# Patient Record
Sex: FEMALE | Race: BLACK OR AFRICAN AMERICAN | NOT HISPANIC OR LATINO | Employment: FULL TIME | URBAN - METROPOLITAN AREA
[De-identification: names, ages, dates, MRNs, and addresses within clinical notes are randomized per-mention and may not be internally consistent; named-entity substitution may affect disease eponyms.]

---

## 2017-01-19 ENCOUNTER — OFFICE VISIT (OUTPATIENT)
Dept: INTERNAL MEDICINE | Facility: CLINIC | Age: 52
End: 2017-01-19
Payer: MEDICAID

## 2017-01-19 ENCOUNTER — LAB VISIT (OUTPATIENT)
Dept: LAB | Facility: OTHER | Age: 52
End: 2017-01-19
Attending: INTERNAL MEDICINE
Payer: MEDICAID

## 2017-01-19 VITALS
HEART RATE: 63 BPM | OXYGEN SATURATION: 99 % | SYSTOLIC BLOOD PRESSURE: 132 MMHG | WEIGHT: 293 LBS | HEIGHT: 67 IN | BODY MASS INDEX: 45.99 KG/M2 | DIASTOLIC BLOOD PRESSURE: 84 MMHG

## 2017-01-19 DIAGNOSIS — Z12.11 COLON CANCER SCREENING: ICD-10-CM

## 2017-01-19 DIAGNOSIS — D50.9 MICROCYTIC ANEMIA: ICD-10-CM

## 2017-01-19 DIAGNOSIS — Z79.4 CONTROLLED TYPE 2 DIABETES MELLITUS WITHOUT COMPLICATION, WITH LONG-TERM CURRENT USE OF INSULIN: Primary | ICD-10-CM

## 2017-01-19 DIAGNOSIS — Z12.39 BREAST CANCER SCREENING: ICD-10-CM

## 2017-01-19 DIAGNOSIS — E11.9 CONTROLLED TYPE 2 DIABETES MELLITUS WITHOUT COMPLICATION, WITH LONG-TERM CURRENT USE OF INSULIN: Primary | ICD-10-CM

## 2017-01-19 DIAGNOSIS — E11.9 CONTROLLED TYPE 2 DIABETES MELLITUS WITHOUT COMPLICATION, WITH LONG-TERM CURRENT USE OF INSULIN: ICD-10-CM

## 2017-01-19 DIAGNOSIS — Z79.4 CONTROLLED TYPE 2 DIABETES MELLITUS WITHOUT COMPLICATION, WITH LONG-TERM CURRENT USE OF INSULIN: ICD-10-CM

## 2017-01-19 DIAGNOSIS — I10 BENIGN ESSENTIAL HYPERTENSION: ICD-10-CM

## 2017-01-19 LAB
ALBUMIN SERPL BCP-MCNC: 3.2 G/DL
ALP SERPL-CCNC: 60 U/L
ALT SERPL W/O P-5'-P-CCNC: 22 U/L
ANION GAP SERPL CALC-SCNC: 8 MMOL/L
ANISOCYTOSIS BLD QL SMEAR: SLIGHT
AST SERPL-CCNC: 18 U/L
BASOPHILS # BLD AUTO: 0.03 K/UL
BASOPHILS NFR BLD: 0.4 %
BILIRUB SERPL-MCNC: 0.5 MG/DL
BUN SERPL-MCNC: 14 MG/DL
CALCIUM SERPL-MCNC: 9.6 MG/DL
CHLORIDE SERPL-SCNC: 108 MMOL/L
CHOLEST/HDLC SERPL: 3.2 {RATIO}
CO2 SERPL-SCNC: 26 MMOL/L
CREAT SERPL-MCNC: 0.9 MG/DL
DIFFERENTIAL METHOD: ABNORMAL
EOSINOPHIL # BLD AUTO: 0.1 K/UL
EOSINOPHIL NFR BLD: 1.7 %
ERYTHROCYTE [DISTWIDTH] IN BLOOD BY AUTOMATED COUNT: 20.2 %
EST. GFR  (AFRICAN AMERICAN): >60 ML/MIN/1.73 M^2
EST. GFR  (NON AFRICAN AMERICAN): >60 ML/MIN/1.73 M^2
FERRITIN SERPL-MCNC: 38 NG/ML
GLUCOSE SERPL-MCNC: 70 MG/DL
HCT VFR BLD AUTO: 33 %
HDL/CHOLESTEROL RATIO: 31.4 %
HDLC SERPL-MCNC: 207 MG/DL
HDLC SERPL-MCNC: 65 MG/DL
HGB BLD-MCNC: 10.4 G/DL
HYPOCHROMIA BLD QL SMEAR: ABNORMAL
IRON SERPL-MCNC: 62 UG/DL
LDLC SERPL CALC-MCNC: 129.6 MG/DL
LYMPHOCYTES # BLD AUTO: 2.2 K/UL
LYMPHOCYTES NFR BLD: 28.6 %
MCH RBC QN AUTO: 21.1 PG
MCHC RBC AUTO-ENTMCNC: 31.5 %
MCV RBC AUTO: 67 FL
MONOCYTES # BLD AUTO: 0.4 K/UL
MONOCYTES NFR BLD: 5.4 %
NEUTROPHILS # BLD AUTO: 4.8 K/UL
NEUTROPHILS NFR BLD: 63.8 %
NONHDLC SERPL-MCNC: 142 MG/DL
PLATELET # BLD AUTO: 251 K/UL
PLATELET BLD QL SMEAR: ABNORMAL
PMV BLD AUTO: ABNORMAL FL
POLYCHROMASIA BLD QL SMEAR: ABNORMAL
POTASSIUM SERPL-SCNC: 4.8 MMOL/L
PROT SERPL-MCNC: 6.9 G/DL
RBC # BLD AUTO: 4.94 M/UL
RETICS/RBC NFR AUTO: 2.9 %
SATURATED IRON: 20 %
SODIUM SERPL-SCNC: 142 MMOL/L
TOTAL IRON BINDING CAPACITY: 314 UG/DL
TRANSFERRIN SERPL-MCNC: 212 MG/DL
TRIGL SERPL-MCNC: 62 MG/DL
WBC # BLD AUTO: 7.53 K/UL

## 2017-01-19 PROCEDURE — 82728 ASSAY OF FERRITIN: CPT

## 2017-01-19 PROCEDURE — 85045 AUTOMATED RETICULOCYTE COUNT: CPT

## 2017-01-19 PROCEDURE — 99999 PR PBB SHADOW E&M-EST. PATIENT-LVL III: CPT | Mod: PBBFAC,,, | Performed by: INTERNAL MEDICINE

## 2017-01-19 PROCEDURE — 80061 LIPID PANEL: CPT

## 2017-01-19 PROCEDURE — 99215 OFFICE O/P EST HI 40 MIN: CPT | Mod: S$PBB,,, | Performed by: INTERNAL MEDICINE

## 2017-01-19 PROCEDURE — 36415 COLL VENOUS BLD VENIPUNCTURE: CPT

## 2017-01-19 PROCEDURE — 83540 ASSAY OF IRON: CPT

## 2017-01-19 PROCEDURE — 83036 HEMOGLOBIN GLYCOSYLATED A1C: CPT

## 2017-01-19 PROCEDURE — 80053 COMPREHEN METABOLIC PANEL: CPT

## 2017-01-19 PROCEDURE — 85025 COMPLETE CBC W/AUTO DIFF WBC: CPT

## 2017-01-19 RX ORDER — METOPROLOL SUCCINATE 50 MG/1
50 TABLET, EXTENDED RELEASE ORAL DAILY
Qty: 90 TABLET | Refills: 1 | Status: SHIPPED | OUTPATIENT
Start: 2017-01-19 | End: 2017-09-06

## 2017-01-19 RX ORDER — METFORMIN HYDROCHLORIDE 1000 MG/1
1000 TABLET, FILM COATED, EXTENDED RELEASE ORAL 2 TIMES DAILY
COMMUNITY
End: 2017-01-19 | Stop reason: SDUPTHER

## 2017-01-19 RX ORDER — HYDROCHLOROTHIAZIDE 25 MG/1
25 TABLET ORAL DAILY
Qty: 90 TABLET | Refills: 1 | Status: SHIPPED | OUTPATIENT
Start: 2017-01-19 | End: 2018-05-16 | Stop reason: SDUPTHER

## 2017-01-19 RX ORDER — METFORMIN HYDROCHLORIDE 1000 MG/1
1000 TABLET, FILM COATED, EXTENDED RELEASE ORAL 2 TIMES DAILY
Qty: 180 TABLET | Refills: 1 | Status: SHIPPED | OUTPATIENT
Start: 2017-01-19 | End: 2017-01-31

## 2017-01-19 NOTE — MR AVS SNAPSHOT
[facility]  [address]  [facility] [address]  Phone: [phone]  Fax: [phone]                  Yayo Carver   2017 2:20 PM   Office Visit    Description:  Female : 1965   Provider:  Kali Hill MD   Department:  [facility]           Reason for Visit     Annual Exam           Diagnoses this Visit        Comments    Controlled type 2 diabetes mellitus without complication, with long-term current use of insulin    -  Primary     Benign essential hypertension         Microcytic anemia         Breast cancer screening         Colon cancer screening                To Do List           Goals (5 Years of Data)     None      Follow-Up and Disposition     Return in about 4 months (around 2017), or if symptoms worsen or fail to improve.       These Medications        Disp Refills Start End    metformin (GLUMETZA) 1000 MG (MOD) 24 hr tablet 180 tablet 1 2017     Take 1 tablet (1,000 mg total) by mouth 2 (two) times daily. - Oral    Pharmacy: [facility] [place] [address] AT [address] [place] [address] Ph #: [phone]       hydrochlorothiazide (HYDRODIURIL) 25 MG tablet 90 tablet 1 2017     Take 1 tablet (25 mg total) by mouth once daily. - Oral    Pharmacy: [address] AT [address] [place] [address] Ph #: [phone]       metoprolol succinate (TOPROL-XL) 50 MG 24 hr tablet 90 tablet 1 2017     Take 1 tablet (50 mg total) by mouth once daily. - Oral    Pharmacy: [address] AT [address] [place] [place] [address] Ph #: [phone]         [facility] Call     [facility] On Call Nurse Care Line -  Assistance  Registered nurses in the [facility] Center provide clinical advisement, health education, appointment booking, and other advisory services.  Call for this free service at [phone].              Medications           Message regarding Medications     Verify the changes and/or additions to your medication regime listed below are the same as discussed with your clinician today.  If any of these changes or additions are incorrect, please notify your healthcare provider.        START taking these NEW medications        Refills    metformin (GLUMETZA) 1000 MG (MOD) 24 hr tablet 1    Sig: Take 1 tablet (1,000 mg total) by mouth 2 (two) times daily.    Class: Normal    Route: Oral      CHANGE how you are taking these medications     Start Taking Instead of    hydrochlorothiazide (HYDRODIURIL) 25 MG tablet hydrochlorothiazide (HYDRODIURIL) 25 MG tablet    Dosage:  Take 1 tablet (25 mg total) by mouth once daily. Dosage:  TK 1 T PO  Daily    Reason for Change:  Reorder       STOP taking these medications     metformin (GLUCOPHAGE) 1000 MG tablet Take 1 tablet (1,000 mg total) by mouth 2 (two) times daily with meals.           Verify that the below list of medications is an accurate representation of the medications you are currently taking.  If none reported, the list may be blank. If incorrect, please contact your healthcare provider. Carry this list with you in case of emergency.           Current Medications     hydrochlorothiazide (HYDRODIURIL) 25 MG tablet Take 1 tablet (25 mg total) by mouth once daily.    ibuprofen (ADVIL,MOTRIN) 800 MG tablet     ibuprofen (ADVIL,MOTRIN) 800 MG tablet TAKE 1 TABLET(800 MG) BY MOUTH THREE TIMES DAILY    insulin aspart (NOVOLOG) 100 unit/mL injection Inject 10 Units into the skin 3 (three) times daily before meals. Inject 10 units underneath skin tid    insulin glargine (TOUJEO SOLOSTAR) 300 unit/mL (1.5 mL) InPn Inject 28 Units into the skin nightly.    metformin (GLUMETZA) 1000 MG (MOD) 24 hr tablet Take 1 tablet (1,000 mg total) by mouth 2 (two) times daily.    metoprolol succinate (TOPROL-XL) 50 MG 24 hr tablet Take 1 tablet (50 mg total) by mouth once daily.    tramadol  "(ULTRAM) 50 mg tablet Take 2 tablets (100 mg total) by mouth every 6 (six) hours as needed.    ergocalciferol (ERGOCALCIFEROL) 50,000 unit Cap Take 1 capsule (50,000 Units total) by mouth twice a week.           Clinical Reference Information           Vital Signs - Last Recorded  Most recent update: 1/19/2017  3:06 PM by Fanta Rosado MA    BP Pulse Ht Wt SpO2 BMI    132/84 63 5' 7" (1.702 m) (!) 144.9 kg (319 lb 7.1 oz) 99% 50.03 kg/m2      Blood Pressure          Most Recent Value    BP  132/84      Allergies as of 1/19/2017     Ace Inhibitors      Immunizations Administered on Date of Encounter - 1/19/2017     None      Orders Placed During Today's Visit      Normal Orders This Visit    Ambulatory consult to Gastroenterology     Future Labs/Procedures Expected by Expires    CBC auto differential  1/19/2017 3/20/2018    Comprehensive metabolic panel  1/19/2017 3/20/2018    Ferritin  1/19/2017 3/20/2018    Hemoglobin A1c  1/19/2017 3/20/2018    Iron and TIBC  1/19/2017 3/20/2018    Lipid panel  1/19/2017 3/20/2018    Mammo Digital Screening Bilat with CAD  1/19/2017 3/21/2018    Reticulocytes  1/19/2017 3/20/2018      "

## 2017-01-19 NOTE — PROGRESS NOTES
"Subjective:       Patient ID: Yayo Carver is a 51 y.o. female.    Chief Complaint: Annual Exam    HPI Comments:   Pt here to re-establish care.      Last visit was 2.5 yrs ago.  Pt says she was dx'd with DM2 in late 2015.  She has been following at [facility] since then.      She is feeling well today.  No c/o.      Recent fasting CBGs have been ranging .  She is taking Lantus at 33u QHS and Novolog at 10u TID WM.  She recalls her last A1c was 5.0%, at which point her Novolog and Lantus doses were lowered.        Review of Systems   Constitutional: Negative.    HENT: Negative.    Eyes: Negative.    Respiratory: Negative.    Cardiovascular: Negative.    Gastrointestinal: Negative.    Genitourinary: Negative.    Musculoskeletal: Negative.    Skin: Negative.    Neurological: Negative.    Psychiatric/Behavioral: Negative.        Objective:       Vitals:    01/19/17 1458   BP: 132/84   Pulse: 63   SpO2: 99%   Weight: (!) 144.9 kg (319 lb 7.1 oz)   Height: 5' 7" (1.702 m)     Physical Exam   Constitutional: She appears well-developed and well-nourished. No distress.   HENT:   Head: Normocephalic and atraumatic.   Right Ear: Tympanic membrane, external ear and ear canal normal.   Left Ear: Tympanic membrane, external ear and ear canal normal.   Mouth/Throat: Uvula is midline, oropharynx is clear and moist and mucous membranes are normal. No oropharyngeal exudate or posterior oropharyngeal erythema.   Eyes: Conjunctivae and EOM are normal. Pupils are equal, round, and reactive to light.   Neck: Normal range of motion. Neck supple.   Cardiovascular: Normal rate, regular rhythm and normal heart sounds.  Exam reveals no gallop and no friction rub.    No murmur heard.  Pulmonary/Chest: Effort normal and breath sounds normal. No respiratory distress. She has no wheezes. She has no rhonchi. She has no rales.   Lymphadenopathy:     She has no cervical adenopathy.   Skin: She is not diaphoretic.       Assessment: "       1. Controlled type 2 diabetes mellitus without complication, with long-term current use of insulin    2. Benign essential hypertension    3. Microcytic anemia    4. Breast cancer screening    5. Colon cancer screening        Plan:           DM2 - Appears to be controlled.  Cont current tx and check new A1c.     HTN  - Adequate control.  Cont current tx.    Microcytic anemia - Seen on old labs.  Will get new CBC and further w/u.      HM - Mammo ordered.  Referred to GI for colon CA screening.

## 2017-01-20 ENCOUNTER — TELEPHONE (OUTPATIENT)
Dept: INTERNAL MEDICINE | Facility: CLINIC | Age: 52
End: 2017-01-20

## 2017-01-20 LAB
ESTIMATED AVG GLUCOSE: 126 MG/DL
HBA1C MFR BLD HPLC: 6 %

## 2017-01-23 ENCOUNTER — PATIENT MESSAGE (OUTPATIENT)
Dept: INTERNAL MEDICINE | Facility: CLINIC | Age: 52
End: 2017-01-23

## 2017-01-23 ENCOUNTER — TELEPHONE (OUTPATIENT)
Dept: INTERNAL MEDICINE | Facility: CLINIC | Age: 52
End: 2017-01-23

## 2017-01-23 RX ORDER — FERROUS SULFATE 325(65) MG
325 TABLET, DELAYED RELEASE (ENTERIC COATED) ORAL DAILY
Qty: 90 TABLET | Refills: 1 | Status: SHIPPED | OUTPATIENT
Start: 2017-01-23 | End: 2017-05-23

## 2017-01-23 RX ORDER — ATORVASTATIN CALCIUM 20 MG/1
20 TABLET, FILM COATED ORAL DAILY
Qty: 90 TABLET | Refills: 3 | Status: SHIPPED | OUTPATIENT
Start: 2017-01-23 | End: 2018-05-16 | Stop reason: SDUPTHER

## 2017-01-24 ENCOUNTER — DOCUMENTATION ONLY (OUTPATIENT)
Dept: BARIATRICS | Facility: CLINIC | Age: 52
End: 2017-01-24

## 2017-01-26 ENCOUNTER — HOSPITAL ENCOUNTER (OUTPATIENT)
Dept: RADIOLOGY | Facility: OTHER | Age: 52
Discharge: HOME | End: 2017-01-26
Attending: INTERNAL MEDICINE
Payer: MEDICAID

## 2017-01-26 DIAGNOSIS — Z12.31 VISIT FOR SCREENING MAMMOGRAM: ICD-10-CM

## 2017-01-26 DIAGNOSIS — Z12.39 BREAST CANCER SCREENING: ICD-10-CM

## 2017-01-26 PROCEDURE — 77063 BREAST TOMOSYNTHESIS BI: CPT | Mod: 26,,, | Performed by: RADIOLOGY

## 2017-01-26 PROCEDURE — 77067 SCR MAMMO BI INCL CAD: CPT | Mod: TC

## 2017-01-26 PROCEDURE — 77067 SCR MAMMO BI INCL CAD: CPT | Mod: 26,,, | Performed by: RADIOLOGY

## 2017-01-27 ENCOUNTER — PATIENT MESSAGE (OUTPATIENT)
Dept: INTERNAL MEDICINE | Facility: CLINIC | Age: 52
End: 2017-01-27

## 2017-01-27 DIAGNOSIS — M25.569 KNEE PAIN, UNSPECIFIED CHRONICITY, UNSPECIFIED LATERALITY: Primary | ICD-10-CM

## 2017-01-27 RX ORDER — IBUPROFEN 800 MG/1
800 TABLET ORAL EVERY 6 HOURS PRN
Qty: 90 TABLET | Refills: 2 | Status: SHIPPED | OUTPATIENT
Start: 2017-01-27 | End: 2017-05-23

## 2017-01-30 DIAGNOSIS — E11.9 TYPE 2 DIABETES MELLITUS WITHOUT COMPLICATION: ICD-10-CM

## 2017-01-31 RX ORDER — METFORMIN HYDROCHLORIDE 1000 MG/1
TABLET ORAL
Qty: 180 TABLET | Refills: 3 | Status: SHIPPED | OUTPATIENT
Start: 2017-01-31 | End: 2018-01-26 | Stop reason: SDUPTHER

## 2017-02-01 NOTE — TELEPHONE ENCOUNTER
----- Message from Deja Esqueda sent at 1/31/2017 10:50 AM CST -----  Contact: DEMARCO DELACRUZ [[id number]]  x_  1st Request  _  2nd Request  _  3rd Request        Who: DEMARCO DELACRUZ [[id number]]    Why: Patient would like doctor to send a prescription for metformin 1000 MG to pharmacy. Patient says insurance would only cover that medication. Please give patient a call back at your earliest convenience. Thanks!     What Number to Call Back: [phone]    When to Expect a call back: (Before the end of the day)   -- if the call is after 12:00, the call back will be tomorrow.

## 2017-02-06 DIAGNOSIS — M25.569 KNEE PAIN, UNSPECIFIED CHRONICITY, UNSPECIFIED LATERALITY: ICD-10-CM

## 2017-02-06 RX ORDER — TRAMADOL HYDROCHLORIDE 50 MG/1
100 TABLET ORAL EVERY 6 HOURS PRN
Qty: 90 TABLET | Refills: 2 | Status: SHIPPED | OUTPATIENT
Start: 2017-02-06 | End: 2017-03-02 | Stop reason: SDUPTHER

## 2017-03-02 ENCOUNTER — OFFICE VISIT (OUTPATIENT)
Dept: ORTHOPEDICS | Facility: CLINIC | Age: 52
End: 2017-03-02
Payer: MEDICAID

## 2017-03-02 VITALS
HEART RATE: 67 BPM | RESPIRATION RATE: 17 BRPM | BODY MASS INDEX: 45.99 KG/M2 | SYSTOLIC BLOOD PRESSURE: 160 MMHG | DIASTOLIC BLOOD PRESSURE: 95 MMHG | HEIGHT: 67 IN | WEIGHT: 293 LBS

## 2017-03-02 DIAGNOSIS — M25.569 KNEE PAIN, UNSPECIFIED CHRONICITY, UNSPECIFIED LATERALITY: ICD-10-CM

## 2017-03-02 DIAGNOSIS — M17.0 PRIMARY OSTEOARTHRITIS OF BOTH KNEES: Primary | ICD-10-CM

## 2017-03-02 PROCEDURE — 99999 PR PBB SHADOW E&M-EST. PATIENT-LVL IV: CPT | Mod: PBBFAC,,, | Performed by: NURSE PRACTITIONER

## 2017-03-02 PROCEDURE — 20610 DRAIN/INJ JOINT/BURSA W/O US: CPT | Mod: 50,PBBFAC | Performed by: NURSE PRACTITIONER

## 2017-03-02 PROCEDURE — 99213 OFFICE O/P EST LOW 20 MIN: CPT | Mod: S$PBB,25,, | Performed by: NURSE PRACTITIONER

## 2017-03-02 PROCEDURE — 99214 OFFICE O/P EST MOD 30 MIN: CPT | Mod: PBBFAC | Performed by: NURSE PRACTITIONER

## 2017-03-02 PROCEDURE — 20610 DRAIN/INJ JOINT/BURSA W/O US: CPT | Mod: 50,S$PBB,, | Performed by: NURSE PRACTITIONER

## 2017-03-02 RX ORDER — TRIAMCINOLONE ACETONIDE 40 MG/ML
80 INJECTION, SUSPENSION INTRA-ARTICULAR; INTRAMUSCULAR
Status: COMPLETED | OUTPATIENT
Start: 2017-03-02 | End: 2017-03-02

## 2017-03-02 RX ORDER — TRAMADOL HYDROCHLORIDE 50 MG/1
100 TABLET ORAL EVERY 6 HOURS PRN
Qty: 90 TABLET | Refills: 2 | Status: SHIPPED | OUTPATIENT
Start: 2017-03-02 | End: 2017-04-27 | Stop reason: SDUPTHER

## 2017-03-02 RX ADMIN — TRIAMCINOLONE ACETONIDE 80 MG: 40 INJECTION, SUSPENSION INTRA-ARTICULAR; INTRAMUSCULAR at 08:03

## 2017-03-02 NOTE — PROGRESS NOTES
CC: Pain of the Left Knee and Pain of the Right Knee      HPI: Pt with bilateral knee pain for the past several weeks right>left. The pain intensified after riding in [place]. The pain is global and aching. It is worse with activity. She has known bilateral knee djd, but has to lose weight before she will be considered for knee replacement. She is taking ibuprofen and tramadol with some relief. She would like to have cortisone injections today if possible. She is ambulating without assistive device. There is not a limp.    ROS  General: denies fever and chills  Resp: no c/o sob  CVS: no c/o cp  MSK: c/o bilateral knee pain which is aching and global and worse with activtiy    PE  General: AAOx3, pleasant and cooperative  Resp: respirations even and unlabored  MSK: bilateral knee exam  0 degrees extension  100 degrees flexion  No warmth or erythema   - effusion    Xray:  Reviewed by me: Right knee: There is moderate to severe DJD and a varus deformity. No fracture dislocation bone destruction seen.    Left knee: There is moderate DJD    Assessment:  Bilateral knee djd    Plan:  Cortisone injections bilateral knees today  RICE  Ibuprofen and tramadol prn  F/u as needed    Knee Injection Procedure Note    Pre-operative Diagnosis: bilateral knee degenerative arthritis    Post-operative Diagnosis: same    Indications: bilateral knee pain    Anesthesia: none    Procedure Details     Verbal consent was obtained for the procedure. The injection site was identified and the skin was prepared with alcohol. The bilateral knee was injected from an anterolateral approach with 1 ml of Kenalog and 5 ml Lidocaine under sterile technique using a 22 gauge needle. The needle was removed and the area cleansed and dressed.    Complications:  None; patient tolerated the procedure well.    she was advised to rest the knee today, using ice and elevation as needed for comfort and swelling. she did receive immediate relief of the knee pain. she  was told this would be short lived and is secondary to the lidocaine. she may have an increase in discomfort tonight followed by steady improvement over the next several days. It may take 1-3 weeks following the injection to get the full benefit of the medication.

## 2017-03-10 ENCOUNTER — TELEPHONE (OUTPATIENT)
Dept: GASTROENTEROLOGY | Facility: CLINIC | Age: 52
End: 2017-03-10

## 2017-03-24 RX ORDER — INSULIN GLARGINE 300 [IU]/ML
28 INJECTION, SOLUTION SUBCUTANEOUS NIGHTLY
Qty: 6 ML | Refills: 3 | Status: SHIPPED | OUTPATIENT
Start: 2017-03-24 | End: 2017-03-31 | Stop reason: SDUPTHER

## 2017-03-24 NOTE — TELEPHONE ENCOUNTER
----- Message from Beatrice Styles sent at 3/24/2017  9:56 AM CDT -----  Contact: pt  _x  1st Request  _  2nd Request  _  3rd Request    Please refill the medication(s) listed below.     Medication #1insulin glargine (TOUJEO SOLOSTAR) 300 unit/mL (1.5 mL) InPn    Medication #2      Preferred Pharmacy:[facility] [address] [place] [address] AT [address]

## 2017-03-31 RX ORDER — INSULIN GLARGINE 300 [IU]/ML
28 INJECTION, SOLUTION SUBCUTANEOUS NIGHTLY
Qty: 6 ML | Refills: 3 | Status: SHIPPED | OUTPATIENT
Start: 2017-03-31 | End: 2017-04-20

## 2017-04-03 ENCOUNTER — TELEPHONE (OUTPATIENT)
Dept: INTERNAL MEDICINE | Facility: CLINIC | Age: 52
End: 2017-04-03

## 2017-04-03 NOTE — TELEPHONE ENCOUNTER
Spoke with pt and she states that insurance company is not covering so i inform her a discount for Toujeo. If she cannot use that then she will call us back and see what is covered by her insurance company.

## 2017-04-03 NOTE — TELEPHONE ENCOUNTER
----- Message from Gracie Yaya sent at 4/3/2017  2:54 PM CDT -----  Contact: Self  X   1st Request  _  2nd Request  _  3rd Request        Who: DEMARCO DELACRUZ [[id number]]    Why: Pt is returning a call. Pt states the pharmacy advised her that her insurance is not covering the insulin glargine, TOUJEO, (TOUJEO SOLOSTAR) 300 unit/mL (1.5 mL) InPn pen. Pt states a different one needs to be sent to the pharmacy. Pt says she has 30 units left which is only enough for tonight. Please call pt, thanks!     What Number to Call Back: [phone]    When to Expect a call back: (Before the end of the day)   -- if the call is after 12:00, the call back will be tomorrow.

## 2017-04-19 RX ORDER — INSULIN GLARGINE 100 [IU]/ML
5 INJECTION, SOLUTION SUBCUTANEOUS 3 TIMES DAILY
Qty: 5 BOX | Refills: 0 | Status: CANCELLED | OUTPATIENT
Start: 2017-04-19 | End: 2018-04-19

## 2017-04-19 NOTE — TELEPHONE ENCOUNTER
----- Message from Josephine Thomas sent at 4/19/2017 10:08 AM CDT -----  _  1st Request  _  2nd Request  _  3rd Request        Who: patient    Why: please call pt concerning her insulin RX     What Number to Call Back: [phone]    When to Expect a call back: (Before the end of the day)   -- if the call is after 12:00, the call back will be tomorrow.

## 2017-04-19 NOTE — TELEPHONE ENCOUNTER
I am seeing some confusion regarding pt's insulin and dose.  When she was here for her most recent visit she said she was taking either 28 units or 33 units of insulin - what type was it she was taking then - was it Toujeo?  If so, when she last took Lantus, what was her dose?

## 2017-04-19 NOTE — TELEPHONE ENCOUNTER
Patient states you told her she can have a rx for lantus until her toujeo came in. Please advise and authorize.

## 2017-04-20 RX ORDER — INSULIN GLARGINE 100 [IU]/ML
30 INJECTION, SOLUTION SUBCUTANEOUS NIGHTLY
Qty: 27 ML | Refills: 1 | Status: SHIPPED | OUTPATIENT
Start: 2017-04-20 | End: 2017-04-25

## 2017-04-20 NOTE — TELEPHONE ENCOUNTER
Spoke with pt and she states she is suppose to be taking 30 units of the Toujeo. And 10 units of  Novolog but both meds iare not covered by her insurance so she is awaiting a application to get the Toujeo at a discount price, she is currently taking the Novolog but is out of Toujeo and is asking for Lantus until she gets other insulin. Please advise.

## 2017-04-20 NOTE — TELEPHONE ENCOUNTER
Please advise pt to keep a glucose log after starting Lantus and to come in for an appt in approx 1 mo to review her glucoses.

## 2017-04-21 NOTE — TELEPHONE ENCOUNTER
----- Message from Gracie Javier sent at 4/21/2017 10:05 AM CDT -----  Contact: Self  X  1st Request  _  2nd Request  _  3rd Request        Who: DEMARCO DELACRUZ [[id number]]    Why: Pt states her insurance needs a form for one of her insulin medications(pt does not know the name of the medication) stating the medicine is necessary and important for her health. Pt also states the pharmacy advised her the  insulin glargine (LANTUS SOLOSTAR) 100 unit/mL (3 mL) InPn pen needs to be resent with only a 30 quantity. Please call pt to further discuss. Thanks!    What Number to Call Back: [phone] and insurance number is   [phone]    When to Expect a call back: (Before the end of the day)   -- if the call is after 12:00, the call back will be tomorrow.

## 2017-04-25 ENCOUNTER — TELEPHONE (OUTPATIENT)
Dept: INTERNAL MEDICINE | Facility: CLINIC | Age: 52
End: 2017-04-25

## 2017-04-25 RX ORDER — INSULIN GLARGINE 100 [IU]/ML
30 INJECTION, SOLUTION SUBCUTANEOUS NIGHTLY
Qty: 30 ML | Refills: 0 | Status: SHIPPED | OUTPATIENT
Start: 2017-04-25 | End: 2017-06-23 | Stop reason: SDUPTHER

## 2017-04-27 ENCOUNTER — OFFICE VISIT (OUTPATIENT)
Dept: ORTHOPEDICS | Facility: CLINIC | Age: 52
End: 2017-04-27
Payer: MEDICAID

## 2017-04-27 VITALS — BODY MASS INDEX: 45.99 KG/M2 | HEIGHT: 67 IN | WEIGHT: 293 LBS

## 2017-04-27 DIAGNOSIS — M25.569 KNEE PAIN, UNSPECIFIED CHRONICITY, UNSPECIFIED LATERALITY: ICD-10-CM

## 2017-04-27 DIAGNOSIS — M17.0 PRIMARY OSTEOARTHRITIS OF BOTH KNEES: Primary | ICD-10-CM

## 2017-04-27 PROCEDURE — 99213 OFFICE O/P EST LOW 20 MIN: CPT | Mod: PBBFAC | Performed by: NURSE PRACTITIONER

## 2017-04-27 PROCEDURE — 99499 UNLISTED E&M SERVICE: CPT | Mod: S$PBB,,, | Performed by: NURSE PRACTITIONER

## 2017-04-27 PROCEDURE — 20610 DRAIN/INJ JOINT/BURSA W/O US: CPT | Mod: 50,PBBFAC | Performed by: NURSE PRACTITIONER

## 2017-04-27 PROCEDURE — 20610 DRAIN/INJ JOINT/BURSA W/O US: CPT | Mod: 50,S$PBB,, | Performed by: NURSE PRACTITIONER

## 2017-04-27 PROCEDURE — 99999 PR PBB SHADOW E&M-EST. PATIENT-LVL III: CPT | Mod: PBBFAC,,, | Performed by: NURSE PRACTITIONER

## 2017-04-27 RX ORDER — FERROUS SULFATE 325(65) MG
TABLET ORAL
Refills: 1 | COMMUNITY
Start: 2017-01-23 | End: 2020-10-12

## 2017-04-27 RX ORDER — AMOXICILLIN 500 MG/1
CAPSULE ORAL
Refills: 0 | COMMUNITY
Start: 2017-04-05 | End: 2017-05-18

## 2017-04-27 RX ORDER — TRIAMCINOLONE ACETONIDE 40 MG/ML
80 INJECTION, SUSPENSION INTRA-ARTICULAR; INTRAMUSCULAR
Status: COMPLETED | OUTPATIENT
Start: 2017-04-27 | End: 2017-04-27

## 2017-04-27 RX ORDER — TRAMADOL HYDROCHLORIDE 50 MG/1
100 TABLET ORAL EVERY 6 HOURS PRN
Qty: 90 TABLET | Refills: 2 | Status: SHIPPED | OUTPATIENT
Start: 2017-04-27 | End: 2017-06-07 | Stop reason: SDUPTHER

## 2017-04-27 RX ADMIN — TRIAMCINOLONE ACETONIDE 80 MG: 40 INJECTION, SUSPENSION INTRA-ARTICULAR; INTRAMUSCULAR at 03:04

## 2017-04-27 NOTE — PROGRESS NOTES
Pt with bilateral knee djd. She is still working on losing weight so she can have knee replacement surgery. 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 Her last injections were in the beginning of March, prior to that she last had them in December. She is taking tramadol for severe pain.    Knee Injection Procedure Note    Pre-operative Diagnosis: bilateral knee degenerative arthritis    Post-operative Diagnosis: same    Indications: bilateral knee pain    Anesthesia: none    Procedure Details     Verbal consent was obtained for the procedure. The injection site was identified and the skin was prepared with alcohol. The bilateral knee was injected from an anterolateral approach with 1 ml of Kenalog and 5 ml Lidocaine under sterile technique using a 22 gauge needle. The needle was removed and the area cleansed and dressed.    Complications:  None; patient tolerated the procedure well.    she was advised to rest the knee today, using ice and elevation as needed for comfort and swelling. she did receive immediate relief of the knee pain. she was told this would be short lived and is secondary to the lidocaine. she may have an increase in discomfort tonight followed by steady improvement over the next several days. It may take 1-3 weeks following the injection to get the full benefit of the medication.

## 2017-05-05 ENCOUNTER — TELEPHONE (OUTPATIENT)
Dept: GASTROENTEROLOGY | Facility: CLINIC | Age: 52
End: 2017-05-05

## 2017-05-09 ENCOUNTER — TELEPHONE (OUTPATIENT)
Dept: GASTROENTEROLOGY | Facility: CLINIC | Age: 52
End: 2017-05-09

## 2017-05-09 NOTE — TELEPHONE ENCOUNTER
----- Message from Kali Hill MD sent at 5/8/2017  5:58 PM CDT -----  Hi and thanks for your message.      When a patient is having their first ever colonoscopy I typically refer for an appointment (instead of just ordering the colonoscopy) so that they have the opportunity to consult with the physician and ask any questions they may have.      Thanks,  Dr Hill    ----- Message -----     From: Yaritza Hill RN     Sent: 5/5/2017   6:30 PM       To: Kali Hill MD    Hi Dr. Hill,    I just wanted to touch base with you re Ms. Carver.  It looks like she has an order for a screening colonoscopy, however she was set up with a consult to see GI.  Is she having any GI concerns that you believe she needs to see GI for?    If the order is placed for the colonoscopy, the pt does not need to see GI for a consult.  Once the order is placed the patient can call [phone] to set up their procedure.    We are happy to see her if you feel she needs to be seen. Let us know your thoughts.    Thank you,  [facility]

## 2017-05-09 NOTE — TELEPHONE ENCOUNTER
Spoke to Ms. Zee, appt set up at the request of Dr. Hill for screening colonoscopy.    Appt made for 5/12 at 1100.    Directions given,pt verbalizes understanding.

## 2017-05-16 DIAGNOSIS — M25.561 ACUTE PAIN OF BOTH KNEES: Primary | ICD-10-CM

## 2017-05-16 DIAGNOSIS — M25.562 ACUTE PAIN OF BOTH KNEES: Primary | ICD-10-CM

## 2017-05-16 RX ORDER — IBUPROFEN 800 MG/1
800 TABLET ORAL 3 TIMES DAILY
Qty: 90 TABLET | Refills: 2 | Status: SHIPPED | OUTPATIENT
Start: 2017-05-16 | End: 2017-08-10 | Stop reason: SDUPTHER

## 2017-05-17 ENCOUNTER — TELEPHONE (OUTPATIENT)
Dept: GASTROENTEROLOGY | Facility: CLINIC | Age: 52
End: 2017-05-17

## 2017-05-18 ENCOUNTER — TELEPHONE (OUTPATIENT)
Dept: ENDOSCOPY | Facility: HOSPITAL | Age: 52
End: 2017-05-18

## 2017-05-18 ENCOUNTER — OFFICE VISIT (OUTPATIENT)
Dept: GASTROENTEROLOGY | Facility: CLINIC | Age: 52
End: 2017-05-18
Payer: MEDICAID

## 2017-05-18 VITALS
HEIGHT: 67 IN | DIASTOLIC BLOOD PRESSURE: 90 MMHG | HEART RATE: 82 BPM | BODY MASS INDEX: 45.99 KG/M2 | WEIGHT: 293 LBS | SYSTOLIC BLOOD PRESSURE: 145 MMHG

## 2017-05-18 DIAGNOSIS — Z12.11 SCREEN FOR COLON CANCER: Primary | ICD-10-CM

## 2017-05-18 DIAGNOSIS — Z12.11 SPECIAL SCREENING FOR MALIGNANT NEOPLASMS, COLON: Primary | ICD-10-CM

## 2017-05-18 PROCEDURE — 99499 UNLISTED E&M SERVICE: CPT | Mod: S$PBB,,, | Performed by: NURSE PRACTITIONER

## 2017-05-18 PROCEDURE — 99999 PR PBB SHADOW E&M-EST. PATIENT-LVL IV: CPT | Mod: PBBFAC,,, | Performed by: NURSE PRACTITIONER

## 2017-05-18 PROCEDURE — 99214 OFFICE O/P EST MOD 30 MIN: CPT | Mod: PBBFAC | Performed by: NURSE PRACTITIONER

## 2017-05-18 RX ORDER — POLYETHYLENE GLYCOL 3350, SODIUM SULFATE ANHYDROUS, SODIUM BICARBONATE, SODIUM CHLORIDE, POTASSIUM CHLORIDE 236; 22.74; 6.74; 5.86; 2.97 G/4L; G/4L; G/4L; G/4L; G/4L
4 POWDER, FOR SOLUTION ORAL ONCE
Qty: 4000 ML | Refills: 0 | Status: SHIPPED | OUTPATIENT
Start: 2017-05-18 | End: 2017-05-18

## 2017-05-18 NOTE — MR AVS SNAPSHOT
[facility]  [address]  [facility] [address]  Phone: [phone]  Fax: [phone]                  Yayo Carver   2017 2:30 PM   Office Visit    Description:  Female : 1965   Provider:  Areli Neal NP   Department:  [facility]           Reason for Visit     Colonoscopy           Diagnoses this Visit        Comments    Screen for colon cancer    -  Primary            To Do List           Future Appointments        Provider Department Dept Phone    2017 1:40 PM Kali Hill MD [facility] [place] [phone]      Goals (5 Years of Data)     None      [facility] On Call     [facility] On Call Nurse Care Line -  Assistance  Unless otherwise directed by your provider, please contact [facility], our nurse care line that is available for  assistance.     Registered nurses in the [facility] On Call Center provide: appointment scheduling, clinical advisement, health education, and other advisory services.  Call: [phone] (toll free)               Medications           Message regarding Medications     Verify the changes and/or additions to your medication regime listed below are the same as discussed with your clinician today.  If any of these changes or additions are incorrect, please notify your healthcare provider.        STOP taking these medications     amoxicillin (AMOXIL) 500 MG capsule TK 1 C PO  TID TAT           Verify that the below list of medications is an accurate representation of the medications you are currently taking.  If none reported, the list may be blank. If incorrect, please contact your healthcare provider. Carry this list with you in case of emergency.           Current Medications     ergocalciferol (ERGOCALCIFEROL) 50,000 unit Cap Take 1 capsule (50,000 Units total) by mouth twice a week.    ferrous sulfate 325 (65 FE) MG EC tablet Take 1 tablet (325 mg total) by mouth once daily.    ferrous  "sulfate 325 mg (65 mg iron) Tab tablet TK 1 T PO  ONCE D    hydrochlorothiazide (HYDRODIURIL) 25 MG tablet Take 1 tablet (25 mg total) by mouth once daily.    ibuprofen (ADVIL,MOTRIN) 800 MG tablet TAKE 1 TABLET(800 MG) BY MOUTH THREE TIMES DAILY    ibuprofen (ADVIL,MOTRIN) 800 MG tablet Take 1 tablet (800 mg total) by mouth every 6 (six) hours as needed for Pain.    insulin aspart (NOVOLOG) 100 unit/mL injection Inject 10 Units into the skin 3 (three) times daily before meals. Inject 10 units underneath skin tid    insulin glargine (BASAGLAR KWIKPEN) 100 unit/mL (3 mL) InPn pen Inject 30 Units into the skin every evening.    metformin (GLUCOPHAGE) 1000 MG tablet TAKE 1 TABLET TWICE DAILY WITH AM AND PM MEALS    metoprolol succinate (TOPROL-XL) 50 MG 24 hr tablet Take 1 tablet (50 mg total) by mouth once daily.    tramadol (ULTRAM) 50 mg tablet Take 2 tablets (100 mg total) by mouth every 6 (six) hours as needed.    atorvastatin (LIPITOR) 20 MG tablet Take 1 tablet (20 mg total) by mouth once daily.    ibuprofen (ADVIL,MOTRIN) 800 MG tablet Take 1 tablet (800 mg total) by mouth 3 (three) times daily.           Clinical Reference Information           Your Vitals Were     BP Pulse Height Weight BMI    145/90 82 5' 7" (1.702 m) 147 kg (324 lb 1.2 oz) 50.76 kg/m2      Blood Pressure          Most Recent Value    BP  (!)  145/90      Allergies as of 5/18/2017     Ace Inhibitors      Immunizations Administered on Date of Encounter - 5/18/2017     None      Orders Placed During Today's Visit      Normal Orders This Visit    Case request GI: COLONOSCOPY       Language Assistance Services     ATTENTION: Language assistance services are available, free of charge. Please call [phone].      ATENCIÓN: Si habla vinita, tiene a moreno disposición servicios gratuitos de asistencia lingüística. Llame al [phone].     CHÚ Ý: N?u b?n nói Ti?ng Vi?t, có các d?ch v? h? tr? ngôn ng? mi?n phí dành cho b?n. G?i s? " [phone].         [facility] complies with applicable Federal civil rights laws and does not discriminate on the basis of race, color, national origin, age, disability, or sex.

## 2017-05-18 NOTE — PROGRESS NOTES
[facility] Note    Reason for Visit:  The encounter diagnosis was Screen for colon cancer.    PCP:   Kali Hill   [address] / [place] [address]    Referring MD:  Kali Hill Md  [address] [address]    HPI:  This is a 51 y.o. female here for evaluation of screening colonoscopy.    Last colonoscopy-none  Family hx of colon cancer- MGF colon cancer dx at ? Pt unsure  Family hx of colon polyps-Mother with 2 benign polyps removed  Change in bowel habits- denies   Pencil thin stools- denies  Bowel habits - normal. 2-3 bristol type 4 BM/day  GI bleeding - denies hematochezia, hematemesis, melena, BRBPR, black/tarry stools, and coffee ground emesis    Abdominal pain - no  Reflux - no  Dysphagia/odynophagia - no   NSAID usage -ibuprofen a few days per week a few weeks ago for acute knee pain. Usually avoids.     ROS:  Constitutional: No fevers, no chills, No unintentional weight loss, no fatigue,   ENT: No allergies  CV: No chest pain, no palpitations, no perif. edema, no sob on exertion  Pulm: No cough, No shortness of breath, no wheezes, no sputum  Ophtho: No vision changes  GI: see HPI; also no nausea, no vomiting, no change in appetite  Derm: No rash  Heme: No lymphadenopathy, No bruising  MSK: + arthritis, no muscle pain, no muscle weakness  : No dysuria, No hematuria  Endo: No hot or cold intolerance  Neuro: No syncope, No seizure,       Medical History:  has a past medical history of Diabetes mellitus and Hypertension.    Surgical History:  has a past surgical history that includes Tubal ligation and Tonsillectomy.    Family History: family history includes Breast cancer in her maternal grandmother and mother; Cancer in her mother; Colon cancer in her maternal grandmother; Colon polyps in her mother; Diabetes in her father, mother, paternal grandmother, and paternal uncle; Heart disease in her paternal grandmother; No Known Problems in her brother,  maternal grandfather, paternal grandfather, and sister. There is no history of Esophageal cancer, Irritable bowel syndrome, Rectal cancer, Stomach cancer, Ulcerative colitis, Celiac disease, or Crohn's disease..     Social History:  reports that she has never smoked. She has never used smokeless tobacco. She reports that she drinks alcohol. She reports that she does not use illicit drugs.    Review of patient's allergies indicates:   Allergen Reactions    Ace inhibitors Edema       Current Outpatient Prescriptions   Medication Sig    ergocalciferol (ERGOCALCIFEROL) 50,000 unit Cap Take 1 capsule (50,000 Units total) by mouth twice a week.    ferrous sulfate 325 (65 FE) MG EC tablet Take 1 tablet (325 mg total) by mouth once daily.    ferrous sulfate 325 mg (65 mg iron) Tab tablet TK 1 T PO  ONCE D    hydrochlorothiazide (HYDRODIURIL) 25 MG tablet Take 1 tablet (25 mg total) by mouth once daily.    ibuprofen (ADVIL,MOTRIN) 800 MG tablet TAKE 1 TABLET(800 MG) BY MOUTH THREE TIMES DAILY    ibuprofen (ADVIL,MOTRIN) 800 MG tablet Take 1 tablet (800 mg total) by mouth every 6 (six) hours as needed for Pain.    insulin aspart (NOVOLOG) 100 unit/mL injection Inject 10 Units into the skin 3 (three) times daily before meals. Inject 10 units underneath skin tid    insulin glargine (BASAGLAR KWIKPEN) 100 unit/mL (3 mL) InPn pen Inject 30 Units into the skin every evening.    metformin (GLUCOPHAGE) 1000 MG tablet TAKE 1 TABLET TWICE DAILY WITH AM AND PM MEALS    metoprolol succinate (TOPROL-XL) 50 MG 24 hr tablet Take 1 tablet (50 mg total) by mouth once daily.    tramadol (ULTRAM) 50 mg tablet Take 2 tablets (100 mg total) by mouth every 6 (six) hours as needed.    atorvastatin (LIPITOR) 20 MG tablet Take 1 tablet (20 mg total) by mouth once daily.    ibuprofen (ADVIL,MOTRIN) 800 MG tablet Take 1 tablet (800 mg total) by mouth 3 (three) times daily.     No current facility-administered medications for this visit.   "      Objective Findings:    Vital Signs:  BP (!) 145/90  Pulse 82  Ht 5' 7" (1.702 m)  Wt (!) 147 kg (324 lb 1.2 oz)  BMI 50.76 kg/m2  Body mass index is 50.76 kg/(m^2).    Physical Exam:  General Appearance: Well appearing in no acute distress  Head:   Normocephalic, without obvious abnormality  Eyes:    No scleral icterus, EOMI  ENT: Neck supple, Lips, mucosa, and tongue normal; teeth and gums normal  Lungs: CTA bilaterally in anterior and posterior fields, no wheezes, no crackles.  Heart:  Regular rate and rhythm, S1, S2 normal, no murmurs heard  Abdomen: Soft, non tender, non distended with positive bowel sounds in all four quadrants. No hepatosplenomegaly, ascites, or mass  Extremities: 2+ radial pulses, no clubbing, cyanosis or edema  Skin: No rash to exposed areas  Neurologic: A&Ox4      Labs:  Lab Results   Component Value Date    WBC 7.53 01/19/2017    HGB 10.4 (L) 01/19/2017    HCT 33.0 (L) 01/19/2017     01/19/2017    CHOL 207 (H) 01/19/2017    TRIG 62 01/19/2017    HDL 65 01/19/2017    ALT 22 01/19/2017    AST 18 01/19/2017     01/19/2017    K 4.8 01/19/2017     01/19/2017    CREATININE 0.9 01/19/2017    BUN 14 01/19/2017    CO2 26 01/19/2017    TSH 2.285 08/22/2016    INR 0.9 10/02/2015    HGBA1C 6.0 01/19/2017       Endoscopy:    8/22/2016  EGD Dr. Crisostomo-gastric erythema.  Mild, chronic gastritis. No h .pylori.  Assessment:  1. Screen for colon cancer           Recommendations:  1. Screen for colon cancer-screening colonoscopy ordered for 2nd floor d/t BMI > 50 (high anesthesia risk).           Order summary:  Orders Placed This Encounter    Case request GI: COLONOSCOPY         Thank you so much for allowing me to participate in the care of Yayo Carver    Areli Neal, APRN, FNP-C      "

## 2017-05-18 NOTE — LETTER
May 19, 2017      Kali Hill MD  [address]  [facility] [address]           [facility]  [address]  [facility] [address]  Phone: [phone]  Fax: [phone]          Patient: Yayo Carver   MR Number: [id number]   YOB: 1965   Date of Visit: 5/18/2017       Dear Dr. Kali Hill:    Thank you for referring Yayo Carver to me for evaluation. Attached you will find relevant portions of my assessment and plan of care.    If you have questions, please do not hesitate to call me. I look forward to following Yayo Carver along with you.    Sincerely,    Areli Neal, SHAHNAZ    Enclosure  CC:  No Recipients    If you would like to receive this communication electronically, please contact [email or web address] or [phone] to request more information on [employer] Link access.    For providers and/or their staff who would like to refer a patient to [facility], please contact us through our one-stop-shop provider referral line, [facility] , at [phone].    If you feel you have received this communication in error or would no longer like to receive these types of communications, please e-mail [email or web address]

## 2017-05-23 ENCOUNTER — OFFICE VISIT (OUTPATIENT)
Dept: INTERNAL MEDICINE | Facility: CLINIC | Age: 52
End: 2017-05-23
Payer: MEDICAID

## 2017-05-23 ENCOUNTER — LAB VISIT (OUTPATIENT)
Dept: LAB | Facility: OTHER | Age: 52
End: 2017-05-23
Attending: INTERNAL MEDICINE
Payer: MEDICAID

## 2017-05-23 VITALS
DIASTOLIC BLOOD PRESSURE: 96 MMHG | HEIGHT: 67 IN | SYSTOLIC BLOOD PRESSURE: 128 MMHG | WEIGHT: 293 LBS | HEART RATE: 79 BPM | BODY MASS INDEX: 45.99 KG/M2 | OXYGEN SATURATION: 99 %

## 2017-05-23 DIAGNOSIS — Z11.59 NEED FOR HEPATITIS C SCREENING TEST: ICD-10-CM

## 2017-05-23 DIAGNOSIS — E66.01 MORBID OBESITY WITH BMI OF 50.0-59.9, ADULT: ICD-10-CM

## 2017-05-23 DIAGNOSIS — E11.9 CONTROLLED TYPE 2 DIABETES MELLITUS WITHOUT COMPLICATION, WITH LONG-TERM CURRENT USE OF INSULIN: Primary | ICD-10-CM

## 2017-05-23 DIAGNOSIS — Z79.4 CONTROLLED TYPE 2 DIABETES MELLITUS WITHOUT COMPLICATION, WITH LONG-TERM CURRENT USE OF INSULIN: ICD-10-CM

## 2017-05-23 DIAGNOSIS — Z79.4 CONTROLLED TYPE 2 DIABETES MELLITUS WITHOUT COMPLICATION, WITH LONG-TERM CURRENT USE OF INSULIN: Primary | ICD-10-CM

## 2017-05-23 DIAGNOSIS — I10 BENIGN ESSENTIAL HYPERTENSION: ICD-10-CM

## 2017-05-23 DIAGNOSIS — E11.9 CONTROLLED TYPE 2 DIABETES MELLITUS WITHOUT COMPLICATION, WITH LONG-TERM CURRENT USE OF INSULIN: ICD-10-CM

## 2017-05-23 DIAGNOSIS — Z01.419 ROUTINE GYNECOLOGICAL EXAMINATION: ICD-10-CM

## 2017-05-23 PROCEDURE — 36415 COLL VENOUS BLD VENIPUNCTURE: CPT

## 2017-05-23 PROCEDURE — 86803 HEPATITIS C AB TEST: CPT

## 2017-05-23 PROCEDURE — 99999 PR PBB SHADOW E&M-EST. PATIENT-LVL IV: CPT | Mod: PBBFAC,,, | Performed by: INTERNAL MEDICINE

## 2017-05-23 PROCEDURE — 83036 HEMOGLOBIN GLYCOSYLATED A1C: CPT

## 2017-05-23 PROCEDURE — 99214 OFFICE O/P EST MOD 30 MIN: CPT | Mod: S$PBB,,, | Performed by: INTERNAL MEDICINE

## 2017-05-23 RX ORDER — INSULIN ASPART 100 [IU]/ML
10 INJECTION, SOLUTION INTRAVENOUS; SUBCUTANEOUS
Qty: 10 ML | Refills: 5 | Status: SHIPPED | OUTPATIENT
Start: 2017-05-23 | End: 2017-06-07

## 2017-05-23 RX ORDER — AMLODIPINE BESYLATE 10 MG/1
10 TABLET ORAL DAILY
Qty: 90 TABLET | Refills: 1 | Status: SHIPPED | OUTPATIENT
Start: 2017-05-23 | End: 2017-09-06

## 2017-05-23 NOTE — PROGRESS NOTES
Subjective:       Patient ID: Yayo Carver is a 51 y.o. female.    Chief Complaint: Diabetes      Pt here for f/u DM2 and other issues.      She reports that her fasting glucose has been doing well lately, around 100.  No lows.          Diabetes   She has type 2 diabetes mellitus. No MedicAlert identification noted. The initial diagnosis of diabetes was made 2 years ago. Hypoglycemia symptoms include sweats. Pertinent negatives for hypoglycemia include no confusion, dizziness, headaches, hunger, mood changes, nervousness/anxiousness, pallor, seizures, sleepiness, speech difficulty or tremors. Pertinent negatives for diabetes include no blurred vision, no chest pain, no fatigue, no foot paresthesias, no foot ulcerations, no polydipsia, no polyphagia, no polyuria, no visual change, no weakness and no weight loss. Symptoms are improving. Pertinent negatives for diabetic complications include no autonomic neuropathy, CVA, heart disease, impotence, nephropathy, peripheral neuropathy, PVD or retinopathy. Risk factors for coronary artery disease include dyslipidemia, hypertension and obesity. Current diabetic treatment includes insulin injections and oral agent (triple therapy). She is compliant with treatment most of the time. She is currently taking insulin pre-breakfast, pre-lunch, pre-dinner and at bedtime. Insulin injections are given by patient. Rotation sites for injection include the abdominal wall. Her weight is fluctuating dramatically. She is following a generally healthy, high fat/cholesterol and high salt diet. Meal planning includes avoidance of concentrated sweets. She has not had a previous visit with a dietitian. She participates in exercise three times a week. She monitors blood glucose at home 1-2 x per day. She monitors urine at home <1 x per month. Blood glucose monitoring compliance is excellent. Her home blood glucose trend is decreasing steadily. She sees a podiatrist.Eye exam is current.  "    Review of Systems   Constitutional: Negative for fatigue and weight loss.   Eyes: Negative for blurred vision.   Cardiovascular: Negative for chest pain.   Endocrine: Negative for polydipsia, polyphagia and polyuria.   Genitourinary: Negative for impotence.   Skin: Negative for pallor.   Neurological: Negative for dizziness, tremors, seizures, speech difficulty, weakness and headaches.   Psychiatric/Behavioral: Negative for confusion. The patient is not nervous/anxious.        Objective:       Vitals:    05/23/17 0946   BP: (!) 128/96   Pulse: 79   SpO2: 99%   Weight: (!) 143.5 kg (316 lb 5.8 oz)   Height: 5' 7" (1.702 m)     Physical Exam   Constitutional: She appears well-developed and well-nourished. No distress.   HENT:   Head: Normocephalic and atraumatic.   Eyes: Conjunctivae and EOM are normal. Pupils are equal, round, and reactive to light.   Cardiovascular:   Pulses:       Dorsalis pedis pulses are 2+ on the right side, and 2+ on the left side.        Posterior tibial pulses are 2+ on the right side, and 2+ on the left side.   Musculoskeletal:        Right foot: There is no deformity.        Left foot: There is no deformity.   Feet:   Right Foot:   Skin Integrity: Negative for ulcer, blister, skin breakdown, erythema, warmth, callus or dry skin.   Left Foot:   Skin Integrity: Negative for ulcer, blister, skin breakdown, erythema, warmth, callus or dry skin.   Skin: No rash noted. She is not diaphoretic.   Psychiatric: She has a normal mood and affect. Her behavior is normal. Thought content normal.       Assessment:       1. Controlled type 2 diabetes mellitus without complication, with long-term current use of insulin    2. Benign essential hypertension    3. Morbid obesity with BMI of 50.0-59.9, adult    4. Need for hepatitis C screening test    5. Routine gynecological examination        Plan:           DM2 - Controlled when last tested.  Cont current tx and check new A1c.     HTN - DBP above goal.  " Cont current tx and add amlodipine.  Pt declined ARB.     Obesity - Overall stable.  Pt inquired about weight loss medication.  I offered referral to Dr Markham but she declined.     HM - Tdap today.  Update labs.  Referred to OB/GYN.

## 2017-05-24 LAB
ESTIMATED AVG GLUCOSE: 128 MG/DL
HBA1C MFR BLD HPLC: 6.1 %
HCV AB SERPL QL IA: NEGATIVE

## 2017-05-25 ENCOUNTER — PATIENT MESSAGE (OUTPATIENT)
Dept: INTERNAL MEDICINE | Facility: CLINIC | Age: 52
End: 2017-05-25

## 2017-06-01 DIAGNOSIS — M17.0 PRIMARY OSTEOARTHRITIS OF BOTH KNEES: Primary | ICD-10-CM

## 2017-06-07 ENCOUNTER — TELEPHONE (OUTPATIENT)
Dept: INTERNAL MEDICINE | Facility: CLINIC | Age: 52
End: 2017-06-07

## 2017-06-07 DIAGNOSIS — M25.569 KNEE PAIN, UNSPECIFIED CHRONICITY, UNSPECIFIED LATERALITY: ICD-10-CM

## 2017-06-07 RX ORDER — INSULIN LISPRO 100 [IU]/ML
10 INJECTION, SOLUTION INTRAVENOUS; SUBCUTANEOUS
Qty: 10 ML | Refills: 5 | Status: SHIPPED | OUTPATIENT
Start: 2017-06-07 | End: 2018-05-18 | Stop reason: SDUPTHER

## 2017-06-07 RX ORDER — TRAMADOL HYDROCHLORIDE 50 MG/1
100 TABLET ORAL EVERY 6 HOURS PRN
Qty: 90 TABLET | Refills: 2 | Status: SHIPPED | OUTPATIENT
Start: 2017-06-07 | End: 2017-07-12 | Stop reason: SDUPTHER

## 2017-06-07 NOTE — TELEPHONE ENCOUNTER
Left message to inform pt that her new med was sent to rx and to give office a call if she has any other questions or concerns.

## 2017-06-22 ENCOUNTER — CLINICAL SUPPORT (OUTPATIENT)
Dept: REHABILITATION | Facility: HOSPITAL | Age: 52
End: 2017-06-22
Attending: NURSE PRACTITIONER
Payer: MEDICAID

## 2017-06-22 DIAGNOSIS — G89.29 CHRONIC PAIN OF BOTH KNEES: ICD-10-CM

## 2017-06-22 DIAGNOSIS — M25.562 CHRONIC PAIN OF BOTH KNEES: ICD-10-CM

## 2017-06-22 DIAGNOSIS — M17.0 PRIMARY OSTEOARTHRITIS OF BOTH KNEES: ICD-10-CM

## 2017-06-22 DIAGNOSIS — M25.561 CHRONIC PAIN OF BOTH KNEES: ICD-10-CM

## 2017-06-22 PROCEDURE — 97161 PT EVAL LOW COMPLEX 20 MIN: CPT
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
## 2017-06-23 ENCOUNTER — TELEPHONE (OUTPATIENT)
Dept: INTERNAL MEDICINE | Facility: CLINIC | Age: 52
End: 2017-06-23

## 2017-06-23 RX ORDER — INSULIN GLARGINE 100 [IU]/ML
30 INJECTION, SOLUTION SUBCUTANEOUS NIGHTLY
Qty: 30 ML | Refills: 0 | Status: SHIPPED | OUTPATIENT
Start: 2017-06-23 | End: 2018-01-31 | Stop reason: SDUPTHER

## 2017-06-23 NOTE — TELEPHONE ENCOUNTER
"----- Message from Marilee Roberts sent at 6/23/2017  7:39 AM CDT -----  Contact: Patient herself  X  1st Request  _  2nd Request  _  3rd Request    Who:  Yayo Carver (mrn# [id number])    Why: Patient called and said, "she's returning your call and she's still completely out of both of her insulin."  Please give a call back at your earliest convenience.     THANKS!     What Number to Call Back:  [phone]    When to Expect a call back: (Before the end of the day)   -- if the call is after 12:00, the call back will be tomorrow.                        "

## 2017-06-23 NOTE — TELEPHONE ENCOUNTER
"----- Message from Marilee Roberts sent at 6/23/2017  7:39 AM CDT -----  Contact: Patient herself  X  1st Request  _  2nd Request  _  3rd Request    Who:  Yayo Carver (mrn# [id number])    Why: Patient called and said, "she's returning your call and she's still completely out of both of her insulin."  Please give a call back at your earliest convenience.     THANKS!     What Number to Call Back:  [phone]    When to Expect a call back: (Before the end of the day)   -- if the call is after 12:00, the call back will be tomorrow.                        "

## 2017-06-26 ENCOUNTER — ANESTHESIA EVENT (OUTPATIENT)
Dept: ENDOSCOPY | Facility: HOSPITAL | Age: 52
End: 2017-06-26
Payer: MEDICAID

## 2017-06-27 ENCOUNTER — SURGERY (OUTPATIENT)
Age: 52
End: 2017-06-27

## 2017-06-27 ENCOUNTER — HOSPITAL ENCOUNTER (OUTPATIENT)
Facility: HOSPITAL | Age: 52
Discharge: HOME | End: 2017-06-27
Attending: INTERNAL MEDICINE | Admitting: INTERNAL MEDICINE
Payer: MEDICAID

## 2017-06-27 ENCOUNTER — ANESTHESIA (OUTPATIENT)
Dept: ENDOSCOPY | Facility: HOSPITAL | Age: 52
End: 2017-06-27
Payer: MEDICAID

## 2017-06-27 VITALS
SYSTOLIC BLOOD PRESSURE: 132 MMHG | DIASTOLIC BLOOD PRESSURE: 68 MMHG | TEMPERATURE: 98 F | WEIGHT: 293 LBS | HEART RATE: 70 BPM | OXYGEN SATURATION: 100 % | BODY MASS INDEX: 45.99 KG/M2 | HEIGHT: 67 IN | RESPIRATION RATE: 18 BRPM

## 2017-06-27 DIAGNOSIS — Z12.11 SCREEN FOR COLON CANCER: Primary | ICD-10-CM

## 2017-06-27 LAB
POCT GLUCOSE: 117 MG/DL (ref 70–110)
POCT GLUCOSE: 94 MG/DL (ref 70–110)

## 2017-06-27 PROCEDURE — 82962 GLUCOSE BLOOD TEST: CPT | Performed by: INTERNAL MEDICINE

## 2017-06-27 PROCEDURE — 63600175 PHARM REV CODE 636 W HCPCS: Performed by: NURSE ANESTHETIST, CERTIFIED REGISTERED

## 2017-06-27 PROCEDURE — 25000003 PHARM REV CODE 250: Performed by: NURSE ANESTHETIST, CERTIFIED REGISTERED

## 2017-06-27 PROCEDURE — 37000009 HC ANESTHESIA EA ADD 15 MINS: Performed by: INTERNAL MEDICINE

## 2017-06-27 PROCEDURE — D9220A PRA ANESTHESIA: Mod: ANES,,, | Performed by: ANESTHESIOLOGY

## 2017-06-27 PROCEDURE — D9220A PRA ANESTHESIA: Mod: CRNA,,, | Performed by: NURSE ANESTHETIST, CERTIFIED REGISTERED

## 2017-06-27 PROCEDURE — 45378 DIAGNOSTIC COLONOSCOPY: CPT | Mod: ,,, | Performed by: INTERNAL MEDICINE

## 2017-06-27 PROCEDURE — G0121 COLON CA SCRN NOT HI RSK IND: HCPCS | Performed by: INTERNAL MEDICINE

## 2017-06-27 PROCEDURE — 25000003 PHARM REV CODE 250: Performed by: INTERNAL MEDICINE

## 2017-06-27 PROCEDURE — 37000008 HC ANESTHESIA 1ST 15 MINUTES: Performed by: INTERNAL MEDICINE

## 2017-06-27 RX ORDER — FENTANYL CITRATE 50 UG/ML
INJECTION, SOLUTION INTRAMUSCULAR; INTRAVENOUS
Status: DISCONTINUED | OUTPATIENT
Start: 2017-06-27 | End: 2017-06-27

## 2017-06-27 RX ORDER — LIDOCAINE HCL/PF 100 MG/5ML
SYRINGE (ML) INTRAVENOUS
Status: DISCONTINUED | OUTPATIENT
Start: 2017-06-27 | End: 2017-06-27

## 2017-06-27 RX ORDER — PROPOFOL 10 MG/ML
VIAL (ML) INTRAVENOUS CONTINUOUS PRN
Status: DISCONTINUED | OUTPATIENT
Start: 2017-06-27 | End: 2017-06-27

## 2017-06-27 RX ORDER — SODIUM CHLORIDE 9 MG/ML
INJECTION, SOLUTION INTRAVENOUS CONTINUOUS
Status: DISCONTINUED | OUTPATIENT
Start: 2017-06-27 | End: 2017-06-27 | Stop reason: HOSPADM

## 2017-06-27 RX ORDER — PROPOFOL 10 MG/ML
VIAL (ML) INTRAVENOUS
Status: DISCONTINUED | OUTPATIENT
Start: 2017-06-27 | End: 2017-06-27

## 2017-06-27 RX ADMIN — FENTANYL CITRATE 50 MCG: 50 INJECTION, SOLUTION INTRAMUSCULAR; INTRAVENOUS at 10:06

## 2017-06-27 RX ADMIN — SODIUM CHLORIDE: 0.9 INJECTION, SOLUTION INTRAVENOUS at 09:06

## 2017-06-27 RX ADMIN — LIDOCAINE HYDROCHLORIDE 100 MG: 20 INJECTION, SOLUTION INTRAVENOUS at 10:06

## 2017-06-27 RX ADMIN — PROPOFOL 80 MG: 10 INJECTION, EMULSION INTRAVENOUS at 10:06

## 2017-06-27 RX ADMIN — PROPOFOL 125 MCG/KG/MIN: 10 INJECTION, EMULSION INTRAVENOUS at 10:06

## 2017-06-27 NOTE — ANESTHESIA PREPROCEDURE EVALUATION
06/27/2017  Yayo Carver is a 51 y.o., female.    Anesthesia Evaluation    I have reviewed the Patient Summary Reports.    I have reviewed the Nursing Notes.   I have reviewed the Medications.     Review of Systems  Anesthesia Hx:   Denies Personal Hx of Anesthesia complications.   Cardiovascular:   Hypertension ECG has been reviewed. 2/2/16 2D echo:    CONCLUSIONS     1 - Normal left ventricular systolic function (EF 60-65%).     2 - Left ventricular diastolic dysfunction.     3 - Moderate left atrial enlargement.     4 - Normal right ventricular systolic function .     5 - Mild mitral regurgitation.   Pulmonary:  Pulmonary Normal    Hepatic/GI:   No Bowel Prep. Denies GERD.    Endocrine:   Diabetes        Physical Exam  General:  Well nourished, Obesity    Airway/Jaw/Neck:  Airway Findings: Mouth Opening: Normal Tongue: Large  General Airway Assessment: Adult  Mallampati: III  Jaw/Neck Findings:  Neck Findings:  Girth Increased, Short Neck       Chest/Lungs:  Chest/Lungs Findings: Clear to auscultation, Normal Respiratory Rate     Heart/Vascular:  Heart Findings: Rate: Normal  Rhythm: Regular Rhythm  Sounds: Normal        Mental Status:  Mental Status Findings:  Cooperative, Alert and Oriented         Anesthesia Plan  Type of Anesthesia, risks & benefits discussed:  Anesthesia Type:  general  Patient's Preference: ga  Intra-op Monitoring Plan:   Intra-op Monitoring Plan Comments:   Post Op Pain Control Plan:   Post Op Pain Control Plan Comments:   Induction:    Beta Blocker:  Patient is on a Beta-Blocker and has received one dose within the past 24 hours (No further documentation required).       Informed Consent: Patient understands risks and agrees with Anesthesia plan.  Questions answered. Anesthesia consent signed with patient.  ASA Score: 3     Day of Surgery Review of History & Physical:    H&P  update referred to the provider.         Ready For Surgery From Anesthesia Perspective.

## 2017-06-27 NOTE — TRANSFER OF CARE
"Anesthesia Transfer of Care Note    Patient: Yayo Carver    Procedure(s) Performed: Procedure(s) (LRB):  COLONOSCOPY (N/A)    Patient location: PACU    Anesthesia Type: general    Transport from OR: Transported from OR on room air with adequate spontaneous ventilation    Post pain: adequate analgesia    Post assessment: no apparent anesthetic complications and tolerated procedure well    Post vital signs: stable    Level of consciousness: awake, alert and oriented    Nausea/Vomiting: no nausea/vomiting    Complications: none    Transfer of care protocol was followed      Last vitals:   Visit Vitals  BP (!) 140/76   Pulse 73   Temp 36.5 °C (97.7 °F) (Temporal)   Resp 16   Ht 5' 7" (1.702 m)   Wt (!) 141.1 kg (311 lb)   SpO2 100%   Breastfeeding? No   BMI 48.71 kg/m²     "

## 2017-06-27 NOTE — ANESTHESIA POSTPROCEDURE EVALUATION
"Anesthesia Post Evaluation    Patient: Yayo Carver    Procedure(s) Performed: Procedure(s) (LRB):  COLONOSCOPY (N/A)    Final Anesthesia Type: general  Patient location during evaluation: PACU  Patient participation: Yes- Able to Participate  Level of consciousness: awake and alert  Post-procedure vital signs: reviewed and stable  Pain management: adequate  Airway patency: patent  PONV status at discharge: No PONV  Anesthetic complications: no      Cardiovascular status: blood pressure returned to baseline and stable  Respiratory status: unassisted and nasal cannula  Hydration status: euvolemic  Follow-up not needed.        Visit Vitals  BP (!) 148/75 (BP Location: Left arm, Patient Position: Sitting, BP Method: Automatic)   Pulse 75   Temp 37 °C (98.6 °F) (Oral)   Resp 16   Ht 5' 7" (1.702 m)   Wt (!) 141.1 kg (311 lb)   SpO2 98%   Breastfeeding? No   BMI 48.71 kg/m²       Pain/Jamilah Score: Pain Assessment Performed: Yes (6/27/2017  9:38 AM)  Presence of Pain: complains of pain/discomfort (6/27/2017  9:38 AM)      "

## 2017-06-27 NOTE — ANESTHESIA RELEASE NOTE
Anesthesia Release from PACU Note    Patient: Yayo Carver    Procedure(s) Performed: Procedure(s) (LRB):  COLONOSCOPY (N/A)    Anesthesia type: General    Post pain: Adequate analgesia    Post assessment: no apparent anesthetic complications    Last Vitals:   Vitals:    06/27/17 0936   BP: (!) 148/75   Pulse: 75   Resp: 16   Temp: 37 °C (98.6 °F)   SpO2: 98%       Post vital signs: stable    Level of consciousness: awake    Complications: none    Airway Patency: patent    Respiratory: spontaneous    Cardiovascular: stable    Hydration: euvolemic

## 2017-06-27 NOTE — H&P
Short Stay Endoscopy History and Physical    PCP - Kali Hill MD    Procedure - Colonoscopy  ASA - per anesthesia  Mallampati - per anesthesia  History of Anesthesia problems - no  Family history Anesthesia problems - no   Plan of anesthesia - General    HPI:  This is a 51 y.o. female here for screening colonoscopy     Rectal bleeding: no   Diarrhea: no    ROS:  Constitutional: No fevers, chills, No weight loss  CV: No chest pain  Pulm: No cough, No shortness of breath  GI: see HPI  Derm: No rash    Medical History:  has a past medical history of Diabetes mellitus and Hypertension.    Surgical History:  has a past surgical history that includes Tubal ligation and Tonsillectomy.    Family History: family history includes Breast cancer in her maternal grandmother and mother; Cancer in her mother; Colon cancer in her maternal grandmother; Colon polyps in her mother; Diabetes in her father, mother, paternal grandmother, and paternal uncle; Heart disease in her paternal grandmother; No Known Problems in her brother, maternal grandfather, paternal grandfather, and sister.. Otherwise no colon cancer, inflammatory bowel disease, or GI malignancies.    Social History:  reports that she has never smoked. She has never used smokeless tobacco. She reports that she drinks alcohol. She reports that she does not use drugs.    Review of patient's allergies indicates:   Allergen Reactions    Ace inhibitors Edema       Medications:   Prescriptions Prior to Admission   Medication Sig Dispense Refill Last Dose    amlodipine (NORVASC) 10 MG tablet Take 1 tablet (10 mg total) by mouth once daily. 90 tablet 1 6/26/2017 at Unknown time    atorvastatin (LIPITOR) 20 MG tablet Take 1 tablet (20 mg total) by mouth once daily. 90 tablet 3 6/26/2017 at Unknown time    ergocalciferol (ERGOCALCIFEROL) 50,000 unit Cap Take 1 capsule (50,000 Units total) by mouth twice a week. 24 capsule 0 6/26/2017 at Unknown time    ferrous  sulfate 325 mg (65 mg iron) Tab tablet TK 1 T PO  ONCE D  1 Past Month at Unknown time    hydrochlorothiazide (HYDRODIURIL) 25 MG tablet Take 1 tablet (25 mg total) by mouth once daily. 90 tablet 1 6/27/2017 at Unknown time    ibuprofen (ADVIL,MOTRIN) 800 MG tablet Take 1 tablet (800 mg total) by mouth 3 (three) times daily. 90 tablet 2 6/26/2017 at Unknown time    metoprolol succinate (TOPROL-XL) 50 MG 24 hr tablet Take 1 tablet (50 mg total) by mouth once daily. 90 tablet 1 6/27/2017 at Unknown time    insulin glargine (BASAGLAR KWIKPEN) 100 unit/mL (3 mL) InPn pen Inject 30 Units into the skin every evening. 30 mL 0 6/25/2017    insulin lispro (HUMALOG) 100 unit/mL injection Inject 10 Units into the skin 3 (three) times daily before meals. 10 mL 5 6/25/2017    metformin (GLUCOPHAGE) 1000 MG tablet TAKE 1 TABLET TWICE DAILY WITH AM AND PM MEALS 180 tablet 3 6/25/2017    tramadol (ULTRAM) 50 mg tablet Take 2 tablets (100 mg total) by mouth every 6 (six) hours as needed. 90 tablet 2 6/25/2017         Physical Exam:    Vital Signs:   Vitals:    06/27/17 0936   BP: (!) 148/75   Pulse: 75   Resp: 16   Temp: 98.6 °F (37 °C)       General Appearance: Well appearing in no acute distress  Eyes:    No scleral icterus  Lungs: CTA bilaterally  Heart:  S1, S2 normal, no murmurs heard  Abdomen: Soft, non tender, non distended with positive bowel sounds. No hepatosplenomegaly, ascites, or mass.  Extremities: 2+ pulses, no clubbing, cyanosis or edema  Skin: No rash      Labs:  Lab Results   Component Value Date    WBC 7.53 01/19/2017    HGB 10.4 (L) 01/19/2017    HCT 33.0 (L) 01/19/2017     01/19/2017    CHOL 207 (H) 01/19/2017    TRIG 62 01/19/2017    HDL 65 01/19/2017    ALT 22 01/19/2017    AST 18 01/19/2017     01/19/2017    K 4.8 01/19/2017     01/19/2017    CREATININE 0.9 01/19/2017    BUN 14 01/19/2017    CO2 26 01/19/2017    TSH 2.285 08/22/2016    INR 0.9 10/02/2015    HGBA1C 6.1 05/23/2017        I have explained the risks and benefits of endoscopy procedures to the patient including but not limited to bleeding, perforation, infection, and death.      Evelin Arceo MD

## 2017-06-27 NOTE — PLAN OF CARE
Problem: Patient Care Overview  Goal: Plan of Care Review  Outcome: Outcome(s) achieved Date Met: 06/27/17    Discharge instructions given to patient and mother. Verbalized understanding. Patient stable, tolerating fluids. No complaints at this time.   Dr. rockwell came to bedside to explain results of procedure. All questions answered.   Patient adequate for discharge.

## 2017-06-28 ENCOUNTER — CLINICAL SUPPORT (OUTPATIENT)
Dept: REHABILITATION | Facility: HOSPITAL | Age: 52
End: 2017-06-28
Attending: NURSE PRACTITIONER
Payer: MEDICAID

## 2017-06-28 DIAGNOSIS — M25.561 CHRONIC PAIN OF BOTH KNEES: ICD-10-CM

## 2017-06-28 DIAGNOSIS — M25.562 CHRONIC PAIN OF BOTH KNEES: ICD-10-CM

## 2017-06-28 DIAGNOSIS — G89.29 CHRONIC PAIN OF BOTH KNEES: ICD-10-CM

## 2017-06-28 PROCEDURE — 97113 AQUATIC THERAPY/EXERCISES: CPT

## 2017-06-28 NOTE — PROGRESS NOTES
Time In: 1530  Time Out: 1630    Subjective  Pt reports:  B knee pain with pain in L knee being greater.      Pt pain level: 7/10      Objective    Treatment: Pt was instructed in and performed therapeutic exercises to develop  for 60 minutes. Patient performed therapeutic exercises consisting of the following exercises:    Warm-up Laps 2 x each  fwd/bkw/lat     Stretches: 2 x 30 sec  HS  Quad     LE exs: 20x each   Mini Squats with QS  Heel Raise with GS  HS curl/Hip ext  Hip flex/LAQ  Hip abd/add  Step ups  Lunges fwd/lat 10 each        Endurance: 2 min  Marching  Bicycle     Cool down laps 1 x each  fwd/bkw/lat    Patient was not issued HEP for pool.      Assessment  Pt's tolerated initial aquatic tx well w/ no increase in symptoms.  Tx focused on BLE flexibility/strengthening and hip/core stabilization.  Will progress as tolerated.  Patient will continue to benefit from skilled PT intervention.    Yayo Carver is making good progress towards established goals.    Medical necessity is demonstrated by the following IMPAIRMENTS/PROBLEMS:  1. Increased Pain  2. Decreased Segmental Mobility & Decreased ROM  3. Decreased Core & BLE strength  4. Decreased Flexibility BLE  5. Decreased Tolerance to Functional Activities     Pt's spiritual, cultural and educational needs considered and pt agreeable to plan of care and goals as stated below:      Anticipated Barriers for physical therapy: chronic pain, obesity, DM     Short Term GOALS: 3 weeks. Pt agrees with goals set.  1. Patient demonstrates independence with HEP.   2. Patient demonstrates independence with Postural Awareness.   3. Patient demonstrates independence with body mechanics.      Long Term GOALS: 6 weeks. Pt agrees with goals set.  1. Patient demonstrates increased B knee AROM to 125 degrees to improve tolerance to functional activities pain free.   2. Patient demonstrates increased strength BLE's to 5/5 or greater to improve tolerance to functional  activities pain free.   3. Patient demonstrates improved overall function per FOTO Knee Survey to 62% Limitation or less.     Plan  Outpatient physical therapy 2 times weekly to include: pt ed, hep, therapeutic exercises, neuromuscular re-education/ balance exercises, joint mobilizations, aquatic therapy and modalities prn. Cont PT for  6 weeks. Pt may be seen by PTA as part of the rehabilitation team.

## 2017-07-05 ENCOUNTER — TELEPHONE (OUTPATIENT)
Dept: ORTHOPEDICS | Facility: CLINIC | Age: 52
End: 2017-07-05

## 2017-07-05 NOTE — TELEPHONE ENCOUNTER
----- Message from Allyn Dominguez sent at 7/5/2017 12:12 PM CDT -----  Contact: self/home  Pt is having bilat knee pain and would like to schedule an appt.

## 2017-07-12 DIAGNOSIS — M25.569 KNEE PAIN, UNSPECIFIED CHRONICITY, UNSPECIFIED LATERALITY: ICD-10-CM

## 2017-07-12 RX ORDER — TRAMADOL HYDROCHLORIDE 50 MG/1
100 TABLET ORAL EVERY 6 HOURS PRN
Qty: 90 TABLET | Refills: 2 | Status: SHIPPED | OUTPATIENT
Start: 2017-07-12 | End: 2017-08-10 | Stop reason: SDUPTHER

## 2017-07-21 ENCOUNTER — TELEPHONE (OUTPATIENT)
Dept: INTERNAL MEDICINE | Facility: CLINIC | Age: 52
End: 2017-07-21

## 2017-07-21 RX ORDER — SYRINGE,SAFETY WITH NEEDLE,1ML 25GX1"
SYRINGE (EA) MISCELLANEOUS
Qty: 100 EACH | Refills: 11 | Status: ON HOLD | OUTPATIENT
Start: 2017-07-21 | End: 2019-11-25

## 2017-07-21 NOTE — TELEPHONE ENCOUNTER
----- Message from Deja Esqueda sent at 7/21/2017 11:30 AM CDT -----  Contact: DEMARCO DELACRUZ [[id number]]  _x  1st Request  _  2nd Request  _  3rd Request        Who: DEMARCO DELACRUZ [[id number]]    Why: patient would like a call back regarding getting syringes for insulin. Please call to discuss    What Number to Call Back: [phone]    When to Expect a call back: (With in 24 hours)

## 2017-08-01 ENCOUNTER — CLINICAL SUPPORT (OUTPATIENT)
Dept: REHABILITATION | Facility: HOSPITAL | Age: 52
End: 2017-08-01
Attending: NURSE PRACTITIONER
Payer: MEDICAID

## 2017-08-01 DIAGNOSIS — M25.561 CHRONIC PAIN OF BOTH KNEES: ICD-10-CM

## 2017-08-01 DIAGNOSIS — M17.0 PRIMARY OSTEOARTHRITIS OF BOTH KNEES: Primary | ICD-10-CM

## 2017-08-01 DIAGNOSIS — G89.29 CHRONIC PAIN OF BOTH KNEES: ICD-10-CM

## 2017-08-01 DIAGNOSIS — M25.562 CHRONIC PAIN OF BOTH KNEES: ICD-10-CM

## 2017-08-01 PROCEDURE — 97530 THERAPEUTIC ACTIVITIES: CPT

## 2017-08-01 NOTE — PROGRESS NOTES
Physical Therapy Initial Evaluation     Name: Yayo Carver  [facility] Number: [id number]    Yayo is a 51 y.o. female evaluated on 08/01/2017.     Diagnosis: No diagnosis found.  Physician: Meghan Simons NP  Treatment Orders: PT Eval and Treat    Past Medical History:   Diagnosis Date    Diabetes mellitus     Hypertension      Current Outpatient Prescriptions   Medication Sig    amlodipine (NORVASC) 10 MG tablet Take 1 tablet (10 mg total) by mouth once daily.    atorvastatin (LIPITOR) 20 MG tablet Take 1 tablet (20 mg total) by mouth once daily.    ergocalciferol (ERGOCALCIFEROL) 50,000 unit Cap Take 1 capsule (50,000 Units total) by mouth twice a week.    ferrous sulfate 325 mg (65 mg iron) Tab tablet TK 1 T PO  ONCE D    hydrochlorothiazide (HYDRODIURIL) 25 MG tablet Take 1 tablet (25 mg total) by mouth once daily.    ibuprofen (ADVIL,MOTRIN) 800 MG tablet Take 1 tablet (800 mg total) by mouth 3 (three) times daily.    insulin glargine (BASAGLAR KWIKPEN) 100 unit/mL (3 mL) InPn pen Inject 30 Units into the skin every evening.    insulin lispro (HUMALOG) 100 unit/mL injection Inject 10 Units into the skin 3 (three) times daily before meals.    insulin syringe-needle U-100 (INSULIN SYRINGE) 1/2 mL 30 gauge x 5/16 Syrg Use as directed    metformin (GLUCOPHAGE) 1000 MG tablet TAKE 1 TABLET TWICE DAILY WITH AM AND PM MEALS    metoprolol succinate (TOPROL-XL) 50 MG 24 hr tablet Take 1 tablet (50 mg total) by mouth once daily.    tramadol (ULTRAM) 50 mg tablet Take 2 tablets (100 mg total) by mouth every 6 (six) hours as needed.     No current facility-administered medications for this visit.      Review of patient's allergies indicates:   Allergen Reactions    Ace inhibitors Edema     Precautions: Fall    Time In: 11:00 pm   Time Out: 11:30 pm     Subjective     Patient reports continued knee pain since her initial evaluation. Has more L knee pain than  R knee pain. Reports peripatellar pain bilaterally, but isn't limiting herself. Is going to the gym, using the treadmill for 20 min and bike for 15, knee extension, in 25-30 reps. Reports increased knee pain with going up stairs and she has to use her arms to pull her up. Has apprehension towards going down the stairs due to knee weakness and being afraid of falling. Works on her feet 12 hours a day. Has about 6 steps at home, and has to go one stair at a time.     Missed her last 5 visits due to illness, but is looking forward to resuming in aqua therapy.    Diagnostic Imaging: x-ray: Right knee: There is moderate to severe DJD and a varus deformity. No fracture dislocation bone destruction seen.  Left knee: There is moderate DJD.  Onset: gradual  Popping/clicking: yes  Lower extremity gives way: yes  Locking episodes: denies    Pain Scale: Yayo rates pain on a scale of 0-10 to be 7 at worst; 6 currently; 1 at best .  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, and wears [employer] and [employer]ics   Patient Goals: strengthen knee and decrease pain     Objective     Reassessment: 30 min 1-on-1 with PT    Observation: genu recurvatum (R>L), B genu valgus     Palpation: TTP L medial and lateral tibiofemoral joint line    Range of Motion: Knee   Left Right   Flexion: 120 degrees 120 degrees   Extension 0 degrees 0 degrees     Strength: Knee   Left Right   Quadriceps 4+/5 4+/5   Hamstrings 4+/5 4+/5       Strength: Hip    Left Right   Iliopsoas 4+/5 4+/5   PGM 4+/5 4+/5   IR 5/5 5/5   ER 4+/5 4+/5   Ext 4/5 4/5     Joint Mobility: hypomobile    Gait: Mehul ambulated with no assistive device.  Level of Assistance: independent  Patient displays antalgic gait with lateral sway.     TREATMENT     PT Evaluation Completed? Yes  Discussed Plan of Care with patient: Yes    Yayo received 5  minutes of therapeutic exercise including:   SLR x10  SL hip ABD x10  Quad sets 2x10    Written Home Exercises Provided: exercises listed above (see handout)   Yayo demonstrated good understanding of the education provided. Patient demonstrated good return demonstration of skill of exercises.    ASSESSMENT   Pt presents with B knee pain, decreased ROM, decreased strength, tenderness, antalgic gait, and subsequent functional limitations. Pt will benefit from aquatic PT to address these deficits.   Pt prognosis is Good.  Pt will benefit from skilled outpatient physical therapy to address the above stated deficits, provide pt/family education and to maximize pt's level of independence.     Medical necessity is demonstrated by the following IMPAIRMENTS/PROBLEMS:  1. Increased Pain  2. Decreased Segmental Mobility & Decreased ROM  3. Decreased Core & BLE strength  4. Decreased Flexibility BLE  5. Decreased Tolerance to Functional Activities    Pt's spiritual, cultural and educational needs considered and pt agreeable to plan of care and goals as stated below:     Anticipated Barriers for physical therapy: chronic pain, obesity, DM    Short Term GOALS: 3 weeks. Pt agrees with goals set.  1. Patient demonstrates independence with HEP. MET: 8/1/2017  2. Patient demonstrates independence with Postural Awareness.  MET: 8/1/2017  3. Patient demonstrates independence with body mechanics.  MET: 8/1/2017    Long Term GOALS: 6 weeks. Pt agrees with goals set.  1. Patient demonstrates increased B knee AROM to 125 degrees to improve tolerance to functional activities pain free.   2. Patient demonstrates increased strength BLE's to 5/5 or greater to improve tolerance to functional activities pain free.   3. Patient demonstrates improved overall function per FOTO Knee Survey to 62% Limitation or less.     Functional Limitations Reports  Tool: FOTO Knee Survey  Score: 72% Limitation    PLAN     Outpatient physical therapy 2 times  weekly to include: pt ed, hep, therapeutic exercises, neuromuscular re-education/ balance exercises, joint mobilizations, aquatic therapy and modalities prn. Cont PT for  6 weeks. Pt may be seen by PTA as part of the rehabilitation team.     Therapist: Ann-Marie Domínguez, PT  8/1/2017

## 2017-08-10 DIAGNOSIS — M25.569 KNEE PAIN, UNSPECIFIED CHRONICITY, UNSPECIFIED LATERALITY: ICD-10-CM

## 2017-08-10 DIAGNOSIS — M25.562 ACUTE PAIN OF BOTH KNEES: ICD-10-CM

## 2017-08-10 DIAGNOSIS — M25.561 ACUTE PAIN OF BOTH KNEES: ICD-10-CM

## 2017-08-10 RX ORDER — IBUPROFEN 800 MG/1
800 TABLET ORAL 3 TIMES DAILY
Qty: 90 TABLET | Refills: 2 | Status: SHIPPED | OUTPATIENT
Start: 2017-08-10 | End: 2017-11-27 | Stop reason: SDUPTHER

## 2017-08-10 RX ORDER — TRAMADOL HYDROCHLORIDE 50 MG/1
100 TABLET ORAL EVERY 6 HOURS PRN
Qty: 90 TABLET | Refills: 2 | Status: SHIPPED | OUTPATIENT
Start: 2017-08-10 | End: 2017-09-07 | Stop reason: SDUPTHER

## 2017-08-14 ENCOUNTER — CLINICAL SUPPORT (OUTPATIENT)
Dept: REHABILITATION | Facility: HOSPITAL | Age: 52
End: 2017-08-14
Attending: NURSE PRACTITIONER
Payer: MEDICAID

## 2017-08-14 ENCOUNTER — TELEPHONE (OUTPATIENT)
Dept: ORTHOPEDICS | Facility: CLINIC | Age: 52
End: 2017-08-14

## 2017-08-14 DIAGNOSIS — M25.562 CHRONIC PAIN OF BOTH KNEES: ICD-10-CM

## 2017-08-14 DIAGNOSIS — M25.561 CHRONIC PAIN OF BOTH KNEES: ICD-10-CM

## 2017-08-14 DIAGNOSIS — G89.29 CHRONIC PAIN OF BOTH KNEES: ICD-10-CM

## 2017-08-14 PROCEDURE — 97110 THERAPEUTIC EXERCISES: CPT

## 2017-08-14 NOTE — TELEPHONE ENCOUNTER
----- Message from Caleb Baez sent at 8/14/2017 11:06 AM CDT -----  Contact: self  Pt called to schedule an appointment for knee pain. Meghan's booking in epic is not pulling up or showing any dates it keeps showing interrupt or back up. When you back up, it still doesn't show any available dates therefore I was unable to schedule the appointment because it was not showing any dates at all.

## 2017-08-14 NOTE — PROGRESS NOTES
Time In: 1540  Time Out: 1635    Subjective  Pt reports:  L knee pain and soreness.      Pt pain level: 7/10      Objective    Treatment: Pt was instructed in and performed therapeutic exercises to develop  for 60 minutes. Patient performed therapeutic exercises consisting of the following exercises:    Warm-up Laps 2 x each  fwd/bkw/lat     Stretches: 2 x 30 sec  HS  Quad     LE exs: 20x each   Mini Squats with QS  Heel Raise with GS  HS curl/Hip ext  Hip flex/LAQ  Hip abd/add  Step ups 10x (slight L knee pain when performing step ups)  Lunges fwd/lat 10 each np        Endurance: 2 min  Marching  Bicycle     Cool down laps 1 x each  fwd/bkw/lat    Patient was not issued HEP for pool.      Assessment  Pt's tolerated initial aquatic tx well. However, it was noted that pt c/o slight L knee pain when performing step ups. Tx focused on BLE flexibility/strengthening and hip/core stabilization.  Will progress as tolerated.  Patient will continue to benefit from skilled PT intervention.    Yayo Carver is making good progress towards established goals.    Medical necessity is demonstrated by the following IMPAIRMENTS/PROBLEMS:  1. Increased Pain  2. Decreased Segmental Mobility & Decreased ROM  3. Decreased Core & BLE strength  4. Decreased Flexibility BLE  5. Decreased Tolerance to Functional Activities     Pt's spiritual, cultural and educational needs considered and pt agreeable to plan of care and goals as stated below:      Anticipated Barriers for physical therapy: chronic pain, obesity, DM     Short Term GOALS: 3 weeks. Pt agrees with goals set.  1. Patient demonstrates independence with HEP.   2. Patient demonstrates independence with Postural Awareness.   3. Patient demonstrates independence with body mechanics.      Long Term GOALS: 6 weeks. Pt agrees with goals set.  1. Patient demonstrates increased B knee AROM to 125 degrees to improve tolerance to functional activities pain free.   2. Patient demonstrates  increased strength BLE's to 5/5 or greater to improve tolerance to functional activities pain free.   3. Patient demonstrates improved overall function per FOTO Knee Survey to 62% Limitation or less.     Plan  Outpatient physical therapy 2 times weekly to include: pt ed, hep, therapeutic exercises, neuromuscular re-education/ balance exercises, joint mobilizations, aquatic therapy and modalities prn. Cont PT for  6 weeks. Pt may be seen by PTA as part of the rehabilitation team.

## 2017-08-14 NOTE — TELEPHONE ENCOUNTER
Spoke to pt and she was added on per Meghan on 8/24 at 1030a. Appt slip will be mailed. Pt verbalized understanding.

## 2017-08-21 ENCOUNTER — CLINICAL SUPPORT (OUTPATIENT)
Dept: REHABILITATION | Facility: HOSPITAL | Age: 52
End: 2017-08-21
Attending: NURSE PRACTITIONER
Payer: MEDICAID

## 2017-08-21 DIAGNOSIS — M25.561 CHRONIC PAIN OF BOTH KNEES: ICD-10-CM

## 2017-08-21 DIAGNOSIS — M25.562 CHRONIC PAIN OF BOTH KNEES: ICD-10-CM

## 2017-08-21 DIAGNOSIS — G89.29 CHRONIC PAIN OF BOTH KNEES: ICD-10-CM

## 2017-08-21 PROCEDURE — 97113 AQUATIC THERAPY/EXERCISES: CPT

## 2017-08-21 NOTE — PROGRESS NOTES
Time In: 1330  Time Out: 1430    Subjective  Pt reports:  L knee pain and soreness.      Pt pain level: 8/10      Objective    Treatment: Pt was instructed in and performed therapeutic exercises to develop  for 60 minutes. Patient performed therapeutic exercises consisting of the following exercises:    Warm-up Laps 2 x each  fwd/bkw/lat     Stretches: 3 x 20 sec  HS  Quad     LE exs: 30x each   Mini Squats with QS  Heel Raise with GS  HS curl/Hip ext  Hip flex/LAQ  Hip abd/add  Step ups 10x (slight L knee pain when performing step ups)  Lunges fwd/lat 10 each         Endurance: 2 min  Marching  Bicycle     Cool down laps 1 x each  fwd/bkw/lat    Patient was not issued HEP for pool.      Assessment  Pt's tolerated today's tx progression session well but c/o of L knee discomfort when performing lateral lunges.  Tx focused on BLE flexibility/strengthening and hip/core stabilization. Will progress as tolerated.  Patient will continue to benefit from skilled PT intervention.    Yayo Carver is making good progress towards established goals.    Medical necessity is demonstrated by the following IMPAIRMENTS/PROBLEMS:  1. Increased Pain  2. Decreased Segmental Mobility & Decreased ROM  3. Decreased Core & BLE strength  4. Decreased Flexibility BLE  5. Decreased Tolerance to Functional Activities     Pt's spiritual, cultural and educational needs considered and pt agreeable to plan of care and goals as stated below:      Anticipated Barriers for physical therapy: chronic pain, obesity, DM     Short Term GOALS: 3 weeks. Pt agrees with goals set.  1. Patient demonstrates independence with HEP.   2. Patient demonstrates independence with Postural Awareness.   3. Patient demonstrates independence with body mechanics.      Long Term GOALS: 6 weeks. Pt agrees with goals set.  1. Patient demonstrates increased B knee AROM to 125 degrees to improve tolerance to functional activities pain free.   2. Patient demonstrates increased  strength BLE's to 5/5 or greater to improve tolerance to functional activities pain free.   3. Patient demonstrates improved overall function per FOTO Knee Survey to 62% Limitation or less.     Plan  Outpatient physical therapy 2 times weekly to include: pt ed, hep, therapeutic exercises, neuromuscular re-education/ balance exercises, joint mobilizations, aquatic therapy and modalities prn. Cont PT for  6 weeks. Pt may be seen by PTA as part of the rehabilitation team.

## 2017-08-23 ENCOUNTER — CLINICAL SUPPORT (OUTPATIENT)
Dept: REHABILITATION | Facility: HOSPITAL | Age: 52
End: 2017-08-23
Attending: NURSE PRACTITIONER
Payer: MEDICAID

## 2017-08-23 DIAGNOSIS — M25.562 CHRONIC PAIN OF BOTH KNEES: ICD-10-CM

## 2017-08-23 DIAGNOSIS — G89.29 CHRONIC PAIN OF BOTH KNEES: ICD-10-CM

## 2017-08-23 DIAGNOSIS — M25.561 CHRONIC PAIN OF BOTH KNEES: ICD-10-CM

## 2017-08-23 PROCEDURE — 97110 THERAPEUTIC EXERCISES: CPT

## 2017-08-23 NOTE — PROGRESS NOTES
Time In: 1330  Time Out: 1430    Subjective  Pt reports:  L knee pain and soreness.      Pt pain level: 5/10      Objective    Treatment: Pt was instructed in and performed therapeutic exercises to develop  for 60 minutes. Patient performed therapeutic exercises consisting of the following exercises:    Warm-up Laps 2 x each  fwd/bkw/lat     Stretches: 3 x 20 sec  HS  Quad     LE exs: 30x each   Mini Squats with QS  Heel Raise with GS  HS curl/Hip ext  Hip flex/LAQ  Hip abd/add  Step ups 15x   Lunges fwd/lat 15 each         Endurance: 2 min  Marching  Bicycle     Cool down laps 1 x each  fwd/bkw/lat    Patient was not issued HEP for pool.      Assessment  Pt was able to arrived with decreased knee pain when compared to the previous tx visit. Pt will discontinue lateral step ups as pt c/o of pain in knee after performing that exercise. Tx focused on BLE flexibility/strengthening and hip/core stabilization. Will progress as tolerated.  Patient will continue to benefit from skilled PT intervention.    Yayo Mehul is making good progress towards established goals.    Medical necessity is demonstrated by the following IMPAIRMENTS/PROBLEMS:  1. Increased Pain  2. Decreased Segmental Mobility & Decreased ROM  3. Decreased Core & BLE strength  4. Decreased Flexibility BLE  5. Decreased Tolerance to Functional Activities     Pt's spiritual, cultural and educational needs considered and pt agreeable to plan of care and goals as stated below:      Anticipated Barriers for physical therapy: chronic pain, obesity, DM     Short Term GOALS: 3 weeks. Pt agrees with goals set.  1. Patient demonstrates independence with HEP.   2. Patient demonstrates independence with Postural Awareness.   3. Patient demonstrates independence with body mechanics.      Long Term GOALS: 6 weeks. Pt agrees with goals set.  1. Patient demonstrates increased B knee AROM to 125 degrees to improve tolerance to functional activities pain free.   2. Patient  demonstrates increased strength BLE's to 5/5 or greater to improve tolerance to functional activities pain free.   3. Patient demonstrates improved overall function per FOTO Knee Survey to 62% Limitation or less.     Plan  Outpatient physical therapy 2 times weekly to include: pt ed, hep, therapeutic exercises, neuromuscular re-education/ balance exercises, joint mobilizations, aquatic therapy and modalities prn. Cont PT for  6 weeks. Pt may be seen by PTA as part of the rehabilitation team.

## 2017-08-24 ENCOUNTER — OFFICE VISIT (OUTPATIENT)
Dept: ORTHOPEDICS | Facility: CLINIC | Age: 52
End: 2017-08-24
Payer: MEDICAID

## 2017-08-24 VITALS — BODY MASS INDEX: 45.99 KG/M2 | WEIGHT: 293 LBS | HEIGHT: 67 IN

## 2017-08-24 DIAGNOSIS — M17.0 PRIMARY OSTEOARTHRITIS OF BOTH KNEES: Primary | ICD-10-CM

## 2017-08-24 PROCEDURE — 20610 DRAIN/INJ JOINT/BURSA W/O US: CPT | Mod: 50,PBBFAC | Performed by: NURSE PRACTITIONER

## 2017-08-24 PROCEDURE — 99213 OFFICE O/P EST LOW 20 MIN: CPT | Mod: PBBFAC | Performed by: NURSE PRACTITIONER

## 2017-08-24 PROCEDURE — 20610 DRAIN/INJ JOINT/BURSA W/O US: CPT | Mod: 50,S$PBB,, | Performed by: NURSE PRACTITIONER

## 2017-08-24 PROCEDURE — 99999 PR PBB SHADOW E&M-EST. PATIENT-LVL III: CPT | Mod: PBBFAC,,, | Performed by: NURSE PRACTITIONER

## 2017-08-24 PROCEDURE — 99213 OFFICE O/P EST LOW 20 MIN: CPT | Mod: 25,S$PBB,, | Performed by: NURSE PRACTITIONER

## 2017-08-24 PROCEDURE — 3008F BODY MASS INDEX DOCD: CPT | Mod: ,,, | Performed by: NURSE PRACTITIONER

## 2017-08-24 RX ORDER — TRIAMCINOLONE ACETONIDE 40 MG/ML
80 INJECTION, SUSPENSION INTRA-ARTICULAR; INTRAMUSCULAR
Status: COMPLETED | OUTPATIENT
Start: 2017-08-24 | End: 2017-08-24

## 2017-08-24 RX ADMIN — TRIAMCINOLONE ACETONIDE 80 MG: 40 INJECTION, SUSPENSION INTRA-ARTICULAR; INTRAMUSCULAR at 04:08

## 2017-08-24 NOTE — PROGRESS NOTES
CC: Pain of the Left Knee      HPI: Pt with bilateral knee pain for the past several weeks. She has been seen multiple times in the past and has had a consultation for surgery, but she has not lost the weight necessary to have knee replacement. She has been taking tramadol for pain and she has had cortisone injections with improvement in the past. 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 The increased activity increases her pain. Her pain is global and aching. She is ambulating without assistive device. There is not a limp.    ROS  General: denies fever and chills  Resp: no c/o sob  CVS: no c/o cp  MSK: c/o bilateral knee pain worse with activity. The pain is global and aching    PE  General: AAOx3, pleasant and cooperative  Resp: respirations even and unlabored  MSK: bilateral knee exam  0 degrees extension  100 degrees flexion  No warmth or erythema   - effusion    Xray:  Reviewed by me: tricompartmental djd    Assessment:  Bilateral knee djd    Plan:  Cortisone injections to bilateral knees today  RICE  Tramadol prn- she was informed that I will be unable to prescribe this going forward and she will get a referral from her pcp to a pain management clinic  F/u as needed    Knee Injection Procedure Note    Pre-operative Diagnosis: bilateral knee degenerative arthritis    Post-operative Diagnosis: same    Indications: bilateral knee pain    Anesthesia: none    Procedure Details     Verbal consent was obtained for the procedure. The injection site was identified and the skin was prepared with alcohol. The bilateral knee was injected from an anterolateral approach with 1 ml of Kenalog and 5 ml Lidocaine under sterile technique using a 22 gauge needle. The needle was removed and the area cleansed and dressed.    Complications:  None; patient tolerated the procedure well.    she was advised to rest the knee today, using ice and elevation as needed for comfort and swelling. she did receive  immediate relief of the knee pain. she was told this would be short lived and is secondary to the lidocaine. she may have an increase in discomfort tonight followed by steady improvement over the next several days. It may take 1-3 weeks following the injection to get the full benefit of the medication.

## 2017-08-30 ENCOUNTER — CLINICAL SUPPORT (OUTPATIENT)
Dept: REHABILITATION | Facility: HOSPITAL | Age: 52
End: 2017-08-30
Attending: NURSE PRACTITIONER
Payer: MEDICAID

## 2017-08-30 DIAGNOSIS — G89.29 CHRONIC PAIN OF BOTH KNEES: ICD-10-CM

## 2017-08-30 DIAGNOSIS — M25.561 CHRONIC PAIN OF BOTH KNEES: ICD-10-CM

## 2017-08-30 DIAGNOSIS — M25.562 CHRONIC PAIN OF BOTH KNEES: ICD-10-CM

## 2017-08-30 PROCEDURE — 97110 THERAPEUTIC EXERCISES: CPT

## 2017-08-30 PROCEDURE — 97113 AQUATIC THERAPY/EXERCISES: CPT

## 2017-08-30 NOTE — PROGRESS NOTES
Time In: 1745  Time Out: 1845    Subjective  Pt reports:  4 to 5/10 L knee pain      Objective    Treatment: Pt was instructed in and performed therapeutic exercises to develop  for 60 minutes. Patient performed therapeutic exercises consisting of the following exercises:    Warm-up Laps 2 x each  fwd/bkw/lat     Stretches: 3 x 20 sec  HS  Quad     LE exs: 30x each   Mini Squats with QS  Heel Raise with GS  HS curl/Hip ext  Hip flex/LAQ  Hip abd/add  Step ups 15x   Lunges fwd 15 each np due to time        Endurance: 2 min  Marching  Bicycle     Cool down laps 1 x each  fwd/bkw/lat    Patient was not issued HEP for pool.      Assessment  Pt tolerated step ups but did demonstrate quad weakness as exercise required max effort. Tx focused on BLE flexibility/strengthening and hip/core stabilization. Will progress as tolerated.  Patient will continue to benefit from skilled PT intervention.    Yayo Carver is making good progress towards established goals.    Medical necessity is demonstrated by the following IMPAIRMENTS/PROBLEMS:  1. Increased Pain  2. Decreased Segmental Mobility & Decreased ROM  3. Decreased Core & BLE strength  4. Decreased Flexibility BLE  5. Decreased Tolerance to Functional Activities     Pt's spiritual, cultural and educational needs considered and pt agreeable to plan of care and goals as stated below:      Anticipated Barriers for physical therapy: chronic pain, obesity, DM     Short Term GOALS: 3 weeks. Pt agrees with goals set.  1. Patient demonstrates independence with HEP.   2. Patient demonstrates independence with Postural Awareness.   3. Patient demonstrates independence with body mechanics.      Long Term GOALS: 6 weeks. Pt agrees with goals set.  1. Patient demonstrates increased B knee AROM to 125 degrees to improve tolerance to functional activities pain free.   2. Patient demonstrates increased strength BLE's to 5/5 or greater to improve tolerance to functional activities pain free.    3. Patient demonstrates improved overall function per FOTO Knee Survey to 62% Limitation or less.     Plan  Outpatient physical therapy 2 times weekly to include: pt ed, hep, therapeutic exercises, neuromuscular re-education/ balance exercises, joint mobilizations, aquatic therapy and modalities prn. Cont PT for  6 weeks. Pt may be seen by PTA as part of the rehabilitation team.

## 2017-09-05 ENCOUNTER — TELEPHONE (OUTPATIENT)
Dept: INTERNAL MEDICINE | Facility: CLINIC | Age: 52
End: 2017-09-05

## 2017-09-05 DIAGNOSIS — M25.569 KNEE PAIN, UNSPECIFIED CHRONICITY, UNSPECIFIED LATERALITY: ICD-10-CM

## 2017-09-05 DIAGNOSIS — G89.29 CHRONIC KNEE PAIN, UNSPECIFIED LATERALITY: Primary | ICD-10-CM

## 2017-09-05 DIAGNOSIS — M25.569 CHRONIC KNEE PAIN, UNSPECIFIED LATERALITY: Primary | ICD-10-CM

## 2017-09-05 NOTE — TELEPHONE ENCOUNTER
Pt states she wants a appt here but none avaible at this moment until 09/21 so pt took that appt so she can see dr for a reg f/u and will go to ER for possible shingles.

## 2017-09-05 NOTE — TELEPHONE ENCOUNTER
----- Message from Bandar Baez sent at 9/5/2017  2:01 PM CDT -----  Contact: gaye  x_ 1st Request   _ 2nd Request   _ 3rd Request     Who: DEMARCO DELACRUZ [[id number]]    Why: Pt missed your call is requesting a call back    What Number to Call Back: [phone]    When to Expect a call back: (Before the end of the day)   -- if call after 3:00 call back will be tomorrow.

## 2017-09-06 ENCOUNTER — HOSPITAL ENCOUNTER (OUTPATIENT)
Dept: RADIOLOGY | Facility: OTHER | Age: 52
Discharge: HOME | End: 2017-09-06
Attending: NURSE PRACTITIONER
Payer: MEDICAID

## 2017-09-06 ENCOUNTER — OFFICE VISIT (OUTPATIENT)
Dept: UROGYNECOLOGY | Facility: CLINIC | Age: 52
End: 2017-09-06
Payer: MEDICAID

## 2017-09-06 VITALS
HEIGHT: 67 IN | WEIGHT: 293 LBS | BODY MASS INDEX: 45.99 KG/M2 | SYSTOLIC BLOOD PRESSURE: 122 MMHG | DIASTOLIC BLOOD PRESSURE: 84 MMHG

## 2017-09-06 DIAGNOSIS — Z12.4 ENCOUNTER FOR SCREENING FOR CERVICAL CANCER: ICD-10-CM

## 2017-09-06 DIAGNOSIS — N95.0 POST-MENOPAUSAL BLEEDING: ICD-10-CM

## 2017-09-06 DIAGNOSIS — Z01.419 WELL WOMAN EXAM: Primary | ICD-10-CM

## 2017-09-06 DIAGNOSIS — N95.2 VAGINAL ATROPHY: ICD-10-CM

## 2017-09-06 DIAGNOSIS — R82.90 URINE ABNORMALITY: ICD-10-CM

## 2017-09-06 PROCEDURE — 87624 HPV HI-RISK TYP POOLED RSLT: CPT

## 2017-09-06 PROCEDURE — 87186 SC STD MICRODIL/AGAR DIL: CPT

## 2017-09-06 PROCEDURE — 99386 PREV VISIT NEW AGE 40-64: CPT | Mod: S$PBB,,, | Performed by: NURSE PRACTITIONER

## 2017-09-06 PROCEDURE — 76856 US EXAM PELVIC COMPLETE: CPT | Mod: 26,,, | Performed by: INTERNAL MEDICINE

## 2017-09-06 PROCEDURE — 99213 OFFICE O/P EST LOW 20 MIN: CPT | Mod: PBBFAC,25 | Performed by: NURSE PRACTITIONER

## 2017-09-06 PROCEDURE — 76856 US EXAM PELVIC COMPLETE: CPT | Mod: TC

## 2017-09-06 PROCEDURE — 87088 URINE BACTERIA CULTURE: CPT

## 2017-09-06 PROCEDURE — 99999 PR PBB SHADOW E&M-EST. PATIENT-LVL III: CPT | Mod: PBBFAC,,, | Performed by: NURSE PRACTITIONER

## 2017-09-06 PROCEDURE — 88175 CYTOPATH C/V AUTO FLUID REDO: CPT

## 2017-09-06 PROCEDURE — 87086 URINE CULTURE/COLONY COUNT: CPT

## 2017-09-06 PROCEDURE — 87077 CULTURE AEROBIC IDENTIFY: CPT

## 2017-09-06 PROCEDURE — 76830 TRANSVAGINAL US NON-OB: CPT | Mod: 26,,, | Performed by: INTERNAL MEDICINE

## 2017-09-06 RX ORDER — NAPROXEN SODIUM 220 MG
TABLET ORAL
Refills: 11 | Status: ON HOLD | COMMUNITY
Start: 2017-07-25 | End: 2019-11-25

## 2017-09-06 NOTE — PROGRESS NOTES
09/06/2017    SUBJECTIVE:   51 y.o. female for annual exam.    Past Medical History:   Diagnosis Date    Diabetes mellitus     Hypertension        Past Surgical History:   Procedure Laterality Date    COLONOSCOPY N/A 6/27/2017    Procedure: COLONOSCOPY;  Surgeon: Evelin Arceo MD;  Location: [address]);  Service: Endoscopy;  Laterality: N/A;  2 nd floor d/t BMI > 50 kg/m3    TONSILLECTOMY      TUBAL LIGATION         Current Outpatient Prescriptions   Medication Sig Dispense Refill    atorvastatin (LIPITOR) 20 MG tablet Take 1 tablet (20 mg total) by mouth once daily. 90 tablet 3    ergocalciferol (ERGOCALCIFEROL) 50,000 unit Cap Take 1 capsule (50,000 Units total) by mouth twice a week. 24 capsule 0    ferrous sulfate 325 mg (65 mg iron) Tab tablet TK 1 T PO  ONCE D  1    hydrochlorothiazide (HYDRODIURIL) 25 MG tablet Take 1 tablet (25 mg total) by mouth once daily. 90 tablet 1    ibuprofen (ADVIL,MOTRIN) 800 MG tablet Take 1 tablet (800 mg total) by mouth 3 (three) times daily. 90 tablet 2    insulin glargine (BASAGLAR KWIKPEN) 100 unit/mL (3 mL) InPn pen Inject 30 Units into the skin every evening. 30 mL 0    insulin lispro (HUMALOG) 100 unit/mL injection Inject 10 Units into the skin 3 (three) times daily before meals. 10 mL 5    insulin syringe-needle U-100 (INSULIN SYRINGE) 1/2 mL 30 gauge x 5/16 Syrg Use as directed 100 each 11    insulin syringe-needle U-100 1/2 mL 30 gauge Syrg U UTD TO INJECT INSULIN  11    metformin (GLUCOPHAGE) 1000 MG tablet TAKE 1 TABLET TWICE DAILY WITH AM AND PM MEALS 180 tablet 3    tramadol (ULTRAM) 50 mg tablet Take 2 tablets (100 mg total) by mouth every 6 (six) hours as needed. 90 tablet 2     No current facility-administered medications for this visit.      Allergies: Ace inhibitors   No LMP recorded. Patient is postmenopausal.   LMP 2013--spotting dark brown x 4 days      + colon cancer: Maternal grandfather  +breast cancer: mother, sister, maternal  "aunt, maternal grandmother    Denies family history of cervical, uterine, ovarian, kidney, or bladder ancers.    Well Woman:  Pap:denies history of abnormal pap semar  Mammo:01/2017 normal  Colonoscopy:06/2017 normal -- repeat in 2027  Dexa:n/a    ROS:  Feeling well.   No dyspnea or chest pain on exertion.    No abdominal pain, change in bowel habits, black or bloody stools.    No urinary tract symptoms.   GYN ROS: no breast pain or new or enlarging lumps on self exam, she complains of dark brown spotting x 4 days.   No neurological complaints.    OBJECTIVE:   The patient appears well, alert, oriented x 3, in no distress.  /84 (BP Location: Right arm, Patient Position: Sitting, BP Method: Medium (Manual))   Ht 5' 7" (1.702 m)   Wt (!) 144.5 kg (318 lb 9 oz)   BMI 49.89 kg/m²   ENT normal.  Neck supple. No adenopathy or thyromegaly. JACEK.   Lungs are clear, good air entry, no wheezes, rhonchi or rales.   S1 and S2 normal, no murmurs, regular rate and rhythm.   Abdomen soft without tenderness, guarding, mass or organomegaly.   Extremities show no edema, normal peripheral pulses.   Neurological is normal, no focal findings.    BREAST EXAM: breasts appear normal, no suspicious masses, no skin or nipple changes or axillary nodes    PELVIC EXAM:   VULVA: normal appearing vulva with no masses, tenderness or lesions,   VAGINA: normal appearing vagina with normal color and discharge, no lesions, atrophic,  CERVIX: cervical stenosis present, scant bloody mucus noted from os,   UTERUS: uterus is normal size, shape, consistency and nontender, difficult to assess due to body habitus,   ADNEXA: no masses, difficult to assess due to body habitus,   RECTAL: normal rectal, no masses    ASSESSMENT:   1. Well woman exam     2. Encounter for screening for cervical cancer   Liquid-based pap smear, screening    HPV DNA (High Risk)   3. Post-menopausal bleeding  US Pelvis Limited Non OB   4. Vaginal atrophy         PLAN:   1. " Well woman  --pap today  --takes 2-3 weeks for results    2. Post menopausal bleeding  --pelvic ultrasound ordered    3. Vaginal atrophy (dryness):  Use REPLENS or REFRESH OTC: 1/2 applicator full in vagina twice a week.      4. RTC pending ultrasound results          30 minutes were spent in face to face time with this patient  75 % of this time was spent in counseling and/or coordination of care  Sabina PEREZ [facility]and [facility]  Division of Female Pelvic Medicine and Reconstructive Surgery  Department of Obstetrics & Gynecology

## 2017-09-06 NOTE — PATIENT INSTRUCTIONS
1. Well woman  --pap today  --takes 2-3 weeks for results    2. Post menopausal bleeding  --pelvic ultrasound ordered    3. Vaginal atrophy (dryness):  Use REPLENS or REFRESH OTC: 1/2 applicator full in vagina twice a week.      4. RTC pending ultrasound results

## 2017-09-07 DIAGNOSIS — M25.569 KNEE PAIN, UNSPECIFIED CHRONICITY, UNSPECIFIED LATERALITY: ICD-10-CM

## 2017-09-07 RX ORDER — TRAMADOL HYDROCHLORIDE 50 MG/1
100 TABLET ORAL EVERY 6 HOURS PRN
Qty: 60 TABLET | Refills: 2 | Status: SHIPPED | OUTPATIENT
Start: 2017-09-07 | End: 2017-10-07 | Stop reason: SDUPTHER

## 2017-09-07 NOTE — PROGRESS NOTES
Pt with chronic knee pain. She has an appt with pain management in January, but is requiring pain medication for acute pain in the knees related to her therapy.

## 2017-09-08 DIAGNOSIS — Z12.4 ENCOUNTER FOR SCREENING FOR CERVICAL CANCER: Primary | ICD-10-CM

## 2017-09-09 LAB — BACTERIA UR CULT: NORMAL

## 2017-09-11 ENCOUNTER — TELEPHONE (OUTPATIENT)
Dept: UROGYNECOLOGY | Facility: CLINIC | Age: 52
End: 2017-09-11

## 2017-09-11 DIAGNOSIS — N39.0 URINARY TRACT INFECTION WITHOUT HEMATURIA, SITE UNSPECIFIED: Primary | ICD-10-CM

## 2017-09-11 DIAGNOSIS — N95.0 POST-MENOPAUSAL BLEEDING: ICD-10-CM

## 2017-09-11 DIAGNOSIS — Z29.9 PROPHYLACTIC MEASURE: Primary | ICD-10-CM

## 2017-09-11 LAB
HPV HR 12 DNA CVX QL NAA+PROBE: NEGATIVE
HPV16 DNA SPEC QL NAA+PROBE: NEGATIVE
HPV18 DNA SPEC QL NAA+PROBE: NEGATIVE

## 2017-09-11 RX ORDER — SULFAMETHOXAZOLE AND TRIMETHOPRIM 800; 160 MG/1; MG/1
1 TABLET ORAL 2 TIMES DAILY
Qty: 6 TABLET | Refills: 0 | Status: SHIPPED | OUTPATIENT
Start: 2017-09-11 | End: 2018-05-16

## 2017-09-11 NOTE — TELEPHONE ENCOUNTER
Attempted to reach patient to discuss UTI.  Need to inform her that her ultrasound was normal, but we should proceed with endometrial biopsy.  Sabina Alvarez, LINDAP-BC

## 2017-09-11 NOTE — TELEPHONE ENCOUNTER
----- Message from Windy Hutton sent at 9/11/2017  3:36 PM CDT -----  Contact: Patient    _1st Request  X_  2nd Request  _  3rd Request    Who:DEMARCO DELACRUZ [[id number]]    Why:Patient was returning a call back from the staff     What Number to Call Back:[phone]    When to Expect a call back: (Before the end of the day)   -- if call after 3:00 call back will be tomorrow.

## 2017-09-12 RX ORDER — MISOPROSTOL 200 UG/1
TABLET ORAL
Qty: 2 TABLET | Refills: 0 | Status: SHIPPED | OUTPATIENT
Start: 2017-09-12 | End: 2018-05-16 | Stop reason: ALTCHOICE

## 2017-09-18 ENCOUNTER — PROCEDURE VISIT (OUTPATIENT)
Dept: UROGYNECOLOGY | Facility: CLINIC | Age: 52
End: 2017-09-18
Payer: MEDICAID

## 2017-09-18 VITALS
BODY MASS INDEX: 45.99 KG/M2 | SYSTOLIC BLOOD PRESSURE: 120 MMHG | DIASTOLIC BLOOD PRESSURE: 70 MMHG | HEIGHT: 67 IN | WEIGHT: 293 LBS

## 2017-09-18 DIAGNOSIS — N95.0 POST-MENOPAUSAL BLEEDING: Primary | ICD-10-CM

## 2017-09-18 DIAGNOSIS — B37.31 YEAST VAGINITIS: ICD-10-CM

## 2017-09-18 RX ORDER — FLUCONAZOLE 150 MG/1
150 TABLET ORAL
Qty: 3 TABLET | Refills: 0 | Status: SHIPPED | OUTPATIENT
Start: 2017-09-18 | End: 2017-09-23

## 2017-09-18 NOTE — PROCEDURES
Procedures  Thick white discharge present in vagina.  Unable to perform embx due to vaginal yeast.  Sabina Alvarez, LINDAP-BC

## 2017-09-25 ENCOUNTER — PROCEDURE VISIT (OUTPATIENT)
Dept: UROGYNECOLOGY | Facility: CLINIC | Age: 52
End: 2017-09-25
Payer: MEDICAID

## 2017-09-25 VITALS
BODY MASS INDEX: 45.99 KG/M2 | HEIGHT: 67 IN | DIASTOLIC BLOOD PRESSURE: 90 MMHG | WEIGHT: 293 LBS | SYSTOLIC BLOOD PRESSURE: 130 MMHG

## 2017-09-25 DIAGNOSIS — N95.0 POST-MENOPAUSAL BLEEDING: ICD-10-CM

## 2017-09-25 PROCEDURE — 88305 TISSUE EXAM BY PATHOLOGIST: CPT | Performed by: PATHOLOGY

## 2017-09-25 PROCEDURE — 88305 TISSUE EXAM BY PATHOLOGIST: CPT | Mod: 26,,, | Performed by: PATHOLOGY

## 2017-09-25 PROCEDURE — 58100 BIOPSY OF UTERUS LINING: CPT | Mod: S$PBB,,, | Performed by: NURSE PRACTITIONER

## 2017-09-25 PROCEDURE — 58100 BIOPSY OF UTERUS LINING: CPT | Mod: PBBFAC | Performed by: NURSE PRACTITIONER

## 2017-09-25 NOTE — PROCEDURES
Procedures     PROCEDURE (EMBx):  The patient was placed in dorsal lithotomy position.  A sterile speculum was used to identify the cervix. The ectocervix was prepped x 2 with betadine.  A single tooth tenaculum was used to grasp the posterior cervix.  The endometrial biopsy pipelle was advanced into the uterine cavity until gentle resistance was met.  2 passes were made, and the specimen was submitted to pathology for further evaluation.  The tenaculum was removed, and the site remained hemostatic.  The speculum was removed.  The patient tolerated the procedure well.   JAYE Villafuerte-BC

## 2017-10-07 DIAGNOSIS — M25.569 KNEE PAIN, UNSPECIFIED CHRONICITY, UNSPECIFIED LATERALITY: ICD-10-CM

## 2017-10-07 RX ORDER — TRAMADOL HYDROCHLORIDE 50 MG/1
100 TABLET ORAL EVERY 6 HOURS PRN
Qty: 60 TABLET | Refills: 0 | Status: SHIPPED | OUTPATIENT
Start: 2017-10-07 | End: 2017-10-25 | Stop reason: SDUPTHER

## 2017-10-07 NOTE — PROGRESS NOTES
Tramadol refilled. Pt now has medicaid and wasn't able to get an appointment with pain management in January.

## 2017-10-17 ENCOUNTER — TELEPHONE (OUTPATIENT)
Dept: ORTHOPEDICS | Facility: CLINIC | Age: 52
End: 2017-10-17

## 2017-10-17 DIAGNOSIS — M17.0 PRIMARY OSTEOARTHRITIS OF BOTH KNEES: Primary | ICD-10-CM

## 2017-10-17 RX ORDER — HYALURONATE SODIUM 20 MG/2 ML
40 SYRINGE (ML) INTRAARTICULAR WEEKLY
Status: SHIPPED | OUTPATIENT
Start: 2017-10-31 | End: 2017-11-21

## 2017-10-17 NOTE — TELEPHONE ENCOUNTER
Called patient and left voicemail. She had injections 8/24 so it's too soon for an injection. She has to see pain management for her tramadol and she has an appt in January.

## 2017-10-17 NOTE — TELEPHONE ENCOUNTER
----- Message from Shana Coleman MA sent at 10/17/2017 11:30 AM CDT -----  Contact: self   He has Medicaid, you have seen him before so should I work him in or have him fu with [facility]?  ----- Message -----  From: Deja Pat  Sent: 10/17/2017  11:19 AM  To: Ronak Christianson Staff    Pt is requesting a call back regarding scheduling an appt for her bilateral knee pains. I was not able to set up an appt due to pt's insurance.  [phone]

## 2017-10-25 DIAGNOSIS — M25.569 KNEE PAIN, UNSPECIFIED CHRONICITY, UNSPECIFIED LATERALITY: ICD-10-CM

## 2017-10-25 RX ORDER — TRAMADOL HYDROCHLORIDE 50 MG/1
100 TABLET ORAL EVERY 6 HOURS PRN
Qty: 60 TABLET | Refills: 0 | Status: SHIPPED | OUTPATIENT
Start: 2017-10-25 | End: 2017-12-05 | Stop reason: SDUPTHER

## 2017-10-26 ENCOUNTER — HOSPITAL ENCOUNTER (EMERGENCY)
Facility: OTHER | Age: 52
Discharge: HOME | End: 2017-10-26
Attending: EMERGENCY MEDICINE
Payer: MEDICAID

## 2017-10-26 VITALS
HEART RATE: 66 BPM | WEIGHT: 293 LBS | SYSTOLIC BLOOD PRESSURE: 188 MMHG | HEIGHT: 67 IN | OXYGEN SATURATION: 98 % | TEMPERATURE: 98 F | BODY MASS INDEX: 45.99 KG/M2 | DIASTOLIC BLOOD PRESSURE: 68 MMHG | RESPIRATION RATE: 18 BRPM

## 2017-10-26 DIAGNOSIS — M54.50 ACUTE LEFT-SIDED LOW BACK PAIN WITHOUT SCIATICA: Primary | ICD-10-CM

## 2017-10-26 PROCEDURE — 96372 THER/PROPH/DIAG INJ SC/IM: CPT

## 2017-10-26 PROCEDURE — 99283 EMERGENCY DEPT VISIT LOW MDM: CPT | Mod: 25

## 2017-10-26 PROCEDURE — 25000003 PHARM REV CODE 250: Performed by: PHYSICIAN ASSISTANT

## 2017-10-26 RX ORDER — METHOCARBAMOL 500 MG/1
1000 TABLET, FILM COATED ORAL
Status: COMPLETED | OUTPATIENT
Start: 2017-10-26 | End: 2017-10-26

## 2017-10-26 RX ORDER — HYDROMORPHONE HYDROCHLORIDE 1 MG/ML
1 INJECTION, SOLUTION INTRAMUSCULAR; INTRAVENOUS; SUBCUTANEOUS
Status: COMPLETED | OUTPATIENT
Start: 2017-10-26 | End: 2017-10-26

## 2017-10-26 RX ORDER — CYCLOBENZAPRINE HCL 10 MG
10 TABLET ORAL 3 TIMES DAILY PRN
Qty: 15 TABLET | Refills: 0 | Status: SHIPPED | OUTPATIENT
Start: 2017-10-26 | End: 2017-10-31

## 2017-10-26 RX ADMIN — HYDROMORPHONE HYDROCHLORIDE 1 MG: 1 INJECTION, SOLUTION INTRAMUSCULAR; INTRAVENOUS; SUBCUTANEOUS at 10:10

## 2017-10-26 RX ADMIN — METHOCARBAMOL 1000 MG: 500 TABLET ORAL at 10:10

## 2017-10-26 NOTE — ED TRIAGE NOTES
Pt c/o left sided back pain since Monday - states moved a srinivas size bed herself and progressively gotten worse

## 2017-10-26 NOTE — ED PROVIDER NOTES
Encounter Date: 10/26/2017       History     Chief Complaint   Patient presents with    Back Pain     C/o pain to lower back radiating to buttocks s/p lifting up on heavy mattress 3 days ago. Denies any urinary symptoms. Pain increases upon movement. No improvement with Icy Hot.     Patient is a 51-year-old female with diabetes and hypertension who presents to the ED with back pain.  She reports left, lower back pain radiating into her left buttocks after moving a mattress and box spring 3 days ago. She states her pain is 10/10  She denies radiation of pain into her leg, numbness, or tingling.  She states she is taking ibuprofen 800 mg with minimal relief.  She states her pain is worse with movement.  She denies fever, saddle anesthesia, or bowel/bladder incontinence.       The history is provided by the patient.     Review of patient's allergies indicates:   Allergen Reactions    Ace inhibitors Edema     Past Medical History:   Diagnosis Date    Diabetes mellitus     Hypertension      Past Surgical History:   Procedure Laterality Date    COLONOSCOPY N/A 6/27/2017    Procedure: COLONOSCOPY;  Surgeon: Evelin Arceo MD;  Location: [facility] ([address] [place] [address]);  Service: Endoscopy;  Laterality: N/A;  2 nd floor d/t BMI > 50 kg/m3    TONSILLECTOMY      TUBAL LIGATION       Family History   Problem Relation Age of Onset    Breast cancer Mother     Cancer Mother     Diabetes Mother     Colon polyps Mother     Diabetes Father     No Known Problems Sister     No Known Problems Brother     Diabetes Paternal Uncle     Breast cancer Maternal Grandmother     Colon cancer Maternal Grandmother     No Known Problems Maternal Grandfather     Diabetes Paternal Grandmother     Heart disease Paternal Grandmother     No Known Problems Paternal Grandfather     Esophageal cancer Neg Hx     Irritable bowel syndrome Neg Hx     Rectal cancer Neg Hx     Stomach cancer Neg Hx     Ulcerative colitis Neg Hx     Celiac  disease Neg Hx     Crohn's disease Neg Hx      Social History   Substance Use Topics    Smoking status: Never Smoker    Smokeless tobacco: Never Used    Alcohol use Yes      Comment: occasional     Review of Systems   Constitutional: Negative for chills and fever.   HENT: Negative for congestion and sore throat.    Eyes: Negative for pain.   Respiratory: Negative for shortness of breath.    Cardiovascular: Negative for chest pain.   Gastrointestinal: Negative for abdominal pain, diarrhea, nausea and vomiting.   Genitourinary: Negative for decreased urine volume, difficulty urinating and dysuria.   Musculoskeletal: Positive for back pain. Negative for arthralgias.   Skin: Negative for rash.   Neurological: Negative for numbness and headaches.       Physical Exam     Initial Vitals [10/26/17 0836]   BP Pulse Resp Temp SpO2   (!) 145/89 74 18 98.2 °F (36.8 °C) 100 %      MAP       107.67         Physical Exam    Constitutional: Vital signs are normal. She is cooperative.   HENT:   Head: Normocephalic and atraumatic.   Mouth/Throat: Oropharynx is clear and moist.   Eyes: Conjunctivae and EOM are normal. Pupils are equal, round, and reactive to light.   Neck: Normal range of motion. Neck supple.   Cardiovascular: Normal rate, regular rhythm and intact distal pulses.   Pulmonary/Chest: Breath sounds normal. She has no wheezes. She has no rhonchi. She has no rales.   Abdominal: Soft. Bowel sounds are normal. There is no tenderness. There is no rebound and no guarding.   Musculoskeletal:   No cervical, thoracic, or lumbar spinal midline tenderness, crepitus, masses, or step-offs.  Tenderness to palpation of the left paraspinal muscles in the lumbar region and left buttocks.   Neurological: GCS eye subscore is 4. GCS verbal subscore is 5. GCS motor subscore is 6.   AAOx4. CN II-XII were intact. Rico intact. Strength 5/5 in all extremities. Normal sensation. Normal gait.    Skin: Skin is warm and dry. Capillary refill  takes less than 2 seconds. No rash noted.   Psychiatric: She has a normal mood and affect. Her behavior is normal.         ED Course   Procedures  Labs Reviewed - No data to display          Medical Decision Making:   Initial Assessment:   Urgent evaluation of a 51 y.o. female presenting to the emergency department complaining of back pain. Patient is afebrile, nontoxic appearing and hemodynamically stable.  ED Management:  The patient's back pain is likely secondary to mechanical back pain.  There are no signs of saddle anesthesia, incontinence, neurologic deficits, fevers, trauma or midline tenderness on history or physical to suggest cauda equina, infectious process, fracture or subluxation.  I will treat with pain medication, anti-inflammatories and muscle relaxers for relief.  I have advised her to follow up with her PCP if her pain persists after couple of weeks.  Have given her specific return precautions.  I discussed this patient with my attending physician who agrees to my assessment and plan.  Other:   I have discussed this case with another health care provider.                   ED Course      Clinical Impression:     1. Acute left-sided low back pain without sciatica                             Bandar Hernandez PA-C  10/26/17 1055

## 2017-10-26 NOTE — PROGRESS NOTES
Refill of tramadol sent in for pt's knee pain. She has an appt with pain management coming up January 7 so I am continuing her pain medicine for now pending that appt. She has medicaid and was unable to get a sooner appt.

## 2017-10-30 ENCOUNTER — TELEPHONE (OUTPATIENT)
Dept: INTERNAL MEDICINE | Facility: CLINIC | Age: 52
End: 2017-10-30

## 2017-10-30 NOTE — TELEPHONE ENCOUNTER
----- Message from Ernestine Maloney sent at 10/30/2017 12:13 PM CDT -----  Contact: DEMARCO DELACRUZ [[id number]]  x_  1st Request  _  2nd Request  _  3rd Request        Who: DEMAROC DELACRUZ [[id number]]    Why: Requesting a call back in regards to rescheduling her appointment. I attempted but couldn't. Please call her.     What Number to Call Back: [phone]    When to Expect a call back: (Within 24 hours)    Please return the call at earliest convenience. Thanks!  4:03 PM  Left  for patient regarding rescheduling NP appointment.

## 2017-11-07 ENCOUNTER — OFFICE VISIT (OUTPATIENT)
Dept: INTERNAL MEDICINE | Facility: CLINIC | Age: 52
End: 2017-11-07
Attending: FAMILY MEDICINE
Payer: MEDICAID

## 2017-11-07 ENCOUNTER — LAB VISIT (OUTPATIENT)
Dept: LAB | Facility: OTHER | Age: 52
End: 2017-11-07
Attending: FAMILY MEDICINE
Payer: MEDICAID

## 2017-11-07 VITALS
HEIGHT: 65 IN | DIASTOLIC BLOOD PRESSURE: 98 MMHG | BODY MASS INDEX: 48.82 KG/M2 | WEIGHT: 293 LBS | SYSTOLIC BLOOD PRESSURE: 166 MMHG | HEART RATE: 92 BPM

## 2017-11-07 DIAGNOSIS — E11.9 CONTROLLED TYPE 2 DIABETES MELLITUS WITHOUT COMPLICATION, WITH LONG-TERM CURRENT USE OF INSULIN: ICD-10-CM

## 2017-11-07 DIAGNOSIS — I10 BENIGN ESSENTIAL HYPERTENSION: ICD-10-CM

## 2017-11-07 DIAGNOSIS — Z79.4 CONTROLLED TYPE 2 DIABETES MELLITUS WITHOUT COMPLICATION, WITH LONG-TERM CURRENT USE OF INSULIN: ICD-10-CM

## 2017-11-07 DIAGNOSIS — E66.01 MORBID OBESITY WITH BMI OF 50.0-59.9, ADULT: ICD-10-CM

## 2017-11-07 DIAGNOSIS — Z79.4 CONTROLLED TYPE 2 DIABETES MELLITUS WITHOUT COMPLICATION, WITH LONG-TERM CURRENT USE OF INSULIN: Primary | ICD-10-CM

## 2017-11-07 DIAGNOSIS — E11.9 CONTROLLED TYPE 2 DIABETES MELLITUS WITHOUT COMPLICATION, WITH LONG-TERM CURRENT USE OF INSULIN: Primary | ICD-10-CM

## 2017-11-07 PROBLEM — Z12.11 SCREEN FOR COLON CANCER: Status: RESOLVED | Noted: 2017-06-27 | Resolved: 2017-11-07

## 2017-11-07 LAB
ESTIMATED AVG GLUCOSE: 108 MG/DL
HBA1C MFR BLD HPLC: 5.4 %

## 2017-11-07 PROCEDURE — 36415 COLL VENOUS BLD VENIPUNCTURE: CPT

## 2017-11-07 PROCEDURE — 99214 OFFICE O/P EST MOD 30 MIN: CPT | Mod: PBBFAC | Performed by: FAMILY MEDICINE

## 2017-11-07 PROCEDURE — 99396 PREV VISIT EST AGE 40-64: CPT | Mod: S$PBB,,, | Performed by: FAMILY MEDICINE

## 2017-11-07 PROCEDURE — 83036 HEMOGLOBIN GLYCOSYLATED A1C: CPT

## 2017-11-07 PROCEDURE — 99999 PR PBB SHADOW E&M-EST. PATIENT-LVL IV: CPT | Mod: PBBFAC,,, | Performed by: FAMILY MEDICINE

## 2017-11-07 RX ORDER — CYCLOBENZAPRINE HCL 5 MG
5 TABLET ORAL 2 TIMES DAILY PRN
Qty: 60 TABLET | Refills: 0 | Status: SHIPPED | OUTPATIENT
Start: 2017-11-07 | End: 2017-12-07

## 2017-11-07 RX ORDER — TRAMADOL HYDROCHLORIDE 50 MG/1
50 TABLET ORAL EVERY 8 HOURS PRN
Qty: 30 TABLET | Refills: 0 | Status: SHIPPED | OUTPATIENT
Start: 2017-11-07 | End: 2017-11-17

## 2017-11-07 NOTE — PATIENT INSTRUCTIONS
Our policy is that you take your blood pressure medication two hours prior to coming to your scheduled appointment.  If at that time your blood pressure is elevated you will have to return within 2 -4 weeks for a nurse blood pressure follow up until control.    Thanks for choosing [facility].

## 2017-11-07 NOTE — PROGRESS NOTES
Subjective:       Patient ID: Yayo Carver is a 51 y.o. female.    Chief Complaint: Establish Care and Low-back Pain (x 2 week ED)    Pt presents today to [facility] care with me from Dr Hill. Per pt, was recently in the ED for LBP and was diagnosed with muscle spasms after lifting a mattress   Pt states that pain is still present and is now out of flexeril. Does not feel that this is a chronic issue and thinks that one more round of tramadol will help with her pain since the NSAIDS are not.  Pt does get tramadol from her ortho, but was told by her ortho doc that she wont get any more from them since they are only treating her back. Pt requesting more tramadol today for ongoing LBP. Declines referral to healthy back PT        Low-back Pain   Associated symptoms include arthralgias and joint swelling. Pertinent negatives include no abdominal pain, chest pain, chills, congestion, coughing, fatigue, fever, headaches, myalgias, nausea, numbness, sore throat, vomiting or weakness.     Review of Systems   Constitutional: Negative for activity change, appetite change, chills, fatigue and fever.   HENT: Negative for congestion, ear pain, sinus pressure, sore throat and trouble swallowing.    Eyes: Negative for photophobia, pain and visual disturbance.   Respiratory: Negative for apnea, cough, chest tightness, shortness of breath and wheezing.    Cardiovascular: Negative for chest pain, palpitations and leg swelling.   Gastrointestinal: Negative for abdominal distention, abdominal pain, constipation, diarrhea, nausea and vomiting.   Genitourinary: Negative.    Musculoskeletal: Positive for arthralgias, back pain and joint swelling. Negative for gait problem, myalgias and neck stiffness.   Skin: Negative.    Neurological: Negative for dizziness, tremors, weakness, numbness and headaches.   Psychiatric/Behavioral: Negative for behavioral problems, decreased concentration and sleep disturbance. The patient is not nervous/anxious.         Objective:      Physical Exam   Constitutional: She is oriented to person, place, and time. She appears well-developed and well-nourished.   HENT:   Head: Normocephalic and atraumatic.   Right Ear: External ear normal.   Left Ear: External ear normal.   Nose: Nose normal.   Mouth/Throat: Oropharynx is clear and moist. No oropharyngeal exudate.   Eyes: EOM are normal. Pupils are equal, round, and reactive to light.   Neck: Normal range of motion. Neck supple. No thyromegaly present.   Cardiovascular: Normal rate, regular rhythm, normal heart sounds and intact distal pulses.    No murmur heard.  Pulmonary/Chest: Effort normal and breath sounds normal. No respiratory distress.   Abdominal: Soft. Bowel sounds are normal. She exhibits no distension and no mass. There is no tenderness. There is no rebound and no guarding.   Musculoskeletal: Normal range of motion. She exhibits no edema or tenderness (neg slr. FROM with limited flexion to 60 degrees).   Lymphadenopathy:     She has no cervical adenopathy.   Neurological: She is alert and oriented to person, place, and time. She has normal reflexes. She displays normal reflexes. No cranial nerve deficit or sensory deficit. She exhibits normal muscle tone. Coordination normal.   Skin: Skin is warm and dry. No erythema.   Psychiatric: She has a normal mood and affect. Her behavior is normal. Judgment and thought content normal.       Assessment:       1. Controlled type 2 diabetes mellitus without complication, with long-term current use of insulin        Plan:       DMT2: stable but is due for repeat a1c and microalb. Ordered. Pt is UTD eye. Foot exam UTD  LBP: ONE TIME REFILL OF TRAMADOL ONLY. If pt needs more meds, she will be referred to back and spine clinic and /or pain management. Pt states that she understands plan. Pt encouraged to use heat, ice, NSAIDS w meals and avoid heavy lifting, use proper lifting techniques  HTN: BP elevated. Pt DID NOT TAKE HER BP  MEDS TODAY. Will RTC for nurse visit BP check in 2 weeks    MORBID OBESITY: weight loss d/w pt. Pt aware that she stress eats and will try not to do so  RTC 2 weeks nurse and with me--3 mos

## 2017-11-14 ENCOUNTER — TELEPHONE (OUTPATIENT)
Dept: ORTHOPEDICS | Facility: CLINIC | Age: 52
End: 2017-11-14

## 2017-11-14 DIAGNOSIS — M17.0 PRIMARY OSTEOARTHRITIS OF BOTH KNEES: Primary | ICD-10-CM

## 2017-11-14 RX ORDER — HYALURONATE SODIUM 20 MG/2 ML
40 SYRINGE (ML) INTRAARTICULAR WEEKLY
Qty: 6 SYRINGE | Refills: 0 | Status: SHIPPED | OUTPATIENT
Start: 2017-11-14 | End: 2017-11-29

## 2017-11-14 NOTE — TELEPHONE ENCOUNTER
Spoke with rep at [employer] and she faxed a prior auth form for me to complete. She directed me to establish who the specialty pharmacy is, so I will have to research that then order the medication.

## 2017-11-14 NOTE — TELEPHONE ENCOUNTER
----- Message from Shana Coleman MA sent at 11/14/2017 11:14 AM CST -----  Contact: Joi with [employer]anne Chanel/[phone]      ----- Message -----  From: Marcin Valero  Sent: 11/14/2017  10:59 AM  To: Ronak Christianson Staff    [facility]oi Chanel called in requesting an authorization for a bilat knee euflexxa inj for the pt.   The original euflexxa inj order was cancelled because it was listed as a medical benefit and needs to be a pharmacy benefit. Joi informs me that this can be changed either over the phone or via fax.  Phone:[phone]  Fax:[phone]   A representative can be reached at [phone].

## 2017-11-15 ENCOUNTER — TELEPHONE (OUTPATIENT)
Dept: ORTHOPEDICS | Facility: CLINIC | Age: 52
End: 2017-11-15

## 2017-11-15 NOTE — TELEPHONE ENCOUNTER
----- Message from Shan Zurita sent at 11/15/2017  1:40 PM CST -----  Contact: Brendan       ----- Message -----  From: Allison Rogel  Sent: 11/15/2017   1:35 PM  To: Ronak Christianson Staff    Need authorization for HYALURONATE SODIUM (EUFLEXXA) 10 mg/mL(mw 2.4 -3.6 million) IAtc Syrg injection

## 2017-11-16 NOTE — PROGRESS NOTES
I was going to refill the patient's tramadol for her knee pain, but she will have to establish with pain management as she has gotten prescriptions from both myself and Dr. Louie and I cannot manage chronic pain. She verbalizes understanding.

## 2017-11-21 ENCOUNTER — TELEPHONE (OUTPATIENT)
Dept: UROGYNECOLOGY | Facility: CLINIC | Age: 52
End: 2017-11-21

## 2017-11-27 DIAGNOSIS — M25.561 ACUTE PAIN OF BOTH KNEES: ICD-10-CM

## 2017-11-27 DIAGNOSIS — M25.562 ACUTE PAIN OF BOTH KNEES: ICD-10-CM

## 2017-11-27 RX ORDER — IBUPROFEN 800 MG/1
800 TABLET ORAL 3 TIMES DAILY
Qty: 90 TABLET | Refills: 2 | Status: SHIPPED | OUTPATIENT
Start: 2017-11-27 | End: 2018-04-25 | Stop reason: SDUPTHER

## 2017-11-27 NOTE — PROGRESS NOTES
Ibuprofen refilled as requested. Unable to continue to refill tramadol as I am unable to treat chronic pain. She will establish care with a pain management group.

## 2017-12-05 ENCOUNTER — OFFICE VISIT (OUTPATIENT)
Dept: INTERNAL MEDICINE | Facility: CLINIC | Age: 52
End: 2017-12-05
Attending: FAMILY MEDICINE
Payer: MEDICAID

## 2017-12-05 VITALS
BODY MASS INDEX: 48.82 KG/M2 | HEIGHT: 65 IN | WEIGHT: 293 LBS | SYSTOLIC BLOOD PRESSURE: 150 MMHG | DIASTOLIC BLOOD PRESSURE: 90 MMHG | HEART RATE: 93 BPM

## 2017-12-05 DIAGNOSIS — E11.9 DIABETES MELLITUS WITHOUT COMPLICATION: ICD-10-CM

## 2017-12-05 DIAGNOSIS — Z23 NEED FOR 23-POLYVALENT PNEUMOCOCCAL POLYSACCHARIDE VACCINE: ICD-10-CM

## 2017-12-05 DIAGNOSIS — M25.569 KNEE PAIN, UNSPECIFIED CHRONICITY, UNSPECIFIED LATERALITY: ICD-10-CM

## 2017-12-05 DIAGNOSIS — Z23 NEEDS FLU SHOT: ICD-10-CM

## 2017-12-05 DIAGNOSIS — M25.512 ACUTE PAIN OF LEFT SHOULDER: Primary | ICD-10-CM

## 2017-12-05 PROCEDURE — 82570 ASSAY OF URINE CREATININE: CPT

## 2017-12-05 PROCEDURE — 99214 OFFICE O/P EST MOD 30 MIN: CPT | Mod: PBBFAC | Performed by: FAMILY MEDICINE

## 2017-12-05 PROCEDURE — 90732 PPSV23 VACC 2 YRS+ SUBQ/IM: CPT | Mod: PBBFAC

## 2017-12-05 PROCEDURE — 99999 PR PBB SHADOW E&M-EST. PATIENT-LVL IV: CPT | Mod: PBBFAC,,, | Performed by: FAMILY MEDICINE

## 2017-12-05 PROCEDURE — 90472 IMMUNIZATION ADMIN EACH ADD: CPT | Mod: PBBFAC

## 2017-12-05 PROCEDURE — 99213 OFFICE O/P EST LOW 20 MIN: CPT | Mod: S$PBB,,, | Performed by: FAMILY MEDICINE

## 2017-12-05 PROCEDURE — 82043 UR ALBUMIN QUANTITATIVE: CPT

## 2017-12-05 PROCEDURE — 90686 IIV4 VACC NO PRSV 0.5 ML IM: CPT | Mod: PBBFAC

## 2017-12-05 RX ORDER — TRAMADOL HYDROCHLORIDE 50 MG/1
100 TABLET ORAL EVERY 6 HOURS PRN
Qty: 21 TABLET | Refills: 0 | Status: SHIPPED | OUTPATIENT
Start: 2017-12-05 | End: 2017-12-08 | Stop reason: SDUPTHER

## 2017-12-05 NOTE — PROGRESS NOTES
Subjective:       Patient ID: Yayo Carver is a 51 y.o. female.    Chief Complaint: Annual Exam (F/U); Shoulder Pain; and Back Pain    Pt presents today for left shoulder pain that has worsened from heavy lifting. Per pt, this was triggered by her working 7 days per wk. Pt says that her lower back pain has improved with her exercises but pain now is in shoulder. Denies any weakness in shoulder, arm or hand. Denies any radiculopathy    Pt also asking for tramadol. Has not seen pain doc        Shoulder Pain    Pertinent negatives include no headaches or numbness.   Back Pain   Pertinent negatives include no chest pain, headaches, numbness or weakness.     Review of Systems   Constitutional: Positive for activity change.   Eyes: Negative.    Respiratory: Negative for cough, chest tightness and shortness of breath.    Cardiovascular: Negative for chest pain, palpitations and leg swelling.   Gastrointestinal: Negative.    Musculoskeletal: Positive for back pain and joint swelling. Negative for arthralgias and myalgias.   Skin: Negative.    Neurological: Negative for dizziness, weakness, light-headedness, numbness and headaches.   All other systems reviewed and are negative.      Objective:      Physical Exam   Constitutional: She is oriented to person, place, and time. She appears well-developed and well-nourished.   HENT:   Head: Normocephalic and atraumatic.   Eyes: Conjunctivae and EOM are normal. Pupils are equal, round, and reactive to light.   Neck: Normal range of motion. Neck supple. No thyromegaly present.   Cardiovascular: Normal rate, regular rhythm, normal heart sounds and intact distal pulses.    No murmur heard.  Pulmonary/Chest: Effort normal and breath sounds normal. No respiratory distress. She has no wheezes. She has no rales. She exhibits no tenderness.   Musculoskeletal: Normal range of motion. She exhibits tenderness (pos TTP SA joint left shoulder, FROM left arm with  5/5. ). She exhibits no  edema.   Lymphadenopathy:     She has no cervical adenopathy.   Neurological: She is alert and oriented to person, place, and time. She displays normal reflexes. No cranial nerve deficit or sensory deficit. She exhibits normal muscle tone. Coordination normal.   Skin: Skin is warm. No erythema.   Psychiatric: She has a normal mood and affect. Her behavior is normal. Judgment and thought content normal.       Assessment:       1. Diabetes mellitus without complication    2. Acute pain of left shoulder    3. Knee pain, unspecified chronicity, unspecified laterality    4. Needs flu shot    5. Need for 23-polyvalent pneumococcal polysaccharide vaccine        Plan:       DM; last a1c stable. Continue present management. Needs microalbumin  Acute pain left shoulder: suspect bursitis: pt advised to avoid heavy lifting. NSAIDS, ice, heat and rest.     Pt once again informed that I WILL NOT PRESCRIBE LONG TERM TRAMADOL. Will need to see ortho if shoulder continues to hurt or she needs to find pain management doc. This will be LAST SCRIPT THAT I GIVE HER. Max 7 days    RTC in 3 mos

## 2017-12-05 NOTE — PROGRESS NOTES
Patient given Pneumococcal 23 IM in the RD. Patient tolerated well and Band-Aid was applied. Lot#[id number] Exp:05/20/19. Patient advised to wait in the lobby for 15 min to make sure no adverse reactions occur. Patient states verbal understanding and has no further questions.  Pt has been given vaccine information sheet.      Patient given Fluzone IM in the LD. Patient tolerated well and Band-Aid was applied. Lot#[id number] Exp:05/25/18. Patient advised to wait in the lobby for 15 min to make sure no adverse reactions occur. Patient states verbal understanding and has no further questions.  Pt has been given vaccine information sheet.

## 2017-12-06 LAB
CREAT UR-MCNC: 72.3 MG/DL
MICROALBUMIN UR DL<=1MG/L-MCNC: 3 UG/ML
MICROALBUMIN/CREATININE RATIO: 4.1 UG/MG

## 2017-12-08 DIAGNOSIS — M25.569 KNEE PAIN, UNSPECIFIED CHRONICITY, UNSPECIFIED LATERALITY: ICD-10-CM

## 2017-12-08 RX ORDER — TRAMADOL HYDROCHLORIDE 50 MG/1
100 TABLET ORAL EVERY 6 HOURS PRN
Qty: 60 TABLET | Refills: 0 | Status: SHIPPED | OUTPATIENT
Start: 2017-12-08 | End: 2017-12-15

## 2017-12-08 NOTE — PROGRESS NOTES
Patient has an appt with pain management at [facility] in 2 weeks. Will send in a prescription for tramadol to hold her with her knee pain until then.

## 2017-12-14 ENCOUNTER — TELEPHONE (OUTPATIENT)
Dept: PAIN MEDICINE | Facility: CLINIC | Age: 52
End: 2017-12-14

## 2017-12-14 NOTE — TELEPHONE ENCOUNTER
----- Message from Chen Elkins sent at 12/14/2017  2:04 PM CST -----  Contact: [phone]  Pt is requesting appt to pain [address] for M25.512 (ICD-10-CM) - Acute pain of left shoulder. Referral was placed by PCP but pt's primary insurance is Medicaid. System generated alert that message need to be sent to clinical staff. Please call patient and advise thanks

## 2017-12-27 DIAGNOSIS — M17.0 PRIMARY OSTEOARTHRITIS OF BOTH KNEES: Primary | ICD-10-CM

## 2018-01-26 NOTE — TELEPHONE ENCOUNTER
----- Message from Marilee Roberts sent at 1/26/2018 11:22 AM CST -----  Contact: Patient Herself  _  1st Request  _  2nd Request  X  3rd Request    Please refill the medication(s) listed below. Please call the patient when the prescription(s) is ready for  at the phone number [phone] .    Medication #1  METFORMIN  1000 mg    Preferred Pharmacy: [id number] - [place] - [address] [place] [address]# [phone]  /  Fax# [phone]

## 2018-01-29 RX ORDER — METFORMIN HYDROCHLORIDE 1000 MG/1
TABLET ORAL
Qty: 180 TABLET | Refills: 0 | Status: SHIPPED | OUTPATIENT
Start: 2018-01-29 | End: 2018-01-31 | Stop reason: SDUPTHER

## 2018-01-31 ENCOUNTER — PATIENT MESSAGE (OUTPATIENT)
Dept: INTERNAL MEDICINE | Facility: CLINIC | Age: 53
End: 2018-01-31

## 2018-01-31 DIAGNOSIS — Z79.4 CONTROLLED TYPE 2 DIABETES MELLITUS WITHOUT COMPLICATION, WITH LONG-TERM CURRENT USE OF INSULIN: Primary | ICD-10-CM

## 2018-01-31 DIAGNOSIS — E11.9 CONTROLLED TYPE 2 DIABETES MELLITUS WITHOUT COMPLICATION, WITH LONG-TERM CURRENT USE OF INSULIN: Primary | ICD-10-CM

## 2018-01-31 RX ORDER — METFORMIN HYDROCHLORIDE 1000 MG/1
TABLET ORAL
Qty: 180 TABLET | Refills: 0 | Status: SHIPPED | OUTPATIENT
Start: 2018-01-31 | End: 2018-05-16 | Stop reason: SDUPTHER

## 2018-01-31 RX ORDER — INSULIN GLARGINE 100 [IU]/ML
30 INJECTION, SOLUTION SUBCUTANEOUS NIGHTLY
Qty: 30 ML | Refills: 0 | Status: SHIPPED | OUTPATIENT
Start: 2018-01-31 | End: 2018-05-16 | Stop reason: SDUPTHER

## 2018-02-02 DIAGNOSIS — E11.9 TYPE 2 DIABETES MELLITUS WITHOUT COMPLICATION: ICD-10-CM

## 2018-02-19 ENCOUNTER — OFFICE VISIT (OUTPATIENT)
Dept: ORTHOPEDICS | Facility: CLINIC | Age: 53
End: 2018-02-19
Payer: MEDICAID

## 2018-02-19 VITALS
HEART RATE: 98 BPM | SYSTOLIC BLOOD PRESSURE: 161 MMHG | WEIGHT: 293 LBS | HEIGHT: 65 IN | BODY MASS INDEX: 48.82 KG/M2 | DIASTOLIC BLOOD PRESSURE: 95 MMHG

## 2018-02-19 DIAGNOSIS — M17.0 PRIMARY OSTEOARTHRITIS OF BOTH KNEES: Primary | ICD-10-CM

## 2018-02-19 PROCEDURE — 20610 DRAIN/INJ JOINT/BURSA W/O US: CPT | Mod: 50,S$PBB,, | Performed by: NURSE PRACTITIONER

## 2018-02-19 PROCEDURE — 99499 UNLISTED E&M SERVICE: CPT | Mod: S$PBB,,, | Performed by: NURSE PRACTITIONER

## 2018-02-19 PROCEDURE — 20610 DRAIN/INJ JOINT/BURSA W/O US: CPT | Mod: 50,PBBFAC | Performed by: NURSE PRACTITIONER

## 2018-02-19 PROCEDURE — 99213 OFFICE O/P EST LOW 20 MIN: CPT | Mod: PBBFAC | Performed by: NURSE PRACTITIONER

## 2018-02-19 PROCEDURE — 99999 PR PBB SHADOW E&M-EST. PATIENT-LVL III: CPT | Mod: PBBFAC,,, | Performed by: NURSE PRACTITIONER

## 2018-02-19 RX ORDER — TRIAMCINOLONE ACETONIDE 40 MG/ML
80 INJECTION, SUSPENSION INTRA-ARTICULAR; INTRAMUSCULAR
Status: COMPLETED | OUTPATIENT
Start: 2018-02-19 | End: 2018-02-19

## 2018-02-19 RX ADMIN — TRIAMCINOLONE ACETONIDE 80 MG: 40 INJECTION, SUSPENSION INTRA-ARTICULAR; INTRAMUSCULAR at 12:02

## 2018-02-19 NOTE — PROGRESS NOTES
Pt with known bilateral knee djd. She returns today for cortisone injections. Her last injections were 8/24/17. Her BMI is too high for knee replacement. She was taking tramadol for pain, but I am unable to refill and she has not established care with pain management due to insurance issues.    Knee Injection Procedure Note    Pre-operative Diagnosis: bilateral knee degenerative arthritis    Post-operative Diagnosis: same    Indications: bilateral knee pain    Anesthesia: none    Procedure Details     Verbal consent was obtained for the procedure. The injection site was identified and the skin was prepared with alcohol. The bilateral knee was injected from an anterolateral approach with 1 ml of Kenalog and 5 ml Lidocaine under sterile technique using a 22 gauge needle. The needle was removed and the area cleansed and dressed.    Complications:  None; patient tolerated the procedure well.    she was advised to rest the knee today, using ice and elevation as needed for comfort and swelling. she did receive immediate relief of the knee pain. she was told this would be short lived and is secondary to the lidocaine. she may have an increase in discomfort tonight followed by steady improvement over the next several days. It may take 1-3 weeks following the injection to get the full benefit of the medication.    She is having difficulty with instability and would like to try hinged knee braces to help with that when she is at work and standing for long periods.

## 2018-02-22 ENCOUNTER — TELEPHONE (OUTPATIENT)
Dept: UROGYNECOLOGY | Facility: CLINIC | Age: 53
End: 2018-02-22

## 2018-02-23 ENCOUNTER — TELEPHONE (OUTPATIENT)
Dept: UROGYNECOLOGY | Facility: CLINIC | Age: 53
End: 2018-02-23

## 2018-02-23 NOTE — TELEPHONE ENCOUNTER
----- Message from Von Stapleton sent at 2/23/2018  2:21 PM CST -----  Please call pt she is having some pelvis pain [phone]

## 2018-02-26 ENCOUNTER — CLINICAL SUPPORT (OUTPATIENT)
Dept: REHABILITATION | Facility: HOSPITAL | Age: 53
End: 2018-02-26
Attending: NURSE PRACTITIONER
Payer: MEDICAID

## 2018-02-26 DIAGNOSIS — M25.561 CHRONIC PAIN OF BOTH KNEES: ICD-10-CM

## 2018-02-26 DIAGNOSIS — M25.562 CHRONIC PAIN OF BOTH KNEES: ICD-10-CM

## 2018-02-26 DIAGNOSIS — M17.0 PRIMARY OSTEOARTHRITIS OF BOTH KNEES: Primary | ICD-10-CM

## 2018-02-26 DIAGNOSIS — G89.29 CHRONIC PAIN OF BOTH KNEES: ICD-10-CM

## 2018-02-26 PROCEDURE — 97161 PT EVAL LOW COMPLEX 20 MIN: CPT

## 2018-02-26 NOTE — PROGRESS NOTES
"[facility]   PATIENT EVALUATION    Date: 02/26/2018    Visit #: 1    Start Time:  1100  Stop Time:  1200    Evaluation Date: 2/26/18  Visit # authorized: 1  Authorization period: TBD  Plan of care Expiration: TBD    History     History of Present Illness: Knee injury Nov 13, 2016.  [place] the [place], beer vendor tripped her and she fell on right knee.   Onset Date: 11/2016  Date of Surgery: 1999 right knee scope   Primary Diagnosis:   1. Primary osteoarthritis of both knees     2. Chronic pain of both knees       Treatment Diagnosis: Difficulty with gait, muscle weakness   Past Medical History:   Diagnosis Date    Diabetes mellitus     Hypertension      Precautions: DM and HTN  Prior Therapy: aquatic therapy concluding August 2017  Diagnostic Tests: xrays taken on previous visit to [facility], pt states xrays showed "arthritis".   Prior Level of Function: Independent  Sports/Recreational Activities: walk for health and workout   Extremity Dominance: right   Functional Deficits Leading to Referral/Nature of Injury: difficulty walking, climbing stairs, prolonged standing, hinders some work activities  Patient Therapy Goals: "get back to jogging again"    Subjective     Yayo Carver states that she is here for a evaluation for pool therapy.  Pt states that she received therapy last year that was geared toward aquatics and had great results with this intervention.     Pain:  Location: knee   Description: Sharp  Activities Which Increase Pain: Standing  Activities Which Decrease Pain: rest  Pain Scale: 3/10 at best 10/10 now  10/10 at worst    Objective     Posture: some side shifting noted with guarding of the left knee which seems to present with more pain versus right knee. Bilateral knee valgus with recervatum as well.   Palpation: left lateral knee pain , right medial knee pain  Sensation: intact   Range of Motion/Strength:     Flexibility   Left Right   SLR 60 60   Hamstring 90°/90° " 60 55   Prone Rectus Femoris     Gastrocnemius     Soleus     Ronens     Iliopsoas       Range of motion    Knee:        Left Right   Extension  +10 +10   Flexion 110 115         Hip: Left Right   Abduction     Adduction     Flexion     Extension     IR     ER                     Strength:    Knee:        Left Right   Extension  4/5 4/5   Flexion 4/5 4/5         Hip: Left Right   Abduction 4/5 4/5   Adduction 3/5 3/3   Flexion 3/5 3/5   Extension 4/5 4/5   IR 3+/5 3+/5   ER 4/5 4/5                       Balance/ Functional Tests-n/a    Left Right % difference   DL Overhead Squat      Ybalance      Single Leg Squat            Gait: Without AD  Analysis: antalgic gait with guarding of the LLE   Special Tests: Pt presented with gross pain throughout the left knee including pain with joint line palpation.  Unable to specifically incriminate any structure at this time              PT reviewed FOTO scores for Yayo Carver on 2/26/18  FOTO score: 23    Functional Limitations Reports- Pt states that she is currently working fulltime without restrictions, however notes difficulty with IADLs and some duties at work.  It's unclear as to how much activity she's performing at work at this time.      Category: mobility walking   Tool: FOTO      Current ():  CL 60-80%  Goal (): CK 40-60%  Discharge ():  N/a             Assessment   This is a 52 y.o. female referred to outpatient physical therapy and presents with a medical diagnosis of bilateral knee osteoarthritis and demonstrates limitations as described in the problem list. Pt demonstrates fair rehab potential. Pt will benefit from physcial therapy services in order to maximize pain free and/or functional use of bilateral lower extremities. Pt has requested aquatic therapy and based on her clinical evaluation I do believe that aquatic therapy would be the best intervention for her at this time.  We discussed the possibility of doing a land based program at the  completion of her aquatic therapy program.  The following goals were discussed with the patient and patient is in agreement with them as to be addressed in the treatment plan. No exercises were given at this time. Pt stated that she was given these same exercises on her previous visit and that she is performing them. Pt verbally understood the instructions as they were given and demonstrated proper form and technique during therapy. Pt was advised to perform these exercises free of pain, and to stop performing them if pain occurs.         Initial Assessment (Pertinent finding, problem list and factors affecting outcome):   Medical necessity is demonstrated by the following problem list:   - Pain limits function of effected part for all activities  - Unable to participate in all daily activities    - Continued inability to participate in vocational pursuits      Rehab Potiential: fair    Short Term Goals (4-6 Weeks):    - Pt will increase strength to 4+/5 grossly throughout in bilateral knees   - Decrease Pain to 3-4/10 on average throughout the day in order to perform ADLs and IADLs  - Pt independent with HEP with progressions.     Long Term Goals (8-10 Weeks):    - Pt will increase ROM to full in bilateral knees   - Pt will increase strength to 5/5 grossly throughout bilaterally   - Decrease Pain to 0-2 on average throughout day in order to perform IADLs        Plan     Pt will be seen 2x times per week for 4 weeks. Recommended Treatment Plan will include:   AAROM/PROM exercise  Manual soft tissue and/or joint mobilization  Therapeutic Exercise   Individualized Home Exercise Program  Modalities:   Pt education:on HEP    Therapist: Frantz Hummel, PT  I CERTIFY THE NEED FOR THESE SERVICES FURNISHED UNDER THIS PLAN OF TREATMENT AND WHILE UNDER MY CARE    Physician's comments:  ________________________________________________________________________________________________________________________________________________    Physician's Name: ___________________________________

## 2018-02-28 ENCOUNTER — CLINICAL SUPPORT (OUTPATIENT)
Dept: REHABILITATION | Facility: HOSPITAL | Age: 53
End: 2018-02-28
Attending: NURSE PRACTITIONER
Payer: MEDICAID

## 2018-02-28 DIAGNOSIS — M17.0 PRIMARY OSTEOARTHRITIS OF BOTH KNEES: ICD-10-CM

## 2018-02-28 DIAGNOSIS — G89.29 CHRONIC PAIN OF BOTH KNEES: Primary | ICD-10-CM

## 2018-02-28 DIAGNOSIS — M25.561 CHRONIC PAIN OF BOTH KNEES: Primary | ICD-10-CM

## 2018-02-28 DIAGNOSIS — M25.562 CHRONIC PAIN OF BOTH KNEES: Primary | ICD-10-CM

## 2018-02-28 PROCEDURE — 97113 AQUATIC THERAPY/EXERCISES: CPT

## 2018-03-01 NOTE — PROGRESS NOTES
Time In: 1640  Time Out: 1745    Subjective  Pt reports:  8/10 L knee pain; pt also reports increased support with brace that she received during the previous tx session.       Objective    Treatment: Pt was instructed in and performed therapeutic exercises to develop  for 60 minutes. Patient performed therapeutic exercises consisting of the following exercises:    Warm-up Laps 2 x each  fwd/bkw/lat     Stretches: 3 x 20 sec  HS  Quad     LE exs: 20x each   Mini Squats with QS  Heel Raise with GS  HS curl/Hip ext  Hip flex/LAQ  Hip abd/add  Step ups 10x   Lunges fwd         Endurance: 2 min  Marching  Bicycle     Cool down laps 1 x each  fwd/bkw/lat    Patient was not issued HEP for pool.      Assessment  Pt tolerated initial aquatic session well with mild discomfort in lateral L knee when performing step ups. Tx focused on BLE flexibility/strengthening and hip stabilization. Will progress as tolerated.  Patient will continue to benefit from skilled PT intervention.    Yayo Carver is making good progress towards established goals.    Medical necessity is demonstrated by the following problem list:   - Pain limits function of effected part for all activities  - Unable to participate in all daily activities    - Continued inability to participate in vocational pursuits        Rehab Potiential: fair     Short Term Goals (4-6 Weeks):    - Pt will increase strength to 4+/5 grossly throughout in bilateral knees   - Decrease Pain to 3-4/10 on average throughout the day in order to perform ADLs and IADLs  - Pt independent with HEP with progressions.      Long Term Goals (8-10 Weeks):    - Pt will increase ROM to full in bilateral knees   - Pt will increase strength to 5/5 grossly throughout bilaterally   - Decrease Pain to 0-2 on average throughout day in order to perform IADLs     Plan  Pt will be seen 2x times per week for 4 weeks. Recommended Treatment Plan will include:              AAROM/PROM exercise  Manual soft  tissue and/or joint mobilization  Therapeutic Exercise              Individualized Home Exercise Program  Modalities:              Pt education:on HEP

## 2018-03-12 ENCOUNTER — CLINICAL SUPPORT (OUTPATIENT)
Dept: REHABILITATION | Facility: HOSPITAL | Age: 53
End: 2018-03-12
Payer: MEDICAID

## 2018-03-12 DIAGNOSIS — M25.561 CHRONIC PAIN OF BOTH KNEES: Primary | ICD-10-CM

## 2018-03-12 DIAGNOSIS — M17.0 PRIMARY OSTEOARTHRITIS OF BOTH KNEES: ICD-10-CM

## 2018-03-12 DIAGNOSIS — M25.562 CHRONIC PAIN OF BOTH KNEES: Primary | ICD-10-CM

## 2018-03-12 DIAGNOSIS — G89.29 CHRONIC PAIN OF BOTH KNEES: Primary | ICD-10-CM

## 2018-03-12 PROCEDURE — 97113 AQUATIC THERAPY/EXERCISES: CPT

## 2018-03-12 NOTE — PROGRESS NOTES
Time In: 1150   Time Out: 1250    Subjective  Pt reports:  9/10 L knee pain and 5/10 R knee pain.       Objective    Treatment: Pt was instructed in and performed therapeutic exercises to develop  for 60 minutes. Patient performed therapeutic exercises consisting of the following exercises:    Warm-up Laps 2 x each  fwd/bkw/lat     Stretches: 3 x 20 sec  HS  Quad     LE exs: 30x each   Mini Squats with QS  Heel Raise with GS  HS curl/Hip ext  Hip flex/LAQ  Hip abd/add  Step ups 10x   Lunges fwd         Endurance: 2 min  Marching  Bicycle     Cool down laps 1 x each  fwd/bkw/lat    Patient was not issued HEP for pool.      Assessment  Pt arrived with significant tolerated initial aquatic session well with mild discomfort in lateral L knee when performing step ups. Possible IT band pain  In LLE as pt c/o lateral knee pain superior and inferior to L knee joint. Tx focused on BLE flexibility/strengthening and hip stabilization. Will progress as tolerated. Patient will continue to benefit from skilled PT intervention.    Yayo Carver is making good progress towards established goals.    Medical necessity is demonstrated by the following problem list:   - Pain limits function of effected part for all activities  - Unable to participate in all daily activities    - Continued inability to participate in vocational pursuits        Rehab Potiential: fair     Short Term Goals (4-6 Weeks):    - Pt will increase strength to 4+/5 grossly throughout in bilateral knees   - Decrease Pain to 3-4/10 on average throughout the day in order to perform ADLs and IADLs  - Pt independent with HEP with progressions.      Long Term Goals (8-10 Weeks):    - Pt will increase ROM to full in bilateral knees   - Pt will increase strength to 5/5 grossly throughout bilaterally   - Decrease Pain to 0-2 on average throughout day in order to perform IADLs     Plan  Pt will be seen 2x times per week for 4 weeks. Recommended Treatment Plan will  include:              AAROM/PROM exercise  Manual soft tissue and/or joint mobilization  Therapeutic Exercise              Individualized Home Exercise Program  Modalities:              Pt education:on HEP

## 2018-03-14 ENCOUNTER — CLINICAL SUPPORT (OUTPATIENT)
Dept: REHABILITATION | Facility: HOSPITAL | Age: 53
End: 2018-03-14
Payer: MEDICAID

## 2018-03-14 DIAGNOSIS — M25.561 CHRONIC PAIN OF BOTH KNEES: Primary | ICD-10-CM

## 2018-03-14 DIAGNOSIS — M17.0 PRIMARY OSTEOARTHRITIS OF BOTH KNEES: ICD-10-CM

## 2018-03-14 DIAGNOSIS — M25.562 CHRONIC PAIN OF BOTH KNEES: Primary | ICD-10-CM

## 2018-03-14 DIAGNOSIS — G89.29 CHRONIC PAIN OF BOTH KNEES: Primary | ICD-10-CM

## 2018-03-14 PROCEDURE — 97110 THERAPEUTIC EXERCISES: CPT

## 2018-03-26 DIAGNOSIS — M17.0 PRIMARY OSTEOARTHRITIS OF BOTH KNEES: Primary | ICD-10-CM

## 2018-03-26 RX ORDER — TRAMADOL HYDROCHLORIDE 50 MG/1
100 TABLET ORAL EVERY 6 HOURS PRN
Qty: 60 TABLET | Refills: 1 | Status: SHIPPED | OUTPATIENT
Start: 2018-03-26 | End: 2018-04-05

## 2018-03-26 NOTE — PROGRESS NOTES
Pt with severe bilateral knee djd. Tramadol sent in as requested for severe knee pain. Will refer to physical medicine and rehab once insurance changes.

## 2018-04-02 ENCOUNTER — CLINICAL SUPPORT (OUTPATIENT)
Dept: REHABILITATION | Facility: HOSPITAL | Age: 53
End: 2018-04-02
Payer: MEDICAID

## 2018-04-02 DIAGNOSIS — M25.562 CHRONIC PAIN OF BOTH KNEES: Primary | ICD-10-CM

## 2018-04-02 DIAGNOSIS — G89.29 CHRONIC PAIN OF BOTH KNEES: Primary | ICD-10-CM

## 2018-04-02 DIAGNOSIS — M25.561 CHRONIC PAIN OF BOTH KNEES: Primary | ICD-10-CM

## 2018-04-02 PROCEDURE — 97110 THERAPEUTIC EXERCISES: CPT

## 2018-04-02 NOTE — PROGRESS NOTES
"[facility]   PATIENT EVALUATION    Date: 04/02/2018    Visit #: 1    Start Time:  1100  Stop Time:  1200    Evaluation Date: 2/26/18  Visit # authorized: 1  Authorization period: TBD  Plan of care Expiration: TBD    History     History of Present Illness: Knee injury Nov 13, 2016.  [place] the [place], beer vendor tripped her and she fell on right knee.   Onset Date: 11/2016  Date of Surgery: 1999 right knee scope   Primary Diagnosis:   No diagnosis found.  Treatment Diagnosis: Difficulty with gait, muscle weakness   Past Medical History:   Diagnosis Date    Diabetes mellitus     Hypertension      Precautions: DM and HTN  Prior Therapy: aquatic therapy concluding August 2017  Diagnostic Tests: xrays taken on previous visit to Md, pt states xrays showed "arthritis".   Prior Level of Function: Independent  Sports/Recreational Activities: walk for health and workout   Extremity Dominance: right   Functional Deficits Leading to Referral/Nature of Injury: difficulty walking, climbing stairs, prolonged standing, hinders some work activities  Patient Therapy Goals: "get back to jogging again"    Subjective     Yayo Carver has been seen at this facility for 5 visits now and states that she's feeling better.  She has had four visits with aquatic therapy and feels as though she's benefiting from intervention.  Notes pain in bilateral knees, but she states that it is improving.  Rates her pain a 8/10 at this time, however she continues to work spending 12 hours per day on her feet.    Pain:  Location: knee   Description: Sharp  Activities Which Increase Pain: Standing  Activities Which Decrease Pain: rest  Pain Scale: 3/10 at best 10/10 now  10/10 at worst    Objective     Posture: some side shifting noted with guarding of the left knee which seems to present with more pain versus right knee. Bilateral knee valgus with recervatum as well.   Palpation: left lateral knee pain , right medial " "knee pain  Sensation: intact   Range of Motion/Strength:     Flexibility   Left Right   SLR 80 80   Hamstring 90°/90° 80 80   Prone Rectus Femoris     Gastrocnemius     Soleus     Ronens     Iliopsoas       Range of motion    Knee:        Left Right   Extension  +10 +10   Flexion 110 115                         Strength:    Knee:        Left Right   Extension  4/5 4/5   Flexion 4/5 4/5         Hip: Left Right   Abduction 4/5 4/5   Adduction 3/5 3/3   Flexion 3/5 3/5   Extension 4/5 4/5   IR 3+/5 3+/5   ER 4/5 4/5                         Gait: Without AD  Analysis: antalgic gait with guarding of the LLE, decrease in left knee control with locking into hyperextension at foot flat    Special Tests: Pt presented with gross pain throughout the left knee including pain with joint line palpation.  Unable to specifically incriminate any structure at this time      Treatment:  Therapeutic exercises 30 min:   Bike   SLR  HS isolated stretching in supine   Side stepping no resistance         PT reviewed FOTO scores for Yayo Carver on 4/2/2018  FOTO score: 28- improvement by 5    Functional Limitations Reports- Pt states that she is currently working fulltime without restrictions, however notes difficulty with IADLs and some duties at work remain.  We did attempt bike riding and exercises today and she complained of cramping in the thighs and "popping" in her knees.  She does have audible crepitus in bilateral knees with flexion and extension.      Category: mobility walking   Tool: FOTO      Current ():  CL 60-80%  Goal (): CK 40-60%  Discharge ():  N/a             Assessment   Pt has made some improvements with aquatic therapy and will benefit from 5 more visits in the pool, she will then transition to land therapy at that time.  Pt was advised that aquatic therapy has helped her with her pain and the initiation of exercises, however she will need to complete land therapy in order to strengthen legs and core " optimally.  She has agreed to this POC.           Initial Assessment (Pertinent finding, problem list and factors affecting outcome):   Medical necessity is demonstrated by the following problem list:   - Pain limits function of effected part for all activities  - Unable to participate in all daily activities    - Continued inability to participate in vocational pursuits      Rehab Potiential: fair    Short Term Goals (4-6 Weeks):    - Pt will increase strength to 4+/5 grossly throughout in bilateral knees   - Decrease Pain to 3-4/10 on average throughout the day in order to perform ADLs and IADLs  - Pt independent with HEP with progressions.     Long Term Goals (8-10 Weeks):    - Pt will increase ROM to full in bilateral knees   - Pt will increase strength to 5/5 grossly throughout bilaterally   - Decrease Pain to 0-2 on average throughout day in order to perform IADLs        Plan     Pt will be seen 5 more visits for aquatic therapy and transition to land based rehab for continued strengthening.  Treatment Plan will include:   AAROM/PROM exercise  Manual soft tissue and/or joint mobilization  Therapeutic Exercise  Aquatic therapy    Individualized Home Exercise Program  Modalities:   Pt education:on HEP    Therapist: Frantz Hummel, PT  I CERTIFY THE NEED FOR THESE SERVICES FURNISHED UNDER THIS PLAN OF TREATMENT AND WHILE UNDER MY CARE    Physician's comments: ________________________________________________________________________________________________________________________________________________    Physician's Name: ___________________________________

## 2018-04-23 ENCOUNTER — CLINICAL SUPPORT (OUTPATIENT)
Dept: REHABILITATION | Facility: HOSPITAL | Age: 53
End: 2018-04-23
Payer: MEDICAID

## 2018-04-23 DIAGNOSIS — M17.0 PRIMARY OSTEOARTHRITIS OF BOTH KNEES: ICD-10-CM

## 2018-04-23 DIAGNOSIS — M25.561 CHRONIC PAIN OF BOTH KNEES: Primary | ICD-10-CM

## 2018-04-23 DIAGNOSIS — G89.29 CHRONIC PAIN OF BOTH KNEES: Primary | ICD-10-CM

## 2018-04-23 DIAGNOSIS — M25.562 CHRONIC PAIN OF BOTH KNEES: Primary | ICD-10-CM

## 2018-04-23 PROCEDURE — 97110 THERAPEUTIC EXERCISES: CPT

## 2018-04-23 NOTE — PROGRESS NOTES
Time In: 1330   Time Out: 1430    Subjective  Pt reports: 7/10 B knee pain upon arrival.       Objective  Treatment: Pt was instructed in and performed therapeutic exercises to develop  for 60 minutes. Patient performed therapeutic exercises consisting of the following exercises:    Warm-up Laps 2 x each  fwd/bkw/lat     Stretches: 3 x 20 sec  HS  Quad     LE exs: 30x each with 1.6#  Mini Squats with QS  Heel Raise with GS  HS curl/Hip ext  Hip flex/LAQ  Hip abd/add  Step ups    Lunges fwd       Endurance: 3 min  Marching  Bicycle     Cool down laps 1 x each  fwd/bkw/lat    Patient was not issued HEP for pool.      Assessment  Pt tolerated tx well. Resistance was not increased due to length of time in between visits. Tx focused on BLE flexibility/strengthening and hip stabilization. Will progress as tolerated. Patient will continue to benefit from skilled PT intervention.    Yayo Carver is making good progress towards established goals.     Medical necessity is demonstrated by the following problem list:   - Pain limits function of effected part for all activities  - Unable to participate in all daily activities    - Continued inability to participate in vocational pursuits        Rehab Potiential: fair     Short Term Goals (4-6 Weeks):    - Pt will increase strength to 4+/5 grossly throughout in bilateral knees   - Decrease Pain to 3-4/10 on average throughout the day in order to perform ADLs and IADLs  - Pt independent with HEP with progressions.      Long Term Goals (8-10 Weeks):    - Pt will increase ROM to full in bilateral knees   - Pt will increase strength to 5/5 grossly throughout bilaterally   - Decrease Pain to 0-2 on average throughout day in order to perform IADLs     Plan  Pt will be seen 2x times per week for 4 weeks. Recommended Treatment Plan will include:              AAROM/PROM exercise  Manual soft tissue and/or joint mobilization  Therapeutic Exercise              Individualized Home Exercise  Program  Modalities:              Pt education:on HEP

## 2018-04-25 ENCOUNTER — CLINICAL SUPPORT (OUTPATIENT)
Dept: REHABILITATION | Facility: HOSPITAL | Age: 53
End: 2018-04-25
Payer: MEDICAID

## 2018-04-25 DIAGNOSIS — M17.0 PRIMARY OSTEOARTHRITIS OF BOTH KNEES: ICD-10-CM

## 2018-04-25 DIAGNOSIS — M25.561 ACUTE PAIN OF BOTH KNEES: ICD-10-CM

## 2018-04-25 DIAGNOSIS — M25.562 ACUTE PAIN OF BOTH KNEES: ICD-10-CM

## 2018-04-25 DIAGNOSIS — M25.561 CHRONIC PAIN OF BOTH KNEES: Primary | ICD-10-CM

## 2018-04-25 DIAGNOSIS — M25.562 CHRONIC PAIN OF BOTH KNEES: Primary | ICD-10-CM

## 2018-04-25 DIAGNOSIS — G89.29 CHRONIC PAIN OF BOTH KNEES: Primary | ICD-10-CM

## 2018-04-25 PROCEDURE — 97110 THERAPEUTIC EXERCISES: CPT

## 2018-04-25 RX ORDER — IBUPROFEN 800 MG/1
800 TABLET ORAL 3 TIMES DAILY
Qty: 90 TABLET | Refills: 2 | Status: SHIPPED | OUTPATIENT
Start: 2018-04-25 | End: 2018-09-08 | Stop reason: SDUPTHER

## 2018-04-25 NOTE — PROGRESS NOTES
Time In: 1330   Time Out: 1430    Subjective  Pt reports: 9/10 B knee pain upon arrival.       Objective  Treatment: Pt was instructed in and performed therapeutic exercises to develop  for 60 minutes. Patient performed therapeutic exercises consisting of the following exercises:    Warm-up Laps 2 x each  fwd/bkw/lat     Stretches: 3 x 20 sec  HS  Quad     LE exs: 30x each with 2.0#  Mini Squats with QS  Heel Raise with GS  HS curl/Hip ext  Hip flex/LAQ  Hip abd/add  Step ups    Lunges fwd       Endurance: 3 min  Marching  Bicycle     Cool down laps 1 x each  fwd/bkw/lat    Patient was not issued HEP for pool.      Assessment  Pt tolerated tx well. Resistance was increased with no adverse affects. Tx focused on BLE flexibility/strengthening and hip stabilization. Will progress as tolerated. Patient will continue to benefit from skilled PT intervention.    Yayo Carver is making good progress towards established goals.     Medical necessity is demonstrated by the following problem list:   - Pain limits function of effected part for all activities  - Unable to participate in all daily activities    - Continued inability to participate in vocational pursuits        Rehab Potiential: fair     Short Term Goals (4-6 Weeks):    - Pt will increase strength to 4+/5 grossly throughout in bilateral knees   - Decrease Pain to 3-4/10 on average throughout the day in order to perform ADLs and IADLs  - Pt independent with HEP with progressions.      Long Term Goals (8-10 Weeks):    - Pt will increase ROM to full in bilateral knees   - Pt will increase strength to 5/5 grossly throughout bilaterally   - Decrease Pain to 0-2 on average throughout day in order to perform IADLs     Plan  Pt will be seen 2x times per week for 4 weeks. Recommended Treatment Plan will include:              AAROM/PROM exercise  Manual soft tissue and/or joint mobilization  Therapeutic Exercise              Individualized Home Exercise  Program  Modalities:              Pt education:on HEP

## 2018-04-30 DIAGNOSIS — Z13.5 DIABETIC RETINOPATHY SCREENING: ICD-10-CM

## 2018-05-02 ENCOUNTER — PATIENT OUTREACH (OUTPATIENT)
Dept: ADMINISTRATIVE | Facility: HOSPITAL | Age: 53
End: 2018-05-02

## 2018-05-02 NOTE — PROGRESS NOTES
[facility] is committed to your overall health.  To help you get the most out of each of your visits, we will review your information to make sure you are up to date on all of your recommended tests and/or procedures.       Your PCP  Fabiana Louie MD   found that you may be due for:       Health Maintenance Due   Topic Date Due    Low Dose Statin  12/07/1986    Lipid Panel  01/19/2018    Eye Exam  04/25/2018    Hemoglobin A1c  05/07/2018    Foot Exam  05/23/2018     You will be scheduled for a eye cam retina scan on the same day of your scheduled visit with your Fabiana Louie MD.  NO eye drop dilation will take place. You can drive after the scan.  This will take no longer than ten minutes. This will take place in the same office area as your visit. This eye scan is NOT a visual exam. This exam is being used to scan for diseases of the eye such as Diabetic Retinopathy which is the leading cause for blindness in those that have Diabetes Mellitus.     If you feel you need a vision exam please contact the office to schedule an appointment with Opthalmology.     If you see an outside eye physician please be prepared to sign a release of information so that we may request your records to update your medical records. Thank you.     Please be prepared to sign release of information so that we obtain medical records for care that you received outside of [facility]. This is done to make sure your medical record is complete and that you receive the best of care. Thank you.           If you have had any of the above done at another facility, please bring the records or information with you so that your record at [facility] will be complete.  If you would like to schedule any of these, please contact me.     If you are currently taking medication, please bring it with you to your appointment for review.     Also, if you have any type of Advanced Directives, please bring them with you to your office visit so we may  scan them into your chart.       Thank you for Choosing [facility] for your healthcare needs.        Additional Information  If you have questions, you can email [email or web address] or call [phone]  to talk to our [employer] staff. Remember, [facility] is NOT to be used for urgent needs. For medical emergencies, dial 911.

## 2018-05-15 ENCOUNTER — OFFICE VISIT (OUTPATIENT)
Dept: ORTHOPEDICS | Facility: CLINIC | Age: 53
End: 2018-05-15
Payer: MEDICAID

## 2018-05-15 DIAGNOSIS — M17.0 PRIMARY OSTEOARTHRITIS OF BOTH KNEES: Primary | ICD-10-CM

## 2018-05-15 PROCEDURE — 99213 OFFICE O/P EST LOW 20 MIN: CPT | Mod: PBBFAC,25 | Performed by: NURSE PRACTITIONER

## 2018-05-15 PROCEDURE — 99999 PR PBB SHADOW E&M-EST. PATIENT-LVL III: CPT | Mod: PBBFAC,,, | Performed by: NURSE PRACTITIONER

## 2018-05-15 PROCEDURE — 20610 DRAIN/INJ JOINT/BURSA W/O US: CPT | Mod: 50,PBBFAC | Performed by: NURSE PRACTITIONER

## 2018-05-15 PROCEDURE — 20610 DRAIN/INJ JOINT/BURSA W/O US: CPT | Mod: 50,S$PBB,, | Performed by: NURSE PRACTITIONER

## 2018-05-15 PROCEDURE — 99499 UNLISTED E&M SERVICE: CPT | Mod: S$PBB,,, | Performed by: NURSE PRACTITIONER

## 2018-05-15 RX ORDER — TRAMADOL HYDROCHLORIDE 50 MG/1
50 TABLET ORAL EVERY 6 HOURS PRN
Qty: 30 TABLET | Refills: 1 | Status: SHIPPED | OUTPATIENT
Start: 2018-05-15 | End: 2018-05-25

## 2018-05-15 RX ADMIN — TRIAMCINOLONE ACETONIDE 80 MG: 40 INJECTION, SUSPENSION INTRA-ARTICULAR; INTRAMUSCULAR at 11:05

## 2018-05-16 ENCOUNTER — LAB VISIT (OUTPATIENT)
Dept: LAB | Facility: OTHER | Age: 53
End: 2018-05-16
Attending: FAMILY MEDICINE
Payer: MEDICAID

## 2018-05-16 ENCOUNTER — CLINICAL SUPPORT (OUTPATIENT)
Dept: INTERNAL MEDICINE | Facility: CLINIC | Age: 53
End: 2018-05-16
Attending: FAMILY MEDICINE
Payer: MEDICAID

## 2018-05-16 VITALS
HEIGHT: 65 IN | WEIGHT: 293 LBS | SYSTOLIC BLOOD PRESSURE: 184 MMHG | DIASTOLIC BLOOD PRESSURE: 100 MMHG | BODY MASS INDEX: 48.82 KG/M2

## 2018-05-16 DIAGNOSIS — E11.9 TYPE 2 DIABETES MELLITUS WITHOUT COMPLICATION, WITHOUT LONG-TERM CURRENT USE OF INSULIN: ICD-10-CM

## 2018-05-16 DIAGNOSIS — H53.9 CHANGE IN VISION: ICD-10-CM

## 2018-05-16 DIAGNOSIS — I10 ESSENTIAL HYPERTENSION: ICD-10-CM

## 2018-05-16 DIAGNOSIS — Z79.4 CONTROLLED TYPE 2 DIABETES MELLITUS WITHOUT COMPLICATION, WITH LONG-TERM CURRENT USE OF INSULIN: ICD-10-CM

## 2018-05-16 DIAGNOSIS — Z12.31 SCREENING MAMMOGRAM, ENCOUNTER FOR: ICD-10-CM

## 2018-05-16 DIAGNOSIS — E11.9 TYPE 2 DIABETES MELLITUS WITHOUT COMPLICATION, WITHOUT LONG-TERM CURRENT USE OF INSULIN: Primary | ICD-10-CM

## 2018-05-16 DIAGNOSIS — Z13.5 DIABETIC RETINOPATHY SCREENING: ICD-10-CM

## 2018-05-16 DIAGNOSIS — E11.9 CONTROLLED TYPE 2 DIABETES MELLITUS WITHOUT COMPLICATION, WITH LONG-TERM CURRENT USE OF INSULIN: ICD-10-CM

## 2018-05-16 DIAGNOSIS — E78.5 HYPERLIPIDEMIA, UNSPECIFIED HYPERLIPIDEMIA TYPE: ICD-10-CM

## 2018-05-16 LAB
ESTIMATED AVG GLUCOSE: 123 MG/DL
HBA1C MFR BLD HPLC: 5.9 %

## 2018-05-16 PROCEDURE — 36415 COLL VENOUS BLD VENIPUNCTURE: CPT

## 2018-05-16 PROCEDURE — 99212 OFFICE O/P EST SF 10 MIN: CPT | Mod: PBBFAC,27,25

## 2018-05-16 PROCEDURE — 99999 PR PBB SHADOW E&M-EST. PATIENT-LVL III: CPT | Mod: PBBFAC,,, | Performed by: FAMILY MEDICINE

## 2018-05-16 PROCEDURE — 99999 PR PBB SHADOW E&M-EST. PATIENT-LVL II: CPT | Mod: PBBFAC,,,

## 2018-05-16 PROCEDURE — 99213 OFFICE O/P EST LOW 20 MIN: CPT | Mod: PBBFAC,25 | Performed by: FAMILY MEDICINE

## 2018-05-16 PROCEDURE — 83036 HEMOGLOBIN GLYCOSYLATED A1C: CPT

## 2018-05-16 PROCEDURE — 99214 OFFICE O/P EST MOD 30 MIN: CPT | Mod: S$PBB,,, | Performed by: FAMILY MEDICINE

## 2018-05-16 PROCEDURE — 92250 FUNDUS PHOTOGRAPHY W/I&R: CPT | Mod: 50,PBBFAC

## 2018-05-16 RX ORDER — AMLODIPINE BESYLATE 10 MG/1
10 TABLET ORAL DAILY
Qty: 90 TABLET | Refills: 1 | Status: SHIPPED | OUTPATIENT
Start: 2018-05-16 | End: 2019-12-17 | Stop reason: SDUPTHER

## 2018-05-16 RX ORDER — METFORMIN HYDROCHLORIDE 1000 MG/1
TABLET ORAL
Qty: 180 TABLET | Refills: 0 | Status: SHIPPED | OUTPATIENT
Start: 2018-05-16 | End: 2018-09-10 | Stop reason: SDUPTHER

## 2018-05-16 RX ORDER — AMLODIPINE BESYLATE 10 MG/1
10 TABLET ORAL DAILY
COMMUNITY
End: 2018-05-16 | Stop reason: SDUPTHER

## 2018-05-16 RX ORDER — HYDROCHLOROTHIAZIDE 25 MG/1
25 TABLET ORAL DAILY
Qty: 90 TABLET | Refills: 1 | Status: SHIPPED | OUTPATIENT
Start: 2018-05-16 | End: 2019-12-17

## 2018-05-16 RX ORDER — ATORVASTATIN CALCIUM 20 MG/1
20 TABLET, FILM COATED ORAL DAILY
Qty: 90 TABLET | Refills: 3 | Status: SHIPPED | OUTPATIENT
Start: 2018-05-16 | End: 2019-12-17 | Stop reason: SDUPTHER

## 2018-05-16 RX ORDER — INSULIN GLARGINE 100 [IU]/ML
30 INJECTION, SOLUTION SUBCUTANEOUS NIGHTLY
Qty: 30 ML | Refills: 0 | Status: CANCELLED | OUTPATIENT
Start: 2018-05-16 | End: 2019-05-16

## 2018-05-16 RX ORDER — INSULIN GLARGINE 100 [IU]/ML
30 INJECTION, SOLUTION SUBCUTANEOUS NIGHTLY
Qty: 30 ML | Refills: 0 | Status: SHIPPED | OUTPATIENT
Start: 2018-05-16 | End: 2019-08-01

## 2018-05-16 RX ORDER — TRIAMCINOLONE ACETONIDE 40 MG/ML
80 INJECTION, SUSPENSION INTRA-ARTICULAR; INTRAMUSCULAR
Status: COMPLETED | OUTPATIENT
Start: 2018-05-15 | End: 2018-05-15

## 2018-05-16 RX ORDER — CLONIDINE HYDROCHLORIDE 0.1 MG/1
0.1 TABLET ORAL
Status: COMPLETED | OUTPATIENT
Start: 2018-05-16 | End: 2018-05-16

## 2018-05-16 RX ADMIN — CLONIDINE HYDROCHLORIDE 0.1 MG: 0.1 TABLET ORAL at 10:05
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
## 2018-05-16 NOTE — PROGRESS NOTES
Pt with known bilateral knee djd. She has been seen in the past and has had cortisone injections for pain relief. She is currently working with physical therapy and has been working to lose weight so that she will be a candidate for knee replacement. She is taking tramadol for severe pain as she is still working full time and is on her feet throughout the day and evening. She has been unable to establish care with a pain management clinic for chronic tramadol use due to her insurance provider. Her last cortisone injections were in February.    Knee Injection Procedure Note    Pre-operative Diagnosis: bilateral knee degenerative arthritis    Post-operative Diagnosis: same    Indications: bilateral knee pain    Anesthesia: none    Procedure Details     Verbal consent was obtained for the procedure. The injection site was identified and the skin was prepared with alcohol. The bilateral knee was injected from an anterolateral approach with 1 ml of Kenalog and 5 ml Lidocaine under sterile technique using a 22 gauge needle. The needle was removed and the area cleansed and dressed.    Complications:  None; patient tolerated the procedure well.    she was advised to rest the knee today, using ice and elevation as needed for comfort and swelling. she did receive immediate relief of the knee pain. she was told this would be short lived and is secondary to the lidocaine. she may have an increase in discomfort tonight followed by steady improvement over the next several days. It may take 1-3 weeks following the injection to get the full benefit of the medication.

## 2018-05-17 ENCOUNTER — PATIENT MESSAGE (OUTPATIENT)
Dept: INTERNAL MEDICINE | Facility: CLINIC | Age: 53
End: 2018-05-17

## 2018-05-17 PROCEDURE — 92250 FUNDUS PHOTOGRAPHY W/I&R: CPT | Mod: 26,S$PBB,, | Performed by: OPTOMETRIST

## 2018-05-18 DIAGNOSIS — E11.9 CONTROLLED TYPE 2 DIABETES MELLITUS WITHOUT COMPLICATION, WITH LONG-TERM CURRENT USE OF INSULIN: Primary | ICD-10-CM

## 2018-05-18 DIAGNOSIS — Z79.4 CONTROLLED TYPE 2 DIABETES MELLITUS WITHOUT COMPLICATION, WITH LONG-TERM CURRENT USE OF INSULIN: Primary | ICD-10-CM

## 2018-05-18 RX ORDER — INSULIN LISPRO 100 [IU]/ML
10 INJECTION, SOLUTION INTRAVENOUS; SUBCUTANEOUS
Qty: 10 ML | Refills: 0 | Status: SHIPPED | OUTPATIENT
Start: 2018-05-18 | End: 2019-08-01

## 2018-05-18 RX ORDER — BLOOD SUGAR DIAGNOSTIC
STRIP MISCELLANEOUS
Qty: 200 EACH | Refills: 11 | Status: ON HOLD | OUTPATIENT
Start: 2018-05-18 | End: 2019-11-25

## 2018-05-18 NOTE — TELEPHONE ENCOUNTER
----- Message from Sade Peterson sent at 5/17/2018  1:26 PM CDT -----  Contact: DEMARCO DELACRUZ [[id number]]            Name of Who is Calling: DEMARCO DELACRUZ [[id number]]      What is the request in detail: Pt called to discuss her insulin glargine (BASAGLAR KWIKPEN U-100 INSULIN) 100 unit/mL (3 mL) InPn pen not being available at the pharmacy as well and the needles for it.  Pt is also wanting to know about her other pressure medication that she was wanting to know if it was found in her past medication list.  Please contact pt to further discuss and advise.    Can the clinic reply by [facility]: No      What Number to Call Back if not in [facility]: [phone]

## 2018-05-26 NOTE — TELEPHONE ENCOUNTER
----- Message from Bandar Baez sent at 9/5/2017  2:01 PM CDT -----  Contact: gaye  x_ 1st Request   _ 2nd Request   _ 3rd Request     Who: EDMARCO DELACRUZ [[id number]]    Why: Pt missed your call is requesting a call back    What Number to Call Back: [phone]    When to Expect a call back: (Before the end of the day)   -- if call after 3:00 call back will be tomorrow.    
----- Message from Josephine Thomas sent at 9/5/2017  7:21 AM CDT -----  _  1st Request  _  2nd Request  _  3rd Request        Who: patient    Why:   Pt thinks she may have shingles, she needs to get an appt as soon as possible. She called one day last week and no one called her back.   What Number to Call Back: [phone]    When to Expect a call back: (With in 24 hours)                      
Pt schedule for pain gonzalez appt on 01/04/2018.  
Walk in

## 2018-05-30 ENCOUNTER — HOSPITAL ENCOUNTER (OUTPATIENT)
Dept: RADIOLOGY | Facility: OTHER | Age: 53
Discharge: HOME | End: 2018-05-30
Attending: FAMILY MEDICINE
Payer: MEDICAID

## 2018-05-30 VITALS — WEIGHT: 293 LBS | HEIGHT: 65 IN | BODY MASS INDEX: 48.82 KG/M2

## 2018-05-30 DIAGNOSIS — Z12.31 SCREENING MAMMOGRAM, ENCOUNTER FOR: ICD-10-CM

## 2018-05-30 PROCEDURE — 77067 SCR MAMMO BI INCL CAD: CPT | Mod: 26,,, | Performed by: RADIOLOGY

## 2018-05-30 PROCEDURE — 77063 BREAST TOMOSYNTHESIS BI: CPT | Mod: 26,,, | Performed by: RADIOLOGY

## 2018-05-30 PROCEDURE — 77067 SCR MAMMO BI INCL CAD: CPT | Mod: TC

## 2018-06-29 DIAGNOSIS — M17.0 PRIMARY OSTEOARTHRITIS OF BOTH KNEES: Primary | ICD-10-CM

## 2018-06-29 RX ORDER — TRAMADOL HYDROCHLORIDE 50 MG/1
50 TABLET ORAL EVERY 6 HOURS PRN
Qty: 60 TABLET | Refills: 1 | Status: SHIPPED | OUTPATIENT
Start: 2018-06-29 | End: 2018-07-09

## 2018-06-29 NOTE — PROGRESS NOTES
Tramadol refilled as requested. She has been unable to establish with a pain management specialist and needs the pain medication to function as she is unable to have knee replacement surgery at this time due to her BMI.

## 2018-07-17 ENCOUNTER — OFFICE VISIT (OUTPATIENT)
Dept: ORTHOPEDICS | Facility: CLINIC | Age: 53
End: 2018-07-17
Payer: MEDICAID

## 2018-07-17 ENCOUNTER — INITIAL CONSULT (OUTPATIENT)
Dept: OPTOMETRY | Facility: CLINIC | Age: 53
End: 2018-07-17
Payer: MEDICAID

## 2018-07-17 ENCOUNTER — HOSPITAL ENCOUNTER (OUTPATIENT)
Dept: RADIOLOGY | Facility: HOSPITAL | Age: 53
Discharge: HOME | End: 2018-07-17
Attending: NURSE PRACTITIONER
Payer: MEDICAID

## 2018-07-17 DIAGNOSIS — H52.4 ASTIGMATISM WITH PRESBYOPIA, BILATERAL: ICD-10-CM

## 2018-07-17 DIAGNOSIS — H52.203 ASTIGMATISM WITH PRESBYOPIA, BILATERAL: ICD-10-CM

## 2018-07-17 DIAGNOSIS — M25.562 LEFT KNEE PAIN, UNSPECIFIED CHRONICITY: Primary | ICD-10-CM

## 2018-07-17 DIAGNOSIS — E11.9 CONTROLLED TYPE 2 DIABETES MELLITUS WITHOUT COMPLICATION, WITH LONG-TERM CURRENT USE OF INSULIN: Primary | ICD-10-CM

## 2018-07-17 DIAGNOSIS — M25.562 LEFT KNEE PAIN, UNSPECIFIED CHRONICITY: ICD-10-CM

## 2018-07-17 DIAGNOSIS — M25.561 RIGHT KNEE PAIN, UNSPECIFIED CHRONICITY: ICD-10-CM

## 2018-07-17 DIAGNOSIS — Z79.4 CONTROLLED TYPE 2 DIABETES MELLITUS WITHOUT COMPLICATION, WITH LONG-TERM CURRENT USE OF INSULIN: Primary | ICD-10-CM

## 2018-07-17 PROCEDURE — 92015 DETERMINE REFRACTIVE STATE: CPT | Mod: ,,, | Performed by: OPTOMETRIST

## 2018-07-17 PROCEDURE — 99999 PR PBB SHADOW E&M-EST. PATIENT-LVL I: CPT | Mod: PBBFAC,,, | Performed by: OPTOMETRIST

## 2018-07-17 PROCEDURE — 99211 OFF/OP EST MAY X REQ PHY/QHP: CPT | Mod: PBBFAC,25 | Performed by: OPTOMETRIST

## 2018-07-17 PROCEDURE — 73562 X-RAY EXAM OF KNEE 3: CPT | Mod: 26,XS,LT, | Performed by: RADIOLOGY

## 2018-07-17 PROCEDURE — 99999 PR PBB SHADOW E&M-EST. PATIENT-LVL III: CPT | Mod: PBBFAC,,, | Performed by: NURSE PRACTITIONER

## 2018-07-17 PROCEDURE — 73564 X-RAY EXAM KNEE 4 OR MORE: CPT | Mod: 26,RT,, | Performed by: RADIOLOGY

## 2018-07-17 PROCEDURE — 99213 OFFICE O/P EST LOW 20 MIN: CPT | Mod: PBBFAC,25,27 | Performed by: NURSE PRACTITIONER

## 2018-07-17 PROCEDURE — 73562 X-RAY EXAM OF KNEE 3: CPT | Mod: TC,LT

## 2018-07-17 PROCEDURE — 92004 COMPRE OPH EXAM NEW PT 1/>: CPT | Mod: S$PBB,,, | Performed by: OPTOMETRIST

## 2018-07-17 PROCEDURE — 99213 OFFICE O/P EST LOW 20 MIN: CPT | Mod: S$PBB,,, | Performed by: NURSE PRACTITIONER

## 2018-07-17 RX ORDER — PEN NEEDLE, DIABETIC 31 G X1/4"
NEEDLE, DISPOSABLE MISCELLANEOUS
Refills: 11 | Status: ON HOLD | COMMUNITY
Start: 2018-05-23 | End: 2019-11-25

## 2018-07-17 NOTE — PROGRESS NOTES
CAROLE ANDRADE  About 1 yr. Ago elsewhere. Diabetic  this morning. had   prescription glasses for reading but lost them. Distance seems fine   without glasses. Not using drops. Patient defers dilation today.    Hemoglobin A1C       Date                     Value               Ref Range             Status                05/16/2018               5.9 (H)             4.0 - 5.6 %           Final              11/07/2017               5.4                 4.0 - 5.6 %           Final              05/23/2017               6.1                 4.5 - 6.2 %           Final                  Last edited by Kelsey Coronel on 7/17/2018  2:27 PM. (History)            Assessment /Plan     For exam results, see Encounter Report.    Controlled type 2 diabetes mellitus without complication, with long-term current use of insulin    Astigmatism with presbyopia, bilateral            1.  Patient deferred dilation--did not bring a .  Educated pt on the importance of dilation and checking for diabetic retinopathy.   2.  Bifocal rx given          RTC 1 month for dilated exam.

## 2018-07-17 NOTE — PROGRESS NOTES
"CC: Pain of the Left Knee      HPI: Pt with known bilateral knee djd for the past several years. She recently started having increased pain over the left knee after working at [employer] where she was standing and walking for long periods. She comes in for an xray and further evaluation. She has been having cortisone injections which have offered good relief and she is using tramadol prn severe pain. She has been unable to establish pain management care due to her insurance, so I have prescribed that sparingly. She has to lose weight to have knee replacement surgery, but she has been unable to secure bariatric surgery due to her insurance. She has altered her diet to help with weight loss. She comes in today because of the increased pain and a "different feeling" in the left knee. She is ambulating without assistive device. There is not a limp.    ROS  General: denies fever and chills  Resp: no c/o sob  CVS: no c/o cp  MSK: c/o left knee pain which has recently increased    PE  General: AAOx3, pleasant and cooperative  Resp: respirations even and unlabored  MSK: left knee exam  0 degrees extension  110 degrees flexion  No warmth or erythema   - effusion    Xray:  Reviewed by me: Degenerative changes are seen in the knees.  The right knee joint is diffusely narrowed and the left knee joint is narrowed laterally.  Degree of narrowing appears to be worse than on a study in 2016.  Patellofemoral degenerative changes seen.  No joint effusion is seen on the right    Assessment:  Bilateral knee djd    Plan:  It is too soon to repeat injections at this time. She will f/u for that in August. She will continue to work on weight loss with the hopes that her insurance will change soon and she will be able to proceed with weight loss surgery and knee replacement. She has knee braces for instability, but they do not support her knee so she has not been wearing them.  "

## 2018-07-26 DIAGNOSIS — M17.0 PRIMARY OSTEOARTHRITIS OF BOTH KNEES: Primary | ICD-10-CM

## 2018-08-06 DIAGNOSIS — M17.0 PRIMARY OSTEOARTHRITIS OF BOTH KNEES: Primary | ICD-10-CM

## 2018-08-06 RX ORDER — TRAMADOL HYDROCHLORIDE 50 MG/1
50 TABLET ORAL EVERY 6 HOURS PRN
Qty: 60 TABLET | Refills: 1 | Status: SHIPPED | OUTPATIENT
Start: 2018-08-06 | End: 2018-08-16

## 2018-08-06 NOTE — PROGRESS NOTES
Pt with significant djd of both knees. She has been unable to establish pain management care due to insurance issues. She is unable to have knee replacement surgery due to her elevated BMI at this time. Tramadol sent to the pharmacy as requested for severe pain.

## 2018-09-04 DIAGNOSIS — M17.0 PRIMARY OSTEOARTHRITIS OF BOTH KNEES: Primary | ICD-10-CM

## 2018-09-04 RX ORDER — TRAMADOL HYDROCHLORIDE 50 MG/1
50 TABLET ORAL EVERY 6 HOURS PRN
Qty: 30 TABLET | Refills: 1 | Status: SHIPPED | OUTPATIENT
Start: 2018-09-04 | End: 2018-09-14

## 2018-09-04 NOTE — PROGRESS NOTES
Tramadol refilled as requested. Pt is Medicaid and has been unable to establish pain management care. She is getting injections and using tramadol to manage the pain in between. She is unable to have knee replacement surgery at this time due to her elevated BMI.

## 2018-09-07 DIAGNOSIS — M25.561 ACUTE PAIN OF BOTH KNEES: ICD-10-CM

## 2018-09-07 DIAGNOSIS — M25.562 ACUTE PAIN OF BOTH KNEES: ICD-10-CM

## 2018-09-08 RX ORDER — IBUPROFEN 800 MG/1
TABLET ORAL
Qty: 90 TABLET | Refills: 0 | Status: SHIPPED | OUTPATIENT
Start: 2018-09-08 | End: 2018-10-10 | Stop reason: SDUPTHER

## 2018-09-10 DIAGNOSIS — Z79.4 CONTROLLED TYPE 2 DIABETES MELLITUS WITHOUT COMPLICATION, WITH LONG-TERM CURRENT USE OF INSULIN: ICD-10-CM

## 2018-09-10 DIAGNOSIS — E11.9 CONTROLLED TYPE 2 DIABETES MELLITUS WITHOUT COMPLICATION, WITH LONG-TERM CURRENT USE OF INSULIN: ICD-10-CM

## 2018-09-10 RX ORDER — METFORMIN HYDROCHLORIDE 1000 MG/1
TABLET ORAL
Qty: 180 TABLET | Refills: 0 | Status: ON HOLD | OUTPATIENT
Start: 2018-09-10 | End: 2019-11-25

## 2018-09-23 DIAGNOSIS — M17.0 PRIMARY OSTEOARTHRITIS OF BOTH KNEES: Primary | ICD-10-CM

## 2018-09-23 RX ORDER — TRAMADOL HYDROCHLORIDE 50 MG/1
50 TABLET ORAL EVERY 6 HOURS PRN
Qty: 60 TABLET | Refills: 0 | Status: SHIPPED | OUTPATIENT
Start: 2018-09-23 | End: 2018-10-03

## 2018-09-23 NOTE — PROGRESS NOTES
Tramadol refilled as requested. She will also come in this week for cortisone injections to help with the pain.

## 2018-09-27 ENCOUNTER — OFFICE VISIT (OUTPATIENT)
Dept: ORTHOPEDICS | Facility: CLINIC | Age: 53
End: 2018-09-27
Payer: MEDICAID

## 2018-09-27 DIAGNOSIS — M17.0 PRIMARY OSTEOARTHRITIS OF BOTH KNEES: Primary | ICD-10-CM

## 2018-09-27 PROCEDURE — 20610 DRAIN/INJ JOINT/BURSA W/O US: CPT | Mod: 50,PBBFAC | Performed by: NURSE PRACTITIONER

## 2018-09-27 PROCEDURE — 20610 DRAIN/INJ JOINT/BURSA W/O US: CPT | Mod: 50,S$PBB,, | Performed by: NURSE PRACTITIONER

## 2018-09-27 PROCEDURE — 99213 OFFICE O/P EST LOW 20 MIN: CPT | Mod: PBBFAC,25 | Performed by: NURSE PRACTITIONER

## 2018-09-27 PROCEDURE — 99999 PR PBB SHADOW E&M-EST. PATIENT-LVL III: CPT | Mod: PBBFAC,,, | Performed by: NURSE PRACTITIONER

## 2018-09-27 PROCEDURE — 99499 UNLISTED E&M SERVICE: CPT | Mod: S$PBB,,, | Performed by: NURSE PRACTITIONER

## 2018-09-27 RX ORDER — TRIAMCINOLONE ACETONIDE 40 MG/ML
80 INJECTION, SUSPENSION INTRA-ARTICULAR; INTRAMUSCULAR
Status: COMPLETED | OUTPATIENT
Start: 2018-09-27 | End: 2018-09-27

## 2018-09-27 RX ADMIN — TRIAMCINOLONE ACETONIDE 80 MG: 40 INJECTION, SUSPENSION INTRA-ARTICULAR; INTRAMUSCULAR at 03:09

## 2018-09-27 NOTE — PROGRESS NOTES
Pt with known djd of both knees. She takes cortisone injections for pain relief and she returns today for repeat injections. Her last injections were in May.    Knee Injection Procedure Note    Diagnosis: bilateral knee degenerative arthritis  Indications: bilateral knee pain  Procedure Details: Verbal consent was obtained for the procedure. The injection site was identified and the skin was prepared with alcohol. The right knee was injected from an anterolateral approach with 1 ml of Kenalog and 4 ml Lidocaine and the left knee was injected from an anterolateral approach with 1 ml of Kenalog and 4 ml lidocaine under sterile technique using a 22 gauge needle. The needle was removed and the area cleansed and dressed.  Complications:  Patient tolerated the procedure well.    she was advised to rest the knee today, using ice and elevation as needed for comfort and swelling.Immediate relief of the knee pain may be short lived and secondary to the lidocaine. she may have an increase in discomfort tonight followed by steady improvement over the next several days. It may take 1-2 weeks following the injection to get the full benefit of the medication.

## 2018-10-10 DIAGNOSIS — M25.562 ACUTE PAIN OF BOTH KNEES: ICD-10-CM

## 2018-10-10 DIAGNOSIS — M25.561 ACUTE PAIN OF BOTH KNEES: ICD-10-CM

## 2018-10-10 RX ORDER — IBUPROFEN 800 MG/1
TABLET ORAL
Qty: 90 TABLET | Refills: 0 | Status: SHIPPED | OUTPATIENT
Start: 2018-10-10 | End: 2018-11-19 | Stop reason: SDUPTHER

## 2018-10-11 DIAGNOSIS — M17.0 PRIMARY OSTEOARTHRITIS OF BOTH KNEES: Primary | ICD-10-CM

## 2018-10-11 RX ORDER — TRAMADOL HYDROCHLORIDE 50 MG/1
50 TABLET ORAL EVERY 6 HOURS PRN
Qty: 60 TABLET | Refills: 0 | Status: SHIPPED | OUTPATIENT
Start: 2018-10-11 | End: 2018-10-21

## 2018-10-24 DIAGNOSIS — M17.0 PRIMARY OSTEOARTHRITIS OF BOTH KNEES: Primary | ICD-10-CM

## 2018-10-24 RX ORDER — TRAMADOL HYDROCHLORIDE 50 MG/1
50 TABLET ORAL EVERY 6 HOURS PRN
Qty: 60 TABLET | Refills: 0 | Status: SHIPPED | OUTPATIENT
Start: 2018-10-24 | End: 2018-11-03

## 2018-10-24 NOTE — PROGRESS NOTES
Pt taking tramadol for pain related to knee djd. She is working on weight loss with aquatherapy. She is unable to get pain management due to insurance issues. Tramadol refilled.

## 2018-10-25 ENCOUNTER — TELEPHONE (OUTPATIENT)
Dept: ADMINISTRATIVE | Facility: HOSPITAL | Age: 53
End: 2018-10-25

## 2018-10-25 NOTE — TELEPHONE ENCOUNTER
The patient was phoned about scheduling her visit with Dr. Louie and her lipid panel. She states that she is unable to come to the new location to see her, so she will call tomorrow to pick another PCP and schedule her labs     Milagro PATEL LPN  Clinical Care Coordinator  Internal Medicine  [facility]

## 2018-11-06 DIAGNOSIS — M17.0 PRIMARY OSTEOARTHRITIS OF BOTH KNEES: Primary | ICD-10-CM

## 2018-11-06 RX ORDER — TRAMADOL HYDROCHLORIDE 50 MG/1
50 TABLET ORAL EVERY 4 HOURS PRN
Qty: 42 TABLET | Refills: 0 | Status: SHIPPED | OUTPATIENT
Start: 2018-11-06 | End: 2018-11-16

## 2018-11-19 DIAGNOSIS — M17.0 PRIMARY OSTEOARTHRITIS OF BOTH KNEES: Primary | ICD-10-CM

## 2018-11-19 DIAGNOSIS — M25.561 ACUTE PAIN OF BOTH KNEES: ICD-10-CM

## 2018-11-19 DIAGNOSIS — M25.562 ACUTE PAIN OF BOTH KNEES: ICD-10-CM

## 2018-11-19 RX ORDER — TRAMADOL HYDROCHLORIDE 50 MG/1
50 TABLET ORAL EVERY 6 HOURS PRN
Qty: 30 TABLET | Refills: 1 | Status: SHIPPED | OUTPATIENT
Start: 2018-11-19 | End: 2018-11-29

## 2018-11-19 RX ORDER — IBUPROFEN 800 MG/1
TABLET ORAL
Qty: 90 TABLET | Refills: 0 | Status: SHIPPED | OUTPATIENT
Start: 2018-11-19 | End: 2018-12-24 | Stop reason: SDUPTHER

## 2018-11-20 NOTE — PROGRESS NOTES
Tramadol refilled as requested. She is unable to see pain management due to insurance issues. She has severe knee djd and is unable to have surgery at this time.

## 2018-11-29 NOTE — PROGRESS NOTES
Pt  Transported out of dept  In stretcher by EMS, vss, no acute distress       Anita Valiente RN  09/24/18 2375 Went to order a tramadol refill and noted that there was a refill put on the last prescription. Advised patient to refill at [facility].

## 2018-12-12 DIAGNOSIS — M17.0 PRIMARY OSTEOARTHRITIS OF BOTH KNEES: Primary | ICD-10-CM

## 2018-12-12 RX ORDER — TRAMADOL HYDROCHLORIDE 50 MG/1
50 TABLET ORAL EVERY 6 HOURS PRN
Qty: 30 TABLET | Refills: 1 | Status: SHIPPED | OUTPATIENT
Start: 2018-12-12 | End: 2018-12-22

## 2018-12-12 NOTE — PROGRESS NOTES
Pt with severe bilateral knee djd. Unable to get pain management currently due to insurance. Unable to have surgery due to high BMI. Unable to have gastric bypass due to insurance. Tramadol refilled.

## 2018-12-24 DIAGNOSIS — M17.0 PRIMARY OSTEOARTHRITIS OF BOTH KNEES: ICD-10-CM

## 2018-12-24 RX ORDER — TRAMADOL HYDROCHLORIDE 50 MG/1
50 TABLET ORAL EVERY 6 HOURS PRN
Qty: 30 TABLET | Refills: 1 | Status: SHIPPED | OUTPATIENT
Start: 2018-12-24 | End: 2019-01-03

## 2018-12-24 RX ORDER — IBUPROFEN 800 MG/1
TABLET ORAL
Qty: 90 TABLET | Refills: 0 | Status: SHIPPED | OUTPATIENT
Start: 2018-12-24 | End: 2019-01-16 | Stop reason: SDUPTHER

## 2018-12-24 NOTE — PROGRESS NOTES
Ibuprofen and tramadol refilled as requested. Pt with severe djd and unable to get surgery due to BMI. Unable to get pain management due to insurance issues.

## 2018-12-28 DIAGNOSIS — E11.9 TYPE 2 DIABETES MELLITUS WITHOUT COMPLICATION: ICD-10-CM

## 2019-01-13 DIAGNOSIS — M17.0 PRIMARY OSTEOARTHRITIS OF BOTH KNEES: Primary | ICD-10-CM

## 2019-01-13 RX ORDER — TRAMADOL HYDROCHLORIDE 50 MG/1
50 TABLET ORAL EVERY 6 HOURS PRN
Qty: 30 TABLET | Refills: 1 | Status: SHIPPED | OUTPATIENT
Start: 2019-01-13 | End: 2019-01-23

## 2019-01-14 NOTE — PROGRESS NOTES
Pt with severe bilateral knee djd. She is taking tramadol for severe pain because she has diabetes so NSAIDS aren't safe for her and she is too large for a knee replacement at this time. Her insurance doesn't cover weight loss surgery. She understands that I am unable to manage chronic opioid use and is trying to find a pain management physician covered by her health plan. Refill sent to [facility] as requested until she is able to establish with a pain management group.

## 2019-01-16 DIAGNOSIS — M17.0 PRIMARY OSTEOARTHRITIS OF BOTH KNEES: ICD-10-CM

## 2019-01-16 RX ORDER — IBUPROFEN 800 MG/1
TABLET ORAL
Qty: 90 TABLET | Refills: 0 | Status: SHIPPED | OUTPATIENT
Start: 2019-01-16 | End: 2019-01-29 | Stop reason: SDUPTHER

## 2019-01-21 ENCOUNTER — OFFICE VISIT (OUTPATIENT)
Dept: ORTHOPEDICS | Facility: CLINIC | Age: 54
End: 2019-01-21
Payer: COMMERCIAL

## 2019-01-21 DIAGNOSIS — M17.0 PRIMARY OSTEOARTHRITIS OF BOTH KNEES: Primary | ICD-10-CM

## 2019-01-21 PROCEDURE — 99499 UNLISTED E&M SERVICE: CPT | Mod: S$GLB,,, | Performed by: NURSE PRACTITIONER

## 2019-01-21 PROCEDURE — 99999 PR PBB SHADOW E&M-EST. PATIENT-LVL III: CPT | Mod: PBBFAC,,, | Performed by: NURSE PRACTITIONER

## 2019-01-21 PROCEDURE — 20610 DRAIN/INJ JOINT/BURSA W/O US: CPT | Mod: 50,S$GLB,, | Performed by: NURSE PRACTITIONER

## 2019-01-21 PROCEDURE — 99499 NO LOS: ICD-10-PCS | Mod: S$GLB,,, | Performed by: NURSE PRACTITIONER

## 2019-01-21 PROCEDURE — 20610 PR DRAIN/INJECT LARGE JOINT/BURSA: ICD-10-PCS | Mod: 50,S$GLB,, | Performed by: NURSE PRACTITIONER

## 2019-01-21 PROCEDURE — 99999 PR PBB SHADOW E&M-EST. PATIENT-LVL III: ICD-10-PCS | Mod: PBBFAC,,, | Performed by: NURSE PRACTITIONER

## 2019-01-21 RX ADMIN — TRIAMCINOLONE ACETONIDE 80 MG: 40 INJECTION, SUSPENSION INTRA-ARTICULAR; INTRAMUSCULAR at 09:01

## 2019-01-23 RX ORDER — TRIAMCINOLONE ACETONIDE 40 MG/ML
80 INJECTION, SUSPENSION INTRA-ARTICULAR; INTRAMUSCULAR
Status: COMPLETED | OUTPATIENT
Start: 2019-01-21 | End: 2019-01-21

## 2019-01-23 NOTE — PROGRESS NOTES
Pt with known djd of both knees for which she has been seen by both Dr. [facility] and myself in the past. She needs weight loss surgery, but her insurance didn't cover that so she has been working on weight loss with diet and exercise. She has been getting cortisone injections and taking tramadol as needed for the pain. She is here to repeat cortisone injections today. Her last injections were in September.    Knee Injection Procedure Note    Diagnosis: bilateral knee degenerative arthritis  Indications: bilateral knee pain  Procedure Details: Verbal consent was obtained for the procedure. The injection site was identified and the skin was prepared with alcohol. The right knee was injected from an anterolateral approach with 1 ml of Kenalog and 4 ml Lidocaine and the left knee was injected from an anterolateral approach with 1 ml of kenalog and 4 ml lidocaine under sterile technique using a 22 gauge needle. The needle was removed and the area cleansed and dressed.  Complications:  Patient tolerated the procedure well.    she was advised to rest the knee today, using ice and elevation as needed for comfort and swelling.Immediate relief of the knee pain may be short lived and secondary to the lidocaine. she may have an increase in discomfort tonight followed by steady improvement over the next several days. It may take 1-2 weeks following the injection to get the full benefit of the medication.

## 2019-01-29 ENCOUNTER — HOSPITAL ENCOUNTER (EMERGENCY)
Facility: OTHER | Age: 54
Discharge: HOME | End: 2019-01-29
Attending: EMERGENCY MEDICINE
Payer: COMMERCIAL

## 2019-01-29 VITALS
SYSTOLIC BLOOD PRESSURE: 177 MMHG | RESPIRATION RATE: 16 BRPM | HEART RATE: 98 BPM | BODY MASS INDEX: 50.75 KG/M2 | TEMPERATURE: 99 F | WEIGHT: 293 LBS | DIASTOLIC BLOOD PRESSURE: 90 MMHG

## 2019-01-29 DIAGNOSIS — J01.90 ACUTE SINUSITIS, RECURRENCE NOT SPECIFIED, UNSPECIFIED LOCATION: Primary | ICD-10-CM

## 2019-01-29 DIAGNOSIS — M17.0 PRIMARY OSTEOARTHRITIS OF BOTH KNEES: ICD-10-CM

## 2019-01-29 DIAGNOSIS — R05.9 COUGH: ICD-10-CM

## 2019-01-29 DIAGNOSIS — M17.0 PRIMARY OSTEOARTHRITIS OF BOTH KNEES: Primary | ICD-10-CM

## 2019-01-29 LAB
ANION GAP SERPL CALC-SCNC: 15 MMOL/L
ANISOCYTOSIS BLD QL SMEAR: SLIGHT
BASOPHILS # BLD AUTO: 0.05 K/UL
BASOPHILS NFR BLD: 0.5 %
BUN SERPL-MCNC: 17 MG/DL
CALCIUM SERPL-MCNC: 10.3 MG/DL
CHLORIDE SERPL-SCNC: 109 MMOL/L
CO2 SERPL-SCNC: 19 MMOL/L
CREAT SERPL-MCNC: 1.1 MG/DL
DIFFERENTIAL METHOD: ABNORMAL
EOSINOPHIL # BLD AUTO: 0.1 K/UL
EOSINOPHIL NFR BLD: 0.9 %
ERYTHROCYTE [DISTWIDTH] IN BLOOD BY AUTOMATED COUNT: 16.2 %
EST. GFR  (AFRICAN AMERICAN): >60 ML/MIN/1.73 M^2
EST. GFR  (NON AFRICAN AMERICAN): 57 ML/MIN/1.73 M^2
GLUCOSE SERPL-MCNC: 106 MG/DL
HCT VFR BLD AUTO: 40.4 %
HGB BLD-MCNC: 12.8 G/DL
INFLUENZA A, MOLECULAR: NEGATIVE
INFLUENZA B, MOLECULAR: NEGATIVE
LYMPHOCYTES # BLD AUTO: 1.8 K/UL
LYMPHOCYTES NFR BLD: 17.1 %
MCH RBC QN AUTO: 21.8 PG
MCHC RBC AUTO-ENTMCNC: 31.7 G/DL
MCV RBC AUTO: 69 FL
MONOCYTES # BLD AUTO: 0.7 K/UL
MONOCYTES NFR BLD: 6.3 %
NEUTROPHILS # BLD AUTO: 7.7 K/UL
NEUTROPHILS NFR BLD: 75.2 %
PLATELET # BLD AUTO: 226 K/UL
PLATELET BLD QL SMEAR: ABNORMAL
PMV BLD AUTO: ABNORMAL FL
POTASSIUM SERPL-SCNC: 4.1 MMOL/L
RBC # BLD AUTO: 5.88 M/UL
SODIUM SERPL-SCNC: 143 MMOL/L
SPECIMEN SOURCE: NORMAL
TROPONIN I SERPL DL<=0.01 NG/ML-MCNC: <0.006 NG/ML
WBC # BLD AUTO: 10.3 K/UL

## 2019-01-29 PROCEDURE — 93010 EKG 12-LEAD: ICD-10-PCS | Mod: ,,, | Performed by: INTERNAL MEDICINE

## 2019-01-29 PROCEDURE — 25000003 PHARM REV CODE 250: Performed by: PHYSICIAN ASSISTANT

## 2019-01-29 PROCEDURE — 93005 ELECTROCARDIOGRAM TRACING: CPT

## 2019-01-29 PROCEDURE — 99285 EMERGENCY DEPT VISIT HI MDM: CPT

## 2019-01-29 PROCEDURE — 85025 COMPLETE CBC W/AUTO DIFF WBC: CPT

## 2019-01-29 PROCEDURE — 84484 ASSAY OF TROPONIN QUANT: CPT

## 2019-01-29 PROCEDURE — 87502 INFLUENZA DNA AMP PROBE: CPT

## 2019-01-29 PROCEDURE — 80048 BASIC METABOLIC PNL TOTAL CA: CPT

## 2019-01-29 PROCEDURE — 93010 ELECTROCARDIOGRAM REPORT: CPT | Mod: ,,, | Performed by: INTERNAL MEDICINE

## 2019-01-29 RX ORDER — TRAMADOL HYDROCHLORIDE 50 MG/1
50 TABLET ORAL EVERY 6 HOURS PRN
Qty: 30 TABLET | Refills: 1 | Status: SHIPPED | OUTPATIENT
Start: 2019-01-29 | End: 2019-02-08

## 2019-01-29 RX ORDER — IBUPROFEN 800 MG/1
800 TABLET ORAL 3 TIMES DAILY
Qty: 90 TABLET | Refills: 0 | Status: SHIPPED | OUTPATIENT
Start: 2019-01-29 | End: 2019-03-05 | Stop reason: SDUPTHER

## 2019-01-29 RX ORDER — ACETAMINOPHEN 500 MG
1000 TABLET ORAL
Status: COMPLETED | OUTPATIENT
Start: 2019-01-29 | End: 2019-01-29

## 2019-01-29 RX ORDER — FLUTICASONE PROPIONATE 50 MCG
1 SPRAY, SUSPENSION (ML) NASAL 2 TIMES DAILY
Qty: 15 G | Refills: 0 | Status: SHIPPED | OUTPATIENT
Start: 2019-01-29 | End: 2019-06-04

## 2019-01-29 RX ADMIN — ACETAMINOPHEN 1000 MG: 500 TABLET ORAL at 06:01

## 2019-01-29 NOTE — ED NOTES
Pt presents with c/o CP, SOB,  nasal congestion, N/V, chills and sweats x4 days. . Pt reports she took a mucinex and zantac today. Pt reports the CP is a tightness to the R side.  Pt is AAOx4. RR are even and unlabored. Skin is warm and dry. Pt is ambulatory with a steady gait.

## 2019-01-29 NOTE — ED PROVIDER NOTES
Encounter Date: 1/29/2019       History     Chief Complaint   Patient presents with    Cough     dry cough, chills, body aches, right sided chest pain, headache x 4 days     Patient is a 53-year-old female with diabetes and hypertension who presents to the ED with multiple complaints. Patient reports right-sided head congestion nasal congestion, sore throat, body aches and general malaise.  and chest congestion.  Patient reports discomfort to the right side of her chest.  She describes this as a tightness.  She states this is present at rest.  She also reports a productive cough with green sputum and shortness of breath. She reports taking Mucinex and Zantac without relief of her symptoms.      The history is provided by the patient.     Review of patient's allergies indicates:   Allergen Reactions    Ace inhibitors Edema     Past Medical History:   Diagnosis Date    Arthritis     Diabetes mellitus     Hypertension      Past Surgical History:   Procedure Laterality Date    COLONOSCOPY N/A 6/27/2017    Performed by Evelin Arceo MD at [facility] (2ND FLR)    ESOPHAGOGASTRODUODENOSCOPY (EGD) N/A 8/22/2016    Performed by Fuentes Crisostomo MD at [facility] END[facility] (2ND FLR)    ESOPHAGOGASTRODUODENOSCOPY (EGD) N/A 8/2/2016    Performed by Dimitry Jacob MD at [facility] END[facility] (2ND FLR)    TONSILLECTOMY      TUBAL LIGATION       Family History   Problem Relation Age of Onset    Breast cancer Mother 65    Cancer Mother     Diabetes Mother     Colon polyps Mother     Hypertension Mother     Diabetes Father     Breast cancer Sister 56    No Known Problems Brother     Diabetes Paternal Uncle     Breast cancer Maternal Grandmother     Colon cancer Maternal Grandmother     No Known Problems Maternal Grandfather     Diabetes Paternal Grandmother     Heart disease Paternal Grandmother     No Known Problems Paternal Grandfather     Breast cancer Maternal Aunt     Esophageal cancer Neg Hx     Irritable bowel syndrome  Neg Hx     Rectal cancer Neg Hx     Stomach cancer Neg Hx     Ulcerative colitis Neg Hx     Celiac disease Neg Hx     Crohn's disease Neg Hx     Amblyopia Neg Hx     Blindness Neg Hx     Cataracts Neg Hx     Glaucoma Neg Hx     Macular degeneration Neg Hx     Retinal detachment Neg Hx     Strabismus Neg Hx     Stroke Neg Hx     Thyroid disease Neg Hx      Social History     Tobacco Use    Smoking status: Never Smoker    Smokeless tobacco: Never Used   Substance Use Topics    Alcohol use: Yes     Comment: occasional    Drug use: No     Review of Systems   Constitutional: Negative for chills and fever.   HENT: Positive for congestion and sore throat.    Eyes: Negative for pain.   Respiratory: Positive for cough and shortness of breath.    Cardiovascular: Positive for chest pain.   Gastrointestinal: Negative for abdominal pain, diarrhea, nausea and vomiting.   Genitourinary: Negative for dysuria.   Musculoskeletal: Negative for arthralgias.   Skin: Negative for rash.   Neurological: Negative for headaches.       Physical Exam     Initial Vitals [01/29/19 1655]   BP Pulse Resp Temp SpO2   (!) 177/90 98 16 98.6 °F (37 °C) --      MAP       --         Physical Exam    Vitals reviewed.  Constitutional: She is Obese . She is cooperative.   HENT:   Head: Normocephalic and atraumatic.   Eyes: EOM are normal. Pupils are equal, round, and reactive to light.   Neck: Normal range of motion. Neck supple.   Cardiovascular: Normal rate, regular rhythm and intact distal pulses.   Pulmonary/Chest: Breath sounds normal. She has no wheezes. She has no rhonchi. She has no rales.       Abdominal: Soft. Bowel sounds are normal. There is no tenderness.   Neurological: She is alert and oriented to person, place, and time. GCS eye subscore is 4. GCS verbal subscore is 5. GCS motor subscore is 6.   Skin: Skin is warm and dry. No rash noted.         ED Course   Procedures  Labs Reviewed   CBC W/ AUTO DIFFERENTIAL - Abnormal;  Notable for the following components:       Result Value    RBC 5.88 (*)     MCV 69 (*)     MCH 21.8 (*)     MCHC 31.7 (*)     RDW 16.2 (*)     Gran% 75.2 (*)     Lymph% 17.1 (*)     All other components within normal limits   BASIC METABOLIC PANEL - Abnormal; Notable for the following components:    CO2 19 (*)     eGFR if non  57 (*)     All other components within normal limits   INFLUENZA A & B BY MOLECULAR   TROPONIN I     EKG Readings: (Independently Interpreted)   Normal sinus rhythm at a rate of 99 beats per minute.  Biatrial enlargement noted. No STEMI.  No significant changes from previous EKG.       Imaging Results          X-Ray Chest PA And Lateral (Final result)  Result time 01/29/19 17:34:24    Final result by Astrid Kowalski MD (01/29/19 17:34:24)                 Impression:      No acute cardiopulmonary process identified.      Electronically signed by: Astrid Kowalski MD  Date:    01/29/2019  Time:    17:34             Narrative:    EXAMINATION:  XR CHEST PA AND LATERAL    CLINICAL HISTORY:  Cough    TECHNIQUE:  PA and lateral views of the chest were performed.    COMPARISON:  August 2016.    FINDINGS:  Cardiac silhouette is normal in size.  Lungs are symmetrically expanded.  No evidence of focal consolidative process, pneumothorax, or significant effusion.  No acute osseous abnormality identified.                                 Medical Decision Making:   Initial Assessment:   Urgent evaluation of a 53 y.o. female presenting to the emergency department complaining of cough, chest pain, and congestion. Patient is afebrile, nontoxic appearing and hemodynamically stable.  Patient's symptoms are likely secondary to viral upper respiratory infection.  However, given patient's cardiac risk factors, will obtain basic labs and troponin.  ED Management:  EKG reveals no ischemic changes.  Chest x-ray reveals no acute cardiopulmonary process.  CBC reveals no leukocytosis or anemia.  No  significant metabolic disturbance noted.  Troponin is less than 0.006. (repeat troponin not indicated as patient's pain has been constant for the past 3 days.)  Rapid flu swab is negative.  Patient likely has acute sinusitis.  She is advised on symptomatic care.  Patient has no other complaints this time is stable for discharge.                      Clinical Impression:     1. Acute sinusitis, recurrence not specified, unspecified location    2. Cough                               Bandar Hernandez PA-C  01/29/19 1916

## 2019-02-08 DIAGNOSIS — E11.9 TYPE 2 DIABETES MELLITUS WITHOUT COMPLICATION: ICD-10-CM

## 2019-02-12 DIAGNOSIS — M17.0 PRIMARY OSTEOARTHRITIS OF BOTH KNEES: Primary | ICD-10-CM

## 2019-02-12 RX ORDER — TRAMADOL HYDROCHLORIDE 50 MG/1
50 TABLET ORAL EVERY 6 HOURS PRN
Qty: 30 TABLET | Refills: 0 | Status: SHIPPED | OUTPATIENT
Start: 2019-02-12 | End: 2019-02-20 | Stop reason: SDUPTHER

## 2019-02-20 DIAGNOSIS — M17.0 PRIMARY OSTEOARTHRITIS OF BOTH KNEES: ICD-10-CM

## 2019-02-20 RX ORDER — TRAMADOL HYDROCHLORIDE 50 MG/1
50 TABLET ORAL EVERY 6 HOURS PRN
Qty: 30 TABLET | Refills: 0 | Status: SHIPPED | OUTPATIENT
Start: 2019-02-20 | End: 2019-03-01 | Stop reason: SDUPTHER

## 2019-02-21 NOTE — PROGRESS NOTES
Tramadol rx sent in for knee pain. Pt with chronic pain. She has not been able to go to pain management due to insurance issues. She recently got a different insurance, so referral placed for PM&R.

## 2019-02-25 ENCOUNTER — OFFICE VISIT (OUTPATIENT)
Dept: OBSTETRICS AND GYNECOLOGY | Facility: CLINIC | Age: 54
End: 2019-02-25
Payer: COMMERCIAL

## 2019-02-25 ENCOUNTER — TELEPHONE (OUTPATIENT)
Dept: OBSTETRICS AND GYNECOLOGY | Facility: CLINIC | Age: 54
End: 2019-02-25

## 2019-02-25 ENCOUNTER — LAB VISIT (OUTPATIENT)
Dept: LAB | Facility: OTHER | Age: 54
End: 2019-02-25
Payer: COMMERCIAL

## 2019-02-25 VITALS
DIASTOLIC BLOOD PRESSURE: 80 MMHG | HEIGHT: 65 IN | WEIGHT: 293 LBS | SYSTOLIC BLOOD PRESSURE: 130 MMHG | BODY MASS INDEX: 48.82 KG/M2

## 2019-02-25 DIAGNOSIS — R10.2 PELVIC PAIN: ICD-10-CM

## 2019-02-25 DIAGNOSIS — Z91.89 AT HIGH RISK FOR BREAST CANCER: ICD-10-CM

## 2019-02-25 DIAGNOSIS — Z11.59 NEED FOR HEPATITIS C SCREENING TEST: ICD-10-CM

## 2019-02-25 DIAGNOSIS — A59.9 TRICHIMONIASIS: Primary | ICD-10-CM

## 2019-02-25 DIAGNOSIS — N76.0 BV (BACTERIAL VAGINOSIS): ICD-10-CM

## 2019-02-25 DIAGNOSIS — Z11.51 SCREENING FOR HPV (HUMAN PAPILLOMAVIRUS): ICD-10-CM

## 2019-02-25 DIAGNOSIS — Z11.3 SCREEN FOR STD (SEXUALLY TRANSMITTED DISEASE): ICD-10-CM

## 2019-02-25 DIAGNOSIS — B96.89 BV (BACTERIAL VAGINOSIS): ICD-10-CM

## 2019-02-25 DIAGNOSIS — Z01.419 WOMEN'S ANNUAL ROUTINE GYNECOLOGICAL EXAMINATION: Primary | ICD-10-CM

## 2019-02-25 DIAGNOSIS — Z12.31 ENCOUNTER FOR SCREENING MAMMOGRAM FOR BREAST CANCER: ICD-10-CM

## 2019-02-25 DIAGNOSIS — Z12.4 ENCOUNTER FOR PAPANICOLAOU SMEAR FOR CERVICAL CANCER SCREENING: ICD-10-CM

## 2019-02-25 DIAGNOSIS — N95.2 VAGINAL ATROPHY: ICD-10-CM

## 2019-02-25 LAB
CANDIDA RRNA VAG QL PROBE: NEGATIVE
G VAGINALIS RRNA GENITAL QL PROBE: POSITIVE
HAV IGM SERPL QL IA: NEGATIVE
HBV CORE IGM SERPL QL IA: NEGATIVE
HBV SURFACE AG SERPL QL IA: NEGATIVE
HCV AB SERPL QL IA: ABNORMAL
HIV 1+2 AB+HIV1 P24 AG SERPL QL IA: NEGATIVE
T VAGINALIS RRNA GENITAL QL PROBE: POSITIVE

## 2019-02-25 PROCEDURE — 87624 HPV HI-RISK TYP POOLED RSLT: CPT

## 2019-02-25 PROCEDURE — 99999 PR PBB SHADOW E&M-EST. PATIENT-LVL V: CPT | Mod: PBBFAC,,, | Performed by: NURSE PRACTITIONER

## 2019-02-25 PROCEDURE — 88141 CYTOPATH C/V INTERPRET: CPT | Mod: ,,, | Performed by: PATHOLOGY

## 2019-02-25 PROCEDURE — 3079F PR MOST RECENT DIASTOLIC BLOOD PRESSURE 80-89 MM HG: ICD-10-PCS | Mod: CPTII,S$GLB,, | Performed by: NURSE PRACTITIONER

## 2019-02-25 PROCEDURE — 86592 SYPHILIS TEST NON-TREP QUAL: CPT

## 2019-02-25 PROCEDURE — 88175 CYTOPATH C/V AUTO FLUID REDO: CPT | Performed by: PATHOLOGY

## 2019-02-25 PROCEDURE — 99396 PR PREVENTIVE VISIT,EST,40-64: ICD-10-PCS | Mod: S$GLB,,, | Performed by: NURSE PRACTITIONER

## 2019-02-25 PROCEDURE — 3079F DIAST BP 80-89 MM HG: CPT | Mod: CPTII,S$GLB,, | Performed by: NURSE PRACTITIONER

## 2019-02-25 PROCEDURE — 3075F PR MOST RECENT SYSTOLIC BLOOD PRESS GE 130-139MM HG: ICD-10-PCS | Mod: CPTII,S$GLB,, | Performed by: NURSE PRACTITIONER

## 2019-02-25 PROCEDURE — 36415 COLL VENOUS BLD VENIPUNCTURE: CPT

## 2019-02-25 PROCEDURE — 3075F SYST BP GE 130 - 139MM HG: CPT | Mod: CPTII,S$GLB,, | Performed by: NURSE PRACTITIONER

## 2019-02-25 PROCEDURE — 88141 LIQUID-BASED PAP SMEAR, SCREENING: ICD-10-PCS | Mod: ,,, | Performed by: PATHOLOGY

## 2019-02-25 PROCEDURE — 80074 ACUTE HEPATITIS PANEL: CPT

## 2019-02-25 PROCEDURE — 87480 CANDIDA DNA DIR PROBE: CPT

## 2019-02-25 PROCEDURE — 86703 HIV-1/HIV-2 1 RESULT ANTBDY: CPT

## 2019-02-25 PROCEDURE — 99396 PREV VISIT EST AGE 40-64: CPT | Mod: S$GLB,,, | Performed by: NURSE PRACTITIONER

## 2019-02-25 PROCEDURE — 99999 PR PBB SHADOW E&M-EST. PATIENT-LVL V: ICD-10-PCS | Mod: PBBFAC,,, | Performed by: NURSE PRACTITIONER

## 2019-02-25 PROCEDURE — 87491 CHLMYD TRACH DNA AMP PROBE: CPT

## 2019-02-25 RX ORDER — ESTRADIOL 0.1 MG/G
1 CREAM VAGINAL
Qty: 42.5 G | Refills: 4 | Status: SHIPPED | OUTPATIENT
Start: 2019-02-25 | End: 2020-10-12

## 2019-02-25 RX ORDER — METRONIDAZOLE 500 MG/1
500 TABLET ORAL 2 TIMES DAILY
Qty: 14 TABLET | Refills: 0 | Status: SHIPPED | OUTPATIENT
Start: 2019-02-25 | End: 2019-03-04

## 2019-02-25 NOTE — PROGRESS NOTES
CC: Annual  HPI: Pt is a 53 y.o.  female who presents for routine annual exam. Pt works as  for [employer]. Works She is postmenopausal.  LMP 10 years ago.  Reports intermittent vaginal spotting.  She does want STD screening.  Pt is c/o vaginal DC- watery.  Denies any associated itching or odor.  Pt also c/o RLQ pain.  Reports pain is intermittent.  She is unsure if it worsens with lifting or staining.  No associated fever.  The patient participates in regular exercise: yes- walking. Recently lost 20 lbs.  The patient does not smoke.  The patient wears seatbelts.   Pt denies any domestic violence.  Pt has had a colonoscopy- it was WNL- with need for 10 yr follow up.  Mammogram is UTD- 5/2018 WNL.      FH:  Breast cancer: mother, maternal grandmother, and sister (all  at diagnosis)  Colon cancer: none  Ovarian cancer: none  Endometrial cancer: none    ROS:  GENERAL: Feeling well overall. Denies fever or chills.   SKIN: Denies rash or lesions.   HEAD: Denies head injury or headache.   NODES: Denies enlarged lymph nodes.   CHEST: Denies chest pain or shortness of breath.   CARDIOVASCULAR: Denies palpitations or left sided chest pain.   ABDOMEN: No abdominal pain, constipation, diarrhea, nausea, vomiting or rectal bleeding.   URINARY: No dysuria, hematuria, or burning on urination.  REPRODUCTIVE: See HPI.   BREASTS: Denies pain, lumps, or nipple discharge.   HEMATOLOGIC: No easy bruisability or excessive bleeding.   MUSCULOSKELETAL: Denies joint pain or swelling.   NEUROLOGIC: Denies syncope or weakness.   PSYCHIATRIC: Denies depression, anxiety or mood swings.    PE:   APPEARANCE: Well nourished, well developed, Black or  female in no acute distress.  NODES: no cervical, supraclavicular, or inguinal lymphadenopathy  BREASTS: Symmetrical, no skin changes or visible lesions. No palpable masses, nipple discharge or adenopathy bilaterally.  ABDOMEN: Soft. No tenderness or masses.  No distention. No hernias palpated. No CVA tenderness.  VULVA: No lesions. Normal external female genitalia.  URETHRAL MEATUS: Normal size and location, no lesions, no prolapse.  URETHRA: No masses, tenderness, or prolapse.  VAGINA: Atrophic. No lesions or lacerations noted. No abnormal discharge present. No odor present.   CERVIX: No lesions or discharge. No cervical motion tenderness.   UTERUS: Exam limited by body habitus.  ADNEXA: + RLQ tenderness. No fullness or masses palpated in the adnexal regions.   ANUS PERINEUM: Normal.      Diagnosis:  1. Women's annual routine gynecological examination    2. Encounter for Papanicolaou smear for cervical cancer screening    3. Screening for HPV (human papillomavirus)    4. Encounter for screening mammogram for breast cancer    5. Screen for STD (sexually transmitted disease)    6. Vaginal atrophy    7. Pelvic pain    8. At high risk for breast cancer        Plan:   Pap/ HPV  Mammo in 5/2019  STD screening - full panel  Estrace for atrophy- bi weekly X 3 months, then taper to PRN  Referral to breast surgery for elevated TC score  Pelvic US for evaluation of RLQ pain.  Discussed if pelvic US and pain persists to f/u with PCP   Discussed to notify clinic if no improvement of vaginal spotting after estrogen cream  And if has any other postmenopausal bleeding       Orders Placed This Encounter    HPV High Risk Genotypes, PCR    C. trachomatis/N. gonorrhoeae by AMP DNA    Vaginosis Screen by DNA Probe    Mammo Digital Screening Bilat w/ Zachery    US Pelvis Complete Non OB    HIV-1 and HIV-2 antibodies    RPR    Hepatitis panel, acute    Ambulatory Referral to Breast Surgery    Liquid-based pap smear, screening    estradiol (ESTRACE) 0.01 % (0.1 mg/gram) vaginal cream           Patient was counseled today on the new ACS guidelines for cervical cytology screening as well as the current recommendations for breast cancer screening. She was counseled to follow up with her  PCP for other routine health maintenance. Counseling session lasted approximately 10 minutes, and all her questions were answered.    Follow-up with me in 1 year for routine exam    TYLER See

## 2019-02-26 ENCOUNTER — PATIENT MESSAGE (OUTPATIENT)
Dept: OBSTETRICS AND GYNECOLOGY | Facility: CLINIC | Age: 54
End: 2019-02-26

## 2019-02-26 LAB
C TRACH DNA SPEC QL NAA+PROBE: NOT DETECTED
N GONORRHOEA DNA SPEC QL NAA+PROBE: NOT DETECTED
RPR SER QL: NORMAL

## 2019-02-28 LAB
HPV HR 12 DNA CVX QL NAA+PROBE: NEGATIVE
HPV16 AG SPEC QL: NEGATIVE
HPV18 DNA SPEC QL NAA+PROBE: NEGATIVE

## 2019-03-01 DIAGNOSIS — M17.0 PRIMARY OSTEOARTHRITIS OF BOTH KNEES: ICD-10-CM

## 2019-03-01 RX ORDER — TRAMADOL HYDROCHLORIDE 50 MG/1
50 TABLET ORAL EVERY 6 HOURS PRN
Qty: 30 TABLET | Refills: 1 | Status: SHIPPED | OUTPATIENT
Start: 2019-03-01 | End: 2019-03-11

## 2019-03-05 DIAGNOSIS — M17.0 PRIMARY OSTEOARTHRITIS OF BOTH KNEES: ICD-10-CM

## 2019-03-06 RX ORDER — IBUPROFEN 800 MG/1
TABLET ORAL
Qty: 90 TABLET | Refills: 0 | Status: SHIPPED | OUTPATIENT
Start: 2019-03-06 | End: 2019-04-11 | Stop reason: SDUPTHER

## 2019-03-16 DIAGNOSIS — M17.0 PRIMARY OSTEOARTHRITIS OF BOTH KNEES: Primary | ICD-10-CM

## 2019-03-16 RX ORDER — TRAMADOL HYDROCHLORIDE 50 MG/1
50 TABLET ORAL EVERY 6 HOURS PRN
Qty: 30 TABLET | Refills: 1 | Status: SHIPPED | OUTPATIENT
Start: 2019-03-16 | End: 2019-03-26

## 2019-03-16 NOTE — PROGRESS NOTES
Pt with bilateral knee djd.She has tried gel injections in the past and would like to try them again. She is using tramadol for pain relief since she is a diabetic and has to be careful with nsaids. She has an upcoming appt with pain management for chronic opioid use, but it isn't until June, so I'm prescribing the tramadol in the meantime.

## 2019-03-20 DIAGNOSIS — M17.0 PRIMARY OSTEOARTHRITIS OF BOTH KNEES: Primary | ICD-10-CM

## 2019-03-20 NOTE — PROGRESS NOTES
Orders changed from euflexxa to synvisc due to insurance restrictions. Awaiting insurance approval.

## 2019-03-25 ENCOUNTER — OFFICE VISIT (OUTPATIENT)
Dept: ORTHOPEDICS | Facility: CLINIC | Age: 54
End: 2019-03-25
Payer: COMMERCIAL

## 2019-03-25 VITALS — HEIGHT: 65 IN | BODY MASS INDEX: 48.82 KG/M2 | WEIGHT: 293 LBS

## 2019-03-25 DIAGNOSIS — M17.0 PRIMARY OSTEOARTHRITIS OF BOTH KNEES: ICD-10-CM

## 2019-03-25 PROCEDURE — 99999 PR PBB SHADOW E&M-EST. PATIENT-LVL III: ICD-10-PCS | Mod: PBBFAC,,, | Performed by: NURSE PRACTITIONER

## 2019-03-25 PROCEDURE — 99499 NO LOS: ICD-10-PCS | Mod: S$GLB,,, | Performed by: NURSE PRACTITIONER

## 2019-03-25 PROCEDURE — 99499 UNLISTED E&M SERVICE: CPT | Mod: S$GLB,,, | Performed by: NURSE PRACTITIONER

## 2019-03-25 PROCEDURE — 20610 DRAIN/INJ JOINT/BURSA W/O US: CPT | Mod: 50,S$GLB,, | Performed by: NURSE PRACTITIONER

## 2019-03-25 PROCEDURE — 20610 PR DRAIN/INJECT LARGE JOINT/BURSA: ICD-10-PCS | Mod: 50,S$GLB,, | Performed by: NURSE PRACTITIONER

## 2019-03-25 PROCEDURE — 99999 PR PBB SHADOW E&M-EST. PATIENT-LVL III: CPT | Mod: PBBFAC,,, | Performed by: NURSE PRACTITIONER

## 2019-03-25 NOTE — PROGRESS NOTES
Yayo Carver presents to clinic today for the first bilateral knee Synvisc injection.    Exam demonstrates the no effusion in the bilateral knee, and the skin is intact.    Diagnosis: osteoarthritis knee    After time out was performed and patient ID, side, and site were verified, the right knee and the left knee were sterilly prepped in the standard fashion.  A 22-gauge needle was introduced into the right knee and the left knee joint from an jl-lateral site without complication. The right knee and the left knee were then injected with 2 ml of Synvisc each. Sterile dressing was applied.  The patient was instructed to resume activities as tolerated and to call with any problems.     We will see Yayo Carver back next week for the second injection.

## 2019-03-31 RX ORDER — TRAMADOL HYDROCHLORIDE 50 MG/1
50 TABLET ORAL EVERY 6 HOURS PRN
Qty: 30 TABLET | Refills: 0 | Status: SHIPPED | OUTPATIENT
Start: 2019-03-31 | End: 2019-04-08 | Stop reason: SDUPTHER

## 2019-04-01 ENCOUNTER — OFFICE VISIT (OUTPATIENT)
Dept: ORTHOPEDICS | Facility: CLINIC | Age: 54
End: 2019-04-01
Payer: COMMERCIAL

## 2019-04-01 VITALS — HEIGHT: 65 IN | BODY MASS INDEX: 48.82 KG/M2 | WEIGHT: 293 LBS

## 2019-04-01 DIAGNOSIS — M17.0 PRIMARY OSTEOARTHRITIS OF BOTH KNEES: ICD-10-CM

## 2019-04-01 PROCEDURE — 99999 PR PBB SHADOW E&M-EST. PATIENT-LVL III: CPT | Mod: PBBFAC,,, | Performed by: NURSE PRACTITIONER

## 2019-04-01 PROCEDURE — 20610 PR DRAIN/INJECT LARGE JOINT/BURSA: ICD-10-PCS | Mod: 50,S$GLB,, | Performed by: NURSE PRACTITIONER

## 2019-04-01 PROCEDURE — 99999 PR PBB SHADOW E&M-EST. PATIENT-LVL III: ICD-10-PCS | Mod: PBBFAC,,, | Performed by: NURSE PRACTITIONER

## 2019-04-01 PROCEDURE — 99499 NO LOS: ICD-10-PCS | Mod: S$GLB,,, | Performed by: NURSE PRACTITIONER

## 2019-04-01 PROCEDURE — 20610 DRAIN/INJ JOINT/BURSA W/O US: CPT | Mod: 50,S$GLB,, | Performed by: NURSE PRACTITIONER

## 2019-04-01 PROCEDURE — 99499 UNLISTED E&M SERVICE: CPT | Mod: S$GLB,,, | Performed by: NURSE PRACTITIONER

## 2019-04-01 NOTE — PROGRESS NOTES
Yayo Carver presents to clinic today for the second bilateral knee Synvisc injection.    Exam demonstrates the no effusion in the bilateral knee, and the skin is intact.    Diagnosis: osteoarthritis knee    After time out was performed and patient ID, side, and site were verified, the right knee and the left knee were sterilly prepped in the standard fashion.  A 22-gauge needle was introduced into the right knee and the left knee joint from an jl-lateral site without complication. The right knee and the left knee were then injected with 2 ml of Synvisc each. Sterile dressing was applied.  The patient was instructed to resume activities as tolerated and to call with any problems.     We will see Yayo Carver back next week for the third injection.

## 2019-04-05 DIAGNOSIS — E11.9 TYPE 2 DIABETES MELLITUS WITHOUT COMPLICATION: ICD-10-CM

## 2019-04-08 ENCOUNTER — OFFICE VISIT (OUTPATIENT)
Dept: ORTHOPEDICS | Facility: CLINIC | Age: 54
End: 2019-04-08
Payer: COMMERCIAL

## 2019-04-08 VITALS — BODY MASS INDEX: 48.82 KG/M2 | WEIGHT: 293 LBS | HEIGHT: 65 IN

## 2019-04-08 DIAGNOSIS — M17.0 PRIMARY OSTEOARTHRITIS OF BOTH KNEES: Primary | ICD-10-CM

## 2019-04-08 PROCEDURE — 20610 DRAIN/INJ JOINT/BURSA W/O US: CPT | Mod: 50,S$GLB,, | Performed by: NURSE PRACTITIONER

## 2019-04-08 PROCEDURE — 99999 PR PBB SHADOW E&M-EST. PATIENT-LVL II: CPT | Mod: PBBFAC,,, | Performed by: NURSE PRACTITIONER

## 2019-04-08 PROCEDURE — 99499 NO LOS: ICD-10-PCS | Mod: S$GLB,,, | Performed by: NURSE PRACTITIONER

## 2019-04-08 PROCEDURE — 99499 UNLISTED E&M SERVICE: CPT | Mod: S$GLB,,, | Performed by: NURSE PRACTITIONER

## 2019-04-08 PROCEDURE — 20610 PR DRAIN/INJECT LARGE JOINT/BURSA: ICD-10-PCS | Mod: 50,S$GLB,, | Performed by: NURSE PRACTITIONER

## 2019-04-08 PROCEDURE — 99999 PR PBB SHADOW E&M-EST. PATIENT-LVL II: ICD-10-PCS | Mod: PBBFAC,,, | Performed by: NURSE PRACTITIONER

## 2019-04-08 RX ORDER — AMOXICILLIN 500 MG/1
CAPSULE ORAL
Refills: 0 | COMMUNITY
Start: 2019-04-02 | End: 2019-06-04 | Stop reason: ALTCHOICE

## 2019-04-08 RX ORDER — TRAMADOL HYDROCHLORIDE 50 MG/1
50 TABLET ORAL EVERY 6 HOURS PRN
Qty: 30 TABLET | Refills: 0 | Status: SHIPPED | OUTPATIENT
Start: 2019-04-08 | End: 2019-04-17 | Stop reason: SDUPTHER

## 2019-04-08 NOTE — PROGRESS NOTES
Yayo Carver presents to clinic today for the third bilateral knee Synvisc injection.    Exam demonstrates the no effusion in the bilateral knee, and the skin is intact.    Diagnosis: osteoarthritis knee    After time out was performed and patient ID, side, and site were verified, the right knee and the left knee were sterilly prepped in the standard fashion.  A 22-gauge needle was introduced into the right knee and the left knee joint from an jl-lateral site without complication. The right knee and the left knee were then injected with 2 ml of Synvisc each. Sterile dressing was applied.  The patient was instructed to resume activities as tolerated and to call with any problems.     She reports good relief and will f/u as needed. Tramadol refilled pending visit with pain management in June as scheduled

## 2019-04-11 DIAGNOSIS — M17.0 PRIMARY OSTEOARTHRITIS OF BOTH KNEES: ICD-10-CM

## 2019-04-11 RX ORDER — IBUPROFEN 800 MG/1
TABLET ORAL
Qty: 90 TABLET | Refills: 0 | Status: SHIPPED | OUTPATIENT
Start: 2019-04-11 | End: 2019-05-10 | Stop reason: SDUPTHER

## 2019-04-17 DIAGNOSIS — M17.0 PRIMARY OSTEOARTHRITIS OF BOTH KNEES: ICD-10-CM

## 2019-04-17 RX ORDER — TRAMADOL HYDROCHLORIDE 50 MG/1
50 TABLET ORAL EVERY 6 HOURS PRN
Qty: 30 TABLET | Refills: 0 | Status: SHIPPED | OUTPATIENT
Start: 2019-04-17 | End: 2019-04-24 | Stop reason: SDUPTHER

## 2019-04-22 ENCOUNTER — TELEPHONE (OUTPATIENT)
Dept: ORTHOPEDICS | Facility: CLINIC | Age: 54
End: 2019-04-22

## 2019-04-24 DIAGNOSIS — M17.0 PRIMARY OSTEOARTHRITIS OF BOTH KNEES: ICD-10-CM

## 2019-04-24 RX ORDER — TRAMADOL HYDROCHLORIDE 50 MG/1
50 TABLET ORAL EVERY 6 HOURS PRN
Qty: 30 TABLET | Refills: 0 | Status: SHIPPED | OUTPATIENT
Start: 2019-04-24 | End: 2019-05-03 | Stop reason: SDUPTHER

## 2019-05-03 DIAGNOSIS — M17.0 PRIMARY OSTEOARTHRITIS OF BOTH KNEES: ICD-10-CM

## 2019-05-03 RX ORDER — TRAMADOL HYDROCHLORIDE 50 MG/1
50 TABLET ORAL EVERY 4 HOURS PRN
Qty: 42 TABLET | Refills: 0 | Status: SHIPPED | OUTPATIENT
Start: 2019-05-03 | End: 2019-05-10 | Stop reason: SDUPTHER

## 2019-05-10 DIAGNOSIS — M17.0 PRIMARY OSTEOARTHRITIS OF BOTH KNEES: ICD-10-CM

## 2019-05-10 RX ORDER — IBUPROFEN 800 MG/1
TABLET ORAL
Qty: 90 TABLET | Refills: 0 | Status: SHIPPED | OUTPATIENT
Start: 2019-05-10 | End: 2019-06-04

## 2019-05-10 RX ORDER — TRAMADOL HYDROCHLORIDE 50 MG/1
50 TABLET ORAL EVERY 4 HOURS PRN
Qty: 42 TABLET | Refills: 0 | Status: SHIPPED | OUTPATIENT
Start: 2019-05-10 | End: 2019-05-18 | Stop reason: SDUPTHER

## 2019-05-10 NOTE — PROGRESS NOTES
Tramadol refilled for patient with chronic knee pain. Upcoming appointment with pain management in June

## 2019-05-18 DIAGNOSIS — M17.0 PRIMARY OSTEOARTHRITIS OF BOTH KNEES: ICD-10-CM

## 2019-05-18 RX ORDER — TRAMADOL HYDROCHLORIDE 50 MG/1
50 TABLET ORAL EVERY 4 HOURS PRN
Qty: 42 TABLET | Refills: 0 | Status: SHIPPED | OUTPATIENT
Start: 2019-05-18 | End: 2019-05-27 | Stop reason: SDUPTHER

## 2019-05-18 NOTE — PROGRESS NOTES
Pt with chronic bilateral knee pain due to DJD. Not yet a surgical candidate due to elevated BMI. Has an appt with pain management on 6/14/19. Tramadol refilled as requested for knee pain.

## 2019-05-27 DIAGNOSIS — M17.0 PRIMARY OSTEOARTHRITIS OF BOTH KNEES: ICD-10-CM

## 2019-05-27 RX ORDER — TRAMADOL HYDROCHLORIDE 50 MG/1
50 TABLET ORAL EVERY 4 HOURS PRN
Qty: 42 TABLET | Refills: 0 | Status: SHIPPED | OUTPATIENT
Start: 2019-05-27 | End: 2019-06-06

## 2019-06-02 ENCOUNTER — OFFICE VISIT (OUTPATIENT)
Dept: URGENT CARE | Facility: CLINIC | Age: 54
End: 2019-06-02
Payer: COMMERCIAL

## 2019-06-02 VITALS
HEIGHT: 65 IN | DIASTOLIC BLOOD PRESSURE: 96 MMHG | SYSTOLIC BLOOD PRESSURE: 196 MMHG | OXYGEN SATURATION: 100 % | WEIGHT: 293 LBS | HEART RATE: 86 BPM | TEMPERATURE: 98 F | RESPIRATION RATE: 18 BRPM | BODY MASS INDEX: 48.82 KG/M2

## 2019-06-02 DIAGNOSIS — R30.0 DYSURIA: ICD-10-CM

## 2019-06-02 DIAGNOSIS — N39.0 URINARY TRACT INFECTION WITH HEMATURIA, SITE UNSPECIFIED: Primary | ICD-10-CM

## 2019-06-02 DIAGNOSIS — R31.9 URINARY TRACT INFECTION WITH HEMATURIA, SITE UNSPECIFIED: Primary | ICD-10-CM

## 2019-06-02 LAB
BILIRUB UR QL STRIP: NEGATIVE
GLUCOSE UR QL STRIP: NEGATIVE
KETONES UR QL STRIP: NEGATIVE
LEUKOCYTE ESTERASE UR QL STRIP: POSITIVE
PH, POC UA: 6 (ref 5–8)
POC BLOOD, URINE: POSITIVE
POC NITRATES, URINE: NEGATIVE
PROT UR QL STRIP: POSITIVE
SP GR UR STRIP: 1.02 (ref 1–1.03)
UROBILINOGEN UR STRIP-ACNC: ABNORMAL (ref 0.1–1.1)

## 2019-06-02 PROCEDURE — 87086 URINE CULTURE/COLONY COUNT: CPT

## 2019-06-02 PROCEDURE — 99214 OFFICE O/P EST MOD 30 MIN: CPT | Mod: S$GLB,,, | Performed by: NURSE PRACTITIONER

## 2019-06-02 PROCEDURE — 99214 PR OFFICE/OUTPT VISIT, EST, LEVL IV, 30-39 MIN: ICD-10-PCS | Mod: S$GLB,,, | Performed by: NURSE PRACTITIONER

## 2019-06-02 PROCEDURE — 3077F SYST BP >= 140 MM HG: CPT | Mod: CPTII,S$GLB,, | Performed by: NURSE PRACTITIONER

## 2019-06-02 PROCEDURE — 87186 SC STD MICRODIL/AGAR DIL: CPT

## 2019-06-02 PROCEDURE — 81003 POCT URINALYSIS, DIPSTICK, AUTOMATED, W/O SCOPE: ICD-10-PCS | Mod: QW,S$GLB,, | Performed by: NURSE PRACTITIONER

## 2019-06-02 PROCEDURE — 3008F PR BODY MASS INDEX (BMI) DOCUMENTED: ICD-10-PCS | Mod: CPTII,S$GLB,, | Performed by: NURSE PRACTITIONER

## 2019-06-02 PROCEDURE — 87088 URINE BACTERIA CULTURE: CPT

## 2019-06-02 PROCEDURE — 3008F BODY MASS INDEX DOCD: CPT | Mod: CPTII,S$GLB,, | Performed by: NURSE PRACTITIONER

## 2019-06-02 PROCEDURE — 87077 CULTURE AEROBIC IDENTIFY: CPT

## 2019-06-02 PROCEDURE — 3080F PR MOST RECENT DIASTOLIC BLOOD PRESSURE >= 90 MM HG: ICD-10-PCS | Mod: CPTII,S$GLB,, | Performed by: NURSE PRACTITIONER

## 2019-06-02 PROCEDURE — 3077F PR MOST RECENT SYSTOLIC BLOOD PRESSURE >= 140 MM HG: ICD-10-PCS | Mod: CPTII,S$GLB,, | Performed by: NURSE PRACTITIONER

## 2019-06-02 PROCEDURE — 81003 URINALYSIS AUTO W/O SCOPE: CPT | Mod: QW,S$GLB,, | Performed by: NURSE PRACTITIONER

## 2019-06-02 PROCEDURE — 3080F DIAST BP >= 90 MM HG: CPT | Mod: CPTII,S$GLB,, | Performed by: NURSE PRACTITIONER

## 2019-06-02 RX ORDER — NITROFURANTOIN 25; 75 MG/1; MG/1
100 CAPSULE ORAL 2 TIMES DAILY
Qty: 14 CAPSULE | Refills: 0 | Status: SHIPPED | OUTPATIENT
Start: 2019-06-02 | End: 2019-06-09

## 2019-06-02 NOTE — PROGRESS NOTES
"Subjective:       Patient ID: Yayo Carver is a 53 y.o. female.    Vitals:  height is 5' 5" (1.651 m) and weight is 135.1 kg (297 lb 12.8 oz). Her temperature is 97.8 °F (36.6 °C). Her blood pressure is 196/96 (abnormal) and her pulse is 86. Her respiration is 18 and oxygen saturation is 100%.     Chief Complaint: Urinary Tract Infection (started Thursday)    Patient presents with complaint of a UTI since Thursday. She says that she soaked in Epson salt on Wednesday and have been having symptoms since. She states it has been painful for her to urinate. She has taken cranberry pills.     Patient states she is also here for left lower leg pain. This past Tuesday she occurred a trauma to the area at work and has been in pain ever since. She notes it is painful to walk on it. Patient says it gets stiff. She states she took Ibuprofen with no relief.     Urinary Tract Infection    This is a new problem. The current episode started in the past 7 days. The problem occurs every urination. The problem has been unchanged. The quality of the pain is described as aching. The pain is at a severity of 10/10. The pain is severe. Associated symptoms include frequency and urgency. Pertinent negatives include no chills, hematuria, nausea, vomiting or rash. She has tried nothing for the symptoms. The treatment provided no relief.   Knee Pain    The incident occurred 5 to 7 days ago. [place] Injury mechanism: cart. The pain is present in the left leg. The quality of the pain is described as burning. The pain is at a severity of 10/10. The pain is severe. The pain has been constant since onset. Associated symptoms include an inability to bear weight. The symptoms are aggravated by movement and weight bearing. She has tried NSAIDs for the symptoms. The treatment provided no relief.       Constitution: Negative for chills and fever.   Neck: Negative for painful lymph nodes.   Gastrointestinal: Negative for " abdominal pain, nausea and vomiting.   Genitourinary: Positive for frequency and urgency. Negative for dysuria, urine decreased, hematuria, history of kidney stones, painful menstruation, irregular menstruation, missed menses, heavy menstrual bleeding, ovarian cysts, genital trauma, vaginal pain, vaginal discharge, vaginal bleeding, vaginal odor, painful intercourse, genital sore, painful ejaculation and pelvic pain.   Musculoskeletal: Positive for pain. Negative for back pain.        Left lower leg pain    Skin: Negative for rash and lesion.   Hematologic/Lymphatic: Negative for swollen lymph nodes.       Results for orders placed or performed in visit on 06/02/19   POCT Urinalysis, Dipstick, Automated, W/O Scope   Result Value Ref Range    POC Blood, Urine Positive (A) Negative    POC Bilirubin, Urine Negative Negative    POC Urobilinogen, Urine norm 0.1 - 1.1    POC Ketones, Urine Negative Negative    POC Protein, Urine Positive (A) Negative    POC Nitrates, Urine Negative Negative    POC Glucose, Urine Negative Negative    pH, UA 6.0 5 - 8    POC Specific Gravity, Urine 1.025 1.003 - 1.029    POC Leukocytes, Urine Positive (A) Negative     Objective:      Physical Exam   Constitutional: She is oriented to person, place, and time. She appears well-developed and well-nourished. She is cooperative.  Non-toxic appearance. She does not appear ill. No distress.   HENT:   Head: Normocephalic and atraumatic.   Right Ear: Hearing, tympanic membrane, external ear and ear canal normal.   Left Ear: Hearing, tympanic membrane, external ear and ear canal normal.   Nose: Nose normal. No mucosal edema, rhinorrhea or nasal deformity. No epistaxis. Right sinus exhibits no maxillary sinus tenderness and no frontal sinus tenderness. Left sinus exhibits no maxillary sinus tenderness and no frontal sinus tenderness.   Mouth/Throat: Uvula is midline, oropharynx is clear and moist and mucous membranes are normal. No trismus in the jaw.  Normal dentition. No uvula swelling. No posterior oropharyngeal erythema.   Eyes: Conjunctivae and lids are normal. Right eye exhibits no discharge. Left eye exhibits no discharge. No scleral icterus.   Sclera clear bilat   Neck: Trachea normal, normal range of motion, full passive range of motion without pain and phonation normal. Neck supple.   Cardiovascular: Normal rate, regular rhythm, normal heart sounds, intact distal pulses and normal pulses.   Pulmonary/Chest: Effort normal and breath sounds normal. No respiratory distress.   Abdominal: Soft. Normal appearance and bowel sounds are normal. She exhibits no distension, no pulsatile midline mass and no mass. There is tenderness in the suprapubic area. There is no rigidity, no rebound, no guarding, no CVA tenderness, no tenderness at McBurney's point and negative Wood's sign.   Musculoskeletal: Normal range of motion. She exhibits no edema or deformity.   Neurological: She is alert and oriented to person, place, and time. She exhibits normal muscle tone. Coordination normal.   Skin: Skin is warm, dry and intact. She is not diaphoretic. No pallor.   Psychiatric: She has a normal mood and affect. Her speech is normal and behavior is normal. Judgment and thought content normal. Cognition and memory are normal.   Nursing note and vitals reviewed.      Assessment:       1. Urinary tract infection with hematuria, site unspecified    2. Dysuria        Plan:       Patient is not seeing for leg pain.  Urinary tract infection with hematuria, site unspecified  -     Culture, Urine  -     nitrofurantoin, macrocrystal-monohydrate, (MACROBID) 100 MG capsule; Take 1 capsule (100 mg total) by mouth 2 (two) times daily. for 7 days  Dispense: 14 capsule; Refill: 0    Dysuria  -     POCT Urinalysis, Dipstick, Automated, W/O Scope  -     Culture, Urine      Patient Instructions   Please return here or go to the Emergency Department for any concerns or worsening of condition.  If  "you were prescribed antibiotics, please take them to completion.  Please follow up with your primary care doctor or specialist as needed.  Please drink plenty of fluids.  Please get plenty of rest.      Push fluids aggressively to improve symptoms and wash through the infection.  Cranberry juice can help relieve symptoms  If you  smoke, please stop smoking.    Please follow up with your primary care doctor or specialist as needed.    Fabiana Louie MD  [phone]        Bladder Infection, Female (Adult)    Urine is normally doesn't have any bacteria in it. But bacteria can get into the urinary tract from the skin around the rectum. Or they can travel in the blood from elsewhere in the body. Once they are in your urinary tract, they can cause infection in the urethra (urethritis), the bladder (cystitis), or the kidneys (pyelonephritis).  The most common place for an infection is in the bladder. This is called a bladder infection. This is one of the most common infections in women. Most bladder infections are easily treated. They are not serious unless the infection spreads to the kidney.  The phrases "bladder infection," "UTI," and "cystitis" are often used to describe the same thing. But they are not always the same. Cystitis is an inflammation of the bladder. The most common cause of cystitis is an infection.  Symptoms  The infection causes inflammation in the urethra and bladder. This causes many of the symptoms. The most common symptoms of a bladder infection are:  · Pain or burning when urinating  · Having to urinate more often than usual  · Urgent need to urinate  · Only a small amount of urine comes out  · Blood in urine  · Abdominal discomfort. This is usually in the lower abdomen above the pubic bone.  · Cloudy urine  · Strong- or bad-smelling urine  · Unable to urinate (urinary retention)  · Unable to hold urine in (urinary incontinence)  · Fever  · Loss of appetite  · Confusion (in older " adults)  Causes  Bladder infections are not contagious. You can't get one from someone else, from a toilet seat, or from sharing a bath.  The most common cause of bladder infections is bacteria from the bowels. The bacteria get onto the skin around the opening of the urethra. From there, they can get into the urine and travel up to the bladder, causing inflammation and infection. This usually happens because of:  · Wiping improperly after urinating. Always wipe from front to back.  · Bowel incontinence  · Pregnancy  · Procedures such as having a catheter inserted  · Older age  · Not emptying your bladder. This can allow bacteria a chance to grow in your urine.  · Dehydration  · Constipation  · Sex  · Use of a diaphragm for birth control   Treatment  Bladder infections are diagnosed by a urine test. They are treated with antibiotics and usually clear up quickly without complications. Treatment helps prevent a more serious kidney infection.  Medicines  Medicines can help in the treatment of a bladder infection:  · Take antibiotics until they are used up, even if you feel better. It is important to finish them to make sure the infection has cleared.  · You can use acetaminophen or ibuprofen for pain, fever, or discomfort, unless another medicine was prescribed. If you have chronic liver or kidney disease, talk with your healthcare provider before using these medicines. Also talk with your provider if you've ever had a stomach ulcer or gastrointestinal bleeding, or are taking blood-thinner medicines.  · If you are given phenazopydridine to reduce burning with urination, it will cause your urine to become a bright orange color. This can stain clothing.  Care and prevention  These self-care steps can help prevent future infections:  · Drink plenty of fluids to prevent dehydration and flush out your bladder. Do this unless you must restrict fluids for other health reasons, or your doctor told you not to.  · Proper cleaning  after going to the bathroom is important. Wipe from front to back after using the toilet to prevent the spread of bacteria.  · Urinate more often. Don't try to hold urine in for a long time.  · Wear loose-fitting clothes and cotton underwear. Avoid tight-fitting pants.  · Improve your diet and prevent constipation. Eat more fresh fruit and vegetables, and fiber, and less junk and fatty foods.  · Avoid sex until your symptoms are gone.  · Avoid caffeine, alcohol, and spicy foods. These can irritate your bladder.  · Urinate right after intercourse to flush out your bladder.  · If you use birth control pills and have frequent bladder infections, discuss it with your doctor.  Follow-up care  Call your healthcare provider if all symptoms are not gone after 3 days of treatment. This is especially important if you have repeat infections.  If a culture was done, you will be told if your treatment needs to be changed. If directed, you can call to find out the results.  If X-rays were done, you will be told if the results will affect your treatment.  Call 911  Call 911 if any of the following occur:  · Trouble breathing  · Hard to wake up or confusion  · Fainting or loss of consciousness  · Rapid heart rate  When to seek medical advice  Call your healthcare provider right away if any of these occur:  · Fever of 100.4ºF (38.0ºC) or higher, or as directed by your healthcare provider  · Symptoms are not better by the third day of treatment  · Back or belly (abdominal) pain that gets worse  · Repeated vomiting, or unable to keep medicine down  · Weakness or dizziness  · Vaginal discharge  · Pain, redness, or swelling in the outer vaginal area (labia)  Date Last Reviewed: 10/1/2016  © 3169-6394 [employer]. [address] [place] [address], [place] [address]. All rights reserved. This information is not intended as a substitute for professional medical care. Always follow your healthcare professional's instructions.

## 2019-06-02 NOTE — PATIENT INSTRUCTIONS
"Please return here or go to the Emergency Department for any concerns or worsening of condition.  If you were prescribed antibiotics, please take them to completion.  Please follow up with your primary care doctor or specialist as needed.  Please drink plenty of fluids.  Please get plenty of rest.      Push fluids aggressively to improve symptoms and wash through the infection.  Cranberry juice can help relieve symptoms  If you  smoke, please stop smoking.    Please follow up with your primary care doctor or specialist as needed.    Fabiana Louie MD  [phone]        Bladder Infection, Female (Adult)    Urine is normally doesn't have any bacteria in it. But bacteria can get into the urinary tract from the skin around the rectum. Or they can travel in the blood from elsewhere in the body. Once they are in your urinary tract, they can cause infection in the urethra (urethritis), the bladder (cystitis), or the kidneys (pyelonephritis).  The most common place for an infection is in the bladder. This is called a bladder infection. This is one of the most common infections in women. Most bladder infections are easily treated. They are not serious unless the infection spreads to the kidney.  The phrases "bladder infection," "UTI," and "cystitis" are often used to describe the same thing. But they are not always the same. Cystitis is an inflammation of the bladder. The most common cause of cystitis is an infection.  Symptoms  The infection causes inflammation in the urethra and bladder. This causes many of the symptoms. The most common symptoms of a bladder infection are:  · Pain or burning when urinating  · Having to urinate more often than usual  · Urgent need to urinate  · Only a small amount of urine comes out  · Blood in urine  · Abdominal discomfort. This is usually in the lower abdomen above the pubic bone.  · Cloudy urine  · Strong- or bad-smelling urine  · Unable to urinate (urinary retention)  · Unable to hold " urine in (urinary incontinence)  · Fever  · Loss of appetite  · Confusion (in older adults)  Causes  Bladder infections are not contagious. You can't get one from someone else, from a toilet seat, or from sharing a bath.  The most common cause of bladder infections is bacteria from the bowels. The bacteria get onto the skin around the opening of the urethra. From there, they can get into the urine and travel up to the bladder, causing inflammation and infection. This usually happens because of:  · Wiping improperly after urinating. Always wipe from front to back.  · Bowel incontinence  · Pregnancy  · Procedures such as having a catheter inserted  · Older age  · Not emptying your bladder. This can allow bacteria a chance to grow in your urine.  · Dehydration  · Constipation  · Sex  · Use of a diaphragm for birth control   Treatment  Bladder infections are diagnosed by a urine test. They are treated with antibiotics and usually clear up quickly without complications. Treatment helps prevent a more serious kidney infection.  Medicines  Medicines can help in the treatment of a bladder infection:  · Take antibiotics until they are used up, even if you feel better. It is important to finish them to make sure the infection has cleared.  · You can use acetaminophen or ibuprofen for pain, fever, or discomfort, unless another medicine was prescribed. If you have chronic liver or kidney disease, talk with your healthcare provider before using these medicines. Also talk with your provider if you've ever had a stomach ulcer or gastrointestinal bleeding, or are taking blood-thinner medicines.  · If you are given phenazopydridine to reduce burning with urination, it will cause your urine to become a bright orange color. This can stain clothing.  Care and prevention  These self-care steps can help prevent future infections:  · Drink plenty of fluids to prevent dehydration and flush out your bladder. Do this unless you must restrict  fluids for other health reasons, or your doctor told you not to.  · Proper cleaning after going to the bathroom is important. Wipe from front to back after using the toilet to prevent the spread of bacteria.  · Urinate more often. Don't try to hold urine in for a long time.  · Wear loose-fitting clothes and cotton underwear. Avoid tight-fitting pants.  · Improve your diet and prevent constipation. Eat more fresh fruit and vegetables, and fiber, and less junk and fatty foods.  · Avoid sex until your symptoms are gone.  · Avoid caffeine, alcohol, and spicy foods. These can irritate your bladder.  · Urinate right after intercourse to flush out your bladder.  · If you use birth control pills and have frequent bladder infections, discuss it with your doctor.  Follow-up care  Call your healthcare provider if all symptoms are not gone after 3 days of treatment. This is especially important if you have repeat infections.  If a culture was done, you will be told if your treatment needs to be changed. If directed, you can call to find out the results.  If X-rays were done, you will be told if the results will affect your treatment.  Call 911  Call 911 if any of the following occur:  · Trouble breathing  · Hard to wake up or confusion  · Fainting or loss of consciousness  · Rapid heart rate  When to seek medical advice  Call your healthcare provider right away if any of these occur:  · Fever of 100.4ºF (38.0ºC) or higher, or as directed by your healthcare provider  · Symptoms are not better by the third day of treatment  · Back or belly (abdominal) pain that gets worse  · Repeated vomiting, or unable to keep medicine down  · Weakness or dizziness  · Vaginal discharge  · Pain, redness, or swelling in the outer vaginal area (labia)  Date Last Reviewed: 10/1/2016  © 5136-1351 [employer]. [address] [place] [address], [place] [address]. All rights reserved. This information is not intended as a substitute for professional  medical care. Always follow your healthcare professional's instructions.

## 2019-06-03 ENCOUNTER — HOSPITAL ENCOUNTER (EMERGENCY)
Facility: OTHER | Age: 54
Discharge: HOME | End: 2019-06-03
Attending: INTERNAL MEDICINE
Payer: COMMERCIAL

## 2019-06-03 VITALS
RESPIRATION RATE: 18 BRPM | HEART RATE: 69 BPM | HEIGHT: 65 IN | DIASTOLIC BLOOD PRESSURE: 95 MMHG | OXYGEN SATURATION: 97 % | WEIGHT: 293 LBS | SYSTOLIC BLOOD PRESSURE: 151 MMHG | BODY MASS INDEX: 48.82 KG/M2 | TEMPERATURE: 98 F

## 2019-06-03 DIAGNOSIS — N30.01 ACUTE CYSTITIS WITH HEMATURIA: ICD-10-CM

## 2019-06-03 DIAGNOSIS — I10 HYPERTENSION: Primary | ICD-10-CM

## 2019-06-03 LAB
ANION GAP SERPL CALC-SCNC: 6 MMOL/L (ref 8–16)
ANISOCYTOSIS BLD QL SMEAR: SLIGHT
BACTERIA #/AREA URNS HPF: ABNORMAL /HPF
BASOPHILS # BLD AUTO: 0.03 K/UL (ref 0–0.2)
BASOPHILS NFR BLD: 0.5 % (ref 0–1.9)
BILIRUB UR QL STRIP: NEGATIVE
BUN SERPL-MCNC: 15 MG/DL (ref 6–20)
CALCIUM SERPL-MCNC: 10.5 MG/DL (ref 8.7–10.5)
CHLORIDE SERPL-SCNC: 108 MMOL/L (ref 95–110)
CLARITY UR: CLEAR
CO2 SERPL-SCNC: 27 MMOL/L (ref 23–29)
COLOR UR: YELLOW
CREAT SERPL-MCNC: 0.8 MG/DL (ref 0.5–1.4)
DACRYOCYTES BLD QL SMEAR: ABNORMAL
DIFFERENTIAL METHOD: ABNORMAL
EOSINOPHIL # BLD AUTO: 0.1 K/UL (ref 0–0.5)
EOSINOPHIL NFR BLD: 2.2 % (ref 0–8)
ERYTHROCYTE [DISTWIDTH] IN BLOOD BY AUTOMATED COUNT: 15.9 % (ref 11.5–14.5)
EST. GFR  (AFRICAN AMERICAN): >60 ML/MIN/1.73 M^2
EST. GFR  (NON AFRICAN AMERICAN): >60 ML/MIN/1.73 M^2
GIANT PLATELETS BLD QL SMEAR: PRESENT
GLUCOSE SERPL-MCNC: 110 MG/DL (ref 70–110)
GLUCOSE UR QL STRIP: NEGATIVE
HCT VFR BLD AUTO: 38.5 % (ref 37–48.5)
HGB BLD-MCNC: 12.1 G/DL (ref 12–16)
HGB UR QL STRIP: ABNORMAL
KETONES UR QL STRIP: NEGATIVE
LEUKOCYTE ESTERASE UR QL STRIP: ABNORMAL
LYMPHOCYTES # BLD AUTO: 1.4 K/UL (ref 1–4.8)
LYMPHOCYTES NFR BLD: 23.6 % (ref 18–48)
MCH RBC QN AUTO: 22 PG (ref 27–31)
MCHC RBC AUTO-ENTMCNC: 31.4 G/DL (ref 32–36)
MCV RBC AUTO: 70 FL (ref 82–98)
MICROSCOPIC COMMENT: ABNORMAL
MONOCYTES # BLD AUTO: 0.4 K/UL (ref 0.3–1)
MONOCYTES NFR BLD: 6.4 % (ref 4–15)
NEUTROPHILS # BLD AUTO: 4 K/UL (ref 1.8–7.7)
NEUTROPHILS NFR BLD: 67.3 % (ref 38–73)
NITRITE UR QL STRIP: NEGATIVE
OVALOCYTES BLD QL SMEAR: ABNORMAL
PH UR STRIP: 6 [PH] (ref 5–8)
PLATELET # BLD AUTO: 206 K/UL (ref 150–350)
PLATELET BLD QL SMEAR: ABNORMAL
PMV BLD AUTO: ABNORMAL FL (ref 9.2–12.9)
POIKILOCYTOSIS BLD QL SMEAR: SLIGHT
POTASSIUM SERPL-SCNC: 4.4 MMOL/L (ref 3.5–5.1)
PROT UR QL STRIP: NEGATIVE
RBC # BLD AUTO: 5.5 M/UL (ref 4–5.4)
RBC #/AREA URNS HPF: >100 /HPF (ref 0–4)
SODIUM SERPL-SCNC: 141 MMOL/L (ref 136–145)
SP GR UR STRIP: 1.02 (ref 1–1.03)
SQUAMOUS #/AREA URNS HPF: 2 /HPF
URN SPEC COLLECT METH UR: ABNORMAL
UROBILINOGEN UR STRIP-ACNC: NEGATIVE EU/DL
WBC # BLD AUTO: 5.97 K/UL (ref 3.9–12.7)
WBC #/AREA URNS HPF: >100 /HPF (ref 0–5)
WBC CLUMPS URNS QL MICRO: ABNORMAL

## 2019-06-03 PROCEDURE — 93005 ELECTROCARDIOGRAM TRACING: CPT

## 2019-06-03 PROCEDURE — 87088 URINE BACTERIA CULTURE: CPT

## 2019-06-03 PROCEDURE — 87086 URINE CULTURE/COLONY COUNT: CPT

## 2019-06-03 PROCEDURE — 99285 EMERGENCY DEPT VISIT HI MDM: CPT

## 2019-06-03 PROCEDURE — 80048 BASIC METABOLIC PNL TOTAL CA: CPT

## 2019-06-03 PROCEDURE — 93010 ELECTROCARDIOGRAM REPORT: CPT | Mod: ,,, | Performed by: INTERNAL MEDICINE

## 2019-06-03 PROCEDURE — 81000 URINALYSIS NONAUTO W/SCOPE: CPT

## 2019-06-03 PROCEDURE — 93010 EKG 12-LEAD: ICD-10-PCS | Mod: ,,, | Performed by: INTERNAL MEDICINE

## 2019-06-03 PROCEDURE — 87077 CULTURE AEROBIC IDENTIFY: CPT

## 2019-06-03 PROCEDURE — 87186 SC STD MICRODIL/AGAR DIL: CPT

## 2019-06-03 PROCEDURE — 85025 COMPLETE CBC W/AUTO DIFF WBC: CPT

## 2019-06-03 RX ORDER — PHENAZOPYRIDINE HYDROCHLORIDE 200 MG/1
200 TABLET, FILM COATED ORAL 3 TIMES DAILY
Qty: 6 TABLET | Refills: 0 | Status: SHIPPED | OUTPATIENT
Start: 2019-06-03 | End: 2019-06-13

## 2019-06-03 NOTE — ED NOTES
"Appearance: Pt awake, alert & oriented to person, place & time. Pt in no acute distress at present time. Pt is clean and well groomed with clothes appropriately fastened.   Skin: Skin warm, dry & intact. Color consistent with ethnicity. Mucous membranes moist. No breakdown or brusing noted.   Musculoskeletal: Patient moving all extremities well, no obvious swelling or deformities noted.   Respiratory: Respirations spontaneous, even, and non-labored. Visible chest rise noted. Airway is open and patent. No accessory muscle use noted.   Neurologic: Sensation is intact. Speech is clear and appropriate. Eyes open spontaneously, behavior appropriate to situation, follows commands,  purposeful motor response noted.   Cardiac:  No Bilateral lower extremity edema. Cap refill is <3 seconds. Pt denies active chest pains, SOB, dizziness, blurred vision, weakness or fatigue at this time.   Abdomen: Pt denies active abd pains, cramping or discomfort, No N/V/D at this time.   : Pt reports suprapubic pressure, hematuria, "its hurts when I pee."   "

## 2019-06-03 NOTE — ED PROVIDER NOTES
Encounter Date: 6/3/2019    SCRIBE #1 NOTE: IObey, am scribing for, and in the presence of, Ana Faulkner PA-C.       History     Chief Complaint   Patient presents with    Hypertension     pt seen at  yesterday and is concerned about her blood pressure, denies symptoms, reports taking medication today     Time seen by provider: 10:09 AM    This is a 53 y.o. female, with history of HTN and DM, who presents to the ED for evaluation of elevated blood pressure. Patient states her pressure has been elevated since yesterday. She has been compliant with amlodipine and HCTZ daily, last taken this morning. She reports bilateral leg swelling two days ago but notes that she worked eight days straight and was on her feet a lot. She does not wear compression stockings. She has an appointment with her PCP tomorrow. She is being treated with antibiotics for a UTI currently. She reports dysuria. Patient denies fevers, chills, headaches, dizziness, vision changes, congestion, rhinorrhea, sore throat, cough, SOB, chest pain, back pain, flank pain, abdominal pain, or N/V/D.          The history is provided by the patient.     Review of patient's allergies indicates:   Allergen Reactions    Ace inhibitors Edema     Past Medical History:   Diagnosis Date    Arthritis     Diabetes mellitus     Hypertension      Past Surgical History:   Procedure Laterality Date    COLONOSCOPY N/A 6/27/2017    Performed by [facility]in [facility]o [facility] at [facility] (2ND FLR)    ESOPHAGOGASTRODUODENOSCOPY (EGD) N/A 8/22/2016    Performed by Fuentes Crisostomo [facility] at [facility] (2ND FLR)    ESOPHAGOGASTRODUODENOSCOPY (EGD) N/A 8/2/2016    Performed by Dimitry Jacob [facility] at [facility] (2ND FLR)    TONSILLECTOMY      TUBAL LIGATION       Family History   Problem Relation Age of Onset    Breast cancer Mother 65    Cancer Mother     Diabetes Mother     Colon polyps Mother     Hypertension Mother     Diabetes Father     Breast cancer Sister 56     No Known Problems Brother     Diabetes Paternal Uncle     Breast cancer Maternal Grandmother     Colon cancer Maternal Grandmother     No Known Problems Maternal Grandfather     Diabetes Paternal Grandmother     Heart disease Paternal Grandmother     No Known Problems Paternal Grandfather     Breast cancer Maternal Aunt     Esophageal cancer Neg Hx     Irritable bowel syndrome Neg Hx     Rectal cancer Neg Hx     Stomach cancer Neg Hx     Ulcerative colitis Neg Hx     Celiac disease Neg Hx     Crohn's disease Neg Hx     Amblyopia Neg Hx     Blindness Neg Hx     Cataracts Neg Hx     Glaucoma Neg Hx     Macular degeneration Neg Hx     Retinal detachment Neg Hx     Strabismus Neg Hx     Stroke Neg Hx     Thyroid disease Neg Hx      Social History     Tobacco Use    Smoking status: Never Smoker    Smokeless tobacco: Never Used   Substance Use Topics    Alcohol use: No     Frequency: Never     Comment: occasional    Drug use: No     Review of Systems   Constitutional: Negative for chills and fever.   HENT: Negative for congestion, rhinorrhea and sore throat.    Eyes: Negative for visual disturbance.   Respiratory: Negative for cough and shortness of breath.    Cardiovascular: Positive for leg swelling (bilateral, resolved). Negative for chest pain.   Gastrointestinal: Negative for abdominal pain, diarrhea, nausea and vomiting.   Genitourinary: Positive for dysuria. Negative for flank pain.   Musculoskeletal: Negative for back pain.   Skin: Negative for rash.   Neurological: Negative for dizziness and headaches.   Psychiatric/Behavioral: Negative for confusion.       Physical Exam     Initial Vitals [06/03/19 0933]   BP Pulse Resp Temp SpO2   (!) 212/98 73 16 98.1 °F (36.7 °C) 99 %      MAP       --         Physical Exam    Nursing note and vitals reviewed.  Constitutional: She appears well-developed and well-nourished. She is Obese . No distress.   Tearful initially on exam.    HENT:    Head: Normocephalic and atraumatic.   Mouth/Throat: Oropharynx is clear and moist.   Eyes: Conjunctivae and EOM are normal.   Neck: Normal range of motion. Neck supple.   Cardiovascular: Normal rate, regular rhythm, normal heart sounds and intact distal pulses.   Pulmonary/Chest: Breath sounds normal. No respiratory distress. She has no wheezes. She has no rhonchi. She has no rales.   Musculoskeletal: Normal range of motion. She exhibits no edema.   No pitting edema to lower extremities.    Neurological: She is alert and oriented to person, place, and time. She has normal strength.   Good strength in bilateral upper and lower extremities.    Skin: Skin is warm and dry.   Psychiatric: She has a normal mood and affect. Her behavior is normal. Judgment and thought content normal.         ED Course   Procedures  Labs Reviewed   URINALYSIS, REFLEX TO URINE CULTURE - Abnormal; Notable for the following components:       Result Value    Occult Blood UA 3+ (*)     Leukocytes, UA 2+ (*)     All other components within normal limits    Narrative:     Preferred Collection Type->Urine, Clean Catch   CBC W/ AUTO DIFFERENTIAL - Abnormal; Notable for the following components:    RBC 5.50 (*)     Mean Corpuscular Volume 70 (*)     Mean Corpuscular Hemoglobin 22.0 (*)     Mean Corpuscular Hemoglobin Conc 31.4 (*)     RDW 15.9 (*)     All other components within normal limits   BASIC METABOLIC PANEL - Abnormal; Notable for the following components:    Anion Gap 6 (*)     All other components within normal limits   URINALYSIS MICROSCOPIC - Abnormal; Notable for the following components:    RBC, UA >100 (*)     WBC, UA >100 (*)     Bacteria Few (*)     All other components within normal limits    Narrative:     Preferred Collection Type->Urine, Clean Catch   CULTURE, URINE     EKG Readings: (Independently Interpreted)   Initial Reading: No STEMI. Rhythm: Normal Sinus Rhythm. Heart Rate: 67. Ectopy: No Ectopy. Conduction: Normal. ST  Segments: Normal ST Segments. T Waves: Normal. Clinical Impression: Normal Sinus Rhythm       Imaging Results    None          Medical Decision Making:   History:   Old Medical Records: I decided to obtain old medical records.  Initial Assessment:   53-year-old female with complaints consistent with hypertension an UTI.  Afebrile neurovascularly intact. Elevated blood pressure with known history of hypertension.  Exam benign and documented above.  No edema noted on exam.  Lungs are clear to auscultation.  Independently Interpreted Test(s):   I have ordered and independently interpreted EKG Reading(s) - see prior notes  Clinical Tests:   Lab Tests: Ordered and Reviewed  Medical Tests: Ordered and Reviewed  ED Management:  Urine and basic labs as well as EKG obtained. No findings concerning for end-organ damage.  EKG consistent with normal sinus rhythm with no evidence of acute ischemia or STEMI.  Patient does still have UTI however she just started her antibiotics after being seen yesterday.  Patient's pressure is much improved without intervention in the ED.  It is now 151/95.  I do not feel that medication management is appropriate from the ED at this time.  Will prescribe Pyridium to help with her UTI symptoms. She is urged to continue taking the antibiotics as prescribed.  Patient has scheduled follow-up with her primary care physician tomorrow.  She is urged to keep this appointment as scheduled.  She is to return to the emergency department for any worsening signs or symptoms otherwise.  She states understanding agrees with this plan.  This is the extent of patient's complaints today.  This note was created using [employer] dictation.  There may be typographical errors secondary to dictation.              Scribe Attestation:   Scribe #1: I performed the above scribed service and the documentation accurately describes the services I performed. I attest to the accuracy of the note.    Attending Attestation:            Physician Attestation for Scribe:  Physician Attestation Statement for Scribe #1: I, Ana Faulkner PA-C, reviewed documentation, as scribed by Obey Crouch  in my presence, and it is both accurate and complete.                    Clinical Impression:     1. Hypertension    2. Acute cystitis with hematuria            Disposition:   Disposition: Discharged  Condition: Stable                        Ana Faulkner PA-C  06/03/19 1220

## 2019-06-03 NOTE — ED NOTES
Pt reporting she was seen at  yesterday for UTI, c/o symptoms of urinary frequency, hematuria, and suprapubic pressure x several days, was given oral antibiotic x yesterday with , took one dose. Reporting she noticed her BP was elevated yesterday while at  and now concerned that its still elevated. Took all her BP medications this AM. Denies dizziness or HA. Pt AAOx4 and appropriate at this time. Respirations even and unlabored. No acute distress noted.

## 2019-06-04 ENCOUNTER — OFFICE VISIT (OUTPATIENT)
Dept: OPTOMETRY | Facility: CLINIC | Age: 54
End: 2019-06-04
Payer: COMMERCIAL

## 2019-06-04 DIAGNOSIS — H52.203 ASTIGMATISM WITH PRESBYOPIA, BILATERAL: ICD-10-CM

## 2019-06-04 DIAGNOSIS — H52.4 ASTIGMATISM WITH PRESBYOPIA, BILATERAL: ICD-10-CM

## 2019-06-04 DIAGNOSIS — E11.9 TYPE 2 DIABETES MELLITUS WITHOUT OPHTHALMIC MANIFESTATIONS: Primary | ICD-10-CM

## 2019-06-04 PROCEDURE — 99999 PR PBB SHADOW E&M-EST. PATIENT-LVL II: ICD-10-PCS | Mod: PBBFAC,,, | Performed by: OPTOMETRIST

## 2019-06-04 PROCEDURE — 92015 PR REFRACTION: ICD-10-PCS | Mod: S$GLB,,, | Performed by: OPTOMETRIST

## 2019-06-04 PROCEDURE — 92014 COMPRE OPH EXAM EST PT 1/>: CPT | Mod: S$GLB,,, | Performed by: OPTOMETRIST

## 2019-06-04 PROCEDURE — 92014 PR EYE EXAM, EST PATIENT,COMPREHESV: ICD-10-PCS | Mod: S$GLB,,, | Performed by: OPTOMETRIST

## 2019-06-04 PROCEDURE — 99999 PR PBB SHADOW E&M-EST. PATIENT-LVL II: CPT | Mod: PBBFAC,,, | Performed by: OPTOMETRIST

## 2019-06-04 PROCEDURE — 92015 DETERMINE REFRACTIVE STATE: CPT | Mod: S$GLB,,, | Performed by: OPTOMETRIST

## 2019-06-04 NOTE — PROGRESS NOTES
HPI     Last eye exam was 7/17/18 with Dr. Miranda.  Patient states interested in glasses rx to wear all the time. No distance   vision complaints. Uses OTC readers (unknown power) for computer and small   print.   Patient denies diplopia, headaches, flashes/floaters, and pain.    Hemoglobin A1C       Date                     Value               Ref Range             Status                05/16/2018               5.9 (H)             4.0 - 5.6 %           Final                  Last edited by Janki Lemos on 6/4/2019  3:43 PM. (History)            Assessment /Plan     For exam results, see Encounter Report.    Type 2 diabetes mellitus without ophthalmic manifestations    Astigmatism with presbyopia, bilateral          1.  No retinopathy--monitor yearly.  BS control.  Eye health normal OU.  2.  Bifocal rx given

## 2019-06-05 ENCOUNTER — TELEPHONE (OUTPATIENT)
Dept: URGENT CARE | Facility: CLINIC | Age: 54
End: 2019-06-05

## 2019-06-05 LAB
BACTERIA UR CULT: NORMAL
BACTERIA UR CULT: NORMAL

## 2019-06-05 NOTE — TELEPHONE ENCOUNTER
Called patient who hung up.  Was trying to inform her of positive urine culture.  Patient is on appropriate treatment at this time

## 2019-06-07 DIAGNOSIS — M17.0 PRIMARY OSTEOARTHRITIS OF BOTH KNEES: Primary | ICD-10-CM

## 2019-06-07 RX ORDER — TRAMADOL HYDROCHLORIDE 50 MG/1
50 TABLET ORAL EVERY 6 HOURS PRN
Qty: 30 TABLET | Refills: 1 | Status: SHIPPED | OUTPATIENT
Start: 2019-06-07 | End: 2019-06-14 | Stop reason: SDUPTHER

## 2019-06-07 NOTE — PROGRESS NOTES
Tramadol refilled as requested. She is seeing pain management next Friday for treatment of chronic pain due to knee djd.

## 2019-06-14 ENCOUNTER — TELEPHONE (OUTPATIENT)
Dept: PHYSICAL MEDICINE AND REHAB | Facility: CLINIC | Age: 54
End: 2019-06-14

## 2019-06-14 ENCOUNTER — INITIAL CONSULT (OUTPATIENT)
Dept: PHYSICAL MEDICINE AND REHAB | Facility: CLINIC | Age: 54
End: 2019-06-14
Payer: COMMERCIAL

## 2019-06-14 VITALS
HEIGHT: 66 IN | SYSTOLIC BLOOD PRESSURE: 180 MMHG | HEART RATE: 75 BPM | BODY MASS INDEX: 47.09 KG/M2 | WEIGHT: 293 LBS | DIASTOLIC BLOOD PRESSURE: 99 MMHG

## 2019-06-14 DIAGNOSIS — E66.01 MORBID OBESITY WITH BMI OF 45.0-49.9, ADULT: ICD-10-CM

## 2019-06-14 DIAGNOSIS — M25.562 CHRONIC PAIN OF BOTH KNEES: ICD-10-CM

## 2019-06-14 DIAGNOSIS — G89.29 CHRONIC PAIN OF BOTH KNEES: ICD-10-CM

## 2019-06-14 DIAGNOSIS — M25.561 CHRONIC PAIN OF BOTH KNEES: ICD-10-CM

## 2019-06-14 DIAGNOSIS — M17.0 PRIMARY OSTEOARTHRITIS OF BOTH KNEES: Primary | ICD-10-CM

## 2019-06-14 PROCEDURE — 99999 PR PBB SHADOW E&M-EST. PATIENT-LVL III: ICD-10-PCS | Mod: PBBFAC,,, | Performed by: PHYSICAL MEDICINE & REHABILITATION

## 2019-06-14 PROCEDURE — 3080F PR MOST RECENT DIASTOLIC BLOOD PRESSURE >= 90 MM HG: ICD-10-PCS | Mod: CPTII,S$GLB,, | Performed by: PHYSICAL MEDICINE & REHABILITATION

## 2019-06-14 PROCEDURE — 3080F DIAST BP >= 90 MM HG: CPT | Mod: CPTII,S$GLB,, | Performed by: PHYSICAL MEDICINE & REHABILITATION

## 2019-06-14 PROCEDURE — 3077F SYST BP >= 140 MM HG: CPT | Mod: CPTII,S$GLB,, | Performed by: PHYSICAL MEDICINE & REHABILITATION

## 2019-06-14 PROCEDURE — 99203 PR OFFICE/OUTPT VISIT, NEW, LEVL III, 30-44 MIN: ICD-10-PCS | Mod: S$GLB,,, | Performed by: PHYSICAL MEDICINE & REHABILITATION

## 2019-06-14 PROCEDURE — 3077F PR MOST RECENT SYSTOLIC BLOOD PRESSURE >= 140 MM HG: ICD-10-PCS | Mod: CPTII,S$GLB,, | Performed by: PHYSICAL MEDICINE & REHABILITATION

## 2019-06-14 PROCEDURE — 99203 OFFICE O/P NEW LOW 30 MIN: CPT | Mod: S$GLB,,, | Performed by: PHYSICAL MEDICINE & REHABILITATION

## 2019-06-14 PROCEDURE — 99999 PR PBB SHADOW E&M-EST. PATIENT-LVL III: CPT | Mod: PBBFAC,,, | Performed by: PHYSICAL MEDICINE & REHABILITATION

## 2019-06-14 PROCEDURE — 3008F BODY MASS INDEX DOCD: CPT | Mod: CPTII,S$GLB,, | Performed by: PHYSICAL MEDICINE & REHABILITATION

## 2019-06-14 PROCEDURE — 3008F PR BODY MASS INDEX (BMI) DOCUMENTED: ICD-10-PCS | Mod: CPTII,S$GLB,, | Performed by: PHYSICAL MEDICINE & REHABILITATION

## 2019-06-14 RX ORDER — MELOXICAM 15 MG/1
15 TABLET ORAL DAILY
Qty: 30 TABLET | Refills: 2 | Status: SHIPPED | OUTPATIENT
Start: 2019-06-14 | End: 2019-07-08

## 2019-06-14 RX ORDER — ACETAMINOPHEN 500 MG
500-1000 TABLET ORAL 3 TIMES DAILY PRN
Refills: 0 | Status: ON HOLD | COMMUNITY
Start: 2019-06-14 | End: 2019-11-25 | Stop reason: CLARIF

## 2019-06-14 RX ORDER — TRAMADOL HYDROCHLORIDE 50 MG/1
50-100 TABLET ORAL 3 TIMES DAILY PRN
Qty: 180 TABLET | Refills: 2 | Status: SHIPPED | OUTPATIENT
Start: 2019-06-14 | End: 2019-08-02 | Stop reason: SDUPTHER

## 2019-06-14 NOTE — LETTER
June 14, 2019        Meghan Simons, SHAHNAZ  [address]  [facility] [address]           [facility]  [address]  [facility] [address]  Phone: [phone]          Patient: Yayo Carver   MR Number: [id number]   YOB: 1965   Date of Visit: 6/14/2019       Dear Meghan Simons:    Thank you for referring Yayo Carver to me for evaluation. Attached you will find relevant portions of my assessment and plan of care.    If you have questions, please do not hesitate to call me. I look forward to following Yayo Carver along with you.    Sincerely,    Rashaun Lawson MD    Enclosure  CC:  No Recipients    If you would like to receive this communication electronically, please contact [email or web address] or [phone] to request more information on [facility] access.    For providers and/or their staff who would like to refer a patient to [facility], please contact us through our one-stop-shop provider referral line, [facility], at [phone].    If you feel you have received this communication in error or would no longer like to receive these types of communications, please e-mail [email or web address]

## 2019-06-14 NOTE — PROGRESS NOTES
Subjective:       Patient ID: Yayo Carver is a 53 y.o. female.    Chief Complaint: No chief complaint on file.    HPI.    HISTORY OF PRESENT ILLNESS:  Mrs. Carver is a 53-year-old black female with past   medical history of hypertension, diabetes mellitus, osteoarthritis and morbid   obesity (weight of 296.5 and BMI of 46.8 today).  She is presenting to the   Physical Medicine Clinic for chronic bilateral knee pain.    The patient started complaining of bilateral knee pain about six or seven years   ago.  She was diagnosed with OA of the knees.  She was followed up in Orthopedic   Surgery periodically.  She gets intermittent steroid injections into her knees   with some relief.  She was seen in 04/2019 in Orthopedic Surgery Clinic and   received Synvisc injections.  It helped to some extent.  She is maintained on   pain medications and she was referred to Physical Medicine Clinic for help with   conservative pain management.  She is not a candidate for surgery due to her   weight.    The patient complains of bilateral knee pain, worse on the left.  It is a   constant aching pain.  It is aggravated by standing, walking or getting up after   sitting for too long.  It is better with lying flat.  Her maximum pain is   9-10/10 and minimum 5-6/10.  Today, it is 5-6/10.  The patient denies any   significant swelling.  She complains of occasional warmth of her left knee.    She is currently taking ibuprofen 800 mg p.o. four times per day.  She takes   tramadol 50 mg p.r.n., up to six tablets per day.      MS/HN  dd: 06/14/2019 08:37:19 (CDT)  td: 06/14/2019 21:44:13 (CDT)  Doc ID   #[id number]  Job ID #[id number]    CC:       Review of Systems   Constitutional: Negative for fatigue.   Eyes: Negative for visual disturbance.   Respiratory: Negative for shortness of breath.    Cardiovascular: Negative for chest pain.   Gastrointestinal: Negative for blood in stool, constipation, nausea and vomiting.   Genitourinary: Negative for  difficulty urinating.   Musculoskeletal: Positive for arthralgias and gait problem. Negative for back pain, joint swelling and neck pain.   Neurological: Negative for dizziness and headaches.   Psychiatric/Behavioral: Negative for behavioral problems and sleep disturbance.       Objective:      Physical Exam   Constitutional: She is oriented to person, place, and time. She appears well-developed and well-nourished.   HENT:   Head: Normocephalic and atraumatic.   Neck: Normal range of motion.   Cardiovascular: Normal rate, regular rhythm and normal heart sounds.   Pulmonary/Chest: Effort normal and breath sounds normal.   Abdominal: Soft.   Musculoskeletal:   BUE:  ROM:full.  Strength:    RUE: 5/5 at shoulder abduction, 5 elbow flexion, 5 elbow extension, 5 hand .   LUE: 5/5 at shoulder abduction, 5 elbow flexion, 5 elbow extension, 5 hand .  Sensation to pinprick:   RUE: intact.   LUE: intact.    BLE:  ROM:full.  Strength:    RLE: 4/5 at hip flexion, 5- knee extension, 5 ankle DF, 5 PF.   LLE: 4/5 at hip flexion, 5- knee extension, 5 ankle DF, 5 PF.  Sensation to pinprick:     RLE: intact.      LLE: intact.     Rt knee:  +ve crepitus.  -ve swelling.  -ve warmth.  -ve tenderness .    Lt knee:  +ve crepitus.  +ve swelling.  +ve warmth.  +ve tenderness pes anserine area.      Gait: waddling.     Neurological: She is alert and oriented to person, place, and time.   Skin: Skin is warm.   Psychiatric: She has a normal mood and affect.   Vitals reviewed.      Assessment:       1. Primary osteoarthritis of both knees    2. Chronic pain of both knees    3. Morbid obesity with BMI of 45.0-49.9, adult        Plan:       - Discontinue ibuprofen.  - Start meloxicam (MOBIC) 15 MG tablet; Take 1 tablet (15 mg total) by mouth once daily.  - Continue  traMADol (ULTRAM) 50 mg tablet; Take 1-2 tablets ( mg total) by mouth 3 (three) times daily as needed.  - Start acetaminophen (TYLENOL) 500 MG tablet; Take 1-2 tablets  (500-1,000 mg total) by mouth 3 (three) times daily as needed for mild pain.  - Weight loss was encouraged.  - Quadriceps strengthening through sitting straight leg raising exercises were demonstrated.  - Follow up in about 3 months (around 9/14/2019).

## 2019-06-14 NOTE — TELEPHONE ENCOUNTER
RTC 3-4 months.  Patient to schedule appointment, self scheduling with [facility].  Asked patient to schedule own appointment to get patient comfortable in using [facility] per Dr Narayan Antonio.

## 2019-07-03 DIAGNOSIS — M17.0 PRIMARY OSTEOARTHRITIS OF BOTH KNEES: Primary | ICD-10-CM

## 2019-07-03 RX ORDER — IBUPROFEN 800 MG/1
800 TABLET ORAL 3 TIMES DAILY
Qty: 90 TABLET | Refills: 1 | Status: SHIPPED | OUTPATIENT
Start: 2019-07-03 | End: 2019-07-08

## 2019-07-05 ENCOUNTER — TELEPHONE (OUTPATIENT)
Dept: INTERNAL MEDICINE | Facility: CLINIC | Age: 54
End: 2019-07-05

## 2019-07-05 NOTE — TELEPHONE ENCOUNTER
THEOm to call us back to schedule a appt to see Dr. Hastings for 40 minutes as a new patient  Scheduled 07/19 at 1pm but asked the Pt to confirm  Sent msg on [employer]

## 2019-07-05 NOTE — TELEPHONE ENCOUNTER
----- Message from Berta Hastings MD sent at 7/3/2019 11:03 PM CDT -----  Since she is switching PCPs, please reschedule her for a 40 minute instead of a 20 minute visit.  Thanks!

## 2019-07-08 ENCOUNTER — TELEPHONE (OUTPATIENT)
Dept: INTERNAL MEDICINE | Facility: CLINIC | Age: 54
End: 2019-07-08

## 2019-07-08 ENCOUNTER — HOSPITAL ENCOUNTER (EMERGENCY)
Facility: OTHER | Age: 54
Discharge: HOME | End: 2019-07-08
Attending: EMERGENCY MEDICINE
Payer: COMMERCIAL

## 2019-07-08 VITALS
SYSTOLIC BLOOD PRESSURE: 193 MMHG | TEMPERATURE: 98 F | HEIGHT: 66 IN | BODY MASS INDEX: 45.48 KG/M2 | RESPIRATION RATE: 18 BRPM | WEIGHT: 283 LBS | OXYGEN SATURATION: 100 % | DIASTOLIC BLOOD PRESSURE: 84 MMHG | HEART RATE: 72 BPM

## 2019-07-08 DIAGNOSIS — S80.02XA CONTUSION OF LEFT KNEE, INITIAL ENCOUNTER: ICD-10-CM

## 2019-07-08 DIAGNOSIS — S39.012A STRAIN OF LUMBAR REGION, INITIAL ENCOUNTER: Primary | ICD-10-CM
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
## 2019-07-08 PROCEDURE — 96372 THER/PROPH/DIAG INJ SC/IM: CPT

## 2019-07-08 PROCEDURE — 63600175 PHARM REV CODE 636 W HCPCS: Performed by: PHYSICIAN ASSISTANT

## 2019-07-08 PROCEDURE — 99284 EMERGENCY DEPT VISIT MOD MDM: CPT | Mod: 25

## 2019-07-08 RX ORDER — ORPHENADRINE CITRATE 100 MG/1
100 TABLET, EXTENDED RELEASE ORAL 2 TIMES DAILY
Qty: 20 TABLET | Refills: 0 | Status: SHIPPED | OUTPATIENT
Start: 2019-07-08 | End: 2019-07-18

## 2019-07-08 RX ORDER — OXAPROZIN 600 MG/1
600 TABLET, FILM COATED ORAL DAILY
Qty: 20 TABLET | Refills: 0 | Status: SHIPPED | OUTPATIENT
Start: 2019-07-08 | End: 2019-07-31 | Stop reason: SDUPTHER

## 2019-07-08 RX ORDER — ORPHENADRINE CITRATE 30 MG/ML
60 INJECTION INTRAMUSCULAR; INTRAVENOUS
Status: COMPLETED | OUTPATIENT
Start: 2019-07-08 | End: 2019-07-08

## 2019-07-08 RX ORDER — KETOROLAC TROMETHAMINE 30 MG/ML
30 INJECTION, SOLUTION INTRAMUSCULAR; INTRAVENOUS
Status: COMPLETED | OUTPATIENT
Start: 2019-07-08 | End: 2019-07-08

## 2019-07-08 RX ADMIN — ORPHENADRINE CITRATE 60 MG: 60 INJECTION INTRAMUSCULAR; INTRAVENOUS at 03:07

## 2019-07-08 RX ADMIN — KETOROLAC TROMETHAMINE 30 MG: 30 INJECTION, SOLUTION INTRAMUSCULAR at 03:07

## 2019-07-08 NOTE — TELEPHONE ENCOUNTER
Pt was notified that there were two appointment scheduled for her to est care with  7/11/19, 7/19/19 and unfortunately the 7/11/19 appointment was schedule incorrectly bu that she can keep the 7/19/19. Pt was in agreement with that conversation was understood and it ended.

## 2019-07-08 NOTE — ED PROVIDER NOTES
"Encounter Date: 7/8/2019       History     Chief Complaint   Patient presents with    Back Pain     +upper back pain that radiates all the way down to legs. pt reports  " i fell off my aliyah yesterday" pt denies hitting head/loc. pt denies neck pain, can move with no limitations. pt ambulatory to triage      Patient is 53 year old female who presents with complaints of back pain after fall off of porch yesterday. Patient reports she was throwing away her trash over the wrought iron railing into her outside garbage can when the railing gave way. She reports that she fell forward and landed in a suspended fashion. She was able to roll off the railing gently to the ground. She reports no LOC or AMS and was able to get up on her own. She reports that she did not have immediate onset of pain but started feeling pain a few hours later. She reports that she has mid to lower back pain with sharp shooting pains down the back of both legs. She reports no bladder or bowel incontinence, saddle anesthesia or gait abnormalities. She reports that she has a bruise to her left knee and has applied Kinese Tape to her knees to help with the soreness, she reports this has helped. She denies associated fever, chills, nausea, vomiting, chest pain, SOB. She is currently unaccompanied in the ER. Of note, she has only taken 800mg NSAIDs 4 hours PTA.         Review of patient's allergies indicates:   Allergen Reactions    Ace inhibitors Edema     Past Medical History:   Diagnosis Date    Arthritis     Diabetes mellitus     Hypertension      Past Surgical History:   Procedure Laterality Date    COLONOSCOPY N/A 6/27/2017    Performed by [facility]in [facility] at [facility] (2ND FLR)    ESOPHAGOGASTRODUODENOSCOPY (EGD) N/A 8/22/2016    Performed by Fuentes Crisostomo MD at [facility] END[facility] (2ND FLR)    ESOPHAGOGASTRODUODENOSCOPY (EGD) N/A 8/2/2016    Performed by Dimitry Jacob MD at [facility] END[facility] (2ND FLR)    TONSILLECTOMY      TUBAL LIGATION   "     Family History   Problem Relation Age of Onset    Breast cancer Mother 65    Cancer Mother     Diabetes Mother     Colon polyps Mother     Hypertension Mother     Diabetes Father     Breast cancer Sister 56    No Known Problems Brother     Diabetes Paternal Uncle     Breast cancer Maternal Grandmother     Colon cancer Maternal Grandmother     No Known Problems Maternal Grandfather     Diabetes Paternal Grandmother     Heart disease Paternal Grandmother     No Known Problems Paternal Grandfather     Breast cancer Maternal Aunt     Esophageal cancer Neg Hx     Irritable bowel syndrome Neg Hx     Rectal cancer Neg Hx     Stomach cancer Neg Hx     Ulcerative colitis Neg Hx     Celiac disease Neg Hx     Crohn's disease Neg Hx     Amblyopia Neg Hx     Blindness Neg Hx     Cataracts Neg Hx     Glaucoma Neg Hx     Macular degeneration Neg Hx     Retinal detachment Neg Hx     Strabismus Neg Hx     Stroke Neg Hx     Thyroid disease Neg Hx      Social History     Tobacco Use    Smoking status: Never Smoker    Smokeless tobacco: Never Used   Substance Use Topics    Alcohol use: No     Frequency: Never     Comment: occasional    Drug use: No     Review of Systems   Constitutional: Negative for fever.   HENT: Negative for sore throat.    Respiratory: Negative for shortness of breath.    Cardiovascular: Negative for chest pain.   Gastrointestinal: Negative for nausea.   Genitourinary: Negative for dysuria.   Musculoskeletal: Positive for back pain.        Leg pain   Left knee bruise   Skin: Negative for rash.   Neurological: Negative for weakness.   Hematological: Does not bruise/bleed easily.       Physical Exam     Initial Vitals [07/08/19 1416]   BP Pulse Resp Temp SpO2   (!) 177/81 89 18 98.6 °F (37 °C) 100 %      MAP       --         Physical Exam    Nursing note and vitals reviewed.  Constitutional: She appears well-developed and well-nourished. She is not diaphoretic. No distress.    Healthy appearing female in NAD or apparent pain while resting in seated upright position on exam table. She makes good eye contact, speaks in clear full sentences and ambulates carefully but without assistance.    HENT:   Head: Normocephalic and atraumatic.   Eyes: Conjunctivae and EOM are normal. Pupils are equal, round, and reactive to light. Right eye exhibits no discharge. Left eye exhibits no discharge. No scleral icterus.   Neck: Normal range of motion.   Cardiovascular: Normal rate, regular rhythm, normal heart sounds and intact distal pulses. Exam reveals no gallop and no friction rub.    No murmur heard.  Pulmonary/Chest: Breath sounds normal. She has no wheezes. She has no rhonchi. She has no rales.   Abdominal: Soft. Bowel sounds are normal. There is no tenderness. There is no rebound and no guarding.   Musculoskeletal: Normal range of motion. She exhibits no edema or tenderness.   Lymphadenopathy:     She has no cervical adenopathy.   Neurological: She is alert and oriented to person, place, and time. She has normal strength. No cranial nerve deficit or sensory deficit. GCS score is 15. GCS eye subscore is 4. GCS verbal subscore is 5. GCS motor subscore is 6.   Skin: Skin is warm. Capillary refill takes less than 2 seconds. No rash and no abscess noted. No erythema.   Contusion with ecchymosis and mild tenderness to palpation just inferior to the lateral joint line. There is no abrasion or skin breakdown.    Psychiatric: She has a normal mood and affect. Her behavior is normal. Thought content normal.         ED Course   Procedures  Labs Reviewed - No data to display     .  Imaging Results          CT Lumbar Spine Without Contrast (Final result)  Result time 07/08/19 17:21:44    Final result by Nikko Willingham MD (07/08/19 17:21:44)                 Impression:      No evidence of acute fracture of the lumbar spine.    2 mm anterolisthesis of L3 on L4.  The findings are most likely on the basis of  degenerative changes.    Advanced multilevel degenerative changes of the lumbar spine.  Follow-up with MRI of the lumbar spine may be obtained, as clinically warranted.      Electronically signed by: Nikko Willingham MD  Date:    07/08/2019  Time:    17:21             Narrative:    EXAMINATION:  CT LUMBAR SPINE WITHOUT CONTRAST    CLINICAL HISTORY:  T/L-spine trauma, significant injury suspected, +/- localizing signs;    TECHNIQUE:  Low-dose axial, sagittal and coronal reformations are obtained through the lumbar spine.  Contrast was not administered.  There are some motion limitations to the examination.    COMPARISON:  None.    FINDINGS:  There is minimal 2 mm anterolisthesis of L3 on L4.  The remainder of the lumbar alignment is within normal limits.    There are 5 lumbar type vertebral bodies.  The vertebral body heights are maintained.  There is marked hypertrophy of the posterior elements.  The transverse processes are within normal limits.  There is significant intervertebral disc space narrowing with vacuum disc phenomenon throughout the lumbar spine.  There are calcifications involving the ligamentum flavum.  There is variable spinal canal and neural foraminal narrowing.  The sacroiliac joints are within normal limits.  The neural arches are unremarkable.    The visualized lung bases demonstrate emphysematous changes.  There is no evidence of lymphadenopathy in the retroperitoneum.  The uterus and adnexal structures appear grossly unremarkable.                               CT Thoracic Spine Without Contrast (Final result)  Result time 07/08/19 16:40:45    Final result by Nikko Willingham MD (07/08/19 16:40:45)                 Impression:      No evidence of acute fracture or listhesis of the thoracic spine, allowing for motion limitations.    Multilevel degenerative changes of the thoracic spine.      Electronically signed by: Nikko Willingham MD  Date:    07/08/2019  Time:    16:40             Narrative:     "EXAMINATION:  CT THORACIC SPINE WITHOUT CONTRAST    CLINICAL HISTORY:  T/L-spine trauma, significant injury suspected, +/- localizing signs;    TECHNIQUE:  Axial CT scan of the thoracic spine was performed without intravenous contrast.  Coronal and sagittal reformats were obtained.  There are some significant motion limitations to the exam.    COMPARISON:  Chest x-ray dated 01/29/2019.    FINDINGS:  There is dextroconvex scoliosis of the thoracolumbar alignment.  No significant kyphosis is identified.    The vertebral body heights are maintained.  There is hypertrophy of the posterior elements.  There is intervertebral disc space narrowing at multiple levels.  There is also narrowing of the neural foramina at multiple levels.    There is no evidence of acute fracture or listhesis of the thoracic spine.    There is a right-sided paratracheal cyst.  The remainder of the trachea is unremarkable.  Evaluation of the lung fields is significantly difficult given motion limitations.  No definitive evidence of a pneumothorax.  No pulmonary contusion is identified.                                   Medical Decision Making:   ED Management:  Urgent evaluation of 53 year old female who presents with complaints of mid to lower back pain after mechanical fall. She is afebrile, non-toxic appearing and hemodynamically stable. Physical exam outlined above and reveals midline bony TTP to the lower thoracic and upper lumbar region. There is associated paraspinal musculature TTP to the lumbar region. There is no overlying skin changes. Will obtain T and L CT without contrast and treat patient with Toradol and Norflex and reassess. She has also evidence of left knee contusion without associated bony landmark TTP or deformities. She is offered knee x-ray but declines stating "I know nothing is broken". She is able to bear weight with out pain, only has pain with palpation over small area of ecchymosis.     Update: Imaging reveals no " acute findings, suspect that her pain is related to muscle strain and spasm with associated radiculopathy. Will discharge with NSAIDs and muscle relaxer, she reports relief with Toradol and norfelx and will discharge with instruction to [facility] week for symptom re-check. She is educated on ED return precautions and is amenable to plan. She is stable for discharge.                       Clinical Impression:       ICD-10-CM ICD-9-CM   1. Strain of lumbar region, initial encounter S39.012A 847.2   2. Contusion of left knee, initial encounter S80.02XA 924.11   3. Fall on same level from slipping, tripping or stumbling, initial encounter W01.0XXA E885.9                                Kalyani Almonte PA-C  07/08/19 1736       Kalyani Almonte PA-C  07/08/19 1737

## 2019-07-08 NOTE — TELEPHONE ENCOUNTER
----- Message from Berta Hastings MD sent at 7/8/2019 12:44 PM CDT -----  He has a 20 minute appt with me on Thursday, 7/11 and a 40 minute appt with me on 7/19.  He is switching PCPs from Dr. Louie to me.  Patients who are switching PCPs should be a 40 minute visit.  Please reschedule to be a 40 minute visit.  Thank you!

## 2019-07-18 PROBLEM — E55.9 VITAMIN D DEFICIENCY: Status: ACTIVE | Noted: 2019-07-18

## 2019-07-31 ENCOUNTER — OFFICE VISIT (OUTPATIENT)
Dept: ORTHOPEDICS | Facility: CLINIC | Age: 54
End: 2019-07-31
Payer: MEDICAID

## 2019-07-31 ENCOUNTER — HOSPITAL ENCOUNTER (OUTPATIENT)
Dept: RADIOLOGY | Facility: HOSPITAL | Age: 54
Discharge: HOME | End: 2019-07-31
Attending: NURSE PRACTITIONER
Payer: COMMERCIAL

## 2019-07-31 DIAGNOSIS — E66.9 OBESITY, UNSPECIFIED CLASSIFICATION, UNSPECIFIED OBESITY TYPE, UNSPECIFIED WHETHER SERIOUS COMORBIDITY PRESENT: ICD-10-CM

## 2019-07-31 DIAGNOSIS — M25.562 LEFT KNEE PAIN, UNSPECIFIED CHRONICITY: Primary | ICD-10-CM

## 2019-07-31 DIAGNOSIS — M17.0 PRIMARY OSTEOARTHRITIS OF BOTH KNEES: ICD-10-CM

## 2019-07-31 DIAGNOSIS — M25.562 LEFT KNEE PAIN, UNSPECIFIED CHRONICITY: ICD-10-CM

## 2019-07-31 PROCEDURE — 20610 PR DRAIN/INJECT LARGE JOINT/BURSA: ICD-10-PCS | Mod: 50,S$PBB,, | Performed by: NURSE PRACTITIONER

## 2019-07-31 PROCEDURE — 99213 OFFICE O/P EST LOW 20 MIN: CPT | Mod: S$PBB,25,, | Performed by: NURSE PRACTITIONER

## 2019-07-31 PROCEDURE — 99213 PR OFFICE/OUTPT VISIT, EST, LEVL III, 20-29 MIN: ICD-10-PCS | Mod: S$PBB,25,, | Performed by: NURSE PRACTITIONER

## 2019-07-31 PROCEDURE — 73562 XR KNEE ORTHO LEFT WITH FLEXION: ICD-10-PCS | Mod: 26,59,RT, | Performed by: RADIOLOGY

## 2019-07-31 PROCEDURE — 20610 DRAIN/INJ JOINT/BURSA W/O US: CPT | Mod: 50,PBBFAC | Performed by: NURSE PRACTITIONER

## 2019-07-31 PROCEDURE — 20610 DRAIN/INJ JOINT/BURSA W/O US: CPT | Mod: 50,S$PBB,, | Performed by: NURSE PRACTITIONER

## 2019-07-31 PROCEDURE — 73564 X-RAY EXAM KNEE 4 OR MORE: CPT | Mod: 26,LT,, | Performed by: RADIOLOGY

## 2019-07-31 PROCEDURE — 73562 X-RAY EXAM OF KNEE 3: CPT | Mod: 26,59,RT, | Performed by: RADIOLOGY

## 2019-07-31 PROCEDURE — 99212 OFFICE O/P EST SF 10 MIN: CPT | Mod: PBBFAC,25 | Performed by: NURSE PRACTITIONER

## 2019-07-31 PROCEDURE — 73564 XR KNEE ORTHO LEFT WITH FLEXION: ICD-10-PCS | Mod: 26,LT,, | Performed by: RADIOLOGY

## 2019-07-31 PROCEDURE — 99999 PR PBB SHADOW E&M-EST. PATIENT-LVL II: CPT | Mod: PBBFAC,,, | Performed by: NURSE PRACTITIONER

## 2019-07-31 PROCEDURE — 73562 X-RAY EXAM OF KNEE 3: CPT | Mod: 59,TC,RT

## 2019-07-31 PROCEDURE — 99999 PR PBB SHADOW E&M-EST. PATIENT-LVL II: ICD-10-PCS | Mod: PBBFAC,,, | Performed by: NURSE PRACTITIONER

## 2019-07-31 RX ORDER — OXAPROZIN 600 MG/1
600 TABLET, FILM COATED ORAL DAILY
Qty: 30 TABLET | Refills: 1 | Status: SHIPPED | OUTPATIENT
Start: 2019-07-31 | End: 2019-08-02 | Stop reason: ALTCHOICE

## 2019-07-31 RX ADMIN — TRIAMCINOLONE ACETONIDE 80 MG: 40 INJECTION, SUSPENSION INTRA-ARTICULAR; INTRAMUSCULAR at 09:07

## 2019-08-01 ENCOUNTER — OFFICE VISIT (OUTPATIENT)
Dept: BARIATRICS | Facility: CLINIC | Age: 54
End: 2019-08-01
Payer: MEDICAID

## 2019-08-01 VITALS
SYSTOLIC BLOOD PRESSURE: 160 MMHG | DIASTOLIC BLOOD PRESSURE: 92 MMHG | HEIGHT: 65 IN | BODY MASS INDEX: 48.19 KG/M2 | WEIGHT: 289.25 LBS | HEART RATE: 89 BPM

## 2019-08-01 DIAGNOSIS — I10 ESSENTIAL HYPERTENSION: ICD-10-CM

## 2019-08-01 DIAGNOSIS — K21.00 GASTROESOPHAGEAL REFLUX DISEASE WITH ESOPHAGITIS: ICD-10-CM

## 2019-08-01 DIAGNOSIS — E78.2 MIXED HYPERLIPIDEMIA: ICD-10-CM

## 2019-08-01 DIAGNOSIS — E11.9 CONTROLLED TYPE 2 DIABETES MELLITUS WITHOUT COMPLICATION, WITH LONG-TERM CURRENT USE OF INSULIN: ICD-10-CM

## 2019-08-01 DIAGNOSIS — E66.01 CLASS 3 SEVERE OBESITY DUE TO EXCESS CALORIES WITH SERIOUS COMORBIDITY AND BODY MASS INDEX (BMI) OF 45.0 TO 49.9 IN ADULT: ICD-10-CM

## 2019-08-01 DIAGNOSIS — M17.0 PRIMARY OSTEOARTHRITIS OF BOTH KNEES: ICD-10-CM

## 2019-08-01 DIAGNOSIS — Z79.4 CONTROLLED TYPE 2 DIABETES MELLITUS WITHOUT COMPLICATION, WITH LONG-TERM CURRENT USE OF INSULIN: ICD-10-CM

## 2019-08-01 PROCEDURE — 99203 PR OFFICE/OUTPT VISIT, NEW, LEVL III, 30-44 MIN: ICD-10-PCS | Mod: S$PBB,,, | Performed by: INTERNAL MEDICINE

## 2019-08-01 PROCEDURE — 99999 PR PBB SHADOW E&M-EST. PATIENT-LVL III: CPT | Mod: PBBFAC,,, | Performed by: INTERNAL MEDICINE

## 2019-08-01 PROCEDURE — 99213 OFFICE O/P EST LOW 20 MIN: CPT | Mod: PBBFAC | Performed by: INTERNAL MEDICINE

## 2019-08-01 PROCEDURE — 99203 OFFICE O/P NEW LOW 30 MIN: CPT | Mod: S$PBB,,, | Performed by: INTERNAL MEDICINE

## 2019-08-01 PROCEDURE — 99999 PR PBB SHADOW E&M-EST. PATIENT-LVL III: ICD-10-PCS | Mod: PBBFAC,,, | Performed by: INTERNAL MEDICINE

## 2019-08-01 RX ORDER — INSULIN LISPRO 100 [IU]/ML
5 INJECTION, SOLUTION INTRAVENOUS; SUBCUTANEOUS
Qty: 10 ML | Refills: 0 | Status: ON HOLD
Start: 2019-08-01 | End: 2019-11-25 | Stop reason: HOSPADM

## 2019-08-01 NOTE — LETTER
[facility]  [address]  [facility] [address]  Phone: [phone]  Fax: [phone] August 1, 2019      Meghan Zimmer RN    Patient: Yayo Carver   MR Number: [id number]   YOB: 1965   Date of Visit: 8/1/2019     Dear Ms. Zimmer:    Thank you for referring Yayo Carver to me for evaluation. Below are the relevant portions of my assessment and plan of care.    Ms. Carver's assessment is as follows:    1. Class 3 severe obesity due to excess calories with serious comorbidity and body mass index (BMI) of 45.0 to 49.9 in adult    2. Primary osteoarthritis of both knees    3. Controlled type 2 diabetes mellitus without complication, with long-term current use of insulin    4. Essential hypertension    5. Mixed hyperlipidemia    6. Gastroesophageal reflux disease with esophagitis        PLAN:  1. Class 3 severe obesity due to excess calories with serious comorbidity and body mass index (BMI) of 45.0 to 49.9 in adult     Decrease portions by 1/3.      [id number] jose menu and meal ideas given.     2. Primary osteoarthritis of both knees  Optimize BMI   Increase low impact activity as tolerated.  Avoid high impact activity, very heavy lifting or other exercise regimens that may cause discomfort.       3. Controlled type 2 diabetes mellitus without complication, with long-term current use of insulin  - semaglutide (OZEMPIC) 0.25 mg or 0.5 mg(2 mg/1.5 mL) PnIj; Inject 0.5 mg into the skin every 7 days.  Dispense: 1.5 mL; Refill: 3  - semaglutide (OZEMPIC) 1 mg/dose (2 mg/1.5 mL) PnIj; Inject 1 mg subq weekly  Dispense: 3 mL; Refill: 3  - insulin lispro 100 unit/mL injection; Inject 5 Units into the skin 3 (three) times daily before meals.  Dispense: 10 mL; Refill: 0  Start Ozempic once a week. Start with 0.25mg once a week x 4 weeks, then 0.5 mg weekly x 2 weeks, then fill 1 mg Rx (printed today).      Decrease portions as soon as you start Ozempic. Some nausea in the first 2  weeks is not unusual.      If you get pain across the upper abdomen and around to your back, please call the office.      4. Essential hypertension  The current medical regimen is effective;  continue present plan and medications. Expect improvement with weight loss.      5. Mixed hyperlipidemia  Expect improvement with weight loss. Recheck after 10% TBW lost.       6. Gastroesophageal reflux disease with esophagitis  Expect improvement with weight loss    If you have questions, please do not hesitate to call me. I look forward to following Yayo along with you.    Sincerely,      Kalyani Ocasio MD   Section of Bariatric Surgery  Department of Surgery  [facility]    AGF/afw

## 2019-08-01 NOTE — PATIENT INSTRUCTIONS
Decrease Humalog to 5 units with meals when you start Ozempic. Stop Humalog when you increase the Ozempic to 0.5 mg    Start Ozempic once a week. Start with 0.25mg once a week x 4 weeks, then 0.5 mg weekly x 2 weeks, then fill 1 mg Rx (printed today).       Decrease portions as soon as you start Ozempic. Some nausea in the first 2 weeks is not unusual.     If you get pain across the upper abdomen and around to your back, please call the office.         Decrease portions by 1/3.       Sample meal plan  80-120g protein; 1889-0828 calories  Protein drinks and bars: 0-4 grams sugar  Drink lots of water throughout the day and exercise!  MENU # 1  Breakfast: 2 eggs, 1 turkey sausage jamal  Lunch: 2-3 roll-ups (sliced turkey, low-fat slice cheese), baby carrots dipped in 1 Tbsp natural peanut butter  Mid-Day Snack: ¼ cup unsalted almonds, ½ cup fruit  Supper: 1 chicken thigh simmered in tomato sauce + 2 Tbsp mozzarella cheese, ½ cup black beans, 1/2 cup steamed veggies  Evening Snack: 1/2 cup grapes with 4 cubes low-fat cheddar cheese   ___________________________________________________  MENU # 2  Breakfast: protein drink  Mid-morning snack : ¼ cup unsalted nuts  Lunch: 1 cup tuna or chicken salad made with light clay, over salad.   Supper: hamburger jamal, slice of cheese, 1 cup steamed veggies.   Snack: light yogurt      Menu #3  Breakfast: 6oz plain Greek yogurt + fruit (½ banana, ½ cup fruit - pineapple, blueberries, strawberries, peach), may add Splenda to dakota.  Lunch: ½  chicken breast w/ slice pepper jamie cheese, 1/2 cup steamed veggies and small salad with Salad Spritzer.    Mid-Day snack: protein drink   Supper: 4oz Grilled fish, grilled veggie kabob ( mushrooms, onion, bell pepper, yellow squash, zucchini, cherry tomatoes)  Evening Snack: fudgsicle no-sugar-added    Menu # 4  Breakfast: vanilla iced coffee: 1 scoop vanilla protein powder + 4oz skim milk + 4oz coffee   Snack: protein bar  Lunch: 2 Lettuce  tacos: ¼ cup seasoned ground turkey wrapped in a Robert lettuce leaf with 1 Tbsp shredded cheese and dollop low-fat sour cream  Dinner: Shrimp omelet: 2 eggs, ½ cup shrimp, green onions, and shredded cheese        Menu #5  Breakfast: 1 cup low-fat cottage cheese, ½ cup peaches (no sugar added)  Lunch: 2 oz baked pork chop, 1 cup okra and tomato stew  Snack: 1 cup black beans + salsa + dollop sour cream  Dinner: Caprese chicken salad: 2 oz chicken, 1oz fresh mozzarella, sliced tomato, 1 Tbsp olive oil, basil  Snack: sugar-free pudding cup      Menu #6  Breakfast: ½ cup part-skim ricotta cheese, 2 Tbsp sugar-free strawberry preserves, ¼ cup slivered almonds  Lunch: 2 oz canned chicken, 1oz shredded cheddar cheese, ½ cup black beans, 2 Tbsp salsa  Snack: Protein drink  Dinner: 4 oz grilled salmon steak, over mixed green salad with light dressing          Meal Ideas for Regular Bariatric Diet  *Recipes and products available at [email or web address].com      Breakfast: (15-20g protein)    - Egg white omelet: 2 egg whites or ½ cup Egg Beaters. (Optional proteins: cheese, shrimp, black beans, chicken, sliced turkey) (Optional veggies: tomatoes, salsa, spinach, mushrooms, onions, green peppers, or small slice avocado)     - Egg and sausage: 1 egg or ¼ cup Egg Beaters (any variety), with 1 jamal or 2 links of Turkey sausage or Veggie breakfast sausage ([employer] or [facility])    - Crust-less breakfast quiche: To make a glass pie dish, mix 4oz part skim Ricotta, 1 cup skim milk, and 2 eggs as your base. Add protein: shredded cheese, sliced lean ham or turkey, turkey quick/sausage. Add veggies: tomato, onion, green onion, mushroom, green pepper, spinach, etc.    - Yogurt parfait: Mix 1 - 6oz container Dannon Light N Fit vanilla yogurt, with ¼ cup Kashi Go Lean cereal    - Cottage cheese and fruit: ½ cup part-skim cottage cheese or ricotta cheese topped with fresh fruit or sugar free preserves     - Gisselle French  Egg custard* (add 2 Tbsp instant coffee granules to make Cappuccino Custard*)    - Hi-Protein café latte (skim milk, decaf coffee, 1 scoop protein powder). Optional to add Sugar free syrup or extract flavoring.    Lunch: (20-30g protein)    - ½ cup Black bean soup (Homemade or Progresso), with ¼ cup shredded low-fat cheese. Top with chopped tomato or fresh salsa.     - Lean deli turkey breast and low-fat sliced cheese, mustard or light clay to moisten, rolled up together, or wrapped in a Robert lettuce leaf    - Chicken salad made from dinner leftovers, moisten with low-fat salad dressing or light clay. Also try leftover salmon, shrimp, tuna or boiled eggs. Serve ½ cup over dark green salad    - Fat-free canned refried beans, topped with ¼ cup shredded low-fat cheese. Top with chopped tomato or fresh salsa.     - Greek salad: Top mixed greens with 1-2oz grilled chicken, tomatoes, red onions, 2-3 kalamata olives, and sprinkle lightly with feta cheese. Spritz with Balsamic vinegar to taste.     - Crust-less lunch quiche: To make a glass pie dish, mix 4oz part skim Ricotta, 1 cup skim milk, and 2 eggs as your base. Add protein: shredded cheese, sliced lean ham or turkey, shrimp, chicken. Add veggies: tomato, onion, green onion, mushroom, green pepper, spinach, artichoke, broccoli, etc.    - Pizza bake: tomato sauce, low-fat shredded mozzarella and turkey pepperoni or Dickinson quick. Add any veggies.    - Cucumber crab bites: Spread ¼ cup crab dip (lump crabmeat + light cream cheese and green onions) over sliced cucumber.     - Chicken with light spinach and artichoke dip*: Puree in : 6oz cooked and drained spinach, 2 cloves garlic, 1 can cannelloni beans, ½ cup chopped green onions, 1 can drained artichoke hearts (not marinated in oil), lemon juice and basil. Mix in 2oz chopped up chicken.    Supper: (20-30g protein)    - Serve grilled fish over dark green salad tossed with low-fat dressing, served with  grilled asparagus olea     - Rotisserie chicken salad: served with sliced strawberries, walnuts, fat-free feta cheese crumbles and 1 tbsp Thompsons Own Light Raspberry [place]avan Vinaigrette    - Shrimp cocktail: Dip cold boiled shrimp in homemade low-sugar cocktail sauce (1/2 cup Lenora One Carb ketchup, 2 tbsp horseradish, 1/4 tsp hot sauce, 1 tsp Worcestershire sauce, 1 tbsp freshly-squeezed lemon juice). Serve with dark green salad, walnuts, and crumbled blue cheese drizzled with olive oil and Balsamic vinegar    - Tuna Melt: Spread tuna salad onto 2 thick slices of tomato. Top with low-fat cheese and broil until cheese is melted. May also be made with chicken salad of shrimp salad. [place] with different types of cheeses.    - Homemade low-fat Chili using extra lean ground beef or ground turkey. Top with shredded cheese and salsa as desired. May add dollop fat-free sour cream if desired    - Dinner Omelet with shrimp or chicken and onion, green peppers and chives.    - No noodle lasagna: Use sliced zucchini or eggplant in place of noodles.  Layer with part skim ricotta cheese and low sugar meat sauce (use very lean ground beef or ground turkey).    - Mexican chicken bake: Bake chunks of chicken breast or thigh with taco seasoning, Pace brand enchilada sauce, green onions and low-fat cheese. Serve with ¼ cup black beans or fat free refried beans topped with chopped tomatoes or salsa.    - Nabil frozen meatballs, simmered in Classico Marinara sauce. Different flavors of salsa or spaghetti sauce create different dishes! Sprinkle with parmesan cheese. Serve with grilled or steamed veggies, or a dark green salad.    - Simmer boneless skinless chicken thigh chunks in Classico Marinara sauce or roasted salsa until tender with chopped onion, bell pepper, garlic, mushrooms, spinach, etc.     - Hamburger, without the bun, dressed the way you like. Served with grilled or steamed veggies.    - Eggplant beltran: Portia  slices of eggplant at 350 degrees for 15 minutes. Layer tomato sauce, sliced eggplant and low-fat mozzarella cheese in a baking dish and cover with foil. Bake 30-40 more minutes or until bubbly. Uncover and bake at 400 degrees for about 15 more minutes, or until top is slightly crisp.    - Fish tacos: grilled/baked white fish, wrapped in Robert lettuce leaf, topped with salsa, shredded low-fat cheese, and light coleslaw.    Snacks: (100-200 calories; >5g protein)    - 1 low-fat cheese stick with 8 cherry tomatoes or 1 serving fresh fruit  - 4 thin slices fat-free turkey breast and 1 slice low-fat cheese  - 4 thin slices fat-free honey ham with wedge of melon  - 1/4 cup unsalted nuts with ½ cup fruit  - 6-oz container Dannon Light n Fit vanilla yogurt, topped with 1oz unsalted nuts         - apple, celery or baby carrots spread with 2 Tbsp natural peanut butter or almond butter   - apple slices with 1 oz slice low-fat cheese  - celery, cucumber, bell pepper or baby carrots dipped in ¼ cup hummus bean spread or light spinach and artichoke dip (*recipe in lunch section)  - 100 calorie bag microwave light popcorn with 3 tbsp grated parmesan cheese  - Lemuel Links Beef Steak - 14g protein! (similar to beef jerky)  - 2 wedges [place] - Light Herb & Garlic Cheese with sliced cucumber or green bell pepper  - 1/2 cup low-fat cottage cheese with ¼ cup fruit or ¼ cup salsa  - RTD Protein drinks: Atkins, Low Carb Slim Fast, EAS light, Muscle Milk Light, etc.  - Homemade Protein drinks: GNC Soy95, Isopure, Nectar, UNJURY, Whey Gourmet, etc. Mix 1 scoop powder with 8oz skim/1% milk or light soymilk.  - Protein bars: Atkins, EAS, Pure Protein, Think Thin, Detour, etc. Must have 0-4 grams sugar - Read the label.    Takeout Options: No more than twice/week  Deli - Salads (no pasta or rice), meats, cheeses. Roasted chicken. Lox (salmon)    Mexican - Platters which don't include tortillas, chips, or rice. Go easy on the beans.  Example: Fajitas without the tortillas. Ask the  not to bring chips to the table if they are too tempting.    Greek - Meat or fish and vegetable, but no bread or rice. Including hummus, baba ganoush, etc, is OK. Most sit-down Greek restaurants can provide you with cucumber slices for dipping instead of afua bread.    Fast Food (Avoid as much as possible) - Salads (no croutons and limit salad dressing to 2 tbsp), grilled chicken sandwich without the bun and ask for no clay. Arianas low fat chili or Taco Bell pintos and cheese.    BBQ - The meats are fine if you ask for sauces on the side, but most of the traditional side dishes are loaded with carbs. [place] slaw, baked beans and BBQ sauce are typically made with sugar.    Chinese - Nothing deep-fried, no rice or noodles. Many Chinese sauces have starch and sugar in them, so you'll have to use your judgement. If you find that these sauces trigger cravings, or cause Dumping, you can ask for the sauce to be made without sugar or just use soy sauce.

## 2019-08-01 NOTE — PROGRESS NOTES
Subjective:       Patient ID: Yayo Carver is a 53 y.o. female.    Chief Complaint: Consult    .CC:Weight    Current attempts at weight loss: New pt to me, referred by Meghan Zimmer NP. , with Patient Active Problem List:     Morbid obesity with BMI of 50.0-59.9, adult     Osteoarthritis of both knees     DOM (iron deficiency anemia)     GERD (gastroesophageal reflux disease)     Essential hypertension     Sinusitis, maxillary, chronic     Controlled type 2 diabetes mellitus without complication, with long-term current use of insulin     HLD (hyperlipidemia)     Vitamin D deficiency       Lab Results       Component                Value               Date                       HGBA1C                   5.9 (H)             05/16/2018                 HGBA1C                   5.4                 11/07/2017                 HGBA1C                   6.1                 05/23/2017            Lab Results       Component                Value               Date                       LDLCALC                  129.6               01/19/2017                 CREATININE               0.8                 06/03/2019             Has been eating more lean proteins and veg and walking.       Previous diet attempts: The patient has tried attempts at weight loss without significant results long term results.  She states she has tried hydroxycut, slim quick, weight , and bebo jamey without long term results.  She has recently been exercising more often by walking and eliminated chips and sodas (her weaknesses) and she has lost 40 lbs.  She was seen in dept interested in weight loss surgery and after discussion she is interested in the gastric bypass.  Has lost 11 lbs since that time.     History of medication for loss: Denies.   checked today     Heaviest weight: 325#    Lightest weight: 270#    Goal weight: Will need to lose about 40-50 more lbs for TKR. 200#      Last eye exam:   Last month. No glaucoma.                            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   Breakfast: oatmeal and fruit. Weekends: .same.     Lunch: chicken or fish and veg.     Dinner: Same as lunch.     Snacks: popcorn or protein bar. Fruit.     Beverages: water. Coffee- black. Denies ETOH    Willingness to change:  10/10    EKG:Normal sinus rhythm  Septal infarct ,age undetermined  Abnormal ECG    BMR: 1908    Review of Systems   Constitutional: Negative for chills and fever.   Respiratory: Negative for shortness of breath.         Denies Snoring.    Cardiovascular: Negative for chest pain.   Gastrointestinal: Negative for constipation and diarrhea.        Denies GERD   Genitourinary: Negative for difficulty urinating and dysuria.   Musculoskeletal: Positive for arthralgias and back pain.   Neurological: Negative for dizziness and light-headedness.   Psychiatric/Behavioral: Negative for dysphoric mood. The patient is not nervous/anxious.        Objective:     BP (!) 160/92   Pulse 89   Ht 5' 5" (1.651 m)   Wt 131.2 kg (289 lb 3.9 oz)   BMI 48.13 kg/m²      Physical Exam   Constitutional: She is oriented to person, place, and time. She appears well-developed and well-nourished. No distress.   Morbidly obese     HENT:   Head: Normocephalic and atraumatic.   Eyes: Pupils are equal, round, and reactive to light. EOM are normal. No scleral icterus.   Neck: Normal range of motion. Neck supple. No thyromegaly present.   Cardiovascular: Normal rate and normal heart sounds. Exam reveals no gallop and no friction rub.   No murmur heard.  Pulmonary/Chest: Effort normal and breath sounds normal. No respiratory distress. She has no wheezes.   Abdominal: Soft. Bowel sounds are normal. She exhibits no distension. There is no tenderness.   Musculoskeletal: Normal range of motion. She exhibits no edema.   Neurological: She is alert and oriented to person, place, and time. No cranial nerve " deficit.   Skin: Skin is warm and dry. No erythema.   Psychiatric: She has a normal mood and affect. Her behavior is normal. Judgment normal.   Vitals reviewed.      Assessment:       1. Class 3 severe obesity due to excess calories with serious comorbidity and body mass index (BMI) of 45.0 to 49.9 in adult    2. Primary osteoarthritis of both knees    3. Controlled type 2 diabetes mellitus without complication, with long-term current use of insulin    4. Essential hypertension    5. Mixed hyperlipidemia    6. Gastroesophageal reflux disease with esophagitis        Plan:         1. Class 3 severe obesity due to excess calories with serious comorbidity and body mass index (BMI) of 45.0 to 49.9 in adult          Decrease portions by 1/3.     [id number] jose menu and meal ideas given.   2. Primary osteoarthritis of both knees  Optimize BMI   Increase low impact activity as tolerated.  Avoid high impact activity, very heavy lifting or other exercise regimens that may cause discomfort.      3. Controlled type 2 diabetes mellitus without complication, with long-term current use of insulin    - semaglutide (OZEMPIC) 0.25 mg or 0.5 mg(2 mg/1.5 mL) PnIj; Inject 0.5 mg into the skin every 7 days.  Dispense: 1.5 mL; Refill: 3  - semaglutide (OZEMPIC) 1 mg/dose (2 mg/1.5 mL) PnIj; Inject 1 mg subq weekly  Dispense: 3 mL; Refill: 3  - insulin lispro 100 unit/mL injection; Inject 5 Units into the skin 3 (three) times daily before meals.  Dispense: 10 mL; Refill: 0  Start Ozempic once a week. Start with 0.25mg once a week x 4 weeks, then 0.5 mg weekly x 2 weeks, then fill 1 mg Rx (printed today).       Decrease portions as soon as you start Ozempic. Some nausea in the first 2 weeks is not unusual.     If you get pain across the upper abdomen and around to your back, please call the office.       4. Essential hypertension  The current medical regimen is effective;  continue present plan and medications. Expect improvement with weight  loss.     5. Mixed hyperlipidemia  Expect improvement with weight loss. Recheck after 10% TBW lost.      6. Gastroesophageal reflux disease with esophagitis  Expect improvement with weight loss

## 2019-08-02 DIAGNOSIS — M17.0 PRIMARY OSTEOARTHRITIS OF BOTH KNEES: Primary | ICD-10-CM

## 2019-08-02 DIAGNOSIS — M17.0 PRIMARY OSTEOARTHRITIS OF BOTH KNEES: ICD-10-CM

## 2019-08-02 RX ORDER — TRAMADOL HYDROCHLORIDE 50 MG/1
50-100 TABLET ORAL 3 TIMES DAILY PRN
Qty: 180 TABLET | Refills: 2 | Status: SHIPPED | OUTPATIENT
Start: 2019-08-02 | End: 2019-08-26 | Stop reason: SDUPTHER

## 2019-08-02 RX ORDER — MELOXICAM 15 MG/1
15 TABLET ORAL DAILY
Qty: 30 TABLET | Refills: 2 | Status: SHIPPED | OUTPATIENT
Start: 2019-08-02 | End: 2019-09-01

## 2019-08-02 RX ORDER — MELOXICAM 15 MG/1
15 TABLET ORAL DAILY
Qty: 30 TABLET | Refills: 1 | Status: SHIPPED | OUTPATIENT
Start: 2019-08-02 | End: 2019-10-16 | Stop reason: SDUPTHER

## 2019-08-02 NOTE — PROGRESS NOTES
CC: c/o bilateral knee pain    HPI: Pt with c/o bilateral knee pain for several years which has worsened over the past wee. She reports that she fell off of her porch at home and since then the left knee has been worse than the right, but they are both very painful. The pain is aching and global. She has been getting cortisone injections, gel injections, and taking tramadol as needed for the pain. The tramadol is managed by pain management. She has known djd and has been seen by Dr. Ochsner to discuss knee replacement, but she needs to lose some weight to be eligible. She is working two jobs and on her feet throughout her shifts. She is ambulating without assistive device. There is not a limp.    ROS  General: denies fever and chills  Resp: no c/o sob  CVS: no c/o cp  MSK: c/o bilateral knee pain which is aching and global and worse following her fall    PE  General: AAOx3, pleasant and cooperative  Resp: respirations even and unlabored  MSK: bilateral knee exam  0 degrees extension  100 degrees flexion  No warmth or erythema   - effusion  + crepitus  5/5 quad strength    Xray:  Reviewed by me: DJD with significant narrowing of the right medial and the left lateral tibiofemoral joint spaces.  Slight narrowing of the patellofemoral joint spaces also identified.  No fracture or dislocation.  No bone destruction identified    Assessment:  Bilateral knee djd    Plan:  Cortisone injections bilateral knees today  mobic refilled  RICE  Referral to bariatric surgery for medication management of weight  F/u as needed    Knee Injection Procedure Note    Diagnosis: bilateral knee degenerative arthritis  Indications: bilateral knee pain  Procedure Details: Verbal consent was obtained for the procedure. The injection site was identified and the skin was prepared with alcohol. The bilateral knee was injected from an anterolateral approach with 1 ml of Kenalog and 4 ml Lidocaine each under sterile technique using a 22 gauge  needle. The needle was removed and the area cleansed and dressed.  Complications:  Patient tolerated the procedure well.    she was advised to rest the knee today, using ice and elevation as needed for comfort and swelling.Immediate relief of the knee pain may be short lived and secondary to the lidocaine. she may have an increase in discomfort tonight followed by steady improvement over the next several days. It may take 1-2 weeks following the injection to get the full benefit of the medication.

## 2019-08-02 NOTE — TELEPHONE ENCOUNTER
----- Message from Naren Nieto sent at 8/2/2019  9:29 AM CDT -----  Pharmacy Calling    Reason for call: [facility] RX: Rx clarification    Pharmacy Name: [facility]    Prescription Name: Meloxicam and Tylenol    Phone Number: [phone]    Additional Information:

## 2019-08-02 NOTE — TELEPHONE ENCOUNTER
Last Rx refill-----06/14/19  Last office visit--06/14/19  Next office visit-- 09/09/19    Appointment scheduled for 09/09/19 due to 09/14/19 is Saturday and 09/13/19 the Doctor is out of the office .  Reminder letter mailed to patient.    ----- Message from Sujey Flores sent at 8/2/2019  9:19 AM CDT -----  Contact: self @ [phone]  Pt is req a refill for meloxicam (MOBIC) 15 MG tablet and  traMADol (ULTRAM) 50 mg tablet.  Pt would also like to schedule her f/u appt around 9-14-19.  Pls call.     [id number] [address] AT [address] [phone] (Phone)     [phone] (Fax)

## 2019-08-04 RX ORDER — TRIAMCINOLONE ACETONIDE 40 MG/ML
80 INJECTION, SUSPENSION INTRA-ARTICULAR; INTRAMUSCULAR
Status: COMPLETED | OUTPATIENT
Start: 2019-07-31 | End: 2019-07-31

## 2019-08-26 DIAGNOSIS — M17.0 PRIMARY OSTEOARTHRITIS OF BOTH KNEES: ICD-10-CM

## 2019-08-26 RX ORDER — TRAMADOL HYDROCHLORIDE 50 MG/1
50-100 TABLET ORAL 3 TIMES DAILY PRN
Qty: 180 TABLET | Refills: 2 | Status: SHIPPED | OUTPATIENT
Start: 2019-08-26 | End: 2019-08-28 | Stop reason: SDUPTHER

## 2019-08-26 NOTE — TELEPHONE ENCOUNTER
"Medically necessary for more then 7 Days.        ----- Message from Meghan Simons NP sent at 8/26/2019  2:10 PM CDT -----  Ms. Carver called and states that she needs someone from your office to let [facility] know that her prescription for tramadol is "medically necessary" due to the new laws. It's the [facility] at [facility] and [address] where Dr. Lawson sent the prescription. She says it's because of the amount and the new law requiring that to be on the prescription for that amount. Thanks for the help  Meghan    "

## 2019-08-28 DIAGNOSIS — M17.0 PRIMARY OSTEOARTHRITIS OF BOTH KNEES: ICD-10-CM

## 2019-08-28 RX ORDER — TRAMADOL HYDROCHLORIDE 50 MG/1
50-100 TABLET ORAL 3 TIMES DAILY PRN
Qty: 180 TABLET | Refills: 2 | Status: SHIPPED | OUTPATIENT
Start: 2019-08-28 | End: 2019-10-16 | Stop reason: SDUPTHER

## 2019-10-16 ENCOUNTER — TELEPHONE (OUTPATIENT)
Dept: BARIATRICS | Facility: CLINIC | Age: 54
End: 2019-10-16

## 2019-10-16 DIAGNOSIS — M17.0 PRIMARY OSTEOARTHRITIS OF BOTH KNEES: ICD-10-CM

## 2019-10-16 RX ORDER — MELOXICAM 15 MG/1
15 TABLET ORAL DAILY
Qty: 30 TABLET | Refills: 1 | Status: SHIPPED | OUTPATIENT
Start: 2019-10-16 | End: 2020-01-08 | Stop reason: SDUPTHER

## 2019-10-16 RX ORDER — TRAMADOL HYDROCHLORIDE 50 MG/1
50-100 TABLET ORAL 3 TIMES DAILY PRN
Qty: 180 TABLET | Refills: 2 | Status: SHIPPED | OUTPATIENT
Start: 2019-10-16 | End: 2019-12-20 | Stop reason: SDUPTHER

## 2019-10-16 NOTE — TELEPHONE ENCOUNTER
Called pharmacy to find out which insurance pt needed a PA through for the Ozempic since our chart has her uninsured. ==Medicaid.    Called pt to inform her that Medicaid does not cover weight loss medications. Also informed pt that she would need to see her PCP as soon as possible in order for her to get her refills since we do not accept primary insurance. Patient was upset and ended call.      **Pt has switched insurances since her consult visit in August.

## 2019-10-16 NOTE — TELEPHONE ENCOUNTER
----- Message from Binh Plummer sent at 10/16/2019  3:13 PM CDT -----  Contact: Pt  Type:  Needs Medical Advice    Who Called: The Pt states that the insurance co wants the Dr Ocasio to verify with the insurance company about the medication for the Pt.  Phone # [phone]    Best Call Back Number:[phone] (pt's #)     Additional Info:  The Pt states that she has been without her medication for 2 months because of this.

## 2019-10-16 NOTE — TELEPHONE ENCOUNTER
Last Rx refill-----08/02/19-Meloxican                             08/28/19-Tramadol  Last office visit--06/14/19  Next office visit--03/02/19          ----- Message from Pauly Barksdale sent at 10/16/2019  3:21 PM CDT -----  Contact: Yayo  tel:  Caller says she tried to schedule a f/u appt. W/ you and she did schedule your first available which is in March 2020.     Asking if you could please move her up and she will need refills for  :  Meloxican   And  Tramadol.      PHarmacy:  [facility] on [address]  .   Pls call.

## 2019-10-18 ENCOUNTER — HOSPITAL ENCOUNTER (EMERGENCY)
Facility: OTHER | Age: 54
Discharge: HOME | End: 2019-10-18
Attending: EMERGENCY MEDICINE
Payer: MEDICAID

## 2019-10-18 VITALS
WEIGHT: 283 LBS | SYSTOLIC BLOOD PRESSURE: 150 MMHG | RESPIRATION RATE: 18 BRPM | BODY MASS INDEX: 47.15 KG/M2 | HEART RATE: 89 BPM | HEIGHT: 65 IN | TEMPERATURE: 98 F | DIASTOLIC BLOOD PRESSURE: 81 MMHG | OXYGEN SATURATION: 100 %

## 2019-10-18 DIAGNOSIS — M54.2 NECK PAIN: Primary | ICD-10-CM

## 2019-10-18 PROCEDURE — S0020 INJECTION, BUPIVICAINE HYDRO: HCPCS | Performed by: EMERGENCY MEDICINE

## 2019-10-18 PROCEDURE — 96372 THER/PROPH/DIAG INJ SC/IM: CPT

## 2019-10-18 PROCEDURE — 63600175 PHARM REV CODE 636 W HCPCS: Performed by: EMERGENCY MEDICINE

## 2019-10-18 PROCEDURE — 25000003 PHARM REV CODE 250: Performed by: EMERGENCY MEDICINE

## 2019-10-18 PROCEDURE — 99284 EMERGENCY DEPT VISIT MOD MDM: CPT | Mod: 25

## 2019-10-18 RX ORDER — ORPHENADRINE CITRATE 100 MG/1
100 TABLET, EXTENDED RELEASE ORAL 2 TIMES DAILY
Qty: 20 TABLET | Refills: 0 | Status: SHIPPED | OUTPATIENT
Start: 2019-10-18 | End: 2019-10-28

## 2019-10-18 RX ORDER — IBUPROFEN 600 MG/1
600 TABLET ORAL EVERY 6 HOURS PRN
Qty: 20 TABLET | Refills: 0 | Status: ON HOLD | OUTPATIENT
Start: 2019-10-18 | End: 2019-11-25

## 2019-10-18 RX ORDER — BUPIVACAINE HYDROCHLORIDE 5 MG/ML
10 INJECTION, SOLUTION EPIDURAL; INTRACAUDAL
Status: COMPLETED | OUTPATIENT
Start: 2019-10-18 | End: 2019-10-18

## 2019-10-18 RX ORDER — KETOROLAC TROMETHAMINE 30 MG/ML
30 INJECTION, SOLUTION INTRAMUSCULAR; INTRAVENOUS
Status: COMPLETED | OUTPATIENT
Start: 2019-10-18 | End: 2019-10-18

## 2019-10-18 RX ADMIN — BUPIVACAINE HYDROCHLORIDE 50 MG: 5 INJECTION, SOLUTION EPIDURAL; INTRACAUDAL; PERINEURAL at 02:10

## 2019-10-18 RX ADMIN — KETOROLAC TROMETHAMINE 30 MG: 30 INJECTION, SOLUTION INTRAMUSCULAR; INTRAVENOUS at 02:10

## 2019-10-18 NOTE — ED PROVIDER NOTES
Encounter Date: 10/18/2019    SCRIBE #1 NOTE: I, Sammy Duncan, am scribing for, and in the presence of, Dr. Domingo.       History     Chief Complaint   Patient presents with    Neck Pain     staring earlier today that is making her head hurt     Time seen by provider: 2:20 AM    This is a 53 y.o. female with a history of HTN and DM who presents with complaint of sudden onset of neck pain that began nine hours ago and is worsening. She reports pain is radiating up towards her head causing headache, and around to her throat making it difficult to swallow. She reports she felt normal yesterday prior to onset of symptoms. She denies fever, or cough. She reports she works at [employer] and does a lot of lifting and moving.    The history is provided by the patient.     Review of patient's allergies indicates:   Allergen Reactions    Ace inhibitors Edema     Past Medical History:   Diagnosis Date    Arthritis     Diabetes mellitus     HLD (hyperlipidemia)     Hypertension      Past Surgical History:   Procedure Laterality Date    COLONOSCOPY N/A 6/27/2017    Procedure: COLONOSCOPY;  Surgeon: Evelin Arceo MD;  Location: [facility] ([address] [place] [address]);  Service: Endoscopy;  Laterality: N/A;  2 nd floor d/t BMI > 50 kg/m3    TONSILLECTOMY      TUBAL LIGATION       Family History   Problem Relation Age of Onset    Breast cancer Mother 65    Cancer Mother     Diabetes Mother     Colon polyps Mother     Hypertension Mother     Diabetes Father     Breast cancer Sister 56    No Known Problems Brother     Diabetes Paternal Uncle     Breast cancer Maternal Grandmother     Colon cancer Maternal Grandmother     No Known Problems Maternal Grandfather     Diabetes Paternal Grandmother     Heart disease Paternal Grandmother     No Known Problems Paternal Grandfather     Breast cancer Maternal Aunt     Esophageal cancer Neg Hx     Irritable bowel syndrome Neg Hx     Rectal cancer Neg Hx     Stomach  cancer Neg Hx     Ulcerative colitis Neg Hx     Celiac disease Neg Hx     Crohn's disease Neg Hx     Amblyopia Neg Hx     Blindness Neg Hx     Cataracts Neg Hx     Glaucoma Neg Hx     Macular degeneration Neg Hx     Retinal detachment Neg Hx     Strabismus Neg Hx     Stroke Neg Hx     Thyroid disease Neg Hx      Social History     Tobacco Use    Smoking status: Never Smoker    Smokeless tobacco: Never Used   Substance Use Topics    Alcohol use: No     Frequency: Never     Comment: occasional    Drug use: No     Review of Systems   Constitutional: Negative for fever.   HENT: Positive for trouble swallowing.    Respiratory: Negative for cough.    Cardiovascular: Negative for chest pain.   Gastrointestinal: Negative for nausea.   Genitourinary: Negative for dysuria.   Musculoskeletal: Positive for neck pain.   Skin: Negative for rash.   Neurological: Positive for headaches.   Hematological: Does not bruise/bleed easily.   All other systems reviewed and are negative.      Physical Exam     Initial Vitals [10/18/19 0217]   BP Pulse Resp Temp SpO2   (!) 150/81 89 18 98 °F (36.7 °C) 100 %      MAP       --         Physical Exam    Nursing note and vitals reviewed.  Constitutional: She appears well-developed and well-nourished. She is not diaphoretic. No distress.   HENT:   Head: Normocephalic and atraumatic.   Eyes: Conjunctivae and EOM are normal.   Neck: Normal range of motion. Neck supple.   Cardiovascular: Normal rate, regular rhythm and normal heart sounds.   Pulmonary/Chest: Breath sounds normal. No respiratory distress. She has no wheezes. She has no rhonchi. She has no rales.   Musculoskeletal: Normal range of motion. She exhibits no edema.   Diffuse tenderness to palpation to right paraspinal region and over the right trapezius muscle.   Neurological: She is alert and oriented to person, place, and time.   Skin: Skin is warm and dry.         ED Course   Procedures  Labs Reviewed - No data to  display      trigger-point injections done by myself.  With slough shin of 0.5% bupivacaine I did 3 trigger-point injections distributing 3 mL of the solution at each site.  Injection 1.  Was paraspinal muscles adjacent to C2, injection 2.  Was the paraspinal muscles adjacent to C6 and injection 3.  Was the paraspinal muscles adjacent to T1.  All of the injections were on the right side.  There were no complications the patient tolerated the procedure well.  Imaging Results    None          Medical Decision Making:   Initial Assessment:   53-year-old female with neck pain that started approximately 9-10 hours ago.  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  Based on my clinical exam I suspect this most likely musculoskeletal over the paraspinal muscles as well as the trapezius.  I do not suspect any intraoral, esophageal etiology.  History is inconsistent with acute coronary syndrome.  Will do couple of trigger-point injections and give Toradol and discharge with anti-inflammatories and muscle relaxant.    Patient discharged home in stable condition. Diagnosis and treatment plan explained to patient and/or family member who is at bedside. I have answered all questions and the patient is satisfied with the plan of care. The patient demonstrates understanding of the care plan. This is the extent to the patients complaints today here in the emergency department.            Scribe Attestation:   Scribe #1: I performed the above scribed service and the documentation accurately describes the services I performed. I attest to the accuracy of the note.    Attending Attestation:           Physician Attestation for Scribe:  Physician Attestation Statement for Scribe #1: I, Dr. Domingo, reviewed documentation, as scribed by Sammy Duncan in my presence, and it is both accurate and complete.                    Clinical Impression:     1. Neck pain                                   Yg  JENNIFER Domingo, DO  10/18/19 0245

## 2019-10-18 NOTE — ED NOTES
Pt has pain to her neck with pulling in her head cause in a headache. Pain started earlier in the day and has gotten worse

## 2019-11-12 ENCOUNTER — PATIENT OUTREACH (OUTPATIENT)
Dept: ADMINISTRATIVE | Facility: OTHER | Age: 54
End: 2019-11-12

## 2019-11-12 ENCOUNTER — OFFICE VISIT (OUTPATIENT)
Dept: ORTHOPEDICS | Facility: CLINIC | Age: 54
End: 2019-11-12
Payer: MEDICAID

## 2019-11-12 DIAGNOSIS — M17.0 PRIMARY OSTEOARTHRITIS OF BOTH KNEES: Primary | ICD-10-CM

## 2019-11-12 PROCEDURE — 99212 OFFICE O/P EST SF 10 MIN: CPT | Mod: PBBFAC,25 | Performed by: NURSE PRACTITIONER

## 2019-11-12 PROCEDURE — 99213 PR OFFICE/OUTPT VISIT, EST, LEVL III, 20-29 MIN: ICD-10-PCS | Mod: 25,S$PBB,, | Performed by: NURSE PRACTITIONER

## 2019-11-12 PROCEDURE — 20610 DRAIN/INJ JOINT/BURSA W/O US: CPT | Mod: 50,PBBFAC | Performed by: NURSE PRACTITIONER

## 2019-11-12 PROCEDURE — 99999 PR PBB SHADOW E&M-EST. PATIENT-LVL II: ICD-10-PCS | Mod: PBBFAC,,, | Performed by: NURSE PRACTITIONER

## 2019-11-12 PROCEDURE — 20610 DRAIN/INJ JOINT/BURSA W/O US: CPT | Mod: 50,S$PBB,, | Performed by: NURSE PRACTITIONER

## 2019-11-12 PROCEDURE — 99999 PR PBB SHADOW E&M-EST. PATIENT-LVL II: CPT | Mod: PBBFAC,,, | Performed by: NURSE PRACTITIONER

## 2019-11-12 PROCEDURE — 20610 PR DRAIN/INJECT LARGE JOINT/BURSA: ICD-10-PCS | Mod: 50,S$PBB,, | Performed by: NURSE PRACTITIONER

## 2019-11-12 PROCEDURE — 99213 OFFICE O/P EST LOW 20 MIN: CPT | Mod: 25,S$PBB,, | Performed by: NURSE PRACTITIONER

## 2019-11-12 RX ADMIN — TRIAMCINOLONE ACETONIDE 80 MG: 40 INJECTION, SUSPENSION INTRA-ARTICULAR; INTRAMUSCULAR at 11:11

## 2019-11-12 NOTE — PROGRESS NOTES
CC: bilateral knee pain    HPI: Pt with c/o bilateral knee pain for the past several years. The pain is medial and aching and after standing for long periods, the pain is global. She is on pain management for tramadol which helps with the pain, but she works at the [employer] and after standing for long periods she has increased pain and swelling. She has known djd of both of her knees. She is currently working on weight loss so that she can have knee replacement surgery. She is ambulating without assistive device. There is not a limp.    ROS  General: denies fever and chills  Resp: no c/o sob  CVS: no c/o cp  MSK: c/o bilateral knee pain which is medial and aching    PE  General: AAOx3, pleasant and cooperative  Resp: respirations even and unlabored  MSK: bilateral knee exam  -5 degrees extension  100 degrees flexion  No warmth or erythema   - effusion    Assessment:  Bilateral knee djd    Plan:  Cortisone injections bilateral knees today  RICE  Pain medication as managed by PM&R  Continue with weight loss so that knee replacement will be an option- BMI needs to be less than 40   F/u as needed    Knee Injection Procedure Note    Diagnosis: bilateral knee degenerative arthritis  Indications: bilateral knee pain  Procedure Details: Verbal consent was obtained for the procedure. The injection site was identified and the skin was prepared with alcohol. The bilateral knee was injected from an anterolateral approach with 1 ml of Kenalog and 4 ml Lidocaine each under sterile technique using a 22 gauge needle. The needle was removed and the area cleansed and dressed.  Complications:  Patient tolerated the procedure well.    she was advised to rest the knee today, using ice and elevation as needed for comfort and swelling.Immediate relief of the knee pain may be short lived and secondary to the lidocaine. she may have an increase in discomfort tonight followed by steady improvement over the next several days.  It may take 1-2 weeks following the injection to get the full benefit of the medication.

## 2019-11-13 RX ORDER — TRIAMCINOLONE ACETONIDE 40 MG/ML
80 INJECTION, SUSPENSION INTRA-ARTICULAR; INTRAMUSCULAR
Status: COMPLETED | OUTPATIENT
Start: 2019-11-12 | End: 2019-11-12

## 2019-11-19 ENCOUNTER — PATIENT OUTREACH (OUTPATIENT)
Dept: ADMINISTRATIVE | Facility: OTHER | Age: 54
End: 2019-11-19

## 2019-11-20 ENCOUNTER — TELEPHONE (OUTPATIENT)
Dept: INTERNAL MEDICINE | Facility: CLINIC | Age: 54
End: 2019-11-20

## 2019-11-20 ENCOUNTER — HOSPITAL ENCOUNTER (EMERGENCY)
Facility: OTHER | Age: 54
Discharge: HOME | End: 2019-11-21
Attending: EMERGENCY MEDICINE
Payer: MEDICAID

## 2019-11-20 ENCOUNTER — NURSE TRIAGE (OUTPATIENT)
Dept: ADMINISTRATIVE | Facility: CLINIC | Age: 54
End: 2019-11-20

## 2019-11-20 ENCOUNTER — HOSPITAL ENCOUNTER (EMERGENCY)
Facility: OTHER | Age: 54
Discharge: HOME | End: 2019-11-20
Attending: EMERGENCY MEDICINE
Payer: MEDICAID

## 2019-11-20 VITALS
OXYGEN SATURATION: 97 % | RESPIRATION RATE: 17 BRPM | TEMPERATURE: 98 F | SYSTOLIC BLOOD PRESSURE: 169 MMHG | DIASTOLIC BLOOD PRESSURE: 77 MMHG | HEIGHT: 65 IN | WEIGHT: 263 LBS | HEART RATE: 77 BPM | BODY MASS INDEX: 43.82 KG/M2

## 2019-11-20 DIAGNOSIS — B37.31 YEAST INFECTION OF THE VAGINA: ICD-10-CM

## 2019-11-20 DIAGNOSIS — N39.0 URINARY TRACT INFECTION WITHOUT HEMATURIA, SITE UNSPECIFIED: ICD-10-CM

## 2019-11-20 DIAGNOSIS — R73.9 HYPERGLYCEMIA: Primary | ICD-10-CM

## 2019-11-20 DIAGNOSIS — E11.65 TYPE 2 DIABETES MELLITUS WITH HYPERGLYCEMIA, UNSPECIFIED WHETHER LONG TERM INSULIN USE: Primary | ICD-10-CM

## 2019-11-20 DIAGNOSIS — E11.9 CONTROLLED TYPE 2 DIABETES MELLITUS WITHOUT COMPLICATION, WITH LONG-TERM CURRENT USE OF INSULIN: ICD-10-CM

## 2019-11-20 DIAGNOSIS — R73.9 HYPERGLYCEMIA: ICD-10-CM

## 2019-11-20 DIAGNOSIS — Z79.4 CONTROLLED TYPE 2 DIABETES MELLITUS WITHOUT COMPLICATION, WITH LONG-TERM CURRENT USE OF INSULIN: ICD-10-CM

## 2019-11-20 LAB
ALBUMIN SERPL BCP-MCNC: 3.6 G/DL (ref 3.5–5.2)
ALP SERPL-CCNC: 89 U/L (ref 55–135)
ALT SERPL W/O P-5'-P-CCNC: 23 U/L (ref 10–44)
ANION GAP SERPL CALC-SCNC: 9 MMOL/L (ref 8–16)
ANISOCYTOSIS BLD QL SMEAR: SLIGHT
AST SERPL-CCNC: 17 U/L (ref 10–40)
B-OH-BUTYR BLD STRIP-SCNC: 0.1 MMOL/L (ref 0–0.5)
BACTERIA #/AREA URNS HPF: ABNORMAL /HPF
BASOPHILS # BLD AUTO: 0.05 K/UL (ref 0–0.2)
BASOPHILS NFR BLD: 0.6 % (ref 0–1.9)
BILIRUB SERPL-MCNC: 0.4 MG/DL (ref 0.1–1)
BILIRUB UR QL STRIP: NEGATIVE
BUN SERPL-MCNC: 22 MG/DL (ref 6–20)
CALCIUM SERPL-MCNC: 10.5 MG/DL (ref 8.7–10.5)
CHLORIDE SERPL-SCNC: 105 MMOL/L (ref 95–110)
CLARITY UR: CLEAR
CO2 SERPL-SCNC: 21 MMOL/L (ref 23–29)
COLOR UR: YELLOW
CREAT SERPL-MCNC: 1.2 MG/DL (ref 0.5–1.4)
DIFFERENTIAL METHOD: ABNORMAL
EOSINOPHIL # BLD AUTO: 0.1 K/UL (ref 0–0.5)
EOSINOPHIL NFR BLD: 1.6 % (ref 0–8)
ERYTHROCYTE [DISTWIDTH] IN BLOOD BY AUTOMATED COUNT: 17.5 % (ref 11.5–14.5)
EST. GFR  (AFRICAN AMERICAN): 60 ML/MIN/1.73 M^2
EST. GFR  (NON AFRICAN AMERICAN): 52 ML/MIN/1.73 M^2
EXTRA BLUE TOP RAINBOW: NORMAL
EXTRA GREEN TOP RAINBOW: NORMAL
EXTRA LAVENDER TOP RAINBOW: NORMAL
EXTRA RED TOP RAINBOW: NORMAL
GIANT PLATELETS BLD QL SMEAR: PRESENT
GLUCOSE SERPL-MCNC: 388 MG/DL (ref 70–110)
GLUCOSE UR QL STRIP: ABNORMAL
HCT VFR BLD AUTO: 41.9 % (ref 37–48.5)
HGB BLD-MCNC: 12.7 G/DL (ref 12–16)
HGB UR QL STRIP: ABNORMAL
HYPOCHROMIA BLD QL SMEAR: ABNORMAL
IMM GRANULOCYTES # BLD AUTO: 0.02 K/UL (ref 0–0.04)
IMM GRANULOCYTES NFR BLD AUTO: 0.2 % (ref 0–0.5)
KETONES UR QL STRIP: NEGATIVE
LEUKOCYTE ESTERASE UR QL STRIP: ABNORMAL
LYMPHOCYTES # BLD AUTO: 2 K/UL (ref 1–4.8)
LYMPHOCYTES NFR BLD: 22.3 % (ref 18–48)
MCH RBC QN AUTO: 21.7 PG (ref 27–31)
MCHC RBC AUTO-ENTMCNC: 30.3 G/DL (ref 32–36)
MCV RBC AUTO: 72 FL (ref 82–98)
MICROSCOPIC COMMENT: ABNORMAL
MONOCYTES # BLD AUTO: 0.6 K/UL (ref 0.3–1)
MONOCYTES NFR BLD: 6.7 % (ref 4–15)
NEUTROPHILS # BLD AUTO: 6.1 K/UL (ref 1.8–7.7)
NEUTROPHILS NFR BLD: 68.6 % (ref 38–73)
NITRITE UR QL STRIP: NEGATIVE
NRBC BLD-RTO: 0 /100 WBC
PH UR STRIP: 6 [PH] (ref 5–8)
PLATELET # BLD AUTO: 238 K/UL (ref 150–350)
PLATELET BLD QL SMEAR: ABNORMAL
PMV BLD AUTO: ABNORMAL FL (ref 9.2–12.9)
POCT GLUCOSE: 283 MG/DL (ref 70–110)
POCT GLUCOSE: 321 MG/DL (ref 70–110)
POCT GLUCOSE: 332 MG/DL (ref 70–110)
POTASSIUM SERPL-SCNC: 5 MMOL/L (ref 3.5–5.1)
PROT SERPL-MCNC: 7.5 G/DL (ref 6–8.4)
PROT UR QL STRIP: ABNORMAL
RBC # BLD AUTO: 5.86 M/UL (ref 4–5.4)
RBC #/AREA URNS HPF: 5 /HPF (ref 0–4)
SODIUM SERPL-SCNC: 135 MMOL/L (ref 136–145)
SP GR UR STRIP: 1.02 (ref 1–1.03)
SQUAMOUS #/AREA URNS HPF: 11 /HPF
URN SPEC COLLECT METH UR: ABNORMAL
UROBILINOGEN UR STRIP-ACNC: NEGATIVE EU/DL
WBC # BLD AUTO: 8.83 K/UL (ref 3.9–12.7)
WBC #/AREA URNS HPF: 23 /HPF (ref 0–5)
WBC CLUMPS URNS QL MICRO: ABNORMAL
YEAST URNS QL MICRO: ABNORMAL

## 2019-11-20 PROCEDURE — 99284 EMERGENCY DEPT VISIT MOD MDM: CPT | Mod: 25

## 2019-11-20 PROCEDURE — 96361 HYDRATE IV INFUSION ADD-ON: CPT

## 2019-11-20 PROCEDURE — 87086 URINE CULTURE/COLONY COUNT: CPT

## 2019-11-20 PROCEDURE — 99284 EMERGENCY DEPT VISIT MOD MDM: CPT | Mod: 25,27

## 2019-11-20 PROCEDURE — 85025 COMPLETE CBC W/AUTO DIFF WBC: CPT

## 2019-11-20 PROCEDURE — 82962 GLUCOSE BLOOD TEST: CPT

## 2019-11-20 PROCEDURE — 96374 THER/PROPH/DIAG INJ IV PUSH: CPT

## 2019-11-20 PROCEDURE — 63600175 PHARM REV CODE 636 W HCPCS: Performed by: EMERGENCY MEDICINE

## 2019-11-20 PROCEDURE — 82010 KETONE BODYS QUAN: CPT

## 2019-11-20 PROCEDURE — 96372 THER/PROPH/DIAG INJ SC/IM: CPT

## 2019-11-20 PROCEDURE — 81000 URINALYSIS NONAUTO W/SCOPE: CPT

## 2019-11-20 PROCEDURE — 80053 COMPREHEN METABOLIC PANEL: CPT

## 2019-11-20 RX ORDER — NITROFURANTOIN 25; 75 MG/1; MG/1
100 CAPSULE ORAL
Status: COMPLETED | OUTPATIENT
Start: 2019-11-21 | End: 2019-11-21

## 2019-11-20 RX ORDER — FLUCONAZOLE 150 MG/1
150 TABLET ORAL
Status: COMPLETED | OUTPATIENT
Start: 2019-11-21 | End: 2019-11-21

## 2019-11-20 RX ADMIN — SODIUM CHLORIDE 1000 ML: 0.9 INJECTION, SOLUTION INTRAVENOUS at 11:11

## 2019-11-20 RX ADMIN — INSULIN HUMAN 4 UNITS: 100 INJECTION, SOLUTION PARENTERAL at 01:11

## 2019-11-20 NOTE — ED TRIAGE NOTES
Pt reports to ED c/o hyperglycemia with urinary frequency, muscle cramps to upper extremities and polydipsia x 1 week. Pt reports recently changed insurance in June and having difficulty getting insulin filled. Pt compliant with all insulin but needs semaglutide refilled. Denies N/V/D, dysuria/heamtira, fevers, chills chest pain or SOB.     Patient identifiers for Yayo Carver checked and correct.  LOC: Patient is awake, alert, and aware of environment with an appropriate affect. Patient is oriented x 3 and speaking appropriately.  APPEARANCE: Patient resting comfortably and in no acute distress. Patient is clean and well groomed, patient's clothing is properly fastened.  SKIN: The skin is warm and dry. Patient has normal skin turgor and moist mucus membrances. Skin is intact; no bruising or breakdown noted.  MUSKULOSKELETAL: Patient is moving all extremities well, no obvious swelling or deformities noted. Pulses intact.   RESPIRATORY: Airway is open and patent. Respirations are spontaneous, even and unlabored. Normal effort and rate noted.  CARDIAC: Patient has a normal rate and rhythm. No peripheral edema noted. Capillary refill < 3 seconds.  ABDOMEN: No distention noted. Bowel sounds active in all 4 quadrants. Soft and non-tender upon palpation.  NEUROLOGICAL: PERRL. Facial expression is symmetrical. Hand grasps are equal bilaterally. Normal sensation in all extremities when touched with finger. Following commands appropriately.   Allergies reported:   Review of patient's allergies indicates:   Allergen Reactions    Ace inhibitors Edema

## 2019-11-20 NOTE — ED NOTES
"Pt rounded on. Pt says she has been out of her insulin for almost a month due to "insurance complications". Pt was sent here by an on call nurse. She has no complaints at this time. Call light in reach and plan of care updated  "

## 2019-11-20 NOTE — ED NOTES
Resp notified of VBG, RT currently in intubation. Pt sleeping in stretcher in NAD with even, unlabored respirations. NS infusing to left AC. Per Dr. Sosa, admin 2 L NC and then will recheck glucose

## 2019-11-20 NOTE — TELEPHONE ENCOUNTER
Reason for Disposition   Blood glucose > 500 mg/dl (27.5 mmol/l)    Additional Information   Negative: Unconscious or difficult to awaken   Negative: Acting confused (e.g., disoriented, slurred speech)   Negative: Very weak (can't stand)   Negative: Sounds like a life-threatening emergency to the triager   Negative: Vomiting and signs of dehydration (e.g., very dry mouth, lightheaded, etc.)   Negative: Blood glucose > 240 mg/dl (13 mmol/l) and rapid breathing    Protocols used: DIABETES - HIGH BLOOD SUGAR-A-OH    Pt called stated her BS has been elevated. On Mon her blood sugar was 573, it went down to 243 then back up to 5 something. Tues it was 3 something then went up to 5 something . Today . Cramps in legs and feet. Pt stated she has been using the bathroom a lot. Pt also stated she has been off of her insulin. Care advice recommend pt go to er now.

## 2019-11-20 NOTE — TELEPHONE ENCOUNTER
----- Message from Windy Hutton sent at 11/20/2019  9:02 AM CST -----  Contact: DEMARCO DELACRUZ   Name of Who is Calling:DEMARCO DELACRUZ        What is the request in detail: Patient is requesting a call back concerning her diabetes she states it has been going up and down Monday 573 and later that day it was 273 and today it is 343 she needs to know what should she do       Can the clinic reply by [facility]: no      What Number to Call Back [facility] not [facility]: [phone]

## 2019-11-20 NOTE — ED NOTES
Pt ambulatory to restroom with slow and steady gait. Pt appears in NAD with even, unlabored respirations. No change in pt status. VSS

## 2019-11-20 NOTE — ED PROVIDER NOTES
Encounter Date: 11/20/2019    SCRIBE #1 NOTE: IMichelle am scribing for, and in the presence of, Dr. Torrez.       History     Chief Complaint   Patient presents with    Hyperglycemia     reports elevated blood gluocose at home with frequent urination and excessive thirst       Time seen by provider: 11:50 AM    This is a 53 y.o. female with hx of asthma and HTN who presents with complaint of elevated blood gluocose for the past 4 days with frequent urination and polydipsia. She states she ran out of her insulin a month ago due to losing her health insurance. She reports associated cramps to lower extremities secondary to dehydration. She denies hx of DKA. She denies tobacco, ETOH or illicit drug use.    The history is provided by the patient.     Review of patient's allergies indicates:   Allergen Reactions    Ace inhibitors Edema     Past Medical History:   Diagnosis Date    Arthritis     Diabetes mellitus     HLD (hyperlipidemia)     Hypertension      Past Surgical History:   Procedure Laterality Date    COLONOSCOPY N/A 6/27/2017    Procedure: COLONOSCOPY;  Surgeon: Evelin Arceo MD;  Location: [facility] ([address] [place] [address]);  Service: Endoscopy;  Laterality: N/A;  2 nd floor d/t BMI > 50 kg/m3    TONSILLECTOMY      TUBAL LIGATION       Family History   Problem Relation Age of Onset    Breast cancer Mother 65    Cancer Mother     Diabetes Mother     Colon polyps Mother     Hypertension Mother     Diabetes Father     Breast cancer Sister 56    No Known Problems Brother     Diabetes Paternal Uncle     Breast cancer Maternal Grandmother     Colon cancer Maternal Grandmother     No Known Problems Maternal Grandfather     Diabetes Paternal Grandmother     Heart disease Paternal Grandmother     No Known Problems Paternal Grandfather     Breast cancer Maternal Aunt     Esophageal cancer Neg Hx     Irritable bowel syndrome Neg Hx     Rectal cancer Neg Hx     Stomach cancer Neg Hx      Ulcerative colitis Neg Hx     Celiac disease Neg Hx     Crohn's disease Neg Hx     Amblyopia Neg Hx     Blindness Neg Hx     Cataracts Neg Hx     Glaucoma Neg Hx     Macular degeneration Neg Hx     Retinal detachment Neg Hx     Strabismus Neg Hx     Stroke Neg Hx     Thyroid disease Neg Hx      Social History     Tobacco Use    Smoking status: Never Smoker    Smokeless tobacco: Never Used   Substance Use Topics    Alcohol use: No     Frequency: Never     Comment: occasional    Drug use: No     Review of Systems   Constitutional: Negative for chills and fever.   HENT: Negative for congestion, rhinorrhea and sore throat.    Eyes: Negative for visual disturbance.   Respiratory: Negative for cough and shortness of breath.    Cardiovascular: Negative for chest pain.   Gastrointestinal: Negative for abdominal pain, diarrhea, nausea and vomiting.   Genitourinary: Negative for dysuria.        Positive for polyuria. Positive for polydipsia.   Musculoskeletal: Negative for back pain.        Positive for lower extremity cramping.   Skin: Negative for rash.   Neurological: Negative for dizziness, weakness and light-headedness.   Psychiatric/Behavioral: Negative for confusion.       Physical Exam     Initial Vitals [11/20/19 1031]   BP Pulse Resp Temp SpO2   (!) 160/74 87 18 97.9 °F (36.6 °C) 97 %      MAP       --         Physical Exam    Nursing note and vitals reviewed.  Constitutional: She appears well-developed and well-nourished. She is not diaphoretic. No distress.   HENT:   Head: Normocephalic and atraumatic.   Sticky mucous membranes, dry lips.   Eyes: Conjunctivae and EOM are normal. Pupils are equal, round, and reactive to light. No scleral icterus.   Neck: Normal range of motion. Neck supple.   Cardiovascular: Normal rate, regular rhythm, S1 normal, S2 normal and normal heart sounds. Exam reveals no gallop and no friction rub.    No murmur heard.  Pulmonary/Chest: Breath sounds normal. No respiratory  distress. She has no wheezes. She has no rhonchi. She has no rales.   Abdominal: Soft. Bowel sounds are normal. There is no tenderness. There is no rebound and no guarding.   Musculoskeletal: Normal range of motion. She exhibits no edema or tenderness.   No lower extremity edema.    Lymphadenopathy:     She has no cervical adenopathy.   Neurological: She is alert and oriented to person, place, and time.   Skin: Skin is warm and dry. Capillary refill takes less than 2 seconds. No rash noted. No pallor.   Psychiatric: She has a normal mood and affect. Her behavior is normal. Judgment and thought content normal.         ED Course   Procedures  Labs Reviewed   URINALYSIS, REFLEX TO URINE CULTURE - Abnormal; Notable for the following components:       Result Value    Protein, UA Trace (*)     Glucose, UA 3+ (*)     Occult Blood UA 1+ (*)     Leukocytes, UA 2+ (*)     All other components within normal limits    Narrative:     Preferred Collection Type->Urine, Clean Catch   COMPREHENSIVE METABOLIC PANEL - Abnormal; Notable for the following components:    Sodium 135 (*)     CO2 21 (*)     Glucose 388 (*)     BUN, Bld 22 (*)     eGFR if non  52 (*)     All other components within normal limits   CBC W/ AUTO DIFFERENTIAL - Abnormal; Notable for the following components:    RBC 5.86 (*)     Mean Corpuscular Volume 72 (*)     Mean Corpuscular Hemoglobin 21.7 (*)     Mean Corpuscular Hemoglobin Conc 30.3 (*)     RDW 17.5 (*)     All other components within normal limits   URINALYSIS MICROSCOPIC - Abnormal; Notable for the following components:    RBC, UA 5 (*)     WBC, UA 23 (*)     WBC Clumps, UA Occasional (*)     Bacteria Few (*)     Yeast, UA Occasional (*)     All other components within normal limits    Narrative:     Preferred Collection Type->Urine, Clean Catch   POCT GLUCOSE - Abnormal; Notable for the following components:    POCT Glucose 332 (*)     All other components within normal limits   POCT  GLUCOSE - Abnormal; Notable for the following components:    POCT Glucose 321 (*)     All other components within normal limits   POCT GLUCOSE - Abnormal; Notable for the following components:    POCT Glucose 283 (*)     All other components within normal limits   CULTURE, URINE   BLUE-TOP TUBE   GREEN-TOP TUBE   LAVENDER-TOP TUBE   RED-TOP TUBE   BETA - HYDROXYBUTYRATE, SERUM   CBC W/ AUTO DIFFERENTIAL   COMPREHENSIVE METABOLIC PANEL          Imaging Results    None          Medical Decision Making:   History:   Old Medical Records: I decided to obtain old medical records.  Clinical Tests:   Lab Tests: Ordered and Reviewed            Scribe Attestation:   Scribe #1: I performed the above scribed service and the documentation accurately describes the services I performed. I attest to the accuracy of the note.    Attending Attestation:           Physician Attestation for Scribe:  Physician Attestation Statement for Scribe #1: I, Dr. Torrez, reviewed documentation, as scribed by Michelle Herrera in my presence, and it is both accurate and complete.         Attending ED Notes:   Emergent evaluation of a 53-year-old female with past medical history of diabetes presents to the emergency room with elevation of blood glucose.  Patient is afebrile, nontoxic-appearing stable vital signs except for elevation of blood pressure.  No clinical or diagnostic evidence of DKA.  Blood glucose is 388.  Patient has no elevation white blood cell count.  H&H within normal limits. Beta hydroxybutyrate is 0.1.  Urinary analysis reveals protein, sugar, blood, leukocytes and occasional white blood cell clumps with few bacteria.  The patient is extensively counseled on her diagnosis and treatment including all laboratory and physical exam findings.  The patient discharged good condition and directed follow-up with her PCP in the next 24-48 hours.                        Clinical Impression:     1. Type 2 diabetes mellitus with hyperglycemia,  unspecified whether long term insulin use    2. Hyperglycemia    3. Controlled type 2 diabetes mellitus without complication, with long-term current use of insulin                              Miah Navarrete MD  11/21/19 2032

## 2019-11-20 NOTE — TELEPHONE ENCOUNTER
Contacted patient to discuss blood glucose readings, observed patient was in the hospital via chart. Called to verify if she was in the hospital for blood glucose levels. States she is and currently being treated.

## 2019-11-21 ENCOUNTER — TELEPHONE (OUTPATIENT)
Dept: PRIMARY CARE CLINIC | Facility: CLINIC | Age: 54
End: 2019-11-21

## 2019-11-21 VITALS
TEMPERATURE: 98 F | DIASTOLIC BLOOD PRESSURE: 87 MMHG | BODY MASS INDEX: 43.82 KG/M2 | SYSTOLIC BLOOD PRESSURE: 144 MMHG | HEIGHT: 65 IN | RESPIRATION RATE: 100 BRPM | WEIGHT: 263 LBS | OXYGEN SATURATION: 98 % | HEART RATE: 85 BPM

## 2019-11-21 LAB
POCT GLUCOSE: 439 MG/DL (ref 70–110)
POCT GLUCOSE: 449 MG/DL (ref 70–110)
POCT GLUCOSE: 480 MG/DL (ref 70–110)

## 2019-11-21 PROCEDURE — 63600175 PHARM REV CODE 636 W HCPCS: Performed by: EMERGENCY MEDICINE

## 2019-11-21 PROCEDURE — 25000003 PHARM REV CODE 250: Performed by: EMERGENCY MEDICINE

## 2019-11-21 RX ORDER — NITROFURANTOIN 25; 75 MG/1; MG/1
100 CAPSULE ORAL 2 TIMES DAILY
Qty: 9 CAPSULE | Refills: 0 | Status: ON HOLD | OUTPATIENT
Start: 2019-11-21 | End: 2019-11-25 | Stop reason: HOSPADM

## 2019-11-21 RX ORDER — METFORMIN HYDROCHLORIDE 500 MG/1
1000 TABLET ORAL
Status: COMPLETED | OUTPATIENT
Start: 2019-11-21 | End: 2019-11-21

## 2019-11-21 RX ADMIN — FLUCONAZOLE 150 MG: 150 TABLET ORAL at 12:11

## 2019-11-21 RX ADMIN — INSULIN HUMAN 6 UNITS: 100 INJECTION, SOLUTION PARENTERAL at 12:11

## 2019-11-21 RX ADMIN — INSULIN HUMAN 6 UNITS: 100 INJECTION, SOLUTION PARENTERAL at 02:11

## 2019-11-21 RX ADMIN — METFORMIN HYDROCHLORIDE 1000 MG: 500 TABLET, FILM COATED ORAL at 02:11

## 2019-11-21 RX ADMIN — NITROFURANTOIN (MONOHYDRATE/MACROCRYSTALS) 100 MG: 75; 25 CAPSULE ORAL at 12:11

## 2019-11-21 NOTE — ED PROVIDER NOTES
Encounter Date: 11/20/2019    SCRIBE #1 NOTE: I, Kim Jimenez, am scribing for, and in the presence of, Dr. Carver.       History     Chief Complaint   Patient presents with    Hyperglycemia     pt reports being unable to obtain medication for elevated blood sugar, was seen earlier in ER     Time seen by provider: 11:48 PM    This is a 53 y.o. female with a hx of HTN and DM who presents with complaint of recurrent high blood sugar. She had high blood sugar for the past three days following normal blood sugar levels at 100. She was here earlier today for same complaint. Workup was performed with no acute findings except hyperglycemia and she was given Insulin with improvement prior to discharge. Since discharge, her blood sugar went back up to 330. She reports last reading at 550. She has not had any caffeine, coffee, or soda today. She also reports urinary frequency, increased thirst, mild vaginal itching, vaginal discharge, and BLE soreness. She had been taking Insulin once a week but ran out about a month ago. She stopped Metformin before that due to weight loss. She denies any dysuria.    The history is provided by the patient.     Review of patient's allergies indicates:   Allergen Reactions    Ace inhibitors Edema     Past Medical History:   Diagnosis Date    Arthritis     Diabetes mellitus     HLD (hyperlipidemia)     Hypertension      Past Surgical History:   Procedure Laterality Date    COLONOSCOPY N/A 6/27/2017    Procedure: COLONOSCOPY;  Surgeon: Evelin Arceo MD;  Location: [address]);  Service: Endoscopy;  Laterality: N/A;  2 nd floor d/t BMI > 50 kg/m3    TONSILLECTOMY      TUBAL LIGATION       Family History   Problem Relation Age of Onset    Breast cancer Mother 65    Cancer Mother     Diabetes Mother     Colon polyps Mother     Hypertension Mother     Diabetes Father     Breast cancer Sister 56    No Known Problems Brother     Diabetes Paternal Uncle     Breast  cancer Maternal Grandmother     Colon cancer Maternal Grandmother     No Known Problems Maternal Grandfather     Diabetes Paternal Grandmother     Heart disease Paternal Grandmother     No Known Problems Paternal Grandfather     Breast cancer Maternal Aunt     Esophageal cancer Neg Hx     Irritable bowel syndrome Neg Hx     Rectal cancer Neg Hx     Stomach cancer Neg Hx     Ulcerative colitis Neg Hx     Celiac disease Neg Hx     Crohn's disease Neg Hx     Amblyopia Neg Hx     Blindness Neg Hx     Cataracts Neg Hx     Glaucoma Neg Hx     Macular degeneration Neg Hx     Retinal detachment Neg Hx     Strabismus Neg Hx     Stroke Neg Hx     Thyroid disease Neg Hx      Social History     Tobacco Use    Smoking status: Never Smoker    Smokeless tobacco: Never Used   Substance Use Topics    Alcohol use: No     Frequency: Never     Comment: occasional    Drug use: No     Review of Systems   Constitutional: Negative for fever.   HENT: Negative for sore throat.    Respiratory: Negative for shortness of breath.    Cardiovascular: Negative for chest pain.   Gastrointestinal: Negative for nausea.   Endocrine:        Notes increased thirst.   Genitourinary: Positive for frequency and vaginal discharge. Negative for dysuria.        Notes vaginal itching.   Musculoskeletal: Negative for back pain.        Notes BLE pain.   Skin: Negative for rash.   Neurological: Negative for weakness.   Hematological: Does not bruise/bleed easily.       Physical Exam     Initial Vitals [11/20/19 2305]   BP Pulse Resp Temp SpO2   (!) 171/82 94 18 98.5 °F (36.9 °C) 98 %      MAP       --         Physical Exam    Nursing note and vitals reviewed.  Constitutional: She appears well-developed and well-nourished. She is not diaphoretic. No distress.   HENT:   Head: Normocephalic and atraumatic.   Mouth/Throat: Mucous membranes are dry.   Eyes: EOM are normal. Pupils are equal, round, and reactive to light.   Neck: Normal range of  motion. Neck supple.   Cardiovascular: Normal rate, regular rhythm and normal heart sounds. Exam reveals no gallop and no friction rub.    No murmur heard.  Pulmonary/Chest: Breath sounds normal. No respiratory distress. She has no wheezes. She has no rhonchi. She has no rales.   Musculoskeletal: Normal range of motion. She exhibits no edema or tenderness.   Neurological: She is alert and oriented to person, place, and time.   Skin: Skin is warm and dry.   Psychiatric: She has a normal mood and affect. Her behavior is normal. Judgment and thought content normal.         ED Course   Procedures  Labs Reviewed   POCT GLUCOSE - Abnormal; Notable for the following components:       Result Value    POCT Glucose 480 (*)     All other components within normal limits   POCT GLUCOSE - Abnormal; Notable for the following components:    POCT Glucose 439 (*)     All other components within normal limits   POCT GLUCOSE MONITORING CONTINUOUS             Medical Decision Making:   History:   Old Medical Records: I decided to obtain old medical records.  Initial Assessment:       53-year-old female with history of DM, HTN, presents with recurrent hyperglycemia.  Patient was seen here earlier for same complaint, had labs with hyperglycemia but no DKA/JIMMY, and was discharged after improvement of fingerstick and insulin.  She was previously on Qweekly insulin but ran out about a month ago; despite lack of medications, her sugars stayed relatively well controlled until the past 4 days when she noticed some increased urinary frequency and polydipsia.  She was previously on metformin but had a lot of weight loss so was taken off this months ago.  Weight loss + diabetic dietary compliance may be why patient had relatively well controlled sugars despite running out of insulin.  After discharge here she went to pharmacy but was unable to get Rx since that required pre approval from her PCP.  She checked her sugar at home and it went from 300  to 500s so  she came back to ED.  No other new symptoms other than persistent urinary frequency and polydipsia, with occasional muscle cramps.     Per chart review, patient with some signs of UTI and yeast on UA done earlier today, but was not given antibiotics for this.  This could be etiology of hyperglycemia for the past 4 days when sugars previously better controlled even off insulin.  Patient does have symptoms of yeast infection as well. No fevers, vomiting, CVA tenderness to suggest pyelonephritis.  Will start Macrobid, and also given dose of Diflucan for yeast infection, the patient can continue topical treatment as outpatient.  She was given subcu insulin with persistent hyperglycemia, so was given another dose.  No clinical sign of DKA or JIMMY are indication for repeat labs at this time.  Patient will follow up with PCP in the morning to get preauthorization to resume her insulin, and can return to the ED if unable to get Rx and with persistent hyperglycemia, or any other new symptoms or concerns.       Clinical Tests:   Lab Tests: Ordered and Reviewed            Scribe Attestation:   Scribe #1: I performed the above scribed service and the documentation accurately describes the services I performed. I attest to the accuracy of the note.    Attending Attestation:           Physician Attestation for Scribe:  Physician Attestation Statement for Scribe #1: I, Dr. Carver, reviewed documentation, as scribed by Kim Jimenez in my presence, and it is both accurate and complete.                               Clinical Impression:     1. Hyperglycemia    2. Urinary tract infection without hematuria, site unspecified    3. Yeast infection of the vagina                                Leander Carver MD  11/24/19 6657

## 2019-11-21 NOTE — TELEPHONE ENCOUNTER
"Left a detailed message for the patient to call back in regards to the on call nurse note.  Please call this pt on home number in AM, she has been out of insulin for a week trying to get insurance to fill her medication:   -Out of insulin,Went to ED earlier today cbg 576 when she woke up   -Was given insulin, iv fluids   -Given a prescription while there for ozempic and it had "medically necessary" written on it, but pharmacy states that has to be written by PCP and faxed over.   -cbg 413 right now   -On Call provider recommends ED again, and to f/u with office in the morning.      Routing comment      Carmen Dove,     Your message has been received. Fabiana Louie MD is currently out of the office and will return on 11/25/19. Please let us know if you require a response sooner than this and your message will be forwarded to the covering physician.     Thank you for using [facility]!        "

## 2019-11-21 NOTE — TELEPHONE ENCOUNTER
"Out of insulin  Went to ED earlier today cbg 576 when she woke up  Was given insulin, iv fluids  Given a prescription while there for ozempic and it had "medically necessary" written on it, but pharmacy states that has to be written by PCP and faxed over.  cbg 413 right now  On Call provider recommends ED again, and to f/u with office in the morning.    Reason for Disposition   Blood glucose > 400 mg/dl (22 mmol/l)    Additional Information   Negative: Unconscious or difficult to awaken   Negative: Acting confused (e.g., disoriented, slurred speech)   Negative: Very weak (e.g., can't stand)   Negative: Sounds like a life-threatening emergency to the triager   Negative: [1] Vomiting AND [2] signs of dehydration (e.g., very dry mouth, lightheaded, etc.)   Negative: [1] Blood glucose > 240 mg/dl (13 mmol/l) AND [2] rapid breathing   Negative: Blood glucose > 500 mg/dl (27.5 mmol/l)   Negative: [1] Blood glucose > 240 mg/dl (13 mmol/l) AND [2] urine ketones moderate-large (or more than 1+)   Negative: [1] Blood glucose > 240 mg/dl (13 mmol/l) AND [2] blood ketones > 1.5 mmol/l   Negative: [1] Blood glucose > 240 mg/dl (13 mmol/l) AND [2] vomiting AND [3] unable to check for ketones (in blood or urine)   Negative: [1] New onset Diabetes suspected (e.g., frequent urination, weak, weight loss) AND [2] vomiting or rapid breathing   Negative: Vomiting lasts > 4 hours   Negative: Patient sounds very sick or weak to the triager   Negative: Fever > 100.5 F (38.1 C)    Protocols used: ST DIABETES - HIGH BLOOD SUGAR-A-AH      "

## 2019-11-21 NOTE — TELEPHONE ENCOUNTER
"----- Message from Brenda Ngo sent at 11/21/2019 11:33 AM CST -----  Contact: DEMARCO DELACRUZ   Please refill the medication(s) listed below. The patient can be reached at this phone number once it is called into the pharmacy.    Medication #1: semaglutide (OZEMPIC) 0.25 mg or 0.5 mg(2 mg/1.5 mL) PnIj    Medication #2    Preferred Pharmacy: [id number] [address]    Additional: Patient states insurance will send paper work that needs "Medically Necessary" on the paper work    "

## 2019-11-21 NOTE — TELEPHONE ENCOUNTER
Spoke with the Yayo in regards to her ER visit blood sugars was 573 and because she states she can't get her prescription filled that was given to her by the ER physician per [facility]. She states that the prescription needed to say medically necessary in which it did but was told her PCP had to determine that and not the ER physician.  aYyo states that she doesn't have any medication and that her sugar are not being controled by diet and she needs her medication.Yayo states that she have been trying to be seen in bariatrics but was advised that her insurance was not accepted.  Then Yayo states that she have an appointment on December 2 ,but that she had also been trying to see  for a year and was not able to see. I explained that we would have gotten her in before a year , that  schedule have been opened. So I reviewed her chart and saw that the patient didn't want to see  because of the distance and that several appointments had been made for the patient since June and most was canceled or the patient no showed. Patient states that her sugars are still high and was advised by the on call nurse to go to the ED. I explained that maybe I could call [facility] to see if they can fill the prescription from the ER physician. Advised to make sure she keeps her appointment on 12/2/19. Conversation was understood and it ended.. Called [facility] asked if in fact they can get a patients rx filled because [facility] wouldn't fill it? Yes, The pharmacist asked for the patients mrn# and said she will call and get it transferred.conversation was understood and it ended.

## 2019-11-22 ENCOUNTER — PES CALL (OUTPATIENT)
Dept: ADMINISTRATIVE | Facility: CLINIC | Age: 54
End: 2019-11-22

## 2019-11-22 LAB
BACTERIA UR CULT: NORMAL
BACTERIA UR CULT: NORMAL

## 2019-11-24 ENCOUNTER — HOSPITAL ENCOUNTER (OUTPATIENT)
Facility: OTHER | Age: 54
Discharge: HOME | End: 2019-11-26
Attending: EMERGENCY MEDICINE | Admitting: EMERGENCY MEDICINE
Payer: MEDICAID

## 2019-11-24 DIAGNOSIS — E11.9 CONTROLLED TYPE 2 DIABETES MELLITUS WITHOUT COMPLICATION, WITH LONG-TERM CURRENT USE OF INSULIN: ICD-10-CM

## 2019-11-24 DIAGNOSIS — E86.0 DEHYDRATION: ICD-10-CM

## 2019-11-24 DIAGNOSIS — Z79.4 TYPE 2 DIABETES MELLITUS WITH HYPERGLYCEMIA, WITH LONG-TERM CURRENT USE OF INSULIN: Primary | ICD-10-CM

## 2019-11-24 DIAGNOSIS — R25.2 MUSCLE CRAMPS: ICD-10-CM

## 2019-11-24 DIAGNOSIS — Z79.4 CONTROLLED TYPE 2 DIABETES MELLITUS WITHOUT COMPLICATION, WITH LONG-TERM CURRENT USE OF INSULIN: ICD-10-CM

## 2019-11-24 DIAGNOSIS — D50.9 IRON DEFICIENCY ANEMIA, UNSPECIFIED IRON DEFICIENCY ANEMIA TYPE: ICD-10-CM

## 2019-11-24 DIAGNOSIS — N28.9 RENAL INSUFFICIENCY: ICD-10-CM

## 2019-11-24 DIAGNOSIS — E66.01 MORBID OBESITY WITH BMI OF 50.0-59.9, ADULT: ICD-10-CM

## 2019-11-24 DIAGNOSIS — E55.9 VITAMIN D DEFICIENCY: ICD-10-CM

## 2019-11-24 DIAGNOSIS — D64.9 ANEMIA, UNSPECIFIED TYPE: ICD-10-CM

## 2019-11-24 DIAGNOSIS — R73.9 HYPERGLYCEMIA: ICD-10-CM

## 2019-11-24 DIAGNOSIS — N17.9 AKI (ACUTE KIDNEY INJURY): ICD-10-CM

## 2019-11-24 DIAGNOSIS — E11.65 TYPE 2 DIABETES MELLITUS WITH HYPERGLYCEMIA, WITH LONG-TERM CURRENT USE OF INSULIN: Primary | ICD-10-CM

## 2019-11-24 LAB
ALBUMIN SERPL BCP-MCNC: 3.5 G/DL (ref 3.5–5.2)
ALLENS TEST: ABNORMAL
ALP SERPL-CCNC: 82 U/L (ref 55–135)
ALT SERPL W/O P-5'-P-CCNC: 25 U/L (ref 10–44)
ANION GAP SERPL CALC-SCNC: 11 MMOL/L (ref 8–16)
AST SERPL-CCNC: 15 U/L (ref 10–40)
B-OH-BUTYR BLD STRIP-SCNC: 0.1 MMOL/L (ref 0–0.5)
BASOPHILS # BLD AUTO: 0.08 K/UL (ref 0–0.2)
BASOPHILS NFR BLD: 0.8 % (ref 0–1.9)
BILIRUB SERPL-MCNC: 0.2 MG/DL (ref 0.1–1)
BILIRUB UR QL STRIP: NEGATIVE
BUN SERPL-MCNC: 26 MG/DL (ref 6–20)
CALCIUM SERPL-MCNC: 9.8 MG/DL (ref 8.7–10.5)
CHLORIDE SERPL-SCNC: 99 MMOL/L (ref 95–110)
CLARITY UR: CLEAR
CO2 SERPL-SCNC: 19 MMOL/L (ref 23–29)
COLOR UR: YELLOW
CREAT SERPL-MCNC: 1.8 MG/DL (ref 0.5–1.4)
DELSYS: ABNORMAL
DIFFERENTIAL METHOD: ABNORMAL
EOSINOPHIL # BLD AUTO: 0 K/UL (ref 0–0.5)
EOSINOPHIL NFR BLD: 0.4 % (ref 0–8)
ERYTHROCYTE [DISTWIDTH] IN BLOOD BY AUTOMATED COUNT: 15.8 % (ref 11.5–14.5)
ERYTHROCYTE [SEDIMENTATION RATE] IN BLOOD BY WESTERGREN METHOD: 20 MM/H
EST. GFR  (AFRICAN AMERICAN): 37 ML/MIN/1.73 M^2
EST. GFR  (NON AFRICAN AMERICAN): 32 ML/MIN/1.73 M^2
FIO2: 21
FLOW: 0
GLUCOSE SERPL-MCNC: 722 MG/DL (ref 70–110)
GLUCOSE SERPL-MCNC: >500 MG/DL (ref 70–110)
GLUCOSE UR QL STRIP: NEGATIVE
HCO3 UR-SCNC: 21.7 MMOL/L (ref 24–28)
HCT VFR BLD AUTO: 37.5 % (ref 37–48.5)
HGB BLD-MCNC: 11.8 G/DL (ref 12–16)
HGB UR QL STRIP: NEGATIVE
IMM GRANULOCYTES # BLD AUTO: 0.02 K/UL (ref 0–0.04)
IMM GRANULOCYTES NFR BLD AUTO: 0.2 % (ref 0–0.5)
KETONES UR QL STRIP: NEGATIVE
LEUKOCYTE ESTERASE UR QL STRIP: NEGATIVE
LYMPHOCYTES # BLD AUTO: 2.2 K/UL (ref 1–4.8)
LYMPHOCYTES NFR BLD: 21 % (ref 18–48)
MCH RBC QN AUTO: 21.5 PG (ref 27–31)
MCHC RBC AUTO-ENTMCNC: 31.5 G/DL (ref 32–36)
MCV RBC AUTO: 68 FL (ref 82–98)
MODE: ABNORMAL
MONOCYTES # BLD AUTO: 0.8 K/UL (ref 0.3–1)
MONOCYTES NFR BLD: 7.5 % (ref 4–15)
NEUTROPHILS # BLD AUTO: 7.3 K/UL (ref 1.8–7.7)
NEUTROPHILS NFR BLD: 70.1 % (ref 38–73)
NITRITE UR QL STRIP: NEGATIVE
NRBC BLD-RTO: 0 /100 WBC
PCO2 BLDA: 42.8 MMHG (ref 35–45)
PH SMN: 7.31 [PH] (ref 7.35–7.45)
PH UR STRIP: 7 [PH] (ref 5–8)
PLATELET # BLD AUTO: 209 K/UL (ref 150–350)
PMV BLD AUTO: ABNORMAL FL (ref 9.2–12.9)
PO2 BLDA: 39 MMHG (ref 40–60)
POC BE: -5 MMOL/L
POC SATURATED O2: 68 % (ref 95–100)
POTASSIUM SERPL-SCNC: 4.7 MMOL/L (ref 3.5–5.1)
PROT SERPL-MCNC: 7.2 G/DL (ref 6–8.4)
PROT UR QL STRIP: NEGATIVE
RBC # BLD AUTO: 5.5 M/UL (ref 4–5.4)
SAMPLE: ABNORMAL
SITE: ABNORMAL
SODIUM SERPL-SCNC: 129 MMOL/L (ref 136–145)
SP GR UR STRIP: <=1.005 (ref 1–1.03)
SP02: 96
URN SPEC COLLECT METH UR: ABNORMAL
UROBILINOGEN UR STRIP-ACNC: NEGATIVE EU/DL
WBC # BLD AUTO: 10.36 K/UL (ref 3.9–12.7)

## 2019-11-24 PROCEDURE — 96365 THER/PROPH/DIAG IV INF INIT: CPT

## 2019-11-24 PROCEDURE — 99900035 HC TECH TIME PER 15 MIN (STAT)

## 2019-11-24 PROCEDURE — 82550 ASSAY OF CK (CPK): CPT

## 2019-11-24 PROCEDURE — 12000002 HC ACUTE/MED SURGE SEMI-PRIVATE ROOM

## 2019-11-24 PROCEDURE — 93005 ELECTROCARDIOGRAM TRACING: CPT

## 2019-11-24 PROCEDURE — 83930 ASSAY OF BLOOD OSMOLALITY: CPT

## 2019-11-24 PROCEDURE — 82010 KETONE BODYS QUAN: CPT

## 2019-11-24 PROCEDURE — 83735 ASSAY OF MAGNESIUM: CPT

## 2019-11-24 PROCEDURE — 82803 BLOOD GASES ANY COMBINATION: CPT

## 2019-11-24 PROCEDURE — 93010 ELECTROCARDIOGRAM REPORT: CPT | Mod: ,,, | Performed by: INTERNAL MEDICINE

## 2019-11-24 PROCEDURE — 63600175 PHARM REV CODE 636 W HCPCS: Performed by: EMERGENCY MEDICINE

## 2019-11-24 PROCEDURE — 85025 COMPLETE CBC W/AUTO DIFF WBC: CPT

## 2019-11-24 PROCEDURE — 96361 HYDRATE IV INFUSION ADD-ON: CPT

## 2019-11-24 PROCEDURE — 96375 TX/PRO/DX INJ NEW DRUG ADDON: CPT

## 2019-11-24 PROCEDURE — G0378 HOSPITAL OBSERVATION PER HR: HCPCS

## 2019-11-24 PROCEDURE — 93010 EKG 12-LEAD: ICD-10-PCS | Mod: ,,, | Performed by: INTERNAL MEDICINE

## 2019-11-24 PROCEDURE — 99285 EMERGENCY DEPT VISIT HI MDM: CPT | Mod: 25

## 2019-11-24 PROCEDURE — 94761 N-INVAS EAR/PLS OXIMETRY MLT: CPT

## 2019-11-24 PROCEDURE — 80053 COMPREHEN METABOLIC PANEL: CPT

## 2019-11-24 PROCEDURE — 25000003 PHARM REV CODE 250: Performed by: EMERGENCY MEDICINE

## 2019-11-24 PROCEDURE — 82962 GLUCOSE BLOOD TEST: CPT

## 2019-11-24 PROCEDURE — 81003 URINALYSIS AUTO W/O SCOPE: CPT

## 2019-11-24 RX ORDER — IBUPROFEN 200 MG
24 TABLET ORAL
Status: CANCELLED | OUTPATIENT
Start: 2019-11-25

## 2019-11-24 RX ORDER — DIAZEPAM 10 MG/2ML
2 INJECTION INTRAMUSCULAR
Status: COMPLETED | OUTPATIENT
Start: 2019-11-24 | End: 2019-11-24

## 2019-11-24 RX ORDER — DIAZEPAM 10 MG/2ML
5 INJECTION INTRAMUSCULAR
Status: COMPLETED | OUTPATIENT
Start: 2019-11-24 | End: 2019-11-24

## 2019-11-24 RX ORDER — POTASSIUM CHLORIDE 14.9 MG/ML
10 INJECTION INTRAVENOUS
Status: DISCONTINUED | OUTPATIENT
Start: 2019-11-24 | End: 2019-11-24

## 2019-11-24 RX ORDER — GLUCAGON 1 MG
1 KIT INJECTION
Status: CANCELLED | OUTPATIENT
Start: 2019-11-25

## 2019-11-24 RX ORDER — INSULIN ASPART 100 [IU]/ML
1-10 INJECTION, SOLUTION INTRAVENOUS; SUBCUTANEOUS
Status: CANCELLED | OUTPATIENT
Start: 2019-11-25

## 2019-11-24 RX ORDER — LORAZEPAM 2 MG/ML
INJECTION INTRAMUSCULAR
Status: DISPENSED
Start: 2019-11-24 | End: 2019-11-25

## 2019-11-24 RX ORDER — POTASSIUM CHLORIDE 7.45 MG/ML
10 INJECTION INTRAVENOUS
Status: COMPLETED | OUTPATIENT
Start: 2019-11-24 | End: 2019-11-24

## 2019-11-24 RX ORDER — IBUPROFEN 200 MG
16 TABLET ORAL
Status: CANCELLED | OUTPATIENT
Start: 2019-11-25

## 2019-11-24 RX ORDER — POTASSIUM CHLORIDE 7.45 MG/ML
10 INJECTION INTRAVENOUS
Status: COMPLETED | OUTPATIENT
Start: 2019-11-24 | End: 2019-11-25

## 2019-11-24 RX ADMIN — DIAZEPAM 2 MG: 5 INJECTION, SOLUTION INTRAMUSCULAR; INTRAVENOUS at 10:11

## 2019-11-24 RX ADMIN — INSULIN HUMAN 6 UNITS: 100 INJECTION, SOLUTION PARENTERAL at 10:11

## 2019-11-24 RX ADMIN — POTASSIUM CHLORIDE 10 MEQ: 7.46 INJECTION, SOLUTION INTRAVENOUS at 11:11

## 2019-11-24 RX ADMIN — SODIUM CHLORIDE 1000 ML: 0.9 INJECTION, SOLUTION INTRAVENOUS at 09:11

## 2019-11-24 RX ADMIN — POTASSIUM BICARBONATE 50 MEQ: 978 TABLET, EFFERVESCENT ORAL at 10:11

## 2019-11-24 RX ADMIN — LORAZEPAM 1 MG: 2 INJECTION INTRAMUSCULAR; INTRAVENOUS at 11:11

## 2019-11-24 RX ADMIN — DIAZEPAM 5 MG: 5 INJECTION, SOLUTION INTRAMUSCULAR; INTRAVENOUS at 11:11

## 2019-11-24 RX ADMIN — POTASSIUM CHLORIDE 10 MEQ: 7.46 INJECTION, SOLUTION INTRAVENOUS at 10:11

## 2019-11-25 LAB
ALBUMIN SERPL BCP-MCNC: 2.9 G/DL (ref 3.5–5.2)
ALP SERPL-CCNC: 69 U/L (ref 55–135)
ALT SERPL W/O P-5'-P-CCNC: 25 U/L (ref 10–44)
ANION GAP SERPL CALC-SCNC: 8 MMOL/L (ref 8–16)
AST SERPL-CCNC: 19 U/L (ref 10–40)
BASOPHILS # BLD AUTO: 0.06 K/UL (ref 0–0.2)
BASOPHILS NFR BLD: 0.6 % (ref 0–1.9)
BILIRUB SERPL-MCNC: 0.3 MG/DL (ref 0.1–1)
BUN SERPL-MCNC: 19 MG/DL (ref 6–20)
CALCIUM SERPL-MCNC: 8.9 MG/DL (ref 8.7–10.5)
CHLORIDE SERPL-SCNC: 110 MMOL/L (ref 95–110)
CK SERPL-CCNC: 226 U/L (ref 20–180)
CO2 SERPL-SCNC: 18 MMOL/L (ref 23–29)
CREAT SERPL-MCNC: 1 MG/DL (ref 0.5–1.4)
DIFFERENTIAL METHOD: ABNORMAL
EOSINOPHIL # BLD AUTO: 0 K/UL (ref 0–0.5)
EOSINOPHIL NFR BLD: 0.3 % (ref 0–8)
ERYTHROCYTE [DISTWIDTH] IN BLOOD BY AUTOMATED COUNT: 15.9 % (ref 11.5–14.5)
EST. GFR  (AFRICAN AMERICAN): >60 ML/MIN/1.73 M^2
EST. GFR  (NON AFRICAN AMERICAN): >60 ML/MIN/1.73 M^2
ESTIMATED AVG GLUCOSE: 220 MG/DL (ref 68–131)
ESTIMATED AVG GLUCOSE: 220 MG/DL (ref 68–131)
GLUCOSE SERPL-MCNC: 267 MG/DL (ref 70–110)
HBA1C MFR BLD HPLC: 9.3 % (ref 4–5.6)
HBA1C MFR BLD HPLC: 9.3 % (ref 4–5.6)
HCT VFR BLD AUTO: 35.3 % (ref 37–48.5)
HGB BLD-MCNC: 10.8 G/DL (ref 12–16)
IMM GRANULOCYTES # BLD AUTO: 0.03 K/UL (ref 0–0.04)
IMM GRANULOCYTES NFR BLD AUTO: 0.3 % (ref 0–0.5)
LYMPHOCYTES # BLD AUTO: 2.1 K/UL (ref 1–4.8)
LYMPHOCYTES NFR BLD: 20.4 % (ref 18–48)
MAGNESIUM SERPL-MCNC: 1.8 MG/DL (ref 1.6–2.6)
MAGNESIUM SERPL-MCNC: 2.1 MG/DL (ref 1.6–2.6)
MCH RBC QN AUTO: 21.7 PG (ref 27–31)
MCHC RBC AUTO-ENTMCNC: 30.6 G/DL (ref 32–36)
MCV RBC AUTO: 71 FL (ref 82–98)
MONOCYTES # BLD AUTO: 0.9 K/UL (ref 0.3–1)
MONOCYTES NFR BLD: 8.1 % (ref 4–15)
NEUTROPHILS # BLD AUTO: 7.4 K/UL (ref 1.8–7.7)
NEUTROPHILS NFR BLD: 70.3 % (ref 38–73)
NRBC BLD-RTO: 0 /100 WBC
OSMOLALITY SERPL: 304 MOSM/KG (ref 275–295)
PHOSPHATE SERPL-MCNC: 1.9 MG/DL (ref 2.7–4.5)
PLATELET # BLD AUTO: 200 K/UL (ref 150–350)
PMV BLD AUTO: 11.1 FL (ref 9.2–12.9)
POCT GLUCOSE: 196 MG/DL (ref 70–110)
POCT GLUCOSE: 199 MG/DL (ref 70–110)
POCT GLUCOSE: 217 MG/DL (ref 70–110)
POCT GLUCOSE: 221 MG/DL (ref 70–110)
POCT GLUCOSE: 257 MG/DL (ref 70–110)
POCT GLUCOSE: 302 MG/DL (ref 70–110)
POCT GLUCOSE: 315 MG/DL (ref 70–110)
POCT GLUCOSE: 319 MG/DL (ref 70–110)
POCT GLUCOSE: 347 MG/DL (ref 70–110)
POCT GLUCOSE: 361 MG/DL (ref 70–110)
POCT GLUCOSE: 379 MG/DL (ref 70–110)
POCT GLUCOSE: 413 MG/DL (ref 70–110)
POTASSIUM SERPL-SCNC: 4.8 MMOL/L (ref 3.5–5.1)
PROT SERPL-MCNC: 6 G/DL (ref 6–8.4)
RBC # BLD AUTO: 4.97 M/UL (ref 4–5.4)
SODIUM SERPL-SCNC: 136 MMOL/L (ref 136–145)
WBC # BLD AUTO: 10.5 K/UL (ref 3.9–12.7)

## 2019-11-25 PROCEDURE — G0378 HOSPITAL OBSERVATION PER HR: HCPCS

## 2019-11-25 PROCEDURE — C9399 UNCLASSIFIED DRUGS OR BIOLOG: HCPCS | Performed by: HOSPITALIST

## 2019-11-25 PROCEDURE — 96375 TX/PRO/DX INJ NEW DRUG ADDON: CPT

## 2019-11-25 PROCEDURE — 25000003 PHARM REV CODE 250: Performed by: NURSE PRACTITIONER

## 2019-11-25 PROCEDURE — 63600175 PHARM REV CODE 636 W HCPCS: Performed by: HOSPITALIST

## 2019-11-25 PROCEDURE — 80053 COMPREHEN METABOLIC PANEL: CPT

## 2019-11-25 PROCEDURE — 83735 ASSAY OF MAGNESIUM: CPT

## 2019-11-25 PROCEDURE — 96367 TX/PROPH/DG ADDL SEQ IV INF: CPT

## 2019-11-25 PROCEDURE — 85025 COMPLETE CBC W/AUTO DIFF WBC: CPT

## 2019-11-25 PROCEDURE — 83036 HEMOGLOBIN GLYCOSYLATED A1C: CPT

## 2019-11-25 PROCEDURE — 96366 THER/PROPH/DIAG IV INF ADDON: CPT

## 2019-11-25 PROCEDURE — 99223 1ST HOSP IP/OBS HIGH 75: CPT | Mod: ,,, | Performed by: NURSE PRACTITIONER

## 2019-11-25 PROCEDURE — 99223 PR INITIAL HOSPITAL CARE,LEVL III: ICD-10-PCS | Mod: ,,, | Performed by: NURSE PRACTITIONER

## 2019-11-25 PROCEDURE — 96372 THER/PROPH/DIAG INJ SC/IM: CPT

## 2019-11-25 PROCEDURE — 63600175 PHARM REV CODE 636 W HCPCS: Performed by: INTERNAL MEDICINE

## 2019-11-25 PROCEDURE — 63600175 PHARM REV CODE 636 W HCPCS: Performed by: NURSE PRACTITIONER

## 2019-11-25 PROCEDURE — 84100 ASSAY OF PHOSPHORUS: CPT

## 2019-11-25 PROCEDURE — 36415 COLL VENOUS BLD VENIPUNCTURE: CPT

## 2019-11-25 RX ORDER — SODIUM CHLORIDE 0.9 % (FLUSH) 0.9 %
10 SYRINGE (ML) INJECTION
Status: DISCONTINUED | OUTPATIENT
Start: 2019-11-25 | End: 2019-11-26 | Stop reason: HOSPADM

## 2019-11-25 RX ORDER — AMLODIPINE BESYLATE 5 MG/1
10 TABLET ORAL DAILY
Status: DISCONTINUED | OUTPATIENT
Start: 2019-11-25 | End: 2019-11-26 | Stop reason: HOSPADM

## 2019-11-25 RX ORDER — ENOXAPARIN SODIUM 100 MG/ML
40 INJECTION SUBCUTANEOUS EVERY 24 HOURS
Status: DISCONTINUED | OUTPATIENT
Start: 2019-11-25 | End: 2019-11-26 | Stop reason: HOSPADM

## 2019-11-25 RX ORDER — IBUPROFEN 200 MG
16 TABLET ORAL
Status: DISCONTINUED | OUTPATIENT
Start: 2019-11-25 | End: 2019-11-26 | Stop reason: HOSPADM

## 2019-11-25 RX ORDER — ATORVASTATIN CALCIUM 20 MG/1
20 TABLET, FILM COATED ORAL DAILY
Status: DISCONTINUED | OUTPATIENT
Start: 2019-11-25 | End: 2019-11-26 | Stop reason: HOSPADM

## 2019-11-25 RX ORDER — MAGNESIUM SULFATE 1 G/100ML
1 INJECTION INTRAVENOUS ONCE
Status: COMPLETED | OUTPATIENT
Start: 2019-11-25 | End: 2019-11-25

## 2019-11-25 RX ORDER — FERROUS SULFATE 325(65) MG
325 TABLET, DELAYED RELEASE (ENTERIC COATED) ORAL DAILY
Status: DISCONTINUED | OUTPATIENT
Start: 2019-11-25 | End: 2019-11-26 | Stop reason: HOSPADM

## 2019-11-25 RX ORDER — INSULIN ASPART 100 [IU]/ML
1-10 INJECTION, SOLUTION INTRAVENOUS; SUBCUTANEOUS
Status: DISCONTINUED | OUTPATIENT
Start: 2019-11-25 | End: 2019-11-26 | Stop reason: HOSPADM

## 2019-11-25 RX ORDER — ACETAMINOPHEN 325 MG/1
650 TABLET ORAL EVERY 4 HOURS PRN
Status: DISCONTINUED | OUTPATIENT
Start: 2019-11-25 | End: 2019-11-26 | Stop reason: HOSPADM

## 2019-11-25 RX ORDER — IBUPROFEN 200 MG
24 TABLET ORAL
Status: DISCONTINUED | OUTPATIENT
Start: 2019-11-25 | End: 2019-11-26 | Stop reason: HOSPADM

## 2019-11-25 RX ORDER — ERGOCALCIFEROL 1.25 MG/1
50000 CAPSULE ORAL
Start: 2019-11-25 | End: 2019-12-19 | Stop reason: SDUPTHER

## 2019-11-25 RX ORDER — DEXTROSE MONOHYDRATE AND SODIUM CHLORIDE 5; .9 G/100ML; G/100ML
INJECTION, SOLUTION INTRAVENOUS CONTINUOUS
Status: DISCONTINUED | OUTPATIENT
Start: 2019-11-25 | End: 2019-11-25

## 2019-11-25 RX ORDER — ONDANSETRON 8 MG/1
8 TABLET, ORALLY DISINTEGRATING ORAL EVERY 8 HOURS PRN
Status: DISCONTINUED | OUTPATIENT
Start: 2019-11-25 | End: 2019-11-26 | Stop reason: HOSPADM

## 2019-11-25 RX ORDER — GLUCAGON 1 MG
1 KIT INJECTION
Status: DISCONTINUED | OUTPATIENT
Start: 2019-11-25 | End: 2019-11-26 | Stop reason: HOSPADM

## 2019-11-25 RX ORDER — SODIUM CHLORIDE 9 MG/ML
INJECTION, SOLUTION INTRAVENOUS CONTINUOUS
Status: DISCONTINUED | OUTPATIENT
Start: 2019-11-25 | End: 2019-11-25

## 2019-11-25 RX ADMIN — SODIUM CHLORIDE 4 UNITS/HR: 9 INJECTION, SOLUTION INTRAVENOUS at 01:11

## 2019-11-25 RX ADMIN — ATORVASTATIN CALCIUM 20 MG: 20 TABLET, FILM COATED ORAL at 08:11

## 2019-11-25 RX ADMIN — SODIUM CHLORIDE: 0.9 INJECTION, SOLUTION INTRAVENOUS at 01:11

## 2019-11-25 RX ADMIN — INSULIN ASPART 5 UNITS: 100 INJECTION, SOLUTION INTRAVENOUS; SUBCUTANEOUS at 10:11

## 2019-11-25 RX ADMIN — ENOXAPARIN SODIUM 40 MG: 40 INJECTION SUBCUTANEOUS at 04:11

## 2019-11-25 RX ADMIN — DEXTROSE AND SODIUM CHLORIDE: 5; .9 INJECTION, SOLUTION INTRAVENOUS at 06:11

## 2019-11-25 RX ADMIN — MAGNESIUM SULFATE 1 G: 1 INJECTION INTRAVENOUS at 04:11

## 2019-11-25 RX ADMIN — AMLODIPINE BESYLATE 10 MG: 5 TABLET ORAL at 08:11

## 2019-11-25 RX ADMIN — INSULIN DETEMIR 30 UNITS: 100 INJECTION, SOLUTION SUBCUTANEOUS at 07:11

## 2019-11-25 RX ADMIN — INSULIN ASPART 8 UNITS: 100 INJECTION, SOLUTION INTRAVENOUS; SUBCUTANEOUS at 04:11

## 2019-11-25 RX ADMIN — FERROUS SULFATE TAB EC 325 MG (65 MG FE EQUIVALENT) 325 MG: 325 (65 FE) TABLET DELAYED RESPONSE at 08:11

## 2019-11-25 RX ADMIN — INSULIN ASPART 4 UNITS: 100 INJECTION, SOLUTION INTRAVENOUS; SUBCUTANEOUS at 11:11

## 2019-11-25 NOTE — ED PROVIDER NOTES
"Encounter Date: 11/24/2019    SCRIBE #1 NOTE: I, Michelle Herrera, am scribing for, and in the presence of, Dr. Torrez.       History     Chief Complaint   Patient presents with    Hyperglycemia     unable to fill insulin script. polyuria, polydipsia     Time seen by provider: 9:34 PM    This is a 53 y.o. female with hx of IDDM who presents with complaint of recurrent hyperglycemia over the past week. She reports polyuria and polydipsia since this morning. States "I've drank 24 bottles of water". On ED arrival her CBG was >500. Of note, pt reports she was on Qweekly insulin but ran out about a month ago. She has been seen here multiple times in the past week for similar sx, last seen 4 days ago, no DKA diagnosed, but was told to follow with PCP and to return to ED if hyperglycemia persisted. She reports she has been seen by PCP and will be filling her insulin Rx tomorrow.    The history is provided by the patient.     Review of patient's allergies indicates:   Allergen Reactions    Ace inhibitors Edema     Past Medical History:   Diagnosis Date    Arthritis     Diabetes mellitus     HLD (hyperlipidemia)     Hypertension      Past Surgical History:   Procedure Laterality Date    COLONOSCOPY N/A 6/27/2017    Procedure: COLONOSCOPY;  Surgeon: Evelin Arceo MD;  Location: [facility] ([address] [place] [address]);  Service: Endoscopy;  Laterality: N/A;  2 nd floor d/t BMI > 50 kg/m3    TONSILLECTOMY      TUBAL LIGATION       Family History   Problem Relation Age of Onset    Breast cancer Mother 65    Cancer Mother     Diabetes Mother     Colon polyps Mother     Hypertension Mother     Diabetes Father     Breast cancer Sister 56    No Known Problems Brother     Diabetes Paternal Uncle     Breast cancer Maternal Grandmother     Colon cancer Maternal Grandmother     No Known Problems Maternal Grandfather     Diabetes Paternal Grandmother     Heart disease Paternal Grandmother     No Known Problems Paternal Grandfather  "    Breast cancer Maternal Aunt     Esophageal cancer Neg Hx     Irritable bowel syndrome Neg Hx     Rectal cancer Neg Hx     Stomach cancer Neg Hx     Ulcerative colitis Neg Hx     Celiac disease Neg Hx     Crohn's disease Neg Hx     Amblyopia Neg Hx     Blindness Neg Hx     Cataracts Neg Hx     Glaucoma Neg Hx     Macular degeneration Neg Hx     Retinal detachment Neg Hx     Strabismus Neg Hx     Stroke Neg Hx     Thyroid disease Neg Hx      Social History     Tobacco Use    Smoking status: Never Smoker    Smokeless tobacco: Never Used   Substance Use Topics    Alcohol use: No     Frequency: Never     Comment: occasional    Drug use: No     Review of Systems   Constitutional: Negative for chills and fever.        Positive for polydipsia.   HENT: Negative for congestion and sore throat.    Eyes: Negative for photophobia and redness.   Respiratory: Negative for cough and shortness of breath.    Cardiovascular: Negative for chest pain.   Gastrointestinal: Negative for abdominal pain, nausea and vomiting.   Genitourinary: Negative for dysuria.        Positive for polyuria.   Musculoskeletal: Negative for back pain.   Skin: Negative for rash.   Neurological: Negative for weakness, light-headedness and headaches.   Psychiatric/Behavioral: Negative for confusion.       Physical Exam     Initial Vitals [11/24/19 2123]   BP Pulse Resp Temp SpO2   (!) 147/81 96 16 98.6 °F (37 °C) 98 %      MAP       --         Physical Exam    Nursing note and vitals reviewed.  Constitutional: She appears well-developed and well-nourished. She is not diaphoretic. No distress.   HENT:   Head: Normocephalic and atraumatic.   Mouth/Throat: Oropharynx is clear and moist.   Sticky mucous membranes.   Eyes: Conjunctivae and EOM are normal. Pupils are equal, round, and reactive to light. No scleral icterus.   Neck: Normal range of motion. Neck supple.   Cardiovascular: Normal rate, regular rhythm, S1 normal, S2 normal and  normal heart sounds. Exam reveals no gallop and no friction rub.    No murmur heard.  Pulmonary/Chest: Breath sounds normal. No respiratory distress. She has no wheezes. She has no rhonchi. She has no rales.   Abdominal: Soft. Bowel sounds are normal. There is no tenderness. There is no rebound and no guarding.   Musculoskeletal: Normal range of motion. She exhibits no edema or tenderness.   No lower extremity edema.    Lymphadenopathy:     She has no cervical adenopathy.   Neurological: She is alert and oriented to person, place, and time.   Skin: Skin is warm and dry. Capillary refill takes less than 2 seconds. No rash noted. No pallor.   Psychiatric: She has a normal mood and affect. Her behavior is normal. Judgment and thought content normal.         ED Course   Procedures  Labs Reviewed   CBC W/ AUTO DIFFERENTIAL - Abnormal; Notable for the following components:       Result Value    RBC 5.50 (*)     Hemoglobin 11.8 (*)     Mean Corpuscular Volume 68 (*)     Mean Corpuscular Hemoglobin 21.5 (*)     Mean Corpuscular Hemoglobin Conc 31.5 (*)     RDW 15.8 (*)     All other components within normal limits   COMPREHENSIVE METABOLIC PANEL - Abnormal; Notable for the following components:    Sodium 129 (*)     CO2 19 (*)     Glucose 722 (*)     BUN, Bld 26 (*)     Creatinine 1.8 (*)     eGFR if  37 (*)     eGFR if non  32 (*)     All other components within normal limits    Narrative:        critical result(s) called and verbal readback obtained from   Shantel Ram NP by [facility] 11/24/2019 22:34   URINALYSIS, REFLEX TO URINE CULTURE - Abnormal; Notable for the following components:    Specific Gravity, UA <=1.005 (*)     All other components within normal limits    Narrative:     Preferred Collection Type->Urine, Clean Catch   CK - Abnormal; Notable for the following components:     (*)     All other components within normal limits   OSMOLALITY, SERUM - Abnormal; Notable for the  following components:    Osmolality 304 (*)     All other components within normal limits   ISTAT PROCEDURE - Abnormal; Notable for the following components:    POC PH 7.313 (*)     POC PO2 39 (*)     POC HCO3 21.7 (*)     POC SATURATED O2 68 (*)     All other components within normal limits   POCT GLUCOSE - Abnormal; Notable for the following components:    POCT Glucose 319 (*)     All other components within normal limits   BETA - HYDROXYBUTYRATE, SERUM   MAGNESIUM   POCT GLUCOSE MONITORING CONTINUOUS     EKG Readings: (Independently Interpreted)   NSR at a rate of 93 bpm. No acute ST/T wave changes. No STEMI. No T wave inversions. Normal QRS duration.       Imaging Results    None          Medical Decision Making:   History:   Old Medical Records: I decided to obtain old medical records.  Independently Interpreted Test(s):   I have ordered and independently interpreted EKG Reading(s) - see prior notes  Clinical Tests:   Lab Tests: Ordered and Reviewed  Medical Tests: Ordered and Reviewed            Scribe Attestation:   Scribe #1: I performed the above scribed service and the documentation accurately describes the services I performed. I attest to the accuracy of the note.    Attending Attestation:           Physician Attestation for Scribe:  Physician Attestation Statement for Scribe #1: I, Dr. Torrez, reviewed documentation, as scribed by Michelle Herrera in my presence, and it is both accurate and complete.         Attending ED Notes:   Emergent evaluation a 53-year-old female with a past medical history of diabetes and unable to refill her insulin now presents to the emergency room with complaint of polyuria, polydipsia and elevation of blood glucose.  Patient is afebrile, nontoxic-appearing stable vital signs. Patient has no elevation white blood cell count.  H&H 11.8 and 37.5.  Blood glucose is 722.  No clinical or diagnostic evidence of diabetic ketoacidosis.  Beta hydroxybutyrate is 0.1.  During the course  of evaluation and treatment patient was administered IV fluids and IV insulin.  Approximately 5 min after administration of IV insulin patient began to have diffuse muscle cramping.  I suspect that patient's muscle cramping was secondary to potassium being drawn back up into the cells causing hypokalemia.  Patient is administered both p.o. and IV potassium.  Patient is administered IV Valium.  The patient is extensively counseled her diagnosis and treatment including all  laboratory and physical exam findings.  The patient is admitted in stable condition.          ED Course as of Nov 25 0534   Sun Nov 24, 2019   1237 Consulted and discussed case with hospitalist Cholo Galloway, who will admit the patient to Dr. Renee.      [TA]      ED Course User Index  [TA] Michelle Herrera                Clinical Impression:     1. Type 2 diabetes mellitus with hyperglycemia, with long-term current use of insulin    2. Hyperglycemia    3. Muscle cramps    4. Dehydration    5. Anemia, unspecified type    6. Renal insufficiency                              Miah Navarrete MD  11/25/19 0536

## 2019-11-25 NOTE — PROGRESS NOTES
"eICU Note    Subjective: Hyperglycemia with polydipsia, polyuria  No ketones  AG not high       Objective:BP (!) 147/88   Pulse 87   Temp 97.8 °F (36.6 °C) (Oral)   Resp (!) 24   Ht 5' 5" (1.651 m)   Wt (!) 137.2 kg (302 lb 7.5 oz)   SpO2 98%   Breastfeeding? No   BMI 50.33 kg/m²   Mg 1.8    Data review done     Video review done - standing    Assessment:  Hyperglycemia from not filling insulin       Plan:  1Monitor glucose ; on insulin    2 Watch AG, lytes  3 JIMMY ? Volume depletion related    DVT prophylaxis  enox  GI prophylaxis na  Ventilator settings na    Communication with RN    "

## 2019-11-25 NOTE — PLAN OF CARE
Pt received from ED. Pt placed on insulin gtt and NS infusion. Blood glucose checks Q1H. ABELINO Galloway at bedside rounding. Pt drowsy from ED meds. Pt VSS. Pt oriented to room and POC. Pt instructed on use of call light. Pt verbalizes understanding. Side rails up x3, bed alarm activated. Personal belongings within reach.

## 2019-11-25 NOTE — ED NOTES
Pt reports having severe cramps all over body, pt diaphoretic and restless. Dr. Navarrete at bedside.

## 2019-11-25 NOTE — SUBJECTIVE & OBJECTIVE
"Past Medical History:   Diagnosis Date    Arthritis     Diabetes mellitus     HLD (hyperlipidemia)     Hypertension        Past Surgical History:   Procedure Laterality Date    COLONOSCOPY N/A 6/27/2017    Procedure: COLONOSCOPY;  Surgeon: Evelin Arceo MD;  Location: [address];  Service: Endoscopy;  Laterality: N/A;  2 nd floor d/t BMI > 50 kg/m3    TONSILLECTOMY      TUBAL LIGATION         Review of patient's allergies indicates:   Allergen Reactions    Ace inhibitors Edema       No current facility-administered medications on file prior to encounter.      Current Outpatient Medications on File Prior to Encounter   Medication Sig    amLODIPine (NORVASC) 10 MG tablet Take 1 tablet (10 mg total) by mouth once daily.    atorvastatin (LIPITOR) 20 MG tablet Take 1 tablet (20 mg total) by mouth once daily.    ferrous sulfate 325 mg (65 mg iron) Tab tablet TK 1 T PO  ONCE D    hydroCHLOROthiazide (HYDRODIURIL) 25 MG tablet Take 1 tablet (25 mg total) by mouth once daily.    meloxicam (MOBIC) 15 MG tablet Take 1 tablet (15 mg total) by mouth once daily.    nitrofurantoin, macrocrystal-monohydrate, (MACROBID) 100 MG capsule Take 1 capsule (100 mg total) by mouth 2 (two) times daily. for 5 days    acetaminophen (TYLENOL) 500 MG tablet Take 1-2 tablets (500-1,000 mg total) by mouth 3 (three) times daily as needed for Pain.    EASY TOUCH 31 gauge x 1/4" Ndle U TO INJ INSULIN TID    ergocalciferol (ERGOCALCIFEROL) 50,000 unit Cap Take 1 capsule (50,000 Units total) by mouth twice a week.    estradiol (ESTRACE) 0.01 % (0.1 mg/gram) vaginal cream Place 1 g vaginally twice a week.    ibuprofen (ADVIL,MOTRIN) 600 MG tablet Take 1 tablet (600 mg total) by mouth every 6 (six) hours as needed for Pain.    insulin lispro 100 unit/mL injection Inject 5 Units into the skin 3 (three) times daily before meals.    insulin syringe-needle U-100 (INSULIN SYRINGE) 1/2 mL 30 gauge x 5/16 Syrg Use as directed " "   insulin syringe-needle U-100 1/2 mL 30 gauge Syrg U UTD TO INJECT INSULIN    metFORMIN (GLUCOPHAGE) 1000 MG tablet TAKE 1 TABLET TWICE DAILY WITH AM AND PM MEALS    pen needle, diabetic (COMFORT EZ PEN NEEDLES) 32 gauge x 1/4" Ndle USE TO INJECT INSULIN X 3    semaglutide (OZEMPIC) 0.25 mg or 0.5 mg(2 mg/1.5 mL) PnIj Inject 0.5 mg into the skin every 7 days.    semaglutide (OZEMPIC) 1 mg/dose (2 mg/1.5 mL) PnIj Inject 1 mg subq weekly     Family History     Problem Relation (Age of Onset)    Breast cancer Mother (65), Sister (56), Maternal Grandmother, Maternal Aunt    Cancer Mother    Colon cancer Maternal Grandmother    Colon polyps Mother    Diabetes Mother, Father, Paternal Uncle, Paternal Grandmother    Heart disease Paternal Grandmother    Hypertension Mother    No Known Problems Brother, Maternal Grandfather, Paternal Grandfather        Tobacco Use    Smoking status: Never Smoker    Smokeless tobacco: Never Used   Substance and Sexual Activity    Alcohol use: No     Frequency: Never     Comment: occasional    Drug use: No    Sexual activity: Not Currently     Comment: occasionally     Review of Systems   Constitutional: Positive for activity change, appetite change and fatigue. Negative for fever.   HENT: Negative for congestion, ear pain, rhinorrhea and sinus pressure.    Eyes: Negative for pain and discharge.   Respiratory: Positive for shortness of breath. Negative for cough, chest tightness and wheezing.    Cardiovascular: Negative for chest pain and leg swelling.   Gastrointestinal: Positive for nausea and vomiting. Negative for abdominal distention, abdominal pain and diarrhea.   Endocrine: Negative for cold intolerance and heat intolerance.   Genitourinary: Negative for difficulty urinating, flank pain, frequency, hematuria and urgency.   Musculoskeletal: Positive for arthralgias and myalgias. Negative for joint swelling.   Allergic/Immunologic: Negative for environmental allergies and " food allergies.   Neurological: Negative for dizziness, weakness, light-headedness and headaches.   Hematological: Does not bruise/bleed easily.   Psychiatric/Behavioral: Negative for agitation, behavioral problems and decreased concentration.     Objective:     Vital Signs (Most Recent):  Temp: 97.8 °F (36.6 °C) (11/25/19 0130)  Pulse: 84 (11/25/19 0200)  Resp: (!) 24 (11/24/19 2326)  BP: (!) 158/90 (11/25/19 0200)  SpO2: 98 % (11/25/19 0200) Vital Signs (24h Range):  Temp:  [97.8 °F (36.6 °C)-98.6 °F (37 °C)] 97.8 °F (36.6 °C)  Pulse:  [] 84  Resp:  [16-24] 24  SpO2:  [95 %-99 %] 98 %  BP: (134-189)/() 158/90     Weight: (!) 137.2 kg (302 lb 7.5 oz)  Body mass index is 50.33 kg/m².    Physical Exam   Constitutional: She is oriented to person, place, and time. She appears well-developed and well-nourished.   HENT:   Head: Normocephalic.   Eyes: Conjunctivae are normal. Right eye exhibits no discharge. Left eye exhibits no discharge.   Neck: Normal range of motion. Neck supple.   Cardiovascular: Normal rate, regular rhythm, normal heart sounds and intact distal pulses.   Pulses:       Radial pulses are 1+ on the right side, and 1+ on the left side.   Pulmonary/Chest: Effort normal. Tachypnea noted. No respiratory distress. She has decreased breath sounds in the right lower field and the left lower field.   Abdominal: Soft. She exhibits distension. Bowel sounds are increased. There is tenderness.   Musculoskeletal: Normal range of motion.   Neurological: She is alert and oriented to person, place, and time. She has normal strength. GCS eye subscore is 4. GCS verbal subscore is 5. GCS motor subscore is 6.   Skin: Skin is warm and dry.   Psychiatric: She has a normal mood and affect. Her speech is normal and behavior is normal.           Significant Labs:   CBC:   Recent Labs   Lab 11/24/19  2154   WBC 10.36   HGB 11.8*   HCT 37.5        CMP:   Recent Labs   Lab 11/24/19  2154   *   K 4.7   CL  99   CO2 19*   *   BUN 26*   CREATININE 1.8*   CALCIUM 9.8   PROT 7.2   ALBUMIN 3.5   BILITOT 0.2   ALKPHOS 82   AST 15   ALT 25   ANIONGAP 11   EGFRNONAA 32*       Significant Imaging: I have reviewed all pertinent imaging results/findings within the past 24 hours.

## 2019-11-25 NOTE — PLAN OF CARE
Initial Discharge Planning Assessment:  Patient admitted on 11/24/2019    Chart reviewed, Care plan discussed with treatment team,  attending Dr Renee    PCP updated in Epic: Dr. Meehan  Pharmacy, updated in Epic: [facility]    DME at home: Glucometer    Current dispo: Home   Transportation: Family to drive home    Power of  or Living Will: none    Case management  to follow.  No DC needs from CM perspective.         11/25/19 1601   Discharge Assessment   Assessment Type Discharge Planning Assessment   Confirmed/corrected address and phone number on facesheet? Yes   Assessment information obtained from? Patient   Communicated expected length of stay with patient/caregiver yes   Prior to hospitilization cognitive status: Alert/Oriented   Prior to hospitalization functional status: Independent   Current cognitive status: Alert/Oriented   Current Functional Status: Independent   Lives With alone   Able to Return to Prior Arrangements yes   Is patient able to care for self after discharge? Yes   Who are your caregiver(s) and their phone number(s)? Daughter: Marybeth Cardenas [phone]   Patient's perception of discharge disposition home or selfcare   Readmission Within the Last 30 Days no previous admission in last 30 days   Patient currently being followed by outpatient case management? No   Patient currently receives any other outside agency services? No   Equipment Currently Used at Home glucometer   Do you have any problems affording any of your prescribed medications? No   Is the patient taking medications as prescribed? yes   Does the patient have transportation home? Yes   Transportation Anticipated family or friend will provide   Does the patient receive services at the [facility]? No   Discharge Plan A Home   DME Needed Upon Discharge  none   Patient/Family in Agreement with Plan yes

## 2019-11-25 NOTE — H&P
"[facility] Hospital Medicine  History & Physical    Patient Name: Yayo Carver  MRN: [id number]  Admission Date: 11/24/2019  Attending Physician: Asha Renee MD   Primary Care Provider: Fabiana Louie MD         Patient information was obtained from patient, past medical records and ER records.     Subjective:     Principal Problem:Type 2 diabetes mellitus with hyperglycemia, with long-term current use of insulin    Chief Complaint:   Chief Complaint   Patient presents with    Hyperglycemia     unable to fill insulin script. polyuria, polydipsia        HPI: The patient is a 53 y.o. female with hx of IDDM who presents with complaint of recurrent hyperglycemia over the past week. She reports polyuria and polydipsia since this morning. States "I've drank 24 bottles of water". On ED arrival her CBG was >500. Of note, pt reports she was on Qweekly insulin but ran out about a month ago. She has been seen here multiple times in the past week for similar sx, last seen 4 days ago, no DKA diagnosed, but was told to follow with PCP and to return to ED if hyperglycemia persisted. She reports she has been seen by PCP and will be filling her insulin Rx tomorrow.    Past Medical History:   Diagnosis Date    Arthritis     Diabetes mellitus     HLD (hyperlipidemia)     Hypertension        Past Surgical History:   Procedure Laterality Date    COLONOSCOPY N/A 6/27/2017    Procedure: COLONOSCOPY;  Surgeon: Evelin Arceo MD;  Location: [address];  Service: Endoscopy;  Laterality: N/A;  2 nd floor d/t BMI > 50 kg/m3    TONSILLECTOMY      TUBAL LIGATION         Review of patient's allergies indicates:   Allergen Reactions    Ace inhibitors Edema       No current facility-administered medications on file prior to encounter.      Current Outpatient Medications on File Prior to Encounter   Medication Sig    amLODIPine (NORVASC) 10 MG tablet Take 1 tablet (10 mg total) by mouth once daily.    " "atorvastatin (LIPITOR) 20 MG tablet Take 1 tablet (20 mg total) by mouth once daily.    ferrous sulfate 325 mg (65 mg iron) Tab tablet TK 1 T PO  ONCE D    hydroCHLOROthiazide (HYDRODIURIL) 25 MG tablet Take 1 tablet (25 mg total) by mouth once daily.    meloxicam (MOBIC) 15 MG tablet Take 1 tablet (15 mg total) by mouth once daily.    nitrofurantoin, macrocrystal-monohydrate, (MACROBID) 100 MG capsule Take 1 capsule (100 mg total) by mouth 2 (two) times daily. for 5 days    acetaminophen (TYLENOL) 500 MG tablet Take 1-2 tablets (500-1,000 mg total) by mouth 3 (three) times daily as needed for Pain.    EASY TOUCH 31 gauge x 1/4" Ndle U TO INJ INSULIN TID    ergocalciferol (ERGOCALCIFEROL) 50,000 unit Cap Take 1 capsule (50,000 Units total) by mouth twice a week.    estradiol (ESTRACE) 0.01 % (0.1 mg/gram) vaginal cream Place 1 g vaginally twice a week.    ibuprofen (ADVIL,MOTRIN) 600 MG tablet Take 1 tablet (600 mg total) by mouth every 6 (six) hours as needed for Pain.    insulin lispro 100 unit/mL injection Inject 5 Units into the skin 3 (three) times daily before meals.    insulin syringe-needle U-100 (INSULIN SYRINGE) 1/2 mL 30 gauge x 5/16 Syrg Use as directed    insulin syringe-needle U-100 1/2 mL 30 gauge Syrg U UTD TO INJECT INSULIN    metFORMIN (GLUCOPHAGE) 1000 MG tablet TAKE 1 TABLET TWICE DAILY WITH AM AND PM MEALS    pen needle, diabetic (COMFORT EZ PEN NEEDLES) 32 gauge x 1/4" Ndle USE TO INJECT INSULIN X 3    semaglutide (OZEMPIC) 0.25 mg or 0.5 mg(2 mg/1.5 mL) PnIj Inject 0.5 mg into the skin every 7 days.    semaglutide (OZEMPIC) 1 mg/dose (2 mg/1.5 mL) PnIj Inject 1 mg subq weekly     Family History     Problem Relation (Age of Onset)    Breast cancer Mother (65), Sister (56), Maternal Grandmother, Maternal Aunt    Cancer Mother    Colon cancer Maternal Grandmother    Colon polyps Mother    Diabetes Mother, Father, Paternal Uncle, Paternal Grandmother    Heart disease Paternal " Grandmother    Hypertension Mother    No Known Problems Brother, Maternal Grandfather, Paternal Grandfather        Tobacco Use    Smoking status: Never Smoker    Smokeless tobacco: Never Used   Substance and Sexual Activity    Alcohol use: No     Frequency: Never     Comment: occasional    Drug use: No    Sexual activity: Not Currently     Comment: occasionally     Review of Systems   Constitutional: Positive for activity change, appetite change and fatigue. Negative for fever.   HENT: Negative for congestion, ear pain, rhinorrhea and sinus pressure.    Eyes: Negative for pain and discharge.   Respiratory: Positive for shortness of breath. Negative for cough, chest tightness and wheezing.    Cardiovascular: Negative for chest pain and leg swelling.   Gastrointestinal: Positive for nausea and vomiting. Negative for abdominal distention, abdominal pain and diarrhea.   Endocrine: Negative for cold intolerance and heat intolerance.   Genitourinary: Negative for difficulty urinating, flank pain, frequency, hematuria and urgency.   Musculoskeletal: Positive for arthralgias and myalgias. Negative for joint swelling.   Allergic/Immunologic: Negative for environmental allergies and food allergies.   Neurological: Negative for dizziness, weakness, light-headedness and headaches.   Hematological: Does not bruise/bleed easily.   Psychiatric/Behavioral: Negative for agitation, behavioral problems and decreased concentration.     Objective:     Vital Signs (Most Recent):  Temp: 97.8 °F (36.6 °C) (11/25/19 0130)  Pulse: 84 (11/25/19 0200)  Resp: (!) 24 (11/24/19 2326)  BP: (!) 158/90 (11/25/19 0200)  SpO2: 98 % (11/25/19 0200) Vital Signs (24h Range):  Temp:  [97.8 °F (36.6 °C)-98.6 °F (37 °C)] 97.8 °F (36.6 °C)  Pulse:  [] 84  Resp:  [16-24] 24  SpO2:  [95 %-99 %] 98 %  BP: (134-189)/() 158/90     Weight: (!) 137.2 kg (302 lb 7.5 oz)  Body mass index is 50.33 kg/m².    Physical Exam   Constitutional: She is  oriented to person, place, and time. She appears well-developed and well-nourished.   HENT:   Head: Normocephalic.   Eyes: Conjunctivae are normal. Right eye exhibits no discharge. Left eye exhibits no discharge.   Neck: Normal range of motion. Neck supple.   Cardiovascular: Normal rate, regular rhythm, normal heart sounds and intact distal pulses.   Pulses:       Radial pulses are 1+ on the right side, and 1+ on the left side.   Pulmonary/Chest: Effort normal. Tachypnea noted. No respiratory distress. She has decreased breath sounds in the right lower field and the left lower field.   Abdominal: Soft. She exhibits distension. Bowel sounds are increased. There is tenderness.   Musculoskeletal: Normal range of motion.   Neurological: She is alert and oriented to person, place, and time. She has normal strength. GCS eye subscore is 4. GCS verbal subscore is 5. GCS motor subscore is 6.   Skin: Skin is warm and dry.   Psychiatric: She has a normal mood and affect. Her speech is normal and behavior is normal.           Significant Labs:   CBC:   Recent Labs   Lab 11/24/19  2154   WBC 10.36   HGB 11.8*   HCT 37.5        CMP:   Recent Labs   Lab 11/24/19  2154   *   K 4.7   CL 99   CO2 19*   *   BUN 26*   CREATININE 1.8*   CALCIUM 9.8   PROT 7.2   ALBUMIN 3.5   BILITOT 0.2   ALKPHOS 82   AST 15   ALT 25   ANIONGAP 11   EGFRNONAA 32*       Significant Imaging: I have reviewed all pertinent imaging results/findings within the past 24 hours.    Assessment/Plan:     * Type 2 diabetes mellitus with hyperglycemia, with long-term current use of insulin  BG- 722, beta hydroxy- .1, anion gap- 11    Insulin drip  IVF  BG hourly        HLD (hyperlipidemia)  Lipid Panel pending    Continue Lipitor      Essential hypertension  Normotensive currently    Continue amlodipine      JIMMY (acute kidney injury)  Creatinine 1.8, baseline- 1.2; believe due to dehydration    IVF  CMP daily      VTE Risk Mitigation (From  admission, onward)         Ordered     enoxaparin injection 40 mg  Daily      11/25/19 0117     Place sequential compression device  Until discontinued      11/25/19 0117     IP VTE HIGH RISK PATIENT  Once      11/25/19 0117                   Julian Galloway NP  Department of Hospital Medicine   [facility]

## 2019-11-25 NOTE — PLAN OF CARE
Spoke with Abbie at outpatient pharmacy ([facility] [id number]).  They have requested prior auth for Oxempic 1 mg pen from insurance company for today.  Abbie expects to receive auth to deliver med prior to their close of business at 5:30 pm on Monday.     Pt requests referral to Endocrinology at [facility], referral placed in Epic.  They will contact pt to schedule appointment.  Information added to AVS.    Pt states she has family to drive her home.    No other DC needs.     Pt free to go once she receives her medication delivery from [facility] bedside delivery.       11/25/19 1605   Final Note   Assessment Type Final Discharge Note   Anticipated Discharge Disposition Home   What phone number can be called within the next 1-3 days to see how you are doing after discharge? [phone]   Hospital Follow Up  Appt(s) scheduled? Yes   Discharge plans and expectations educations in teach back method with documentation complete? Yes   Right Care Referral Info   Post Acute Recommendation No Care

## 2019-11-25 NOTE — ED NOTES
Pt sleeping, respirations even and unlabored, awakens to verbal stimuli. Will continue to monitor.

## 2019-11-25 NOTE — HOSPITAL COURSE
Patient was on insulin drip overnight.  Her BG was easily controlled and by morning D5 was started.  Drip was discontinued and she received her previous dose of long-acting Levemir 30 units and a moderate dose SSI.  Contacted pharmacy and found prior authorization for patient's Ozempic had been initiated on 11/21 and was still pending.  Patient had been started on Ozempic and her insulin and metformin were discontinued after she had lost about 80 pounds, and she did quite well on it until she lost her insurance and was put on Medicaid.  She is obtaining private insurance again but it has taken some time.  Patient was discharged home on the Ozempic after it was obtained from the pharmacy.  She was tolerating a diet and feeling well at discharge.

## 2019-11-25 NOTE — HPI
"Per H&P by Julian Galloway NP:  "The patient is a 53 y.o. female with hx of IDDM who presents with complaint of recurrent hyperglycemia over the past week. She reports polyuria and polydipsia since this morning. States "I've drank 24 bottles of water". On ED arrival her CBG was >500. Of note, pt reports she was on Qweekly insulin but ran out about a month ago. She has been seen here multiple times in the past week for similar sx, last seen 4 days ago, no DKA diagnosed, but was told to follow with PCP and to return to ED if hyperglycemia persisted. She reports she has been seen by PCP and will be filling her insulin Rx tomorrow."  "

## 2019-11-25 NOTE — NURSING TRANSFER
Nursing Transfer Note      11/25/2019     Transfer To:  321    Transfer via wheelchair    Transfer with personal belongings    Transported by transport staff and RN    Medicines sent: Yes    Chart send with patient: Yes    Notified: pt stated she will notify family    Patient reassessed at: 1710 11/25/19    Upon arrival to floor: patient oriented to room, call bell in reach and bed in lowest position    Up to chair upon arrival to floor. Chair locked. Call bell in reach. No acute distress noted. Safely transferred to  321. Report given to Nurse Delcid.

## 2019-11-25 NOTE — TELEPHONE ENCOUNTER
Pt has been NO SHOW for numerous appts and opted to find a physician that was closer. We can assist her but she needs to keep her appts in order for me to care for her moving forward. I think she needs an appt with a doctor that is closer to her home and can assist her easier in order for her to be compliant moving forward.  Thanks,  PV

## 2019-11-25 NOTE — ED TRIAGE NOTES
Pt presents to ED with c/o high blood sugar. Reports she hasn't been able to get her insulin yet due to issues with her insurance. Reports polyuria, polydipsia, and occasional muscle cramping. Denies chest pain, fever, N/V/D, and SOB.

## 2019-11-25 NOTE — PLAN OF CARE
VSS. On RA with no respiratory distress noted or reported. Insulin gtt off since 0855. Accuchecks ACHS. Tolerating diabetic diet. Ambulates to restroom with min assist. Awaiting transport to be transferred to room 321. Will continue to monitor until then.

## 2019-11-25 NOTE — DISCHARGE SUMMARY
"[facility] Hospital Medicine  Discharge Summary      Patient Name: Yayo Carver  MRN: [id number]  Admission Date: 11/24/2019  Hospital Length of Stay: 1 days  Discharge Date and Time:  11/25/2019 4:47 PM  Attending Physician: Asha Renee MD   Discharging Provider: Asha Renee MD  Primary Care Provider: John Meehan MD      HPI:   Per H&P by Julian Galloway NP:  "The patient is a 53 y.o. female with hx of IDDM who presents with complaint of recurrent hyperglycemia over the past week. She reports polyuria and polydipsia since this morning. States "I've drank 24 bottles of water". On ED arrival her CBG was >500. Of note, pt reports she was on Qweekly insulin but ran out about a month ago. She has been seen here multiple times in the past week for similar sx, last seen 4 days ago, no DKA diagnosed, but was told to follow with PCP and to return to ED if hyperglycemia persisted. She reports she has been seen by PCP and will be filling her insulin Rx tomorrow."          Hospital Course:   Patient was on insulin drip overnight.  Her BG was easily controlled and by morning D5 was started.  Drip was discontinued and she received her previous dose of long-acting Levemir 30 units and a moderate dose SSI.  Contacted pharmacy and found prior authorization for patient's Ozempic had been initiated on 11/21 and was still pending.  Patient had been started on Ozempic and her insulin and metformin were discontinued after she had lost about 80 pounds, and she did quite well on it until she lost her insurance and was put on Medicaid.  She is obtaining private insurance again but it has taken some time.  Patient was discharged home on the Ozempic after it was obtained from the pharmacy.  She was tolerating a diet and feeling well at discharge.         Final Active Diagnoses:    Diagnosis Date Noted POA    PRINCIPAL PROBLEM:  Type 2 diabetes mellitus with hyperglycemia, with long-term current use " of insulin [E11.65, Z79.4] 11/24/2019 Not Applicable    JIMMY (acute kidney injury) [N17.9] 10/20/2015 Yes    HLD (hyperlipidemia) [E78.5]  Yes    Essential hypertension [I10]  Yes      Problems Resolved During this Admission:       Discharged Condition: stable    Disposition: Home or Self Care    Follow Up:  Follow-up Information     John Meehan MD.    Specialty:  Internal Medicine  Why:  Follow up as scheduled 12/4/2019  Contact information:  [facility] [place] [id number] [phone]             [facility] [address].    Specialty:  Endocrinology  Why:  they will call you to schedule first appointment with specialist for Diabetes  Contact information:  [address], [facility] [address]  [facility] [address] [phone]  Additional information:  Attention Patients:       Dr. Fuentes Henning office location is at [address] [place] [address] [address] of the [facility] on the 8th floor.        Dr. Karan Glez office location is at [address] [place] [address] in [address] of the [facility] on the 3rd floor.                Patient Instructions:      Ambulatory referral/consult to Endocrinology   Referral Priority: Routine Referral Type: Consultation   Requested Specialty: Endocrinology   Number of Visits Requested: 1     Diet diabetic     Activity as tolerated       Medications:  Reconciled Home Medications:      Medication List      START taking these medications    semaglutide 1 mg/dose (2 mg/1.5 mL) Pnij  Commonly known as:  OZEMPIC  Inject 0.75 mLs (1MG) into the skin every 7 days.  Replaces:  semaglutide 0.25 mg or 0.5 mg(2 mg/1.5 mL) Pnij        CHANGE how you take these medications    ergocalciferol 50,000 unit Cap  Commonly known as:  ERGOCALCIFEROL  Take 1 capsule (50,000 Units total) by mouth every 7 days.  What changed:  when to take this        CONTINUE taking these medications    amLODIPine 10 MG tablet  Commonly known as:  NORVASC  Take 1 tablet (10 mg  total) by mouth once daily.     atorvastatin 20 MG tablet  Commonly known as:  LIPITOR  Take 1 tablet (20 mg total) by mouth once daily.     estradiol 0.01 % (0.1 mg/gram) vaginal cream  Commonly known as:  ESTRACE  Place 1 g vaginally twice a week.     ferrous sulfate 325 mg (65 mg iron) Tab tablet  Commonly known as:  FEOSOL  TK 1 T PO  ONCE D     hydroCHLOROthiazide 25 MG tablet  Commonly known as:  HYDRODIURIL  Take 1 tablet (25 mg total) by mouth once daily.     meloxicam 15 MG tablet  Commonly known as:  MOBIC  Take 1 tablet (15 mg total) by mouth once daily.        STOP taking these medications    insulin lispro 100 unit/mL injection     nitrofurantoin (macrocrystal-monohydrate) 100 MG capsule  Commonly known as:  MACROBID     semaglutide 0.25 mg or 0.5 mg(2 mg/1.5 mL) Pnij  Commonly known as:  Ozempic  Replaced by:  semaglutide 1 mg/dose (2 mg/1.5 mL) Pnij        ASK your doctor about these medications    OPW TEST CLAIM - DO NOT FILL  OPW test claim. Do not fill.              Time spent on the discharge of patient: >30 minutes  Patient was seen and examined on the date of discharge and determined to be suitable for discharge.         Asha Mensah MD  Department of Hospital Medicine  [facility]

## 2019-11-26 ENCOUNTER — TELEPHONE (OUTPATIENT)
Dept: PHARMACY | Facility: CLINIC | Age: 54
End: 2019-11-26

## 2019-11-26 VITALS
HEART RATE: 92 BPM | BODY MASS INDEX: 48.82 KG/M2 | OXYGEN SATURATION: 98 % | WEIGHT: 293 LBS | TEMPERATURE: 98 F | DIASTOLIC BLOOD PRESSURE: 96 MMHG | HEIGHT: 65 IN | SYSTOLIC BLOOD PRESSURE: 143 MMHG | RESPIRATION RATE: 14 BRPM

## 2019-11-26 LAB
ALBUMIN SERPL BCP-MCNC: 3.3 G/DL (ref 3.5–5.2)
ALP SERPL-CCNC: 79 U/L (ref 55–135)
ALT SERPL W/O P-5'-P-CCNC: 27 U/L (ref 10–44)
ANION GAP SERPL CALC-SCNC: 10 MMOL/L (ref 8–16)
ANISOCYTOSIS BLD QL SMEAR: SLIGHT
AST SERPL-CCNC: 22 U/L (ref 10–40)
BASOPHILS # BLD AUTO: 0.05 K/UL (ref 0–0.2)
BASOPHILS NFR BLD: 0.6 % (ref 0–1.9)
BILIRUB SERPL-MCNC: 0.4 MG/DL (ref 0.1–1)
BUN SERPL-MCNC: 15 MG/DL (ref 6–20)
CALCIUM SERPL-MCNC: 10.1 MG/DL (ref 8.7–10.5)
CHLORIDE SERPL-SCNC: 106 MMOL/L (ref 95–110)
CO2 SERPL-SCNC: 19 MMOL/L (ref 23–29)
CREAT SERPL-MCNC: 1 MG/DL (ref 0.5–1.4)
DIFFERENTIAL METHOD: ABNORMAL
EOSINOPHIL # BLD AUTO: 0.1 K/UL (ref 0–0.5)
EOSINOPHIL NFR BLD: 0.8 % (ref 0–8)
ERYTHROCYTE [DISTWIDTH] IN BLOOD BY AUTOMATED COUNT: 16.3 % (ref 11.5–14.5)
EST. GFR  (AFRICAN AMERICAN): >60 ML/MIN/1.73 M^2
EST. GFR  (NON AFRICAN AMERICAN): >60 ML/MIN/1.73 M^2
GIANT PLATELETS BLD QL SMEAR: PRESENT
GLUCOSE SERPL-MCNC: 393 MG/DL (ref 70–110)
HCT VFR BLD AUTO: 40.8 % (ref 37–48.5)
HGB BLD-MCNC: 12.3 G/DL (ref 12–16)
HYPOCHROMIA BLD QL SMEAR: ABNORMAL
IMM GRANULOCYTES # BLD AUTO: 0.02 K/UL (ref 0–0.04)
IMM GRANULOCYTES NFR BLD AUTO: 0.3 % (ref 0–0.5)
LYMPHOCYTES # BLD AUTO: 1.8 K/UL (ref 1–4.8)
LYMPHOCYTES NFR BLD: 23.3 % (ref 18–48)
MAGNESIUM SERPL-MCNC: 2.1 MG/DL (ref 1.6–2.6)
MCH RBC QN AUTO: 21.3 PG (ref 27–31)
MCHC RBC AUTO-ENTMCNC: 30.1 G/DL (ref 32–36)
MCV RBC AUTO: 71 FL (ref 82–98)
MONOCYTES # BLD AUTO: 0.5 K/UL (ref 0.3–1)
MONOCYTES NFR BLD: 6.1 % (ref 4–15)
NEUTROPHILS # BLD AUTO: 5.4 K/UL (ref 1.8–7.7)
NEUTROPHILS NFR BLD: 68.9 % (ref 38–73)
NRBC BLD-RTO: 0 /100 WBC
PHOSPHATE SERPL-MCNC: 3.4 MG/DL (ref 2.7–4.5)
PLATELET # BLD AUTO: 238 K/UL (ref 150–350)
PLATELET BLD QL SMEAR: ABNORMAL
PMV BLD AUTO: ABNORMAL FL (ref 9.2–12.9)
POCT GLUCOSE: 345 MG/DL (ref 70–110)
POCT GLUCOSE: 407 MG/DL (ref 70–110)
POCT GLUCOSE: >500 MG/DL (ref 70–110)
POTASSIUM SERPL-SCNC: 5 MMOL/L (ref 3.5–5.1)
PROT SERPL-MCNC: 6.9 G/DL (ref 6–8.4)
RBC # BLD AUTO: 5.78 M/UL (ref 4–5.4)
SODIUM SERPL-SCNC: 135 MMOL/L (ref 136–145)
WBC # BLD AUTO: 7.82 K/UL (ref 3.9–12.7)

## 2019-11-26 PROCEDURE — C9399 UNCLASSIFIED DRUGS OR BIOLOG: HCPCS | Performed by: HOSPITALIST

## 2019-11-26 PROCEDURE — 85025 COMPLETE CBC W/AUTO DIFF WBC: CPT

## 2019-11-26 PROCEDURE — 63600175 PHARM REV CODE 636 W HCPCS: Performed by: HOSPITALIST

## 2019-11-26 PROCEDURE — 96372 THER/PROPH/DIAG INJ SC/IM: CPT

## 2019-11-26 PROCEDURE — 84100 ASSAY OF PHOSPHORUS: CPT

## 2019-11-26 PROCEDURE — 80053 COMPREHEN METABOLIC PANEL: CPT

## 2019-11-26 PROCEDURE — 36415 COLL VENOUS BLD VENIPUNCTURE: CPT

## 2019-11-26 PROCEDURE — 83735 ASSAY OF MAGNESIUM: CPT

## 2019-11-26 PROCEDURE — 25000003 PHARM REV CODE 250: Performed by: HOSPITALIST

## 2019-11-26 PROCEDURE — G0378 HOSPITAL OBSERVATION PER HR: HCPCS

## 2019-11-26 RX ORDER — METFORMIN HYDROCHLORIDE 1000 MG/1
1000 TABLET ORAL 2 TIMES DAILY WITH MEALS
Qty: 60 TABLET | Refills: 0 | Status: SHIPPED | OUTPATIENT
Start: 2019-11-26 | End: 2019-12-17 | Stop reason: SDUPTHER

## 2019-11-26 RX ADMIN — INSULIN DETEMIR 30 UNITS: 100 INJECTION, SOLUTION SUBCUTANEOUS at 08:11

## 2019-11-26 RX ADMIN — INSULIN ASPART 8 UNITS: 100 INJECTION, SOLUTION INTRAVENOUS; SUBCUTANEOUS at 12:11

## 2019-11-26 RX ADMIN — AMLODIPINE BESYLATE 10 MG: 5 TABLET ORAL at 08:11

## 2019-11-26 RX ADMIN — INSULIN ASPART 10 UNITS: 100 INJECTION, SOLUTION INTRAVENOUS; SUBCUTANEOUS at 08:11

## 2019-11-26 RX ADMIN — ATORVASTATIN CALCIUM 20 MG: 20 TABLET, FILM COATED ORAL at 08:11

## 2019-11-26 RX ADMIN — FERROUS SULFATE TAB EC 325 MG (65 MG FE EQUIVALENT) 325 MG: 325 (65 FE) TABLET DELAYED RESPONSE at 08:11

## 2019-11-26 NOTE — PLAN OF CARE
Pt in bed, no noted acute distress, no complaints of pain, at bed time B.G was 415, covered with PRN insulin, pt remained free of injury and falls during shift, VSS, bed in low locked position, call light in reach, hourly rounds complete, will continue to assess

## 2019-11-26 NOTE — NURSING
Discussed with patient and she is agreeable to taking Trulicity. Reviewed medication directions, information, side effects. Pt is engaged in learning. She describes a healthy diabetic eating style. She expressed difficulty getting appointments with her previous pcp and is agreeing for new one, with appointment that is already set up.   Review of all instructions on AVS completed, obtaining meds from outpt pharmacy.

## 2019-11-26 NOTE — PLAN OF CARE
Abbie from [facility] reports patient's insurance is denying Ozempic - suggested to change to Trulicity as there is a coupon available for a free 30 day supply & it will give pharmacy time to complete prior auth for Trulicity refill - Dr Blount aware - to speak with patient & inform CM of her decision

## 2019-11-26 NOTE — PLAN OF CARE
Spoke with Abbie at [facility] - she is following up with patient's insurance regarding prior auth for Ozempic - questioned possibility of substitutions but all similar meds would require prior auth - awaiting response

## 2019-11-26 NOTE — PLAN OF CARE
Dr Blount approved change from Ozempic to Branden Leiva from [facility] updated - awaiting bedside delivery for discharge

## 2019-12-01 ENCOUNTER — HOSPITAL ENCOUNTER (INPATIENT)
Facility: OTHER | Age: 54
LOS: 3 days | Discharge: HOME | DRG: 638 | End: 2019-12-04
Attending: EMERGENCY MEDICINE | Admitting: INTERNAL MEDICINE
Payer: MEDICAID

## 2019-12-01 DIAGNOSIS — R73.9 HYPERGLYCEMIA: ICD-10-CM

## 2019-12-01 DIAGNOSIS — E11.10 DIABETIC KETOACIDOSIS: Primary | ICD-10-CM

## 2019-12-01 DIAGNOSIS — I10 ESSENTIAL HYPERTENSION: ICD-10-CM

## 2019-12-01 DIAGNOSIS — D50.9 IRON DEFICIENCY ANEMIA, UNSPECIFIED IRON DEFICIENCY ANEMIA TYPE: ICD-10-CM

## 2019-12-01 DIAGNOSIS — E11.10 DIABETIC KETOACIDOSIS WITHOUT COMA ASSOCIATED WITH TYPE 2 DIABETES MELLITUS: ICD-10-CM

## 2019-12-01 DIAGNOSIS — N17.9 ACUTE KIDNEY INJURY: ICD-10-CM

## 2019-12-01 DIAGNOSIS — E11.10 TYPE 2 DIABETES MELLITUS WITH KETOACIDOSIS WITHOUT COMA, WITHOUT LONG-TERM CURRENT USE OF INSULIN: ICD-10-CM

## 2019-12-01 DIAGNOSIS — E66.01 MORBID OBESITY WITH BMI OF 50.0-59.9, ADULT: ICD-10-CM

## 2019-12-01 LAB
ALLENS TEST: ABNORMAL
DELSYS: ABNORMAL
FIO2: 21
GLUCOSE SERPL-MCNC: >500 MG/DL (ref 70–110)
HCO3 UR-SCNC: 21.2 MMOL/L (ref 24–28)
MODE: ABNORMAL
PCO2 BLDA: 45.1 MMHG (ref 35–45)
PH SMN: 7.28 [PH] (ref 7.35–7.45)
PO2 BLDA: 32 MMHG (ref 40–60)
POC BE: -6 MMOL/L
POC SATURATED O2: 54 % (ref 95–100)
SAMPLE: ABNORMAL
SITE: ABNORMAL
SP02: 98

## 2019-12-01 PROCEDURE — 82962 GLUCOSE BLOOD TEST: CPT

## 2019-12-01 PROCEDURE — 93005 ELECTROCARDIOGRAM TRACING: CPT

## 2019-12-01 PROCEDURE — 93010 ELECTROCARDIOGRAM REPORT: CPT | Mod: ,,, | Performed by: INTERNAL MEDICINE

## 2019-12-01 PROCEDURE — 80053 COMPREHEN METABOLIC PANEL: CPT

## 2019-12-01 PROCEDURE — 82803 BLOOD GASES ANY COMBINATION: CPT

## 2019-12-01 PROCEDURE — 81000 URINALYSIS NONAUTO W/SCOPE: CPT

## 2019-12-01 PROCEDURE — 85025 COMPLETE CBC W/AUTO DIFF WBC: CPT

## 2019-12-01 PROCEDURE — 63600175 PHARM REV CODE 636 W HCPCS: Performed by: EMERGENCY MEDICINE

## 2019-12-01 PROCEDURE — 99291 CRITICAL CARE FIRST HOUR: CPT | Mod: 25

## 2019-12-01 PROCEDURE — 99900035 HC TECH TIME PER 15 MIN (STAT)

## 2019-12-01 PROCEDURE — 82010 KETONE BODYS QUAN: CPT

## 2019-12-01 PROCEDURE — 87086 URINE CULTURE/COLONY COUNT: CPT

## 2019-12-01 PROCEDURE — 12000002 HC ACUTE/MED SURGE SEMI-PRIVATE ROOM

## 2019-12-01 PROCEDURE — 93010 EKG 12-LEAD: ICD-10-PCS | Mod: ,,, | Performed by: INTERNAL MEDICINE

## 2019-12-01 RX ADMIN — SODIUM CHLORIDE 1000 ML: 0.9 INJECTION, SOLUTION INTRAVENOUS at 11:12

## 2019-12-02 PROBLEM — E11.10 DIABETIC KETOACIDOSIS: Status: ACTIVE | Noted: 2019-12-02

## 2019-12-02 LAB
ALBUMIN SERPL BCP-MCNC: 3.9 G/DL (ref 3.5–5.2)
ALP SERPL-CCNC: 84 U/L (ref 55–135)
ALT SERPL W/O P-5'-P-CCNC: 35 U/L (ref 10–44)
AMPHET+METHAMPHET UR QL: NEGATIVE
ANION GAP SERPL CALC-SCNC: 13 MMOL/L (ref 8–16)
ANION GAP SERPL CALC-SCNC: 16 MMOL/L (ref 8–16)
ANION GAP SERPL CALC-SCNC: 9 MMOL/L (ref 8–16)
AST SERPL-CCNC: 21 U/L (ref 10–40)
B-OH-BUTYR BLD STRIP-SCNC: 1.6 MMOL/L (ref 0–0.5)
BACTERIA #/AREA URNS HPF: ABNORMAL /HPF
BARBITURATES UR QL SCN>200 NG/ML: NEGATIVE
BASOPHILS # BLD AUTO: 0.05 K/UL (ref 0–0.2)
BASOPHILS # BLD AUTO: 0.08 K/UL (ref 0–0.2)
BASOPHILS NFR BLD: 0.6 % (ref 0–1.9)
BASOPHILS NFR BLD: 1 % (ref 0–1.9)
BENZODIAZ UR QL SCN>200 NG/ML: NEGATIVE
BILIRUB SERPL-MCNC: 0.3 MG/DL (ref 0.1–1)
BILIRUB UR QL STRIP: NEGATIVE
BUN SERPL-MCNC: 20 MG/DL (ref 6–20)
BUN SERPL-MCNC: 24 MG/DL (ref 6–20)
BUN SERPL-MCNC: 27 MG/DL (ref 6–20)
BZE UR QL SCN: NEGATIVE
CALCIUM SERPL-MCNC: 10.2 MG/DL (ref 8.7–10.5)
CALCIUM SERPL-MCNC: 10.6 MG/DL (ref 8.7–10.5)
CALCIUM SERPL-MCNC: 9.5 MG/DL (ref 8.7–10.5)
CANNABINOIDS UR QL SCN: NEGATIVE
CHLORIDE SERPL-SCNC: 105 MMOL/L (ref 95–110)
CHLORIDE SERPL-SCNC: 107 MMOL/L (ref 95–110)
CHLORIDE SERPL-SCNC: 99 MMOL/L (ref 95–110)
CLARITY UR: CLEAR
CO2 SERPL-SCNC: 17 MMOL/L (ref 23–29)
CO2 SERPL-SCNC: 18 MMOL/L (ref 23–29)
CO2 SERPL-SCNC: 21 MMOL/L (ref 23–29)
COLOR UR: YELLOW
CREAT SERPL-MCNC: 1.1 MG/DL (ref 0.5–1.4)
CREAT SERPL-MCNC: 1.4 MG/DL (ref 0.5–1.4)
CREAT SERPL-MCNC: 1.8 MG/DL (ref 0.5–1.4)
CREAT UR-MCNC: 39.8 MG/DL (ref 15–325)
CREAT UR-MCNC: 40.3 MG/DL (ref 15–325)
DIFFERENTIAL METHOD: ABNORMAL
DIFFERENTIAL METHOD: ABNORMAL
EOSINOPHIL # BLD AUTO: 0.1 K/UL (ref 0–0.5)
EOSINOPHIL # BLD AUTO: 0.1 K/UL (ref 0–0.5)
EOSINOPHIL NFR BLD: 1.1 % (ref 0–8)
EOSINOPHIL NFR BLD: 1.2 % (ref 0–8)
ERYTHROCYTE [DISTWIDTH] IN BLOOD BY AUTOMATED COUNT: 15.2 % (ref 11.5–14.5)
ERYTHROCYTE [DISTWIDTH] IN BLOOD BY AUTOMATED COUNT: 15.3 % (ref 11.5–14.5)
EST. GFR  (AFRICAN AMERICAN): 37 ML/MIN/1.73 M^2
EST. GFR  (AFRICAN AMERICAN): 49 ML/MIN/1.73 M^2
EST. GFR  (AFRICAN AMERICAN): >60 ML/MIN/1.73 M^2
EST. GFR  (NON AFRICAN AMERICAN): 32 ML/MIN/1.73 M^2
EST. GFR  (NON AFRICAN AMERICAN): 43 ML/MIN/1.73 M^2
EST. GFR  (NON AFRICAN AMERICAN): 57 ML/MIN/1.73 M^2
ESTIMATED AVG GLUCOSE: 258 MG/DL (ref 68–131)
ETHANOL UR-MCNC: <10 MG/DL
GLUCOSE SERPL-MCNC: 224 MG/DL (ref 70–110)
GLUCOSE SERPL-MCNC: 365 MG/DL (ref 70–110)
GLUCOSE SERPL-MCNC: 606 MG/DL (ref 70–110)
GLUCOSE UR QL STRIP: ABNORMAL
HBA1C MFR BLD HPLC: 10.6 % (ref 4–5.6)
HCG INTACT+B SERPL-ACNC: 2.5 MIU/ML
HCT VFR BLD AUTO: 39.6 % (ref 37–48.5)
HCT VFR BLD AUTO: 41.4 % (ref 37–48.5)
HGB BLD-MCNC: 12.1 G/DL (ref 12–16)
HGB BLD-MCNC: 12.5 G/DL (ref 12–16)
HGB UR QL STRIP: ABNORMAL
IMM GRANULOCYTES # BLD AUTO: 0.03 K/UL (ref 0–0.04)
IMM GRANULOCYTES # BLD AUTO: 0.04 K/UL (ref 0–0.04)
IMM GRANULOCYTES NFR BLD AUTO: 0.4 % (ref 0–0.5)
IMM GRANULOCYTES NFR BLD AUTO: 0.5 % (ref 0–0.5)
KETONES UR QL STRIP: ABNORMAL
LACTATE SERPL-SCNC: 2.2 MMOL/L (ref 0.5–2.2)
LEUKOCYTE ESTERASE UR QL STRIP: ABNORMAL
LYMPHOCYTES # BLD AUTO: 2.1 K/UL (ref 1–4.8)
LYMPHOCYTES # BLD AUTO: 2.6 K/UL (ref 1–4.8)
LYMPHOCYTES NFR BLD: 25.7 % (ref 18–48)
LYMPHOCYTES NFR BLD: 30.3 % (ref 18–48)
MAGNESIUM SERPL-MCNC: 1.8 MG/DL (ref 1.6–2.6)
MCH RBC QN AUTO: 21.4 PG (ref 27–31)
MCH RBC QN AUTO: 22 PG (ref 27–31)
MCHC RBC AUTO-ENTMCNC: 30.2 G/DL (ref 32–36)
MCHC RBC AUTO-ENTMCNC: 30.6 G/DL (ref 32–36)
MCV RBC AUTO: 71 FL (ref 82–98)
MCV RBC AUTO: 72 FL (ref 82–98)
METHADONE UR QL SCN>300 NG/ML: NEGATIVE
MICROSCOPIC COMMENT: ABNORMAL
MONOCYTES # BLD AUTO: 0.6 K/UL (ref 0.3–1)
MONOCYTES # BLD AUTO: 0.7 K/UL (ref 0.3–1)
MONOCYTES NFR BLD: 8 % (ref 4–15)
MONOCYTES NFR BLD: 8.2 % (ref 4–15)
NEUTROPHILS # BLD AUTO: 5.1 K/UL (ref 1.8–7.7)
NEUTROPHILS # BLD AUTO: 5.1 K/UL (ref 1.8–7.7)
NEUTROPHILS NFR BLD: 59.3 % (ref 38–73)
NEUTROPHILS NFR BLD: 63.7 % (ref 38–73)
NITRITE UR QL STRIP: NEGATIVE
NRBC BLD-RTO: 0 /100 WBC
NRBC BLD-RTO: 0 /100 WBC
OPIATES UR QL SCN: NEGATIVE
OSMOLALITY UR: 684 MOSM/KG (ref 50–1200)
PCP UR QL SCN>25 NG/ML: NEGATIVE
PH UR STRIP: 7 [PH] (ref 5–8)
PHOSPHATE SERPL-MCNC: 2.5 MG/DL (ref 2.7–4.5)
PHOSPHATE SERPL-MCNC: 2.5 MG/DL (ref 2.7–4.5)
PLATELET # BLD AUTO: 185 K/UL (ref 150–350)
PLATELET # BLD AUTO: 188 K/UL (ref 150–350)
PMV BLD AUTO: ABNORMAL FL (ref 9.2–12.9)
PMV BLD AUTO: ABNORMAL FL (ref 9.2–12.9)
POCT GLUCOSE: 124 MG/DL (ref 70–110)
POCT GLUCOSE: 144 MG/DL (ref 70–110)
POCT GLUCOSE: 147 MG/DL (ref 70–110)
POCT GLUCOSE: 154 MG/DL (ref 70–110)
POCT GLUCOSE: 180 MG/DL (ref 70–110)
POCT GLUCOSE: 193 MG/DL (ref 70–110)
POCT GLUCOSE: 194 MG/DL (ref 70–110)
POCT GLUCOSE: 205 MG/DL (ref 70–110)
POCT GLUCOSE: 206 MG/DL (ref 70–110)
POCT GLUCOSE: 218 MG/DL (ref 70–110)
POCT GLUCOSE: 336 MG/DL (ref 70–110)
POCT GLUCOSE: 369 MG/DL (ref 70–110)
POCT GLUCOSE: 484 MG/DL (ref 70–110)
POTASSIUM SERPL-SCNC: 4.2 MMOL/L (ref 3.5–5.1)
POTASSIUM SERPL-SCNC: 4.4 MMOL/L (ref 3.5–5.1)
POTASSIUM SERPL-SCNC: 4.8 MMOL/L (ref 3.5–5.1)
PROT SERPL-MCNC: 7.6 G/DL (ref 6–8.4)
PROT UR QL STRIP: NEGATIVE
PROT UR-MCNC: <7 MG/DL (ref 0–15)
PROT/CREAT UR: NORMAL MG/G{CREAT} (ref 0–0.2)
RBC # BLD AUTO: 5.51 M/UL (ref 4–5.4)
RBC # BLD AUTO: 5.83 M/UL (ref 4–5.4)
RBC #/AREA URNS HPF: 2 /HPF (ref 0–4)
SODIUM SERPL-SCNC: 132 MMOL/L (ref 136–145)
SODIUM SERPL-SCNC: 136 MMOL/L (ref 136–145)
SODIUM SERPL-SCNC: 137 MMOL/L (ref 136–145)
SODIUM UR-SCNC: 70 MMOL/L (ref 20–250)
SP GR UR STRIP: 1.01 (ref 1–1.03)
SQUAMOUS #/AREA URNS HPF: 18 /HPF
TOXICOLOGY INFORMATION: NORMAL
URN SPEC COLLECT METH UR: ABNORMAL
UROBILINOGEN UR STRIP-ACNC: 1 EU/DL
WBC # BLD AUTO: 8.04 K/UL (ref 3.9–12.7)
WBC # BLD AUTO: 8.52 K/UL (ref 3.9–12.7)
WBC #/AREA URNS HPF: 16 /HPF (ref 0–5)
YEAST URNS QL MICRO: ABNORMAL

## 2019-12-02 PROCEDURE — 84100 ASSAY OF PHOSPHORUS: CPT

## 2019-12-02 PROCEDURE — 83605 ASSAY OF LACTIC ACID: CPT

## 2019-12-02 PROCEDURE — 99232 PR SUBSEQUENT HOSPITAL CARE,LEVL II: ICD-10-PCS | Mod: ,,, | Performed by: PHYSICIAN ASSISTANT

## 2019-12-02 PROCEDURE — 25000003 PHARM REV CODE 250: Performed by: PHYSICIAN ASSISTANT

## 2019-12-02 PROCEDURE — 80048 BASIC METABOLIC PNL TOTAL CA: CPT

## 2019-12-02 PROCEDURE — 99223 PR INITIAL HOSPITAL CARE,LEVL III: ICD-10-PCS | Mod: ,,, | Performed by: INTERNAL MEDICINE

## 2019-12-02 PROCEDURE — 36415 COLL VENOUS BLD VENIPUNCTURE: CPT

## 2019-12-02 PROCEDURE — 83735 ASSAY OF MAGNESIUM: CPT

## 2019-12-02 PROCEDURE — 63600175 PHARM REV CODE 636 W HCPCS: Performed by: PHYSICIAN ASSISTANT

## 2019-12-02 PROCEDURE — 82570 ASSAY OF URINE CREATININE: CPT

## 2019-12-02 PROCEDURE — 80307 DRUG TEST PRSMV CHEM ANLYZR: CPT

## 2019-12-02 PROCEDURE — 11000001 HC ACUTE MED/SURG PRIVATE ROOM

## 2019-12-02 PROCEDURE — 99223 1ST HOSP IP/OBS HIGH 75: CPT | Mod: ,,, | Performed by: INTERNAL MEDICINE

## 2019-12-02 PROCEDURE — 63600175 PHARM REV CODE 636 W HCPCS: Performed by: INTERNAL MEDICINE

## 2019-12-02 PROCEDURE — S5010 5% DEXTROSE AND 0.45% SALINE: HCPCS | Performed by: PHYSICIAN ASSISTANT

## 2019-12-02 PROCEDURE — C9399 UNCLASSIFIED DRUGS OR BIOLOG: HCPCS | Performed by: INTERNAL MEDICINE

## 2019-12-02 PROCEDURE — 25000003 PHARM REV CODE 250: Performed by: INTERNAL MEDICINE

## 2019-12-02 PROCEDURE — 83935 ASSAY OF URINE OSMOLALITY: CPT

## 2019-12-02 PROCEDURE — 97802 MEDICAL NUTRITION INDIV IN: CPT

## 2019-12-02 PROCEDURE — 85025 COMPLETE CBC W/AUTO DIFF WBC: CPT

## 2019-12-02 PROCEDURE — 82962 GLUCOSE BLOOD TEST: CPT

## 2019-12-02 PROCEDURE — 63600175 PHARM REV CODE 636 W HCPCS: Performed by: EMERGENCY MEDICINE

## 2019-12-02 PROCEDURE — 84702 CHORIONIC GONADOTROPIN TEST: CPT

## 2019-12-02 PROCEDURE — 99232 SBSQ HOSP IP/OBS MODERATE 35: CPT | Mod: ,,, | Performed by: PHYSICIAN ASSISTANT

## 2019-12-02 PROCEDURE — 84300 ASSAY OF URINE SODIUM: CPT

## 2019-12-02 PROCEDURE — 83036 HEMOGLOBIN GLYCOSYLATED A1C: CPT

## 2019-12-02 PROCEDURE — 94761 N-INVAS EAR/PLS OXIMETRY MLT: CPT

## 2019-12-02 RX ORDER — ACETAMINOPHEN 325 MG/1
650 TABLET ORAL EVERY 4 HOURS PRN
Status: DISCONTINUED | OUTPATIENT
Start: 2019-12-02 | End: 2019-12-02

## 2019-12-02 RX ORDER — IBUPROFEN 200 MG
24 TABLET ORAL
Status: DISCONTINUED | OUTPATIENT
Start: 2019-12-02 | End: 2019-12-04 | Stop reason: HOSPADM

## 2019-12-02 RX ORDER — GLUCAGON 1 MG
1 KIT INJECTION
Status: DISCONTINUED | OUTPATIENT
Start: 2019-12-02 | End: 2019-12-04 | Stop reason: HOSPADM

## 2019-12-02 RX ORDER — SODIUM CHLORIDE 0.9 % (FLUSH) 0.9 %
10 SYRINGE (ML) INJECTION
Status: DISCONTINUED | OUTPATIENT
Start: 2019-12-02 | End: 2019-12-03

## 2019-12-02 RX ORDER — HEPARIN SODIUM 5000 [USP'U]/ML
5000 INJECTION, SOLUTION INTRAVENOUS; SUBCUTANEOUS EVERY 8 HOURS
Status: DISCONTINUED | OUTPATIENT
Start: 2019-12-02 | End: 2019-12-04 | Stop reason: HOSPADM

## 2019-12-02 RX ORDER — ACETAMINOPHEN 325 MG/1
650 TABLET ORAL EVERY 4 HOURS PRN
Status: DISCONTINUED | OUTPATIENT
Start: 2019-12-02 | End: 2019-12-04 | Stop reason: HOSPADM

## 2019-12-02 RX ORDER — IBUPROFEN 200 MG
16 TABLET ORAL
Status: DISCONTINUED | OUTPATIENT
Start: 2019-12-02 | End: 2019-12-04 | Stop reason: HOSPADM

## 2019-12-02 RX ORDER — ATORVASTATIN CALCIUM 20 MG/1
20 TABLET, FILM COATED ORAL DAILY
Status: DISCONTINUED | OUTPATIENT
Start: 2019-12-02 | End: 2019-12-04 | Stop reason: HOSPADM

## 2019-12-02 RX ORDER — AMLODIPINE BESYLATE 5 MG/1
10 TABLET ORAL DAILY
Status: DISCONTINUED | OUTPATIENT
Start: 2019-12-02 | End: 2019-12-04 | Stop reason: HOSPADM

## 2019-12-02 RX ORDER — HALOPERIDOL 5 MG/ML
2 INJECTION INTRAMUSCULAR ONCE
Status: COMPLETED | OUTPATIENT
Start: 2019-12-02 | End: 2019-12-02

## 2019-12-02 RX ORDER — DEXTROSE MONOHYDRATE AND SODIUM CHLORIDE 5; .45 G/100ML; G/100ML
INJECTION, SOLUTION INTRAVENOUS CONTINUOUS
Status: DISCONTINUED | OUTPATIENT
Start: 2019-12-02 | End: 2019-12-02

## 2019-12-02 RX ORDER — DIAZEPAM 5 MG/1
5 TABLET ORAL ONCE
Status: COMPLETED | OUTPATIENT
Start: 2019-12-02 | End: 2019-12-02

## 2019-12-02 RX ORDER — SODIUM CHLORIDE 0.9 % (FLUSH) 0.9 %
10 SYRINGE (ML) INJECTION
Status: DISCONTINUED | OUTPATIENT
Start: 2019-12-02 | End: 2019-12-04 | Stop reason: HOSPADM

## 2019-12-02 RX ORDER — ONDANSETRON 2 MG/ML
4 INJECTION INTRAMUSCULAR; INTRAVENOUS EVERY 8 HOURS PRN
Status: DISCONTINUED | OUTPATIENT
Start: 2019-12-02 | End: 2019-12-02

## 2019-12-02 RX ORDER — ONDANSETRON 2 MG/ML
4 INJECTION INTRAMUSCULAR; INTRAVENOUS EVERY 6 HOURS PRN
Status: DISCONTINUED | OUTPATIENT
Start: 2019-12-02 | End: 2019-12-04 | Stop reason: HOSPADM

## 2019-12-02 RX ORDER — SODIUM CHLORIDE 9 MG/ML
INJECTION, SOLUTION INTRAVENOUS CONTINUOUS
Status: DISCONTINUED | OUTPATIENT
Start: 2019-12-02 | End: 2019-12-02

## 2019-12-02 RX ORDER — INSULIN ASPART 100 [IU]/ML
1-10 INJECTION, SOLUTION INTRAVENOUS; SUBCUTANEOUS
Status: DISCONTINUED | OUTPATIENT
Start: 2019-12-02 | End: 2019-12-03

## 2019-12-02 RX ADMIN — INSULIN ASPART 4 UNITS: 100 INJECTION, SOLUTION INTRAVENOUS; SUBCUTANEOUS at 12:12

## 2019-12-02 RX ADMIN — POTASSIUM PHOSPHATE, MONOBASIC AND POTASSIUM PHOSPHATE, DIBASIC 15 MMOL: 224; 236 INJECTION, SOLUTION, CONCENTRATE INTRAVENOUS at 05:12

## 2019-12-02 RX ADMIN — AMLODIPINE BESYLATE 10 MG: 5 TABLET ORAL at 09:12

## 2019-12-02 RX ADMIN — HALOPERIDOL LACTATE 2 MG: 5 INJECTION, SOLUTION INTRAMUSCULAR at 05:12

## 2019-12-02 RX ADMIN — HEPARIN SODIUM 5000 UNITS: 5000 INJECTION, SOLUTION INTRAVENOUS; SUBCUTANEOUS at 05:12

## 2019-12-02 RX ADMIN — SODIUM CHLORIDE: 0.9 INJECTION, SOLUTION INTRAVENOUS at 02:12

## 2019-12-02 RX ADMIN — INSULIN ASPART 2 UNITS: 100 INJECTION, SOLUTION INTRAVENOUS; SUBCUTANEOUS at 09:12

## 2019-12-02 RX ADMIN — INSULIN DETEMIR 25 UNITS: 100 INJECTION, SOLUTION SUBCUTANEOUS at 09:12

## 2019-12-02 RX ADMIN — SODIUM CHLORIDE 1000 ML: 0.9 INJECTION, SOLUTION INTRAVENOUS at 01:12

## 2019-12-02 RX ADMIN — SODIUM CHLORIDE 10 UNITS/HR: 9 INJECTION, SOLUTION INTRAVENOUS at 01:12

## 2019-12-02 RX ADMIN — DIAZEPAM 5 MG: 5 TABLET ORAL at 03:12

## 2019-12-02 RX ADMIN — ATORVASTATIN CALCIUM 20 MG: 20 TABLET, FILM COATED ORAL at 09:12

## 2019-12-02 RX ADMIN — INSULIN ASPART 4 UNITS: 100 INJECTION, SOLUTION INTRAVENOUS; SUBCUTANEOUS at 04:12

## 2019-12-02 RX ADMIN — HEPARIN SODIUM 5000 UNITS: 5000 INJECTION, SOLUTION INTRAVENOUS; SUBCUTANEOUS at 09:12

## 2019-12-02 RX ADMIN — HEPARIN SODIUM 5000 UNITS: 5000 INJECTION, SOLUTION INTRAVENOUS; SUBCUTANEOUS at 03:12

## 2019-12-02 RX ADMIN — HALOPERIDOL LACTATE 2 MG: 5 INJECTION INTRAMUSCULAR at 03:12

## 2019-12-02 RX ADMIN — DEXTROSE AND SODIUM CHLORIDE: 5; .45 INJECTION, SOLUTION INTRAVENOUS at 04:12

## 2019-12-02 NOTE — SUBJECTIVE & OBJECTIVE
Past Medical History:   Diagnosis Date    Arthritis     Diabetes mellitus     HLD (hyperlipidemia)     Hypertension        Past Surgical History:   Procedure Laterality Date    COLONOSCOPY N/A 6/27/2017    Procedure: COLONOSCOPY;  Surgeon: Evelin Arceo MD;  Location: [facility] ([address] [place] [address]);  Service: Endoscopy;  Laterality: N/A;  2 nd floor d/t BMI > 50 kg/m3    TONSILLECTOMY      TUBAL LIGATION         Review of patient's allergies indicates:   Allergen Reactions    Ace inhibitors Edema       No current facility-administered medications on file prior to encounter.      Current Outpatient Medications on File Prior to Encounter   Medication Sig    amLODIPine (NORVASC) 10 MG tablet Take 1 tablet (10 mg total) by mouth once daily.    dulaglutide (TRULICITY) 0.75 mg/0.5 mL PnIj inject 0.5 mls into the skin once weekly    ergocalciferol (ERGOCALCIFEROL) 50,000 unit Cap Take 1 capsule (50,000 Units total) by mouth every 7 days.    estradiol (ESTRACE) 0.01 % (0.1 mg/gram) vaginal cream Place 1 g vaginally twice a week.    ferrous sulfate 325 mg (65 mg iron) Tab tablet TK 1 T PO  ONCE D    hydroCHLOROthiazide (HYDRODIURIL) 25 MG tablet Take 1 tablet (25 mg total) by mouth once daily.    meloxicam (MOBIC) 15 MG tablet Take 1 tablet (15 mg total) by mouth once daily.    metFORMIN (GLUCOPHAGE) 1000 MG tablet Take 1 tablet (1,000 mg total) by mouth 2 (two) times daily with meals.    atorvastatin (LIPITOR) 20 MG tablet Take 1 tablet (20 mg total) by mouth once daily.     Family History     Problem Relation (Age of Onset)    Breast cancer Mother (65), Sister (56), Maternal Grandmother, Maternal Aunt    Cancer Mother    Colon cancer Maternal Grandmother    Colon polyps Mother    Diabetes Mother, Father, Paternal Uncle, Paternal Grandmother    Heart disease Paternal Grandmother    Hypertension Mother    No Known Problems Brother, Maternal Grandfather, Paternal Grandfather        Tobacco Use    Smoking status:  Never Smoker    Smokeless tobacco: Never Used   Substance and Sexual Activity    Alcohol use: No     Frequency: Never     Comment: occasional    Drug use: No    Sexual activity: Not Currently     Comment: occasionally     Review of Systems   Constitutional: Negative for chills, diaphoresis and fever.   Respiratory: Negative for cough, shortness of breath and wheezing.    Cardiovascular: Negative for chest pain and palpitations.   Gastrointestinal: Negative for abdominal pain, constipation, diarrhea, nausea and vomiting.   Genitourinary: Negative for dysuria, flank pain, frequency, hematuria and urgency.   Musculoskeletal: Negative for arthralgias, neck pain and neck stiffness.        Diffuse muscles cramps/twitching.    Skin: Negative for color change, pallor, rash and wound.   Neurological: Negative for dizziness, syncope, weakness, light-headedness, numbness and headaches.   Psychiatric/Behavioral: Negative for confusion and decreased concentration.     Objective:     Vital Signs (Most Recent):  Temp: 97.9 °F (36.6 °C) (12/02/19 0315)  Pulse: 88 (12/02/19 0330)  Resp: (!) 28 (12/02/19 0330)  BP: (!) 151/98 (12/02/19 0330)  SpO2: 98 % (12/02/19 0330) Vital Signs (24h Range):  Temp:  [97.8 °F (36.6 °C)-98.7 °F (37.1 °C)] 97.9 °F (36.6 °C)  Pulse:  [] 88  Resp:  [12-28] 28  SpO2:  [97 %-100 %] 98 %  BP: (128-181)/() 151/98     Weight: 131.3 kg (289 lb 7.4 oz)  Body mass index is 46.72 kg/m².    Physical Exam   Constitutional: She is oriented to person, place, and time. She appears well-developed and well-nourished. No distress.   Very agitated.    HENT:   Head: Normocephalic and atraumatic.   Eyes: Pupils are equal, round, and reactive to light. Conjunctivae and EOM are normal. No scleral icterus.   Neck: Normal range of motion. Neck supple. No tracheal deviation present.   Cardiovascular: Normal rate, regular rhythm, normal heart sounds and intact distal pulses. Exam reveals no gallop and no friction  rub.   No murmur heard.  Pulmonary/Chest: Effort normal and breath sounds normal. No stridor. No respiratory distress. She has no wheezes. She has no rales.   Abdominal: Soft. Bowel sounds are normal. She exhibits no distension. There is no tenderness. There is no guarding.   Musculoskeletal: Normal range of motion. She exhibits no deformity.   Neurological: She is alert and oriented to person, place, and time.   Shaking legs and arms. Moving all over bed.    Skin: Skin is warm and dry. Capillary refill takes less than 2 seconds. She is not diaphoretic. No erythema. No pallor.   Psychiatric: Judgment and thought content normal.   Nursing note and vitals reviewed.        CRANIAL NERVES     CN III, IV, VI   Pupils are equal, round, and reactive to light.  Extraocular motions are normal.        Significant Labs:   BMP:   Recent Labs   Lab 12/02/19  0308   *      K 4.4      CO2 18*   BUN 24*   CREATININE 1.4   CALCIUM 10.2   MG 1.8     CBC:   Recent Labs   Lab 12/01/19 2354 12/02/19  0312   WBC 8.04 8.52   HGB 12.5 12.1   HCT 41.4 39.6    185     CMP:   Recent Labs   Lab 12/01/19 2354 12/02/19  0308   * 136   K 4.8 4.4   CL 99 105   CO2 17* 18*   * 365*   BUN 27* 24*   CREATININE 1.8* 1.4   CALCIUM 10.6* 10.2   PROT 7.6  --    ALBUMIN 3.9  --    BILITOT 0.3  --    ALKPHOS 84  --    AST 21  --    ALT 35  --    ANIONGAP 16 13   EGFRNONAA 32* 43*     Urine Culture: No results for input(s): LABURIN in the last 48 hours.  Urine Studies:   Recent Labs   Lab 12/01/19 2347   COLORU Yellow   APPEARANCEUA Clear   PHUR 7.0   SPECGRAV 1.010   PROTEINUA Negative   GLUCUA 3+*   KETONESU Trace*   BILIRUBINUA Negative   OCCULTUA Trace*   NITRITE Negative   UROBILINOGEN 1.0   LEUKOCYTESUR Trace*   RBCUA 2   WBCUA 16*   BACTERIA Rare   SQUAMEPITHEL 18     All pertinent labs within the past 24 hours have been reviewed.    Significant Imaging: I have reviewed all pertinent imaging results/findings  within the past 24 hours.   Imaging Results          X-Ray Chest AP Portable (Final result)  Result time 12/02/19 00:26:35    Final result by Nika Arreaga MD (12/02/19 00:26:35)                 Impression:      No acute intrathoracic abnormality detected.      Electronically signed by: Nika Arreaga  Date:    12/02/2019  Time:    00:26             Narrative:    EXAMINATION:  AP PORTABLE CHEST    CLINICAL HISTORY:  hyperglycemia;    TECHNIQUE:  AP portable chest radiograph was submitted.    COMPARISON:  01/29/2019    FINDINGS:  AP portable chest radiograph demonstrates a cardiac silhouette within normal limits.  There is no focal consolidation, pneumothorax, or pleural effusion.

## 2019-12-02 NOTE — ED TRIAGE NOTES
"Pt presents to ED with c/o high blood sugar. Reports she has been struggling with her DR to get on the proper insulin regimen. Reports polyuria and polydipsia. States she has been drinking "several bottles of water" for the past few days. Denies N/V/D, SOB, chest pain, fever, and chills  "

## 2019-12-02 NOTE — EICU
New admit    52 y/o F morbidly obese BMI 46, DM HbA1C 9.3, has not had her medications filled for over 2 months due to paperwork issues presents with elevated sugars accompanied by polyuria and polydipsia. Found to be in DKA beta hydroxybutyrate 1.6.    Camera assessment:  Patient seen conversant not in distress  She states she has cramps when insulin and fluids are infused together. She states valium helps alleviate cramps.     12/1/2019 23:54   Sodium 132 (L)   Potassium 4.8   Chloride 99   CO2 17 (L)   Anion Gap 16   BUN, Bld 27 (H)   Creatinine 1.8 (H)   eGFR 37 (A)   Glucose 606 (HH)       · Fluids and insulin as per DKA protocol  · Will need social workers assistance regarding medications

## 2019-12-02 NOTE — HPI
Ms. Carver is a 53 year old woman who presented with 3 weeks of hyperglycemia, since she was started on Trulicity to replace her Ozempic.  She has had the problem despite compliance with diet and medications.  For the last 2 days her BG has been greater than 500 and she has had polyuria and polydipsia.  She was in DKA on presentation with glucose 606, beta hydroxybutyrate 1.6, anion gap 16.  She was started on an insulin drip and transferred to ICU.    Medical history includes type II diabetes and HTN.  She was admitted here with hyperglycemia in November after running out of the Ozempic and being unable to obtain more as it required a prior authorization.  Ozempic was changed to Trulicity as it was easier to obtain, but she was on a lower dose than the prescribed Ozempic.

## 2019-12-02 NOTE — CONSULTS
"  [facility]  Adult Nutrition  Consult Note    SUMMARY     Recommendations  1. Encourage PO intake of meals.     Goals:   1. >75% of EEN, EPN by RD follow up.   Nutrition Goal Status: new    Reason for Assessment    Reason For Assessment: consult  Diagnosis: diabetes diagnosis/complications  Relevant Medical History: HTN, DM, HLD  Interdisciplinary Rounds: did not attend  General Information Comments: Pt with Diabetic ketoacidosis.   Diabetic education given to patient. Reviewed handouts with pt. Pt was not engaged in conversation, did not ask questions, and did not seems invested in her health. Discussed with patient the importance of eating the same amount of CHO at the same time daily to stabilize blood glucose levels. Also discussed how to read a nutrition label for CHO servings. Pt was not attentive to diet education and does it does not seem that she will follow the diabetic diet upon discharge. Handouts left at bedside.  NFPE- pt refused.    Nutrition Discharge Planning: Discharge on diabetic diet.     Nutrition Risk Screen    Nutrition Risk Screen: no indicators present    Nutrition/Diet History    Spiritual, Cultural Beliefs, Hoahaoism Practices, Values that Affect Care: no    Anthropometrics    Temp: 98 °F (36.7 °C)  Height Method: Stated  Height: 5' 6" (167.6 cm)  Height (inches): 66 in  Weight Method: Bed Scale  Weight: 131.3 kg (289 lb 7.4 oz)  Weight (lb): 289.47 lb  Ideal Body Weight (IBW), Female: 130 lb  % Ideal Body Weight, Female (lb): 222.67 lb  BMI (Calculated): 46.7  BMI Grade: greater than 40 - morbid obesity     Lab/Procedures/Meds    Pertinent Labs Reviewed: reviewed  Pertinent Labs Comments: Glucose 224, A1C 10.6  Pertinent Medications Reviewed: reviewed  Pertinent Medications Comments: Heparin, Insulin    Estimated/Assessed Needs    Weight Used For Calorie Calculations: 131.3 kg (289 lb 7.4 oz)  Energy Calorie Requirements (kcal): 1934 kcals/day  Energy Need Method: " Jesus Sánchez  Protein Requirements: 47.3-59.12 g/day(0.8-1.0 g/kg IBW)  Weight Used For Protein Calculations: 59 kg (130 lb 1.1 oz)  Fluid Requirements (mL): 1mL/kcal or per MD  Estimated Fluid Requirement Method: RDA Method  RDA Method (mL): 1934  CHO Requirement: 241 g CHO    Nutrition Prescription Ordered    Current Diet Order: 2000 kcal diabetic diet    Evaluation of Received Nutrient/Fluid Intake    Energy Calories Required: meeting needs  Protein Required: meeting needs  Fluid Required: meeting needs  % Intake of Estimated Energy Needs: 50 - 75 %  % Meal Intake: 50 - 75 %    Nutrition Risk    Level of Risk/Frequency of Follow-up: low     Assessment and Plan    Nutrition Problem  Inconsistent CHO intake    Related to (etiology):   Food and nutrition related knowledge deficit    Signs and Symptoms (as evidenced by):   A1C 10.6, glucose 224    Interventions/Recommendations (treatment strategy):  Collaboration with other providers    Nutrition Diagnosis Status:   New    Monitor and Evaluation    Food and Nutrient Intake: food and beverage intake  Food and Nutrient Adminstration: diet order  Knowledge/Beliefs/Attitudes: food and nutrition knowledge/skill  Physical Activity and Function: nutrition-related ADLs and IADLs  Anthropometric Measurements: weight  Biochemical Data, Medical Tests and Procedures: electrolyte and renal panel, lipid profile, gastrointestinal profile, glucose/endocrine profile, inflammatory profile  Nutrition-Focused Physical Findings: overall appearance     Malnutrition Assessment     Pt refused NFPE.    Nutrition Follow-Up    RD Follow-up?: Yes

## 2019-12-02 NOTE — ASSESSMENT & PLAN NOTE
- Ms. Yayo Craver is admitted to inpatient status  - she has DKA, with glucose 66, CO2 17, anion gap 16, beta hydroxybutyrate 1.6  - started on insulin drip in ED, continue  - DKA pathways order set ordered

## 2019-12-02 NOTE — PLAN OF CARE
12/02/19 1655   Readmission Questionnaire   At the time of your discharge, did someone talk to you about what your health problems were? Yes   At the time of discharge, did someone talk to you about what to watch out for regarding worsening of your health problem? Yes   At the time of discharge, did someone talk to you about what to do if you experienced worsening of your health problem? Yes   At the time of discharge, did someone talk to you about which medication to take when you left the hospital and which ones to stop taking? Yes   At the time of discharge, did someone talk to you about when and where to follow up with a doctor after you left the hospital? Yes

## 2019-12-02 NOTE — NURSING TRANSFER
Nursing Transfer Note      12/2/2019     Transfer To: 370    Transfer via wheelchair    Transfer with nurse, belongings, insulin pen    Transported by transport, nurse    Medicines sent: levemir insulin pen    Chart send with patient: Yes    Notified: pt notified family members herself    Patient reassessed at: on arrival    Upon arrival to floor: patient oriented to room, call bell in reach and bed in lowest position

## 2019-12-02 NOTE — PLAN OF CARE
Initial Discharge Planning Assessment:  Patient admitted on 12-1-19  Chart reviewed, Care plan discussed with treatment team,  attending Dr Blount  PCP updated in Epic: Dr. Meehan  Pharmacy, updated in Epic: [facility]  DME at home: Glucometer  Current dispo: Home soon  Transportation: Family to drive home  Case management  to follow       12/02/19 4052   Discharge Assessment   Assessment Type Discharge Planning Assessment   Confirmed/corrected address and phone number on facesheet? Yes   Assessment information obtained from? Caregiver;Patient;Medical Record   Communicated expected length of stay with patient/caregiver yes   Prior to hospitilization cognitive status: Alert/Oriented   Prior to hospitalization functional status: Independent   Current cognitive status: Alert/Oriented   Current Functional Status: Independent   Lives With alone   Able to Return to Prior Arrangements yes   Is patient able to care for self after discharge? Yes   Patient currently being followed by outpatient case management? No   Equipment Currently Used at Home glucometer   Do you have any problems affording any of your prescribed medications? No   Is the patient taking medications as prescribed? yes   Does the patient have transportation home? Yes   Transportation Anticipated family or friend will provide   Discharge Plan A Home   Patient/Family in Agreement with Plan yes

## 2019-12-02 NOTE — PLAN OF CARE
Recommendations  1. Encourage PO intake of meals.     Goals:   1. >75% of EEN, EPN by RD follow up.   Nutrition Goal Status: new

## 2019-12-02 NOTE — PLAN OF CARE
Patient is awake, alert, and oriented.  Purposeful hourly rounds done.  Vital signs WNL. Afebrile.  Patient has not complained of any pain since transfer. Resting mostly through shift. Patient stated that she still feels drowsy from the medications PM ICU nurse gave.  Peripheral IV intact.  Pt ambulated in room without difficulties and position self in bed independently.  Tolerating diet well. No nausea or vomiting.  Accuchecks before meals have been below 250.  Voids spontaneously.  Reviewed plan of care with patient. Reassurance was provided.  Call light within reach.  Continue monitoring.

## 2019-12-02 NOTE — PLAN OF CARE
Pt VS stable since arrival to unit; afebrile; oxygen sats >=92% via room air. Pt AAOx4. Pt extremely anxious initially, C/O muscle spasms and nervousness; Colin BROOKS and ROBERT MCINTOSH notified; pt given PO valium and IVP haldol; effective; See MAR. Pt voids; adequate UOP for shift. Insulin and IVF infusing; pt currently sleeping; will continue to monitor.

## 2019-12-02 NOTE — H&P
[facility] Medicine  History & Physical    Patient Name: Yayo Carver  MRN: [id number]  Admission Date: 12/1/2019  Attending Physician: ADEEL Price MD   Primary Care Provider: John Meehan MD         Patient information was obtained from patient and past medical records.     Subjective:     Principal Problem:Diabetic ketoacidosis    Chief Complaint:   Chief Complaint   Patient presents with    Hyperglycemia     high glucose over the past 3 weeks in 300, 400 and HI        HPI: Ms. Yayo Carver is a 53 y.o. female, with PMH of IDDM-2, HTN, HLD, id, GERD, OA, who presented to [facility] ED on 12-2-19 with 3 weeks of hyperglycemia.  She states that this is occurred despite dietary compliance, and compliance with p.o. and IV medications.  She states that for the past 2 days her fingersticks at home have been greater than 500.  She notes associated polyuria, polydipsia.  Other symptoms include cough.  In the ED she was evaluated in labs showed that she was in DKA with a serum glucose of 606, beta hydroxybutyrate of 1.6, anion gap of 16.  She was started on insulin drip.  Labs additionally show HPI with BUN 27 creatinine of 1.8, with baseline creatinine of 1.0. She will be admitted to inpatient status to the ICU.    Past Medical History:   Diagnosis Date    Arthritis     Diabetes mellitus     HLD (hyperlipidemia)     Hypertension        Past Surgical History:   Procedure Laterality Date    COLONOSCOPY N/A 6/27/2017    Procedure: COLONOSCOPY;  Surgeon: Evelin Arceo MD;  Location: [address];  Service: Endoscopy;  Laterality: N/A;  2 nd floor d/t BMI > 50 kg/m3    TONSILLECTOMY      TUBAL LIGATION         Review of patient's allergies indicates:   Allergen Reactions    Ace inhibitors Edema       No current facility-administered medications on file prior to encounter.      Current Outpatient Medications on File Prior to Encounter   Medication Sig    amLODIPine  (NORVASC) 10 MG tablet Take 1 tablet (10 mg total) by mouth once daily.    dulaglutide (TRULICITY) 0.75 mg/0.5 mL PnIj inject 0.5 mls into the skin once weekly    ergocalciferol (ERGOCALCIFEROL) 50,000 unit Cap Take 1 capsule (50,000 Units total) by mouth every 7 days.    estradiol (ESTRACE) 0.01 % (0.1 mg/gram) vaginal cream Place 1 g vaginally twice a week.    ferrous sulfate 325 mg (65 mg iron) Tab tablet TK 1 T PO  ONCE D    hydroCHLOROthiazide (HYDRODIURIL) 25 MG tablet Take 1 tablet (25 mg total) by mouth once daily.    meloxicam (MOBIC) 15 MG tablet Take 1 tablet (15 mg total) by mouth once daily.    metFORMIN (GLUCOPHAGE) 1000 MG tablet Take 1 tablet (1,000 mg total) by mouth 2 (two) times daily with meals.    atorvastatin (LIPITOR) 20 MG tablet Take 1 tablet (20 mg total) by mouth once daily.     Family History     Problem Relation (Age of Onset)    Breast cancer Mother (65), Sister (56), Maternal Grandmother, Maternal Aunt    Cancer Mother    Colon cancer Maternal Grandmother    Colon polyps Mother    Diabetes Mother, Father, Paternal Uncle, Paternal Grandmother    Heart disease Paternal Grandmother    Hypertension Mother    No Known Problems Brother, Maternal Grandfather, Paternal Grandfather        Tobacco Use    Smoking status: Never Smoker    Smokeless tobacco: Never Used   Substance and Sexual Activity    Alcohol use: No     Frequency: Never     Comment: occasional    Drug use: No    Sexual activity: Not Currently     Comment: occasionally     Review of Systems   Constitutional: Negative for chills, diaphoresis and fever.   Respiratory: Negative for cough, shortness of breath and wheezing.    Cardiovascular: Negative for chest pain and palpitations.   Gastrointestinal: Negative for abdominal pain, constipation, diarrhea, nausea and vomiting.   Genitourinary: Negative for dysuria, flank pain, frequency, hematuria and urgency.   Musculoskeletal: Negative for arthralgias, neck pain and neck  stiffness.        Diffuse muscles cramps/twitching.    Skin: Negative for color change, pallor, rash and wound.   Neurological: Negative for dizziness, syncope, weakness, light-headedness, numbness and headaches.   Psychiatric/Behavioral: Negative for confusion and decreased concentration.     Objective:     Vital Signs (Most Recent):  Temp: 97.9 °F (36.6 °C) (12/02/19 0315)  Pulse: 88 (12/02/19 0330)  Resp: (!) 28 (12/02/19 0330)  BP: (!) 151/98 (12/02/19 0330)  SpO2: 98 % (12/02/19 0330) Vital Signs (24h Range):  Temp:  [97.8 °F (36.6 °C)-98.7 °F (37.1 °C)] 97.9 °F (36.6 °C)  Pulse:  [] 88  Resp:  [12-28] 28  SpO2:  [97 %-100 %] 98 %  BP: (128-181)/() 151/98     Weight: 131.3 kg (289 lb 7.4 oz)  Body mass index is 46.72 kg/m².    Physical Exam   Constitutional: She is oriented to person, place, and time. She appears well-developed and well-nourished. No distress.   Very agitated.    HENT:   Head: Normocephalic and atraumatic.   Eyes: Pupils are equal, round, and reactive to light. Conjunctivae and EOM are normal. No scleral icterus.   Neck: Normal range of motion. Neck supple. No tracheal deviation present.   Cardiovascular: Normal rate, regular rhythm, normal heart sounds and intact distal pulses. Exam reveals no gallop and no friction rub.   No murmur heard.  Pulmonary/Chest: Effort normal and breath sounds normal. No stridor. No respiratory distress. She has no wheezes. She has no rales.   Abdominal: Soft. Bowel sounds are normal. She exhibits no distension. There is no tenderness. There is no guarding.   Musculoskeletal: Normal range of motion. She exhibits no deformity.   Neurological: She is alert and oriented to person, place, and time.   Shaking legs and arms. Moving all over bed.    Skin: Skin is warm and dry. Capillary refill takes less than 2 seconds. She is not diaphoretic. No erythema. No pallor.   Psychiatric: Judgment and thought content normal.   Nursing note and vitals  reviewed.        CRANIAL NERVES     CN III, IV, VI   Pupils are equal, round, and reactive to light.  Extraocular motions are normal.        Significant Labs:   BMP:   Recent Labs   Lab 12/02/19  0308   *      K 4.4      CO2 18*   BUN 24*   CREATININE 1.4   CALCIUM 10.2   MG 1.8     CBC:   Recent Labs   Lab 12/01/19  2354 12/02/19  0312   WBC 8.04 8.52   HGB 12.5 12.1   HCT 41.4 39.6    185     CMP:   Recent Labs   Lab 12/01/19  2354 12/02/19  0308   * 136   K 4.8 4.4   CL 99 105   CO2 17* 18*   * 365*   BUN 27* 24*   CREATININE 1.8* 1.4   CALCIUM 10.6* 10.2   PROT 7.6  --    ALBUMIN 3.9  --    BILITOT 0.3  --    ALKPHOS 84  --    AST 21  --    ALT 35  --    ANIONGAP 16 13   EGFRNONAA 32* 43*     Urine Culture: No results for input(s): LABURIN in the last 48 hours.  Urine Studies:   Recent Labs   Lab 12/01/19  2347   COLORU Yellow   APPEARANCEUA Clear   PHUR 7.0   SPECGRAV 1.010   PROTEINUA Negative   GLUCUA 3+*   KETONESU Trace*   BILIRUBINUA Negative   OCCULTUA Trace*   NITRITE Negative   UROBILINOGEN 1.0   LEUKOCYTESUR Trace*   RBCUA 2   WBCUA 16*   BACTERIA Rare   SQUAMEPITHEL 18     All pertinent labs within the past 24 hours have been reviewed.    Significant Imaging: I have reviewed all pertinent imaging results/findings within the past 24 hours.   Imaging Results          X-Ray Chest AP Portable (Final result)  Result time 12/02/19 00:26:35    Final result by Nika Arreaga MD (12/02/19 00:26:35)                 Impression:      No acute intrathoracic abnormality detected.      Electronically signed by: Nika Arreaga  Date:    12/02/2019  Time:    00:26             Narrative:    EXAMINATION:  AP PORTABLE CHEST    CLINICAL HISTORY:  hyperglycemia;    TECHNIQUE:  AP portable chest radiograph was submitted.    COMPARISON:  01/29/2019    FINDINGS:  AP portable chest radiograph demonstrates a cardiac silhouette within normal limits.  There is no focal consolidation,  pneumothorax, or pleural effusion.                                 Assessment/Plan:     * Diabetic ketoacidosis  - Ms. Yayo Carver is admitted to inpatient status  - she has DKA, with glucose 66, CO2 17, anion gap 16, beta hydroxybutyrate 1.6  - started on insulin drip in ED, continue  - DKA pathways order set ordered      JIMMY (acute kidney injury)  - suspect secondary to dehydration  - urine studies ordered   - continue IV fluid hydration  - avoid nephrotoxins, renally dose meds  - monitor    Essential hypertension  - normotensive at present  - continue home medications  - monitor    DOM (iron deficiency anemia)  - appears to be at baseline  - no reports of active bleeding  - monitor      VTE Risk Mitigation (From admission, onward)         Ordered     heparin (porcine) injection 5,000 Units  Every 8 hours      12/02/19 0204     IP VTE HIGH RISK PATIENT  Once      12/02/19 0204     Place RONNI hose  Until discontinued      12/02/19 0204     Place sequential compression device  Until discontinued      12/02/19 0204                   MARY McallisterC  Department of Hospital Medicine   [facility], [place], operated [facility]

## 2019-12-02 NOTE — ED PROVIDER NOTES
Encounter Date: 12/1/2019    SCRIBE #1 NOTE: I, Mattie Loera, am scribing for, and in the presence of, Dr. Roy.       History     Chief Complaint   Patient presents with    Hyperglycemia     high glucose over the past 3 weeks in 300, 400 and HI     This is a 53 y.o. female who presents with severe hyperglycemia despite compliance with her metformin and weekly insulin.  For the past 2 days she has had readings greater than 500 at home.  She has associated polyuria and polydipsia. She reports mild cough for the past several days. Otherwise, no clear inciting factors for her hyperglycemia. She states she takes her total metformin dose of 2 g daily, but has been taking it 500 mg QID with meals because it causes her nausea and sometimes vomiting including small amount of vomiting this morning.    The history is provided by the patient and medical records.     Review of patient's allergies indicates:   Allergen Reactions    Ace inhibitors Edema     Past Medical History:   Diagnosis Date    Arthritis     Diabetes mellitus     HLD (hyperlipidemia)     Hypertension      Past Surgical History:   Procedure Laterality Date    COLONOSCOPY N/A 6/27/2017    Procedure: COLONOSCOPY;  Surgeon: Evelin Arceo MD;  Location: [facility] ([address] [place] [address]);  Service: Endoscopy;  Laterality: N/A;  2 nd floor d/t BMI > 50 kg/m3    TONSILLECTOMY      TUBAL LIGATION       Family History   Problem Relation Age of Onset    Breast cancer Mother 65    Cancer Mother     Diabetes Mother     Colon polyps Mother     Hypertension Mother     Diabetes Father     Breast cancer Sister 56    No Known Problems Brother     Diabetes Paternal Uncle     Breast cancer Maternal Grandmother     Colon cancer Maternal Grandmother     No Known Problems Maternal Grandfather     Diabetes Paternal Grandmother     Heart disease Paternal Grandmother     No Known Problems Paternal Grandfather     Breast cancer Maternal Aunt     Esophageal cancer Neg Hx  "    Irritable bowel syndrome Neg Hx     Rectal cancer Neg Hx     Stomach cancer Neg Hx     Ulcerative colitis Neg Hx     Celiac disease Neg Hx     Crohn's disease Neg Hx     Amblyopia Neg Hx     Blindness Neg Hx     Cataracts Neg Hx     Glaucoma Neg Hx     Macular degeneration Neg Hx     Retinal detachment Neg Hx     Strabismus Neg Hx     Stroke Neg Hx     Thyroid disease Neg Hx      Social History     Tobacco Use    Smoking status: Never Smoker    Smokeless tobacco: Never Used   Substance Use Topics    Alcohol use: No     Frequency: Never     Comment: occasional    Drug use: No     Review of Systems  ROS: As per HPI and below:   General: No fever. No chills. No generalized weakness.   HENT: No sore throat.    Eyes: No visual changes. No eye pain.   Cardiovascular: No chest pain.   Respiratory: Cough. No dyspnea.   GI: Nausea and vomiting. No abdominal pain. No diarrhea.   Endocrine: Polydipsia.  : Polyuria. No dysuria. No hematuria.   Skin: No rashes.  Neuro: No focal weakness. No headache. No syncope.  Musculoskeletal: No extremity pain. No swelling.    Psych: No acute changes.  All other systems negative.     Physical Exam     Initial Vitals [12/01/19 2241]   BP Pulse Resp Temp SpO2   (!) 179/88 101 16 98.7 °F (37.1 °C) 98 %      MAP       --         Physical Exam  Nursing note and vitals reviewed.  /69   Pulse 97   Temp 98.7 °F (37.1 °C) (Oral)   Resp 12   Ht 5' 6" (1.676 m)   Wt 136.1 kg (300 lb)   SpO2 99%   BMI 48.42 kg/m²   Constitutional: AAOx3. No distress. Obese.  Eyes: EOMI. No discharge. Anicteric.  HENT:   Mouth/Throat: Oropharynx is clear. Uvula midline. Mucus membranes moist.  Neck: Normal range of motion. Neck supple.  Cardiovascular: Normal rate. No murmur, no gallop and no friction rub heard.   Pulmonary/Chest: No respiratory distress. Effort normal. No wheezes, no rales, no rhonchi.   Abdominal: Bowel sounds normal. Soft. No distension and no mass. There is no " tenderness. There is no rebound, no guarding, no tenderness at McBurney's point.  Musculoskeletal: Normal range of motion.   Neurological: GCS 15. Alert and oriented to person, place, and time. No gross cranial nerve, light touch or strength deficit. Coordination normal.   Skin: Skin is warm and dry.   EXT: 2+ radial pulses.   Psychiatric: Behavior is normal. Judgment normal.    ED Course   Procedures  Labs Reviewed   CBC W/ AUTO DIFFERENTIAL - Abnormal; Notable for the following components:       Result Value    RBC 5.83 (*)     Mean Corpuscular Volume 71 (*)     Mean Corpuscular Hemoglobin 21.4 (*)     Mean Corpuscular Hemoglobin Conc 30.2 (*)     RDW 15.2 (*)     All other components within normal limits   COMPREHENSIVE METABOLIC PANEL - Abnormal; Notable for the following components:    Sodium 132 (*)     CO2 17 (*)     Glucose 606 (*)     BUN, Bld 27 (*)     Creatinine 1.8 (*)     Calcium 10.6 (*)     eGFR if  37 (*)     eGFR if non  32 (*)     All other components within normal limits    Narrative:     GLU    critical result(s) called and verbal readback obtained from   SRINIVAS SEARS RN by VÍCTOR 12/02/2019 00:39   BETA - HYDROXYBUTYRATE, SERUM - Abnormal; Notable for the following components:    Beta-Hydroxybutyrate 1.6 (*)     All other components within normal limits   URINALYSIS, REFLEX TO URINE CULTURE - Abnormal; Notable for the following components:    Glucose, UA 3+ (*)     Ketones, UA Trace (*)     Occult Blood UA Trace (*)     Leukocytes, UA Trace (*)     All other components within normal limits    Narrative:     Preferred Collection Type->Urine, Clean Catch   URINALYSIS MICROSCOPIC - Abnormal; Notable for the following components:    WBC, UA 16 (*)     All other components within normal limits    Narrative:     Preferred Collection Type->Urine, Clean Catch   ISTAT PROCEDURE - Abnormal; Notable for the following components:    POC PH 7.280 (*)     POC PCO2 45.1 (*)      POC PO2 32 (*)     POC HCO3 21.2 (*)     POC SATURATED O2 54 (*)     All other components within normal limits   POCT GLUCOSE - Abnormal; Notable for the following components:    POCT Glucose 484 (*)     All other components within normal limits   CULTURE, URINE   BASIC METABOLIC PANEL   POCT GLUCOSE, HAND-HELD DEVICE   POCT GLUCOSE, HAND-HELD DEVICE   POCT GLUCOSE, HAND-HELD DEVICE   POCT GLUCOSE, HAND-HELD DEVICE   POCT GLUCOSE, HAND-HELD DEVICE   POCT GLUCOSE MONITORING CONTINUOUS          Imaging Results          X-Ray Chest AP Portable (Final result)  Result time 12/02/19 00:26:35    Final result by Nika Arreaga MD (12/02/19 00:26:35)                 Impression:      No acute intrathoracic abnormality detected.      Electronically signed by: Nika Arreaga  Date:    12/02/2019  Time:    00:26             Narrative:    EXAMINATION:  AP PORTABLE CHEST    CLINICAL HISTORY:  hyperglycemia;    TECHNIQUE:  AP portable chest radiograph was submitted.    COMPARISON:  01/29/2019    FINDINGS:  AP portable chest radiograph demonstrates a cardiac silhouette within normal limits.  There is no focal consolidation, pneumothorax, or pleural effusion.                                 Medical Decision Making:   History:   Old Medical Records: I decided to obtain old medical records.  Independently Interpreted Test(s):   I have ordered and independently interpreted X-rays - see summary below.  I have ordered and independently interpreted EKG Reading(s) - see summary below  Clinical Tests:   Lab Tests: Ordered and Reviewed  Radiological Study: Ordered and Reviewed  Medical Tests: Ordered and Reviewed            Scribe Attestation:   Scribe #1: I performed the above scribed service and the documentation accurately describes the services I performed. I attest to the accuracy of the note.    Attending Attestation:           Physician Attestation for Scribe:  Physician Attestation Statement for Scribe #1: I, Dr. Roy,  reviewed documentation, as scribed by Mattie Loera in my presence, and it is both accurate and complete.                 ED Course as of Dec 02 0133   Mon Dec 02, 2019   0000 I independently reviewed and interpreted EKG which shows normal sinus rhythm at 99 beats per minute (2344 tracing) and 95 beats per minute (2358 tracing), no STEMI, no ischemic changes, normal intervals.  No acute change compared to prior tracing.        [RC]   0038 Patient is a 53-year-old female with obesity, poorly controlled diabetes who presents with hyperglycemia associated with polyuria, polydipsia.  She reports compliance with her metformin.  Glucose greater than 500 at triage.  Physical exam nonfocal.  The initial differential included DKA, hyperglycemia, HHS, dehydration, other gross metabolic abnormality.    [RC]   0101 I independently reviewed and interpreted labs which are notable for diabetic ketoacidosis with CO2 17, gap 16, and acute kidney injury with creatinine 1.8.    I have discussed the patient history, exam, findings with Kelsey Lopez PA-C, who accepts the patient for Dr. Price.    [RC]   0122 I independently reviewed and interpreted CXR which shows no pneumothorax, no focal consolidation, no cardiomegaly, no acute process.    [AC]   0123 =====================================  Critical Care:  30 minutes total critical care time was personally spent by me, exclusive of procedures and separately billable time.   Critical care was necessary to treat or prevent imminent or life-threatening deterioration of the following conditions: metabolic crisis   =====================================    [AC]      ED Course User Index  [AC] Mattie Loera  [RC] Jose Roy MD                Clinical Impression:     1. Diabetic ketoacidosis    2. Hyperglycemia    3. Acute kidney injury                              Jose Roy MD  12/02/19 0133

## 2019-12-02 NOTE — ASSESSMENT & PLAN NOTE
- suspect secondary to dehydration  - urine studies ordered   - continue IV fluid hydration  - avoid nephrotoxins, renally dose meds  - monitor

## 2019-12-03 LAB
ANION GAP SERPL CALC-SCNC: 10 MMOL/L (ref 8–16)
BACTERIA UR CULT: NORMAL
BACTERIA UR CULT: NORMAL
BASOPHILS # BLD AUTO: 0.05 K/UL (ref 0–0.2)
BASOPHILS NFR BLD: 0.8 % (ref 0–1.9)
BUN SERPL-MCNC: 15 MG/DL (ref 6–20)
CALCIUM SERPL-MCNC: 9.8 MG/DL (ref 8.7–10.5)
CHLORIDE SERPL-SCNC: 107 MMOL/L (ref 95–110)
CO2 SERPL-SCNC: 19 MMOL/L (ref 23–29)
CREAT SERPL-MCNC: 1.1 MG/DL (ref 0.5–1.4)
DIFFERENTIAL METHOD: ABNORMAL
EOSINOPHIL # BLD AUTO: 0.1 K/UL (ref 0–0.5)
EOSINOPHIL NFR BLD: 1 % (ref 0–8)
ERYTHROCYTE [DISTWIDTH] IN BLOOD BY AUTOMATED COUNT: 15.6 % (ref 11.5–14.5)
EST. GFR  (AFRICAN AMERICAN): >60 ML/MIN/1.73 M^2
EST. GFR  (NON AFRICAN AMERICAN): 57 ML/MIN/1.73 M^2
GLUCOSE SERPL-MCNC: 356 MG/DL (ref 70–110)
HCT VFR BLD AUTO: 39.6 % (ref 37–48.5)
HGB BLD-MCNC: 12.2 G/DL (ref 12–16)
IMM GRANULOCYTES # BLD AUTO: 0.02 K/UL (ref 0–0.04)
IMM GRANULOCYTES NFR BLD AUTO: 0.3 % (ref 0–0.5)
LYMPHOCYTES # BLD AUTO: 1.8 K/UL (ref 1–4.8)
LYMPHOCYTES NFR BLD: 29.2 % (ref 18–48)
MAGNESIUM SERPL-MCNC: 2 MG/DL (ref 1.6–2.6)
MCH RBC QN AUTO: 21.8 PG (ref 27–31)
MCHC RBC AUTO-ENTMCNC: 30.8 G/DL (ref 32–36)
MCV RBC AUTO: 71 FL (ref 82–98)
MONOCYTES # BLD AUTO: 0.5 K/UL (ref 0.3–1)
MONOCYTES NFR BLD: 8.4 % (ref 4–15)
NEUTROPHILS # BLD AUTO: 3.7 K/UL (ref 1.8–7.7)
NEUTROPHILS NFR BLD: 60.3 % (ref 38–73)
NRBC BLD-RTO: 0 /100 WBC
PHOSPHATE SERPL-MCNC: 2.8 MG/DL (ref 2.7–4.5)
PLATELET # BLD AUTO: 179 K/UL (ref 150–350)
PMV BLD AUTO: ABNORMAL FL (ref 9.2–12.9)
POCT GLUCOSE: 218 MG/DL (ref 70–110)
POCT GLUCOSE: 218 MG/DL (ref 70–110)
POCT GLUCOSE: 269 MG/DL (ref 70–110)
POCT GLUCOSE: 313 MG/DL (ref 70–110)
POCT GLUCOSE: 321 MG/DL (ref 70–110)
POCT GLUCOSE: >500 MG/DL (ref 70–110)
POCT GLUCOSE: >500 MG/DL (ref 70–110)
POTASSIUM SERPL-SCNC: 4.7 MMOL/L (ref 3.5–5.1)
RBC # BLD AUTO: 5.59 M/UL (ref 4–5.4)
SODIUM SERPL-SCNC: 136 MMOL/L (ref 136–145)
WBC # BLD AUTO: 6.16 K/UL (ref 3.9–12.7)

## 2019-12-03 PROCEDURE — 25000003 PHARM REV CODE 250: Performed by: INTERNAL MEDICINE

## 2019-12-03 PROCEDURE — 99233 SBSQ HOSP IP/OBS HIGH 50: CPT | Mod: ,,, | Performed by: HOSPITALIST

## 2019-12-03 PROCEDURE — 63600175 PHARM REV CODE 636 W HCPCS: Performed by: NURSE PRACTITIONER

## 2019-12-03 PROCEDURE — 11000001 HC ACUTE MED/SURG PRIVATE ROOM

## 2019-12-03 PROCEDURE — C9399 UNCLASSIFIED DRUGS OR BIOLOG: HCPCS | Performed by: HOSPITALIST

## 2019-12-03 PROCEDURE — 80048 BASIC METABOLIC PNL TOTAL CA: CPT

## 2019-12-03 PROCEDURE — 63600175 PHARM REV CODE 636 W HCPCS: Performed by: HOSPITALIST

## 2019-12-03 PROCEDURE — 36415 COLL VENOUS BLD VENIPUNCTURE: CPT

## 2019-12-03 PROCEDURE — 85025 COMPLETE CBC W/AUTO DIFF WBC: CPT

## 2019-12-03 PROCEDURE — 99233 PR SUBSEQUENT HOSPITAL CARE,LEVL III: ICD-10-PCS | Mod: ,,, | Performed by: HOSPITALIST

## 2019-12-03 PROCEDURE — 83735 ASSAY OF MAGNESIUM: CPT

## 2019-12-03 PROCEDURE — 94761 N-INVAS EAR/PLS OXIMETRY MLT: CPT

## 2019-12-03 PROCEDURE — 63600175 PHARM REV CODE 636 W HCPCS: Performed by: INTERNAL MEDICINE

## 2019-12-03 PROCEDURE — 84100 ASSAY OF PHOSPHORUS: CPT

## 2019-12-03 RX ORDER — INSULIN ASPART 100 [IU]/ML
0-5 INJECTION, SOLUTION INTRAVENOUS; SUBCUTANEOUS
Status: DISCONTINUED | OUTPATIENT
Start: 2019-12-03 | End: 2019-12-04 | Stop reason: HOSPADM

## 2019-12-03 RX ORDER — INSULIN ASPART 100 [IU]/ML
15 INJECTION, SOLUTION INTRAVENOUS; SUBCUTANEOUS
Status: DISCONTINUED | OUTPATIENT
Start: 2019-12-03 | End: 2019-12-04 | Stop reason: HOSPADM

## 2019-12-03 RX ADMIN — HEPARIN SODIUM 5000 UNITS: 5000 INJECTION, SOLUTION INTRAVENOUS; SUBCUTANEOUS at 03:12

## 2019-12-03 RX ADMIN — HEPARIN SODIUM 5000 UNITS: 5000 INJECTION, SOLUTION INTRAVENOUS; SUBCUTANEOUS at 09:12

## 2019-12-03 RX ADMIN — INSULIN ASPART 8 UNITS: 100 INJECTION, SOLUTION INTRAVENOUS; SUBCUTANEOUS at 08:12

## 2019-12-03 RX ADMIN — AMLODIPINE BESYLATE 10 MG: 5 TABLET ORAL at 08:12

## 2019-12-03 RX ADMIN — INSULIN DETEMIR 30 UNITS: 100 INJECTION, SOLUTION SUBCUTANEOUS at 09:12

## 2019-12-03 RX ADMIN — INSULIN ASPART 15 UNITS: 100 INJECTION, SOLUTION INTRAVENOUS; SUBCUTANEOUS at 12:12

## 2019-12-03 RX ADMIN — INSULIN ASPART 1 UNITS: 100 INJECTION, SOLUTION INTRAVENOUS; SUBCUTANEOUS at 09:12

## 2019-12-03 RX ADMIN — HEPARIN SODIUM 5000 UNITS: 5000 INJECTION, SOLUTION INTRAVENOUS; SUBCUTANEOUS at 05:12

## 2019-12-03 RX ADMIN — ATORVASTATIN CALCIUM 20 MG: 20 TABLET, FILM COATED ORAL at 08:12

## 2019-12-03 RX ADMIN — INSULIN DETEMIR 25 UNITS: 100 INJECTION, SOLUTION SUBCUTANEOUS at 08:12

## 2019-12-03 RX ADMIN — INSULIN ASPART 15 UNITS: 100 INJECTION, SOLUTION INTRAVENOUS; SUBCUTANEOUS at 04:12

## 2019-12-03 NOTE — ASSESSMENT & PLAN NOTE
- suspect secondary to dehydration.  Resolved to baseline with IV fluids.  - avoid nephrotoxins, renally dose meds  - monitor

## 2019-12-03 NOTE — CONSULTS
Patient seen by DM ed. PCP is Dr. Meehan @ [facility]. Per pt, had been seeing Bariatric specialist and had some recent insurance changes and GLP-1 coverage/prior auth needs changed. Pt had difficulty getting bariatric specialist to do prior auth. Stressed importance of follow up and communication w/ PCP to help avoid lapses in med dosing. Discussed ways to communicate better w/ PCP and staff to help get meds on time/work continuously to get BG better controlled.     Pt w/ PCP f/u 12/4. Will be scheduled for outpatient DM education appointment at this time to further assist w/ medication coverage issues + ongoing lifestyle modifications/A1C control.     All questions answered at this time; contact information for DM education program left w/ patient.

## 2019-12-03 NOTE — HOSPITAL COURSE
Patient was admitted to ICU and then transferred to the floor when her anion gap closed.  She was feeling well, but the Trulicity prescription had not been authorized yet so she was held over while awaiting the prescription.  Ultimately her insurance declined the prior authorization for Trulicity as they had for Ozempic.  The outpatient pharmacy also looked into other GLP-1 agonists and none were covered.  Patient will resume her previous doses of short and long-acting insulin until her insurance changes.  She was seen by the diabetic educator and scheduled for outpatient diabetes education to assist further with medication coverage issues and lifestyle modifications.  She was feeling well on discharge.

## 2019-12-03 NOTE — SUBJECTIVE & OBJECTIVE
Interval History:  Patient known to me from previous admission.  Her BG is still up on current insulin regimen and it is being increased to her previous home dose of 30 units Levemir qhs and 15 units aspart with meals.  Trulicity dose noted to be too low in comparison to her previous Ozempic.  Notified pharmacy.      Review of Systems   Constitutional: Negative for chills and fever.   Respiratory: Negative for cough and shortness of breath.    Cardiovascular: Negative for chest pain and palpitations.     Objective:     Vital Signs (Most Recent):  Temp: 98.1 °F (36.7 °C) (12/03/19 0734)  Pulse: 87 (12/03/19 0734)  Resp: 14 (12/03/19 0734)  BP: 118/69 (12/03/19 0734)  SpO2: 99 % (12/03/19 0734) Vital Signs (24h Range):  Temp:  [97.3 °F (36.3 °C)-98.3 °F (36.8 °C)] 98.1 °F (36.7 °C)  Pulse:  [] 87  Resp:  [14-20] 14  SpO2:  [96 %-99 %] 99 %  BP: (118-166)/(65-94) 118/69     Weight: 131.3 kg (289 lb 7.4 oz)  Body mass index is 46.72 kg/m².    Intake/Output Summary (Last 24 hours) at 12/3/2019 0918  Last data filed at 12/2/2019 1133  Gross per 24 hour   Intake 571.35 ml   Output 450 ml   Net 121.35 ml      Physical Exam   Constitutional: She is oriented to person, place, and time. She appears well-developed.   Overweight.   HENT:   Head: Normocephalic.   Eyes: Pupils are equal, round, and reactive to light. Conjunctivae are normal.   Neck: Neck supple. No thyromegaly present.   Cardiovascular: Normal rate, regular rhythm, normal heart sounds and intact distal pulses. Exam reveals no gallop and no friction rub.   No murmur heard.  Pulmonary/Chest: Effort normal and breath sounds normal.   Abdominal: Soft. Bowel sounds are normal. She exhibits no distension. There is no tenderness.   Musculoskeletal: Normal range of motion. She exhibits no edema.   Lymphadenopathy:     She has no cervical adenopathy.   Neurological: She is alert and oriented to person, place, and time.   Strength equal and symmetric   Skin: Skin is  warm and dry. No rash noted.   Psychiatric: She has a normal mood and affect. Her behavior is normal. Thought content normal.       Significant Labs: All pertinent labs within the past 24 hours have been reviewed.    Significant Imaging: I have reviewed all pertinent imaging results/findings within the past 24 hours.

## 2019-12-03 NOTE — ASSESSMENT & PLAN NOTE
- Patient admitted in DKA with glucose 66, CO2 17, anion gap 16, beta hydroxybutyrate 1.6  - Insulin drip discontinued and patient transferred to floor.  - Awaiting prior authorization for Trulicity.

## 2019-12-03 NOTE — PLAN OF CARE
AAOx4.  NAD noted.  Pt remained free from injury or falls.  Pt remains free from skin breakdowns. Vitals signs stable throughout shift on RA.  Positions self independently.  Voiding adequately throughout shift.  Pt has no complaints of pain this shift.  Pt remained afebrile this shift. Pt able to voice needs and concerns.  Purposeful rounding done.  All needs met.  Bed locked in lowest position, call bell in reach.  Will continue to monitor.

## 2019-12-03 NOTE — PROGRESS NOTES
[facility] Hospital Medicine  Progress Note    Patient Name: Yayo Carver  MRN: [id number]  Patient Class: IP- Inpatient   Admission Date: 12/1/2019  Length of Stay: 1 days  Attending Physician: Asha Renee MD  Primary Care Provider: John Meehan MD        Subjective:     Principal Problem:Type 2 diabetes mellitus with ketoacidosis without coma, without long-term current use of insulin        HPI:  Ms. Carver is a 53 year old woman who presented with 3 weeks of hyperglycemia, since she was started on Trulicity to replace her Ozempic.  She has had the problem despite compliance with diet and medications.  For the last 2 days her BG has been greater than 500 and she has had polyuria and polydipsia.  She was in DKA on presentation with glucose 606, beta hydroxybutyrate 1.6, anion gap 16.  She was started on an insulin drip and transferred to ICU.    Medical history includes type II diabetes and HTN.  She was admitted here with hyperglycemia in November after running out of the Ozempic and being unable to obtain more as it required a prior authorization.  Ozempic was changed to Trulicity as it was easier to obtain, but she was on a lower dose than the prescribed Ozempic.    Overview/Hospital Course:  Patient was admitted to ICU and then transferred to the floor when her anion gap closed.  She was feeling well, but the Trulicity prescription had not been authorized yet so she was held over while awaiting the prescription.      Interval History:  Patient known to me from previous admission.  Her BG is still up on current insulin regimen and it is being increased to her previous home dose of 30 units Levemir qhs and 15 units aspart with meals.  Trulicity dose noted to be too low in comparison to her previous Ozempic.  Notified pharmacy.      Review of Systems   Constitutional: Negative for chills and fever.   Respiratory: Negative for cough and shortness of breath.    Cardiovascular:  Negative for chest pain and palpitations.     Objective:     Vital Signs (Most Recent):  Temp: 98.1 °F (36.7 °C) (12/03/19 0734)  Pulse: 87 (12/03/19 0734)  Resp: 14 (12/03/19 0734)  BP: 118/69 (12/03/19 0734)  SpO2: 99 % (12/03/19 0734) Vital Signs (24h Range):  Temp:  [97.3 °F (36.3 °C)-98.3 °F (36.8 °C)] 98.1 °F (36.7 °C)  Pulse:  [] 87  Resp:  [14-20] 14  SpO2:  [96 %-99 %] 99 %  BP: (118-166)/(65-94) 118/69     Weight: 131.3 kg (289 lb 7.4 oz)  Body mass index is 46.72 kg/m².    Intake/Output Summary (Last 24 hours) at 12/3/2019 0918  Last data filed at 12/2/2019 1133  Gross per 24 hour   Intake 571.35 ml   Output 450 ml   Net 121.35 ml      Physical Exam   Constitutional: She is oriented to person, place, and time. She appears well-developed.   Overweight.   HENT:   Head: Normocephalic.   Eyes: Pupils are equal, round, and reactive to light. Conjunctivae are normal.   Neck: Neck supple. No thyromegaly present.   Cardiovascular: Normal rate, regular rhythm, normal heart sounds and intact distal pulses. Exam reveals no gallop and no friction rub.   No murmur heard.  Pulmonary/Chest: Effort normal and breath sounds normal.   Abdominal: Soft. Bowel sounds are normal. She exhibits no distension. There is no tenderness.   Musculoskeletal: Normal range of motion. She exhibits no edema.   Lymphadenopathy:     She has no cervical adenopathy.   Neurological: She is alert and oriented to person, place, and time.   Strength equal and symmetric   Skin: Skin is warm and dry. No rash noted.   Psychiatric: She has a normal mood and affect. Her behavior is normal. Thought content normal.       Significant Labs: All pertinent labs within the past 24 hours have been reviewed.    Significant Imaging: I have reviewed all pertinent imaging results/findings within the past 24 hours.      Assessment/Plan:      * Type 2 diabetes mellitus with ketoacidosis without coma, without long-term current use of insulin  - Patient admitted  in DKA with glucose 66, CO2 17, anion gap 16, beta hydroxybutyrate 1.6  - Insulin drip discontinued and patient transferred to floor.  - Awaiting prior authorization for Trulicity.    JIMMY (acute kidney injury)  - suspect secondary to dehydration.  Resolved to baseline with IV fluids.  - avoid nephrotoxins, renally dose meds  - monitor    Essential hypertension  - normotensive at present  - continue home medications  - monitor    DOM (iron deficiency anemia)  - appears to be at baseline  - no reports of active bleeding  - monitor        VTE Risk Mitigation (From admission, onward)         Ordered     heparin (porcine) injection 5,000 Units  Every 8 hours      12/02/19 0204     IP VTE HIGH RISK PATIENT  Once      12/02/19 0204                      Asha Mensah MD  Department of Hospital Medicine   [facility]

## 2019-12-04 ENCOUNTER — OFFICE VISIT (OUTPATIENT)
Dept: INTERNAL MEDICINE | Facility: CLINIC | Age: 54
DRG: 638 | End: 2019-12-04
Payer: MEDICAID

## 2019-12-04 VITALS
RESPIRATION RATE: 14 BRPM | BODY MASS INDEX: 46.52 KG/M2 | HEART RATE: 87 BPM | SYSTOLIC BLOOD PRESSURE: 129 MMHG | OXYGEN SATURATION: 96 % | TEMPERATURE: 97 F | WEIGHT: 289.44 LBS | DIASTOLIC BLOOD PRESSURE: 75 MMHG | HEIGHT: 66 IN

## 2019-12-04 VITALS
BODY MASS INDEX: 46.7 KG/M2 | OXYGEN SATURATION: 98 % | SYSTOLIC BLOOD PRESSURE: 134 MMHG | HEART RATE: 95 BPM | DIASTOLIC BLOOD PRESSURE: 88 MMHG | HEIGHT: 66 IN | WEIGHT: 290.56 LBS

## 2019-12-04 DIAGNOSIS — E78.2 MIXED HYPERLIPIDEMIA: Chronic | ICD-10-CM

## 2019-12-04 DIAGNOSIS — Z79.4 CONTROLLED TYPE 2 DIABETES MELLITUS WITHOUT COMPLICATION, WITH LONG-TERM CURRENT USE OF INSULIN: ICD-10-CM

## 2019-12-04 DIAGNOSIS — E11.9 CONTROLLED TYPE 2 DIABETES MELLITUS WITHOUT COMPLICATION, WITH LONG-TERM CURRENT USE OF INSULIN: ICD-10-CM

## 2019-12-04 DIAGNOSIS — I10 ESSENTIAL HYPERTENSION: Primary | ICD-10-CM

## 2019-12-04 LAB — POCT GLUCOSE: 294 MG/DL (ref 70–110)

## 2019-12-04 PROCEDURE — 99999 PR PBB SHADOW E&M-EST. PATIENT-LVL III: CPT | Mod: PBBFAC,,, | Performed by: INTERNAL MEDICINE

## 2019-12-04 PROCEDURE — 63600175 PHARM REV CODE 636 W HCPCS: Performed by: INTERNAL MEDICINE

## 2019-12-04 PROCEDURE — 99214 PR OFFICE/OUTPT VISIT, EST, LEVL IV, 30-39 MIN: ICD-10-PCS | Mod: S$PBB,,, | Performed by: INTERNAL MEDICINE

## 2019-12-04 PROCEDURE — 25000003 PHARM REV CODE 250: Performed by: INTERNAL MEDICINE

## 2019-12-04 PROCEDURE — 99238 PR HOSPITAL DISCHARGE DAY,<30 MIN: ICD-10-PCS | Mod: ,,, | Performed by: HOSPITALIST

## 2019-12-04 PROCEDURE — 99238 HOSP IP/OBS DSCHRG MGMT 30/<: CPT | Mod: ,,, | Performed by: HOSPITALIST

## 2019-12-04 PROCEDURE — 99213 OFFICE O/P EST LOW 20 MIN: CPT | Mod: PBBFAC | Performed by: INTERNAL MEDICINE

## 2019-12-04 PROCEDURE — 99999 PR PBB SHADOW E&M-EST. PATIENT-LVL III: ICD-10-PCS | Mod: PBBFAC,,, | Performed by: INTERNAL MEDICINE

## 2019-12-04 PROCEDURE — 99214 OFFICE O/P EST MOD 30 MIN: CPT | Mod: S$PBB,,, | Performed by: INTERNAL MEDICINE

## 2019-12-04 RX ORDER — INSULIN ASPART 100 [IU]/ML
15 INJECTION, SOLUTION INTRAVENOUS; SUBCUTANEOUS
Qty: 15 ML | Refills: 0 | Status: SHIPPED | OUTPATIENT
Start: 2019-12-04 | End: 2019-12-17 | Stop reason: SDUPTHER

## 2019-12-04 RX ORDER — PEN NEEDLE, DIABETIC 30 GX3/16"
1 NEEDLE, DISPOSABLE MISCELLANEOUS 4 TIMES DAILY
Qty: 100 EACH | Status: SHIPPED | OUTPATIENT
Start: 2019-12-04 | End: 2020-12-24 | Stop reason: SDUPTHER

## 2019-12-04 RX ADMIN — AMLODIPINE BESYLATE 10 MG: 5 TABLET ORAL at 08:12

## 2019-12-04 RX ADMIN — INSULIN ASPART 15 UNITS: 100 INJECTION, SOLUTION INTRAVENOUS; SUBCUTANEOUS at 08:12

## 2019-12-04 RX ADMIN — HEPARIN SODIUM 5000 UNITS: 5000 INJECTION, SOLUTION INTRAVENOUS; SUBCUTANEOUS at 05:12

## 2019-12-04 RX ADMIN — ATORVASTATIN CALCIUM 20 MG: 20 TABLET, FILM COATED ORAL at 08:12

## 2019-12-04 NOTE — NURSING
0500-Patient advertently removed IV. Nurse went to reinsert, pt refuse and stated that, she don't need an IV, she's getting discharge today.    0510-Julian Galloway NP notified, telephone order given, ok to leave peripheral IV out.

## 2019-12-04 NOTE — PROGRESS NOTES
Subjective:       Patient ID: Yayo Carver is a 53 y.o. female.    Chief Complaint: Hospital Follow Up and Dm2    Pt here as a new pt to me but will be est care with Dr Hastings soon. She was discharged from hospital earlier today for DKA. She has had 2 admissions for this in the last 3 weeks. Record reviewed. Her insurance changed from commercial insurance to medicaid recently and this presented problems with getting her insulin and ozempic that she was previously taking thus leading to her hospitalizations.     As noted above, she has DM2 that is not currently well controlled. Last A1c was 10.6. She is on levemir 40 units qhs and novolog 15 units tid with meals. She was given trulicity 0.75mg weekly on prior hospitalization but last injection was 8 days ago. She was just released from hospital so does not have interpretable blood sugar data from home at this time. No known end organ damage. She saw DM educ in hospital who helped determine that ozempic may be better covered with medicaid.     Pt's BP is fairly well controlled. Tolerating meds well. Pt denies cp/sob/ha/vision or neuro changes. Checking at home and is well controlled.     HLD is tx with lipitor which she tolerates well.         Review of Systems   Constitutional: Negative for unexpected weight change.   Respiratory: Negative for shortness of breath.    Cardiovascular: Negative for chest pain.   Endocrine: Negative for polyuria.   Psychiatric/Behavioral: Negative for dysphoric mood.       Objective:      Physical Exam   Constitutional: She is oriented to person, place, and time. She appears well-developed and well-nourished.   HENT:   Right Ear: Hearing, tympanic membrane and ear canal normal.   Left Ear: Hearing, tympanic membrane and ear canal normal.   Eyes: Pupils are equal, round, and reactive to light. EOM are normal.   Neck: Neck supple. No thyromegaly present.   Cardiovascular: Normal rate, regular rhythm and normal heart sounds.   Pulses:        Posterior tibial pulses are 2+ on the right side, and 2+ on the left side.   Pulmonary/Chest: Effort normal and breath sounds normal.   Musculoskeletal: She exhibits no edema.        Right foot: There is no deformity.        Left foot: There is no deformity.   Feet:   Right Foot:   Protective Sensation: 6 sites tested. 6 sites sensed.   Skin Integrity: Negative for ulcer, blister or skin breakdown.   Left Foot:   Protective Sensation: 6 sites tested. 6 sites sensed.   Skin Integrity: Negative for ulcer, blister or skin breakdown.   Lymphadenopathy:     She has no cervical adenopathy.   Neurological: She is alert and oriented to person, place, and time. She has normal strength. No cranial nerve deficit or sensory deficit. She displays a negative Romberg sign. Coordination and gait normal.   Psychiatric: She has a normal mood and affect. Her behavior is normal.       Assessment:       1. Essential hypertension    2. Mixed hyperlipidemia    3. Controlled type 2 diabetes mellitus without complication, with long-term current use of insulin    4. Uncontrolled diabetes mellitus type 2 without complications        Plan:       1. Continue with insulin doses as Rx and use remainder of trulicity 0.75mg weekly that she has; will send Rx for ozempic to our pharmacy to assist in authorization--this will replace trulicity and pt is aware  2. Home BS monitoring and bring logs to appt with her new PCP which is scheduled for 12/17  3. Encouraged pt to let me know via phone if BS readings are increasing as we can adjust insulin doses to help prevent hospitalization  4. Home BP monitoring

## 2019-12-04 NOTE — PLAN OF CARE
Plan of care reviewed with patient. Pt remains free from fall, injury, and skin breakdown. Positions self independently. Patient denies pain. Tolerating diet well and educated on diabetic diet. No complaints of N/V. Voiding spontaneously. Blood glucose monitoring. Purposeful hourly rounding done. Safety maintained. Patient lying in bed in no distress. Will continue to monitor.

## 2019-12-04 NOTE — PLAN OF CARE
Patient is awake, alert, and oriented.  Purposeful hourly rounds done.  Vital signs WNL. Afebrile.  No complaints of pain. Ambulation is encouraged.  Peripheral IV intact.  Pt ambulated in room without difficulties and position self in bed independently.  Tolerating diet well. No nausea or vomiting.  Voids spontaneously.  Reviewed plan of care with patient. Reassurance was provided.  Accuchecks before meals and at bedtime continues. Patient is hoping to go home tomorrow to make it to a doctor's appointment.  Call light within reach.  Continue monitoring.

## 2019-12-04 NOTE — NURSING
Patient is awake, alert, and oriented.  Vital signs are WNL.  Peripheral IV has been discontinued.  Patient is being discharge home with no services.  Discharge instructions have been reviewed.  Patient verbalized understanding.  New prescriptions are ready at the pharmacy. Patient stated she will stop by the [facility] to withdraw the money to pay for it, and then head to her appointment in the [facility].  All questions were answered.  Patient requesting wheelchair assistance to take her. Dispatcher said they can accommodate. Called doctor's office to let them know that the patient may be a little late since she has to stop by the pharmacy to  her prescription. Spoke with Abbie. She said she will let me know if the patient did not fullfill her promise.

## 2019-12-04 NOTE — DISCHARGE SUMMARY
[facility] Hospital Medicine  Discharge Summary      Patient Name: Yayo Carver  MRN: [id number]  Admission Date: 12/1/2019  Hospital Length of Stay: 2 days  Discharge Date and Time:  12/04/2019 8:42 AM  Attending Physician: Asha Renee MD   Discharging Provider: Asha Renee MD  Primary Care Provider: John Meehan MD      HPI:   Ms. Carver is a 53 year old woman who presented with 3 weeks of hyperglycemia, since she was started on Trulicity to replace her Ozempic.  She has had the problem despite compliance with diet and medications.  For the last 2 days her BG has been greater than 500 and she has had polyuria and polydipsia.  She was in DKA on presentation with glucose 606, beta hydroxybutyrate 1.6, anion gap 16.  She was started on an insulin drip and transferred to ICU.    Medical history includes type II diabetes and HTN.  She was admitted here with hyperglycemia in November after running out of the Ozempic and being unable to obtain more as it required a prior authorization.  Ozempic was changed to Trulicity as it was easier to obtain, but she was on a lower dose than the prescribed Ozempic.          Hospital Course:   Patient was admitted to ICU and then transferred to the floor when her anion gap closed.  She was feeling well, but the Trulicity prescription had not been authorized yet so she was held over while awaiting the prescription.  Ultimately her insurance declined the prior authorization for Trulicity as they had for Ozempic.  The outpatient pharmacy also looked into other GLP-1 agonists and none were covered.  Patient will resume her previous doses of short and long-acting insulin until her insurance changes.  She was seen by the diabetic educator and scheduled for outpatient diabetes education to assist further with medication coverage issues and lifestyle modifications.  She was feeling well on discharge.     Consults:   Consults (From admission, onward)       "  Status Ordering Provider     Inpatient consult to Diabetes educator  Once     Provider:  (Not yet assigned)    Completed CLARISSA DECKER     Inpatient consult to Registered Dietitian/Nutritionist  Once     Provider:  (Not yet assigned)    Completed CLARISSA DECKER            Final Active Diagnoses:    Diagnosis Date Noted POA    PRINCIPAL PROBLEM:  Type 2 diabetes mellitus with ketoacidosis without coma, without long-term current use of insulin [E11.10] 12/02/2019 Yes    Acute kidney injury [N17.9] 10/20/2015 Yes    Essential hypertension [I10]  Yes    DOM (iron deficiency anemia) [D50.9] 05/21/2014 Yes    Morbid obesity with BMI of 50.0-59.9, adult [E66.01, Z68.43] 05/15/2014 Not Applicable      Problems Resolved During this Admission:       Discharged Condition: stable    Disposition: Home or Self Care    Follow Up:  Follow-up Information     John Meehan MD.    Specialty:  Internal Medicine  Why:  Today at 10 AM as scheduled.  [facility] information:  [address] [facility] [facility] [address] [phone]                 Patient Instructions:      Diet diabetic     Activity as tolerated       Medications:  Reconciled Home Medications:      Medication List      START taking these medications    insulin aspart U-100 100 unit/mL (3 mL) Inpn pen  Commonly known as:  NovoLOG  Inject 15 Units into the skin 3 (three) times daily with meals.     insulin detemir U-100 100 unit/mL (3 mL) Inpn pen  Commonly known as:  LEVEMIR FLEXTOUCH  Inject 40 Units into the skin every evening.     pen needle, diabetic 31 gauge x 5/16" Ndle  Commonly known as:  BD Ultra-Fine Short Pen Needle  1 each by Misc.(Non-Drug; Combo Route) route 4 (four) times daily.        CONTINUE taking these medications    amLODIPine 10 MG tablet  Commonly known as:  NORVASC  Take 1 tablet (10 mg total) by mouth once daily.     atorvastatin 20 MG tablet  Commonly known as:  LIPITOR  Take 1 tablet (20 mg total) by mouth once daily.     ergocalciferol " 50,000 unit Cap  Commonly known as:  ERGOCALCIFEROL  Take 1 capsule (50,000 Units total) by mouth every 7 days.     estradiol 0.01 % (0.1 mg/gram) vaginal cream  Commonly known as:  ESTRACE  Place 1 g vaginally twice a week.     ferrous sulfate 325 mg (65 mg iron) Tab tablet  Commonly known as:  FEOSOL  TK 1 T PO  ONCE D     hydroCHLOROthiazide 25 MG tablet  Commonly known as:  HYDRODIURIL  Take 1 tablet (25 mg total) by mouth once daily.     meloxicam 15 MG tablet  Commonly known as:  MOBIC  Take 1 tablet (15 mg total) by mouth once daily.     metFORMIN 1000 MG tablet  Commonly known as:  GLUCOPHAGE  Take 1 tablet (1,000 mg total) by mouth 2 (two) times daily with meals.        STOP taking these medications    Trulicity 0.75 mg/0.5 mL Pnij  Generic drug:  dulaglutide            Time spent on the discharge of patient: <30 minutes  Patient was seen and examined on the date of discharge and determined to be suitable for discharge.         Asha Mensah MD  Department of Hospital Medicine  [facility]

## 2019-12-13 ENCOUNTER — TELEPHONE (OUTPATIENT)
Dept: INTERNAL MEDICINE | Facility: CLINIC | Age: 54
End: 2019-12-13

## 2019-12-13 NOTE — TELEPHONE ENCOUNTER
----- Message from Windy Hutton sent at 12/12/2019  1:05 PM CST -----  Contact: laureen  Name of Who is Calling: laureen from [facility]      What is the request in detail:Laureen is requesting a update certificate of medical necessities and with 4x testing and 3 multiple daily injection and updated chart notes with current information  fax #[phone]      Can the clinic reply by [facility]: no    What Number to Call Back if not in [facility]: [phone] ext [phone]

## 2019-12-14 ENCOUNTER — PATIENT OUTREACH (OUTPATIENT)
Dept: ADMINISTRATIVE | Facility: OTHER | Age: 54
End: 2019-12-14

## 2019-12-17 ENCOUNTER — OFFICE VISIT (OUTPATIENT)
Dept: INTERNAL MEDICINE | Facility: CLINIC | Age: 54
End: 2019-12-17
Payer: MEDICAID

## 2019-12-17 ENCOUNTER — TELEPHONE (OUTPATIENT)
Dept: INTERNAL MEDICINE | Facility: CLINIC | Age: 54
End: 2019-12-17

## 2019-12-17 VITALS
BODY MASS INDEX: 47.09 KG/M2 | HEIGHT: 66 IN | DIASTOLIC BLOOD PRESSURE: 88 MMHG | WEIGHT: 293 LBS | SYSTOLIC BLOOD PRESSURE: 128 MMHG

## 2019-12-17 DIAGNOSIS — E11.65 TYPE 2 DIABETES MELLITUS WITH HYPERGLYCEMIA, WITH LONG-TERM CURRENT USE OF INSULIN: Primary | ICD-10-CM

## 2019-12-17 DIAGNOSIS — E66.01 MORBID OBESITY WITH BMI OF 45.0-49.9, ADULT: ICD-10-CM

## 2019-12-17 DIAGNOSIS — E78.2 MIXED HYPERLIPIDEMIA: ICD-10-CM

## 2019-12-17 DIAGNOSIS — Z79.4 TYPE 2 DIABETES MELLITUS WITH HYPERGLYCEMIA, WITH LONG-TERM CURRENT USE OF INSULIN: Primary | ICD-10-CM

## 2019-12-17 DIAGNOSIS — I10 ESSENTIAL HYPERTENSION: ICD-10-CM

## 2019-12-17 DIAGNOSIS — D50.9 IRON DEFICIENCY ANEMIA, UNSPECIFIED IRON DEFICIENCY ANEMIA TYPE: ICD-10-CM

## 2019-12-17 DIAGNOSIS — Z12.39 BREAST CANCER SCREENING: ICD-10-CM

## 2019-12-17 DIAGNOSIS — E55.9 VITAMIN D DEFICIENCY: ICD-10-CM

## 2019-12-17 PROBLEM — E11.9 CONTROLLED TYPE 2 DIABETES MELLITUS WITHOUT COMPLICATION, WITH LONG-TERM CURRENT USE OF INSULIN: Status: RESOLVED | Noted: 2017-01-19 | Resolved: 2019-12-17

## 2019-12-17 PROCEDURE — 99215 PR OFFICE/OUTPT VISIT, EST, LEVL V, 40-54 MIN: ICD-10-PCS | Mod: S$PBB,,, | Performed by: INTERNAL MEDICINE

## 2019-12-17 PROCEDURE — 99999 PR PBB SHADOW E&M-EST. PATIENT-LVL III: ICD-10-PCS | Mod: PBBFAC,,, | Performed by: INTERNAL MEDICINE

## 2019-12-17 PROCEDURE — 99215 OFFICE O/P EST HI 40 MIN: CPT | Mod: S$PBB,,, | Performed by: INTERNAL MEDICINE

## 2019-12-17 PROCEDURE — 99213 OFFICE O/P EST LOW 20 MIN: CPT | Mod: PBBFAC | Performed by: INTERNAL MEDICINE

## 2019-12-17 PROCEDURE — 99999 PR PBB SHADOW E&M-EST. PATIENT-LVL III: CPT | Mod: PBBFAC,,, | Performed by: INTERNAL MEDICINE

## 2019-12-17 RX ORDER — OXAPROZIN 600 MG/1
600 TABLET, FILM COATED ORAL DAILY
COMMUNITY
End: 2020-01-23

## 2019-12-17 RX ORDER — ATORVASTATIN CALCIUM 20 MG/1
20 TABLET, FILM COATED ORAL DAILY
Qty: 30 TABLET | Refills: 3 | Status: SHIPPED | OUTPATIENT
Start: 2019-12-17 | End: 2019-12-19 | Stop reason: SDUPTHER

## 2019-12-17 RX ORDER — INSULIN ASPART 100 [IU]/ML
15 INJECTION, SOLUTION INTRAVENOUS; SUBCUTANEOUS
Qty: 40.5 ML | Refills: 3 | Status: SHIPPED | OUTPATIENT
Start: 2019-12-17 | End: 2019-12-17 | Stop reason: SDUPTHER

## 2019-12-17 RX ORDER — AMLODIPINE BESYLATE 10 MG/1
10 TABLET ORAL DAILY
Qty: 30 TABLET | Refills: 3 | Status: SHIPPED | OUTPATIENT
Start: 2019-12-17 | End: 2020-01-23 | Stop reason: SDUPTHER

## 2019-12-17 RX ORDER — INSULIN PUMP SYRINGE, 3 ML
EACH MISCELLANEOUS
Qty: 1 EACH | Refills: 0 | Status: SHIPPED | OUTPATIENT
Start: 2019-12-17 | End: 2022-02-10

## 2019-12-17 RX ORDER — HYDROCHLOROTHIAZIDE 25 MG/1
25 TABLET ORAL DAILY
Qty: 90 TABLET | Refills: 1 | Status: CANCELLED | OUTPATIENT
Start: 2019-12-17

## 2019-12-17 RX ORDER — METFORMIN HYDROCHLORIDE 1000 MG/1
1000 TABLET ORAL 2 TIMES DAILY WITH MEALS
Qty: 180 TABLET | Refills: 3 | Status: SHIPPED | OUTPATIENT
Start: 2019-12-17 | End: 2020-07-29 | Stop reason: SDUPTHER

## 2019-12-17 RX ORDER — ASPIRIN 81 MG/1
81 TABLET ORAL DAILY
Qty: 90 TABLET | Refills: 3 | Status: SHIPPED | OUTPATIENT
Start: 2019-12-17 | End: 2021-01-15 | Stop reason: SDUPTHER

## 2019-12-17 RX ORDER — INSULIN ASPART 100 [IU]/ML
10 INJECTION, SOLUTION INTRAVENOUS; SUBCUTANEOUS
Qty: 27 ML | Refills: 3 | Status: SHIPPED | OUTPATIENT
Start: 2019-12-17 | End: 2020-01-23

## 2019-12-17 NOTE — PROGRESS NOTES
Subjective:       Patient ID: Yayo Carver is a 54 y.o. female who  has a past medical history of Arthritis, Diabetes mellitus, HLD (hyperlipidemia), and Hypertension.    Chief Complaint: Establish Care and Diabetes     History was obtained from the patient and supplemented through chart review.  She was previously seen by Dr. Fabiana Louie for DM 2.  -Discussed care with ED staff.    HPI    DM2 is not controlled.  Was admitted 2 weeks ago for DKA d/t insurance issues obtaining Trulicity/Ozempic since they thought that she was not taking metformin or checking BG 4/day.  Was completely out of insulin x 2 mo.  Her insurance also did not cover other GLP 1 agonists.  Currently pt is taking Levemir 40 q.h.s., NovoLog 15 t.i.d. Taking metformin 500 b.i.d; decreased from 1000 BID d/t palpitations when she had DKA, but amenable to taking 1000 BID.     Diet:  Avoids bread, carbs. Saw DM edu in the hospital.    Exercise:  Walks at work in the [employer].      Reports decreased energy daily, sleepiness.  Requesting B12 injection.  Initial BG # was during admission with improvement.  No recent BG due to running out of test strips and needs a new glucometer.  Ate oatmeal this AM.  Took 40 units of Levemir last night and 15 units of NovoLog this a.m.   AM fasting B ->120  BG Before lunch: 321->142  BG Before dinner: 269->110  BG At bedtime: 218->129    With normal microalbumin creatinine ratio.  No ACE/ARB d/t angioedema.   Retinal exams: [facility],   Foot exams:  [facility],   Hemoglobin A1C   Date Value Ref Range Status   2019 10.6 (H) 4.0 - 5.6 % Final     Comment:     ADA Screening Guidelines:  5.7-6.4%  Consistent with prediabetes  >or=6.5%  Consistent with diabetes  High levels of fetal hemoglobin interfere with the HbA1C  assay. Heterozygous hemoglobin variants (HbS, HgC, etc)do  not significantly interfere with this assay.   However, presence of multiple variants may affect accuracy.      11/25/2019 9.3 (H) 4.0 - 5.6 % Final     Comment:     ADA Screening Guidelines:  5.7-6.4%  Consistent with prediabetes  >or=6.5%  Consistent with diabetes  High levels of fetal hemoglobin interfere with the HbA1C  assay. Heterozygous hemoglobin variants (HbS, HgC, etc)do  not significantly interfere with this assay.   However, presence of multiple variants may affect accuracy.     11/25/2019 9.3 (H) 4.0 - 5.6 % Final     Comment:     ADA Screening Guidelines:  5.7-6.4%  Consistent with prediabetes  >or=6.5%  Consistent with diabetes  High levels of fetal hemoglobin interfere with the HbA1C  assay. Heterozygous hemoglobin variants (HbS, HgC, etc)do  not significantly interfere with this assay.   However, presence of multiple variants may affect accuracy.       HTN:    Pt's BP is controlled on Norvasc 10, HCTZ 25 but takes both qOD d/t bothersome muscle cramps, dehydration. Took meds yesterday.  No leg edema. H/o DM, but no ACE d/t angioedema.  Tolerating meds well. Pt denies CP, SOB,   Home BP:  none    FHx: yes  Tobacco: no  ETOH:   Exercise: walks  SHORTY:       HLD:  Is currently taking Lipitor 20.  Not on ASA 81 daily.  Lab Results   Component Value Date    LDLCALC 129.6 01/19/2017     The 10-year ASCVD risk score (Owen FERRER Jr., et al., 2013) is: 9.3%    Values used to calculate the score:      Age: 54 years      Sex: Female      Is Non- : Yes      Diabetic: Yes      Tobacco smoker: No      Systolic Blood Pressure: 128 mmHg      Is BP treated: Yes      HDL Cholesterol: 65 mg/dL      Total Cholesterol: 207 mg/dL    Obesity:  BMI 49.  Has had difficulty getting Ozempic approved as above.    Vitamin D deficiency:  On ergocalciferol weekly.  Lab Results   Component Value Date    ECXMPNZH83YR 18 (L) 08/22/2016     DOM: Takes iron daily.  H/o menorrhagia.  Now menopausal.  Lab Results   Component Value Date    IRON 62 01/19/2017    TIBC 314 01/19/2017    FERRITIN 38 01/19/2017     Lab Results    Component Value Date    [id number] 373 08/22/2016     Lab Results   Component Value Date    FOLATE 10.0 08/22/2016             Not addressed today.  Chronic back, knee pain:    Fell off porch in 7/2019, resulting in chronic back pain.\    Review of Systems   Constitutional: Negative for fever and unexpected weight change.   HENT: Negative for rhinorrhea and sneezing.    Eyes: Negative for redness and itching.   Respiratory: Negative for shortness of breath and wheezing.    Cardiovascular: Negative for chest pain, palpitations and leg swelling.   Gastrointestinal: Negative for abdominal pain and diarrhea.   Genitourinary: Negative for dysuria and hematuria.   Musculoskeletal: Positive for arthralgias and back pain.   Skin: Negative for color change and rash.   Neurological: Negative for dizziness and light-headedness.   Hematological: Negative for adenopathy.   Psychiatric/Behavioral: Negative for confusion. The patient is not nervous/anxious.          Past Medical History:   Diagnosis Date    Arthritis     Diabetes mellitus     HLD (hyperlipidemia)     Hypertension      Past Surgical History:   Procedure Laterality Date    COLONOSCOPY N/A 6/27/2017    Procedure: COLONOSCOPY;  Surgeon: Evelin Arceo MD;  Location: [address];  Service: Endoscopy;  Laterality: N/A;  2 nd floor d/t BMI > 50 kg/m3    TONSILLECTOMY      TUBAL LIGATION       Family History   Problem Relation Age of Onset    Breast cancer Mother 65    Diabetes Mother     Colon polyps Mother     Hypertension Mother     Diabetes Father     Breast cancer Sister 56    No Known Problems Brother     Diabetes Paternal Uncle     Breast cancer Maternal Grandmother     Colon cancer Maternal Grandfather     Diabetes Paternal Grandmother     Heart disease Paternal Grandmother     No Known Problems Paternal Grandfather     Breast cancer Maternal Aunt     Esophageal cancer Neg Hx     Irritable bowel syndrome Neg Hx     Rectal cancer  "Neg Hx     Stomach cancer Neg Hx     Ulcerative colitis Neg Hx     Celiac disease Neg Hx     Crohn's disease Neg Hx     Amblyopia Neg Hx     Blindness Neg Hx     Cataracts Neg Hx     Glaucoma Neg Hx     Macular degeneration Neg Hx     Retinal detachment Neg Hx     Strabismus Neg Hx     Stroke Neg Hx     Thyroid disease Neg Hx      Social History     Socioeconomic History    Marital status: Single     Spouse name: Not on file    Number of children: Not on file    Years of education: Not on file    Highest education level: Not on file   Occupational History    Occupation: Supervisor for ushers at [employer]     Employer: [employer]   Social Needs    Financial resource strain: Not on file    Food insecurity:     Worry: Not on file     Inability: Not on file    Transportation needs:     Medical: Not on file     Non-medical: Not on file   Tobacco Use    Smoking status: Never Smoker    Smokeless tobacco: Never Used   Substance and Sexual Activity    Alcohol use: No     Frequency: Never     Comment: occasional    Drug use: No    Sexual activity: Not Currently     Comment: occasionally   Lifestyle    Physical activity:     Days per week: Not on file     Minutes per session: Not on file    Stress: Not on file   Relationships    Social connections:     Talks on phone: Not on file     Gets together: Not on file     Attends Latter-day service: Not on file     Active member of club or organization: Not on file     Attends meetings of clubs or organizations: Not on file     Relationship status: Not on file   Other Topics Concern    Not on file   Social History Narrative    Pt lives alone.  Has a college-age son who comes and goes.  Has 3 other adult children.       Objective:      Vitals:    12/17/19 1051   BP: 128/88   Weight: (!) 140 kg (308 lb 10.3 oz)   Height: 5' 6" (1.676 m)      Physical Exam   Constitutional: She appears well-developed and well-nourished. No distress.   HENT: "   Head: Normocephalic and atraumatic.   Nose: Nose normal.   Mouth/Throat: Oropharynx is clear and moist. No oropharyngeal exudate.   Eyes: Pupils are equal, round, and reactive to light. EOM are normal. Right eye exhibits no discharge. Left eye exhibits no discharge. No scleral icterus.   Neck: Neck supple. No tracheal deviation present. No thyromegaly present.   Cardiovascular: Normal rate, regular rhythm, normal heart sounds and intact distal pulses.   No murmur heard.  Pulmonary/Chest: Effort normal and breath sounds normal. No respiratory distress. She has no wheezes.   Abdominal: Soft. Bowel sounds are normal. She exhibits no distension. There is no tenderness.   Musculoskeletal: She exhibits no edema or deformity.   Lymphadenopathy:     She has no cervical adenopathy.   Neurological: She is alert. No cranial nerve deficit. Gait normal.   Skin: Skin is warm and dry. She is not diaphoretic. No erythema.   Psychiatric: She has a normal mood and affect. Her behavior is normal.         Lab Results   Component Value Date    WBC 6.16 12/03/2019    HGB 12.2 12/03/2019    HCT 39.6 12/03/2019     12/03/2019    CHOL 207 (H) 01/19/2017    TRIG 62 01/19/2017    HDL 65 01/19/2017    ALT 35 12/01/2019    AST 21 12/01/2019     12/03/2019    K 4.7 12/03/2019     12/03/2019    CREATININE 1.1 12/03/2019    BUN 15 12/03/2019    CO2 19 (L) 12/03/2019    TSH 2.285 08/22/2016    INR 0.9 10/02/2015    HGBA1C 10.6 (H) 12/02/2019       The 10-year ASCVD risk score (Brooks CARISSA Jr., et al., 2013) is: 9.3%    Values used to calculate the score:      Age: 54 years      Sex: Female      Is Non- : Yes      Diabetic: Yes      Tobacco smoker: No      Systolic Blood Pressure: 128 mmHg      Is BP treated: Yes      HDL Cholesterol: 65 mg/dL      Total Cholesterol: 207 mg/dL    (Imaging have been independently reviewed)  CXR without acute abnormality.    Assessment:       1. Type 2 diabetes mellitus with  hyperglycemia, with long-term current use of insulin    2. Essential hypertension    3. Mixed hyperlipidemia    4. Morbid obesity with BMI of 45.0-49.9, adult    5. Vitamin D deficiency    6. Iron deficiency anemia, unspecified iron deficiency anemia type    7. Breast cancer screening          Plan:       Yayo was seen today for establish care and diabetes.    Diagnoses and all orders for this visit:    Type 2 diabetes mellitus with hyperglycemia, with long-term current use of insulin  Comments:  HypoBG. See note below. Ozempic medically necessary. Cont POC BG 4/day, Levemir 40 qHS, NovoLog 15->10 TID. Increase metformin 1000 BID; discussed SE. A1C 3 mo  Orders:  -     Hemoglobin A1c; Future  -     Microalbumin/creatinine urine ratio; Future  -     Comprehensive metabolic panel; Future  -     Ambulatory Referral to Diabetes Education; Future  -     metFORMIN (GLUCOPHAGE) 1000 MG tablet; Take 1 tablet (1,000 mg total) by mouth 2 (two) times daily with meals.  -     insulin detemir U-100 (LEVEMIR FLEXTOUCH) 100 unit/mL (3 mL) SubQ InPn pen; Inject 40 Units into the skin every evening.  -     Discontinue: insulin aspart U-100 (NOVOLOG) 100 unit/mL (3 mL) InPn pen; Inject 15 Units into the skin 3 (three) times daily with meals.  -     Vitamin B12; Future  -     blood sugar diagnostic Strp; 1 each by Misc.(Non-Drug; Combo Route) route 4 (four) times daily before meals and nightly.  -     semaglutide (OZEMPIC) 0.25 mg or 0.5 mg(2 mg/1.5 mL) PnIj; Start with 0.25mg weekly for 4 weeks then increase to 0.5mg weekly  -     blood-glucose meter kit; To check BG 4 times daily, to use with insurance preferred meter  -     POCT Glucose, Hand-Held Device  -     insulin aspart U-100 (NOVOLOG) 100 unit/mL (3 mL) InPn pen; Inject 10 Units into the skin 3 (three) times daily with meals.    Essential hypertension  Comments:  At goal, but muscle cramps. H/o ACE angioedema. Was taking Norvasc 10, HCTZ 25 qOD. Trial of Norvasc 10 daily  only. RTC 1 mo. CMP 3 mo for GFR. Dig HTN.  Orders:  -     Comprehensive metabolic panel; Future  -     amLODIPine (NORVASC) 10 MG tablet; Take 1 tablet (10 mg total) by mouth once daily.  -     NURSING COMMUNICATION: Create [facility] Account  -     [facility] En[facility] Order  -     Hypertension Digital Medicine ([facility]): Assign Onboarding Questionnaires    Mixed hyperlipidemia  Comments:  Check FLP and recalculate ASCVD.  Might need to increase dose of Lipitor.  Added ASA 81.  Orders:  -     Lipid panel; Future  -     atorvastatin (LIPITOR) 20 MG tablet; Take 1 tablet (20 mg total) by mouth once daily.  -     aspirin (ECOTRIN) 81 MG EC tablet; Take 1 tablet (81 mg total) by mouth once daily.    Morbid obesity with BMI of 45.0-49.9, adult  Comments:  Refilled Ozempic.  Schedule with Dr. Markham due to weight, DM.  Could consider bariatric surgery.  Orders:  -     semaglutide (OZEMPIC) 0.25 mg or 0.5 mg(2 mg/1.5 mL) PnIj; Start with 0.25mg weekly for 4 weeks then increase to 0.5mg weekly    Vitamin D deficiency  Comments:  On ergocalciferol weekly.  Check vitamin-D level.  Orders:  -     Vitamin D; Future    Iron deficiency anemia, unspecified iron deficiency anemia type  Comments:  H/o menorrhagia.  On iron daily.  Recheck iron panel.  Orders:  -     Ferritin; Future  -     Iron and TIBC; Future  -     Vitamin B12; Future    Breast cancer screening  Comments:  Reschedule mammogram.    Other orders  -     Cancel: hydroCHLOROthiazide (HYDRODIURIL) 25 MG tablet; Take 1 tablet (25 mg total) by mouth once daily.         Addendum:  After our visit, she suddenly felt fatigued, diaphoretic, malaise. POC BG 42.  Was given a piece of chocolate and BG improved to 56.  Was given oral glucose gel and BG 55 with continued malaise.  Each Accu-Chek was 15 min apart.  Was planning to transfer to the ED, but patient felt much better.  Repeat Accu-Chek 80.  Will discharge home.  Discussed eating meals  regularly, decreasing NovoLog and checking BG regularly.  RTC 1 month with BG log.    Side effects of medication(s) were discussed in detail and patient voiced understanding.  Patient will call back for any issues or complications.     RTC in 1 month(s) or sooner PRN for DM with BG log, HTN (monitor leg edema/side effects).  Labs tomorrow.  Then A1c, CMP in 3 months to monitor renal function.

## 2019-12-17 NOTE — TELEPHONE ENCOUNTER
Pharmacist sent a note:  Ozempic PA has been denied by [employer] Bydureon is the preferred product, but it will still need a PA according to [employer]. Please send over a new rx for Bydureon if the therapy is appropriate, and we will get the PA team to iniate the new PA ASAP.     Thank you!

## 2019-12-17 NOTE — TELEPHONE ENCOUNTER
----- Message from Farhana Estrada LPN sent at 12/17/2019 12:11 PM CST -----      ----- Message -----  From: Abbie Lynne PharmD  Sent: 12/17/2019  11:09 AM CST  To: Shefali Curry Staff    Ozempic PA has been denied by [employer]. Bydureon is the preferred product, but it will still need a PA according to [employer]. Please send over a new rx for Bydureon if the therapy is appropriate, and we will get the PA team to iniate the new PA ASAP.     Thank you!

## 2019-12-17 NOTE — TELEPHONE ENCOUNTER
Spoke with jadiel from the supply stating she needs medical notes from both doctors to be faxed over to them

## 2019-12-17 NOTE — TELEPHONE ENCOUNTER
----- Message from Berta Hastings MD sent at 12/16/2019  5:11 PM CST -----  Contact: Isa hernández/[facility]  Dr. Meehan saw this patient on 12/4/2019.  You can fax his note.  I have not seen this patient before, but she has a visit with me tomorrow.    What is a certificate of medical necessity?  ----- Message -----  From: Salbador Aguirre MA  Sent: 12/16/2019   5:08 PM CST  To: Berta Hastings MD    Do they have medical notes stating this so it can be fax ?    Thank you  DAYTON Siu    ----- Message -----  From: Gracie Javier  Sent: 12/16/2019   3:34 PM CST  To: Shef[facility] Curry Staff              Name of Who is Calling: Isa hernández/[facility]      What is the request in detail: Waiting for chart notes within last 6 months stating he is testing four or more times a day and three or more multi daily injections. Also needs a certificate of medical necessity, all documents needs to be signed and dated so pt can receive the glucose monitor. Please fax to [phone]. Please contact to further discuss and advise.        Can the clinic reply by [facility]: N      What [facility]: [phone] [phone]

## 2019-12-18 ENCOUNTER — PATIENT MESSAGE (OUTPATIENT)
Dept: ADMINISTRATIVE | Facility: OTHER | Age: 54
End: 2019-12-18

## 2019-12-18 ENCOUNTER — LAB VISIT (OUTPATIENT)
Dept: LAB | Facility: OTHER | Age: 54
End: 2019-12-18
Attending: INTERNAL MEDICINE
Payer: MEDICAID

## 2019-12-18 ENCOUNTER — OFFICE VISIT (OUTPATIENT)
Dept: OPTOMETRY | Facility: CLINIC | Age: 54
End: 2019-12-18
Payer: MEDICAID

## 2019-12-18 DIAGNOSIS — E11.65 TYPE 2 DIABETES MELLITUS WITH HYPERGLYCEMIA, WITH LONG-TERM CURRENT USE OF INSULIN: ICD-10-CM

## 2019-12-18 DIAGNOSIS — Z79.4 TYPE 2 DIABETES MELLITUS WITH HYPERGLYCEMIA, WITH LONG-TERM CURRENT USE OF INSULIN: ICD-10-CM

## 2019-12-18 DIAGNOSIS — H52.203 ASTIGMATISM WITH PRESBYOPIA, BILATERAL: ICD-10-CM

## 2019-12-18 DIAGNOSIS — E78.2 MIXED HYPERLIPIDEMIA: ICD-10-CM

## 2019-12-18 DIAGNOSIS — E55.9 VITAMIN D DEFICIENCY: ICD-10-CM

## 2019-12-18 DIAGNOSIS — H11.32 SUBCONJUNCTIVAL HEMORRHAGE OF LEFT EYE: ICD-10-CM

## 2019-12-18 DIAGNOSIS — H52.4 ASTIGMATISM WITH PRESBYOPIA, BILATERAL: ICD-10-CM

## 2019-12-18 DIAGNOSIS — D50.9 IRON DEFICIENCY ANEMIA, UNSPECIFIED IRON DEFICIENCY ANEMIA TYPE: ICD-10-CM

## 2019-12-18 DIAGNOSIS — E11.9 TYPE 2 DIABETES MELLITUS WITHOUT OPHTHALMIC MANIFESTATIONS: Primary | ICD-10-CM

## 2019-12-18 LAB
25(OH)D3+25(OH)D2 SERPL-MCNC: 12 NG/ML (ref 30–96)
CHOLEST SERPL-MCNC: 202 MG/DL (ref 120–199)
CHOLEST/HDLC SERPL: 2.9 {RATIO} (ref 2–5)
FERRITIN SERPL-MCNC: 42 NG/ML (ref 20–300)
HDLC SERPL-MCNC: 70 MG/DL (ref 40–75)
HDLC SERPL: 34.7 % (ref 20–50)
IRON SERPL-MCNC: 60 UG/DL (ref 30–160)
LDLC SERPL CALC-MCNC: 117 MG/DL (ref 63–159)
NONHDLC SERPL-MCNC: 132 MG/DL
SATURATED IRON: 18 % (ref 20–50)
TOTAL IRON BINDING CAPACITY: 326 UG/DL (ref 250–450)
TRANSFERRIN SERPL-MCNC: 220 MG/DL (ref 200–375)
TRIGL SERPL-MCNC: 75 MG/DL (ref 30–150)
VIT B12 SERPL-MCNC: 284 PG/ML (ref 210–950)

## 2019-12-18 PROCEDURE — 83540 ASSAY OF IRON: CPT

## 2019-12-18 PROCEDURE — 80061 LIPID PANEL: CPT

## 2019-12-18 PROCEDURE — 92014 PR EYE EXAM, EST PATIENT,COMPREHESV: ICD-10-PCS | Mod: S$PBB,,, | Performed by: OPTOMETRIST

## 2019-12-18 PROCEDURE — 36415 COLL VENOUS BLD VENIPUNCTURE: CPT

## 2019-12-18 PROCEDURE — 99999 PR PBB SHADOW E&M-EST. PATIENT-LVL II: CPT | Mod: PBBFAC,,, | Performed by: OPTOMETRIST

## 2019-12-18 PROCEDURE — 82607 VITAMIN B-12: CPT

## 2019-12-18 PROCEDURE — 82306 VITAMIN D 25 HYDROXY: CPT

## 2019-12-18 PROCEDURE — 82728 ASSAY OF FERRITIN: CPT

## 2019-12-18 PROCEDURE — 92014 COMPRE OPH EXAM EST PT 1/>: CPT | Mod: S$PBB,,, | Performed by: OPTOMETRIST

## 2019-12-18 PROCEDURE — 99999 PR PBB SHADOW E&M-EST. PATIENT-LVL II: ICD-10-PCS | Mod: PBBFAC,,, | Performed by: OPTOMETRIST

## 2019-12-18 PROCEDURE — 99212 OFFICE O/P EST SF 10 MIN: CPT | Mod: PBBFAC | Performed by: OPTOMETRIST

## 2019-12-18 NOTE — PROGRESS NOTES
"HPI     Last eye exam 03/04/2019  Last Monday - went to put in eye drops in eyes, lifted eyelid, and eye   popped out. "whole eye was bloodshot" immediately. EMS gave saline to   rinse out eyes. Reports blurriness in both eyes about a day or two after,   constant - doesn't know if it's related to blood sugar fluctuations but   states that blood sugar is controlled now. Notes periorbital soreness but   has settled down.  Denies headaches, flashes/floaters, diplopia, discharge, pain on eye   movement.   Blood sugar levels started leveling around a week ago.   Hemoglobin A1C       Date                     Value               Ref Range             Status                12/02/2019               10.6 (H)            4.0 - 5.6 %           Final                  Last edited by Kathia Miranda, OD on 12/18/2019 10:58 AM. (History)            Assessment /Plan     For exam results, see Encounter Report.    Type 2 diabetes mellitus without ophthalmic manifestations    Subconjunctival hemorrhage of left eye    Astigmatism with presbyopia, bilateral            1.  No retinopathy OU.  Continue working on good blood sugar control.  Patient states BS has been uncontrolled due to changes in medication.  2.  Patient reports spontaneous luxation while putting in eye drops.  No residual problems.  Eye health normal.  3.  No rx given at this time.  Continue with OTC readers for now.  Will recheck refraction in 5 weeks.                   "

## 2019-12-19 ENCOUNTER — TELEPHONE (OUTPATIENT)
Dept: INTERNAL MEDICINE | Facility: CLINIC | Age: 54
End: 2019-12-19

## 2019-12-19 DIAGNOSIS — E78.2 MIXED HYPERLIPIDEMIA: ICD-10-CM

## 2019-12-19 DIAGNOSIS — E55.9 VITAMIN D DEFICIENCY: ICD-10-CM

## 2019-12-19 DIAGNOSIS — E53.8 CYANOCOBALAMIN DEFICIENCY: ICD-10-CM

## 2019-12-19 RX ORDER — ATORVASTATIN CALCIUM 40 MG/1
40 TABLET, FILM COATED ORAL DAILY
Qty: 90 TABLET | Refills: 3 | Status: SHIPPED | OUTPATIENT
Start: 2019-12-19 | End: 2021-01-15 | Stop reason: SDUPTHER

## 2019-12-19 RX ORDER — LANOLIN ALCOHOL/MO/W.PET/CERES
1000 CREAM (GRAM) TOPICAL DAILY
Qty: 90 TABLET | Refills: 3 | Status: SHIPPED | OUTPATIENT
Start: 2019-12-19 | End: 2021-01-15 | Stop reason: SDUPTHER

## 2019-12-19 RX ORDER — ERGOCALCIFEROL 1.25 MG/1
50000 CAPSULE ORAL
Qty: 12 CAPSULE | Refills: 3 | Status: SHIPPED | OUTPATIENT
Start: 2019-12-19 | End: 2021-01-14 | Stop reason: SDUPTHER

## 2019-12-19 NOTE — TELEPHONE ENCOUNTER
Spoke to sonali and she stated that the pt chart notes need to stated that pt is testing 4 times daily     Please fax too once notes is corrected   Fax [phone]

## 2019-12-19 NOTE — TELEPHONE ENCOUNTER
----- Message from Brenda Ngo sent at 12/19/2019 12:51 PM CST -----  Contact: page @ [facility] [facility]  Name of Who is Calling: [facility]      What is the request in detail: [facility] is requesting a call back regarding an order for the patient and medical necessity paperwork.. Please contact to further advise.      Can the clinic reply by [facility]: NO      What Number to Call Back if not in [facility]: [phone] [phone]

## 2019-12-20 ENCOUNTER — PATIENT MESSAGE (OUTPATIENT)
Dept: PHYSICAL MEDICINE AND REHAB | Facility: CLINIC | Age: 54
End: 2019-12-20

## 2019-12-20 DIAGNOSIS — M17.0 PRIMARY OSTEOARTHRITIS OF BOTH KNEES: ICD-10-CM

## 2019-12-20 RX ORDER — TRAMADOL HYDROCHLORIDE 50 MG/1
50-100 TABLET ORAL 3 TIMES DAILY PRN
Qty: 180 TABLET | Refills: 1 | Status: SHIPPED | OUTPATIENT
Start: 2019-12-31 | End: 2020-01-30

## 2019-12-26 ENCOUNTER — TELEPHONE (OUTPATIENT)
Dept: INTERNAL MEDICINE | Facility: CLINIC | Age: 54
End: 2019-12-26

## 2019-12-26 NOTE — TELEPHONE ENCOUNTER
----- Message from Farhana Svitlana sent at 12/26/2019  3:46 PM CST -----  Contact: Laureen with [facility]  Type: Patient Call Back    Who called: Laureen with [facility]    What is the request in detail: Patient is requesting a call back. She states that she will fax over a request for a medically necessity form to be completed. She states that it was previously faxed over but she will re fax.   Please contact to further advise.    Can the clinic reply by [facility]? No    Best call back number: [phone] ext [phone]    Additional Information: N/A

## 2019-12-30 ENCOUNTER — TELEPHONE (OUTPATIENT)
Dept: INTERNAL MEDICINE | Facility: CLINIC | Age: 54
End: 2019-12-30

## 2019-12-30 NOTE — TELEPHONE ENCOUNTER
Spoke to person at [facility] [place] and was asking for paper work faxed over to the office.  Informed her I will look into the faxes for the paper work.

## 2019-12-30 NOTE — TELEPHONE ENCOUNTER
----- Message from Megan Barnett sent at 12/30/2019 10:29 AM CST -----  Contact: Isa ([facility])  Name of Who is Calling : Isa ([facility])    Isa ([facility]) is requesting a call from staff in regards to pending CMN for continuous glucose monitor fax [phone]  .....Please contact to further discuss and advise.    Can the clinic reply by [facility] : No    What Number to Call Back :  [phone] ext [phone]

## 2020-01-08 ENCOUNTER — PATIENT MESSAGE (OUTPATIENT)
Dept: PHYSICAL MEDICINE AND REHAB | Facility: CLINIC | Age: 55
End: 2020-01-08

## 2020-01-08 ENCOUNTER — HOSPITAL ENCOUNTER (OUTPATIENT)
Dept: DIABETES | Facility: OTHER | Age: 55
Discharge: HOME | End: 2020-01-08
Attending: INTERNAL MEDICINE
Payer: MEDICAID

## 2020-01-08 VITALS — WEIGHT: 293 LBS | BODY MASS INDEX: 49.92 KG/M2

## 2020-01-08 DIAGNOSIS — M17.0 PRIMARY OSTEOARTHRITIS OF BOTH KNEES: ICD-10-CM

## 2020-01-08 DIAGNOSIS — Z79.4 TYPE 2 DIABETES MELLITUS WITH HYPERGLYCEMIA, WITH LONG-TERM CURRENT USE OF INSULIN: ICD-10-CM

## 2020-01-08 DIAGNOSIS — E11.65 TYPE 2 DIABETES MELLITUS WITH HYPERGLYCEMIA, WITH LONG-TERM CURRENT USE OF INSULIN: ICD-10-CM

## 2020-01-08 PROCEDURE — G0108 DIAB MANAGE TRN  PER INDIV: HCPCS | Performed by: DIETITIAN, REGISTERED

## 2020-01-08 RX ORDER — MELOXICAM 15 MG/1
15 TABLET ORAL DAILY
Qty: 30 TABLET | Refills: 1 | Status: SHIPPED | OUTPATIENT
Start: 2020-01-08 | End: 2020-03-02 | Stop reason: SDUPTHER

## 2020-01-08 NOTE — PROGRESS NOTES
Diabetes Education  Author: Mariely Garber RD  Date: 1/8/2020    Diabetes Care Management Summary  Diabetes Education Record Assessment/Progress: Initial  Current Diabetes Risk Level: Moderate     Last A1c:   Lab Results   Component Value Date    HGBA1C 10.6 (H) 12/02/2019     Last visit with Diabetes Educator: : 01/08/2020      Diabetes Type  Diabetes Type : Type II    Diabetes History  Current Treatment: Oral Medication, Insulin(metformin, novolog 10 unit bf each meal and levemir 40 units at 10 pm)  Reviewed Problem List with Patient: Yes    Health Maintenance was reviewed today with patient. Discussed with patient importance of routine eye exams, foot exams/foot care, blood work (i.e.: A1c, microalbumin, and lipid), dental visits, yearly flu vaccine, and pneumonia vaccine as indicated by PCP. Patient verbalized understanding.     Health Maintenance Topics with due status: Not Due       Topic Last Completion Date    TETANUS VACCINE 05/23/2017    Colonoscopy 06/27/2017    Mammogram 05/30/2018    Pap Smear with HPV Cotest 02/25/2019    Hemoglobin A1c 12/02/2019    Foot Exam 12/04/2019    Lipid Panel 12/18/2019    Eye Exam 12/18/2019    Urine Microalbumin 12/18/2019    Low Dose Statin 12/19/2019     There are no preventive care reminders to display for this patient.    Nutrition  Meal Planning: water, 3 meals per day, artificial sweeteners, eats out seldom, snacks between meal(is avoiding bread and rice. Eats a very low carb diet. States has been losing wieght for a year with injectables and diet)  What type of sweetener do you use?: Sweet N Low  What type of beverages do you drink?: water  Meal Plan 24 Hour Recall - Breakfast: Oatmeal 1 pack sweet n low and black coffee with sweet n low  Meal Plan 24 Hour Recall - Lunch: cabbage with no rice with water  Meal Plan 24 Hour Recall - Dinner: steak with spinach with water  Meal Plan 24 Hour Recall - Snack: apple or orange or popcorn    Monitoring   Self Monitoring :  Fastin, 107,  before dinner: 125  Blood Glucose Logs: No(verbal recall, forgot logs)  Do you use a personal continuous glucose monitor?: No  In the last month, how often have you had a low blood sugar reaction?: more than once a week(having frequent low BG 1 hr after eating, is treating with glucose tabs)  What are your symptoms of low blood sugar?: nauseated, weak, shaky  How do you treat low blood sugar?: 2 glucose tabs  Can you tell when your blood sugar is too high?: yes(weigth loss, dehydrated, frequent urination, poor appetite)  How do you treat high blood sugar?: dr.s welchmt to get back on meds    Exercise   Exercise Type: walking    Current Diabetes Treatment   Current Treatment: Oral Medication, Insulin(metformin, novolog 10 unit bf each meal and levemir 40 units at 10 pm)    Social History  Preferred Learning Method: Face to Face, Reading Materials  Primary Support: Self, Spouse  Educational Level: Some College  Occupation: works at [employer] and at the [employer]  Smoking Status: Never a Smoker  Alcohol Use: Rarely            DDS-2 Score  ( > 3 = SIGNIFICANT DISTRESS): 2                   Barriers to Change  Barriers to Change: None  Learning Challenges : None    Readiness to Learn   Readiness to Learn : Eager    Cultural Influences  Cultural Influences: No    Diabetes Education Assessment/Progress  Diabetes Disease Process (diabetes disease process and treatment options): Discussion, Individual Session, Needs Instruction, Instructed, Written Materials Provided(Ed on role of insulin in controlling BG, Ed on IR and weight gain)    Nutrition (Incorporating nutritional management into one's lifestyle): Discussion, Individual Session, Instructed, Demonstration, Return Demonstration, Needs Instruction(Pt is eating a low to no carb diet. She has been doing this for years and it has led to weight loss. She is c/o weight gain since no injectable therapy. Warned against eating no  carbs and taking rapid insulin)    Physical Activity (incorporating physical activity into one's lifestyle): Discussion, Individual Session, Needs Instruction    Medications (states correct name, dose, onset, peak, duration, side effects & timing of meds): Discussion, Demonstration, Return Demonstration, Instructed, Needs Instruction, Individual Session, Written Materials Provided(Having low BG 50-60 1 hour after eating. Ed on mech, timing injection tech and pen storage. Will reduce NL to 8 units and if still having low BG will reduce to 6 units. RX written for Bydureon.Demonstrattion with teach back provided on both pens. Savings card provided. She has taken Ozempic in the past and is eagar for insurance to cover an injectable. Appointment with Shahrzad tomorrow)    Monitoring (monitoring blood glucose/other parameters & using results): Discussion, Individual Session, Instructed, Needs Instruction, Written Materials Provided(no logs today. Verbal recall only. BG fasting and at bedtime or before dinner. She has daily hypoglycemia 1 hr pp meals. Agress to return to clinic with logs. Ed on target BG ranges, logs provided)    Acute Complications (preventing, detecting, and treating acute complications): Discussion, Instructed, Needs Review, Individual Session, Written Materials Provided(Daily PP hypoglycemia. Treats with 2 glucose tabs, carries Tx at all times. Stressed prevention. AGrees to decrease NL to 8 units or 6 units if necessary)    Chronic Complications (preventing, detecting, and treating chronic complications): Discussion, Individual Session, Needs Instruction    Clinical (diabetes, other pertinent medical history, and relevant comorbidities reviewed during visit): Discussion, Individual Session, Needs Instruction    Cognitive (knowledge of self-management skills, functional health literacy): Discussion, Individual Session, Needs Review    Psychosocial (emotional response to diabetes): Discussion,  Individual Session, Needs Review, Written Materials Provided, Instructed(She is very eagar to lose weight and improve her BG. )    Diabetes Distress and Support Systems: Discussion, Instructed, Needs Review, Individual Session, Written Materials Provided(She has a supportive daughter)    Behavioral (readiness for change, lifestyle practices, self-care behaviors): Discussion, Instructed, Needs Instruction, Individual Session, Written Materials Provided    Goals  Patient has selected/evaluated goals during today's session: Yes, selected  Monitoring: Set(bring logs to all clinic visits)  Start Date: 01/08/20  Target Date: 02/10/20  Medications: Set(Reduce NL to 8 units at all meals. If still having lows, reduce NL to 6 units)  Start Date: 01/08/20  Target Date: 02/10/20         Diabetes Care Plan/Intervention  Education Plan/Intervention: Individual Follow-Up DSMT    Diabetes Meal Plan  Restrictions: Restricted Carbohydrate, Low Sodium  Calories: 1600  Carbohydrate Per Meal: 30-45g  Carbohydrate Per Snack : 15-20g  Fat: 64-71g  Protein: 64-72g    Today's Self-Management Care Plan was developed with the patient's input and is based on barriers identified during today's assessment.    The long and short-term goals in the care plan were written with the patient/caregiver's input. The patient has agreed to work toward these goals to improve her overall diabetes control.      The patient received a copy of today's self-management plan and verbalized understanding of the care plan, goals, and all of today's instructions.      The patient was encouraged to communicate with her physician and care team regarding her condition(s) and treatment.  I provided the patient with my contact information today and encouraged her to contact me via phone or patient portal as needed.     Education Units of Time   Time Spent: 60 min

## 2020-01-09 ENCOUNTER — TELEPHONE (OUTPATIENT)
Dept: PHARMACY | Facility: CLINIC | Age: 55
End: 2020-01-09

## 2020-01-09 ENCOUNTER — TELEPHONE (OUTPATIENT)
Dept: INTERNAL MEDICINE | Facility: CLINIC | Age: 55
End: 2020-01-09

## 2020-01-09 NOTE — TELEPHONE ENCOUNTER
Spoke to pt and r/s appt with Dr. Markham.    ----- Message from Eladia Crisostomo sent at 1/9/2020  7:32 AM CST -----  Contact: Self   Type: Patient Call Back    Who called: Self     What is the request in detail:patient states she over slept for her appt. And would like to know if she can be worked in later. Please call     Can the clinic reply by [facility]? No     Would the patient rather a call back or a response via [facility]? Call     Best call back number:[phone]

## 2020-01-10 ENCOUNTER — TELEPHONE (OUTPATIENT)
Dept: INTERNAL MEDICINE | Facility: CLINIC | Age: 55
End: 2020-01-10

## 2020-01-10 NOTE — TELEPHONE ENCOUNTER
----- Message from Anju Gomez LPN sent at 1/8/2020  2:21 PM CST -----  Contact: Shauna ( [employer] )  Madiha Mayo can you see if this form been filled out and signed.  I can't do anything from over here.  ----- Message -----  From: Kat Zurita  Sent: 1/8/2020  10:01 AM CST  To: Shefali Curry Staff    Contact: Shauna ( [employer] )    What is the request in detail: Certificate of medical necessity forms need to be filled out the one that was sent to clinic was blank and she needs it to be completed and faxed        (fax # [phone] )     Can the clinic reply by [facility]  No      What Number to Call Back if not [facility]:  [phone]

## 2020-01-14 ENCOUNTER — TELEPHONE (OUTPATIENT)
Dept: INTERNAL MEDICINE | Facility: CLINIC | Age: 55
End: 2020-01-14

## 2020-01-17 ENCOUNTER — TELEPHONE (OUTPATIENT)
Dept: INTERNAL MEDICINE | Facility: CLINIC | Age: 55
End: 2020-01-17

## 2020-01-17 ENCOUNTER — PATIENT OUTREACH (OUTPATIENT)
Dept: ADMINISTRATIVE | Facility: OTHER | Age: 55
End: 2020-01-17

## 2020-01-17 NOTE — TELEPHONE ENCOUNTER
Spoke with katie in regards to pt. Katie stated more information on pt  is needed and will fax over the papers to office.Waiting on papers to be faxed to office

## 2020-01-17 NOTE — TELEPHONE ENCOUNTER
----- Message from Erinn Collier sent at 1/17/2020  9:45 AM CST -----  Contact: Katie with [facility]  Name of Who is Calling: Katie with [facility] [employer]    What is the request in detail: states they are pending corrections of documentation and needing to speak with staff today. Please contact to further discuss and advise      Can the clinic reply by [facility]: no    What Number to [facility] Back [facility]: [phone]

## 2020-01-20 ENCOUNTER — OFFICE VISIT (OUTPATIENT)
Dept: INTERNAL MEDICINE | Facility: CLINIC | Age: 55
End: 2020-01-20
Attending: FAMILY MEDICINE
Payer: MEDICAID

## 2020-01-20 VITALS
HEART RATE: 99 BPM | HEIGHT: 66 IN | WEIGHT: 293 LBS | BODY MASS INDEX: 47.09 KG/M2 | DIASTOLIC BLOOD PRESSURE: 84 MMHG | SYSTOLIC BLOOD PRESSURE: 122 MMHG | OXYGEN SATURATION: 99 %

## 2020-01-20 DIAGNOSIS — I10 ESSENTIAL HYPERTENSION: Primary | ICD-10-CM

## 2020-01-20 PROCEDURE — 99999 PR PBB SHADOW E&M-EST. PATIENT-LVL III: ICD-10-PCS | Mod: PBBFAC,,, | Performed by: FAMILY MEDICINE

## 2020-01-20 PROCEDURE — 99213 OFFICE O/P EST LOW 20 MIN: CPT | Mod: PBBFAC | Performed by: FAMILY MEDICINE

## 2020-01-20 PROCEDURE — 99214 PR OFFICE/OUTPT VISIT, EST, LEVL IV, 30-39 MIN: ICD-10-PCS | Mod: S$PBB,,, | Performed by: FAMILY MEDICINE

## 2020-01-20 PROCEDURE — 99214 OFFICE O/P EST MOD 30 MIN: CPT | Mod: S$PBB,,, | Performed by: FAMILY MEDICINE

## 2020-01-20 PROCEDURE — 99999 PR PBB SHADOW E&M-EST. PATIENT-LVL III: CPT | Mod: PBBFAC,,, | Performed by: FAMILY MEDICINE

## 2020-01-20 NOTE — PATIENT INSTRUCTIONS
160 fl oz water daily     Sleep Hygiene  Unplug ideally @ 8pm (from phone, email, social media) or one hour before sleeping  Use bedroom only for sleeping, relaxing, meditation and intimacy  Light: get 15 minutes of direct sunlight before lunch (research shows people with windows in their office sleep 46 minutes longer than people with no windows)  Blue light: avoid too much blue light specially at night (computer, nany, iPad, TV);   Avoid having too much electricity in bedroom    Optimal Timing  Setting a sleep schedule and sticking to it if possible  -Jump start your sleep rhythm if necessary (say 3 times I consciously decide   to change a habit before going to sleep)  -In bed by 10pm, wake by 6am (sun lamp) if possible  -Last food or beverage ingested before 8pm  -Do not look at alarm clock when you wake up during the night (go to bathroom with eyes half closed)  -Replenishing the Nervous System after 8pm  Start preparing for the night at least 2 hours before going to sleep (Cool Down Period)    Bedtime ritual (creating one that suits you)  Relaxing music  Journaling (journal dumping, success journal, gratefulness journal)  Bath time (lavender oil, Epsom salt)  Stretching or yoga (legs up the wall, child pose)  Squeeze and release muscle contractions (or relax your body muscle by muscle: face, jaw, eyes,  shoulders all the way to the feet)  4 (in) - 7 (hold) - 8 (out) breathing technique  Meditation (Guided or self-led)  Hypnosis CDs ([email or web address].[email or web address] Map, Gaby Short)  Deep Sleep, [place], iTunes  Pay attention to dreams (befriending sub-consciousness)  Honor your sleep, its a sacred time for the sub-consciousness  Preparation for tomorrows healthy meals    Other Strategies  Remember old memories (childhood, as far back as possible) instead of thinking about today or tomorrow (shifts the attention from front of the brain to the back of the brain)  Count backyards from [address] (counting  sheep)  Aromatherapy (lavender is very calming)  Allow creativity instead of trying to sleep (if thats happening occasionally)  Ask your partner for a foot massage  Dont fight when you cant sleep - rather think to yourself Im resting now, that will help me  tomorrow  a. Higher pillow (adjustable bed), sleep mask, ear plugs, sound machine (white noise),  delta waves, sleep phone, ice pack under neck, cool room  Blackout curtains are really helpful  If you get less than 8 hours of sleep, try to catch up on weekends  No caffeine after lunch (try if it makes a difference)  A study shows that sleeping pills and placebos have the same effects (mental)  Light snack before going to bed (dairy) works for some people  Book: The Sleep Revolution by Darline Tao  CBT-i  (john that gives suggestions for better sleep)  Completing the day: to do lists, finding solutions, journaling (for 20 min. 2 hours ahead of sleep  time)  Worry sheet (good worries what is in my control, bad worries what is not in my control, I  dont know)  Instead of thinking about stressful things, imagine you are at a beach playing (or lying in a canoe  on a calm lake with the blue [place]is above you)  Grounding - direct contact with the earth (reduces night-time levels of cortisol)  Eating a healthy breakfast within the hour of waking up and light and early meals at night    Natural Supplements  Melatonin (take for at least 3 weeks to see results),  Magnesium, Vitamin B  complex, Valerian Root,       2000 jose diet  40 % protein ( 200 grams)  30% fat (66 grams)  30% carbs ( 150 grams)-           Pramlintide injection  What is this medicine?  PRAMLINTIDE (PRAM brooklynn tide) is a man-made form of a hormone normally found in the body. It is used to treat type 1 and type 2 diabetes in adults. This medicine works with insulin to control blood sugar.  How should I use this medicine?  This medicine is for injection under the skin. You will be taught how  to prepare and give this medicine. Use exactly as directed. Do not mix this medicine with insulin in the same syringe. Take this medicine immediately before meals. Take your medicine at regular intervals. Do not take your medicine more often than directed.  Always check the appearance of this medicine before using it. Do not use it if it is cloudy or has solid particles in it.  It is important that you put your used needles and syringes in a special sharps container. Do not put them in a trash can. If you do not have a sharps container, call your pharmacist or healthcare provider to get one.  A special MedGuide will be given to you by the pharmacist with each prescription and refill. Be sure to read this information carefully each time.  Talk to your pediatrician regarding the use of this medicine in children. Special care may be needed.  What side effects may I notice from receiving this medicine?  Side effects that you should report to your doctor or health care professional as soon as possible:  · allergic reactions like skin rash, itching or hives, swelling of the face, lips, or tongue  · breathing problems  · fever, chills  · loss of appetite  · signs and symptoms of high blood sugar such as dizziness, dry mouth, dry skin, fruity breath, nausea, stomach pain, increased hunger or thirst, increased urination  · signs and symptoms of low blood sugar such as feeling anxious, confusion, dizziness, increased hunger, unusually weak or tired, sweating, shakiness, cold, irritable, headache, blurred vision, fast heartbeat, loss of consciousness  · unusual stomach pain or upset  · vomiting  Side effects that usually do not require medical attention (report to your doctor or health care professional if they continue or are bothersome):  · headache  · decreased appetite  · dizziness  · increase or decrease in fatty tissue under the skin due to overuse of a particular injection site  · irritation at site where  injected  · nausea  · stomach upset  What may interact with this medicine?  · atropine  · cisapride  · erythromycin  · medicines for depression, anxiety, or psychotic disturbances  · medicines used to treat stomach problems  · narcotic medicines for pain  · other medicines for diabetes like acarbose, miglitol  · tegaserod  Many medications may cause changes in blood sugar, these include:  · alcohol containing beverages  · aspirin and aspirin-like drugs  · chloramphenicol  · chromium  · female hormones, such as estrogens or progestins, birth control pills  · heart medicines  · isoniazid  · male hormones or anabolic steroids  · medications for weight loss  · medicines for allergies, asthma, cold, or cough  · medicines for mental problems  · medicines called MAO inhibitors - Nardil, Parnate, Marplan, Eldepryl  · niacin  · NSAIDS, such as ibuprofen  · pentamidine  · phenytoin  · probenecid  · quinolone antibiotics such as ciprofloxacin, levofloxacin, ofloxacin  · some herbal dietary supplements  · steroid medicines such as prednisone or cortisone  · thyroid hormones  · diuretics  Some medications can hide the warning symptoms of low blood sugar (hypoglycemia). You may need to monitor your blood sugar more closely if you are taking one of these medications. These include:  · beta-blockers, often used for high blood pressure or heart problems (examples include atenolol, metoprolol, propranolol)  · clonidine  · guanethidine  · reserpine  What if I miss a dose?  It is important not to miss a dose. Your health care professional or doctor should discuss a plan for missed doses with you. If you do miss a dose, follow their plan. Do not take double doses.  Where should I keep my medicine?  Keep out of the reach of children.  Store unopened vials in the refrigerator between 2 to 8 degrees C (36 to 46 degrees F). Do not freeze. Throw away any unused medicine after the expiration date.  Store opened vials (vials currently in use)  in the refrigerator or at room temperature at or below 25 degrees C (77 degrees F). Do not freeze. Keeping this medicine at room temperature decreases the amount of pain during injection. Throw away any opened vials of this medicine 28 days after opening.  What should I tell my health care provider before I take this medicine?  They need to know if you have any of these conditions:  · HbA1c above 9  · low blood sugar episodes  · problems checking blood sugar  · problems taking diabetes medicine  · stomach problems like gastroparesis  · trouble being able to tell when blood sugar is low  · an unusual or allergic reaction to pramlintide, metacresol, other medicines, foods, dyes, or preservatives  · pregnant or trying to get pregnant  · breast-feeding  What should I watch for while using this medicine?  Visit your doctor or health care professional for regular checks on your progress.  A test called the HbA1C (A1C) will be monitored. This is a simple blood test. It measures your blood sugar control over the last 2 to 3 months. You will receive this test every 3 to 6 months.  Learn how to check your blood sugar. Learn the symptoms of low and high blood sugar and how to manage them.  Always carry a quick-source of sugar with you in case you have symptoms of low blood sugar. Examples include hard sugar candy or glucose tablets. Make sure others know that you can choke if you eat or drink when you develop serious symptoms of low blood sugar, such as seizures or unconsciousness. They must get medical help at once.  Tell your doctor or health care professional if you have high blood sugar. You might need to change the dose of your medicine. If you are sick or exercising more than usual, you might need to change the dose of your medicine.  Do not skip meals. Ask your doctor or health care professional if you should avoid alcohol. Many nonprescription cough and cold products contain sugar or alcohol. These can affect blood  sugar.  Pramlintide pens and cartridges should never be shared. Even if the needle is changed, sharing may result in passing of viruses like hepatitis or HIV.  Wear a medical ID bracelet or chain, and carry a card that describes your disease and details of your medicine and dosage times.  NOTE:This sheet is a summary. It may not cover all possible information. If you have questions about this medicine, talk to your doctor, pharmacist, or health care provider. Copyright© 2017 [employer]        Empagliflozin oral tablets  What is this medicine?  EMPAGLIGLOZIN (EM pa gli FLOE zin) helps to treat type 2 diabetes. It helps to control blood sugar. Treatment is combined with diet and exercise.  How should I use this medicine?  Take this medicine by mouth with a glass of water. Follow the directions on the prescription label. Take it in the morning, with or without food. Take your dose at the same time each day. Do not take more often than directed. Do not stop taking except on your doctor's advice.  Talk to your pediatrician regarding the use of this medicine in children. Special care may be needed.  What side effects may I notice from receiving this medicine?  Side effects that you should report to your doctor or health care professional as soon as possible:  · allergic reactions like skin rash, itching or hives, swelling of the face, lips, or tongue  · breathing problems  · dizziness  · fast or irregular heartbeat  · feeling faint or lightheaded, falls  · muscle weakness  · nausea, vomiting, unusual stomach upset or pain  · signs and symptoms of low blood sugar such as feeling anxious, confusion, dizziness, increased hunger, unusually weak or tired, sweating, shakiness, cold, irritable, headache, blurred vision, fast heartbeat, loss of consciousness  · signs and symptoms of a urinary tract infection, such as fever, chills, a burning feeling when urinating, blood in the urine, back pain  · trouble passing urine or  change in the amount of urine, including an urgent need to urinate more often, in larger amounts, or at night  · penile discharge, itching, or pain in men  · unusual tiredness  · vaginal discharge, itching, or odor in women  Side effects that usually do not require medical attention (Report these to your doctor or health care professional if they continue or are bothersome.):  · joint pain  · mild increase in urination  · thirsty  What may interact with this medicine?  Do not take this medicine with any of the following medications:  · gatifloxacin  This medicine may also interact with the following medications:  · alcohol  · certain medicines for blood pressure, heart disease  · diuretics  What if I miss a dose?  If you miss a dose, take it as soon as you can. If it is almost time for your next dose, take only that dose. Do not take double or extra doses.  Where should I keep my medicine?  Keep out of the reach of children.  Store at room temperature between 20 and 25 degrees C (68 and 77 degrees F). Throw away any unused medicine after the expiration date.  What should I tell my health care provider before I take this medicine?  They need to know if you have any of these conditions:  · dehydration  · diabetic ketoacidosis  · diet low in salt  · eating less due to illness, surgery, dieting, or any other reason  · having surgery  · high cholesterol  · high levels of potassium in the blood  · history of pancreatitis or pancreas problems  · history of yeast infection of the penis or vagina  · if you often drink alcohol  · infections in the bladder, kidneys, or urinary tract  · kidney disease  · liver disease  · low blood pressure  · on hemodialysis  · problems urinating  · type 1 diabetes  · uncircumcised male  · an unusual or allergic reaction to empagliflozin, other medicines, foods, dyes, or preservatives  · pregnant or trying to get pregnant  · breast-feeding  What should I watch for while using this  medicine?  Visit your doctor or health care professional for regular checks on your progress.  This medicine can cause a serious condition in which there is too much acid in the blood. If you develop nausea, vomiting, stomach pain, unusual tiredness, or breathing problems, stop taking this medicine and call your doctor right away. If possible, use a ketone dipstick to check for ketones in your urine.  A test called the HbA1C (A1C) will be monitored. This is a simple blood test. It measures your blood sugar control over the last 2 to 3 months. You will receive this test every 3 to 6 months.  Learn how to check your blood sugar. Learn the symptoms of low and high blood sugar and how to manage them.  Always carry a quick-source of sugar with you in case you have symptoms of low blood sugar. Examples include hard sugar candy or glucose tablets. Make sure others know that you can choke if you eat or drink when you develop serious symptoms of low blood sugar, such as seizures or unconsciousness. They must get medical help at once.  Tell your doctor or health care professional if you have high blood sugar. You might need to change the dose of your medicine. If you are sick or exercising more than usual, you might need to change the dose of your medicine.  Do not skip meals. Ask your doctor or health care professional if you should avoid alcohol. Many nonprescription cough and cold products contain sugar or alcohol. These can affect blood sugar.  Wear a medical ID bracelet or chain, and carry a card that describes your disease and details of your medicine and dosage times.  NOTE:This sheet is a summary. It may not cover all possible information. If you have questions about this medicine, talk to your doctor, pharmacist, or health care provider. Copyright© 2017 [employer]        Nateglinide tablets  What is this medicine?  NATEGLINIDE (nuh SIMON gli nide) helps to treat type 2 diabetes. It helps to control blood sugar.  Treatment is combined with diet and exercise.  How should I use this medicine?  Take this medicine by mouth with a glass of water. Follow the directions on the prescription label. The dose should be taken no earlier than 30 minutes before every meal. If an extra meal is added, take a tablet before that meal. If a meal is skipped, skip the dose for that meal. Do not take more often than directed.  Talk to your pediatrician regarding the use of this medicine in children. Special care may be needed.  What side effects may I notice from receiving this medicine?  Side effects that you should report to your doctor or health care professional as soon as possible:  · allergic reactions like skin rash, itching or hives, swelling of the face, lips, or tongue  · signs and symptoms of low blood sugar such as feeling anxious, confusion, dizziness, increased hunger, unusually weak or tired, sweating, shakiness, cold, irritable, headache, blurred vision, fast heartbeat, loss of consciousness  Side effects that usually do not require medical attention (report to your doctor or health care professional if they continue or are bothersome):  · back pain  · diarrhea  · dizziness  · joint pain  What may interact with this medicine?  Many medications may cause an increase or decrease in blood sugar, these include:  · alcohol containing beverages  · aspirin and aspirin-like drugs  · chloramphenicol  · chromium  · diuretics  · female hormones, like estrogens or progestins and birth control pills  · heart medicines  · isoniazid  · male hormones or anabolic steroids  · medicines for weight loss  · medicines for allergies, asthma, cold, or cough  · medicines for mental problems  · medicines called MAO Inhibitors like Nardil, Parnate, Marplan, Eldepryl  · niacin  · NSAIDs, medicines for pain and inflammation, like ibuprofen or naproxen  · other medicines for diabetes including tolbutamide  · pentamidine  · phenytoin  · probenecid  · quinolone  antibiotics like ciprofloxacin, levofloxacin, ofloxacin  · some herbal dietary supplements  · steroid medicines like prednisone or cortisone  · thyroid medicine  What if I miss a dose?  If you miss a dose before a meal, skip that dose. If it is almost time for your next dose, take only that dose with the next scheduled meal as directed. Do not take double or extra doses.  Where should I keep my medicine?  Keep out of the reach of children.  Store at room temperature between 15 and 30 degrees C (59 and 86 degrees F). Throw away any unused medicine after the expiration date.  What should I tell my health care provider before I take this medicine?  They need to know if you have any of these conditions:  · diabetic ketoacidosis  · kidney disease  · liver disease  · an unusual or allergic reaction to nateglinide, other medicines, foods, dyes, or preservatives  · pregnant or trying to get pregnant  · breast-feeding  What should I watch for while using this medicine?  Visit your doctor or health care professional for regular checks on your progress.  A test called the HbA1C (A1C) will be monitored. This is a simple blood test. It measures your blood sugar control over the last 2 to 3 months. You will receive this test every 3 to 6 months.  Learn how to check your blood sugar. Learn the symptoms of low and high blood sugar and how to manage them.  Always carry a quick-source of sugar with you in case you have symptoms of low blood sugar. Examples include hard sugar candy or glucose tablets. Make sure others know that you can choke if you eat or drink when you develop serious symptoms of low blood sugar, such as seizures or unconsciousness. They must get medical help at once.  Tell your doctor or health care professional if you have high blood sugar. You might need to change the dose of your medicine. If you are sick or exercising more than usual, you might need to change the dose of your medicine.  Do not skip meals. Ask  your doctor or health care professional if you should avoid alcohol. Many nonprescription cough and cold products contain sugar or alcohol. These can affect blood sugar.  Wear a medical ID bracelet or chain, and carry a card that describes your disease and details of your medicine and dosage times.  NOTE:This sheet is a summary. It may not cover all possible information. If you have questions about this medicine, talk to your doctor, pharmacist, or health care provider. Copyright© 2017 [employer]

## 2020-01-20 NOTE — PROGRESS NOTES
"Subjective:      Patient ID: Yayo Carver is a 54 y.o. female.    Chief Complaint: Weight Loss    HPI   Patient here today for weight loss consultation.     New pt to me, referred by Dr. Hastings, with uncontrolled diabetes, HTN, HLD. 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 She walks at times. She started bydureon last week.     Current attempts at weight loss: eating healthy and exercising     Previous diet attempts: none    History of medication for loss: ozempic (insurance would not cover), metformin     Heaviest weight:310    Lightest weight: 280    Goal weight: 250    Typical eating patterns:     Breakfast:   Coffee   Oatmeal/walnuts    Lunch:  Turkey sandwich (mustard)    dinner:  Vegetables/protein    Snacks:  Popcorn  Greek yogurt     Beverages:    EKG:  Normal sinus rhythm  Right atrial enlargement  Nonspecific ST and T wave abnormality  Abnormal ECG    BMR:    Review of Systems   Constitutional: Negative for activity change, appetite change, chills, diaphoresis, fatigue, fever and unexpected weight change.   HENT: Negative for congestion, ear discharge, ear pain, hearing loss, postnasal drip, rhinorrhea, sinus pressure and sore throat.    Respiratory: Negative for cough, shortness of breath and wheezing.    Cardiovascular: Negative for chest pain.   Gastrointestinal: Negative for abdominal pain, constipation, diarrhea, nausea and vomiting.   Genitourinary: Negative for dysuria and frequency.   Musculoskeletal: Negative.    Psychiatric/Behavioral: Negative for suicidal ideas.     I personally reviewed Past Medical History, Past Surgical history,  Past Social History and Family History      Objective:   /84 (BP Location: Left arm, Patient Position: Sitting)   Pulse 99   Ht 5' 6" (1.676 m)   Wt (!) 140.8 kg (310 lb 6.5 oz)   SpO2 99%   BMI 50.10 kg/m²     Physical Exam   Constitutional: She is oriented to person, place, and time. She appears well-developed and well-nourished. No " distress.   HENT:   Head: Normocephalic and atraumatic.   Right Ear: Hearing, tympanic membrane, external ear and ear canal normal.   Left Ear: Hearing, tympanic membrane, external ear and ear canal normal.   Nose: Nose normal.   Mouth/Throat: Uvula is midline and oropharynx is clear and moist. No oropharyngeal exudate.   Eyes: Pupils are equal, round, and reactive to light. Conjunctivae and EOM are normal. Right eye exhibits no discharge. Left eye exhibits no discharge. No scleral icterus.   Neck: Normal range of motion. Neck supple.   Cardiovascular: Normal rate, regular rhythm, normal heart sounds and intact distal pulses. Exam reveals no gallop.   No murmur heard.  Pulmonary/Chest: Effort normal and breath sounds normal. No respiratory distress. She has no wheezes. She has no rales. She exhibits no tenderness.   Abdominal: Soft. Bowel sounds are normal. She exhibits no distension and no mass. There is no tenderness. There is no rebound and no guarding.   Neurological: She is alert and oriented to person, place, and time.   Vitals reviewed.      1. Essential hypertension    2. Uncontrolled diabetes mellitus type 2 without complications        1. stable, cont current regimen   2. Stop novolog, start symlin pen prior to meals, cont levemir and metformin  -return in 4 weeks with food journal    -increase water intake      No orders of the defined types were placed in this encounter.    Medications Ordered This Encounter   Medications    pramlintide (SYMLINPEN 120) 2,700 mcg/2.7 mL PnIj     Sig: Inject 120 mcg into the skin 3 (three) times daily before meals. Start 60 mcg prior to meals for one week     Dispense:  10.8 mL     Refill:  3     Uncontrolled on levemir, novolog, metformin Also tried toujeo

## 2020-01-21 ENCOUNTER — TELEPHONE (OUTPATIENT)
Dept: INTERNAL MEDICINE | Facility: CLINIC | Age: 55
End: 2020-01-21

## 2020-01-21 ENCOUNTER — TELEPHONE (OUTPATIENT)
Dept: PHARMACY | Facility: CLINIC | Age: 55
End: 2020-01-21

## 2020-01-22 NOTE — TELEPHONE ENCOUNTER
----- Message from Trey Pelaez sent at 1/22/2020  9:52 AM CST -----  Contact: pt  Pt would like a call back regarding accidentally cancelled the appointment      Pt can be reached at [phone]

## 2020-01-22 NOTE — TELEPHONE ENCOUNTER
Spoke with pt and informed her that she was added back to the schedule for tomorrow with  @ 1:20pm. Pt verbalized understanding

## 2020-01-23 ENCOUNTER — OFFICE VISIT (OUTPATIENT)
Dept: INTERNAL MEDICINE | Facility: CLINIC | Age: 55
End: 2020-01-23
Payer: MEDICAID

## 2020-01-23 VITALS
WEIGHT: 293 LBS | BODY MASS INDEX: 47.09 KG/M2 | DIASTOLIC BLOOD PRESSURE: 88 MMHG | HEIGHT: 66 IN | HEART RATE: 80 BPM | SYSTOLIC BLOOD PRESSURE: 140 MMHG | OXYGEN SATURATION: 98 %

## 2020-01-23 DIAGNOSIS — Z79.4 TYPE 2 DIABETES MELLITUS WITH HYPERGLYCEMIA, WITH LONG-TERM CURRENT USE OF INSULIN: Primary | ICD-10-CM

## 2020-01-23 DIAGNOSIS — E11.65 TYPE 2 DIABETES MELLITUS WITH HYPERGLYCEMIA, WITH LONG-TERM CURRENT USE OF INSULIN: Primary | ICD-10-CM

## 2020-01-23 DIAGNOSIS — E78.2 MIXED HYPERLIPIDEMIA: ICD-10-CM

## 2020-01-23 DIAGNOSIS — E66.01 MORBID OBESITY WITH BMI OF 45.0-49.9, ADULT: ICD-10-CM

## 2020-01-23 DIAGNOSIS — I10 ESSENTIAL HYPERTENSION: ICD-10-CM

## 2020-01-23 PROCEDURE — 99215 PR OFFICE/OUTPT VISIT, EST, LEVL V, 40-54 MIN: ICD-10-PCS | Mod: S$PBB,,, | Performed by: INTERNAL MEDICINE

## 2020-01-23 PROCEDURE — 99999 PR PBB SHADOW E&M-EST. PATIENT-LVL III: CPT | Mod: PBBFAC,,, | Performed by: INTERNAL MEDICINE

## 2020-01-23 PROCEDURE — 99215 OFFICE O/P EST HI 40 MIN: CPT | Mod: S$PBB,,, | Performed by: INTERNAL MEDICINE

## 2020-01-23 PROCEDURE — 99999 PR PBB SHADOW E&M-EST. PATIENT-LVL III: ICD-10-PCS | Mod: PBBFAC,,, | Performed by: INTERNAL MEDICINE

## 2020-01-23 PROCEDURE — 99213 OFFICE O/P EST LOW 20 MIN: CPT | Mod: PBBFAC | Performed by: INTERNAL MEDICINE

## 2020-01-23 RX ORDER — AMLODIPINE BESYLATE 10 MG/1
10 TABLET ORAL DAILY
Qty: 90 TABLET | Refills: 3 | Status: SHIPPED | OUTPATIENT
Start: 2020-01-23 | End: 2021-01-15 | Stop reason: SDUPTHER

## 2020-01-23 NOTE — PROGRESS NOTES
Subjective:       Patient ID: Yayo Carver is a 54 y.o. female who  has a past medical history of Arthritis, Diabetes mellitus, HLD (hyperlipidemia), and Hypertension.    Chief Complaint: Follow-up and Diabetes     History was obtained from the patient and supplemented through chart review.  -Saw Dr. Markham for weight loss.  Stopped NovoLog.  Started Symlin TID AC.     HPI    DM2 is not controlled.  H/o admission for DKA d/t insurance issues obtaining Trulicity/Ozempic and out of insulin.  Already completed medical necessity.    Currently pt is taking Levemir 40 q.h.s. Will switch from NovoLog 8 t.i.d. to Pramlintide for weight loss today per Dr. Markham.  Taking increased metformin 1000 b.i.d. W/o GI SE.    Diet:  Avoids bread, carbs.   Exercise:  Walks at work in the [employer].      H/o hypoBG. No symptoms of hypoglycemia.  No more 300s.  AM fasting B, 99  BG Before lunch:   BG Before dinner:   BG At bedtime:     With normal microalbumin creatinine ratio.  No ACE/ARB d/t angioedema.   Retinal exams: UTD, .  No retinopathy.  Foot exams:  UTD,   Sees DM [facility]  Hemoglobin A1C   Date Value Ref Range Status   2019 10.6 (H) 4.0 - 5.6 % Final     Comment:     ADA Screening Guidelines:  5.7-6.4%  Consistent with prediabetes  >or=6.5%  Consistent with diabetes  High levels of fetal hemoglobin interfere with the HbA1C  assay. Heterozygous hemoglobin variants (HbS, HgC, etc)do  not significantly interfere with this assay.   However, presence of multiple variants may affect accuracy.     2019 9.3 (H) 4.0 - 5.6 % Final     Comment:     ADA Screening Guidelines:  5.7-6.4%  Consistent with prediabetes  >or=6.5%  Consistent with diabetes  High levels of fetal hemoglobin interfere with the HbA1C  assay. Heterozygous hemoglobin variants (HbS, HgC, etc)do  not significantly interfere with this assay.   However, presence of multiple variants may affect accuracy.     2019  9.3 (H) 4.0 - 5.6 % Final     Comment:     ADA Screening Guidelines:  5.7-6.4%  Consistent with prediabetes  >or=6.5%  Consistent with diabetes  High levels of fetal hemoglobin interfere with the HbA1C  assay. Heterozygous hemoglobin variants (HbS, HgC, etc)do  not significantly interfere with this assay.   However, presence of multiple variants may affect accuracy.       HTN:    H/o DM, but no ACE d/t angioedema.  Used to take Norvasc 10, HCTZ 25 both qOD d/t bothersome muscle cramps, dehydration.  Switched to only Norvasc 10 daily.  Is now compliant.  Pt's BP is not controlled today, but b/l knee pain qAM.  BP wnl during clinic visit a few days ago. No leg edema.  Tolerating meds well. Pt denies CP, SOB, lightheadedness, dizziness  Dig HTN: just started.     FHx: yes  Tobacco: no  ETOH:   Exercise: walks at work.  Sometimes snores.  No apnea.    HLD:  Is currently taking Lipitor increased to 40, ASA 81 daily.  Lab Results   Component Value Date    LDLCALC 117.0 12/18/2019     The 10-year ASCVD risk score (Owen CARISSA Jr., et al., 2013) is: 11.5%    Values used to calculate the score:      Age: 54 years      Sex: Female      Is Non- : Yes      Diabetic: Yes      Tobacco smoker: No      Systolic Blood Pressure: 140 mmHg      Is BP treated: Yes      HDL Cholesterol: 70 mg/dL      Total Cholesterol: 202 mg/dL    Obesity:  Body mass index is 50.35 kg/m².  Has had difficulty getting Ozempic approved as above.  On Symlin.  Following with Dr. Markham.          Not addressed today.  Vitamin D deficiency:  On ergocalciferol weekly.  Persistently low.  Continue ergocalciferol weekly.    Lab Results   Component Value Date    KFYYPZNG10SW 12 (L) 12/18/2019    ADDLCGCC13UE 18 (L) 08/22/2016     B12 deficiency, DOM:  Started B12.  H/o menorrhagia.  Now menopausal.  Can stop daily iron.  Continue B12.  Can discontinue daily iron.    Lab Results   Component Value Date    IRON 60 12/18/2019    TIBC 326  "12/18/2019    FERRITIN 42 12/18/2019     Lab Results   Component Value Date    IRBCDCZP08 284 12/18/2019     Lab Results   Component Value Date    FOLATE 10.0 08/22/2016     Chronic back, knee pain:    Fell off porch in 7/2019, resulting in chronic back pain.    Review of Systems   Constitutional: Negative for fever and unexpected weight change.   HENT: Negative for rhinorrhea and sneezing.    Eyes: Negative for redness and itching.   Respiratory: Negative for shortness of breath and wheezing.    Cardiovascular: Negative for chest pain, palpitations and leg swelling.   Gastrointestinal: Negative for abdominal pain and diarrhea.   Genitourinary: Negative for dysuria and hematuria.   Musculoskeletal: Positive for arthralgias and back pain.   Skin: Negative for color change and rash.   Neurological: Negative for dizziness and light-headedness.   Hematological: Negative for adenopathy.   Psychiatric/Behavioral: Negative for confusion. The patient is not nervous/anxious.        I personally reviewed Past Medical History, Past Surgical History, Social History, and Family History.    Objective:      Vitals:    01/23/20 1318   BP: (!) 140/88   Pulse: 80   SpO2: 98%   Weight: (!) 141.5 kg (311 lb 15.2 oz)   Height: 5' 6" (1.676 m)      Physical Exam   Constitutional: She appears well-developed and well-nourished. No distress.   HENT:   Head: Normocephalic and atraumatic.   Nose: Nose normal.   Mouth/Throat: Oropharynx is clear and moist. No oropharyngeal exudate.   Mallampati 4   Eyes: Pupils are equal, round, and reactive to light. EOM are normal. Right eye exhibits no discharge. Left eye exhibits no discharge. No scleral icterus.   Neck: Neck supple. No tracheal deviation present. No thyromegaly present.   Cardiovascular: Normal rate, regular rhythm, normal heart sounds and intact distal pulses.   No murmur heard.  Pulmonary/Chest: Effort normal and breath sounds normal. No respiratory distress. She has no wheezes. "   Abdominal: Soft. Bowel sounds are normal. She exhibits no distension. There is no tenderness.   Musculoskeletal: She exhibits no edema or deformity.   Lymphadenopathy:     She has no cervical adenopathy.   Neurological: She is alert. No cranial nerve deficit. Gait normal.   Skin: Skin is warm and dry. She is not diaphoretic. No erythema.   Psychiatric: She has a normal mood and affect. Her behavior is normal.         Lab Results   Component Value Date    WBC 6.16 12/03/2019    HGB 12.2 12/03/2019    HCT 39.6 12/03/2019     12/03/2019    CHOL 202 (H) 12/18/2019    TRIG 75 12/18/2019    HDL 70 12/18/2019    ALT 35 12/01/2019    AST 21 12/01/2019     12/03/2019    K 4.7 12/03/2019     12/03/2019    CREATININE 1.1 12/03/2019    BUN 15 12/03/2019    CO2 19 (L) 12/03/2019    TSH 2.285 08/22/2016    INR 0.9 10/02/2015    HGBA1C 10.6 (H) 12/02/2019       The 10-year ASCVD risk score (Owen CARISSA Jr., et al., 2013) is: 11.5%    Values used to calculate the score:      Age: 54 years      Sex: Female      Is Non- : Yes      Diabetic: Yes      Tobacco smoker: No      Systolic Blood Pressure: 140 mmHg      Is BP treated: Yes      HDL Cholesterol: 70 mg/dL      Total Cholesterol: 202 mg/dL    (Imaging have been independently reviewed)  CXR without acute abnormality.    Assessment:       1. Type 2 diabetes mellitus with hyperglycemia, with long-term current use of insulin    2. Essential hypertension    3. Mixed hyperlipidemia    4. Morbid obesity with BMI of 45.0-49.9, adult          Plan:       Yayo was seen today for follow-up and diabetes.    Diagnoses and all orders for this visit:    Type 2 diabetes mellitus with hyperglycemia, with long-term current use of insulin  Comments:  Uncontrolled, but improved BG.  Cont Levemir 40 qHS, Pramlintide, metformin 1000 BID. Ordered A1C 3 mo.    Essential hypertension  Comments:  Elevated today, but wnl in clinic days ago. Cont Norvasc 10. H/o  ACE angioedema. CMP ordered in 3 mo for GFR. Dig HTN.  Orders:  -     amLODIPine (NORVASC) 10 MG tablet; Take 1 tablet (10 mg total) by mouth once daily.    Mixed hyperlipidemia  Comments:  Elevated ASCVD. Cont increased Lipitor 40,  ASA 81.    Morbid obesity with BMI of 45.0-49.9, adult  Comments:  F/u with Dr. Markham due to weight, DM.  Starting Symlin.         Side effects of medication(s) were discussed in detail and patient voiced understanding.  Patient will call back for any issues or complications.     RTC in 3 month(s) or sooner PRN for DM, HTN with A1C prior.

## 2020-02-12 ENCOUNTER — OFFICE VISIT (OUTPATIENT)
Dept: ORTHOPEDICS | Facility: CLINIC | Age: 55
End: 2020-02-12
Payer: MEDICAID

## 2020-02-12 VITALS — HEIGHT: 66 IN | BODY MASS INDEX: 47.09 KG/M2 | WEIGHT: 293 LBS

## 2020-02-12 DIAGNOSIS — M17.0 PRIMARY OSTEOARTHRITIS OF BOTH KNEES: Primary | ICD-10-CM

## 2020-02-12 PROCEDURE — 99999 PR PBB SHADOW E&M-EST. PATIENT-LVL III: CPT | Mod: PBBFAC,,, | Performed by: NURSE PRACTITIONER

## 2020-02-12 PROCEDURE — 20610 DRAIN/INJ JOINT/BURSA W/O US: CPT | Mod: 50,PBBFAC | Performed by: NURSE PRACTITIONER

## 2020-02-12 PROCEDURE — 99213 OFFICE O/P EST LOW 20 MIN: CPT | Mod: S$PBB,25,, | Performed by: NURSE PRACTITIONER

## 2020-02-12 PROCEDURE — 20610 DRAIN/INJ JOINT/BURSA W/O US: CPT | Mod: 50,S$PBB,, | Performed by: NURSE PRACTITIONER

## 2020-02-12 PROCEDURE — 99999 PR PBB SHADOW E&M-EST. PATIENT-LVL III: ICD-10-PCS | Mod: PBBFAC,,, | Performed by: NURSE PRACTITIONER

## 2020-02-12 PROCEDURE — 20610 PR DRAIN/INJECT LARGE JOINT/BURSA: ICD-10-PCS | Mod: 50,S$PBB,, | Performed by: NURSE PRACTITIONER

## 2020-02-12 PROCEDURE — 99213 PR OFFICE/OUTPT VISIT, EST, LEVL III, 20-29 MIN: ICD-10-PCS | Mod: S$PBB,25,, | Performed by: NURSE PRACTITIONER

## 2020-02-12 PROCEDURE — 99213 OFFICE O/P EST LOW 20 MIN: CPT | Mod: PBBFAC | Performed by: NURSE PRACTITIONER

## 2020-02-12 RX ADMIN — TRIAMCINOLONE ACETONIDE 80 MG: 40 INJECTION, SUSPENSION INTRA-ARTICULAR; INTRAMUSCULAR at 11:02

## 2020-02-12 NOTE — PROGRESS NOTES
CC: Injections of the Right Knee and Injections of the Left Knee      HPI: Pt with c/o bilateral knee pain for the past few months. The pain is global and aching, but worse medially. She is employed at the [employer] and has to walk throughout her shifts. The increased ambulation aggravates the pain. She has been seen by Dr. O[facility]ner and was referred to bariatric surgery. She is working on weight loss. She has had both cortisone and gel injections in the past. The cortisone injections have given her good relief and she would like to repeat that today. Her last injections were 3 months ago. She is ambulating without assistive device. There is not a limp.    ROS  General: denies fever and chills  Resp: no c/o sob  CVS: no c/o cp  MSK: c/o bilateral knee pain which is global and aching and worse with increased activity    PE  General: AAOx3, pleasant and cooperative  Resp: respirations even and unlabored  MSK: bilateral knee exam  0 degrees extension  120 degrees flexion  No warmth or erythema   - effusion  + tenderness over the joint line  + crepitus    Assessment:  Bilateral knee djd    Plan:  Bilateral knee cortisone injections today  RICE  nsaids prn  F/u as needed    Knee Injection Procedure Note  Diagnosis: bilateral knee degenerative arthritis  Indications: bilateral knee pain  Procedure Details: Verbal consent was obtained for the procedure. The injection site was identified and the skin was prepared with alcohol. The bilateral knee was injected from an anterolateral approach with 1 ml of Kenalog and 4 ml Lidocaine each under sterile technique using a 22 gauge needle. The needle was removed and the area cleansed and dressed.  Complications:  Patient tolerated the procedure well.    she was advised to rest the knee today, using ice and elevation as needed for comfort and swelling.Immediate relief of the knee pain may be short lived and secondary to the lidocaine. she may have an increase in discomfort  tonight followed by steady improvement over the next several days. It may take 1-2 weeks following the injection to get the full benefit of the medication.

## 2020-02-16 RX ORDER — TRIAMCINOLONE ACETONIDE 40 MG/ML
80 INJECTION, SUSPENSION INTRA-ARTICULAR; INTRAMUSCULAR
Status: COMPLETED | OUTPATIENT
Start: 2020-02-12 | End: 2020-02-12

## 2020-03-02 ENCOUNTER — TELEPHONE (OUTPATIENT)
Dept: PHYSICAL MEDICINE AND REHAB | Facility: CLINIC | Age: 55
End: 2020-03-02

## 2020-03-02 ENCOUNTER — OFFICE VISIT (OUTPATIENT)
Dept: PHYSICAL MEDICINE AND REHAB | Facility: CLINIC | Age: 55
End: 2020-03-02
Payer: MEDICAID

## 2020-03-02 VITALS
HEIGHT: 65 IN | WEIGHT: 293 LBS | DIASTOLIC BLOOD PRESSURE: 94 MMHG | SYSTOLIC BLOOD PRESSURE: 162 MMHG | BODY MASS INDEX: 48.82 KG/M2

## 2020-03-02 DIAGNOSIS — E66.01 MORBID OBESITY WITH BMI OF 45.0-49.9, ADULT: ICD-10-CM

## 2020-03-02 DIAGNOSIS — G89.29 CHRONIC PAIN OF BOTH KNEES: ICD-10-CM

## 2020-03-02 DIAGNOSIS — M17.0 PRIMARY OSTEOARTHRITIS OF BOTH KNEES: Primary | ICD-10-CM

## 2020-03-02 DIAGNOSIS — M25.562 CHRONIC PAIN OF BOTH KNEES: ICD-10-CM

## 2020-03-02 DIAGNOSIS — M25.561 CHRONIC PAIN OF BOTH KNEES: ICD-10-CM

## 2020-03-02 PROCEDURE — 99212 OFFICE O/P EST SF 10 MIN: CPT | Mod: PBBFAC | Performed by: PHYSICAL MEDICINE & REHABILITATION

## 2020-03-02 PROCEDURE — 99999 PR PBB SHADOW E&M-EST. PATIENT-LVL II: ICD-10-PCS | Mod: PBBFAC,,, | Performed by: PHYSICAL MEDICINE & REHABILITATION

## 2020-03-02 PROCEDURE — 99214 OFFICE O/P EST MOD 30 MIN: CPT | Mod: S$PBB,,, | Performed by: PHYSICAL MEDICINE & REHABILITATION

## 2020-03-02 PROCEDURE — 99999 PR PBB SHADOW E&M-EST. PATIENT-LVL II: CPT | Mod: PBBFAC,,, | Performed by: PHYSICAL MEDICINE & REHABILITATION

## 2020-03-02 PROCEDURE — 99214 PR OFFICE/OUTPT VISIT, EST, LEVL IV, 30-39 MIN: ICD-10-PCS | Mod: S$PBB,,, | Performed by: PHYSICAL MEDICINE & REHABILITATION

## 2020-03-02 RX ORDER — MELOXICAM 15 MG/1
15 TABLET ORAL DAILY
Qty: 90 TABLET | Refills: 1 | Status: SHIPPED | OUTPATIENT
Start: 2020-03-09 | End: 2020-07-20

## 2020-03-02 RX ORDER — TRAMADOL HYDROCHLORIDE 50 MG/1
50-100 TABLET ORAL 3 TIMES DAILY PRN
Qty: 180 TABLET | Refills: 3 | Status: SHIPPED | OUTPATIENT
Start: 2020-03-02 | End: 2020-06-29 | Stop reason: SDUPTHER

## 2020-03-02 NOTE — TELEPHONE ENCOUNTER
Patient was seen today at her scheduled visit.      ----- Message from Aileen Evans sent at 3/2/2020  1:41 PM CST -----  Contact: self   Pt stated she has an appointment today and is running a few minutes behind but she is still coming.      Contact info- [phone]

## 2020-03-02 NOTE — PROGRESS NOTES
Subjective:       Patient ID: Yayo Carver is a 54 y.o. female.    Chief Complaint: No chief complaint on file.    HPI.    HISTORY OF PRESENT ILLNESS:  Mrs. Carver is a 54-year-old black female with past medical history of hypertension, diabetes mellitus, osteoarthritis and morbid obesity.  She presented to the [facility] on 06/14/2019 for chronic bilateral knee pain due to OA.  He was started on meloxicam and p.r.n. Tylenol.  She was maintained on p.r.n. Tramadol.    The patient is coming to the clinic for follow-up.  Since her last visit, she was followed up at the [facility].  On 02/16/2020 she underwent bilateral intra-articular knee injections with steroids.  She had good relief but only lasting couple weeks.  Her bilateral knee pain has been stable. The pain is recently worse on the right.  It is a constant aching pain.  It is aggravated by standing, walking or getting up after sitting for too long.  It is better with lying flat.  Her maximum pain is 9-10/10 and minimum 6-7/10.  Today, it is 9/10.  The patient has occasional rightt knee swelling.  She denies warmth    She is currently taking meloxicam 50 mg p.o. once per day.  She takes tramadol 50 mg p.r.n., 1-2 tablets 3 times per day.        Review of Systems   Constitutional: Negative for fatigue.   Eyes: Negative for visual disturbance.   Respiratory: Negative for shortness of breath.    Cardiovascular: Negative for chest pain.   Gastrointestinal: Negative for blood in stool, constipation, nausea and vomiting.   Genitourinary: Negative for difficulty urinating.   Musculoskeletal: Positive for arthralgias and gait problem. Negative for back pain, joint swelling and neck pain.   Neurological: Negative for dizziness and headaches.   Psychiatric/Behavioral: Negative for behavioral problems and sleep disturbance.       Objective:      Physical Exam   Constitutional: She is oriented to person, place, and time. She appears  well-developed and well-nourished.   HENT:   Head: Normocephalic and atraumatic.   Neck: Normal range of motion.   Cardiovascular: Normal rate, regular rhythm and normal heart sounds.   Pulmonary/Chest: Effort normal and breath sounds normal.   Abdominal: Soft.   Musculoskeletal:   BLE:  ROM:full.  Strength:    RLE: 4/5 at hip flexion, 5- knee extension, 5 ankle DF, 5 PF.   LLE: 4/5 at hip flexion, 5- knee extension, 5 ankle DF, 5 PF.  Sensation to pinprick:     RLE: intact.      LLE: intact.     Rt knee:  +ve crepitus.  +ve swelling.  -ve warmth.      Lt knee:  +ve crepitus.  -ve swelling.  -ve warmth.        Gait: waddling.     Neurological: She is alert and oriented to person, place, and time.   Skin: Skin is warm.   Psychiatric: She has a normal mood and affect.   Vitals reviewed.      Assessment:       1. Primary osteoarthritis of both knees    2. Chronic pain of both knees    3. Morbid obesity with BMI of 45.0-49.9, adult        Plan:       - Continue meloxicam (MOBIC) 15 MG tablet; Take 1 tablet (15 mg total) by mouth once daily.  - Continue  traMADol (ULTRAM) 50 mg tablet; Take 1-2 tablets ( mg total) by mouth 3 (three) times daily as needed.  - Weight loss was encouraged.  - Quadriceps strengthening through sitting straight leg raising exercises were again demonstrated.  - Follow up in about 4 months (around 7/2/2020).     This was a 25 minute visit, more than 50% of which was spent counseling the patient about the diagnosis and the treatment plan.    This note was partly generated with [employer] voice recognition software. I apologize for any possible typographical errors.

## 2020-05-25 ENCOUNTER — PATIENT OUTREACH (OUTPATIENT)
Dept: ADMINISTRATIVE | Facility: OTHER | Age: 55
End: 2020-05-25

## 2020-05-25 ENCOUNTER — OFFICE VISIT (OUTPATIENT)
Dept: ORTHOPEDICS | Facility: CLINIC | Age: 55
End: 2020-05-25
Payer: MEDICAID

## 2020-05-25 VITALS — HEIGHT: 65 IN | WEIGHT: 262.38 LBS | BODY MASS INDEX: 43.71 KG/M2

## 2020-05-25 DIAGNOSIS — Z12.31 ENCOUNTER FOR SCREENING MAMMOGRAM FOR MALIGNANT NEOPLASM OF BREAST: Primary | ICD-10-CM

## 2020-05-25 DIAGNOSIS — M17.0 PRIMARY OSTEOARTHRITIS OF BOTH KNEES: ICD-10-CM

## 2020-05-25 DIAGNOSIS — M54.30 SCIATICA, UNSPECIFIED LATERALITY: Primary | ICD-10-CM

## 2020-05-25 PROCEDURE — 99999 PR PBB SHADOW E&M-EST. PATIENT-LVL II: ICD-10-PCS | Mod: PBBFAC,,, | Performed by: NURSE PRACTITIONER

## 2020-05-25 PROCEDURE — 20610 PR DRAIN/INJECT LARGE JOINT/BURSA: ICD-10-PCS | Mod: 50,S$PBB,, | Performed by: NURSE PRACTITIONER

## 2020-05-25 PROCEDURE — 20610 DRAIN/INJ JOINT/BURSA W/O US: CPT | Mod: 50,S$PBB,, | Performed by: NURSE PRACTITIONER

## 2020-05-25 PROCEDURE — 99999 PR PBB SHADOW E&M-EST. PATIENT-LVL II: CPT | Mod: PBBFAC,,, | Performed by: NURSE PRACTITIONER

## 2020-05-25 PROCEDURE — 99212 OFFICE O/P EST SF 10 MIN: CPT | Mod: PBBFAC | Performed by: NURSE PRACTITIONER

## 2020-05-25 PROCEDURE — 20610 DRAIN/INJ JOINT/BURSA W/O US: CPT | Mod: 50,PBBFAC | Performed by: NURSE PRACTITIONER

## 2020-05-25 PROCEDURE — 99213 PR OFFICE/OUTPT VISIT, EST, LEVL III, 20-29 MIN: ICD-10-PCS | Mod: 25,S$PBB,, | Performed by: NURSE PRACTITIONER

## 2020-05-25 PROCEDURE — 99213 OFFICE O/P EST LOW 20 MIN: CPT | Mod: 25,S$PBB,, | Performed by: NURSE PRACTITIONER

## 2020-05-25 RX ORDER — METHYLPREDNISOLONE 4 MG/1
TABLET ORAL
Qty: 1 PACKAGE | Refills: 0 | Status: SHIPPED | OUTPATIENT
Start: 2020-05-25 | End: 2020-06-15

## 2020-05-25 RX ADMIN — TRIAMCINOLONE ACETONIDE 80 MG: 40 INJECTION, SUSPENSION INTRA-ARTICULAR; INTRAMUSCULAR at 01:05

## 2020-05-25 NOTE — PROGRESS NOTES
Chart reviewed.   Immunizations: updated  Orders placed: Mammogram  Upcoming appts to satisfy JON topics: n/a

## 2020-05-26 RX ORDER — TRIAMCINOLONE ACETONIDE 40 MG/ML
80 INJECTION, SUSPENSION INTRA-ARTICULAR; INTRAMUSCULAR
Status: COMPLETED | OUTPATIENT
Start: 2020-05-25 | End: 2020-05-25

## 2020-05-26 NOTE — PROGRESS NOTES
CC: Injections of the Right Knee and Injections of the Left Knee      HPI: Pt with c/o bilateral knee pain which is severe for the past month. She has known djd and has had injections in the past with good relief. She is working on her weight so that she can be a candidate for knee replacement. In the meantime she has had cortisone injections for pain relief. She is also followed by pain management. She usually works on her feet daily at two different jobs, but due to the pandemic, she hasn't been working and she finds that she has gotten very stiff. She is ambulating without assistive device. There is not a limp.    ROS  General: denies fever and chills  Resp: no c/o sob  CVS: no c/o cp  MSK: c/o bilateral knee pain which is aching and medial with associated stiffness    PE  General: AAOx3, pleasant and cooperative  Resp: respirations even and unlabored  MSK: bilateral knee exam  0 degrees extension  120 degrees flexion  No warmth or erythema   - effusion  + crepitus  - instability    Assessment:  Bilateral knee djd    Plan:  Cortisone injections bilateral knees today for pain relief  Continue to work on weight loss  Pain medication as prescribed by pain management    Knee Injection Procedure Note  Diagnosis: bilateral knee degenerative arthritis  Indications: bilateral knee pain  Procedure Details: Verbal consent was obtained for the procedure. The injection site was identified and the skin was prepared with alcohol. The bilateral knee was injected from an anterolateral approach with 1 ml of Kenalog and 4 ml Lidocaine each under sterile technique using a 22 gauge needle. The needle was removed and the area cleansed and dressed.  Complications:  Patient tolerated the procedure well.    she was advised to rest the knee today, using ice and elevation as needed for comfort and swelling.Immediate relief of the knee pain may be short lived and secondary to the lidocaine. she may have an increase in discomfort tonight  followed by steady improvement over the next several days. It may take 1-2 weeks following the injection to get the full benefit of the medication.

## 2020-06-02 ENCOUNTER — TELEPHONE (OUTPATIENT)
Dept: PHYSICAL MEDICINE AND REHAB | Facility: CLINIC | Age: 55
End: 2020-06-02

## 2020-06-04 ENCOUNTER — TELEPHONE (OUTPATIENT)
Dept: INTERNAL MEDICINE | Facility: CLINIC | Age: 55
End: 2020-06-04

## 2020-06-04 NOTE — TELEPHONE ENCOUNTER
----- Message from Krystal Aguero sent at 6/4/2020  3:46 PM CDT -----  Contact: PT   Needing a call about rescheduling.

## 2020-06-10 ENCOUNTER — TELEPHONE (OUTPATIENT)
Dept: INTERNAL MEDICINE | Facility: CLINIC | Age: 55
End: 2020-06-10

## 2020-06-10 NOTE — PROGRESS NOTES
The patient location is: home  The chief complaint leading to consultation is: GERD.  The patient elected for a virtual visit to decrease risk of exposure to Coronavirus.  Visit type: Virtual visit with synchronous audio and video  Total time spent with patient: 15 minutes  Each patient to whom he or she provides medical services by telemedicine is:  (1) informed of the relationship between the physician and patient and the respective role of any other health care provider with respect to management of the patient; and (2) notified that he or she may decline to receive medical services by telemedicine and may withdraw from such care at any time.    Subjective:       Patient ID: Yayo Carver is a 54 y.o. female who  has a past medical history of Arthritis, Diabetes mellitus, HLD (hyperlipidemia), and Hypertension.    Chief Complaint: Diabetes and Gastroesophageal Reflux     History was obtained from the patient and supplemented through chart review.  -Following with Ortho for chronic back pain, knee OA    Diabetes   She has type 2 diabetes mellitus. No MedicAlert identification noted. Pertinent negatives for hypoglycemia include no confusion, dizziness, headaches, hunger, mood changes, nervousness/anxiousness, pallor, seizures, sleepiness, speech difficulty, sweats or tremors. Associated symptoms include weight loss. Pertinent negatives for diabetes include no blurred vision, no chest pain, no fatigue, no foot paresthesias, no foot ulcerations, no polydipsia, no polyphagia, no polyuria, no visual change and no weakness. Pertinent negatives for hypoglycemia complications include no blackouts, no hospitalization, no nocturnal hypoglycemia, no required assistance and no required glucagon injection. Symptoms are improving. Pertinent negatives for diabetic complications include no autonomic neuropathy, CVA, heart disease, impotence, nephropathy, peripheral neuropathy, PVD or retinopathy. There are no known risk factors  for coronary artery disease. Current diabetic treatment includes insulin injections. She is compliant with treatment all of the time. She is currently taking insulin pre-breakfast, pre-lunch, pre-dinner and at bedtime. Insulin injections are given by patient. Rotation sites for injection include the abdominal wall, arms and thighs. Her weight is decreasing rapidly. She is following a generally healthy and high fiber diet. Meal planning includes avoidance of concentrated sweets. She has had a previous visit with a dietitian. She participates in exercise every other day. She monitors blood glucose at home 1-2 x per day. She monitors urine at home <1 x per month. Blood glucose monitoring compliance is good. Her home blood glucose trend is decreasing rapidly. She does not see a podiatrist.Eye exam is current.     GERD:  Started 1 week ago.  Mid/upper chest burning.  Sometimes a little sour regurg.  No sour taste in her throat.  Noticed it after heavy food, coffee, ice cream.  No ETOH.  Taking peppermint, GasX.  Peppermint helped.  Latest meal at 7 PM.  Sometimes lies down after eating.  Did lose a significant amount of weight as below.    Obesity:    BMI 43.  311->262 lb.  Lost weight through diet.  Eating baked food.  Decreased junk, fried food.  Used to walk, but scared d/t COVID and DM.  Walks with her son in the park in the AM.    Has had difficulty getting Ozempic approved.  Started Symlin.  Was seeing Dr. Markham.    DM2 is not controlled.  H/o admission for DKA d/t insurance issues obtaining Trulicity/Ozempic and out of insulin.  Is on Levemir 40 q.h.s. Switched from NovoLog 8 t.i.d. to Pramlintide/Symlin for weight loss per Dr. Markham.  Taking increased metformin 1000 BID w/o GI SE.    H/o hypoBG. No symptoms of hypoglycemia.  No more 300s.  AM fasting B, 99    With normal microalbumin creatinine ratio.  No ACE/ARB d/t angioedema.   Retinal exams: [facility], .  No retinopathy.  Foot exams:  [facility],    [facility] DM [facility]  Hemoglobin A1C   Date Value Ref Range Status   12/02/2019 10.6 (H) 4.0 - 5.6 % Final     Comment:     ADA Screening Guidelines:  5.7-6.4%  Consistent with prediabetes  >or=6.5%  Consistent with diabetes  High levels of fetal hemoglobin interfere with the HbA1C  assay. Heterozygous hemoglobin variants (HbS, HgC, etc)do  not significantly interfere with this assay.   However, presence of multiple variants may affect accuracy.     11/25/2019 9.3 (H) 4.0 - 5.6 % Final     Comment:     ADA Screening Guidelines:  5.7-6.4%  Consistent with prediabetes  >or=6.5%  Consistent with diabetes  High levels of fetal hemoglobin interfere with the HbA1C  assay. Heterozygous hemoglobin variants (HbS, HgC, etc)do  not significantly interfere with this assay.   However, presence of multiple variants may affect accuracy.     11/25/2019 9.3 (H) 4.0 - 5.6 % Final     Comment:     ADA Screening Guidelines:  5.7-6.4%  Consistent with prediabetes  >or=6.5%  Consistent with diabetes  High levels of fetal hemoglobin interfere with the HbA1C  assay. Heterozygous hemoglobin variants (HbS, HgC, etc)do  not significantly interfere with this assay.   However, presence of multiple variants may affect accuracy.               Not addressed today.  HTN:    H/o DM, but no ACE d/t angioedema.  Used to take Norvasc 10, HCTZ 25 both qOD d/t bothersome muscle cramps, dehydration.      Switched to only Norvasc 10 daily.  Is now compliant.  Pt's BP is not controlled today, but b/l knee pain qAM.  BP wnl during clinic visit a few days ago. No leg edema.  Tolerating meds well. Pt denies CP, SOB, lightheadedness, dizziness  Dig HTN: did not enroll    FHx: yes  Tobacco: no  ETOH:   Exercise: walks at work.  Sometimes snores.  No apnea.  Elevated today, but wnl in clinic days ago. Cont Norvasc 10. H/o ACE angioedema. Dig HTN.    HLD:  Is currently taking Lipitor increased to 40, ASA 81 daily.  Elevated ASCVD. Cont increased Lipitor 40,  ASA  81.  Lab Results   Component Value Date    LDLCALC 117.0 12/18/2019     The 10-year ASCVD risk score (Owen FERRER Jr., et al., 2013) is: 18.7%    Values used to calculate the score:      Age: 54 years      Sex: Female      Is Non- : Yes      Diabetic: Yes      Tobacco smoker: No      Systolic Blood Pressure: 162 mmHg      Is BP treated: Yes      HDL Cholesterol: 70 mg/dL      Total Cholesterol: 202 mg/dL    Vitamin D deficiency:  On ergocalciferol weekly.  Persistently low.  Continue ergocalciferol weekly.    Lab Results   Component Value Date    EFFYEUBI48BA 12 (L) 12/18/2019    CBZXHGYL89LS 18 (L) 08/22/2016     B12 deficiency, DOM:  Started B12.  H/o menorrhagia.  Now menopausal.  Can stop daily iron.  Continue B12.  Can discontinue daily iron.    Lab Results   Component Value Date    IRON 60 12/18/2019    TIBC 326 12/18/2019    FERRITIN 42 12/18/2019     Lab Results   Component Value Date    PWBDAOBG76 284 12/18/2019     Lab Results   Component Value Date    FOLATE 10.0 08/22/2016     Chronic back, b/l knee OA:    Fell off porch in 7/2019, resulting in chronic back pain.  Following with Ortho, PM&R.  Receiving knee injections.  On Mobic, tramadol prescribed by PM&R.    Review of Systems   Constitutional: Positive for weight loss. Negative for fatigue, fever and unexpected weight change.   HENT: Negative for rhinorrhea and sneezing.    Eyes: Negative for blurred vision, redness and itching.   Respiratory: Negative for shortness of breath and wheezing.    Cardiovascular: Negative for chest pain, palpitations and leg swelling.   Gastrointestinal: Negative for abdominal pain and diarrhea.   Endocrine: Negative for polydipsia, polyphagia and polyuria.   Genitourinary: Negative for dysuria, hematuria and impotence.   Musculoskeletal: Positive for arthralgias and back pain.   Skin: Negative for color change, pallor and rash.   Neurological: Negative for dizziness, tremors, seizures, speech  difficulty, weakness, light-headedness and headaches.   Hematological: Negative for adenopathy.   Psychiatric/Behavioral: Negative for confusion. The patient is not nervous/anxious.        I personally reviewed Past Medical History, Past Surgical History, Social History, and Family History.    Objective:      There were no vitals filed for this visit.   Physical Exam   Constitutional: She appears well-developed and well-nourished. No distress.   HENT:   Head: Normocephalic and atraumatic.   Eyes: Right eye exhibits no discharge. Left eye exhibits no discharge.   Pulmonary/Chest: Effort normal. No respiratory distress.   Speaking in complete sentences.   Neurological: She is alert.   Skin: She is not diaphoretic. No erythema. No pallor.   Psychiatric: She has a normal mood and affect. Her behavior is normal.         Lab Results   Component Value Date    WBC 6.16 12/03/2019    HGB 12.2 12/03/2019    HCT 39.6 12/03/2019     12/03/2019    CHOL 202 (H) 12/18/2019    TRIG 75 12/18/2019    HDL 70 12/18/2019    ALT 35 12/01/2019    AST 21 12/01/2019     12/03/2019    K 4.7 12/03/2019     12/03/2019    CREATININE 1.1 12/03/2019    BUN 15 12/03/2019    CO2 19 (L) 12/03/2019    TSH 2.285 08/22/2016    INR 0.9 10/02/2015    HGBA1C 10.6 (H) 12/02/2019       The 10-year ASCVD risk score (Lincoln CARISSA Jr., et al., 2013) is: 18.7%    Values used to calculate the score:      Age: 54 years      Sex: Female      Is Non- : Yes      Diabetic: Yes      Tobacco smoker: No      Systolic Blood Pressure: 162 mmHg      Is BP treated: Yes      HDL Cholesterol: 70 mg/dL      Total Cholesterol: 202 mg/dL    (Imaging have been independently reviewed)  CXR without acute abnormality.    Assessment:       1. Gastroesophageal reflux disease with esophagitis    2. Morbid obesity with BMI of 40.0-44.9, adult    3. Type 2 diabetes mellitus with hyperglycemia, with long-term current use of insulin          Plan:        Yayo was seen today for diabetes and gastroesophageal reflux.    Diagnoses and all orders for this visit:    Gastroesophageal reflux disease with esophagitis  Comments:  Discussed dietary changes, avoid recumbency after meals. Start PPI p.r.n.  If occurring more frequently, rec PPI qAM. and order H pylori test  Orders:  -     pantoprazole (PROTONIX) 40 MG tablet; Take 1 tablet (40 mg total) by mouth daily as needed (heartburn).    Morbid obesity with BMI of 40.0-44.9, adult  Comments:  Improved!  Encouraged continued dietary changes, exercise regularly.  Was seeing Dr. Markham.  Will discuss referral to Bariatric Medicine    Type 2 diabetes mellitus with hyperglycemia, with long-term current use of insulin  Comments:  Uncontrolled, but improved BG.  Cont Levemir 40 qHS, Pramlintide, metformin 1000 BID. Reschedule A1C now and f/u to discuss  Orders:  -     Microalbumin/creatinine urine ratio; Future  -     Hemoglobin A1C; Future    Other orders  -     Cancel: Ambulatory referral/consult to Bariatric Medicine; Future  -     Cancel: Hemoglobin A1C; Future  -     Cancel: Microalbumin/creatinine urine ratio; Future         Side effects of medication(s) were discussed in detail and patient voiced understanding.  Patient will call back for any issues or complications.     RTC in 2 weeks or sooner PRN for DM, HTN with A1C prior.  Virtual.

## 2020-06-10 NOTE — TELEPHONE ENCOUNTER
----- Message from Bridgett Rodriguez sent at 6/10/2020  7:52 AM CDT -----  Contact: Self/[phone]  Type: Patient Call Back    Who called: Patient    What is the request in detail:  Patient had a Virtual Visit scheduled and the staff did not log into the Virtual Visit. She would like to speak to the staff regarding this matter. Thank you.    Can the clinic reply by [facility]? No    Would the patient rather a call back or a response via [facility]? Call back    Best call back number: [phone]

## 2020-06-11 ENCOUNTER — TELEPHONE (OUTPATIENT)
Dept: PHYSICAL MEDICINE AND REHAB | Facility: CLINIC | Age: 55
End: 2020-06-11

## 2020-06-11 ENCOUNTER — OFFICE VISIT (OUTPATIENT)
Dept: INTERNAL MEDICINE | Facility: CLINIC | Age: 55
End: 2020-06-11
Payer: MEDICAID

## 2020-06-11 DIAGNOSIS — Z79.4 TYPE 2 DIABETES MELLITUS WITH HYPERGLYCEMIA, WITH LONG-TERM CURRENT USE OF INSULIN: ICD-10-CM

## 2020-06-11 DIAGNOSIS — K21.00 GASTROESOPHAGEAL REFLUX DISEASE WITH ESOPHAGITIS: Primary | ICD-10-CM

## 2020-06-11 DIAGNOSIS — E66.01 MORBID OBESITY WITH BMI OF 40.0-44.9, ADULT: ICD-10-CM

## 2020-06-11 DIAGNOSIS — E11.65 TYPE 2 DIABETES MELLITUS WITH HYPERGLYCEMIA, WITH LONG-TERM CURRENT USE OF INSULIN: ICD-10-CM

## 2020-06-11 PROCEDURE — 99214 OFFICE O/P EST MOD 30 MIN: CPT | Mod: 95,,, | Performed by: INTERNAL MEDICINE

## 2020-06-11 PROCEDURE — 99214 PR OFFICE/OUTPT VISIT, EST, LEVL IV, 30-39 MIN: ICD-10-PCS | Mod: 95,,, | Performed by: INTERNAL MEDICINE

## 2020-06-11 RX ORDER — PANTOPRAZOLE SODIUM 40 MG/1
40 TABLET, DELAYED RELEASE ORAL DAILY PRN
Qty: 30 TABLET | Refills: 5 | Status: SHIPPED | OUTPATIENT
Start: 2020-06-11 | End: 2021-07-14

## 2020-06-11 NOTE — TELEPHONE ENCOUNTER
----- Message from Farhana Mcknight sent at 6/11/2020 11:06 AM CDT -----  Contact: pt   Pt is returning the nurses phone call can you please call pt at [phone].    CHIRAG

## 2020-06-11 NOTE — PATIENT INSTRUCTIONS
Tips to Control Acid Reflux    To control acid reflux, youll need to make some basic diet and lifestyle changes. The simple steps outlined below may be all youll need to ease discomfort.  Watch what you eat  · Avoid fatty foods and spicy foods.  · Eat fewer acidic foods, such as citrus and tomato-based foods. These can increase symptoms.  · Limit drinking alcohol, caffeine, and fizzy beverages. All increase acid reflux.  · Try limiting chocolate, peppermint, and spearmint. These can worsen acid reflux in some people.  Watch when you eat  · Avoid lying down for 3 hours after eating.  · Do not snack before going to bed.  Raise your head  Raising your head and upper body by 4 to 6 inches helps limit reflux when youre lying down. Put blocks under the head of your bed frame to raise it.  Other changes  · Lose weight, if you need to  · Dont exercise near bedtime  · Avoid tight-fitting clothes  · Limit aspirin and ibuprofen  · Stop smoking   Date Last Reviewed: 7/1/2016  © 6974-1405 [employer]. [address] [place] [address], [place] [address]. All rights reserved. This information is not intended as a substitute for professional medical care. Always follow your healthcare professional's instructions.

## 2020-06-28 ENCOUNTER — PATIENT MESSAGE (OUTPATIENT)
Dept: INTERNAL MEDICINE | Facility: CLINIC | Age: 55
End: 2020-06-28

## 2020-06-29 RX ORDER — TRAMADOL HYDROCHLORIDE 50 MG/1
50-100 TABLET ORAL 3 TIMES DAILY PRN
Qty: 180 TABLET | Refills: 3 | OUTPATIENT
Start: 2020-06-29 | End: 2020-07-29

## 2020-07-01 ENCOUNTER — HOSPITAL ENCOUNTER (OUTPATIENT)
Dept: RADIOLOGY | Facility: OTHER | Age: 55
Discharge: HOME | End: 2020-07-01
Attending: INTERNAL MEDICINE
Payer: MEDICAID

## 2020-07-01 VITALS — WEIGHT: 262.38 LBS | BODY MASS INDEX: 43.71 KG/M2 | HEIGHT: 65 IN

## 2020-07-01 DIAGNOSIS — Z12.31 ENCOUNTER FOR SCREENING MAMMOGRAM FOR MALIGNANT NEOPLASM OF BREAST: ICD-10-CM

## 2020-07-01 PROCEDURE — 77063 BREAST TOMOSYNTHESIS BI: CPT | Mod: 26,,, | Performed by: RADIOLOGY

## 2020-07-01 PROCEDURE — 77067 MAMMO DIGITAL SCREENING BILAT WITH TOMOSYNTHESIS_CAD: ICD-10-PCS | Mod: 26,,, | Performed by: RADIOLOGY

## 2020-07-01 PROCEDURE — 77067 SCR MAMMO BI INCL CAD: CPT | Mod: 26,,, | Performed by: RADIOLOGY

## 2020-07-01 PROCEDURE — 77067 SCR MAMMO BI INCL CAD: CPT | Mod: TC

## 2020-07-01 PROCEDURE — 77063 MAMMO DIGITAL SCREENING BILAT WITH TOMOSYNTHESIS_CAD: ICD-10-PCS | Mod: 26,,, | Performed by: RADIOLOGY

## 2020-07-06 ENCOUNTER — LAB VISIT (OUTPATIENT)
Dept: LAB | Facility: OTHER | Age: 55
End: 2020-07-06
Attending: INTERNAL MEDICINE
Payer: MEDICAID

## 2020-07-06 DIAGNOSIS — E11.65 TYPE 2 DIABETES MELLITUS WITH HYPERGLYCEMIA, WITH LONG-TERM CURRENT USE OF INSULIN: ICD-10-CM

## 2020-07-06 DIAGNOSIS — Z79.4 TYPE 2 DIABETES MELLITUS WITH HYPERGLYCEMIA, WITH LONG-TERM CURRENT USE OF INSULIN: ICD-10-CM

## 2020-07-06 PROCEDURE — 36415 COLL VENOUS BLD VENIPUNCTURE: CPT

## 2020-07-06 PROCEDURE — 83036 HEMOGLOBIN GLYCOSYLATED A1C: CPT

## 2020-07-07 ENCOUNTER — PATIENT MESSAGE (OUTPATIENT)
Dept: INTERNAL MEDICINE | Facility: CLINIC | Age: 55
End: 2020-07-07

## 2020-07-07 LAB
ESTIMATED AVG GLUCOSE: 151 MG/DL (ref 68–131)
HBA1C MFR BLD HPLC: 6.9 % (ref 4–5.6)

## 2020-07-08 ENCOUNTER — TELEPHONE (OUTPATIENT)
Dept: INTERNAL MEDICINE | Facility: CLINIC | Age: 55
End: 2020-07-08

## 2020-07-08 NOTE — TELEPHONE ENCOUNTER
----- Message from Berta Hastings MD sent at 7/7/2020  4:29 PM CDT -----  Please reschedule virtual visit with me to discuss lab results, diabetes.  Thanks!      -----------------  DM2:  Much improved! Cont Levemir 40 qHS, Pramlintide, metformin 1000 BID. Reschedule virtual to discuss

## 2020-07-08 NOTE — TELEPHONE ENCOUNTER
Call placed to patient to schedule virtual visit with PCP regarding lab results. Appointment scheduled on 7/9/20. Patient had no further questions or concerns.     Radha Kendall MA

## 2020-07-09 ENCOUNTER — OFFICE VISIT (OUTPATIENT)
Dept: INTERNAL MEDICINE | Facility: CLINIC | Age: 55
End: 2020-07-09
Payer: MEDICAID

## 2020-07-09 VITALS — DIASTOLIC BLOOD PRESSURE: 61 MMHG | SYSTOLIC BLOOD PRESSURE: 145 MMHG

## 2020-07-09 DIAGNOSIS — I10 ESSENTIAL HYPERTENSION: ICD-10-CM

## 2020-07-09 DIAGNOSIS — M21.619 BUNION: ICD-10-CM

## 2020-07-09 DIAGNOSIS — E11.65 TYPE 2 DIABETES MELLITUS WITH HYPERGLYCEMIA, WITH LONG-TERM CURRENT USE OF INSULIN: Primary | ICD-10-CM

## 2020-07-09 DIAGNOSIS — Z79.4 TYPE 2 DIABETES MELLITUS WITH HYPERGLYCEMIA, WITH LONG-TERM CURRENT USE OF INSULIN: Primary | ICD-10-CM

## 2020-07-09 PROCEDURE — 99214 PR OFFICE/OUTPT VISIT, EST, LEVL IV, 30-39 MIN: ICD-10-PCS | Mod: 95,,, | Performed by: INTERNAL MEDICINE

## 2020-07-09 PROCEDURE — 99214 OFFICE O/P EST MOD 30 MIN: CPT | Mod: 95,,, | Performed by: INTERNAL MEDICINE

## 2020-07-09 NOTE — PROGRESS NOTES
The patient location is: car (not moving)  The chief complaint leading to consultation is: DM.  The patient elected for a virtual visit to decrease risk of exposure to Coronavirus.  Visit type: Virtual visit with synchronous audio and video  Total time spent with patient: 15 minutes  Each patient to whom he or she provides medical services by telemedicine is:  (1) informed of the relationship between the physician and patient and the respective role of any other health care provider with respect to management of the patient; and (2) notified that he or she may decline to receive medical services by telemedicine and may withdraw from such care at any time.    Subjective:       Patient ID: Yayo Carver is a 54 y.o. female who  has a past medical history of Arthritis, Diabetes mellitus, HLD (hyperlipidemia), and Hypertension.    Chief Complaint: Diabetes     History was obtained from the patient and supplemented through chart review.  There were no ER or clinic visits since our last appointment.    Diabetes  She has type 2 diabetes mellitus. No MedicAlert identification noted. The initial diagnosis of diabetes was made 10 years ago. Pertinent negatives for hypoglycemia include no confusion, dizziness, headaches, hunger, mood changes, nervousness/anxiousness, pallor, seizures, sleepiness, speech difficulty, sweats or tremors. Associated symptoms include weight loss. Pertinent negatives for diabetes include no blurred vision, no chest pain, no fatigue, no foot paresthesias, no foot ulcerations, no polydipsia, no polyphagia, no polyuria, no visual change and no weakness. Pertinent negatives for hypoglycemia complications include no blackouts, no hospitalization, no nocturnal hypoglycemia, no required assistance and no required glucagon injection. Symptoms are improving. Pertinent negatives for diabetic complications include no autonomic neuropathy, CVA, heart disease, impotence, nephropathy, peripheral neuropathy, PVD or  retinopathy. There are no known risk factors for coronary artery disease. Current diabetic treatment includes insulin injections. She is compliant with treatment all of the time. She is currently taking insulin pre-breakfast, pre-lunch, pre-dinner and at bedtime. Insulin injections are given by patient. Rotation sites for injection include the abdominal wall, arms and thighs. Her weight is decreasing rapidly. She is following a generally healthy and high fiber diet. Meal planning includes avoidance of concentrated sweets. She has had a previous visit with a dietitian. She participates in exercise every other day. She monitors blood glucose at home 1-2 x per day. She monitors urine at home <1 x per month. Blood glucose monitoring compliance is good. Her home blood glucose trend is decreasing rapidly. She does not see a podiatrist.Eye exam is current.     DM2 is now controlled.  H/o admission for DKA when she had insurance issues obtaining Trulicity/Ozempic and ran out of insulin.  Is on Levemir 40 q.h.s. Switched from NovoLog 8 t.i.d. to Pramlintide/Symlin TID for weight loss per Dr. Markham.  Taking increased metformin 1000 BID w/o GI SE.    Diet: much improved after seeing DM [facility].  Baked, grilled, fruits, veggies.  Less carbs/bread, pasta  Exercise: walks daily x 1-1.5 hours    H/o hypoBG. No symptoms of hypoglycemia.  No more 300s.  BG 91, 110    With normal microalbumin creatinine ratio.  No ACE/ARB d/t angioedema.   Retinal exams: [facility], .  No retinopathy.  Foot exams:  [facility],   Sees DM [facility]  Hemoglobin A1C   Date Value Ref Range Status   07/06/2020 6.9 (H) 4.0 - 5.6 % Final     Comment:     ADA Screening Guidelines:  5.7-6.4%  Consistent with prediabetes  >or=6.5%  Consistent with diabetes  High levels of fetal hemoglobin interfere with the HbA1C  assay. Heterozygous hemoglobin variants (HbS, HgC, etc)do  not significantly interfere with this assay.   However, presence of multiple variants may  affect accuracy.     12/02/2019 10.6 (H) 4.0 - 5.6 % Final     Comment:     ADA Screening Guidelines:  5.7-6.4%  Consistent with prediabetes  >or=6.5%  Consistent with diabetes  High levels of fetal hemoglobin interfere with the HbA1C  assay. Heterozygous hemoglobin variants (HbS, HgC, etc)do  not significantly interfere with this assay.   However, presence of multiple variants may affect accuracy.     11/25/2019 9.3 (H) 4.0 - 5.6 % Final     Comment:     ADA Screening Guidelines:  5.7-6.4%  Consistent with prediabetes  >or=6.5%  Consistent with diabetes  High levels of fetal hemoglobin interfere with the HbA1C  assay. Heterozygous hemoglobin variants (HbS, HgC, etc)do  not significantly interfere with this assay.   However, presence of multiple variants may affect accuracy.     11/25/2019 9.3 (H) 4.0 - 5.6 % Final     Comment:     ADA Screening Guidelines:  5.7-6.4%  Consistent with prediabetes  >or=6.5%  Consistent with diabetes  High levels of fetal hemoglobin interfere with the HbA1C  assay. Heterozygous hemoglobin variants (HbS, HgC, etc)do  not significantly interfere with this assay.   However, presence of multiple variants may affect accuracy.       HTN:    H/o DM, but no ACE d/t angioedema.  Used to take Norvasc 10, HCTZ 25 both qOD d/t bothersome muscle cramps, dehydration.      Switched to only Norvasc 10 daily.  BP previously wnl during clinic visits.  No leg edema.  Tolerating meds well. Pt denies CP, SOB, lightheadedness, dizziness  Dig HTN: Needs to get cuff  Home BP: only 1 reading, 145/61.  States that this has improved.    FHx: yes  Tobacco: no  Diet: Avoids Na  Sometimes snores.  No apnea.              Not addressed today.  GERD:  Mid/upper chest burning.  Sometimes a little sour regurg.  No sour taste in her throat.  Noticed it after heavy food, coffee, ice cream.  No ETOH.  Taking peppermint, GasX.  Peppermint helped.  Latest meal at 7 PM.  Sometimes lies down after eating.  Did lose a  significant amount of weight as below.  Discussed dietary changes, avoid recumbency after meals. Start PPI p.r.n.  If occurring more frequently, rec PPI qAM. and order H pylori test    Obesity:    BMI 43.  311->262 lb.  Lost weight through diet.  Eating baked food.  Decreased junk, fried food.  Used to walk, but scared d/t COVID and DM.  Walks with her son in the park in the AM.    Has had difficulty getting Ozempic approved.  Started Symlin.  Was seeing Dr. Markham.  Improved!  Encouraged continued dietary changes, exercise regularly.      HLD:  Is currently taking Lipitor increased to 40, ASA 81 daily.  Elevated ASCVD. Cont increased Lipitor 40,  ASA 81.  Lab Results   Component Value Date    LDLCALC 117.0 12/18/2019     The 10-year ASCVD risk score (Owen FERRER Jr., et al., 2013) is: 12.9%    Values used to calculate the score:      Age: 54 years      Sex: Female      Is Non- : Yes      Diabetic: Yes      Tobacco smoker: No      Systolic Blood Pressure: 145 mmHg      Is BP treated: Yes      HDL Cholesterol: 70 mg/dL      Total Cholesterol: 202 mg/dL    Vitamin D deficiency:  On ergocalciferol weekly.  Persistently low.  Continue ergocalciferol weekly.    Lab Results   Component Value Date    PWYSOAZC21KY 12 (L) 12/18/2019    RGBVXULM08IN 18 (L) 08/22/2016     B12 deficiency, DOM:  Started B12.  H/o menorrhagia.  Now menopausal.  Can stop daily iron.  Continue B12.  Can discontinue daily iron.    Lab Results   Component Value Date    IRON 60 12/18/2019    TIBC 326 12/18/2019    FERRITIN 42 12/18/2019     Lab Results   Component Value Date    OEWQPALA66 284 12/18/2019     Lab Results   Component Value Date    FOLATE 10.0 08/22/2016     Chronic back, b/l knee OA:    Fell off porch in 7/2019, resulting in chronic back pain.  Following with Ortho, PM&R.  Receiving knee injections.  On Mobic, tramadol prescribed by PM&R.    Review of Systems   Constitutional: Positive for weight loss. Negative  for fatigue, fever and unexpected weight change.   HENT: Negative for rhinorrhea and sneezing.    Eyes: Negative for blurred vision, redness and itching.   Respiratory: Negative for shortness of breath and wheezing.    Cardiovascular: Negative for chest pain, palpitations and leg swelling.   Gastrointestinal: Negative for abdominal pain and diarrhea.   Endocrine: Negative for polydipsia, polyphagia and polyuria.   Genitourinary: Negative for dysuria, hematuria and impotence.   Musculoskeletal: Positive for arthralgias and back pain.   Skin: Negative for color change, pallor and rash.   Neurological: Negative for dizziness, tremors, seizures, speech difficulty, weakness, light-headedness and headaches.   Hematological: Negative for adenopathy.   Psychiatric/Behavioral: Negative for confusion. The patient is not nervous/anxious.        I personally reviewed Past Medical History, Past Surgical History, Social History, and Family History.    Objective:      Vitals:    07/09/20 1438   BP: (!) 145/61      Physical Exam  Constitutional:       General: She is not in acute distress.     Appearance: She is well-developed. She is not diaphoretic.   HENT:      Head: Normocephalic and atraumatic.   Eyes:      General:         Right eye: No discharge.         Left eye: No discharge.   Pulmonary:      Effort: Pulmonary effort is normal. No respiratory distress.   Skin:     Coloration: Skin is not pale.      Findings: No erythema.   Neurological:      Mental Status: She is alert.   Psychiatric:         Behavior: Behavior normal.           Lab Results   Component Value Date    WBC 6.16 12/03/2019    HGB 12.2 12/03/2019    HCT 39.6 12/03/2019     12/03/2019    CHOL 202 (H) 12/18/2019    TRIG 75 12/18/2019    HDL 70 12/18/2019    ALT 35 12/01/2019    AST 21 12/01/2019     12/03/2019    K 4.7 12/03/2019     12/03/2019    CREATININE 1.1 12/03/2019    BUN 15 12/03/2019    CO2 19 (L) 12/03/2019    TSH 2.285 08/22/2016     INR 0.9 10/02/2015    HGBA1C 6.9 (H) 07/06/2020       The 10-year ASCVD risk score (Owen FERRER JrAlla, et al., 2013) is: 12.9%    Values used to calculate the score:      Age: 54 years      Sex: Female      Is Non- : Yes      Diabetic: Yes      Tobacco smoker: No      Systolic Blood Pressure: 145 mmHg      Is BP treated: Yes      HDL Cholesterol: 70 mg/dL      Total Cholesterol: 202 mg/dL    (Imaging have been independently reviewed)  CXR without acute abnormality.    Assessment:       1. Type 2 diabetes mellitus with hyperglycemia, with long-term current use of insulin    2. Essential hypertension    3. Bunion          Plan:       Yayo was seen today for diabetes.    Diagnoses and all orders for this visit:    Type 2 diabetes mellitus with hyperglycemia, with long-term current use of insulin  Comments:  Much improved with dietary changes! Cont Levemir 40 qHS, Pramlintide, metformin 1000 BID. A1C in 3 mo.  Orders:  -     Hemoglobin A1C; Future  -     Microalbumin/creatinine urine ratio; Future    Essential hypertension  Comments:  Elevated, but previously wnl in clinic. Needs more BP #s. Complete Dig HTN. Cont Norvasc 10. Might need HCTZ. H/o ACE angioedema.    Bunion  Comments:  Discussed wearing roomy comfortable shoes.  Reschedule Podiatry.         Side effects of medication(s) were discussed in detail and patient voiced understanding.  Patient will call back for any issues or complications.     RTC in 3 months or sooner PRN for DM, HTN with A1C prior.  In person.

## 2020-07-13 ENCOUNTER — TELEPHONE (OUTPATIENT)
Dept: PHYSICAL MEDICINE AND REHAB | Facility: CLINIC | Age: 55
End: 2020-07-13

## 2020-07-13 NOTE — TELEPHONE ENCOUNTER
----- Message from Leisa Javier sent at 7/13/2020 10:14 AM CDT -----  Regarding: self  Pt called to cancel appt. And asking for a call to reschedule      Contact info [phone]    Patient appointment rescheduled and placed on the wait list.  Reminder letter mailed to patient home.

## 2020-07-29 DIAGNOSIS — Z79.4 TYPE 2 DIABETES MELLITUS WITH HYPERGLYCEMIA, WITH LONG-TERM CURRENT USE OF INSULIN: ICD-10-CM

## 2020-07-29 DIAGNOSIS — E11.65 TYPE 2 DIABETES MELLITUS WITH HYPERGLYCEMIA, WITH LONG-TERM CURRENT USE OF INSULIN: ICD-10-CM

## 2020-07-29 RX ORDER — METFORMIN HYDROCHLORIDE 1000 MG/1
1000 TABLET ORAL 2 TIMES DAILY WITH MEALS
Qty: 180 TABLET | Refills: 3 | Status: SHIPPED | OUTPATIENT
Start: 2020-07-29 | End: 2021-09-14 | Stop reason: SDUPTHER

## 2020-08-04 ENCOUNTER — OFFICE VISIT (OUTPATIENT)
Dept: ORTHOPEDICS | Facility: CLINIC | Age: 55
End: 2020-08-04
Payer: MEDICAID

## 2020-08-04 DIAGNOSIS — M17.0 PRIMARY OSTEOARTHRITIS OF BOTH KNEES: Primary | ICD-10-CM

## 2020-08-04 PROCEDURE — 20610 DRAIN/INJ JOINT/BURSA W/O US: CPT | Mod: 50,S$PBB,, | Performed by: NURSE PRACTITIONER

## 2020-08-04 PROCEDURE — 99213 OFFICE O/P EST LOW 20 MIN: CPT | Mod: S$PBB,25,, | Performed by: NURSE PRACTITIONER

## 2020-08-04 PROCEDURE — 99999 PR PBB SHADOW E&M-EST. PATIENT-LVL III: ICD-10-PCS | Mod: PBBFAC,,, | Performed by: NURSE PRACTITIONER

## 2020-08-04 PROCEDURE — 99213 OFFICE O/P EST LOW 20 MIN: CPT | Mod: PBBFAC | Performed by: NURSE PRACTITIONER

## 2020-08-04 PROCEDURE — 20610 DRAIN/INJ JOINT/BURSA W/O US: CPT | Mod: 50,PBBFAC | Performed by: NURSE PRACTITIONER

## 2020-08-04 PROCEDURE — 20610 PR DRAIN/INJECT LARGE JOINT/BURSA: ICD-10-PCS | Mod: 50,S$PBB,, | Performed by: NURSE PRACTITIONER

## 2020-08-04 PROCEDURE — 99213 PR OFFICE/OUTPT VISIT, EST, LEVL III, 20-29 MIN: ICD-10-PCS | Mod: S$PBB,25,, | Performed by: NURSE PRACTITIONER

## 2020-08-04 PROCEDURE — 99999 PR PBB SHADOW E&M-EST. PATIENT-LVL III: CPT | Mod: PBBFAC,,, | Performed by: NURSE PRACTITIONER

## 2020-08-04 RX ORDER — TRIAMCINOLONE ACETONIDE 40 MG/ML
80 INJECTION, SUSPENSION INTRA-ARTICULAR; INTRAMUSCULAR
Status: COMPLETED | OUTPATIENT
Start: 2020-08-04 | End: 2020-08-04

## 2020-08-04 RX ADMIN — TRIAMCINOLONE ACETONIDE 80 MG: 40 INJECTION, SUSPENSION INTRA-ARTICULAR; INTRAMUSCULAR at 04:08

## 2020-08-04 NOTE — PROGRESS NOTES
CC: Injections of the Right Knee and Injections of the Left Knee      HPI: Pt with c/o bilateral knee pain for the past few weeks. The pain is aching and global and worse with increased activity. She has not been working due to the pandemic and there is stiffness as well. She takes pain medication for the pain since she is not currently a surgical candidate due to her elevated BMI. She has had cortisone injections in the past with good relief and would like to repeat those today. She is a diabetic and her most recent A1c was 6.9. she is ambulating without assistive device. There is not a limp.    ROS  General: denies fever and chills  Resp: no c/o sob  CVS: no c/o cp  MSK: c/o bilateral knee pain which is aching and global    PE  General: AAOx3, pleasant and cooperative  Resp: respirations even and unlabored  MSK: bilateral knee exam  0 degrees extension  100 degrees flexion  No warmth or erythema   - effusion  + crepitus    Assessment:  Bilateral knee djd    Plan:  Cortisone injections bilateral knees  RICE  nsaids prn  F/u as needed    Knee Injection Procedure Note  Diagnosis: bilateral knee degenerative arthritis  Indications: bilateral knee pain  Procedure Details: Verbal consent was obtained for the procedure. The injection site was identified and the skin was prepared with alcohol. The bilateral knee was injected from an anterolateral approach with 1 ml of Kenalog and 4 ml Lidocaine each under sterile technique using a 22 gauge needle. The needle was removed and the area cleansed and dressed.  Complications:  Patient tolerated the procedure well.    she was advised to rest the knee today, using ice and elevation as needed for comfort and swelling.Immediate relief of the knee pain may be short lived and secondary to the lidocaine. she may have an increase in discomfort tonight followed by steady improvement over the next several days. It may take 1-2 weeks following the injection to get the full benefit of the  medication.

## 2020-10-12 ENCOUNTER — OFFICE VISIT (OUTPATIENT)
Dept: INTERNAL MEDICINE | Facility: CLINIC | Age: 55
End: 2020-10-12
Payer: MEDICAID

## 2020-10-12 VITALS — SYSTOLIC BLOOD PRESSURE: 162 MMHG | DIASTOLIC BLOOD PRESSURE: 100 MMHG | WEIGHT: 293 LBS | BODY MASS INDEX: 56.2 KG/M2

## 2020-10-12 DIAGNOSIS — M17.0 PRIMARY OSTEOARTHRITIS OF BOTH KNEES: ICD-10-CM

## 2020-10-12 DIAGNOSIS — Z23 INFLUENZA VACCINE NEEDED: ICD-10-CM

## 2020-10-12 DIAGNOSIS — E11.65 TYPE 2 DIABETES MELLITUS WITH HYPERGLYCEMIA, WITH LONG-TERM CURRENT USE OF INSULIN: Primary | ICD-10-CM

## 2020-10-12 DIAGNOSIS — E66.01 MORBID OBESITY WITH BMI OF 50.0-59.9, ADULT: ICD-10-CM

## 2020-10-12 DIAGNOSIS — E55.9 VITAMIN D DEFICIENCY: ICD-10-CM

## 2020-10-12 DIAGNOSIS — I10 ESSENTIAL HYPERTENSION: ICD-10-CM

## 2020-10-12 DIAGNOSIS — E78.2 MIXED HYPERLIPIDEMIA: ICD-10-CM

## 2020-10-12 DIAGNOSIS — Z79.4 TYPE 2 DIABETES MELLITUS WITH HYPERGLYCEMIA, WITH LONG-TERM CURRENT USE OF INSULIN: Primary | ICD-10-CM

## 2020-10-12 DIAGNOSIS — D50.9 IRON DEFICIENCY ANEMIA, UNSPECIFIED IRON DEFICIENCY ANEMIA TYPE: ICD-10-CM

## 2020-10-12 DIAGNOSIS — E53.8 CYANOCOBALAMIN DEFICIENCY: ICD-10-CM

## 2020-10-12 PROCEDURE — 99215 OFFICE O/P EST HI 40 MIN: CPT | Mod: S$PBB,,, | Performed by: INTERNAL MEDICINE

## 2020-10-12 PROCEDURE — 99999 PR PBB SHADOW E&M-EST. PATIENT-LVL IV: CPT | Mod: PBBFAC,,, | Performed by: INTERNAL MEDICINE

## 2020-10-12 PROCEDURE — 99999 PR PBB SHADOW E&M-EST. PATIENT-LVL IV: ICD-10-PCS | Mod: PBBFAC,,, | Performed by: INTERNAL MEDICINE

## 2020-10-12 PROCEDURE — 99215 PR OFFICE/OUTPT VISIT, EST, LEVL V, 40-54 MIN: ICD-10-PCS | Mod: S$PBB,,, | Performed by: INTERNAL MEDICINE

## 2020-10-12 PROCEDURE — 99214 OFFICE O/P EST MOD 30 MIN: CPT | Mod: PBBFAC | Performed by: INTERNAL MEDICINE

## 2020-10-12 RX ORDER — DICLOFENAC SODIUM 10 MG/G
2 GEL TOPICAL 4 TIMES DAILY PRN
Qty: 100 G | Refills: 11 | Status: SHIPPED | OUTPATIENT
Start: 2020-10-12 | End: 2021-03-10 | Stop reason: SDUPTHER

## 2020-10-12 NOTE — PROGRESS NOTES
Subjective:       Patient ID: Yayo Carver is a 54 y.o. female who  has a past medical history of Arthritis, Diabetes mellitus, HLD (hyperlipidemia), and Hypertension.    Chief Complaint: Diabetes and Hypertension     History was obtained from the patient and supplemented through chart review.  Saw Ortho for bilateral knee OA    Was laid off d/t COVID, but has a second job.    Diabetes  She has type 2 diabetes mellitus. No MedicAlert identification noted. The initial diagnosis of diabetes was made 10 years ago. Pertinent negatives for hypoglycemia include no confusion, dizziness, headaches, hunger, mood changes, nervousness/anxiousness, pallor, seizures, sleepiness, speech difficulty, sweats or tremors. Associated symptoms include weight loss. Pertinent negatives for diabetes include no blurred vision, no chest pain, no fatigue, no foot paresthesias, no foot ulcerations, no polydipsia, no polyphagia, no polyuria, no visual change and no weakness. Pertinent negatives for hypoglycemia complications include no blackouts, no hospitalization, no nocturnal hypoglycemia, no required assistance and no required glucagon injection. Symptoms are improving. Pertinent negatives for diabetic complications include no autonomic neuropathy, CVA, heart disease, impotence, nephropathy, peripheral neuropathy, PVD or retinopathy. There are no known risk factors for coronary artery disease. Current diabetic treatment includes insulin injections. She is compliant with treatment all of the time. She is currently taking insulin pre-breakfast, pre-lunch, pre-dinner and at bedtime. Insulin injections are given by patient. Rotation sites for injection include the abdominal wall, arms and thighs. Her weight is decreasing rapidly. She is following a generally healthy and high fiber diet. Meal planning includes avoidance of concentrated sweets. She has had a previous visit with a dietitian. She participates in exercise every other day. She  monitors blood glucose at home 1-2 x per day. She monitors urine at home <1 x per month. Blood glucose monitoring compliance is good. Her home blood glucose trend is decreasing rapidly. She does not see a podiatrist.Eye exam is current.     DM2 is controlled.  H/o DKA when she had insurance issues obtaining Trulicity/Ozempic and ran out of insulin.  Is on Levemir 40 q.h.s. Switched from NovoLog 8 t.i.d. to Pramlintide/Symlin TID for weight loss per Dr. Markham.  Taking metformin 1000 BID w/o GI SE.    Diet: much improved after seeing DM [facility].  Baked, grilled, fruits, veggies.  Less carbs/bread, pasta. Eating baked food.  Decreased junk, fried food.      Exercise: Decreased as below.    H/o hypoBG. No symptoms of hypoglycemia.  No more 300s.  BG Improved.     With normal microalbumin creatinine ratio.  No ACE/ARB d/t angioedema.   Retinal exams: .  No retinopathy.  Foot exams:    Sees DM [facility]  Hemoglobin A1C   Date Value Ref Range Status   07/06/2020 6.9 (H) 4.0 - 5.6 % Final     Comment:     ADA Screening Guidelines:  5.7-6.4%  Consistent with prediabetes  >or=6.5%  Consistent with diabetes  High levels of fetal hemoglobin interfere with the HbA1C  assay. Heterozygous hemoglobin variants (HbS, HgC, etc)do  not significantly interfere with this assay.   However, presence of multiple variants may affect accuracy.     12/02/2019 10.6 (H) 4.0 - 5.6 % Final     Comment:     ADA Screening Guidelines:  5.7-6.4%  Consistent with prediabetes  >or=6.5%  Consistent with diabetes  High levels of fetal hemoglobin interfere with the HbA1C  assay. Heterozygous hemoglobin variants (HbS, HgC, etc)do  not significantly interfere with this assay.   However, presence of multiple variants may affect accuracy.     11/25/2019 9.3 (H) 4.0 - 5.6 % Final     Comment:     ADA Screening Guidelines:  5.7-6.4%  Consistent with prediabetes  >or=6.5%  Consistent with diabetes  High levels of fetal hemoglobin interfere with the  HbA1C  assay. Heterozygous hemoglobin variants (HbS, HgC, etc)do  not significantly interfere with this assay.   However, presence of multiple variants may affect accuracy.     11/25/2019 9.3 (H) 4.0 - 5.6 % Final     Comment:     ADA Screening Guidelines:  5.7-6.4%  Consistent with prediabetes  >or=6.5%  Consistent with diabetes  High levels of fetal hemoglobin interfere with the HbA1C  assay. Heterozygous hemoglobin variants (HbS, HgC, etc)do  not significantly interfere with this assay.   However, presence of multiple variants may affect accuracy.       HTN:    H/o DM, but no ACE d/t angioedema.  Used to take Norvasc 10, HCTZ 25 both qOD d/t bothersome muscle cramps, dehydration.      Is upset d/t her weight gain. Decreased exercise d/t pandemic.  Has worsened R hip pain.    Is on Norvasc 10 daily, but hasn't taken meds today d/t coming to clinic.  No leg edema.  Tolerating meds well. Pt denies CP, SOB, lightheadedness, dizziness.  No HA, paresthesias.  Home BP: doesn't check often.  102/78    FHx: yes  Tobacco: no  Diet, exercise as below.  No snoring, apnea.    Obesity:    BMI 56.  Has lost weight through diet in the past, but gained 75 lbs x 3 mo.  Has led to worsened right hip, leg pain.    Exercise: staying inside often d/t fear of COVID.  Used to walk, but scared d/t COVID and DM.  Walking inside her home.    Has had difficulty getting Ozempic approved.  On Symlin.  Was seeing Dr. Markham.    Considering surgical bariatrics clinic on the [facility].    HLD:  Is currently taking Lipitor increased to 40, ASA 81 daily.  Lab Results   Component Value Date    LDLCALC 117.0 12/18/2019     The 10-year ASCVD risk score (Glencoeana paula FERRER Jr., et al., 2013) is: 18.7%    Values used to calculate the score:      Age: 54 years      Sex: Female      Is Non- : Yes      Diabetic: Yes      Tobacco smoker: No      Systolic Blood Pressure: 162 mmHg      Is BP treated: Yes      HDL Cholesterol: 70 mg/dL       Total Cholesterol: 202 mg/dL    Vitamin D deficiency:  On ergocalciferol weekly.  Lab Results   Component Value Date    ERWROQFM58DL 12 (L) 12/18/2019    KWHZIJNL97ND 18 (L) 08/22/2016     B12 deficiency, DOM:    Started B12.  H/o menorrhagia.  Now menopausal.  Stopped daily iron.  Lab Results   Component Value Date    IRON 60 12/18/2019    TIBC 326 12/18/2019    FERRITIN 42 12/18/2019     Lab Results   Component Value Date    NXVOPIMR28 284 12/18/2019     Lab Results   Component Value Date    FOLATE 10.0 08/22/2016     Chronic back, b/l knee OA:    Fell off porch in 7/2019, resulting in chronic back pain.  Following with Ortho, PM&R.  Receiving knee injections.  Used to be on tramadol prescribed by PM&R.  Tries to avoid meds/Mobic.                Not addressed today.  GERD:  Mid/upper chest burning.  Sometimes a little sour regurg.  No sour taste in her throat.  Noticed it after heavy food, coffee, ice cream.  No ETOH.  Taking peppermint, GasX.  Peppermint helped.  Latest meal at 7 PM.  Sometimes lies down after eating.  Did lose a significant amount of weight as below.  Discussed dietary changes, avoid recumbency after meals. Start PPI p.r.n.  If occurring more frequently, rec PPI qAM. and order H pylori test    Review of Systems   Constitutional: Positive for weight loss. Negative for activity change, fatigue, fever and unexpected weight change.   HENT: Negative for hearing loss, rhinorrhea, sneezing and trouble swallowing.    Eyes: Negative for blurred vision, discharge, redness, itching and visual disturbance.   Respiratory: Negative for chest tightness, shortness of breath and wheezing.    Cardiovascular: Positive for leg swelling. Negative for chest pain.   Gastrointestinal: Negative for abdominal pain, blood in stool, constipation, diarrhea and vomiting.   Endocrine: Negative for polydipsia, polyphagia and polyuria.   Genitourinary: Negative for difficulty urinating, dysuria, hematuria, impotence and  menstrual problem.   Musculoskeletal: Positive for arthralgias and back pain. Negative for joint swelling and neck pain.   Skin: Negative for color change, pallor and rash.   Neurological: Negative for dizziness, tremors, seizures, speech difficulty, weakness, light-headedness and headaches.   Hematological: Negative for adenopathy.   Psychiatric/Behavioral: Negative for confusion and dysphoric mood. The patient is not nervous/anxious.        I personally reviewed Past Medical History, Past Surgical History, Social History, and Family History.    Objective:      Vitals:    10/12/20 1002   BP: (!) 162/100   Weight: (!) 153.2 kg (337 lb 11.9 oz)      Physical Exam  Constitutional:       General: She is not in acute distress.     Appearance: She is well-developed. She is not diaphoretic.   HENT:      Head: Normocephalic and atraumatic.      Nose: Nose normal.      Mouth/Throat:      Pharynx: No oropharyngeal exudate.   Eyes:      General: No scleral icterus.        Right eye: No discharge.         Left eye: No discharge.   Neck:      Musculoskeletal: Neck supple.      Thyroid: No thyromegaly.      Trachea: No tracheal deviation.   Cardiovascular:      Rate and Rhythm: Normal rate and regular rhythm.      Heart sounds: Normal heart sounds. No murmur.   Pulmonary:      Effort: Pulmonary effort is normal. No respiratory distress.      Breath sounds: Normal breath sounds. No wheezing.   Abdominal:      General: Bowel sounds are normal. There is no distension.      Palpations: Abdomen is soft.      Tenderness: There is no abdominal tenderness.   Musculoskeletal:         General: No deformity.      Right lower leg: Edema present.      Left lower leg: Edema present.      Comments: +1 BLE edema   Lymphadenopathy:      Cervical: No cervical adenopathy.   Skin:     General: Skin is warm and dry.      Findings: No erythema.   Neurological:      Mental Status: She is alert.      Cranial Nerves: No cranial nerve deficit.      Gait:  Gait normal.   Psychiatric:         Behavior: Behavior normal.           Lab Results   Component Value Date    WBC 6.16 12/03/2019    HGB 12.2 12/03/2019    HCT 39.6 12/03/2019     12/03/2019    CHOL 202 (H) 12/18/2019    TRIG 75 12/18/2019    HDL 70 12/18/2019    ALT 35 12/01/2019    AST 21 12/01/2019     12/03/2019    K 4.7 12/03/2019     12/03/2019    CREATININE 1.1 12/03/2019    BUN 15 12/03/2019    CO2 19 (L) 12/03/2019    TSH 2.285 08/22/2016    INR 0.9 10/02/2015    HGBA1C 6.9 (H) 07/06/2020       The 10-year ASCVD risk score (Owen FERRER Jr., et al., 2013) is: 18.7%    Values used to calculate the score:      Age: 54 years      Sex: Female      Is Non- : Yes      Diabetic: Yes      Tobacco smoker: No      Systolic Blood Pressure: 162 mmHg      Is BP treated: Yes      HDL Cholesterol: 70 mg/dL      Total Cholesterol: 202 mg/dL    (Imaging have been independently reviewed)   Mammogram without evidence of malignancy, BI-RADS 1, TC score low.    Assessment:       1. Type 2 diabetes mellitus with hyperglycemia, with long-term current use of insulin    2. Essential hypertension    3. Morbid obesity with BMI of 50.0-59.9, adult    4. Mixed hyperlipidemia    5. Vitamin D deficiency    6. Cyanocobalamin deficiency    7. Iron deficiency anemia, unspecified iron deficiency anemia type    8. Primary osteoarthritis of both knees    9. Influenza vaccine needed          Plan:       Yayo was seen today for diabetes and hypertension.    Diagnoses and all orders for this visit:    Type 2 diabetes mellitus with hyperglycemia, with long-term current use of insulin  Comments:  Much improved with dietary changes. Cont Levemir 40 qHS, Pramlintide, metformin 1000 BID. Reschedule eye, foot exams. A1C today, q3 mo.  Orders:  -     Hemoglobin A1C; Future  -     Microalbumin/Creatinine Ratio, Urine; Future  -     Comprehensive Metabolic Panel; Future  -     Hemoglobin A1C; Future  -      Microalbumin/Creatinine Ratio, Urine; Future  -     Ambulatory referral/consult to Podiatry; Future    Essential hypertension  Comments:  Elevated; did not take meds today. Cont Norvasc 10. Nurse visit for BP check. Consider adding HCTZ. H/o ACE angioedema.  CMP in 3 months.  Orders:  -     Comprehensive Metabolic Panel; Future    Morbid obesity with BMI of 50.0-59.9, adult  Comments:  Worsened. Restart exercise regularly.  Refer to Bariatrics. If they do not accept Medicaid, consider OSH referral to [facility] or [facility].  Orders:  -     Ambulatory referral/consult to Bariatric Surgery; Future    Mixed hyperlipidemia  Comments:  Elevated ASCVD. Cont increased Lipitor 40,  ASA 81.  Check FLP in 3 months.  Orders:  -     Lipid Panel; Future    Vitamin D deficiency  Comments:  Elevated ASCVD. Cont increased Lipitor 40,  ASA 81.  Check level in 3 months.  Orders:  -     Vitamin D; Future    Cyanocobalamin deficiency  Comments:  Stable. Continue B12.  Will monitor in 3 months.  Orders:  -     Vitamin B12; Future    Iron deficiency anemia, unspecified iron deficiency anemia type  Comments:  Controlled.  Stopped daily iron.  Recheck iron panel, CBC with labs in 3 months.  Orders:  -     CBC auto differential; Future  -     Ferritin; Future  -     Iron and TIBC; Future  -     Vitamin B12; Future    Primary osteoarthritis of both knees  Comments:  Persistent pain.  Start topical Voltaren p.r.n..  Follow-up with Ortho, PM&R.  Orders:  -     diclofenac sodium (VOLTAREN) 1 % Gel; Apply 2 g topically 4 (four) times daily as needed.    Influenza vaccine needed  Comments:  Advised to obtain vaccine at Pharmacy.         Side effects of medication(s) were discussed in detail and patient voiced understanding.  Patient will call back for any issues or complications.     RTC in 3 months or sooner PRN for DM, HTN with labs prior.  In person.  Nurse visit for BP check this week.

## 2020-10-15 ENCOUNTER — CLINICAL SUPPORT (OUTPATIENT)
Dept: INTERNAL MEDICINE | Facility: CLINIC | Age: 55
End: 2020-10-15
Payer: MEDICAID

## 2020-10-15 ENCOUNTER — TELEPHONE (OUTPATIENT)
Dept: INTERNAL MEDICINE | Facility: CLINIC | Age: 55
End: 2020-10-15

## 2020-10-15 ENCOUNTER — LAB VISIT (OUTPATIENT)
Dept: LAB | Facility: OTHER | Age: 55
End: 2020-10-15
Attending: INTERNAL MEDICINE
Payer: MEDICAID

## 2020-10-15 VITALS — SYSTOLIC BLOOD PRESSURE: 152 MMHG | HEART RATE: 86 BPM | DIASTOLIC BLOOD PRESSURE: 92 MMHG | OXYGEN SATURATION: 98 %

## 2020-10-15 DIAGNOSIS — Z79.4 TYPE 2 DIABETES MELLITUS WITH HYPERGLYCEMIA, WITH LONG-TERM CURRENT USE OF INSULIN: ICD-10-CM

## 2020-10-15 DIAGNOSIS — E11.65 TYPE 2 DIABETES MELLITUS WITH HYPERGLYCEMIA, WITH LONG-TERM CURRENT USE OF INSULIN: ICD-10-CM

## 2020-10-15 DIAGNOSIS — I10 ESSENTIAL HYPERTENSION: Primary | ICD-10-CM

## 2020-10-15 LAB
ALBUMIN/CREAT UR: 13 UG/MG (ref 0–30)
CREAT UR-MCNC: 339.5 MG/DL (ref 15–325)
MICROALBUMIN UR DL<=1MG/L-MCNC: 44 UG/ML

## 2020-10-15 PROCEDURE — 99999 PR PBB SHADOW E&M-EST. PATIENT-LVL II: CPT | Mod: PBBFAC,,,

## 2020-10-15 PROCEDURE — 82043 UR ALBUMIN QUANTITATIVE: CPT

## 2020-10-15 PROCEDURE — 99999 PR PBB SHADOW E&M-EST. PATIENT-LVL II: ICD-10-PCS | Mod: PBBFAC,,,

## 2020-10-15 PROCEDURE — 99212 OFFICE O/P EST SF 10 MIN: CPT | Mod: PBBFAC

## 2020-10-15 RX ORDER — HYDROCHLOROTHIAZIDE 12.5 MG/1
12.5 TABLET ORAL DAILY
Qty: 30 TABLET | Refills: 3 | Status: SHIPPED | OUTPATIENT
Start: 2020-10-15 | End: 2021-01-15

## 2020-10-15 NOTE — TELEPHONE ENCOUNTER
BP is still high.  Please call with med changes.  Thanks!    -Please schedule nurse visit for BP check in about 1 week.  Thanks!    Continue taking:  -amlodipine/Norvasc 10 mg once a day    New meds:  -hydrochlorothiazide 12 5 mg once a day

## 2020-10-15 NOTE — PROGRESS NOTES
Yayo Carver 54 y.o. female is here today for Blood Pressure check.   History of HTN yes.    Review of patient's allergies indicates:   Allergen Reactions    Ace inhibitors Edema     Creatinine   Date Value Ref Range Status   12/03/2019 1.1 0.5 - 1.4 mg/dL Final     Sodium   Date Value Ref Range Status   12/03/2019 136 136 - 145 mmol/L Final     Potassium   Date Value Ref Range Status   12/03/2019 4.7 3.5 - 5.1 mmol/L Final   ]  Patient verifies taking blood pressure medications on a regular basis at the same time of the day.     Current Outpatient Medications:     amLODIPine (NORVASC) 10 MG tablet, Take 1 tablet (10 mg total) by mouth once daily., Disp: 90 tablet, Rfl: 3    aspirin (ECOTRIN) 81 MG EC tablet, Take 1 tablet (81 mg total) by mouth once daily., Disp: 90 tablet, Rfl: 3    atorvastatin (LIPITOR) 40 MG tablet, Take 1 tablet (40 mg total) by mouth once daily., Disp: 90 tablet, Rfl: 3    blood sugar diagnostic Strp, 1 each by Misc.(Non-Drug; Combo Route) route 4 (four) times daily before meals and nightly., Disp: 100 strip, Rfl: 11    blood-glucose meter kit, To check BG 4 times daily, to use with insurance preferred meter, Disp: 1 each, Rfl: 0    cyanocobalamin (VITAMIN B-12) 1000 MCG tablet, Take 1 tablet (1,000 mcg total) by mouth once daily., Disp: 90 tablet, Rfl: 3    diclofenac sodium (VOLTAREN) 1 % Gel, Apply 2 g topically 4 (four) times daily as needed., Disp: 100 g, Rfl: 11    ergocalciferol (ERGOCALCIFEROL) 50,000 unit Cap, Take 1 capsule (50,000 Units total) by mouth every 7 days., Disp: 12 capsule, Rfl: 3    exenatide microspheres (BYDUREON) 2 mg/0.65 mL PnIj, Inject 2 mg into the skin every 7 days., Disp: 4 each, Rfl: 11    LEVEMIR FLEXTOUCH U-100 INSULN 100 unit/mL (3 mL) InPn pen, ADMINISTER 40 UNITS UNDER THE SKIN EVERY EVENING, Disp: 36 mL, Rfl: 3    meloxicam (MOBIC) 15 MG tablet, TAKE 1 TABLET(15 MG) BY MOUTH EVERY DAY, Disp: 30 tablet, Rfl: 1    metFORMIN (GLUCOPHAGE) 1000  "MG tablet, Take 1 tablet (1,000 mg total) by mouth 2 (two) times daily with meals., Disp: 180 tablet, Rfl: 3    pantoprazole (PROTONIX) 40 MG tablet, Take 1 tablet (40 mg total) by mouth daily as needed (heartburn)., Disp: 30 tablet, Rfl: 5    pen needle, diabetic (BD ULTRA-FINE SHORT PEN NEEDLE) 31 gauge x 5/16" Ndle, Use 1 pen needle 4 times daily, Disp: 100 each, Rfl: PRN    pramlintide (SYMLINPEN 120) 2,700 mcg/2.7 mL PnIj, Inject 120 mcg into the skin 3 (three) times daily before meals. Start 60 mcg prior to meals for one week, Disp: 10.8 mL, Rfl: 3  Does patient have record of home blood pressure readings no. Readings have been averaging unknown.   Last dose of blood pressure medication was taken at 2pm.  Patient is asymptomatic.   Complains of headaches yesterday. Patient state that she has a 10 out of 10 pain to her Right knee and hip area.   BP: (!) 150/90 , Pulse: 97 .  Blood pressure reading after 15 minutes was 152/92, Pulse 86.  Dr. Hastings notified.     "

## 2020-10-15 NOTE — TELEPHONE ENCOUNTER
Does patient have record of home blood pressure readings no. Readings have been averaging unknown.   Last dose of blood pressure medication was taken at 2pm.  Patient is asymptomatic.   Complains of headaches yesterday. Patient state that she has a 10 out of 10 pain to her Right knee and hip area.   BP: (!) 150/90 , Pulse: 97 .  Blood pressure reading after 15 minutes was 152/92, Pulse 86.

## 2020-10-16 ENCOUNTER — PATIENT MESSAGE (OUTPATIENT)
Dept: INTERNAL MEDICINE | Facility: CLINIC | Age: 55
End: 2020-10-16

## 2020-10-16 NOTE — TELEPHONE ENCOUNTER
Patient has been informed per Dr Hastings's message.    She also said that possibly because of her insurance she will need bariatric referral for Medical center instead of [facility]

## 2020-10-20 ENCOUNTER — TELEPHONE (OUTPATIENT)
Dept: INTERNAL MEDICINE | Facility: CLINIC | Age: 55
End: 2020-10-20

## 2020-10-20 NOTE — TELEPHONE ENCOUNTER
----- Message from Berta Hastings MD sent at 10/16/2020  4:30 PM CDT -----  Did she have the A1C drawn?  If not, could you please schedule an A1C?  The rest of the labs are supposed to be done in 3 months, a few days before our next visit. Thanks!        -------------------------  Elevated microalbumin creatinine ratio. No ACE/ARB d/t angioedema.

## 2020-10-22 ENCOUNTER — PATIENT OUTREACH (OUTPATIENT)
Dept: ADMINISTRATIVE | Facility: OTHER | Age: 55
End: 2020-10-22

## 2020-10-22 ENCOUNTER — OFFICE VISIT (OUTPATIENT)
Dept: ORTHOPEDICS | Facility: CLINIC | Age: 55
End: 2020-10-22
Payer: MEDICAID

## 2020-10-22 DIAGNOSIS — M17.0 PRIMARY OSTEOARTHRITIS OF BOTH KNEES: Primary | ICD-10-CM

## 2020-10-22 PROCEDURE — 99999 PR PBB SHADOW E&M-EST. PATIENT-LVL III: ICD-10-PCS | Mod: PBBFAC,,, | Performed by: NURSE PRACTITIONER

## 2020-10-22 PROCEDURE — 20610 DRAIN/INJ JOINT/BURSA W/O US: CPT | Mod: 50,S$PBB,, | Performed by: NURSE PRACTITIONER

## 2020-10-22 PROCEDURE — 99999 PR PBB SHADOW E&M-EST. PATIENT-LVL III: CPT | Mod: PBBFAC,,, | Performed by: NURSE PRACTITIONER

## 2020-10-22 PROCEDURE — 20610 DRAIN/INJ JOINT/BURSA W/O US: CPT | Mod: 50,PBBFAC | Performed by: NURSE PRACTITIONER

## 2020-10-22 PROCEDURE — 99213 PR OFFICE/OUTPT VISIT, EST, LEVL III, 20-29 MIN: ICD-10-PCS | Mod: 25,S$PBB,, | Performed by: NURSE PRACTITIONER

## 2020-10-22 PROCEDURE — 20610 PR DRAIN/INJECT LARGE JOINT/BURSA: ICD-10-PCS | Mod: 50,S$PBB,, | Performed by: NURSE PRACTITIONER

## 2020-10-22 PROCEDURE — 99213 OFFICE O/P EST LOW 20 MIN: CPT | Mod: PBBFAC,25 | Performed by: NURSE PRACTITIONER

## 2020-10-22 PROCEDURE — 99213 OFFICE O/P EST LOW 20 MIN: CPT | Mod: 25,S$PBB,, | Performed by: NURSE PRACTITIONER

## 2020-10-22 RX ORDER — TRIAMCINOLONE ACETONIDE 40 MG/ML
80 INJECTION, SUSPENSION INTRA-ARTICULAR; INTRAMUSCULAR
Status: COMPLETED | OUTPATIENT
Start: 2020-10-22 | End: 2020-10-22

## 2020-10-22 RX ADMIN — TRIAMCINOLONE ACETONIDE 80 MG: 40 INJECTION, SUSPENSION INTRA-ARTICULAR; INTRAMUSCULAR at 04:10

## 2020-10-22 NOTE — PROGRESS NOTES
Requested updates within Care Everywhere.  Patient's chart was reviewed for overdue JON topics.  Immunizations reconciled.    Orders placed:n/a  Tasked appts:n/a  Labs Linked:n/a

## 2020-10-22 NOTE — PROGRESS NOTES
CC: Injections of the Right Knee and Injections of the Left Knee      HPI: Pt with c/o bilateral knee pain which is aching and global for the past few weeks. She has known bilateral knee djd which we have been treating with cortisone injections as she is not a surgical candidate due her elevated BMI. She comes in today to repeat injections. She had lost some weight, but unfortunately, since the pandemic she has gained some. She is ambulating without assistive device. There is not a limp.    ROS  General: denies fever and chills  Resp: no c/o sob  CVS: no c/o cp  MSK: c/o bilateral knee pain which is aching and global and worse with increased activity    PE  General: AAOx3, pleasant and cooperative  Resp: respirations even and unlabored  MSK: bilateral knee exam  0 degrees extension  120 degrees flexion  No warmth or erythema   - effusion  + crepitus    Assessment:  Bilateral knee djd    Plan:  Cortisone injections bilateral knees today  RICE  Pain medication as managed by pain management  F/u as needed    Knee Injection Procedure Note  Diagnosis: bilateral knee degenerative arthritis  Indications: bilateral knee pain  Procedure Details: Verbal consent was obtained for the procedure. The injection site was identified and the skin was prepared with alcohol. The bilateral knee was injected from an anterolateral approach with 1 ml of Kenalog and 4 ml Lidocaine each under sterile technique using a 22 gauge needle. The needle was removed and the area cleansed and dressed.  Complications:  Patient tolerated the procedure well.    she was advised to rest the knee today, using ice and elevation as needed for comfort and swelling.Immediate relief of the knee pain may be short lived and secondary to the lidocaine. she may have an increase in discomfort tonight followed by steady improvement over the next several days. It may take 1-2 weeks following the injection to get the full benefit of the medication.

## 2020-10-25 ENCOUNTER — PATIENT MESSAGE (OUTPATIENT)
Dept: PHYSICAL MEDICINE AND REHAB | Facility: CLINIC | Age: 55
End: 2020-10-25

## 2020-10-25 DIAGNOSIS — M17.0 PRIMARY OSTEOARTHRITIS OF BOTH KNEES: ICD-10-CM

## 2020-10-26 DIAGNOSIS — M17.0 PRIMARY OSTEOARTHRITIS OF BOTH KNEES: ICD-10-CM

## 2020-10-26 RX ORDER — MELOXICAM 15 MG/1
15 TABLET ORAL DAILY
Qty: 30 TABLET | Refills: 1 | OUTPATIENT
Start: 2020-10-26

## 2020-10-26 RX ORDER — TRAMADOL HYDROCHLORIDE 50 MG/1
50-100 TABLET ORAL 3 TIMES DAILY PRN
Qty: 180 TABLET | Refills: 3 | OUTPATIENT
Start: 2020-10-26 | End: 2020-11-25

## 2020-10-26 RX ORDER — MELOXICAM 15 MG/1
15 TABLET ORAL DAILY
Qty: 30 TABLET | Refills: 1 | Status: SHIPPED | OUTPATIENT
Start: 2020-10-26 | End: 2021-03-10 | Stop reason: SDUPTHER

## 2020-10-26 RX ORDER — TRAMADOL HYDROCHLORIDE 50 MG/1
50-100 TABLET ORAL 3 TIMES DAILY PRN
Qty: 180 TABLET | Refills: 3 | Status: SHIPPED | OUTPATIENT
Start: 2020-10-29 | End: 2020-11-28

## 2020-10-26 NOTE — TELEPHONE ENCOUNTER
----- Message from Andrew Parisi sent at 10/26/2020  3:53 PM CDT -----  Contact: pt  Please call pt at [phone]    Refill request for meloxicam (MOBIC) 15 MG tablet & traMADol (ULTRAM) 50 mg tablet    Use [id number] [address] [phone] (Phone)  [phone] (Fax)    Patient is out of medication    Thank you

## 2020-10-26 NOTE — TELEPHONE ENCOUNTER
03/02/20- patient last seen      Last Rx refill-----07/20/20-Mobic                            06/29/20-Tramadol  Last office visit--03/02/20  Next office visit--11/24/20

## 2020-10-30 ENCOUNTER — TELEPHONE (OUTPATIENT)
Dept: PRIMARY CARE CLINIC | Facility: CLINIC | Age: 55
End: 2020-10-30

## 2020-10-30 ENCOUNTER — PATIENT MESSAGE (OUTPATIENT)
Dept: INTERNAL MEDICINE | Facility: CLINIC | Age: 55
End: 2020-10-30

## 2020-10-30 NOTE — TELEPHONE ENCOUNTER
----- Message from Saman Sorto, Patient Care Assistant sent at 10/30/2020  1:50 PM CDT -----  Name of Who is Calling: DEMARCO DELACRUZ [[id number]]    What is the request in detail: Requesting a call back in regards of scheduling appointment for stomach pain.  Please contact to further discuss and advise      Can the clinic reply by [facility]: No    [facility]: [phone]

## 2020-10-30 NOTE — TELEPHONE ENCOUNTER
----- Message from Ileana Pollack sent at 10/30/2020  2:29 PM CDT -----  Contact: DEMARCO DELACRUZ [[id number]]  Type:  Patient Returning Call    Who Called: DEMARCO DELACRUZ [[id number]]    Who Left Message for Patient: Denise/Caesar    Does the patient know what this is regarding?: Yes    Can the clinic reply in [facility]: No    Best Call Back Number: ..[phone]    Additional Information: N/A

## 2020-11-04 ENCOUNTER — IMMUNIZATION (OUTPATIENT)
Dept: PHARMACY | Facility: CLINIC | Age: 55
End: 2020-11-04
Payer: MEDICAID

## 2020-11-05 ENCOUNTER — PATIENT MESSAGE (OUTPATIENT)
Dept: INTERNAL MEDICINE | Facility: CLINIC | Age: 55
End: 2020-11-05

## 2020-11-06 NOTE — TELEPHONE ENCOUNTER
Pend pend external referral to bariatrics and ask to which bariatrics clinic.  I'm not sure to which center outside of [facility]. Thanks!

## 2020-11-15 ENCOUNTER — PATIENT MESSAGE (OUTPATIENT)
Dept: INTERNAL MEDICINE | Facility: CLINIC | Age: 55
End: 2020-11-15

## 2020-11-22 ENCOUNTER — PATIENT MESSAGE (OUTPATIENT)
Dept: PHYSICAL MEDICINE AND REHAB | Facility: CLINIC | Age: 55
End: 2020-11-22

## 2020-11-23 ENCOUNTER — PATIENT MESSAGE (OUTPATIENT)
Dept: PHYSICAL MEDICINE AND REHAB | Facility: CLINIC | Age: 55
End: 2020-11-23

## 2020-11-23 ENCOUNTER — PATIENT OUTREACH (OUTPATIENT)
Dept: ADMINISTRATIVE | Facility: OTHER | Age: 55
End: 2020-11-23

## 2020-11-23 ENCOUNTER — TELEPHONE (OUTPATIENT)
Dept: PHYSICAL MEDICINE AND REHAB | Facility: CLINIC | Age: 55
End: 2020-11-23

## 2020-11-23 DIAGNOSIS — Z79.4 TYPE 2 DIABETES MELLITUS WITH HYPERGLYCEMIA, WITH LONG-TERM CURRENT USE OF INSULIN: Primary | ICD-10-CM

## 2020-11-23 DIAGNOSIS — E11.65 TYPE 2 DIABETES MELLITUS WITH HYPERGLYCEMIA, WITH LONG-TERM CURRENT USE OF INSULIN: Primary | ICD-10-CM

## 2020-11-23 NOTE — PROGRESS NOTES
Requested updates within Care Everywhere.  Patient's chart was reviewed for overdue JON topics.  Immunizations reconciled.    Orders placed:Diabetic eye cam

## 2020-11-23 NOTE — TELEPHONE ENCOUNTER
----- Message from Ele Negron sent at 11/23/2020  8:21 AM CST -----  Pt is calling to reschedule her appt on 11/24 pt is stating that she did not try to cancel the appt. She was try to e check in and would like for the nurse to give her a call back

## 2020-12-09 ENCOUNTER — TELEPHONE (OUTPATIENT)
Dept: PODIATRY | Facility: CLINIC | Age: 55
End: 2020-12-09

## 2020-12-09 NOTE — TELEPHONE ENCOUNTER
----- Message from Lia Armstrong sent at 12/9/2020  8:32 AM CST -----  Regarding: Patient call back  Who called:NUBIADEMARCO PLATA [[id number]]    What is the request in detail: Patient is requesting a call back. She states she has a family emergency and cannot come in today for her appt. She would like to r/s. Next available is in February. She would like to further discuss.   Please advise.    Can the clinic reply by [facility]? No    Best call back number: [phone]    Additional Information: N/A

## 2020-12-09 NOTE — TELEPHONE ENCOUNTER
Spoke to pt and scheduled her for the next available appointment in podiatry. Pt voiced understanding and call ended.

## 2020-12-24 DIAGNOSIS — Z79.4 TYPE 2 DIABETES MELLITUS WITH HYPERGLYCEMIA, WITH LONG-TERM CURRENT USE OF INSULIN: ICD-10-CM

## 2020-12-24 DIAGNOSIS — E66.01 MORBID OBESITY WITH BMI OF 45.0-49.9, ADULT: ICD-10-CM

## 2020-12-24 DIAGNOSIS — E11.65 TYPE 2 DIABETES MELLITUS WITH HYPERGLYCEMIA, WITH LONG-TERM CURRENT USE OF INSULIN: ICD-10-CM

## 2020-12-24 RX ORDER — PEN NEEDLE, DIABETIC 30 GX3/16"
1 NEEDLE, DISPOSABLE MISCELLANEOUS 4 TIMES DAILY
Qty: 100 EACH | Refills: 11 | Status: SHIPPED | OUTPATIENT
Start: 2020-12-24 | End: 2021-06-17 | Stop reason: SDUPTHER

## 2021-01-04 ENCOUNTER — PATIENT MESSAGE (OUTPATIENT)
Dept: ADMINISTRATIVE | Facility: HOSPITAL | Age: 56
End: 2021-01-04

## 2021-01-11 ENCOUNTER — PATIENT MESSAGE (OUTPATIENT)
Dept: PHYSICAL MEDICINE AND REHAB | Facility: CLINIC | Age: 56
End: 2021-01-11

## 2021-01-14 DIAGNOSIS — E55.9 VITAMIN D DEFICIENCY: ICD-10-CM

## 2021-01-14 RX ORDER — ERGOCALCIFEROL 1.25 MG/1
50000 CAPSULE ORAL
Qty: 12 CAPSULE | Refills: 3 | Status: SHIPPED | OUTPATIENT
Start: 2021-01-14 | End: 2021-01-15 | Stop reason: SDUPTHER

## 2021-01-15 ENCOUNTER — OFFICE VISIT (OUTPATIENT)
Dept: INTERNAL MEDICINE | Facility: CLINIC | Age: 56
End: 2021-01-15
Payer: MEDICAID

## 2021-01-15 ENCOUNTER — LAB VISIT (OUTPATIENT)
Dept: LAB | Facility: OTHER | Age: 56
End: 2021-01-15
Attending: INTERNAL MEDICINE
Payer: MEDICAID

## 2021-01-15 VITALS
SYSTOLIC BLOOD PRESSURE: 150 MMHG | BODY MASS INDEX: 47.09 KG/M2 | HEART RATE: 110 BPM | OXYGEN SATURATION: 99 % | HEIGHT: 66 IN | DIASTOLIC BLOOD PRESSURE: 92 MMHG | WEIGHT: 293 LBS

## 2021-01-15 DIAGNOSIS — E55.9 VITAMIN D DEFICIENCY: ICD-10-CM

## 2021-01-15 DIAGNOSIS — I10 ESSENTIAL HYPERTENSION: ICD-10-CM

## 2021-01-15 DIAGNOSIS — E53.8 CYANOCOBALAMIN DEFICIENCY: ICD-10-CM

## 2021-01-15 DIAGNOSIS — E78.2 MIXED HYPERLIPIDEMIA: ICD-10-CM

## 2021-01-15 DIAGNOSIS — D50.9 IRON DEFICIENCY ANEMIA, UNSPECIFIED IRON DEFICIENCY ANEMIA TYPE: ICD-10-CM

## 2021-01-15 DIAGNOSIS — E11.65 TYPE 2 DIABETES MELLITUS WITH HYPERGLYCEMIA, WITHOUT LONG-TERM CURRENT USE OF INSULIN: Primary | ICD-10-CM

## 2021-01-15 DIAGNOSIS — E66.01 MORBID OBESITY WITH BMI OF 50.0-59.9, ADULT: ICD-10-CM

## 2021-01-15 DIAGNOSIS — E11.65 TYPE 2 DIABETES MELLITUS WITH HYPERGLYCEMIA, WITH LONG-TERM CURRENT USE OF INSULIN: ICD-10-CM

## 2021-01-15 DIAGNOSIS — Z79.4 TYPE 2 DIABETES MELLITUS WITH HYPERGLYCEMIA, WITH LONG-TERM CURRENT USE OF INSULIN: ICD-10-CM

## 2021-01-15 DIAGNOSIS — M17.0 PRIMARY OSTEOARTHRITIS OF BOTH KNEES: ICD-10-CM

## 2021-01-15 LAB
ALBUMIN SERPL BCP-MCNC: 3.6 G/DL (ref 3.5–5.2)
ALP SERPL-CCNC: 76 U/L (ref 55–135)
ALT SERPL W/O P-5'-P-CCNC: 18 U/L (ref 10–44)
ANION GAP SERPL CALC-SCNC: 11 MMOL/L (ref 8–16)
AST SERPL-CCNC: 15 U/L (ref 10–40)
BASOPHILS # BLD AUTO: 0.06 K/UL (ref 0–0.2)
BASOPHILS NFR BLD: 0.9 % (ref 0–1.9)
BILIRUB SERPL-MCNC: 0.4 MG/DL (ref 0.1–1)
BUN SERPL-MCNC: 15 MG/DL (ref 6–20)
CALCIUM SERPL-MCNC: 10.3 MG/DL (ref 8.7–10.5)
CHLORIDE SERPL-SCNC: 113 MMOL/L (ref 95–110)
CO2 SERPL-SCNC: 20 MMOL/L (ref 23–29)
CREAT SERPL-MCNC: 1 MG/DL (ref 0.5–1.4)
DIFFERENTIAL METHOD: ABNORMAL
EOSINOPHIL # BLD AUTO: 0.1 K/UL (ref 0–0.5)
EOSINOPHIL NFR BLD: 1.6 % (ref 0–8)
ERYTHROCYTE [DISTWIDTH] IN BLOOD BY AUTOMATED COUNT: 18.6 % (ref 11.5–14.5)
EST. GFR  (AFRICAN AMERICAN): >60 ML/MIN/1.73 M^2
EST. GFR  (NON AFRICAN AMERICAN): >60 ML/MIN/1.73 M^2
GLUCOSE SERPL-MCNC: 107 MG/DL (ref 70–110)
HCT VFR BLD AUTO: 40.6 % (ref 37–48.5)
HGB BLD-MCNC: 12.1 G/DL (ref 12–16)
IMM GRANULOCYTES # BLD AUTO: 0.02 K/UL (ref 0–0.04)
IMM GRANULOCYTES NFR BLD AUTO: 0.3 % (ref 0–0.5)
LYMPHOCYTES # BLD AUTO: 2 K/UL (ref 1–4.8)
LYMPHOCYTES NFR BLD: 29.4 % (ref 18–48)
MCH RBC QN AUTO: 21.4 PG (ref 27–31)
MCHC RBC AUTO-ENTMCNC: 29.8 G/DL (ref 32–36)
MCV RBC AUTO: 72 FL (ref 82–98)
MONOCYTES # BLD AUTO: 0.5 K/UL (ref 0.3–1)
MONOCYTES NFR BLD: 7.4 % (ref 4–15)
NEUTROPHILS # BLD AUTO: 4.1 K/UL (ref 1.8–7.7)
NEUTROPHILS NFR BLD: 60.4 % (ref 38–73)
NRBC BLD-RTO: 0 /100 WBC
PLATELET # BLD AUTO: 221 K/UL (ref 150–350)
PMV BLD AUTO: ABNORMAL FL (ref 9.2–12.9)
POTASSIUM SERPL-SCNC: 4.6 MMOL/L (ref 3.5–5.1)
PROT SERPL-MCNC: 7.3 G/DL (ref 6–8.4)
RBC # BLD AUTO: 5.65 M/UL (ref 4–5.4)
SODIUM SERPL-SCNC: 144 MMOL/L (ref 136–145)
WBC # BLD AUTO: 6.73 K/UL (ref 3.9–12.7)

## 2021-01-15 PROCEDURE — 83540 ASSAY OF IRON: CPT

## 2021-01-15 PROCEDURE — 80061 LIPID PANEL: CPT

## 2021-01-15 PROCEDURE — 99215 OFFICE O/P EST HI 40 MIN: CPT | Mod: PBBFAC | Performed by: INTERNAL MEDICINE

## 2021-01-15 PROCEDURE — 99215 PR OFFICE/OUTPT VISIT, EST, LEVL V, 40-54 MIN: ICD-10-PCS | Mod: S$PBB,,, | Performed by: INTERNAL MEDICINE

## 2021-01-15 PROCEDURE — 82728 ASSAY OF FERRITIN: CPT

## 2021-01-15 PROCEDURE — 82306 VITAMIN D 25 HYDROXY: CPT

## 2021-01-15 PROCEDURE — 82607 VITAMIN B-12: CPT

## 2021-01-15 PROCEDURE — 80053 COMPREHEN METABOLIC PANEL: CPT

## 2021-01-15 PROCEDURE — 99999 PR PBB SHADOW E&M-EST. PATIENT-LVL V: CPT | Mod: PBBFAC,,, | Performed by: INTERNAL MEDICINE

## 2021-01-15 PROCEDURE — 83036 HEMOGLOBIN GLYCOSYLATED A1C: CPT

## 2021-01-15 PROCEDURE — 99215 OFFICE O/P EST HI 40 MIN: CPT | Mod: S$PBB,,, | Performed by: INTERNAL MEDICINE

## 2021-01-15 PROCEDURE — 85025 COMPLETE CBC W/AUTO DIFF WBC: CPT

## 2021-01-15 PROCEDURE — 36415 COLL VENOUS BLD VENIPUNCTURE: CPT

## 2021-01-15 PROCEDURE — 99999 PR PBB SHADOW E&M-EST. PATIENT-LVL V: ICD-10-PCS | Mod: PBBFAC,,, | Performed by: INTERNAL MEDICINE

## 2021-01-15 RX ORDER — LANOLIN ALCOHOL/MO/W.PET/CERES
1000 CREAM (GRAM) TOPICAL DAILY
Qty: 90 TABLET | Refills: 3 | Status: ON HOLD | OUTPATIENT
Start: 2021-01-15 | End: 2021-10-14 | Stop reason: HOSPADM

## 2021-01-15 RX ORDER — ATORVASTATIN CALCIUM 40 MG/1
40 TABLET, FILM COATED ORAL DAILY
Qty: 90 TABLET | Refills: 3 | Status: SHIPPED | OUTPATIENT
Start: 2021-01-15 | End: 2022-02-10 | Stop reason: SDUPTHER

## 2021-01-15 RX ORDER — HYDROCHLOROTHIAZIDE 12.5 MG/1
12.5 TABLET ORAL DAILY
Qty: 30 TABLET | Refills: 3 | Status: CANCELLED | OUTPATIENT
Start: 2021-01-15 | End: 2022-01-15

## 2021-01-15 RX ORDER — ASPIRIN 81 MG/1
81 TABLET ORAL DAILY
Qty: 90 TABLET | Refills: 3 | Status: ON HOLD | OUTPATIENT
Start: 2021-01-15 | End: 2022-12-19 | Stop reason: SDUPTHER

## 2021-01-15 RX ORDER — ERGOCALCIFEROL 1.25 MG/1
50000 CAPSULE ORAL
Qty: 12 CAPSULE | Refills: 3 | Status: SHIPPED | OUTPATIENT
Start: 2021-01-15 | End: 2021-06-29

## 2021-01-15 RX ORDER — TRAMADOL HYDROCHLORIDE 50 MG/1
TABLET ORAL
COMMUNITY
Start: 2020-12-23 | End: 2021-02-18 | Stop reason: SDUPTHER

## 2021-01-15 RX ORDER — CHLORTHALIDONE 25 MG/1
25 TABLET ORAL DAILY
Qty: 30 TABLET | Refills: 3 | Status: SHIPPED | OUTPATIENT
Start: 2021-01-15 | End: 2021-09-14 | Stop reason: SDUPTHER

## 2021-01-15 RX ORDER — AMLODIPINE BESYLATE 10 MG/1
10 TABLET ORAL DAILY
Qty: 90 TABLET | Refills: 3 | Status: SHIPPED | OUTPATIENT
Start: 2021-01-15 | End: 2022-02-10 | Stop reason: SDUPTHER

## 2021-01-16 LAB
25(OH)D3+25(OH)D2 SERPL-MCNC: 24 NG/ML (ref 30–96)
CHOLEST SERPL-MCNC: 141 MG/DL (ref 120–199)
CHOLEST/HDLC SERPL: 2.7 {RATIO} (ref 2–5)
ESTIMATED AVG GLUCOSE: 117 MG/DL (ref 68–131)
FERRITIN SERPL-MCNC: 32 NG/ML (ref 20–300)
HBA1C MFR BLD HPLC: 5.7 % (ref 4–5.6)
HDLC SERPL-MCNC: 52 MG/DL (ref 40–75)
HDLC SERPL: 36.9 % (ref 20–50)
IRON SERPL-MCNC: 44 UG/DL (ref 30–160)
LDLC SERPL CALC-MCNC: 74.4 MG/DL (ref 63–159)
NONHDLC SERPL-MCNC: 89 MG/DL
SATURATED IRON: 11 % (ref 20–50)
TOTAL IRON BINDING CAPACITY: 385 UG/DL (ref 250–450)
TRANSFERRIN SERPL-MCNC: 260 MG/DL (ref 200–375)
TRIGL SERPL-MCNC: 73 MG/DL (ref 30–150)
VIT B12 SERPL-MCNC: 416 PG/ML (ref 210–950)

## 2021-01-17 PROCEDURE — 99283 EMERGENCY DEPT VISIT LOW MDM: CPT

## 2021-01-18 ENCOUNTER — HOSPITAL ENCOUNTER (EMERGENCY)
Facility: OTHER | Age: 56
Discharge: HOME | End: 2021-01-18
Attending: EMERGENCY MEDICINE
Payer: MEDICAID

## 2021-01-18 VITALS
RESPIRATION RATE: 18 BRPM | TEMPERATURE: 98 F | HEIGHT: 66 IN | OXYGEN SATURATION: 100 % | BODY MASS INDEX: 47.09 KG/M2 | WEIGHT: 293 LBS | DIASTOLIC BLOOD PRESSURE: 90 MMHG | HEART RATE: 105 BPM | SYSTOLIC BLOOD PRESSURE: 180 MMHG

## 2021-01-18 DIAGNOSIS — M79.604 RIGHT LEG PAIN: Primary | ICD-10-CM

## 2021-01-18 PROCEDURE — 25000003 PHARM REV CODE 250: Performed by: EMERGENCY MEDICINE

## 2021-01-18 RX ORDER — HYDROCODONE BITARTRATE AND ACETAMINOPHEN 5; 325 MG/1; MG/1
1 TABLET ORAL
Status: COMPLETED | OUTPATIENT
Start: 2021-01-18 | End: 2021-01-18

## 2021-01-18 RX ADMIN — HYDROCODONE BITARTRATE AND ACETAMINOPHEN 1 TABLET: 5; 325 TABLET ORAL at 01:01

## 2021-01-21 ENCOUNTER — OFFICE VISIT (OUTPATIENT)
Dept: ORTHOPEDICS | Facility: CLINIC | Age: 56
End: 2021-01-21
Payer: MEDICAID

## 2021-01-21 ENCOUNTER — PATIENT OUTREACH (OUTPATIENT)
Dept: ADMINISTRATIVE | Facility: OTHER | Age: 56
End: 2021-01-21

## 2021-01-21 ENCOUNTER — TELEPHONE (OUTPATIENT)
Dept: ORTHOPEDICS | Facility: CLINIC | Age: 56
End: 2021-01-21

## 2021-01-21 ENCOUNTER — HOSPITAL ENCOUNTER (OUTPATIENT)
Dept: RADIOLOGY | Facility: HOSPITAL | Age: 56
Discharge: HOME | End: 2021-01-21
Attending: PHYSICIAN ASSISTANT
Payer: MEDICAID

## 2021-01-21 VITALS — HEIGHT: 66 IN | WEIGHT: 293 LBS | BODY MASS INDEX: 47.09 KG/M2

## 2021-01-21 DIAGNOSIS — M25.561 PAIN IN BOTH KNEES, UNSPECIFIED CHRONICITY: Primary | ICD-10-CM

## 2021-01-21 DIAGNOSIS — M25.562 PAIN IN BOTH KNEES, UNSPECIFIED CHRONICITY: ICD-10-CM

## 2021-01-21 DIAGNOSIS — M25.561 PAIN IN BOTH KNEES, UNSPECIFIED CHRONICITY: ICD-10-CM

## 2021-01-21 DIAGNOSIS — M17.0 PRIMARY OSTEOARTHRITIS OF BOTH KNEES: ICD-10-CM

## 2021-01-21 DIAGNOSIS — M25.562 PAIN IN BOTH KNEES, UNSPECIFIED CHRONICITY: Primary | ICD-10-CM

## 2021-01-21 PROCEDURE — 99213 OFFICE O/P EST LOW 20 MIN: CPT | Mod: PBBFAC,25 | Performed by: PHYSICIAN ASSISTANT

## 2021-01-21 PROCEDURE — 73564 X-RAY EXAM KNEE 4 OR MORE: CPT | Mod: 26,LT,, | Performed by: RADIOLOGY

## 2021-01-21 PROCEDURE — 20610 DRAIN/INJ JOINT/BURSA W/O US: CPT | Mod: 50,S$PBB,, | Performed by: PHYSICIAN ASSISTANT

## 2021-01-21 PROCEDURE — 99999 PR PBB SHADOW E&M-EST. PATIENT-LVL III: ICD-10-PCS | Mod: PBBFAC,,, | Performed by: PHYSICIAN ASSISTANT

## 2021-01-21 PROCEDURE — 99213 OFFICE O/P EST LOW 20 MIN: CPT | Mod: 25,S$PBB,, | Performed by: PHYSICIAN ASSISTANT

## 2021-01-21 PROCEDURE — 20610 DRAIN/INJ JOINT/BURSA W/O US: CPT | Mod: 50,PBBFAC | Performed by: PHYSICIAN ASSISTANT

## 2021-01-21 PROCEDURE — 20610 PR DRAIN/INJECT LARGE JOINT/BURSA: ICD-10-PCS | Mod: 50,S$PBB,, | Performed by: PHYSICIAN ASSISTANT

## 2021-01-21 PROCEDURE — 99213 PR OFFICE/OUTPT VISIT, EST, LEVL III, 20-29 MIN: ICD-10-PCS | Mod: 25,S$PBB,, | Performed by: PHYSICIAN ASSISTANT

## 2021-01-21 PROCEDURE — 73564 X-RAY EXAM KNEE 4 OR MORE: CPT | Mod: TC,50

## 2021-01-21 PROCEDURE — 99999 PR PBB SHADOW E&M-EST. PATIENT-LVL III: CPT | Mod: PBBFAC,,, | Performed by: PHYSICIAN ASSISTANT

## 2021-01-21 PROCEDURE — 73564 PR  X-RAY KNEE 4+ VIEW: ICD-10-PCS | Mod: 26,LT,, | Performed by: RADIOLOGY

## 2021-01-21 PROCEDURE — 73564 X-RAY EXAM KNEE 4 OR MORE: CPT | Mod: 26,RT,, | Performed by: RADIOLOGY

## 2021-01-21 RX ORDER — METHYLPREDNISOLONE ACETATE 80 MG/ML
80 INJECTION, SUSPENSION INTRA-ARTICULAR; INTRALESIONAL; INTRAMUSCULAR; SOFT TISSUE
Status: COMPLETED | OUTPATIENT
Start: 2021-01-21 | End: 2021-01-21

## 2021-01-21 RX ADMIN — METHYLPREDNISOLONE ACETATE 80 MG: 80 INJECTION, SUSPENSION INTRALESIONAL; INTRAMUSCULAR; INTRASYNOVIAL; SOFT TISSUE at 07:01

## 2021-01-25 ENCOUNTER — TELEPHONE (OUTPATIENT)
Dept: ADMINISTRATIVE | Facility: HOSPITAL | Age: 56
End: 2021-01-25

## 2021-01-25 ENCOUNTER — PATIENT MESSAGE (OUTPATIENT)
Dept: ADMINISTRATIVE | Facility: HOSPITAL | Age: 56
End: 2021-01-25

## 2021-02-03 ENCOUNTER — TELEPHONE (OUTPATIENT)
Dept: PODIATRY | Facility: CLINIC | Age: 56
End: 2021-02-03

## 2021-02-09 ENCOUNTER — TELEPHONE (OUTPATIENT)
Dept: ADMINISTRATIVE | Facility: HOSPITAL | Age: 56
End: 2021-02-09

## 2021-02-10 ENCOUNTER — OFFICE VISIT (OUTPATIENT)
Dept: PODIATRY | Facility: CLINIC | Age: 56
End: 2021-02-10
Payer: MEDICAID

## 2021-02-10 ENCOUNTER — APPOINTMENT (OUTPATIENT)
Dept: RADIOLOGY | Facility: OTHER | Age: 56
End: 2021-02-10
Attending: PODIATRIST
Payer: MEDICAID

## 2021-02-10 VITALS
SYSTOLIC BLOOD PRESSURE: 195 MMHG | HEART RATE: 92 BPM | WEIGHT: 293 LBS | DIASTOLIC BLOOD PRESSURE: 101 MMHG | HEIGHT: 66 IN | BODY MASS INDEX: 47.09 KG/M2

## 2021-02-10 DIAGNOSIS — M24.574 JOINT CONTRACTURE OF FOOT, RIGHT: ICD-10-CM

## 2021-02-10 DIAGNOSIS — M20.41 HAMMER TOE OF RIGHT FOOT: ICD-10-CM

## 2021-02-10 DIAGNOSIS — M79.671 FOOT PAIN, RIGHT: ICD-10-CM

## 2021-02-10 DIAGNOSIS — M20.11 HALLUX VALGUS OF RIGHT FOOT: ICD-10-CM

## 2021-02-10 DIAGNOSIS — E11.65 TYPE 2 DIABETES MELLITUS WITH HYPERGLYCEMIA, WITH LONG-TERM CURRENT USE OF INSULIN: ICD-10-CM

## 2021-02-10 DIAGNOSIS — M20.11 HALLUX VALGUS OF RIGHT FOOT: Primary | ICD-10-CM

## 2021-02-10 DIAGNOSIS — B35.1 ONYCHOMYCOSIS DUE TO DERMATOPHYTE: ICD-10-CM

## 2021-02-10 DIAGNOSIS — Z79.4 TYPE 2 DIABETES MELLITUS WITH HYPERGLYCEMIA, WITH LONG-TERM CURRENT USE OF INSULIN: ICD-10-CM

## 2021-02-10 PROCEDURE — 99999 PR PBB SHADOW E&M-EST. PATIENT-LVL IV: CPT | Mod: PBBFAC,,, | Performed by: PODIATRIST

## 2021-02-10 PROCEDURE — 29540 STRAPPING ANKLE &/FOOT: CPT | Mod: PBBFAC,PN,RT | Performed by: PODIATRIST

## 2021-02-10 PROCEDURE — 73630 XR FOOT COMPLETE 3 VIEW RIGHT: ICD-10-PCS | Mod: 26,RT,, | Performed by: RADIOLOGY

## 2021-02-10 PROCEDURE — 73630 X-RAY EXAM OF FOOT: CPT | Mod: 26,RT,, | Performed by: RADIOLOGY

## 2021-02-10 PROCEDURE — 99204 PR OFFICE/OUTPT VISIT, NEW, LEVL IV, 45-59 MIN: ICD-10-PCS | Mod: 25,S$PBB,, | Performed by: PODIATRIST

## 2021-02-10 PROCEDURE — 99204 OFFICE O/P NEW MOD 45 MIN: CPT | Mod: 25,S$PBB,, | Performed by: PODIATRIST

## 2021-02-10 PROCEDURE — 29540 PR STRAPPING; ANKLE &/OR FOOT: ICD-10-PCS | Mod: S$PBB,RT,, | Performed by: PODIATRIST

## 2021-02-10 PROCEDURE — 29540 STRAPPING ANKLE &/FOOT: CPT | Mod: S$PBB,RT,, | Performed by: PODIATRIST

## 2021-02-10 PROCEDURE — 73630 X-RAY EXAM OF FOOT: CPT | Mod: TC,RT

## 2021-02-10 PROCEDURE — 99214 OFFICE O/P EST MOD 30 MIN: CPT | Mod: PBBFAC,PN,25 | Performed by: PODIATRIST

## 2021-02-10 PROCEDURE — 99999 PR PBB SHADOW E&M-EST. PATIENT-LVL IV: ICD-10-PCS | Mod: PBBFAC,,, | Performed by: PODIATRIST

## 2021-02-10 RX ORDER — CICLOPIROX 80 MG/ML
SOLUTION TOPICAL NIGHTLY
Qty: 6.6 ML | Refills: 11 | Status: ON HOLD | OUTPATIENT
Start: 2021-02-10 | End: 2021-10-12

## 2021-02-10 RX ORDER — LIDOCAINE HYDROCHLORIDE 20 MG/ML
JELLY TOPICAL
Qty: 30 ML | Refills: 2 | Status: SHIPPED | OUTPATIENT
Start: 2021-02-10 | End: 2021-06-29

## 2021-02-18 ENCOUNTER — PATIENT MESSAGE (OUTPATIENT)
Dept: PHYSICAL MEDICINE AND REHAB | Facility: CLINIC | Age: 56
End: 2021-02-18

## 2021-02-18 RX ORDER — TRAMADOL HYDROCHLORIDE 50 MG/1
50-100 TABLET ORAL 3 TIMES DAILY PRN
Qty: 180 TABLET | Refills: 0 | Status: SHIPPED | OUTPATIENT
Start: 2021-02-18 | End: 2021-03-10 | Stop reason: SDUPTHER

## 2021-02-26 DIAGNOSIS — M17.0 PRIMARY OSTEOARTHRITIS OF BOTH KNEES: Primary | ICD-10-CM

## 2021-03-06 ENCOUNTER — IMMUNIZATION (OUTPATIENT)
Dept: PRIMARY CARE CLINIC | Facility: CLINIC | Age: 56
End: 2021-03-06
Payer: MEDICAID

## 2021-03-06 DIAGNOSIS — Z23 NEED FOR VACCINATION: Primary | ICD-10-CM

## 2021-03-06 PROCEDURE — 91300 PR SARS-COV- 2 COVID-19 VACCINE, NO PRSV, 30MCG/0.3ML, IM: ICD-10-PCS | Mod: S$GLB,,, | Performed by: INTERNAL MEDICINE

## 2021-03-06 PROCEDURE — 0001A PR IMMUNIZ ADMIN, SARS-COV-2 COVID-19 VACC, 30MCG/0.3ML, 1ST DOSE: CPT | Mod: CV19,S$GLB,, | Performed by: INTERNAL MEDICINE

## 2021-03-06 PROCEDURE — 91300 PR SARS-COV- 2 COVID-19 VACCINE, NO PRSV, 30MCG/0.3ML, IM: CPT | Mod: S$GLB,,, | Performed by: INTERNAL MEDICINE

## 2021-03-06 PROCEDURE — 0001A PR IMMUNIZ ADMIN, SARS-COV-2 COVID-19 VACC, 30MCG/0.3ML, 1ST DOSE: ICD-10-PCS | Mod: CV19,S$GLB,, | Performed by: INTERNAL MEDICINE

## 2021-03-06 RX ADMIN — Medication 0.3 ML: at 04:03

## 2021-03-08 DIAGNOSIS — E11.65 TYPE 2 DIABETES MELLITUS WITH HYPERGLYCEMIA, WITHOUT LONG-TERM CURRENT USE OF INSULIN: ICD-10-CM

## 2021-03-08 DIAGNOSIS — Z79.4 TYPE 2 DIABETES MELLITUS WITH HYPERGLYCEMIA, WITH LONG-TERM CURRENT USE OF INSULIN: ICD-10-CM

## 2021-03-08 DIAGNOSIS — E11.65 TYPE 2 DIABETES MELLITUS WITH HYPERGLYCEMIA, WITH LONG-TERM CURRENT USE OF INSULIN: ICD-10-CM

## 2021-03-08 RX ORDER — INSULIN DETEMIR 100 [IU]/ML
INJECTION, SOLUTION SUBCUTANEOUS
Qty: 36 ML | Refills: 3 | Status: CANCELLED | OUTPATIENT
Start: 2021-03-08

## 2021-03-09 ENCOUNTER — PATIENT OUTREACH (OUTPATIENT)
Dept: ADMINISTRATIVE | Facility: OTHER | Age: 56
End: 2021-03-09

## 2021-03-10 ENCOUNTER — TELEPHONE (OUTPATIENT)
Dept: PODIATRY | Facility: CLINIC | Age: 56
End: 2021-03-10

## 2021-03-10 ENCOUNTER — OFFICE VISIT (OUTPATIENT)
Dept: PHYSICAL MEDICINE AND REHAB | Facility: CLINIC | Age: 56
End: 2021-03-10
Payer: MEDICAID

## 2021-03-10 VITALS
HEIGHT: 66 IN | WEIGHT: 293 LBS | HEART RATE: 106 BPM | SYSTOLIC BLOOD PRESSURE: 157 MMHG | BODY MASS INDEX: 47.09 KG/M2 | DIASTOLIC BLOOD PRESSURE: 108 MMHG

## 2021-03-10 DIAGNOSIS — G89.29 CHRONIC PAIN OF BOTH KNEES: ICD-10-CM

## 2021-03-10 DIAGNOSIS — M17.0 PRIMARY OSTEOARTHRITIS OF BOTH KNEES: ICD-10-CM

## 2021-03-10 DIAGNOSIS — E66.01 MORBID OBESITY WITH BMI OF 45.0-49.9, ADULT: ICD-10-CM

## 2021-03-10 DIAGNOSIS — M17.0 PRIMARY OSTEOARTHRITIS OF BOTH KNEES: Primary | ICD-10-CM

## 2021-03-10 DIAGNOSIS — M25.562 CHRONIC PAIN OF BOTH KNEES: ICD-10-CM

## 2021-03-10 DIAGNOSIS — M25.561 CHRONIC PAIN OF BOTH KNEES: ICD-10-CM

## 2021-03-10 PROCEDURE — 99212 OFFICE O/P EST SF 10 MIN: CPT | Mod: PBBFAC | Performed by: PHYSICAL MEDICINE & REHABILITATION

## 2021-03-10 PROCEDURE — 99999 PR PBB SHADOW E&M-EST. PATIENT-LVL II: CPT | Mod: PBBFAC,,, | Performed by: PHYSICAL MEDICINE & REHABILITATION

## 2021-03-10 PROCEDURE — 99214 PR OFFICE/OUTPT VISIT, EST, LEVL IV, 30-39 MIN: ICD-10-PCS | Mod: S$PBB,,, | Performed by: PHYSICAL MEDICINE & REHABILITATION

## 2021-03-10 PROCEDURE — 99214 OFFICE O/P EST MOD 30 MIN: CPT | Mod: S$PBB,,, | Performed by: PHYSICAL MEDICINE & REHABILITATION

## 2021-03-10 PROCEDURE — 99999 PR PBB SHADOW E&M-EST. PATIENT-LVL II: ICD-10-PCS | Mod: PBBFAC,,, | Performed by: PHYSICAL MEDICINE & REHABILITATION

## 2021-03-10 RX ORDER — MELOXICAM 15 MG/1
15 TABLET ORAL DAILY
Qty: 90 TABLET | Refills: 1 | Status: SHIPPED | OUTPATIENT
Start: 2021-03-10 | End: 2021-09-06

## 2021-03-10 RX ORDER — TRAMADOL HYDROCHLORIDE 50 MG/1
50-100 TABLET ORAL 3 TIMES DAILY PRN
Qty: 180 TABLET | Refills: 3 | Status: SHIPPED | OUTPATIENT
Start: 2021-03-21 | End: 2021-07-14 | Stop reason: SDUPTHER

## 2021-03-10 RX ORDER — DICLOFENAC SODIUM 10 MG/G
2 GEL TOPICAL 4 TIMES DAILY PRN
Qty: 300 G | Refills: 3 | Status: SHIPPED | OUTPATIENT
Start: 2021-03-10 | End: 2022-03-03 | Stop reason: SDUPTHER

## 2021-03-12 ENCOUNTER — OFFICE VISIT (OUTPATIENT)
Dept: ORTHOPEDICS | Facility: CLINIC | Age: 56
End: 2021-03-12
Payer: MEDICAID

## 2021-03-12 ENCOUNTER — TELEPHONE (OUTPATIENT)
Dept: PHARMACY | Facility: CLINIC | Age: 56
End: 2021-03-12

## 2021-03-12 DIAGNOSIS — M17.0 PRIMARY OSTEOARTHRITIS OF BOTH KNEES: ICD-10-CM

## 2021-03-12 PROCEDURE — 20610 DRAIN/INJ JOINT/BURSA W/O US: CPT | Mod: 50,PBBFAC | Performed by: NURSE PRACTITIONER

## 2021-03-12 PROCEDURE — 99999 PR PBB SHADOW E&M-EST. PATIENT-LVL II: ICD-10-PCS | Mod: PBBFAC,,, | Performed by: NURSE PRACTITIONER

## 2021-03-12 PROCEDURE — 99212 OFFICE O/P EST SF 10 MIN: CPT | Mod: PBBFAC,25 | Performed by: NURSE PRACTITIONER

## 2021-03-12 PROCEDURE — 20610 DRAIN/INJ JOINT/BURSA W/O US: CPT | Mod: 50,S$PBB,, | Performed by: NURSE PRACTITIONER

## 2021-03-12 PROCEDURE — 20610 PR DRAIN/INJECT LARGE JOINT/BURSA: ICD-10-PCS | Mod: 50,S$PBB,, | Performed by: NURSE PRACTITIONER

## 2021-03-12 PROCEDURE — 99999 PR PBB SHADOW E&M-EST. PATIENT-LVL II: CPT | Mod: PBBFAC,,, | Performed by: NURSE PRACTITIONER

## 2021-03-12 PROCEDURE — 99499 UNLISTED E&M SERVICE: CPT | Mod: S$PBB,,, | Performed by: NURSE PRACTITIONER

## 2021-03-12 PROCEDURE — 99499 NO LOS: ICD-10-PCS | Mod: S$PBB,,, | Performed by: NURSE PRACTITIONER

## 2021-03-12 RX ADMIN — Medication 32 MG: at 09:03

## 2021-03-19 ENCOUNTER — OFFICE VISIT (OUTPATIENT)
Dept: ORTHOPEDICS | Facility: CLINIC | Age: 56
End: 2021-03-19
Payer: MEDICAID

## 2021-03-19 DIAGNOSIS — M17.0 PRIMARY OSTEOARTHRITIS OF BOTH KNEES: ICD-10-CM

## 2021-03-19 PROCEDURE — 99499 UNLISTED E&M SERVICE: CPT | Mod: S$PBB,,, | Performed by: NURSE PRACTITIONER

## 2021-03-19 PROCEDURE — 20610 DRAIN/INJ JOINT/BURSA W/O US: CPT | Mod: 50,S$PBB,, | Performed by: NURSE PRACTITIONER

## 2021-03-19 PROCEDURE — 99212 OFFICE O/P EST SF 10 MIN: CPT | Mod: PBBFAC,25 | Performed by: NURSE PRACTITIONER

## 2021-03-19 PROCEDURE — 20610 PR DRAIN/INJECT LARGE JOINT/BURSA: ICD-10-PCS | Mod: 50,S$PBB,, | Performed by: NURSE PRACTITIONER

## 2021-03-19 PROCEDURE — 20610 DRAIN/INJ JOINT/BURSA W/O US: CPT | Mod: 50,PBBFAC | Performed by: NURSE PRACTITIONER

## 2021-03-19 PROCEDURE — 99999 PR PBB SHADOW E&M-EST. PATIENT-LVL II: CPT | Mod: PBBFAC,,, | Performed by: NURSE PRACTITIONER

## 2021-03-19 PROCEDURE — 99499 NO LOS: ICD-10-PCS | Mod: S$PBB,,, | Performed by: NURSE PRACTITIONER

## 2021-03-19 PROCEDURE — 99999 PR PBB SHADOW E&M-EST. PATIENT-LVL II: ICD-10-PCS | Mod: PBBFAC,,, | Performed by: NURSE PRACTITIONER

## 2021-03-19 RX ADMIN — Medication 32 MG: at 10:03

## 2021-03-26 ENCOUNTER — OFFICE VISIT (OUTPATIENT)
Dept: ORTHOPEDICS | Facility: CLINIC | Age: 56
End: 2021-03-26
Payer: MEDICAID

## 2021-03-26 DIAGNOSIS — M17.0 PRIMARY OSTEOARTHRITIS OF BOTH KNEES: ICD-10-CM

## 2021-03-26 PROCEDURE — 20610 DRAIN/INJ JOINT/BURSA W/O US: CPT | Mod: 50,PBBFAC | Performed by: NURSE PRACTITIONER

## 2021-03-26 PROCEDURE — 20610 PR DRAIN/INJECT LARGE JOINT/BURSA: ICD-10-PCS | Mod: 50,S$PBB,, | Performed by: NURSE PRACTITIONER

## 2021-03-26 PROCEDURE — 20610 DRAIN/INJ JOINT/BURSA W/O US: CPT | Mod: 50,S$PBB,, | Performed by: NURSE PRACTITIONER

## 2021-03-26 PROCEDURE — 99499 UNLISTED E&M SERVICE: CPT | Mod: S$PBB,,, | Performed by: NURSE PRACTITIONER

## 2021-03-26 PROCEDURE — 99213 OFFICE O/P EST LOW 20 MIN: CPT | Mod: PBBFAC | Performed by: NURSE PRACTITIONER

## 2021-03-26 PROCEDURE — 99999 PR PBB SHADOW E&M-EST. PATIENT-LVL III: ICD-10-PCS | Mod: PBBFAC,,, | Performed by: NURSE PRACTITIONER

## 2021-03-26 PROCEDURE — 99499 NO LOS: ICD-10-PCS | Mod: S$PBB,,, | Performed by: NURSE PRACTITIONER

## 2021-03-26 PROCEDURE — 99999 PR PBB SHADOW E&M-EST. PATIENT-LVL III: CPT | Mod: PBBFAC,,, | Performed by: NURSE PRACTITIONER

## 2021-03-26 RX ADMIN — Medication 32 MG: at 04:03

## 2021-04-05 ENCOUNTER — TELEPHONE (OUTPATIENT)
Dept: PHYSICAL MEDICINE AND REHAB | Facility: CLINIC | Age: 56
End: 2021-04-05

## 2021-04-05 ENCOUNTER — PATIENT MESSAGE (OUTPATIENT)
Dept: PHYSICAL MEDICINE AND REHAB | Facility: CLINIC | Age: 56
End: 2021-04-05

## 2021-04-06 NOTE — TELEPHONE ENCOUNTER
----- Message from Deion Can sent at 10/30/2017  4:06 PM CDT -----  Contact: Patient  _x 1st Request  _ 2nd Request  _ 3rd Request    Who:DEMARCO DELACRUZ [[id number]]    Why: Patient is returning a call.     What Number to Call Back: [phone]    When to Expect a call back: (Before the end of the day)  -- if call after 3:00 call back will be tomorrow.  4:29 PM  Patient was scheduled for NP appointment with Dr. Louie earlier today.    
multifactorial mostly due to  bradycardia with CHB , possible component of severe AS ,patient scheduled to have PPM , will reassess for TAVR after PPM placement    discussed with daughter patient , EP

## 2021-04-11 ENCOUNTER — IMMUNIZATION (OUTPATIENT)
Dept: PRIMARY CARE CLINIC | Facility: CLINIC | Age: 56
End: 2021-04-11
Payer: MEDICAID

## 2021-04-11 DIAGNOSIS — Z23 NEED FOR VACCINATION: Primary | ICD-10-CM

## 2021-04-11 PROCEDURE — 0002A PR IMMUNIZ ADMIN, SARS-COV-2 COVID-19 VACC, 30MCG/0.3ML, 2ND DOSE: ICD-10-PCS | Mod: CV19,S$GLB,, | Performed by: INTERNAL MEDICINE

## 2021-04-11 PROCEDURE — 91300 PR SARS-COV- 2 COVID-19 VACCINE, NO PRSV, 30MCG/0.3ML, IM: ICD-10-PCS | Mod: S$GLB,,, | Performed by: INTERNAL MEDICINE

## 2021-04-11 PROCEDURE — 0002A PR IMMUNIZ ADMIN, SARS-COV-2 COVID-19 VACC, 30MCG/0.3ML, 2ND DOSE: CPT | Mod: CV19,S$GLB,, | Performed by: INTERNAL MEDICINE

## 2021-04-11 PROCEDURE — 91300 PR SARS-COV- 2 COVID-19 VACCINE, NO PRSV, 30MCG/0.3ML, IM: CPT | Mod: S$GLB,,, | Performed by: INTERNAL MEDICINE

## 2021-04-11 RX ADMIN — Medication 0.3 ML: at 06:04

## 2021-05-03 ENCOUNTER — DOCUMENTATION ONLY (OUTPATIENT)
Dept: BARIATRICS | Facility: CLINIC | Age: 56
End: 2021-05-03

## 2021-05-27 ENCOUNTER — TELEPHONE (OUTPATIENT)
Dept: BARIATRICS | Facility: CLINIC | Age: 56
End: 2021-05-27

## 2021-06-01 ENCOUNTER — PATIENT OUTREACH (OUTPATIENT)
Dept: ADMINISTRATIVE | Facility: OTHER | Age: 56
End: 2021-06-01

## 2021-06-01 DIAGNOSIS — Z12.31 ENCOUNTER FOR SCREENING MAMMOGRAM FOR MALIGNANT NEOPLASM OF BREAST: Primary | ICD-10-CM

## 2021-06-02 ENCOUNTER — OFFICE VISIT (OUTPATIENT)
Dept: ORTHOPEDICS | Facility: CLINIC | Age: 56
End: 2021-06-02
Payer: MEDICAID

## 2021-06-02 DIAGNOSIS — M17.11 PRIMARY OSTEOARTHRITIS OF RIGHT KNEE: ICD-10-CM

## 2021-06-02 PROCEDURE — 99999 PR PBB SHADOW E&M-EST. PATIENT-LVL II: ICD-10-PCS | Mod: PBBFAC,,, | Performed by: NURSE PRACTITIONER

## 2021-06-02 PROCEDURE — 99999 PR PBB SHADOW E&M-EST. PATIENT-LVL II: CPT | Mod: PBBFAC,,, | Performed by: NURSE PRACTITIONER

## 2021-06-02 PROCEDURE — 20610 DRAIN/INJ JOINT/BURSA W/O US: CPT | Mod: S$PBB,RT,, | Performed by: NURSE PRACTITIONER

## 2021-06-02 PROCEDURE — 99499 UNLISTED E&M SERVICE: CPT | Mod: S$PBB,,, | Performed by: NURSE PRACTITIONER

## 2021-06-02 PROCEDURE — 99499 NO LOS: ICD-10-PCS | Mod: S$PBB,,, | Performed by: NURSE PRACTITIONER

## 2021-06-02 PROCEDURE — 20610 PR DRAIN/INJECT LARGE JOINT/BURSA: ICD-10-PCS | Mod: S$PBB,RT,, | Performed by: NURSE PRACTITIONER

## 2021-06-02 PROCEDURE — 20610 DRAIN/INJ JOINT/BURSA W/O US: CPT | Mod: PBBFAC,RT | Performed by: NURSE PRACTITIONER

## 2021-06-02 PROCEDURE — 99212 OFFICE O/P EST SF 10 MIN: CPT | Mod: PBBFAC | Performed by: NURSE PRACTITIONER

## 2021-06-17 DIAGNOSIS — E11.65 TYPE 2 DIABETES MELLITUS WITH HYPERGLYCEMIA, WITH LONG-TERM CURRENT USE OF INSULIN: ICD-10-CM

## 2021-06-17 DIAGNOSIS — Z79.4 TYPE 2 DIABETES MELLITUS WITH HYPERGLYCEMIA, WITH LONG-TERM CURRENT USE OF INSULIN: ICD-10-CM

## 2021-06-17 RX ORDER — PEN NEEDLE, DIABETIC 30 GX3/16"
1 NEEDLE, DISPOSABLE MISCELLANEOUS 4 TIMES DAILY
Qty: 100 EACH | Refills: 11 | Status: SHIPPED | OUTPATIENT
Start: 2021-06-17 | End: 2021-09-14

## 2021-06-17 RX ORDER — INSULIN DETEMIR 100 [IU]/ML
INJECTION, SOLUTION SUBCUTANEOUS
Qty: 36 ML | Refills: 3 | Status: SHIPPED | OUTPATIENT
Start: 2021-06-17 | End: 2021-09-14 | Stop reason: SDUPTHER

## 2021-06-23 ENCOUNTER — OFFICE VISIT (OUTPATIENT)
Dept: ORTHOPEDICS | Facility: CLINIC | Age: 56
End: 2021-06-23
Payer: MEDICAID

## 2021-06-23 DIAGNOSIS — M17.12 PRIMARY OSTEOARTHRITIS OF LEFT KNEE: Primary | ICD-10-CM

## 2021-06-23 PROCEDURE — 99999 PR PBB SHADOW E&M-EST. PATIENT-LVL II: ICD-10-PCS | Mod: PBBFAC,,, | Performed by: NURSE PRACTITIONER

## 2021-06-23 PROCEDURE — 99499 UNLISTED E&M SERVICE: CPT | Mod: S$PBB,,, | Performed by: NURSE PRACTITIONER

## 2021-06-23 PROCEDURE — 99999 PR PBB SHADOW E&M-EST. PATIENT-LVL II: CPT | Mod: PBBFAC,,, | Performed by: NURSE PRACTITIONER

## 2021-06-23 PROCEDURE — 20610 DRAIN/INJ JOINT/BURSA W/O US: CPT | Mod: S$PBB,LT,, | Performed by: NURSE PRACTITIONER

## 2021-06-23 PROCEDURE — 20610 DRAIN/INJ JOINT/BURSA W/O US: CPT | Mod: PBBFAC | Performed by: NURSE PRACTITIONER

## 2021-06-23 PROCEDURE — 99212 OFFICE O/P EST SF 10 MIN: CPT | Mod: PBBFAC,25 | Performed by: NURSE PRACTITIONER

## 2021-06-23 PROCEDURE — 99499 NO LOS: ICD-10-PCS | Mod: S$PBB,,, | Performed by: NURSE PRACTITIONER

## 2021-06-23 PROCEDURE — 20610 PR DRAIN/INJECT LARGE JOINT/BURSA: ICD-10-PCS | Mod: S$PBB,LT,, | Performed by: NURSE PRACTITIONER

## 2021-06-23 RX ORDER — TRIAMCINOLONE ACETONIDE 40 MG/ML
40 INJECTION, SUSPENSION INTRA-ARTICULAR; INTRAMUSCULAR
Status: COMPLETED | OUTPATIENT
Start: 2021-06-23 | End: 2021-06-23

## 2021-06-23 RX ADMIN — TRIAMCINOLONE ACETONIDE 40 MG: 40 INJECTION, SUSPENSION INTRA-ARTICULAR; INTRAMUSCULAR at 10:06

## 2021-06-25 ENCOUNTER — HOSPITAL ENCOUNTER (EMERGENCY)
Facility: OTHER | Age: 56
Discharge: HOME | End: 2021-06-25
Attending: EMERGENCY MEDICINE
Payer: MEDICAID

## 2021-06-25 VITALS
RESPIRATION RATE: 18 BRPM | WEIGHT: 283 LBS | HEIGHT: 66 IN | SYSTOLIC BLOOD PRESSURE: 174 MMHG | BODY MASS INDEX: 45.48 KG/M2 | TEMPERATURE: 98 F | HEART RATE: 85 BPM | OXYGEN SATURATION: 100 % | DIASTOLIC BLOOD PRESSURE: 104 MMHG

## 2021-06-25 DIAGNOSIS — E86.0 DEHYDRATION: ICD-10-CM

## 2021-06-25 DIAGNOSIS — R73.9 HYPERGLYCEMIA: Primary | ICD-10-CM

## 2021-06-25 LAB
ALBUMIN SERPL BCP-MCNC: 3.7 G/DL (ref 3.5–5.2)
ALP SERPL-CCNC: 95 U/L (ref 55–135)
ALT SERPL W/O P-5'-P-CCNC: 26 U/L (ref 10–44)
ANION GAP SERPL CALC-SCNC: 14 MMOL/L (ref 8–16)
AST SERPL-CCNC: 38 U/L (ref 10–40)
B-OH-BUTYR BLD STRIP-SCNC: 0.1 MMOL/L (ref 0–0.5)
BACTERIA #/AREA URNS HPF: ABNORMAL /HPF
BASOPHILS # BLD AUTO: 0.02 K/UL (ref 0–0.2)
BASOPHILS NFR BLD: 0.2 % (ref 0–1.9)
BILIRUB SERPL-MCNC: 0.4 MG/DL (ref 0.1–1)
BILIRUB UR QL STRIP: NEGATIVE
BUN SERPL-MCNC: 22 MG/DL (ref 6–20)
CALCIUM SERPL-MCNC: 10.2 MG/DL (ref 8.7–10.5)
CHLORIDE SERPL-SCNC: 104 MMOL/L (ref 95–110)
CLARITY UR: CLEAR
CO2 SERPL-SCNC: 15 MMOL/L (ref 23–29)
COLOR UR: YELLOW
CREAT SERPL-MCNC: 1.5 MG/DL (ref 0.5–1.4)
DIFFERENTIAL METHOD: ABNORMAL
EOSINOPHIL # BLD AUTO: 0 K/UL (ref 0–0.5)
EOSINOPHIL NFR BLD: 0.1 % (ref 0–8)
ERYTHROCYTE [DISTWIDTH] IN BLOOD BY AUTOMATED COUNT: 17.7 % (ref 11.5–14.5)
EST. GFR  (AFRICAN AMERICAN): 45 ML/MIN/1.73 M^2
EST. GFR  (NON AFRICAN AMERICAN): 39 ML/MIN/1.73 M^2
GLUCOSE SERPL-MCNC: 517 MG/DL (ref 70–110)
GLUCOSE UR QL STRIP: ABNORMAL
HCO3 UR-SCNC: 22.4 MMOL/L (ref 24–28)
HCT VFR BLD AUTO: 38.9 % (ref 37–48.5)
HCT VFR BLD CALC: 38 %PCV (ref 36–54)
HCV AB SERPL QL IA: NEGATIVE
HGB BLD-MCNC: 11.8 G/DL (ref 12–16)
HGB BLD-MCNC: 13 G/DL
HGB UR QL STRIP: NEGATIVE
HIV 1+2 AB+HIV1 P24 AG SERPL QL IA: NEGATIVE
IMM GRANULOCYTES # BLD AUTO: 0.03 K/UL (ref 0–0.04)
IMM GRANULOCYTES NFR BLD AUTO: 0.3 % (ref 0–0.5)
KETONES UR QL STRIP: NEGATIVE
LEUKOCYTE ESTERASE UR QL STRIP: NEGATIVE
LYMPHOCYTES # BLD AUTO: 2 K/UL (ref 1–4.8)
LYMPHOCYTES NFR BLD: 19.7 % (ref 18–48)
MCH RBC QN AUTO: 21.2 PG (ref 27–31)
MCHC RBC AUTO-ENTMCNC: 30.3 G/DL (ref 32–36)
MCV RBC AUTO: 70 FL (ref 82–98)
MICROSCOPIC COMMENT: ABNORMAL
MONOCYTES # BLD AUTO: 0.6 K/UL (ref 0.3–1)
MONOCYTES NFR BLD: 5.8 % (ref 4–15)
NEUTROPHILS # BLD AUTO: 7.5 K/UL (ref 1.8–7.7)
NEUTROPHILS NFR BLD: 73.9 % (ref 38–73)
NITRITE UR QL STRIP: NEGATIVE
NRBC BLD-RTO: 0 /100 WBC
PCO2 BLDA: 37 MMHG (ref 35–45)
PH SMN: 7.39 [PH] (ref 7.35–7.45)
PH UR STRIP: 7 [PH] (ref 5–8)
PLATELET # BLD AUTO: 239 K/UL (ref 150–450)
PLATELET BLD QL SMEAR: ABNORMAL
PMV BLD AUTO: ABNORMAL FL (ref 9.2–12.9)
PO2 BLDA: 66 MMHG (ref 40–60)
POC BE: -3 MMOL/L
POC SATURATED O2: 93 % (ref 95–100)
POC TCO2: 23 MMOL/L (ref 24–29)
POCT GLUCOSE: 425 MG/DL (ref 70–110)
POCT GLUCOSE: 441 MG/DL (ref 70–110)
POCT GLUCOSE: 492 MG/DL (ref 70–110)
POTASSIUM SERPL-SCNC: 5.3 MMOL/L (ref 3.5–5.1)
PROT SERPL-MCNC: 8.2 G/DL (ref 6–8.4)
PROT UR QL STRIP: NEGATIVE
RBC # BLD AUTO: 5.57 M/UL (ref 4–5.4)
SAMPLE: ABNORMAL
SITE: ABNORMAL
SODIUM SERPL-SCNC: 133 MMOL/L (ref 136–145)
SP GR UR STRIP: 1.01 (ref 1–1.03)
SQUAMOUS #/AREA URNS HPF: 3 /HPF
URN SPEC COLLECT METH UR: ABNORMAL
UROBILINOGEN UR STRIP-ACNC: NEGATIVE EU/DL
WBC # BLD AUTO: 10.13 K/UL (ref 3.9–12.7)
YEAST URNS QL MICRO: ABNORMAL

## 2021-06-25 PROCEDURE — 85025 COMPLETE CBC W/AUTO DIFF WBC: CPT | Performed by: NURSE PRACTITIONER

## 2021-06-25 PROCEDURE — 80053 COMPREHEN METABOLIC PANEL: CPT | Performed by: NURSE PRACTITIONER

## 2021-06-25 PROCEDURE — 25000003 PHARM REV CODE 250: Performed by: NURSE PRACTITIONER

## 2021-06-25 PROCEDURE — 86703 HIV-1/HIV-2 1 RESULT ANTBDY: CPT | Performed by: EMERGENCY MEDICINE

## 2021-06-25 PROCEDURE — 86803 HEPATITIS C AB TEST: CPT | Performed by: EMERGENCY MEDICINE

## 2021-06-25 PROCEDURE — 96361 HYDRATE IV INFUSION ADD-ON: CPT

## 2021-06-25 PROCEDURE — 96372 THER/PROPH/DIAG INJ SC/IM: CPT | Mod: 59

## 2021-06-25 PROCEDURE — 25000003 PHARM REV CODE 250: Performed by: EMERGENCY MEDICINE

## 2021-06-25 PROCEDURE — 82010 KETONE BODYS QUAN: CPT | Performed by: NURSE PRACTITIONER

## 2021-06-25 PROCEDURE — 63600175 PHARM REV CODE 636 W HCPCS: Performed by: EMERGENCY MEDICINE

## 2021-06-25 PROCEDURE — 81000 URINALYSIS NONAUTO W/SCOPE: CPT | Performed by: NURSE PRACTITIONER

## 2021-06-25 PROCEDURE — 82962 GLUCOSE BLOOD TEST: CPT

## 2021-06-25 PROCEDURE — 99284 EMERGENCY DEPT VISIT MOD MDM: CPT | Mod: 25

## 2021-06-25 PROCEDURE — 96374 THER/PROPH/DIAG INJ IV PUSH: CPT

## 2021-06-25 PROCEDURE — 82803 BLOOD GASES ANY COMBINATION: CPT

## 2021-06-25 PROCEDURE — 99900035 HC TECH TIME PER 15 MIN (STAT)

## 2021-06-25 RX ADMIN — INSULIN HUMAN 10 UNITS: 100 INJECTION, SOLUTION PARENTERAL at 08:06

## 2021-06-25 RX ADMIN — SODIUM CHLORIDE 1000 ML: 0.9 INJECTION, SOLUTION INTRAVENOUS at 04:06

## 2021-06-25 RX ADMIN — SODIUM CHLORIDE 1000 ML: 0.9 INJECTION, SOLUTION INTRAVENOUS at 05:06

## 2021-06-25 RX ADMIN — INSULIN HUMAN 10 UNITS: 100 INJECTION, SOLUTION PARENTERAL at 07:06

## 2021-06-29 ENCOUNTER — TELEPHONE (OUTPATIENT)
Dept: INTERNAL MEDICINE | Facility: CLINIC | Age: 56
End: 2021-06-29

## 2021-06-29 ENCOUNTER — OFFICE VISIT (OUTPATIENT)
Dept: INTERNAL MEDICINE | Facility: CLINIC | Age: 56
End: 2021-06-29
Payer: MEDICAID

## 2021-06-29 VITALS
OXYGEN SATURATION: 98 % | HEART RATE: 97 BPM | DIASTOLIC BLOOD PRESSURE: 75 MMHG | SYSTOLIC BLOOD PRESSURE: 140 MMHG | HEIGHT: 66 IN | BODY MASS INDEX: 47.09 KG/M2 | WEIGHT: 293 LBS

## 2021-06-29 DIAGNOSIS — Z79.4 TYPE 2 DIABETES MELLITUS WITH HYPERGLYCEMIA, WITH LONG-TERM CURRENT USE OF INSULIN: Primary | ICD-10-CM

## 2021-06-29 DIAGNOSIS — E11.65 TYPE 2 DIABETES MELLITUS WITH HYPERGLYCEMIA, WITH LONG-TERM CURRENT USE OF INSULIN: Primary | ICD-10-CM

## 2021-06-29 DIAGNOSIS — R73.9 HYPERGLYCEMIA: ICD-10-CM

## 2021-06-29 PROCEDURE — 99999 PR PBB SHADOW E&M-EST. PATIENT-LVL IV: ICD-10-PCS | Mod: PBBFAC,,, | Performed by: PHYSICIAN ASSISTANT

## 2021-06-29 PROCEDURE — 99214 OFFICE O/P EST MOD 30 MIN: CPT | Mod: S$PBB,,, | Performed by: PHYSICIAN ASSISTANT

## 2021-06-29 PROCEDURE — 99214 PR OFFICE/OUTPT VISIT, EST, LEVL IV, 30-39 MIN: ICD-10-PCS | Mod: S$PBB,,, | Performed by: PHYSICIAN ASSISTANT

## 2021-06-29 PROCEDURE — 99999 PR PBB SHADOW E&M-EST. PATIENT-LVL IV: CPT | Mod: PBBFAC,,, | Performed by: PHYSICIAN ASSISTANT

## 2021-06-29 PROCEDURE — 99214 OFFICE O/P EST MOD 30 MIN: CPT | Mod: PBBFAC | Performed by: PHYSICIAN ASSISTANT

## 2021-07-02 ENCOUNTER — PATIENT OUTREACH (OUTPATIENT)
Dept: ADMINISTRATIVE | Facility: OTHER | Age: 56
End: 2021-07-02

## 2021-07-06 ENCOUNTER — TELEPHONE (OUTPATIENT)
Dept: INTERNAL MEDICINE | Facility: CLINIC | Age: 56
End: 2021-07-06

## 2021-07-07 RX ORDER — PEN NEEDLE, DIABETIC 30 GX3/16"
1 NEEDLE, DISPOSABLE MISCELLANEOUS 4 TIMES DAILY
Qty: 100 EACH | Refills: 11 | Status: SHIPPED | OUTPATIENT
Start: 2021-07-07 | End: 2021-11-04 | Stop reason: SDUPTHER

## 2021-07-09 ENCOUNTER — TELEPHONE (OUTPATIENT)
Dept: INTERNAL MEDICINE | Facility: CLINIC | Age: 56
End: 2021-07-09

## 2021-07-09 RX ORDER — INSULIN ASPART 100 [IU]/ML
7 INJECTION, SOLUTION INTRAVENOUS; SUBCUTANEOUS
Qty: 9 ML | Refills: 0 | Status: SHIPPED | OUTPATIENT
Start: 2021-07-09 | End: 2021-08-16

## 2021-07-13 ENCOUNTER — PATIENT OUTREACH (OUTPATIENT)
Dept: ADMINISTRATIVE | Facility: HOSPITAL | Age: 56
End: 2021-07-13

## 2021-07-13 ENCOUNTER — PATIENT MESSAGE (OUTPATIENT)
Dept: ADMINISTRATIVE | Facility: HOSPITAL | Age: 56
End: 2021-07-13

## 2021-07-14 ENCOUNTER — OFFICE VISIT (OUTPATIENT)
Dept: OBSTETRICS AND GYNECOLOGY | Facility: CLINIC | Age: 56
End: 2021-07-14
Payer: MEDICAID

## 2021-07-14 ENCOUNTER — PATIENT MESSAGE (OUTPATIENT)
Dept: PHYSICAL MEDICINE AND REHAB | Facility: CLINIC | Age: 56
End: 2021-07-14

## 2021-07-14 VITALS
DIASTOLIC BLOOD PRESSURE: 80 MMHG | WEIGHT: 293 LBS | HEIGHT: 66 IN | BODY MASS INDEX: 47.09 KG/M2 | SYSTOLIC BLOOD PRESSURE: 140 MMHG

## 2021-07-14 DIAGNOSIS — B37.9 CANDIDA ALBICANS INFECTION: Primary | ICD-10-CM

## 2021-07-14 PROCEDURE — 99999 PR PBB SHADOW E&M-EST. PATIENT-LVL III: CPT | Mod: PBBFAC,,, | Performed by: ADVANCED PRACTICE MIDWIFE

## 2021-07-14 PROCEDURE — 99212 PR OFFICE/OUTPT VISIT, EST, LEVL II, 10-19 MIN: ICD-10-PCS | Mod: S$PBB,,, | Performed by: ADVANCED PRACTICE MIDWIFE

## 2021-07-14 PROCEDURE — 87481 CANDIDA DNA AMP PROBE: CPT | Mod: 59 | Performed by: ADVANCED PRACTICE MIDWIFE

## 2021-07-14 PROCEDURE — 99999 PR PBB SHADOW E&M-EST. PATIENT-LVL III: ICD-10-PCS | Mod: PBBFAC,,, | Performed by: ADVANCED PRACTICE MIDWIFE

## 2021-07-14 PROCEDURE — 99213 OFFICE O/P EST LOW 20 MIN: CPT | Mod: PBBFAC,PN | Performed by: ADVANCED PRACTICE MIDWIFE

## 2021-07-14 PROCEDURE — 99212 OFFICE O/P EST SF 10 MIN: CPT | Mod: S$PBB,,, | Performed by: ADVANCED PRACTICE MIDWIFE

## 2021-07-14 RX ORDER — FLUCONAZOLE 200 MG/1
200 TABLET ORAL EVERY OTHER DAY
Qty: 3 TABLET | Refills: 0 | Status: SHIPPED | OUTPATIENT
Start: 2021-07-14 | End: 2021-07-19

## 2021-07-14 RX ORDER — TRAMADOL HYDROCHLORIDE 50 MG/1
50-100 TABLET ORAL 3 TIMES DAILY PRN
Qty: 180 TABLET | Refills: 3 | Status: SHIPPED | OUTPATIENT
Start: 2021-07-18 | End: 2021-11-03 | Stop reason: SDUPTHER

## 2021-07-16 LAB
BACTERIAL VAGINOSIS DNA: NEGATIVE
CANDIDA GLABRATA DNA: NEGATIVE
CANDIDA KRUSEI DNA: NEGATIVE
CANDIDA RRNA VAG QL PROBE: POSITIVE
T VAGINALIS RRNA GENITAL QL PROBE: NEGATIVE

## 2021-07-19 ENCOUNTER — PATIENT MESSAGE (OUTPATIENT)
Dept: OBSTETRICS AND GYNECOLOGY | Facility: CLINIC | Age: 56
End: 2021-07-19

## 2021-07-27 ENCOUNTER — TELEPHONE (OUTPATIENT)
Dept: INTERNAL MEDICINE | Facility: CLINIC | Age: 56
End: 2021-07-27

## 2021-08-20 ENCOUNTER — PATIENT OUTREACH (OUTPATIENT)
Dept: ADMINISTRATIVE | Facility: OTHER | Age: 56
End: 2021-08-20

## 2021-08-23 ENCOUNTER — TELEPHONE (OUTPATIENT)
Dept: PHYSICAL MEDICINE AND REHAB | Facility: CLINIC | Age: 56
End: 2021-08-23

## 2021-08-24 ENCOUNTER — TELEPHONE (OUTPATIENT)
Dept: PHYSICAL MEDICINE AND REHAB | Facility: CLINIC | Age: 56
End: 2021-08-24

## 2021-09-13 ENCOUNTER — OFFICE VISIT (OUTPATIENT)
Dept: ORTHOPEDICS | Facility: CLINIC | Age: 56
End: 2021-09-13
Payer: MEDICAID

## 2021-09-13 VITALS — WEIGHT: 289 LBS | BODY MASS INDEX: 46.65 KG/M2

## 2021-09-13 DIAGNOSIS — M17.0 PRIMARY OSTEOARTHRITIS OF BOTH KNEES: Primary | ICD-10-CM

## 2021-09-13 PROCEDURE — 99499 UNLISTED E&M SERVICE: CPT | Mod: S$PBB,,, | Performed by: NURSE PRACTITIONER

## 2021-09-13 PROCEDURE — 99999 PR PBB SHADOW E&M-EST. PATIENT-LVL III: ICD-10-PCS | Mod: PBBFAC,,, | Performed by: NURSE PRACTITIONER

## 2021-09-13 PROCEDURE — 20610 DRAIN/INJ JOINT/BURSA W/O US: CPT | Mod: 50,PBBFAC | Performed by: NURSE PRACTITIONER

## 2021-09-13 PROCEDURE — 20610 PR DRAIN/INJECT LARGE JOINT/BURSA: ICD-10-PCS | Mod: 50,S$PBB,, | Performed by: NURSE PRACTITIONER

## 2021-09-13 PROCEDURE — 99999 PR PBB SHADOW E&M-EST. PATIENT-LVL III: CPT | Mod: PBBFAC,,, | Performed by: NURSE PRACTITIONER

## 2021-09-13 PROCEDURE — 99499 NO LOS: ICD-10-PCS | Mod: S$PBB,,, | Performed by: NURSE PRACTITIONER

## 2021-09-13 PROCEDURE — 20610 DRAIN/INJ JOINT/BURSA W/O US: CPT | Mod: 50,S$PBB,, | Performed by: NURSE PRACTITIONER

## 2021-09-13 PROCEDURE — 99213 OFFICE O/P EST LOW 20 MIN: CPT | Mod: PBBFAC | Performed by: NURSE PRACTITIONER

## 2021-09-13 RX ADMIN — TRIAMCINOLONE ACETONIDE 80 MG: 40 INJECTION, SUSPENSION INTRA-ARTICULAR; INTRAMUSCULAR at 09:09

## 2021-09-14 ENCOUNTER — PATIENT MESSAGE (OUTPATIENT)
Dept: INTERNAL MEDICINE | Facility: CLINIC | Age: 56
End: 2021-09-14

## 2021-09-14 ENCOUNTER — OFFICE VISIT (OUTPATIENT)
Dept: INTERNAL MEDICINE | Facility: CLINIC | Age: 56
End: 2021-09-14
Payer: MEDICAID

## 2021-09-14 DIAGNOSIS — E11.65 TYPE 2 DIABETES MELLITUS WITH HYPERGLYCEMIA, WITH LONG-TERM CURRENT USE OF INSULIN: Primary | ICD-10-CM

## 2021-09-14 DIAGNOSIS — Z79.4 TYPE 2 DIABETES MELLITUS WITH HYPERGLYCEMIA, WITH LONG-TERM CURRENT USE OF INSULIN: Primary | ICD-10-CM

## 2021-09-14 DIAGNOSIS — N17.9 AKI (ACUTE KIDNEY INJURY): ICD-10-CM

## 2021-09-14 DIAGNOSIS — I10 ESSENTIAL HYPERTENSION: ICD-10-CM

## 2021-09-14 DIAGNOSIS — E66.01 MORBID OBESITY WITH BMI OF 50.0-59.9, ADULT: ICD-10-CM

## 2021-09-14 PROCEDURE — 99215 OFFICE O/P EST HI 40 MIN: CPT | Mod: 95,,, | Performed by: INTERNAL MEDICINE

## 2021-09-14 PROCEDURE — 99215 PR OFFICE/OUTPT VISIT, EST, LEVL V, 40-54 MIN: ICD-10-PCS | Mod: 95,,, | Performed by: INTERNAL MEDICINE

## 2021-09-14 RX ORDER — INSULIN DETEMIR 100 [IU]/ML
50 INJECTION, SOLUTION SUBCUTANEOUS NIGHTLY
Qty: 15 ML | Refills: 3 | Status: ON HOLD | OUTPATIENT
Start: 2021-09-14 | End: 2021-10-14 | Stop reason: SDUPTHER

## 2021-09-14 RX ORDER — INSULIN ASPART 100 [IU]/ML
7 INJECTION, SOLUTION INTRAVENOUS; SUBCUTANEOUS
Qty: 15 ML | Refills: 11 | Status: ON HOLD | OUTPATIENT
Start: 2021-09-14 | End: 2021-10-14 | Stop reason: SDUPTHER

## 2021-09-14 RX ORDER — CHLORTHALIDONE 25 MG/1
25 TABLET ORAL DAILY
Qty: 30 TABLET | Refills: 3 | Status: SHIPPED | OUTPATIENT
Start: 2021-09-14 | End: 2021-11-09 | Stop reason: SDUPTHER

## 2021-09-14 RX ORDER — PEN NEEDLE, DIABETIC 30 GX3/16"
1 NEEDLE, DISPOSABLE MISCELLANEOUS 4 TIMES DAILY
Qty: 200 EACH | Refills: 11 | Status: SHIPPED | OUTPATIENT
Start: 2021-09-14 | End: 2021-11-09

## 2021-09-14 RX ORDER — METFORMIN HYDROCHLORIDE 1000 MG/1
1000 TABLET ORAL 2 TIMES DAILY WITH MEALS
Qty: 180 TABLET | Refills: 3 | Status: ON HOLD | OUTPATIENT
Start: 2021-09-14 | End: 2021-10-14 | Stop reason: HOSPADM

## 2021-09-15 RX ORDER — TRIAMCINOLONE ACETONIDE 40 MG/ML
80 INJECTION, SUSPENSION INTRA-ARTICULAR; INTRAMUSCULAR
Status: COMPLETED | OUTPATIENT
Start: 2021-09-13 | End: 2021-09-13

## 2021-09-19 ENCOUNTER — PATIENT MESSAGE (OUTPATIENT)
Dept: PHYSICAL MEDICINE AND REHAB | Facility: CLINIC | Age: 56
End: 2021-09-19

## 2021-09-19 ENCOUNTER — PATIENT MESSAGE (OUTPATIENT)
Dept: INTERNAL MEDICINE | Facility: CLINIC | Age: 56
End: 2021-09-19

## 2021-09-22 ENCOUNTER — PATIENT MESSAGE (OUTPATIENT)
Dept: INTERNAL MEDICINE | Facility: CLINIC | Age: 56
End: 2021-09-22

## 2021-10-04 ENCOUNTER — PATIENT MESSAGE (OUTPATIENT)
Dept: ADMINISTRATIVE | Facility: HOSPITAL | Age: 56
End: 2021-10-04

## 2021-10-11 ENCOUNTER — HOSPITAL ENCOUNTER (INPATIENT)
Facility: OTHER | Age: 56
LOS: 3 days | Discharge: HOME | DRG: 638 | End: 2021-10-14
Attending: EMERGENCY MEDICINE | Admitting: INTERNAL MEDICINE
Payer: MEDICAID

## 2021-10-11 DIAGNOSIS — E87.20 ACIDOSIS: ICD-10-CM

## 2021-10-11 DIAGNOSIS — R25.2 MUSCLE CRAMPS: ICD-10-CM

## 2021-10-11 DIAGNOSIS — R73.9 HYPERGLYCEMIA: ICD-10-CM

## 2021-10-11 DIAGNOSIS — E11.65 TYPE 2 DIABETES MELLITUS WITH HYPERGLYCEMIA, WITH LONG-TERM CURRENT USE OF INSULIN: ICD-10-CM

## 2021-10-11 DIAGNOSIS — E11.65 TYPE 2 DIABETES MELLITUS WITH HYPERGLYCEMIA, WITH LONG-TERM CURRENT USE OF INSULIN: Primary | ICD-10-CM

## 2021-10-11 DIAGNOSIS — E66.01 MORBID OBESITY: ICD-10-CM

## 2021-10-11 DIAGNOSIS — Z79.4 TYPE 2 DIABETES MELLITUS WITH HYPERGLYCEMIA, WITH LONG-TERM CURRENT USE OF INSULIN: ICD-10-CM

## 2021-10-11 DIAGNOSIS — Z79.4 TYPE 2 DIABETES MELLITUS WITH HYPERGLYCEMIA, WITH LONG-TERM CURRENT USE OF INSULIN: Primary | ICD-10-CM

## 2021-10-11 LAB
B-OH-BUTYR BLD STRIP-SCNC: 0.1 MMOL/L (ref 0–0.5)
POCT GLUCOSE: 465 MG/DL (ref 70–110)

## 2021-10-11 PROCEDURE — 82010 KETONE BODYS QUAN: CPT | Performed by: NURSE PRACTITIONER

## 2021-10-11 PROCEDURE — 85025 COMPLETE CBC W/AUTO DIFF WBC: CPT | Performed by: NURSE PRACTITIONER

## 2021-10-11 PROCEDURE — 25000003 PHARM REV CODE 250: Performed by: NURSE PRACTITIONER

## 2021-10-11 PROCEDURE — 96361 HYDRATE IV INFUSION ADD-ON: CPT

## 2021-10-11 PROCEDURE — 99291 CRITICAL CARE FIRST HOUR: CPT | Mod: 25

## 2021-10-11 PROCEDURE — 12000002 HC ACUTE/MED SURGE SEMI-PRIVATE ROOM

## 2021-10-11 PROCEDURE — 82962 GLUCOSE BLOOD TEST: CPT

## 2021-10-11 PROCEDURE — 80053 COMPREHEN METABOLIC PANEL: CPT | Performed by: NURSE PRACTITIONER

## 2021-10-11 RX ADMIN — SODIUM CHLORIDE 1000 ML: 0.9 INJECTION, SOLUTION INTRAVENOUS at 11:10

## 2021-10-12 ENCOUNTER — PATIENT MESSAGE (OUTPATIENT)
Dept: PHYSICAL MEDICINE AND REHAB | Facility: CLINIC | Age: 56
End: 2021-10-12

## 2021-10-12 PROBLEM — N17.9 AKI (ACUTE KIDNEY INJURY): Status: ACTIVE | Noted: 2021-10-12

## 2021-10-12 PROBLEM — E87.20 METABOLIC ACIDOSIS: Status: ACTIVE | Noted: 2021-10-12

## 2021-10-12 PROBLEM — R25.2 MUSCLE CRAMPS: Status: ACTIVE | Noted: 2021-10-12

## 2021-10-12 LAB
ALBUMIN SERPL BCP-MCNC: 3.3 G/DL (ref 3.5–5.2)
ALBUMIN SERPL BCP-MCNC: 3.4 G/DL (ref 3.5–5.2)
ALLENS TEST: ABNORMAL
ALP SERPL-CCNC: 93 U/L (ref 55–135)
ALP SERPL-CCNC: 99 U/L (ref 55–135)
ALT SERPL W/O P-5'-P-CCNC: 23 U/L (ref 10–44)
ALT SERPL W/O P-5'-P-CCNC: 24 U/L (ref 10–44)
ANION GAP SERPL CALC-SCNC: 10 MMOL/L (ref 8–16)
ANION GAP SERPL CALC-SCNC: 12 MMOL/L (ref 8–16)
AST SERPL-CCNC: 18 U/L (ref 10–40)
AST SERPL-CCNC: 26 U/L (ref 10–40)
BACTERIA #/AREA URNS HPF: NORMAL /HPF
BASOPHILS # BLD AUTO: 0.03 K/UL (ref 0–0.2)
BASOPHILS # BLD AUTO: 0.05 K/UL (ref 0–0.2)
BASOPHILS NFR BLD: 0.5 % (ref 0–1.9)
BASOPHILS NFR BLD: 0.7 % (ref 0–1.9)
BILIRUB SERPL-MCNC: 0.3 MG/DL (ref 0.1–1)
BILIRUB SERPL-MCNC: 0.4 MG/DL (ref 0.1–1)
BILIRUB UR QL STRIP: NEGATIVE
BUN SERPL-MCNC: 16 MG/DL (ref 6–20)
BUN SERPL-MCNC: 17 MG/DL (ref 6–20)
CALCIUM SERPL-MCNC: 10 MG/DL (ref 8.7–10.5)
CALCIUM SERPL-MCNC: 9.8 MG/DL (ref 8.7–10.5)
CHLORIDE SERPL-SCNC: 106 MMOL/L (ref 95–110)
CHLORIDE SERPL-SCNC: 107 MMOL/L (ref 95–110)
CK SERPL-CCNC: 140 U/L (ref 20–180)
CLARITY UR: CLEAR
CO2 SERPL-SCNC: 15 MMOL/L (ref 23–29)
CO2 SERPL-SCNC: 18 MMOL/L (ref 23–29)
COLOR UR: YELLOW
CREAT SERPL-MCNC: 1.2 MG/DL (ref 0.5–1.4)
CREAT SERPL-MCNC: 1.6 MG/DL (ref 0.5–1.4)
CTP QC/QA: YES
DELSYS: ABNORMAL
DIFFERENTIAL METHOD: ABNORMAL
DIFFERENTIAL METHOD: ABNORMAL
EOSINOPHIL # BLD AUTO: 0.1 K/UL (ref 0–0.5)
EOSINOPHIL # BLD AUTO: 0.1 K/UL (ref 0–0.5)
EOSINOPHIL NFR BLD: 1.4 % (ref 0–8)
EOSINOPHIL NFR BLD: 1.9 % (ref 0–8)
ERYTHROCYTE [DISTWIDTH] IN BLOOD BY AUTOMATED COUNT: 18.6 % (ref 11.5–14.5)
ERYTHROCYTE [DISTWIDTH] IN BLOOD BY AUTOMATED COUNT: 18.8 % (ref 11.5–14.5)
EST. GFR  (AFRICAN AMERICAN): 42 ML/MIN/1.73 M^2
EST. GFR  (AFRICAN AMERICAN): 59 ML/MIN/1.73 M^2
EST. GFR  (NON AFRICAN AMERICAN): 36 ML/MIN/1.73 M^2
EST. GFR  (NON AFRICAN AMERICAN): 51 ML/MIN/1.73 M^2
ESTIMATED AVG GLUCOSE: ABNORMAL MG/DL (ref 68–131)
GLUCOSE SERPL-MCNC: 404 MG/DL (ref 70–110)
GLUCOSE SERPL-MCNC: 532 MG/DL (ref 70–110)
GLUCOSE UR QL STRIP: ABNORMAL
HBA1C MFR BLD: >14 % (ref 4–5.6)
HCT VFR BLD AUTO: 39.5 % (ref 37–48.5)
HCT VFR BLD AUTO: 39.7 % (ref 37–48.5)
HGB BLD-MCNC: 12 G/DL (ref 12–16)
HGB BLD-MCNC: 12.2 G/DL (ref 12–16)
HGB UR QL STRIP: ABNORMAL
IMM GRANULOCYTES # BLD AUTO: 0.01 K/UL (ref 0–0.04)
IMM GRANULOCYTES # BLD AUTO: 0.02 K/UL (ref 0–0.04)
IMM GRANULOCYTES NFR BLD AUTO: 0.1 % (ref 0–0.5)
IMM GRANULOCYTES NFR BLD AUTO: 0.3 % (ref 0–0.5)
KETONES UR QL STRIP: NEGATIVE
LACTATE SERPL-SCNC: 1.2 MMOL/L (ref 0.5–2.2)
LEUKOCYTE ESTERASE UR QL STRIP: NEGATIVE
LYMPHOCYTES # BLD AUTO: 1.6 K/UL (ref 1–4.8)
LYMPHOCYTES # BLD AUTO: 2 K/UL (ref 1–4.8)
LYMPHOCYTES NFR BLD: 26.5 % (ref 18–48)
LYMPHOCYTES NFR BLD: 29.2 % (ref 18–48)
MAGNESIUM SERPL-MCNC: 1.8 MG/DL (ref 1.6–2.6)
MCH RBC QN AUTO: 21 PG (ref 27–31)
MCH RBC QN AUTO: 21.3 PG (ref 27–31)
MCHC RBC AUTO-ENTMCNC: 30.2 G/DL (ref 32–36)
MCHC RBC AUTO-ENTMCNC: 30.9 G/DL (ref 32–36)
MCV RBC AUTO: 69 FL (ref 82–98)
MCV RBC AUTO: 70 FL (ref 82–98)
MICROSCOPIC COMMENT: NORMAL
MODE: ABNORMAL
MONOCYTES # BLD AUTO: 0.4 K/UL (ref 0.3–1)
MONOCYTES # BLD AUTO: 0.5 K/UL (ref 0.3–1)
MONOCYTES NFR BLD: 7.4 % (ref 4–15)
MONOCYTES NFR BLD: 7.9 % (ref 4–15)
NEUTROPHILS # BLD AUTO: 3.7 K/UL (ref 1.8–7.7)
NEUTROPHILS # BLD AUTO: 4.1 K/UL (ref 1.8–7.7)
NEUTROPHILS NFR BLD: 60.2 % (ref 38–73)
NEUTROPHILS NFR BLD: 63.9 % (ref 38–73)
NITRITE UR QL STRIP: NEGATIVE
NRBC BLD-RTO: 0 /100 WBC
NRBC BLD-RTO: 0 /100 WBC
PCO2 BLDA: 47.6 MMHG (ref 35–45)
PH SMN: 7.24 [PH] (ref 7.35–7.45)
PH UR STRIP: 6 [PH] (ref 5–8)
PHOSPHATE SERPL-MCNC: 3 MG/DL (ref 2.7–4.5)
PLATELET # BLD AUTO: 188 K/UL (ref 150–450)
PLATELET # BLD AUTO: 223 K/UL (ref 150–450)
PLATELET BLD QL SMEAR: ABNORMAL
PMV BLD AUTO: ABNORMAL FL (ref 9.2–12.9)
PMV BLD AUTO: ABNORMAL FL (ref 9.2–12.9)
PO2 BLDA: 38 MMHG (ref 40–60)
POC BE: -7 MMOL/L
POC SATURATED O2: 62 % (ref 95–100)
POCT GLUCOSE: 283 MG/DL (ref 70–110)
POCT GLUCOSE: 291 MG/DL (ref 70–110)
POCT GLUCOSE: 323 MG/DL (ref 70–110)
POCT GLUCOSE: 359 MG/DL (ref 70–110)
POCT GLUCOSE: 403 MG/DL (ref 70–110)
POCT GLUCOSE: 456 MG/DL (ref 70–110)
POTASSIUM SERPL-SCNC: 4.4 MMOL/L (ref 3.5–5.1)
POTASSIUM SERPL-SCNC: 4.6 MMOL/L (ref 3.5–5.1)
PROT SERPL-MCNC: 7.1 G/DL (ref 6–8.4)
PROT SERPL-MCNC: 7.2 G/DL (ref 6–8.4)
PROT UR QL STRIP: NEGATIVE
RBC # BLD AUTO: 5.71 M/UL (ref 4–5.4)
RBC # BLD AUTO: 5.73 M/UL (ref 4–5.4)
RBC #/AREA URNS HPF: 3 /HPF (ref 0–4)
SAMPLE: ABNORMAL
SARS-COV-2 RDRP RESP QL NAA+PROBE: NEGATIVE
SITE: ABNORMAL
SODIUM SERPL-SCNC: 133 MMOL/L (ref 136–145)
SODIUM SERPL-SCNC: 135 MMOL/L (ref 136–145)
SP GR UR STRIP: 1.02 (ref 1–1.03)
SQUAMOUS #/AREA URNS HPF: 50 /HPF
URN SPEC COLLECT METH UR: ABNORMAL
UROBILINOGEN UR STRIP-ACNC: NEGATIVE EU/DL
WBC # BLD AUTO: 5.84 K/UL (ref 3.9–12.7)
WBC # BLD AUTO: 6.81 K/UL (ref 3.9–12.7)
WBC #/AREA URNS HPF: 0 /HPF (ref 0–5)
YEAST URNS QL MICRO: NORMAL

## 2021-10-12 PROCEDURE — 25000003 PHARM REV CODE 250: Performed by: EMERGENCY MEDICINE

## 2021-10-12 PROCEDURE — G0378 HOSPITAL OBSERVATION PER HR: HCPCS

## 2021-10-12 PROCEDURE — 83735 ASSAY OF MAGNESIUM: CPT | Performed by: NURSE PRACTITIONER

## 2021-10-12 PROCEDURE — 83036 HEMOGLOBIN GLYCOSYLATED A1C: CPT | Performed by: NURSE PRACTITIONER

## 2021-10-12 PROCEDURE — 63600175 PHARM REV CODE 636 W HCPCS: Performed by: EMERGENCY MEDICINE

## 2021-10-12 PROCEDURE — 93005 ELECTROCARDIOGRAM TRACING: CPT

## 2021-10-12 PROCEDURE — 96375 TX/PRO/DX INJ NEW DRUG ADDON: CPT

## 2021-10-12 PROCEDURE — 99220 PR INITIAL OBSERVATION CARE,LEVL III: ICD-10-PCS | Mod: ,,, | Performed by: INTERNAL MEDICINE

## 2021-10-12 PROCEDURE — 25000003 PHARM REV CODE 250: Performed by: INTERNAL MEDICINE

## 2021-10-12 PROCEDURE — 84100 ASSAY OF PHOSPHORUS: CPT | Performed by: NURSE PRACTITIONER

## 2021-10-12 PROCEDURE — 99900035 HC TECH TIME PER 15 MIN (STAT)

## 2021-10-12 PROCEDURE — 83605 ASSAY OF LACTIC ACID: CPT | Performed by: EMERGENCY MEDICINE

## 2021-10-12 PROCEDURE — 82803 BLOOD GASES ANY COMBINATION: CPT

## 2021-10-12 PROCEDURE — 80053 COMPREHEN METABOLIC PANEL: CPT | Performed by: NURSE PRACTITIONER

## 2021-10-12 PROCEDURE — 96374 THER/PROPH/DIAG INJ IV PUSH: CPT

## 2021-10-12 PROCEDURE — 85025 COMPLETE CBC W/AUTO DIFF WBC: CPT | Performed by: NURSE PRACTITIONER

## 2021-10-12 PROCEDURE — 82550 ASSAY OF CK (CPK): CPT | Performed by: EMERGENCY MEDICINE

## 2021-10-12 PROCEDURE — 36415 COLL VENOUS BLD VENIPUNCTURE: CPT | Performed by: EMERGENCY MEDICINE

## 2021-10-12 PROCEDURE — 11000001 HC ACUTE MED/SURG PRIVATE ROOM

## 2021-10-12 PROCEDURE — C9399 UNCLASSIFIED DRUGS OR BIOLOG: HCPCS | Performed by: INTERNAL MEDICINE

## 2021-10-12 PROCEDURE — U0002 COVID-19 LAB TEST NON-CDC: HCPCS | Performed by: EMERGENCY MEDICINE

## 2021-10-12 PROCEDURE — 25000003 PHARM REV CODE 250: Performed by: NURSE PRACTITIONER

## 2021-10-12 PROCEDURE — 99220 PR INITIAL OBSERVATION CARE,LEVL III: CPT | Mod: ,,, | Performed by: INTERNAL MEDICINE

## 2021-10-12 PROCEDURE — 63600175 PHARM REV CODE 636 W HCPCS: Performed by: NURSE PRACTITIONER

## 2021-10-12 PROCEDURE — 82962 GLUCOSE BLOOD TEST: CPT

## 2021-10-12 PROCEDURE — 96376 TX/PRO/DX INJ SAME DRUG ADON: CPT

## 2021-10-12 PROCEDURE — 96361 HYDRATE IV INFUSION ADD-ON: CPT

## 2021-10-12 PROCEDURE — 81000 URINALYSIS NONAUTO W/SCOPE: CPT | Performed by: NURSE PRACTITIONER

## 2021-10-12 RX ORDER — MULTIVITAMIN
1 TABLET ORAL DAILY
COMMUNITY
End: 2022-12-15 | Stop reason: HOSPADM

## 2021-10-12 RX ORDER — ATORVASTATIN CALCIUM 20 MG/1
40 TABLET, FILM COATED ORAL DAILY
Status: DISCONTINUED | OUTPATIENT
Start: 2021-10-12 | End: 2021-10-14 | Stop reason: HOSPADM

## 2021-10-12 RX ORDER — INSULIN ASPART 100 [IU]/ML
7 INJECTION, SOLUTION INTRAVENOUS; SUBCUTANEOUS
Status: DISCONTINUED | OUTPATIENT
Start: 2021-10-12 | End: 2021-10-13

## 2021-10-12 RX ORDER — ASPIRIN 81 MG/1
81 TABLET ORAL DAILY
Status: DISCONTINUED | OUTPATIENT
Start: 2021-10-12 | End: 2021-10-14 | Stop reason: HOSPADM

## 2021-10-12 RX ORDER — SODIUM CHLORIDE 0.9 % (FLUSH) 0.9 %
10 SYRINGE (ML) INJECTION EVERY 8 HOURS PRN
Status: DISCONTINUED | OUTPATIENT
Start: 2021-10-12 | End: 2021-10-14 | Stop reason: HOSPADM

## 2021-10-12 RX ORDER — ACETAMINOPHEN 325 MG/1
650 TABLET ORAL EVERY 4 HOURS PRN
Status: DISCONTINUED | OUTPATIENT
Start: 2021-10-12 | End: 2021-10-14 | Stop reason: HOSPADM

## 2021-10-12 RX ORDER — CYCLOBENZAPRINE HCL 5 MG
5 TABLET ORAL 3 TIMES DAILY PRN
Status: DISCONTINUED | OUTPATIENT
Start: 2021-10-12 | End: 2021-10-14 | Stop reason: HOSPADM

## 2021-10-12 RX ORDER — ENOXAPARIN SODIUM 100 MG/ML
40 INJECTION SUBCUTANEOUS EVERY 24 HOURS
Status: DISCONTINUED | OUTPATIENT
Start: 2021-10-12 | End: 2021-10-14 | Stop reason: HOSPADM

## 2021-10-12 RX ORDER — INSULIN ASPART 100 [IU]/ML
1-10 INJECTION, SOLUTION INTRAVENOUS; SUBCUTANEOUS
Status: DISCONTINUED | OUTPATIENT
Start: 2021-10-12 | End: 2021-10-14 | Stop reason: HOSPADM

## 2021-10-12 RX ORDER — IBUPROFEN 200 MG
16 TABLET ORAL
Status: DISCONTINUED | OUTPATIENT
Start: 2021-10-12 | End: 2021-10-14 | Stop reason: HOSPADM

## 2021-10-12 RX ORDER — MORPHINE SULFATE 2 MG/ML
2 INJECTION, SOLUTION INTRAMUSCULAR; INTRAVENOUS
Status: COMPLETED | OUTPATIENT
Start: 2021-10-12 | End: 2021-10-12

## 2021-10-12 RX ORDER — DIAZEPAM 5 MG/1
5 TABLET ORAL
Status: COMPLETED | OUTPATIENT
Start: 2021-10-12 | End: 2021-10-12

## 2021-10-12 RX ORDER — ONDANSETRON 8 MG/1
8 TABLET, ORALLY DISINTEGRATING ORAL EVERY 8 HOURS PRN
Status: DISCONTINUED | OUTPATIENT
Start: 2021-10-12 | End: 2021-10-14 | Stop reason: HOSPADM

## 2021-10-12 RX ORDER — MORPHINE SULFATE 4 MG/ML
4 INJECTION, SOLUTION INTRAMUSCULAR; INTRAVENOUS
Status: COMPLETED | OUTPATIENT
Start: 2021-10-12 | End: 2021-10-12

## 2021-10-12 RX ORDER — SODIUM CHLORIDE 9 MG/ML
INJECTION, SOLUTION INTRAVENOUS CONTINUOUS
Status: DISCONTINUED | OUTPATIENT
Start: 2021-10-12 | End: 2021-10-13

## 2021-10-12 RX ORDER — AMLODIPINE BESYLATE 5 MG/1
10 TABLET ORAL DAILY
Status: DISCONTINUED | OUTPATIENT
Start: 2021-10-12 | End: 2021-10-14 | Stop reason: HOSPADM

## 2021-10-12 RX ORDER — IBUPROFEN 200 MG
24 TABLET ORAL
Status: DISCONTINUED | OUTPATIENT
Start: 2021-10-12 | End: 2021-10-14 | Stop reason: HOSPADM

## 2021-10-12 RX ORDER — VITAMIN B COMPLEX
1 CAPSULE ORAL DAILY
COMMUNITY

## 2021-10-12 RX ORDER — KETOROLAC TROMETHAMINE 30 MG/ML
15 INJECTION, SOLUTION INTRAMUSCULAR; INTRAVENOUS
Status: COMPLETED | OUTPATIENT
Start: 2021-10-12 | End: 2021-10-12

## 2021-10-12 RX ORDER — LANOLIN ALCOHOL/MO/W.PET/CERES
1000 CREAM (GRAM) TOPICAL DAILY
Status: DISCONTINUED | OUTPATIENT
Start: 2021-10-12 | End: 2021-10-13

## 2021-10-12 RX ORDER — SODIUM CHLORIDE 9 MG/ML
INJECTION, SOLUTION INTRAVENOUS
Status: COMPLETED | OUTPATIENT
Start: 2021-10-12 | End: 2021-10-12

## 2021-10-12 RX ORDER — GLUCAGON 1 MG
1 KIT INJECTION
Status: DISCONTINUED | OUTPATIENT
Start: 2021-10-12 | End: 2021-10-14 | Stop reason: HOSPADM

## 2021-10-12 RX ADMIN — KETOROLAC TROMETHAMINE 15 MG: 30 INJECTION, SOLUTION INTRAMUSCULAR at 12:10

## 2021-10-12 RX ADMIN — DIAZEPAM 5 MG: 5 TABLET ORAL at 01:10

## 2021-10-12 RX ADMIN — INSULIN ASPART 7 UNITS: 100 INJECTION, SOLUTION INTRAVENOUS; SUBCUTANEOUS at 08:10

## 2021-10-12 RX ADMIN — INSULIN DETEMIR 40 UNITS: 100 INJECTION, SOLUTION SUBCUTANEOUS at 09:10

## 2021-10-12 RX ADMIN — CYANOCOBALAMIN TAB 1000 MCG 1000 MCG: 1000 TAB at 08:10

## 2021-10-12 RX ADMIN — INSULIN ASPART 10 UNITS: 100 INJECTION, SOLUTION INTRAVENOUS; SUBCUTANEOUS at 08:10

## 2021-10-12 RX ADMIN — MORPHINE SULFATE 2 MG: 2 INJECTION, SOLUTION INTRAMUSCULAR; INTRAVENOUS at 02:10

## 2021-10-12 RX ADMIN — MORPHINE SULFATE 4 MG: 4 INJECTION INTRAVENOUS at 03:10

## 2021-10-12 RX ADMIN — INSULIN ASPART 7 UNITS: 100 INJECTION, SOLUTION INTRAVENOUS; SUBCUTANEOUS at 05:10

## 2021-10-12 RX ADMIN — INSULIN ASPART 7 UNITS: 100 INJECTION, SOLUTION INTRAVENOUS; SUBCUTANEOUS at 01:10

## 2021-10-12 RX ADMIN — ASPIRIN 81 MG: 81 TABLET, COATED ORAL at 08:10

## 2021-10-12 RX ADMIN — INSULIN DETEMIR 15 UNITS: 100 INJECTION, SOLUTION SUBCUTANEOUS at 01:10

## 2021-10-12 RX ADMIN — ATORVASTATIN CALCIUM 40 MG: 20 TABLET, FILM COATED ORAL at 08:10

## 2021-10-12 RX ADMIN — SODIUM CHLORIDE: 0.9 INJECTION, SOLUTION INTRAVENOUS at 01:10

## 2021-10-12 RX ADMIN — ENOXAPARIN SODIUM 40 MG: 40 INJECTION SUBCUTANEOUS at 05:10

## 2021-10-12 RX ADMIN — INSULIN ASPART 6 UNITS: 100 INJECTION, SOLUTION INTRAVENOUS; SUBCUTANEOUS at 05:10

## 2021-10-12 RX ADMIN — SODIUM CHLORIDE: 0.9 INJECTION, SOLUTION INTRAVENOUS at 09:10

## 2021-10-12 RX ADMIN — AMLODIPINE BESYLATE 10 MG: 5 TABLET ORAL at 08:10

## 2021-10-12 RX ADMIN — CYCLOBENZAPRINE HYDROCHLORIDE 5 MG: 5 TABLET, FILM COATED ORAL at 02:10

## 2021-10-12 RX ADMIN — INSULIN ASPART 8 UNITS: 100 INJECTION, SOLUTION INTRAVENOUS; SUBCUTANEOUS at 01:10

## 2021-10-12 RX ADMIN — INSULIN HUMAN 10 UNITS: 100 INJECTION, SOLUTION PARENTERAL at 01:10

## 2021-10-12 RX ADMIN — CYCLOBENZAPRINE HYDROCHLORIDE 5 MG: 5 TABLET, FILM COATED ORAL at 10:10

## 2021-10-13 PROBLEM — R73.9 HYPERGLYCEMIA: Status: ACTIVE | Noted: 2021-10-13

## 2021-10-13 LAB
25(OH)D3+25(OH)D2 SERPL-MCNC: 25 NG/ML (ref 30–96)
ALBUMIN SERPL BCP-MCNC: 2.9 G/DL (ref 3.5–5.2)
ALP SERPL-CCNC: 82 U/L (ref 55–135)
ALT SERPL W/O P-5'-P-CCNC: 20 U/L (ref 10–44)
ANION GAP SERPL CALC-SCNC: 7 MMOL/L (ref 8–16)
AST SERPL-CCNC: 15 U/L (ref 10–40)
BASOPHILS # BLD AUTO: 0.04 K/UL (ref 0–0.2)
BASOPHILS NFR BLD: 0.8 % (ref 0–1.9)
BILIRUB SERPL-MCNC: 0.3 MG/DL (ref 0.1–1)
BUN SERPL-MCNC: 16 MG/DL (ref 6–20)
CALCIUM SERPL-MCNC: 9.3 MG/DL (ref 8.7–10.5)
CHLORIDE SERPL-SCNC: 111 MMOL/L (ref 95–110)
CK SERPL-CCNC: 156 U/L (ref 20–180)
CO2 SERPL-SCNC: 19 MMOL/L (ref 23–29)
CREAT SERPL-MCNC: 1.1 MG/DL (ref 0.5–1.4)
DIFFERENTIAL METHOD: ABNORMAL
EOSINOPHIL # BLD AUTO: 0.1 K/UL (ref 0–0.5)
EOSINOPHIL NFR BLD: 2.5 % (ref 0–8)
ERYTHROCYTE [DISTWIDTH] IN BLOOD BY AUTOMATED COUNT: 18.2 % (ref 11.5–14.5)
EST. GFR  (AFRICAN AMERICAN): >60 ML/MIN/1.73 M^2
EST. GFR  (NON AFRICAN AMERICAN): 57 ML/MIN/1.73 M^2
FERRITIN SERPL-MCNC: 59 NG/ML (ref 20–300)
FOLATE SERPL-MCNC: 11.7 NG/ML (ref 4–24)
GLUCOSE SERPL-MCNC: 471 MG/DL (ref 70–110)
HCT VFR BLD AUTO: 38.3 % (ref 37–48.5)
HGB BLD-MCNC: 11.5 G/DL (ref 12–16)
IMM GRANULOCYTES # BLD AUTO: 0 K/UL (ref 0–0.04)
IMM GRANULOCYTES NFR BLD AUTO: 0 % (ref 0–0.5)
IRON SERPL-MCNC: 53 UG/DL (ref 30–160)
LYMPHOCYTES # BLD AUTO: 1.8 K/UL (ref 1–4.8)
LYMPHOCYTES NFR BLD: 34.9 % (ref 18–48)
MAGNESIUM SERPL-MCNC: 1.8 MG/DL (ref 1.6–2.6)
MCH RBC QN AUTO: 20.9 PG (ref 27–31)
MCHC RBC AUTO-ENTMCNC: 30 G/DL (ref 32–36)
MCV RBC AUTO: 70 FL (ref 82–98)
MONOCYTES # BLD AUTO: 0.4 K/UL (ref 0.3–1)
MONOCYTES NFR BLD: 8.2 % (ref 4–15)
NEUTROPHILS # BLD AUTO: 2.8 K/UL (ref 1.8–7.7)
NEUTROPHILS NFR BLD: 53.6 % (ref 38–73)
NRBC BLD-RTO: 0 /100 WBC
PHOSPHATE SERPL-MCNC: 2.8 MG/DL (ref 2.7–4.5)
PLATELET # BLD AUTO: 208 K/UL (ref 150–450)
PLATELET BLD QL SMEAR: ABNORMAL
PMV BLD AUTO: ABNORMAL FL (ref 9.2–12.9)
POCT GLUCOSE: 212 MG/DL (ref 70–110)
POCT GLUCOSE: 257 MG/DL (ref 70–110)
POCT GLUCOSE: 265 MG/DL (ref 70–110)
POCT GLUCOSE: 326 MG/DL (ref 70–110)
POCT GLUCOSE: 379 MG/DL (ref 70–110)
POTASSIUM SERPL-SCNC: 4.6 MMOL/L (ref 3.5–5.1)
PROT SERPL-MCNC: 6.2 G/DL (ref 6–8.4)
RBC # BLD AUTO: 5.51 M/UL (ref 4–5.4)
SATURATED IRON: 20 % (ref 20–50)
SODIUM SERPL-SCNC: 137 MMOL/L (ref 136–145)
TOTAL IRON BINDING CAPACITY: 259 UG/DL (ref 250–450)
TRANSFERRIN SERPL-MCNC: 175 MG/DL (ref 200–375)
TSH SERPL DL<=0.005 MIU/L-ACNC: 1.83 UIU/ML (ref 0.4–4)
URATE SERPL-MCNC: 6.2 MG/DL (ref 2.4–5.7)
VIT B12 SERPL-MCNC: 1670 PG/ML (ref 210–950)
WBC # BLD AUTO: 5.27 K/UL (ref 3.9–12.7)

## 2021-10-13 PROCEDURE — 11000001 HC ACUTE MED/SURG PRIVATE ROOM

## 2021-10-13 PROCEDURE — 84466 ASSAY OF TRANSFERRIN: CPT | Performed by: INTERNAL MEDICINE

## 2021-10-13 PROCEDURE — 25000003 PHARM REV CODE 250: Performed by: NURSE PRACTITIONER

## 2021-10-13 PROCEDURE — 82306 VITAMIN D 25 HYDROXY: CPT | Performed by: INTERNAL MEDICINE

## 2021-10-13 PROCEDURE — 84550 ASSAY OF BLOOD/URIC ACID: CPT | Performed by: INTERNAL MEDICINE

## 2021-10-13 PROCEDURE — C9399 UNCLASSIFIED DRUGS OR BIOLOG: HCPCS | Performed by: INTERNAL MEDICINE

## 2021-10-13 PROCEDURE — 99233 SBSQ HOSP IP/OBS HIGH 50: CPT | Mod: ,,, | Performed by: INTERNAL MEDICINE

## 2021-10-13 PROCEDURE — 82607 VITAMIN B-12: CPT | Performed by: INTERNAL MEDICINE

## 2021-10-13 PROCEDURE — 80053 COMPREHEN METABOLIC PANEL: CPT | Performed by: NURSE PRACTITIONER

## 2021-10-13 PROCEDURE — 82550 ASSAY OF CK (CPK): CPT | Performed by: INTERNAL MEDICINE

## 2021-10-13 PROCEDURE — 83735 ASSAY OF MAGNESIUM: CPT | Performed by: NURSE PRACTITIONER

## 2021-10-13 PROCEDURE — 84100 ASSAY OF PHOSPHORUS: CPT | Performed by: NURSE PRACTITIONER

## 2021-10-13 PROCEDURE — 82746 ASSAY OF FOLIC ACID SERUM: CPT | Performed by: INTERNAL MEDICINE

## 2021-10-13 PROCEDURE — 85025 COMPLETE CBC W/AUTO DIFF WBC: CPT | Performed by: NURSE PRACTITIONER

## 2021-10-13 PROCEDURE — 84443 ASSAY THYROID STIM HORMONE: CPT | Performed by: INTERNAL MEDICINE

## 2021-10-13 PROCEDURE — 82728 ASSAY OF FERRITIN: CPT | Performed by: INTERNAL MEDICINE

## 2021-10-13 PROCEDURE — 99233 PR SUBSEQUENT HOSPITAL CARE,LEVL III: ICD-10-PCS | Mod: ,,, | Performed by: INTERNAL MEDICINE

## 2021-10-13 PROCEDURE — 25000003 PHARM REV CODE 250: Performed by: INTERNAL MEDICINE

## 2021-10-13 RX ORDER — INSULIN ASPART 100 [IU]/ML
12 INJECTION, SOLUTION INTRAVENOUS; SUBCUTANEOUS
Status: DISCONTINUED | OUTPATIENT
Start: 2021-10-13 | End: 2021-10-13

## 2021-10-13 RX ORDER — INSULIN ASPART 100 [IU]/ML
10 INJECTION, SOLUTION INTRAVENOUS; SUBCUTANEOUS
Status: DISCONTINUED | OUTPATIENT
Start: 2021-10-13 | End: 2021-10-14 | Stop reason: HOSPADM

## 2021-10-13 RX ADMIN — CYCLOBENZAPRINE HYDROCHLORIDE 5 MG: 5 TABLET, FILM COATED ORAL at 08:10

## 2021-10-13 RX ADMIN — CYANOCOBALAMIN TAB 1000 MCG 1000 MCG: 1000 TAB at 08:10

## 2021-10-13 RX ADMIN — ATORVASTATIN CALCIUM 40 MG: 20 TABLET, FILM COATED ORAL at 08:10

## 2021-10-13 RX ADMIN — INSULIN ASPART 6 UNITS: 100 INJECTION, SOLUTION INTRAVENOUS; SUBCUTANEOUS at 06:10

## 2021-10-13 RX ADMIN — INSULIN DETEMIR 25 UNITS: 100 INJECTION, SOLUTION SUBCUTANEOUS at 08:10

## 2021-10-13 RX ADMIN — INSULIN ASPART 2 UNITS: 100 INJECTION, SOLUTION INTRAVENOUS; SUBCUTANEOUS at 10:10

## 2021-10-13 RX ADMIN — INSULIN ASPART 6 UNITS: 100 INJECTION, SOLUTION INTRAVENOUS; SUBCUTANEOUS at 09:10

## 2021-10-13 RX ADMIN — AMLODIPINE BESYLATE 10 MG: 5 TABLET ORAL at 08:10

## 2021-10-13 RX ADMIN — ASPIRIN 81 MG: 81 TABLET, COATED ORAL at 08:10

## 2021-10-13 RX ADMIN — INSULIN ASPART 10 UNITS: 100 INJECTION, SOLUTION INTRAVENOUS; SUBCUTANEOUS at 04:10

## 2021-10-13 RX ADMIN — INSULIN ASPART 10 UNITS: 100 INJECTION, SOLUTION INTRAVENOUS; SUBCUTANEOUS at 06:10

## 2021-10-13 RX ADMIN — SODIUM CHLORIDE: 0.9 INJECTION, SOLUTION INTRAVENOUS at 07:10

## 2021-10-13 RX ADMIN — INSULIN ASPART 10 UNITS: 100 INJECTION, SOLUTION INTRAVENOUS; SUBCUTANEOUS at 07:10

## 2021-10-13 RX ADMIN — ACETAMINOPHEN 650 MG: 325 TABLET ORAL at 04:10

## 2021-10-13 RX ADMIN — INSULIN ASPART 8 UNITS: 100 INJECTION, SOLUTION INTRAVENOUS; SUBCUTANEOUS at 04:10

## 2021-10-13 RX ADMIN — INSULIN DETEMIR 40 UNITS: 100 INJECTION, SOLUTION SUBCUTANEOUS at 08:10

## 2021-10-14 VITALS
HEART RATE: 95 BPM | RESPIRATION RATE: 18 BRPM | TEMPERATURE: 98 F | SYSTOLIC BLOOD PRESSURE: 150 MMHG | DIASTOLIC BLOOD PRESSURE: 76 MMHG | HEIGHT: 66 IN | WEIGHT: 293 LBS | OXYGEN SATURATION: 100 % | BODY MASS INDEX: 47.09 KG/M2

## 2021-10-14 LAB
ALBUMIN SERPL BCP-MCNC: 2.7 G/DL (ref 3.5–5.2)
ALP SERPL-CCNC: 73 U/L (ref 55–135)
ALT SERPL W/O P-5'-P-CCNC: 16 U/L (ref 10–44)
ANION GAP SERPL CALC-SCNC: 9 MMOL/L (ref 8–16)
AST SERPL-CCNC: 14 U/L (ref 10–40)
BASOPHILS # BLD AUTO: 0.03 K/UL (ref 0–0.2)
BASOPHILS NFR BLD: 0.6 % (ref 0–1.9)
BILIRUB SERPL-MCNC: 0.3 MG/DL (ref 0.1–1)
BUN SERPL-MCNC: 13 MG/DL (ref 6–20)
CALCIUM SERPL-MCNC: 9.2 MG/DL (ref 8.7–10.5)
CHLORIDE SERPL-SCNC: 111 MMOL/L (ref 95–110)
CO2 SERPL-SCNC: 18 MMOL/L (ref 23–29)
CREAT SERPL-MCNC: 0.9 MG/DL (ref 0.5–1.4)
DIFFERENTIAL METHOD: ABNORMAL
EOSINOPHIL # BLD AUTO: 0.2 K/UL (ref 0–0.5)
EOSINOPHIL NFR BLD: 2.8 % (ref 0–8)
ERYTHROCYTE [DISTWIDTH] IN BLOOD BY AUTOMATED COUNT: 18.6 % (ref 11.5–14.5)
EST. GFR  (AFRICAN AMERICAN): >60 ML/MIN/1.73 M^2
EST. GFR  (NON AFRICAN AMERICAN): >60 ML/MIN/1.73 M^2
GLUCOSE SERPL-MCNC: 239 MG/DL (ref 70–110)
HCT VFR BLD AUTO: 37.1 % (ref 37–48.5)
HGB BLD-MCNC: 11.1 G/DL (ref 12–16)
IMM GRANULOCYTES # BLD AUTO: 0.01 K/UL (ref 0–0.04)
IMM GRANULOCYTES NFR BLD AUTO: 0.2 % (ref 0–0.5)
LYMPHOCYTES # BLD AUTO: 2.2 K/UL (ref 1–4.8)
LYMPHOCYTES NFR BLD: 41.1 % (ref 18–48)
MAGNESIUM SERPL-MCNC: 1.8 MG/DL (ref 1.6–2.6)
MCH RBC QN AUTO: 20.6 PG (ref 27–31)
MCHC RBC AUTO-ENTMCNC: 29.9 G/DL (ref 32–36)
MCV RBC AUTO: 69 FL (ref 82–98)
MONOCYTES # BLD AUTO: 0.5 K/UL (ref 0.3–1)
MONOCYTES NFR BLD: 9.1 % (ref 4–15)
NEUTROPHILS # BLD AUTO: 2.5 K/UL (ref 1.8–7.7)
NEUTROPHILS NFR BLD: 46.2 % (ref 38–73)
NRBC BLD-RTO: 0 /100 WBC
PHOSPHATE SERPL-MCNC: 3 MG/DL (ref 2.7–4.5)
PLATELET # BLD AUTO: 225 K/UL (ref 150–450)
PLATELET BLD QL SMEAR: ABNORMAL
PMV BLD AUTO: ABNORMAL FL (ref 9.2–12.9)
POCT GLUCOSE: 205 MG/DL (ref 70–110)
POCT GLUCOSE: 236 MG/DL (ref 70–110)
POCT GLUCOSE: 303 MG/DL (ref 70–110)
POTASSIUM SERPL-SCNC: 4.2 MMOL/L (ref 3.5–5.1)
PROT SERPL-MCNC: 5.8 G/DL (ref 6–8.4)
RBC # BLD AUTO: 5.38 M/UL (ref 4–5.4)
SODIUM SERPL-SCNC: 138 MMOL/L (ref 136–145)
WBC # BLD AUTO: 5.4 K/UL (ref 3.9–12.7)

## 2021-10-14 PROCEDURE — 36415 COLL VENOUS BLD VENIPUNCTURE: CPT | Performed by: NURSE PRACTITIONER

## 2021-10-14 PROCEDURE — 85025 COMPLETE CBC W/AUTO DIFF WBC: CPT | Performed by: NURSE PRACTITIONER

## 2021-10-14 PROCEDURE — 80053 COMPREHEN METABOLIC PANEL: CPT | Performed by: NURSE PRACTITIONER

## 2021-10-14 PROCEDURE — 99239 PR HOSPITAL DISCHARGE DAY,>30 MIN: ICD-10-PCS | Mod: ,,, | Performed by: INTERNAL MEDICINE

## 2021-10-14 PROCEDURE — 99239 HOSP IP/OBS DSCHRG MGMT >30: CPT | Mod: ,,, | Performed by: INTERNAL MEDICINE

## 2021-10-14 PROCEDURE — 84100 ASSAY OF PHOSPHORUS: CPT | Performed by: NURSE PRACTITIONER

## 2021-10-14 PROCEDURE — 83735 ASSAY OF MAGNESIUM: CPT | Performed by: NURSE PRACTITIONER

## 2021-10-14 PROCEDURE — 25000003 PHARM REV CODE 250: Performed by: INTERNAL MEDICINE

## 2021-10-14 PROCEDURE — 25000003 PHARM REV CODE 250: Performed by: NURSE PRACTITIONER

## 2021-10-14 RX ORDER — SODIUM BICARBONATE 650 MG/1
650 TABLET ORAL ONCE
Status: COMPLETED | OUTPATIENT
Start: 2021-10-14 | End: 2021-10-14

## 2021-10-14 RX ORDER — SODIUM BICARBONATE 650 MG/1
650 TABLET ORAL 2 TIMES DAILY
Qty: 60 TABLET | Refills: 1 | Status: SHIPPED | OUTPATIENT
Start: 2021-10-14 | End: 2022-02-10 | Stop reason: SDUPTHER

## 2021-10-14 RX ORDER — INSULIN ASPART 100 [IU]/ML
10 INJECTION, SOLUTION INTRAVENOUS; SUBCUTANEOUS
Qty: 15 ML | Refills: 11
Start: 2021-10-14 | End: 2022-08-02

## 2021-10-14 RX ORDER — MUPIROCIN 20 MG/G
OINTMENT TOPICAL DAILY
Status: DISCONTINUED | OUTPATIENT
Start: 2021-10-14 | End: 2021-10-14 | Stop reason: HOSPADM

## 2021-10-14 RX ORDER — INSULIN DETEMIR 100 [IU]/ML
30 INJECTION, SOLUTION SUBCUTANEOUS 2 TIMES DAILY
Qty: 15 ML | Refills: 3
Start: 2021-10-14 | End: 2022-03-21

## 2021-10-14 RX ADMIN — INSULIN ASPART 8 UNITS: 100 INJECTION, SOLUTION INTRAVENOUS; SUBCUTANEOUS at 12:10

## 2021-10-14 RX ADMIN — MUPIROCIN: 20 OINTMENT TOPICAL at 12:10

## 2021-10-14 RX ADMIN — ACETAMINOPHEN 650 MG: 325 TABLET ORAL at 01:10

## 2021-10-14 RX ADMIN — INSULIN ASPART 10 UNITS: 100 INJECTION, SOLUTION INTRAVENOUS; SUBCUTANEOUS at 10:10

## 2021-10-14 RX ADMIN — ASPIRIN 81 MG: 81 TABLET, COATED ORAL at 08:10

## 2021-10-14 RX ADMIN — SODIUM BICARBONATE 650 MG TABLET 650 MG: at 12:10

## 2021-10-14 RX ADMIN — ATORVASTATIN CALCIUM 40 MG: 20 TABLET, FILM COATED ORAL at 08:10

## 2021-10-14 RX ADMIN — INSULIN DETEMIR 25 UNITS: 100 INJECTION, SOLUTION SUBCUTANEOUS at 10:10

## 2021-10-14 RX ADMIN — AMLODIPINE BESYLATE 10 MG: 5 TABLET ORAL at 01:10

## 2021-10-14 RX ADMIN — INSULIN ASPART 10 UNITS: 100 INJECTION, SOLUTION INTRAVENOUS; SUBCUTANEOUS at 12:10

## 2021-10-14 RX ADMIN — INSULIN ASPART 4 UNITS: 100 INJECTION, SOLUTION INTRAVENOUS; SUBCUTANEOUS at 10:10

## 2021-10-22 ENCOUNTER — TELEPHONE (OUTPATIENT)
Dept: INTERNAL MEDICINE | Facility: CLINIC | Age: 56
End: 2021-10-22

## 2021-10-22 PROBLEM — N17.9 AKI (ACUTE KIDNEY INJURY): Status: RESOLVED | Noted: 2021-10-12 | Resolved: 2021-10-22

## 2021-10-25 ENCOUNTER — PATIENT MESSAGE (OUTPATIENT)
Dept: PHYSICAL MEDICINE AND REHAB | Facility: CLINIC | Age: 56
End: 2021-10-25
Payer: MEDICAID

## 2021-10-25 DIAGNOSIS — M17.0 PRIMARY OSTEOARTHRITIS OF BOTH KNEES: Primary | ICD-10-CM

## 2021-10-25 RX ORDER — TRAMADOL HYDROCHLORIDE 50 MG/1
50-100 TABLET ORAL 3 TIMES DAILY PRN
Qty: 180 TABLET | Refills: 3 | OUTPATIENT
Start: 2021-10-25

## 2021-10-25 RX ORDER — MELOXICAM 15 MG/1
15 TABLET ORAL DAILY
Qty: 30 TABLET | Refills: 1 | Status: SHIPPED | OUTPATIENT
Start: 2021-10-25 | End: 2022-01-11

## 2021-11-03 RX ORDER — TRAMADOL HYDROCHLORIDE 50 MG/1
50-100 TABLET ORAL 3 TIMES DAILY PRN
Qty: 180 TABLET | Refills: 3 | Status: SHIPPED | OUTPATIENT
Start: 2021-11-13 | End: 2022-03-03 | Stop reason: SDUPTHER

## 2021-11-04 ENCOUNTER — TELEPHONE (OUTPATIENT)
Dept: INTERNAL MEDICINE | Facility: CLINIC | Age: 56
End: 2021-11-04
Payer: MEDICAID

## 2021-11-04 DIAGNOSIS — Z79.4 TYPE 2 DIABETES MELLITUS WITH HYPERGLYCEMIA, WITH LONG-TERM CURRENT USE OF INSULIN: Primary | ICD-10-CM

## 2021-11-04 DIAGNOSIS — E11.65 TYPE 2 DIABETES MELLITUS WITH HYPERGLYCEMIA, WITH LONG-TERM CURRENT USE OF INSULIN: Primary | ICD-10-CM

## 2021-11-04 RX ORDER — PEN NEEDLE, DIABETIC 30 GX3/16"
1 NEEDLE, DISPOSABLE MISCELLANEOUS 4 TIMES DAILY
Qty: 200 EACH | Refills: 11 | Status: SHIPPED | OUTPATIENT
Start: 2021-11-04 | End: 2021-11-09 | Stop reason: SDUPTHER

## 2021-11-09 ENCOUNTER — TELEPHONE (OUTPATIENT)
Dept: INTERNAL MEDICINE | Facility: CLINIC | Age: 56
End: 2021-11-09

## 2021-11-09 ENCOUNTER — OFFICE VISIT (OUTPATIENT)
Dept: INTERNAL MEDICINE | Facility: CLINIC | Age: 56
End: 2021-11-09
Payer: MEDICAID

## 2021-11-09 ENCOUNTER — LAB VISIT (OUTPATIENT)
Dept: LAB | Facility: OTHER | Age: 56
End: 2021-11-09
Attending: INTERNAL MEDICINE
Payer: MEDICAID

## 2021-11-09 VITALS
WEIGHT: 293 LBS | HEART RATE: 97 BPM | OXYGEN SATURATION: 99 % | BODY MASS INDEX: 47.09 KG/M2 | HEIGHT: 66 IN | SYSTOLIC BLOOD PRESSURE: 138 MMHG | DIASTOLIC BLOOD PRESSURE: 82 MMHG

## 2021-11-09 DIAGNOSIS — N17.9 AKI (ACUTE KIDNEY INJURY): ICD-10-CM

## 2021-11-09 DIAGNOSIS — E87.20 METABOLIC ACIDOSIS: ICD-10-CM

## 2021-11-09 DIAGNOSIS — E11.65 TYPE 2 DIABETES MELLITUS WITH HYPERGLYCEMIA, WITHOUT LONG-TERM CURRENT USE OF INSULIN: Primary | ICD-10-CM

## 2021-11-09 DIAGNOSIS — I10 ESSENTIAL HYPERTENSION: ICD-10-CM

## 2021-11-09 DIAGNOSIS — E55.9 VITAMIN D DEFICIENCY: ICD-10-CM

## 2021-11-09 DIAGNOSIS — E66.01 MORBID OBESITY WITH BMI OF 50.0-59.9, ADULT: ICD-10-CM

## 2021-11-09 DIAGNOSIS — N17.9 AKI (ACUTE KIDNEY INJURY): Primary | ICD-10-CM

## 2021-11-09 LAB
ANION GAP SERPL CALC-SCNC: 6 MMOL/L (ref 8–16)
BUN SERPL-MCNC: 16 MG/DL (ref 6–20)
CALCIUM SERPL-MCNC: 10.1 MG/DL (ref 8.7–10.5)
CHLORIDE SERPL-SCNC: 109 MMOL/L (ref 95–110)
CO2 SERPL-SCNC: 26 MMOL/L (ref 23–29)
CREAT SERPL-MCNC: 0.9 MG/DL (ref 0.5–1.4)
EST. GFR  (AFRICAN AMERICAN): >60 ML/MIN/1.73 M^2
EST. GFR  (NON AFRICAN AMERICAN): >60 ML/MIN/1.73 M^2
GLUCOSE SERPL-MCNC: 137 MG/DL (ref 70–110)
OSMOLALITY SERPL: 301 MOSM/KG (ref 275–295)
POTASSIUM SERPL-SCNC: 4.5 MMOL/L (ref 3.5–5.1)
SODIUM SERPL-SCNC: 141 MMOL/L (ref 136–145)

## 2021-11-09 PROCEDURE — 80048 BASIC METABOLIC PNL TOTAL CA: CPT | Performed by: INTERNAL MEDICINE

## 2021-11-09 PROCEDURE — 99215 PR OFFICE/OUTPT VISIT, EST, LEVL V, 40-54 MIN: ICD-10-PCS | Mod: S$PBB,,, | Performed by: INTERNAL MEDICINE

## 2021-11-09 PROCEDURE — 99214 OFFICE O/P EST MOD 30 MIN: CPT | Mod: PBBFAC | Performed by: INTERNAL MEDICINE

## 2021-11-09 PROCEDURE — 99417 PR PROLONGED SVC, OUTPT, W/WO DIRECT PT CONTACT,  EA ADDTL 15 MIN: ICD-10-PCS | Mod: S$PBB,,, | Performed by: INTERNAL MEDICINE

## 2021-11-09 PROCEDURE — 99999 PR PBB SHADOW E&M-EST. PATIENT-LVL IV: ICD-10-PCS | Mod: PBBFAC,,, | Performed by: INTERNAL MEDICINE

## 2021-11-09 PROCEDURE — 99417 PROLNG OP E/M EACH 15 MIN: CPT | Mod: S$PBB,,, | Performed by: INTERNAL MEDICINE

## 2021-11-09 PROCEDURE — 99999 PR PBB SHADOW E&M-EST. PATIENT-LVL IV: CPT | Mod: PBBFAC,,, | Performed by: INTERNAL MEDICINE

## 2021-11-09 PROCEDURE — 83930 ASSAY OF BLOOD OSMOLALITY: CPT | Performed by: INTERNAL MEDICINE

## 2021-11-09 PROCEDURE — 36415 COLL VENOUS BLD VENIPUNCTURE: CPT | Performed by: INTERNAL MEDICINE

## 2021-11-09 PROCEDURE — 99215 OFFICE O/P EST HI 40 MIN: CPT | Mod: S$PBB,,, | Performed by: INTERNAL MEDICINE

## 2021-11-09 RX ORDER — ERGOCALCIFEROL 1.25 MG/1
50000 CAPSULE ORAL
Qty: 12 CAPSULE | Refills: 3 | Status: SHIPPED | OUTPATIENT
Start: 2021-11-09 | End: 2022-02-10 | Stop reason: SDUPTHER

## 2021-11-09 RX ORDER — PEN NEEDLE, DIABETIC 30 GX3/16"
1 NEEDLE, DISPOSABLE MISCELLANEOUS 4 TIMES DAILY
Qty: 200 EACH | Refills: 11 | Status: SHIPPED | OUTPATIENT
Start: 2021-11-09 | End: 2021-12-22 | Stop reason: SDUPTHER

## 2021-11-09 RX ORDER — FLASH GLUCOSE SCANNING READER
EACH MISCELLANEOUS
Qty: 1 EACH | Refills: 0 | Status: SHIPPED | OUTPATIENT
Start: 2021-11-09 | End: 2023-12-21 | Stop reason: ALTCHOICE

## 2021-11-09 RX ORDER — FLASH GLUCOSE SENSOR
KIT MISCELLANEOUS
Qty: 1 KIT | Refills: 11 | Status: SHIPPED | OUTPATIENT
Start: 2021-11-09

## 2021-11-09 RX ORDER — CHLORTHALIDONE 25 MG/1
25 TABLET ORAL DAILY
Qty: 90 TABLET | Refills: 3 | Status: SHIPPED | OUTPATIENT
Start: 2021-11-09 | End: 2022-08-02

## 2021-11-16 ENCOUNTER — TELEPHONE (OUTPATIENT)
Dept: INTERNAL MEDICINE | Facility: CLINIC | Age: 56
End: 2021-11-16
Payer: MEDICAID

## 2021-11-16 DIAGNOSIS — N17.9 AKI (ACUTE KIDNEY INJURY): Primary | ICD-10-CM

## 2021-11-16 DIAGNOSIS — E87.20 METABOLIC ACIDOSIS: ICD-10-CM

## 2021-11-18 ENCOUNTER — TELEPHONE (OUTPATIENT)
Dept: PHARMACY | Facility: CLINIC | Age: 56
End: 2021-11-18
Payer: MEDICAID

## 2021-11-30 ENCOUNTER — PATIENT MESSAGE (OUTPATIENT)
Dept: INTERNAL MEDICINE | Facility: CLINIC | Age: 56
End: 2021-11-30
Payer: MEDICAID

## 2021-11-30 DIAGNOSIS — E11.65 TYPE 2 DIABETES MELLITUS WITH HYPERGLYCEMIA, WITHOUT LONG-TERM CURRENT USE OF INSULIN: ICD-10-CM

## 2021-11-30 RX ORDER — PEN NEEDLE, DIABETIC 30 GX3/16"
NEEDLE, DISPOSABLE MISCELLANEOUS
Qty: 800 EACH | Refills: 5 | Status: SHIPPED | OUTPATIENT
Start: 2021-11-30 | End: 2021-12-22

## 2021-12-08 ENCOUNTER — OFFICE VISIT (OUTPATIENT)
Dept: ORTHOPEDICS | Facility: CLINIC | Age: 56
End: 2021-12-08
Payer: MEDICAID

## 2021-12-08 ENCOUNTER — LAB VISIT (OUTPATIENT)
Dept: LAB | Facility: HOSPITAL | Age: 56
End: 2021-12-08
Payer: MEDICAID

## 2021-12-08 ENCOUNTER — PATIENT OUTREACH (OUTPATIENT)
Dept: ADMINISTRATIVE | Facility: OTHER | Age: 56
End: 2021-12-08
Payer: MEDICAID

## 2021-12-08 DIAGNOSIS — E11.69 TYPE 2 DIABETES MELLITUS WITH OTHER SPECIFIED COMPLICATION, UNSPECIFIED WHETHER LONG TERM INSULIN USE: ICD-10-CM

## 2021-12-08 DIAGNOSIS — E11.65 TYPE 2 DIABETES MELLITUS WITH HYPERGLYCEMIA, WITH LONG-TERM CURRENT USE OF INSULIN: Primary | ICD-10-CM

## 2021-12-08 DIAGNOSIS — M17.0 PRIMARY OSTEOARTHRITIS OF BOTH KNEES: Primary | ICD-10-CM

## 2021-12-08 DIAGNOSIS — Z79.4 TYPE 2 DIABETES MELLITUS WITH HYPERGLYCEMIA, WITH LONG-TERM CURRENT USE OF INSULIN: Primary | ICD-10-CM

## 2021-12-08 DIAGNOSIS — E11.69 TYPE 2 DIABETES MELLITUS WITH OTHER SPECIFIED COMPLICATION, UNSPECIFIED WHETHER LONG TERM INSULIN USE: Primary | ICD-10-CM

## 2021-12-08 LAB
ANION GAP SERPL CALC-SCNC: 8 MMOL/L (ref 8–16)
BUN SERPL-MCNC: 21 MG/DL (ref 6–20)
CALCIUM SERPL-MCNC: 10.3 MG/DL (ref 8.7–10.5)
CHLORIDE SERPL-SCNC: 111 MMOL/L (ref 95–110)
CO2 SERPL-SCNC: 19 MMOL/L (ref 23–29)
CREAT SERPL-MCNC: 0.9 MG/DL (ref 0.5–1.4)
EST. GFR  (AFRICAN AMERICAN): >60 ML/MIN/1.73 M^2
EST. GFR  (NON AFRICAN AMERICAN): >60 ML/MIN/1.73 M^2
ESTIMATED AVG GLUCOSE: 243 MG/DL (ref 68–131)
GLUCOSE SERPL-MCNC: 103 MG/DL (ref 70–110)
HBA1C MFR BLD: 10.1 % (ref 4–5.6)
POTASSIUM SERPL-SCNC: 4.4 MMOL/L (ref 3.5–5.1)
SODIUM SERPL-SCNC: 138 MMOL/L (ref 136–145)

## 2021-12-08 PROCEDURE — 99499 NO LOS: ICD-10-PCS | Mod: S$PBB,,, | Performed by: NURSE PRACTITIONER

## 2021-12-08 PROCEDURE — 20610 DRAIN/INJ JOINT/BURSA W/O US: CPT | Mod: 50,S$PBB,, | Performed by: NURSE PRACTITIONER

## 2021-12-08 PROCEDURE — 20610 PR DRAIN/INJECT LARGE JOINT/BURSA: ICD-10-PCS | Mod: 50,S$PBB,, | Performed by: NURSE PRACTITIONER

## 2021-12-08 PROCEDURE — 99999 PR PBB SHADOW E&M-EST. PATIENT-LVL III: ICD-10-PCS | Mod: PBBFAC,,, | Performed by: NURSE PRACTITIONER

## 2021-12-08 PROCEDURE — 99213 OFFICE O/P EST LOW 20 MIN: CPT | Mod: PBBFAC | Performed by: NURSE PRACTITIONER

## 2021-12-08 PROCEDURE — 3066F NEPHROPATHY DOC TX: CPT | Mod: CPTII,,, | Performed by: NURSE PRACTITIONER

## 2021-12-08 PROCEDURE — 20610 DRAIN/INJ JOINT/BURSA W/O US: CPT | Mod: 50,PBBFAC | Performed by: NURSE PRACTITIONER

## 2021-12-08 PROCEDURE — 3061F NEG MICROALBUMINURIA REV: CPT | Mod: CPTII,,, | Performed by: NURSE PRACTITIONER

## 2021-12-08 PROCEDURE — 36415 COLL VENOUS BLD VENIPUNCTURE: CPT | Performed by: NURSE PRACTITIONER

## 2021-12-08 PROCEDURE — 99499 UNLISTED E&M SERVICE: CPT | Mod: S$PBB,,, | Performed by: NURSE PRACTITIONER

## 2021-12-08 PROCEDURE — 3066F PR DOCUMENTATION OF TREATMENT FOR NEPHROPATHY: ICD-10-PCS | Mod: CPTII,,, | Performed by: NURSE PRACTITIONER

## 2021-12-08 PROCEDURE — 80048 BASIC METABOLIC PNL TOTAL CA: CPT | Performed by: NURSE PRACTITIONER

## 2021-12-08 PROCEDURE — 99999 PR PBB SHADOW E&M-EST. PATIENT-LVL III: CPT | Mod: PBBFAC,,, | Performed by: NURSE PRACTITIONER

## 2021-12-08 PROCEDURE — 83036 HEMOGLOBIN GLYCOSYLATED A1C: CPT | Performed by: NURSE PRACTITIONER

## 2021-12-08 PROCEDURE — 3061F PR NEG MICROALBUMINURIA RESULT DOCUMENTED/REVIEW: ICD-10-PCS | Mod: CPTII,,, | Performed by: NURSE PRACTITIONER

## 2021-12-08 RX ADMIN — TRIAMCINOLONE ACETONIDE 80 MG: 40 INJECTION, SUSPENSION INTRA-ARTICULAR; INTRAMUSCULAR at 11:12

## 2021-12-12 RX ORDER — TRIAMCINOLONE ACETONIDE 40 MG/ML
80 INJECTION, SUSPENSION INTRA-ARTICULAR; INTRAMUSCULAR
Status: COMPLETED | OUTPATIENT
Start: 2021-12-08 | End: 2021-12-08

## 2021-12-22 ENCOUNTER — PATIENT MESSAGE (OUTPATIENT)
Dept: INTERNAL MEDICINE | Facility: CLINIC | Age: 56
End: 2021-12-22
Payer: MEDICAID

## 2021-12-22 DIAGNOSIS — E11.65 TYPE 2 DIABETES MELLITUS WITH HYPERGLYCEMIA, WITHOUT LONG-TERM CURRENT USE OF INSULIN: ICD-10-CM

## 2021-12-22 RX ORDER — PEN NEEDLE, DIABETIC 30 GX3/16"
1 NEEDLE, DISPOSABLE MISCELLANEOUS 4 TIMES DAILY
Qty: 200 EACH | Refills: 11 | Status: SHIPPED | OUTPATIENT
Start: 2021-12-22 | End: 2022-08-02

## 2022-01-05 ENCOUNTER — TELEPHONE (OUTPATIENT)
Dept: INTERNAL MEDICINE | Facility: CLINIC | Age: 57
End: 2022-01-05
Payer: MEDICAID

## 2022-01-05 NOTE — TELEPHONE ENCOUNTER
Spoke with pt about invalid fax and phone number to send over nephrology referral. Pt is okay with mailing the referral to her home.

## 2022-01-10 ENCOUNTER — TELEPHONE (OUTPATIENT)
Dept: PHYSICAL MEDICINE AND REHAB | Facility: CLINIC | Age: 57
End: 2022-01-10
Payer: MEDICAID

## 2022-01-10 ENCOUNTER — TELEPHONE (OUTPATIENT)
Dept: INTERNAL MEDICINE | Facility: CLINIC | Age: 57
End: 2022-01-10
Payer: MEDICAID

## 2022-01-10 NOTE — TELEPHONE ENCOUNTER
Called and spoke with patient.  Patient cancel 08/2021  Patient last seen 03/10/21  Patient was added to the wait list.

## 2022-01-10 NOTE — TELEPHONE ENCOUNTER
----- Message from Shavon Farmer sent at 1/10/2022  2:14 PM CST -----  Regarding: Appt. Access  Contact: patient  Pt. Requesting a call back to schedule a follow-up appt.              Pt.@[phone]

## 2022-01-10 NOTE — TELEPHONE ENCOUNTER
Enid Hyde MD  [facility] [address]  Phone: [phone]  Fax: [phone]      Please fax external referral to Nephrology and provide the patient with their phone number to schedule.  Thanks!      Appointment faxed as directed. Patient given  to call . States she already has an appointment for April 14 for this problem. She will compare date for Dr. Franklin and date that can be given by Dr. Hyde.

## 2022-01-10 NOTE — TELEPHONE ENCOUNTER
Pt  stated she was called and notified her referral  was never sent to Dr. Enid Hyde Office.Pt is req a call back when this is taken care of.      Called patient back. Informed attempting to get Dr. Hyde office on telephone  as referral is not going through. States she needs to see Dr. Hastings and discuss reason for being sent to Nephrology as she doesn't have a kidney injury.

## 2022-01-11 NOTE — TELEPHONE ENCOUNTER
She needs to see Nephrology for metabolic acidosis, which is regulated by the kidneys.  Please help her schedule with Dr. Hyde or Dr. Rosales; they are in the same group.  Thanks!

## 2022-01-13 NOTE — TELEPHONE ENCOUNTER
I believe Dr. Hyde is with [facility].  Please call them to confirm the fax number and how they prefer referrals.  Thank you!    Address  [facility],  [facility] [address]  [place] [address]    Phone  [phone]    Fax  [phone]    [email or web address]

## 2022-01-13 NOTE — TELEPHONE ENCOUNTER
From Dr. Hyde:  Hi!  Our fax number is [phone].  Our office number is [phone].  Thanks for getting in touch and we're sorry you had trouble getting this info to us.

## 2022-01-14 NOTE — TELEPHONE ENCOUNTER
Please fax referral to the number listed below per Dr. Hastings. Thanks. Routed to DAYTON Martinez for fax completion.

## 2022-01-14 NOTE — TELEPHONE ENCOUNTER
This is strange. Dr. Hyde said that their number has been the same for over a decade.  She suggested mailing the referral to them.  Thank you!    [facility]   [address] [place] [address] [address]   [place] [phone] phone   [phone] fax

## 2022-01-20 ENCOUNTER — TELEPHONE (OUTPATIENT)
Dept: PHYSICAL MEDICINE AND REHAB | Facility: CLINIC | Age: 57
End: 2022-01-20
Payer: MEDICAID

## 2022-01-20 NOTE — TELEPHONE ENCOUNTER
----- Message from Hellen Rubin sent at 1/20/2022 10:56 AM CST -----  Contact: Pt  Pt's requesting a call back re: wait list offer.    Confirmed patient's contact info below:  Contact Name: Yayo Carver  Phone Number: [phone]

## 2022-01-20 NOTE — TELEPHONE ENCOUNTER
----- Message from Katrin Madera sent at 1/20/2022 11:34 AM CST -----  Contact: Patient [phone]  Patient is returning a phone call.  Who left a message for the patient: Cece Walker MA   Does patient know what this is regarding:  Earlier appt  Would you like a call back, or a response through your [facility] portal?:   yes   Comments:

## 2022-01-21 ENCOUNTER — TELEPHONE (OUTPATIENT)
Dept: PHYSICAL MEDICINE AND REHAB | Facility: CLINIC | Age: 57
End: 2022-01-21
Payer: MEDICAID

## 2022-01-27 ENCOUNTER — TELEPHONE (OUTPATIENT)
Dept: INTERNAL MEDICINE | Facility: CLINIC | Age: 57
End: 2022-01-27

## 2022-01-27 NOTE — TELEPHONE ENCOUNTER
----- Message from Alcira Song sent at 1/27/2022  9:10 AM CST -----  Regarding: requesting back  Name of Who is Calling: [facility]          What is the request in detail: [facility] Is requesting  labs, blood working and imaging      Fax - [phone]      Can the clinic reply by [facility]: no          What Number to Call Back if not [facility]:[phone]

## 2022-01-31 ENCOUNTER — TELEPHONE (OUTPATIENT)
Dept: INTERNAL MEDICINE | Facility: CLINIC | Age: 57
End: 2022-01-31
Payer: MEDICAID

## 2022-01-31 NOTE — TELEPHONE ENCOUNTER
----- Message from Lia Armstrong sent at 1/31/2022  1:38 PM CST -----  Regarding: Patient call back  Who called:DEMARCO DELACRUZ [[id number]]    What is the request in detail: Patient is requesting a call back.  Patient would like to schedule a f/u appt. Attempted to schedule, no appts available. She would like to further discuss.   Please advise.    Can the clinic reply by [facility]? Y    Best call back number: [phone]    Additional Information: N/A

## 2022-02-10 ENCOUNTER — TELEPHONE (OUTPATIENT)
Dept: INTERNAL MEDICINE | Facility: CLINIC | Age: 57
End: 2022-02-10
Payer: MEDICAID

## 2022-02-10 ENCOUNTER — OFFICE VISIT (OUTPATIENT)
Dept: INTERNAL MEDICINE | Facility: CLINIC | Age: 57
End: 2022-02-10
Payer: MEDICAID

## 2022-02-10 VITALS
DIASTOLIC BLOOD PRESSURE: 80 MMHG | SYSTOLIC BLOOD PRESSURE: 130 MMHG | WEIGHT: 293 LBS | HEART RATE: 103 BPM | HEIGHT: 66 IN | OXYGEN SATURATION: 99 % | BODY MASS INDEX: 47.09 KG/M2

## 2022-02-10 DIAGNOSIS — G89.29 CHRONIC BACK PAIN, UNSPECIFIED BACK LOCATION, UNSPECIFIED BACK PAIN LATERALITY: Primary | ICD-10-CM

## 2022-02-10 DIAGNOSIS — I10 ESSENTIAL HYPERTENSION: ICD-10-CM

## 2022-02-10 DIAGNOSIS — E55.9 VITAMIN D DEFICIENCY: ICD-10-CM

## 2022-02-10 DIAGNOSIS — R26.81 GAIT INSTABILITY: ICD-10-CM

## 2022-02-10 DIAGNOSIS — E87.20 METABOLIC ACIDOSIS: ICD-10-CM

## 2022-02-10 DIAGNOSIS — Z79.4 TYPE 2 DIABETES MELLITUS WITH HYPERGLYCEMIA, WITH LONG-TERM CURRENT USE OF INSULIN: ICD-10-CM

## 2022-02-10 DIAGNOSIS — M54.9 CHRONIC BACK PAIN, UNSPECIFIED BACK LOCATION, UNSPECIFIED BACK PAIN LATERALITY: Primary | ICD-10-CM

## 2022-02-10 DIAGNOSIS — E66.01 MORBID OBESITY WITH BMI OF 50.0-59.9, ADULT: ICD-10-CM

## 2022-02-10 DIAGNOSIS — E78.2 MIXED HYPERLIPIDEMIA: ICD-10-CM

## 2022-02-10 DIAGNOSIS — E11.65 TYPE 2 DIABETES MELLITUS WITH HYPERGLYCEMIA, WITH LONG-TERM CURRENT USE OF INSULIN: ICD-10-CM

## 2022-02-10 DIAGNOSIS — Z23 INFLUENZA VACCINE NEEDED: ICD-10-CM

## 2022-02-10 DIAGNOSIS — Z23 NEED FOR ZOSTER VACCINE: ICD-10-CM

## 2022-02-10 PROCEDURE — 3008F PR BODY MASS INDEX (BMI) DOCUMENTED: ICD-10-PCS | Mod: CPTII,,, | Performed by: INTERNAL MEDICINE

## 2022-02-10 PROCEDURE — 99215 PR OFFICE/OUTPT VISIT, EST, LEVL V, 40-54 MIN: ICD-10-PCS | Mod: S$PBB,,, | Performed by: INTERNAL MEDICINE

## 2022-02-10 PROCEDURE — 1160F PR REVIEW ALL MEDS BY PRESCRIBER/CLIN PHARMACIST DOCUMENTED: ICD-10-PCS | Mod: CPTII,,, | Performed by: INTERNAL MEDICINE

## 2022-02-10 PROCEDURE — 99417 PR PROLONGED SVC, OUTPT, W/WO DIRECT PT CONTACT,  EA ADDTL 15 MIN: ICD-10-PCS | Mod: S$PBB,,, | Performed by: INTERNAL MEDICINE

## 2022-02-10 PROCEDURE — 1160F RVW MEDS BY RX/DR IN RCRD: CPT | Mod: CPTII,,, | Performed by: INTERNAL MEDICINE

## 2022-02-10 PROCEDURE — 3075F PR MOST RECENT SYSTOLIC BLOOD PRESS GE 130-139MM HG: ICD-10-PCS | Mod: CPTII,,, | Performed by: INTERNAL MEDICINE

## 2022-02-10 PROCEDURE — 3079F PR MOST RECENT DIASTOLIC BLOOD PRESSURE 80-89 MM HG: ICD-10-PCS | Mod: CPTII,,, | Performed by: INTERNAL MEDICINE

## 2022-02-10 PROCEDURE — 3008F BODY MASS INDEX DOCD: CPT | Mod: CPTII,,, | Performed by: INTERNAL MEDICINE

## 2022-02-10 PROCEDURE — 3079F DIAST BP 80-89 MM HG: CPT | Mod: CPTII,,, | Performed by: INTERNAL MEDICINE

## 2022-02-10 PROCEDURE — 99215 OFFICE O/P EST HI 40 MIN: CPT | Mod: S$PBB,,, | Performed by: INTERNAL MEDICINE

## 2022-02-10 PROCEDURE — 99417 PROLNG OP E/M EACH 15 MIN: CPT | Mod: S$PBB,,, | Performed by: INTERNAL MEDICINE

## 2022-02-10 PROCEDURE — 99215 OFFICE O/P EST HI 40 MIN: CPT | Mod: PBBFAC | Performed by: INTERNAL MEDICINE

## 2022-02-10 PROCEDURE — 3075F SYST BP GE 130 - 139MM HG: CPT | Mod: CPTII,,, | Performed by: INTERNAL MEDICINE

## 2022-02-10 PROCEDURE — 1159F PR MEDICATION LIST DOCUMENTED IN MEDICAL RECORD: ICD-10-PCS | Mod: CPTII,,, | Performed by: INTERNAL MEDICINE

## 2022-02-10 PROCEDURE — 1159F MED LIST DOCD IN RCRD: CPT | Mod: CPTII,,, | Performed by: INTERNAL MEDICINE

## 2022-02-10 PROCEDURE — 99999 PR PBB SHADOW E&M-EST. PATIENT-LVL V: CPT | Mod: PBBFAC,,, | Performed by: INTERNAL MEDICINE

## 2022-02-10 PROCEDURE — 99999 PR PBB SHADOW E&M-EST. PATIENT-LVL V: ICD-10-PCS | Mod: PBBFAC,,, | Performed by: INTERNAL MEDICINE

## 2022-02-10 RX ORDER — ATORVASTATIN CALCIUM 40 MG/1
40 TABLET, FILM COATED ORAL DAILY
Qty: 90 TABLET | Refills: 3 | Status: SHIPPED | OUTPATIENT
Start: 2022-02-10 | End: 2023-03-30 | Stop reason: SDUPTHER

## 2022-02-10 RX ORDER — ERGOCALCIFEROL 1.25 MG/1
50000 CAPSULE ORAL
Qty: 12 CAPSULE | Refills: 3 | Status: SHIPPED | OUTPATIENT
Start: 2022-02-10 | End: 2023-02-26 | Stop reason: SDUPTHER

## 2022-02-10 RX ORDER — INSULIN DETEMIR 100 [IU]/ML
30 INJECTION, SOLUTION SUBCUTANEOUS 2 TIMES DAILY
Qty: 15 ML | Refills: 3 | Status: CANCELLED | OUTPATIENT
Start: 2022-02-10

## 2022-02-10 RX ORDER — SODIUM BICARBONATE 650 MG/1
650 TABLET ORAL 2 TIMES DAILY
Qty: 60 TABLET | Refills: 2 | Status: SHIPPED | OUTPATIENT
Start: 2022-02-10 | End: 2022-11-23 | Stop reason: SDUPTHER

## 2022-02-10 RX ORDER — CYCLOBENZAPRINE HCL 5 MG
5 TABLET ORAL 3 TIMES DAILY PRN
Qty: 30 TABLET | Refills: 1 | Status: SHIPPED | OUTPATIENT
Start: 2022-02-10 | End: 2022-08-02

## 2022-02-10 RX ORDER — AMLODIPINE BESYLATE 10 MG/1
10 TABLET ORAL DAILY
Qty: 90 TABLET | Refills: 3 | Status: SHIPPED | OUTPATIENT
Start: 2022-02-10 | End: 2023-03-30 | Stop reason: SDUPTHER

## 2022-02-10 NOTE — PROGRESS NOTES
Subjective:       Patient ID: Yayo Carver is a 56 y.o. female who  has a past medical history of Arthritis, Diabetes mellitus, HLD (hyperlipidemia), and Hypertension.    Chief Complaint: Back Pain     History was obtained from the patient and supplemented through chart review.  Saw Ortho for knee OA.     Works nights for [employer].  Her son works as a , rodriguez; he played college football.    Back Pain      Chronic back, b/l knee OA:    Fell off porch in 7/2019, resulting in chronic back pain.  Following with Ortho, PM&R.  No relief with Tylenol.  Has a knee sleeve.  On tramadol prescribed by PM&R.  She tries to avoid meds/Mobic.  Following with Ortho for bilateral knee injections with relief, but unable to receive injections until A1c is less than 8.  Working on weight loss as below.    Reports that she fell off her porch again d/t the hand rail. Epson salt helps, but recurrent.  Feels tight. Has had difficulty rescheduling c PM&R. Is on the wait list.    Improved with topical Voltaren, but persistent. Follow-up with Ortho, PM&R for injections, Bariatrics for weight loss.    DM2 is uncontrolled. H/o DKA when she had insurance issues obtaining Trulicity/Ozempic and ran out of insulin and Metformin.  Was admitted 10/2021 for HHS.  On Pramlintide/Symlin TID for weight loss. NovoLog 12 TID. Was on Levemir 30 BID, but stopped the AM dose d/t AM hypoBG>  Is only on Levemir 30 qHS.  No h/o GI SE to Metformin.  Stopped metformin due to non gap metabolic acidosis.      Tati was approved.  No co-pay.  BG used to be 200s.  AM fasting BG:   BG Before lunch: 90, 110s  BG Before dinner: 105  BG At bedtime:      Diet: cut out bread, rice, potatoes. Eats protein, veggies.    Exercise: limited mobility.     With normal microalbumin creatinine ratio.  No ACE/ARB d/t angioedema.   Retinal exams: .  No retinopathy.   Foot exams:    DM edu:  Seeing nutrition through bariatrics clinic at [facility]. Reviews BG with  them.  Lab Results   Component Value Date/Time    HGBA1C 10.1 (H) 12/08/2021 11:18 AM    HGBA1C >14.0 (H) 10/12/2021 06:31 AM    HGBA1C 7.0 (H) 05/06/2021 10:29 AM    HGBA1C 5.7 (H) 01/15/2021 10:03 AM     Non gap metabolic acidosis:  BG was up to 400 with JIMMY to 1.6 during admission that resolved with IVF.  No hypokalemia. Urine pH 6. No ketones. No diarrhea.  Stopped metformin due to non gap metabolic acidosis.  Bicarb 15. Started NaHCO3 pills for possible RTA during admission  for hyperglycemia.    Has appt c [facility] 4/20[facility]. [facility]ro at [facility] unable to see her sooner. Referred to Dr. Rosales, but was unable to fax the referral despite multiple attempts.     HTN:    H/o DM, but not on ACE d/t h/o angioedema.  Has issues with muscle cramps.      BP is controlled.  Has chronic knee pain. Is on Norvasc 10 daily, chlorthalidone 25. Compliant with meds.    Home BP:      FHx: yes  Tobacco: no  No snoring, apnea.                Obesity:    Has had difficulty getting Ozempic approved.  On Symlin.  Following with OSH Bariatrics, nutrition at [facility]. Is awaiting bariatric sx.     Exercise: used to walk more often, but has been afraid of COVID. Walking inside her home. Walking at work but limited mobility d/t knee pain.  BMI Readings from Last 3 Encounters:   02/10/22 48.25 kg/m²   11/09/21 47.82 kg/m²   10/12/21 47.54 kg/m²     HLD:  Is currently taking Lipitor 40, ASA 81 daily.  Lab Results   Component Value Date    LDLCALC 71 05/06/2021     The 10-year ASCVD risk score (Bethelana paula FERRER Jr., et al., 2013) is: 10.8%    Values used to calculate the score:      Age: 56 years      Sex: Female      Is Non- : Yes      Diabetic: Yes      Tobacco smoker: No      Systolic Blood Pressure: 130 mmHg      Is BP treated: Yes      HDL Cholesterol: 44 mg/dL      Total Cholesterol: 133 mg/dL    Vitamin D deficiency:  On ergocalciferol weekly.  Lab Results   Component Value Date    MSFZPJVK73IO 25 (L)  "10/13/2021    DCWYIWKJ82BL 24 (L) 01/15/2021    HHISRJWM91XY 12 (L) 12/18/2019                Not addressed today.  B12 deficiency, DOM:    On B12.  H/o menorrhagia.  Now menopausal.  Stopped daily iron.  Planning on bariatric surgery as above.              Improved. Continue B12.                Ferritin controlled.  Stopped daily iron; ok to d/c.    Lab Results   Component Value Date    IRON 53 10/13/2021    TIBC 259 10/13/2021    FERRITIN 59 10/13/2021     Lab Results   Component Value Date    OVYYABOO07 1670 (H) 10/13/2021     Lab Results   Component Value Date    FOLATE 11.7 10/13/2021     GERD:  Mid/upper chest burning.  Sometimes a little sour regurg.  No sour taste in her throat.  Noticed it after heavy food, coffee, ice cream.  No ETOH.  Taking peppermint, GasX.  Peppermint helped.  Latest meal at 7 PM.  Sometimes lies down after eating.  Has lost a significant amount of weight before.  Tries to take Mobic sparingly.  Discussed dietary changes, avoid recumbency after meals. Start PPI p.r.n.  If occurring more frequently, rec PPI qAM. and order H pylori test     Review of Systems   Constitutional: Negative for activity change and appetite change.   Respiratory: Negative for wheezing.    Cardiovascular: Negative for leg swelling.   Musculoskeletal: Positive for back pain and gait problem.   Skin: Negative for rash and wound.   Hematological: Negative for adenopathy.   Psychiatric/Behavioral: Negative for confusion. The patient is not nervous/anxious.        I personally reviewed Past Medical History, Past Surgical History, Social History, and Family History.    Objective:      Vitals:    02/10/22 1357   BP: 130/80   Pulse: 103   SpO2: 99%   Weight: 135.6 kg (298 lb 15.1 oz)   Height: 5' 6" (1.676 m)      Physical Exam  Constitutional:       General: She is not in acute distress.     Appearance: She is well-developed. She is not diaphoretic.   HENT:      Head: Normocephalic and atraumatic.      Nose: Nose " normal.      Mouth/Throat:      Pharynx: No oropharyngeal exudate.      Comments: mallampati III  Eyes:      General: No scleral icterus.        Right eye: No discharge.         Left eye: No discharge.   Neck:      Thyroid: No thyromegaly.      Trachea: No tracheal deviation.   Cardiovascular:      Rate and Rhythm: Normal rate and regular rhythm.      Heart sounds: Normal heart sounds. No murmur heard.      Pulmonary:      Effort: Pulmonary effort is normal. No respiratory distress.      Breath sounds: Normal breath sounds. No wheezing.   Abdominal:      General: Bowel sounds are normal. There is no distension.      Palpations: Abdomen is soft.      Tenderness: There is no abdominal tenderness.   Musculoskeletal:         General: No deformity.      Cervical back: Neck supple.      Right lower leg: No edema.      Left lower leg: No edema.   Lymphadenopathy:      Cervical: No cervical adenopathy.   Skin:     General: Skin is warm and dry.      Findings: No erythema.   Neurological:      Mental Status: She is alert.      Cranial Nerves: No cranial nerve deficit.      Gait: Gait abnormal (difficulty sitting to standing, steps).   Psychiatric:         Behavior: Behavior normal.           Lab Results   Component Value Date    WBC 5.40 10/14/2021    HGB 11.1 (L) 10/14/2021    HCT 37.1 10/14/2021     10/14/2021    CHOL 133 05/06/2021    TRIG 89 05/06/2021    HDL 44 05/06/2021    ALT 16 10/14/2021    AST 14 10/14/2021     12/08/2021    K 4.4 12/08/2021     (H) 12/08/2021    CREATININE 0.9 12/08/2021    BUN 21 (H) 12/08/2021    CO2 19 (L) 12/08/2021    TSH 1.832 10/13/2021    INR 0.9 10/02/2015    HGBA1C 10.1 (H) 12/08/2021       The 10-year ASCVD risk score ([place] CARISSA Jr., et al., 2013) is: 10.8%    Values used to calculate the score:      Age: 56 years      Sex: Female      Is Non- : Yes      Diabetic: Yes      Tobacco smoker: No      Systolic Blood Pressure: 130 mmHg      Is BP  treated: Yes      HDL Cholesterol: 44 mg/dL      Total Cholesterol: 133 mg/dL    (Imaging have been independently reviewed)  Foot xray c OA. No fx.    Assessment:       1. Chronic back pain, unspecified back location, unspecified back pain laterality    2. Gait instability    3. Type 2 diabetes mellitus with hyperglycemia, with long-term current use of insulin    4. Metabolic acidosis    5. Essential hypertension    6. Morbid obesity with BMI of 50.0-59.9, adult    7. Mixed hyperlipidemia    8. Vitamin D deficiency    9. Influenza vaccine needed    10. Need for zoster vaccine          Plan:       Yayo was seen today for back pain.    Diagnoses and all orders for this visit:    Chronic back pain, unspecified back location, unspecified back pain laterality  Comments:  Recurrent. Reschedule c PM&R. Rx Flexeril. Would benefit from [facility] Progam to help c exercises, gait, gradual weight loss.  Orders:  -     cyclobenzaprine (FLEXERIL) 5 MG tablet; Take 1 tablet (5 mg total) by mouth 3 (three) times daily as needed for Muscle spasms.  -     [facility]    Gait instability  Comments:  As above.  Orders:  -     Ambulatory referral/consult to [email or web address]; Future    Type 2 diabetes mellitus with hyperglycemia, with long-term current use of insulin  Comments:  A1C uncontrolled, but BG much improved! Cont current meds, Levemir 30qHS. Monitor BG log. Follows c OSH Nutrition. A1C next mo. Sched eye, foot exams.  Orders:  -     Ambulatory referral/consult to Optometry; Future  -     pramlintide (SYMLINPEN 120) 2,700 mcg/2.7 mL PnIj; Inject 120 mcg into the skin 3 (three) times daily before meals. Start 60 mcg prior to meals for one week  -     Hemoglobin A1C; Future  -     Microalbumin/Creatinine Ratio, Urine; Future    Metabolic acidosis  Comments:  Improved. Cont sodium bicarb. Has appt c Nephro.  Orders:  -     sodium bicarbonate 650 MG tablet; Take 1 tablet (650  mg total) by mouth 2 (two) times daily.    Essential hypertension  Comments:  Controlled. Cont Norvasc 10, chlorthalidone 25. Consider Aldactone. H/o ACE angioedema. Monitor home BP.  Orders:  -     amLODIPine (NORVASC) 10 MG tablet; Take 1 tablet (10 mg total) by mouth once daily.    Morbid obesity with BMI of 50.0-59.9, adult  Comments:  Stable. Awaiting bariatric surgery. Following c Bariatrics, nutrition at [facility]. Referred to [facility] to help c exercise, mobility.  Orders:  -     Ambulatory referral/consult to [facility]; Future    Mixed hyperlipidemia  Comments:  FLP controlled. Elevated ASCVD. Cont Lipitor 40,  ASA 81. Monitor FLP annually.  Orders:  -     atorvastatin (LIPITOR) 40 MG tablet; Take 1 tablet (40 mg total) by mouth once daily.    Vitamin D deficiency  Comments:  Improved. Cont ergocalciferol weekly.  Orders:  -     ergocalciferol (ERGOCALCIFEROL) 50,000 unit Cap; Take 1 capsule (50,000 Units total) by mouth every 7 days.    Influenza vaccine needed  Comments:  Advised to obtain vaccine at Pharmacy.    Need for zoster vaccine  Comments:  Advised to obtain vaccine at Pharmacy.    Other orders  The following orders have not been finalized:  -     Cancel: insulin detemir U-100 (LEVEMIR FLEXTOUCH U-100 INSULN) 100 unit/mL (3 mL) InPn pen         Side effects of medication(s) were discussed in detail and patient voiced understanding.  Patient will call back for any issues or complications.     I have spent a total of 80 minutes with the patient as well as reviewing the chart/medical record and placing orders on the day of the visit. Discussed back pain, DM, acidosis.      RTC in 1 month or sooner PRN for DM c BG log, labs prior. In person or virtual, but 30 minutes long.

## 2022-02-10 NOTE — PATIENT INSTRUCTIONS
"Thank you for your message. We have been getting a lot of questions like yours.     [facility] is offering COVID-19 vaccinations in accordance to the recommendations of the [facility].  [facility] recommends scheduling your COVID Vaccine via [employer] by following the directions outlined below.      To Schedule your vaccine on the [employer] website:  Choose "Visits" then "Schedule an Appointment" and select the visit tile titled "COVID-19 Vaccine."    To Schedule your vaccine on the [employer] john:  Choose "Appointments" then "Schedule an Appointment" then "Tell us why you're coming in" and select the visit tile titled "COVID Vaccine"      Patients may also call [phone] if they do not have a [facility] account.    More times and dates will become available as we receive more vaccine on a weekly basis. We encourage you to continue to check [facility] as more slots become available and appreciate your patience during this process.    Due to high activity, the [facility] website and COVID hotline may experience a slowdown or long wait times. We sincerely apologize for the inconvenience and appreciate your patience as you try and schedule your vaccination.    We are hopeful that the vaccine will be available to more members of the public soon. We encourage community members and patients to visit [email or web address] or ldh.[place].gov/coronavirus or cov[place]vaccine[place]gov for the latest information and resources.     "

## 2022-02-10 NOTE — TELEPHONE ENCOUNTER
SymlinPen 120 2700MCG/2.7ML pen-injectors  Form  [employer] Electronic PA Form (2017 NCPDP)  The member recently filled this medication and will be able to return for their next refill according to their plan limits.

## 2022-02-16 ENCOUNTER — OFFICE VISIT (OUTPATIENT)
Dept: ORTHOPEDICS | Facility: CLINIC | Age: 57
End: 2022-02-16
Payer: MEDICAID

## 2022-02-16 DIAGNOSIS — M17.0 PRIMARY OSTEOARTHRITIS OF BOTH KNEES: Primary | ICD-10-CM

## 2022-02-16 PROCEDURE — 99999 PR PBB SHADOW E&M-EST. PATIENT-LVL III: CPT | Mod: PBBFAC,,, | Performed by: NURSE PRACTITIONER

## 2022-02-16 PROCEDURE — 20610 DRAIN/INJ JOINT/BURSA W/O US: CPT | Mod: 50,PBBFAC | Performed by: NURSE PRACTITIONER

## 2022-02-16 PROCEDURE — 99999 PR PBB SHADOW E&M-EST. PATIENT-LVL III: ICD-10-PCS | Mod: PBBFAC,,, | Performed by: NURSE PRACTITIONER

## 2022-02-16 PROCEDURE — 1159F MED LIST DOCD IN RCRD: CPT | Mod: CPTII,,, | Performed by: NURSE PRACTITIONER

## 2022-02-16 PROCEDURE — 99213 OFFICE O/P EST LOW 20 MIN: CPT | Mod: S$PBB,25,, | Performed by: NURSE PRACTITIONER

## 2022-02-16 PROCEDURE — 20610 PR DRAIN/INJECT LARGE JOINT/BURSA: ICD-10-PCS | Mod: 50,S$PBB,, | Performed by: NURSE PRACTITIONER

## 2022-02-16 PROCEDURE — 99213 OFFICE O/P EST LOW 20 MIN: CPT | Mod: PBBFAC | Performed by: NURSE PRACTITIONER

## 2022-02-16 PROCEDURE — 1159F PR MEDICATION LIST DOCUMENTED IN MEDICAL RECORD: ICD-10-PCS | Mod: CPTII,,, | Performed by: NURSE PRACTITIONER

## 2022-02-16 PROCEDURE — 1160F RVW MEDS BY RX/DR IN RCRD: CPT | Mod: CPTII,,, | Performed by: NURSE PRACTITIONER

## 2022-02-16 PROCEDURE — 99213 PR OFFICE/OUTPT VISIT, EST, LEVL III, 20-29 MIN: ICD-10-PCS | Mod: S$PBB,25,, | Performed by: NURSE PRACTITIONER

## 2022-02-16 PROCEDURE — 20610 DRAIN/INJ JOINT/BURSA W/O US: CPT | Mod: 50,S$PBB,, | Performed by: NURSE PRACTITIONER

## 2022-02-16 PROCEDURE — 1160F PR REVIEW ALL MEDS BY PRESCRIBER/CLIN PHARMACIST DOCUMENTED: ICD-10-PCS | Mod: CPTII,,, | Performed by: NURSE PRACTITIONER

## 2022-02-16 RX ORDER — TRIAMCINOLONE ACETONIDE 40 MG/ML
80 INJECTION, SUSPENSION INTRA-ARTICULAR; INTRAMUSCULAR
Status: COMPLETED | OUTPATIENT
Start: 2022-02-16 | End: 2022-02-16

## 2022-02-16 RX ADMIN — TRIAMCINOLONE ACETONIDE 80 MG: 40 INJECTION, SUSPENSION INTRA-ARTICULAR; INTRAMUSCULAR at 04:02

## 2022-02-16 NOTE — PROGRESS NOTES
CC: Pain of the Right Knee      HPI: Pt with c/o bilateral knee pain right>left for the past few weeks. There has been increased swelling in the right knee since she's been working more. 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 She has known bilateral knee djd and has been working on weight loss. She is getting weight loss surgery at [facility], but that's currently on hold due to covid restrictions. She has had cortisone injections in the past with good relief and she would like to repeat that as well as an aspiration of the right knee. She is ambulating without assistive device. There is not a limp.    ROS  General: denies fever and chills  Resp: no c/o sob  CVS: no c/o cp  MSK: c/o bilateral knee pain    PE  General: AAOx3, pleasant and cooperative  Resp: respirations even and unlabored  MSK: right knee exam  0 degrees extension  100 degrees flexion  No warmth or erythema   + effusion  + crepitus  + tenderness over the medial joint line  5/5 strength    Left knee exam  0 degrees extension  120 degrees flexion  No warmth or erythema  - effusion  + crepitus  + tenderness over the medial joint line  5/5 strength    Assessment:  Bilateral knee djd  Right knee effusion    Plan:  Aspiration right knee today with cortisone injection  Cortisone injection left knee today  RICE  nsaids prn  F/u as needed        Knee Arthrocentesis with Injection Procedure Note    Pre-operative Diagnosis: right knee degenerative arthritis    Post-operative Diagnosis: same    Indications: right knee pain    Anesthesia: none    Procedure Details     Verbal consent was obtained for the procedure.An 18 gauge needle was inserted into the superior aspect of the joint from a lateral approach. 15 ml of clear yellow fluid was removed from the joint and discarded. 1% marcaine 4 ml and 40mg of kenalog was then injected into the joint through the same needle. The needle was removed and the area cleansed and  dressed.    Complications:  None; patient tolerated the procedure well.    Knee Injection Procedure Note  Diagnosis: left knee degenerative arthritis  Indications: left knee pain  Procedure Details: Verbal consent was obtained for the procedure. The injection site was identified and the skin was prepared with alcohol. The left knee was injected from an anterolateral approach with 1 ml of Kenalog and 4 ml Lidocaine under sterile technique using a 22 gauge needle. The needle was removed and the area cleansed and dressed.  Complications:  Patient tolerated the procedure well.    she was advised to rest the knee today, using ice and elevation as needed for comfort and swelling.Immediate relief of the knee pain may be short lived and secondary to the lidocaine. she may have an increase in discomfort tonight followed by steady improvement over the next several days. It may take 1-2 weeks following the injection to get the full benefit of the medication.

## 2022-03-03 DIAGNOSIS — M17.0 PRIMARY OSTEOARTHRITIS OF BOTH KNEES: ICD-10-CM

## 2022-03-04 RX ORDER — TRAMADOL HYDROCHLORIDE 50 MG/1
50-100 TABLET ORAL 3 TIMES DAILY PRN
Qty: 180 TABLET | Refills: 3 | Status: SHIPPED | OUTPATIENT
Start: 2022-03-12 | End: 2022-05-11 | Stop reason: SDUPTHER

## 2022-03-04 RX ORDER — DICLOFENAC SODIUM 10 MG/G
2 GEL TOPICAL 4 TIMES DAILY PRN
Qty: 300 G | Refills: 3 | Status: SHIPPED | OUTPATIENT
Start: 2022-03-04 | End: 2022-05-11 | Stop reason: SDUPTHER

## 2022-03-08 DIAGNOSIS — D50.0 IRON DEFICIENCY ANEMIA DUE TO CHRONIC BLOOD LOSS: ICD-10-CM

## 2022-03-08 DIAGNOSIS — E87.20 METABOLIC ACIDOSIS: ICD-10-CM

## 2022-03-08 DIAGNOSIS — I10 ESSENTIAL HYPERTENSION: ICD-10-CM

## 2022-03-08 DIAGNOSIS — E11.65 TYPE 2 DIABETES MELLITUS WITH HYPERGLYCEMIA, WITH LONG-TERM CURRENT USE OF INSULIN: ICD-10-CM

## 2022-03-08 DIAGNOSIS — Z79.4 TYPE 2 DIABETES MELLITUS WITH HYPERGLYCEMIA, WITH LONG-TERM CURRENT USE OF INSULIN: ICD-10-CM

## 2022-03-08 DIAGNOSIS — E78.2 MIXED HYPERLIPIDEMIA: Primary | ICD-10-CM

## 2022-03-12 DIAGNOSIS — Z79.4 TYPE 2 DIABETES MELLITUS WITH HYPERGLYCEMIA, WITH LONG-TERM CURRENT USE OF INSULIN: ICD-10-CM

## 2022-03-12 DIAGNOSIS — E11.65 TYPE 2 DIABETES MELLITUS WITH HYPERGLYCEMIA, WITH LONG-TERM CURRENT USE OF INSULIN: ICD-10-CM

## 2022-03-12 NOTE — TELEPHONE ENCOUNTER
Care Due:                  Date            Visit Type   Department     Provider  --------------------------------------------------------------------------------                                EP -                              PRIMARY      [facility] INTERNAL  Last Visit: 02-      CARE ([facility])   MEDICINE       Berta Hastings                              ESTABLISHED                              PATIENT -    [facility] INTERNAL  Next Visit: 03-      [facility]       Berta Hastings                                                            Last  Test          Frequency    Reason                     Performed    Due Date  --------------------------------------------------------------------------------    Lipid Panel.  12 months..  atorvastatin.............  01-   01-    Powered by [employer] by [employer]. Reference number: [id number].   3/12/2022 8:02:24 AM CST

## 2022-03-15 DIAGNOSIS — M17.0 PRIMARY OSTEOARTHRITIS OF BOTH KNEES: Primary | ICD-10-CM

## 2022-03-21 RX ORDER — INSULIN DETEMIR 100 [IU]/ML
30 INJECTION, SOLUTION SUBCUTANEOUS NIGHTLY
Qty: 27 ML | Refills: 0 | Status: SHIPPED | OUTPATIENT
Start: 2022-03-21 | End: 2022-08-02

## 2022-03-21 NOTE — TELEPHONE ENCOUNTER
Refill Authorization Note   Yayo Carver  is requesting a refill authorization.  Brief Assessment and Rationale for Refill:  Approve    -Medication-Related Problems Identified: Dose adjustment  Medication Therapy Plan:  Fixed to 30 units QHS    Medication Reconciliation Completed: No   Comments:   --->Care Gap information included below if applicable.   Orders Placed This Encounter    insulin detemir U-100 (LEVEMIR FLEXTOUCH U-100 INSULN) 100 unit/mL (3 mL) InPn pen      Requested Prescriptions   Signed Prescriptions Disp Refills    insulin detemir U-100 (LEVEMIR FLEXTOUCH U-100 INSULN) 100 unit/mL (3 mL) InPn pen 27 mL 0     Sig: Inject 30 Units into the skin every evening.       Endocrinology:  Diabetes - Insulins Passed - 3/12/2022  8:01 AM        Passed - Patient is at least 18 years old        Passed - Valid encounter within last 15 months     Recent Visits  Date Type Provider Dept   02/10/22 Office Visit Berta Hastings MD [facility]   11/09/21 Office Visit Berta Hastings MD [facility]   09/14/21 Office Visit Berta Hastings MD [facility]   01/15/21 Office Visit Berta Hastings MD [facility]   10/12/20 Office Visit Berta Hastings MD [facility]   07/09/20 Office Visit Berta Hastings MD [facility]   06/11/20 Office Visit Berta Hastings MD [facility]   Showing recent visits within past 720 days and meeting all other requirements  Future Appointments  No visits were found meeting these conditions.  Showing future appointments within next 150 days and meeting all other requirements      Future Appointments              In 2 days Berta Hastings MD [facility]    In 2 days Alida Barr, PT [place] [facility] [place]    In 2 months Arline Bullock, OD [facility], [facility]                Passed - HBA1C within 180 days     Lab Results   Component Value Date    HGBA1C 10.1 (H) 12/08/2021     HGBA1C >14.0 (H) 10/12/2021    HGBA1C 7.0 (H) 05/06/2021              Passed - eGFR is 30 or above and within 360 days     Lab Results   Component Value Date    EGFRNONAA >60.0 12/08/2021    EGFRNONAA >60 11/09/2021    EGFRNONAA >60 10/14/2021                Passed - Cr is 1.39 or below and within 360 days     Lab Results   Component Value Date    CREATININE 0.9 12/08/2021    CREATININE 0.9 11/09/2021    CREATININE 0.9 10/14/2021                  Appointments  past 12m or future 3m with PCP    Date Provider   Last Visit   2/10/2022 Berta Hastings MD   Next Visit   3/23/2022 Berta Hastings MD   ED visits in past 90 days: 0     Note composed:12:33 PM 03/21/2022

## 2022-03-23 ENCOUNTER — OFFICE VISIT (OUTPATIENT)
Dept: INTERNAL MEDICINE | Facility: CLINIC | Age: 57
End: 2022-03-23
Payer: MEDICAID

## 2022-03-23 ENCOUNTER — CLINICAL SUPPORT (OUTPATIENT)
Dept: REHABILITATION | Facility: HOSPITAL | Age: 57
End: 2022-03-23
Payer: MEDICAID

## 2022-03-23 ENCOUNTER — PATIENT OUTREACH (OUTPATIENT)
Dept: ADMINISTRATIVE | Facility: HOSPITAL | Age: 57
End: 2022-03-23
Payer: MEDICAID

## 2022-03-23 DIAGNOSIS — Z79.4 TYPE 2 DIABETES MELLITUS WITH HYPERGLYCEMIA, WITH LONG-TERM CURRENT USE OF INSULIN: Primary | ICD-10-CM

## 2022-03-23 DIAGNOSIS — E87.20 METABOLIC ACIDOSIS: ICD-10-CM

## 2022-03-23 DIAGNOSIS — M17.0 PRIMARY OSTEOARTHRITIS OF BOTH KNEES: ICD-10-CM

## 2022-03-23 DIAGNOSIS — E11.65 TYPE 2 DIABETES MELLITUS WITH HYPERGLYCEMIA, WITH LONG-TERM CURRENT USE OF INSULIN: Primary | ICD-10-CM

## 2022-03-23 DIAGNOSIS — E66.01 MORBID OBESITY WITH BMI OF 50.0-59.9, ADULT: ICD-10-CM

## 2022-03-23 DIAGNOSIS — R29.898 DECREASED STRENGTH OF LOWER EXTREMITY: ICD-10-CM

## 2022-03-23 DIAGNOSIS — I10 ESSENTIAL HYPERTENSION: ICD-10-CM

## 2022-03-23 DIAGNOSIS — R26.9 GAIT ABNORMALITY: ICD-10-CM

## 2022-03-23 PROCEDURE — 1159F MED LIST DOCD IN RCRD: CPT | Mod: CPTII,95,, | Performed by: INTERNAL MEDICINE

## 2022-03-23 PROCEDURE — 1159F PR MEDICATION LIST DOCUMENTED IN MEDICAL RECORD: ICD-10-PCS | Mod: CPTII,95,, | Performed by: INTERNAL MEDICINE

## 2022-03-23 PROCEDURE — 99215 OFFICE O/P EST HI 40 MIN: CPT | Mod: 95,,, | Performed by: INTERNAL MEDICINE

## 2022-03-23 PROCEDURE — 1160F RVW MEDS BY RX/DR IN RCRD: CPT | Mod: CPTII,95,, | Performed by: INTERNAL MEDICINE

## 2022-03-23 PROCEDURE — 97140 MANUAL THERAPY 1/> REGIONS: CPT

## 2022-03-23 PROCEDURE — 1160F PR REVIEW ALL MEDS BY PRESCRIBER/CLIN PHARMACIST DOCUMENTED: ICD-10-PCS | Mod: CPTII,95,, | Performed by: INTERNAL MEDICINE

## 2022-03-23 PROCEDURE — 97110 THERAPEUTIC EXERCISES: CPT

## 2022-03-23 PROCEDURE — 99215 PR OFFICE/OUTPT VISIT, EST, LEVL V, 40-54 MIN: ICD-10-PCS | Mod: 95,,, | Performed by: INTERNAL MEDICINE

## 2022-03-23 PROCEDURE — 97161 PT EVAL LOW COMPLEX 20 MIN: CPT

## 2022-03-23 RX ORDER — INSULIN DETEMIR 100 [IU]/ML
30 INJECTION, SOLUTION SUBCUTANEOUS NIGHTLY
Qty: 27 ML | Refills: 11 | Status: CANCELLED | OUTPATIENT
Start: 2022-03-23

## 2022-03-23 NOTE — PROGRESS NOTES
The patient location is: [place]  The chief complaint leading to consultation is: Diabetes    Visit type: Virtual visit with synchronous audio and video  Contact time spent with patient: 15 minutes  Each patient to whom he or she provides medical services by telemedicine is:  (1) informed of the relationship between the physician and patient and the respective role of any other health care provider with respect to management of the patient; and (2) notified that he or she may decline to receive medical services by telemedicine and may withdraw from such care at any time.    Subjective:       Patient ID: Yayo Carver is a 56 y.o. female who  has a past medical history of Arthritis, Diabetes mellitus, HLD (hyperlipidemia), and Hypertension.    Chief Complaint: Diabetes     History was obtained from the patient and supplemented through chart review.  Follows c Ortho for knee OA.     Works nights for [employer].  Her son works as a , rodriguez; he played [employer] football.    HPI    DM2 is uncontrolled. H/o DKA when she had insurance issues obtaining Trulicity/Ozempic and ran out of insulin and Metformin.  Was admitted 10/2021 for HHS.  On Pramlintide/Symlin TID for weight loss. NovoLog 12 TID. Was on Levemir 30 BID, but stopped the AM dose d/t AM hypoBG.  Is only on Levemir 30 qHS.  No h/o GI SE to Metformin.  Stopped metformin due to non gap metabolic acidosis.      Tati was approved.  No co-pay.  BG used to be 200s.  AM fasting B this AM. 89-98     Diet: cut out bread, rice, potatoes. Eats protein, veggies. Will be on liquid diet x 2 wks before bariatric sx.    Exercise: limited mobility. Has appt c PT.     With normal microalbumin creatinine ratio.  No ACE/ARB d/t angioedema.   Retinal exams: .  No retinopathy.  Has appt.  Foot exams:    DM edu:  Seeing nutrition through bariatrics clinic at [facility]. Reviews BG with them.  Lab Results   Component Value Date/Time    HGBA1C 10.1 (H) 2021 11:18 AM     HGBA1C >14.0 (H) 10/12/2021 06:31 AM    HGBA1C 7.0 (H) 05/06/2021 10:29 AM    HGBA1C 5.7 (H) 01/15/2021 10:03 AM     HTN:    H/o DM, but not on ACE d/t h/o angioedema.  Has issues with muscle cramps.      BP is controlled.  Has chronic knee pain. Is on Norvasc 10 daily, chlorthalidone 25. Compliant with meds.    Home BP: none     FHx: yes  Tobacco: no  No snoring, apnea.    Non gap metabolic acidosis:  BG was up to 400 with JIMMY to 1.6 during admission that resolved with IVF.  No hypokalemia. Urine pH 6. No ketones. No diarrhea.  Stopped metformin due to non gap metabolic acidosis.  Bicarb 15. Started NaHCO3 pills for possible RTA during admission  for hyperglycemia.    Nephro at [facility] unable to see her sooner. Referred to Dr. Rosales, but was unable to fax the referral despite multiple attempts. Appt c [facility] was cancelled d/t provider availability and needed labs prior.     Obesity:    Has had difficulty getting Ozempic approved.  On Symlin.  Following with OSH Bariatrics, nutrition at [facility]. Planning on bariatric sx 4/28. No form for preop needed. Will be on a liquid diet for 2 weeks prior to sx.     Exercise: used to walk more often, but has been afraid of COVID. Walking inside her home. Walking at work but limited mobility d/t knee pain. Has appt c PT.  BMI Readings from Last 3 Encounters:   02/10/22 48.25 kg/m²   11/09/21 47.82 kg/m²   10/12/21 47.54 kg/m²               Not addressed today.  HLD:  Is currently taking Lipitor 40, ASA 81 daily.  FLP controlled. Elevated ASCVD. Cont Lipitor 40,  ASA 81. Monitor FLP annually.  Lab Results   Component Value Date    LDLCALC 71 05/06/2021     The 10-year ASCVD risk score (Owen FERRER Jr., et al., 2013) is: 10.8%    Values used to calculate the score:      Age: 56 years      Sex: Female      Is Non- : Yes      Diabetic: Yes      Tobacco smoker: No      Systolic Blood Pressure: 130 mmHg      Is BP treated: Yes      HDL Cholesterol:  44 mg/dL      Total Cholesterol: 133 mg/dL    Vitamin D deficiency:  On ergocalciferol weekly.  Improved. Cont ergocalciferol weekly.  Lab Results   Component Value Date    HOKEXWFO00RJ 25 (L) 10/13/2021    JSTEGKYU01TL 24 (L) 01/15/2021    ZYZIKLEJ67XQ 12 (L) 12/18/2019      B12 deficiency, DOM:    On B12.  H/o menorrhagia.  Now menopausal.  Stopped daily iron.  Planning on bariatric surgery as above.              Improved. Continue B12.  Will need to monitor after bariatric sx.              Ferritin controlled.  Stopped daily iron; ok to d/c.    Lab Results   Component Value Date    IRON 53 10/13/2021    TIBC 259 10/13/2021    FERRITIN 59 10/13/2021     Lab Results   Component Value Date    TLZMRVMV40 1670 (H) 10/13/2021     Lab Results   Component Value Date    FOLATE 11.7 10/13/2021     GERD:  Mid/upper chest burning.  Sometimes a little sour regurg.  No sour taste in her throat.  Noticed it after heavy food, coffee, ice cream.  No ETOH.  Taking peppermint, GasX.  Peppermint helped.  Latest meal at 7 PM.  Sometimes lies down after eating.  Has lost a significant amount of weight before.  Tries to take Mobic sparingly.  Discussed dietary changes, avoid recumbency after meals. Start PPI p.r.n.  If occurring more frequently, rec PPI qAM. and order H pylori test    Chronic back, b/l knee OA:    Fell off porch in 7/2019, resulting in chronic back pain.  Following with Ortho, PM&R.  No relief with Tylenol.  Has a knee sleeve.  On tramadol prescribed by PM&R.  She tries to avoid meds/Mobic.  Following with Ortho for bilateral knee injections with relief, but unable to receive injections until A1c is less than 8.  Working on weight loss.    Reports that she fell off her porch again d/t the hand rail. Epson salt helps, but recurrent.  Feels tight. Has had difficulty rescheduling c PM&R. Is on the wait list.  Ordered Aquatherapy.  Improved with topical Voltaren, but persistent. Follow-up with Ortho,  PM&R for injections, Bariatrics for weight loss.   Recurrent. Reschedule c PM&R. Rx Flexeril. Would benefit from Healthy Back Progam to help c exercises, gait, gradual weight loss. Has appt c PT.     Review of Systems   Constitutional: Negative for activity change and unexpected weight change.   HENT: Negative for hearing loss, rhinorrhea and trouble swallowing.    Eyes: Negative for discharge and visual disturbance.   Respiratory: Negative for chest tightness and wheezing.    Cardiovascular: Negative for chest pain and palpitations.   Gastrointestinal: Negative for blood in stool, constipation, diarrhea and vomiting.   Endocrine: Negative for polydipsia and polyuria.   Genitourinary: Negative for difficulty urinating, dysuria, hematuria and menstrual problem.   Musculoskeletal: Positive for arthralgias and back pain. Negative for joint swelling and neck pain.   Neurological: Negative for weakness and headaches.   Psychiatric/Behavioral: Negative for confusion and dysphoric mood.       I personally reviewed Past Medical History, Past Surgical History, Social History, and Family History.    Objective:      There were no vitals filed for this visit.   Physical Exam  Constitutional:       General: She is not in acute distress.     Appearance: She is well-developed. She is not diaphoretic.   HENT:      Head: Normocephalic and atraumatic.   Eyes:      General:         Right eye: No discharge.         Left eye: No discharge.   Pulmonary:      Effort: Pulmonary effort is normal. No respiratory distress.   Skin:     Coloration: Skin is not pale.      Findings: No erythema.   Neurological:      Mental Status: She is alert.   Psychiatric:         Behavior: Behavior normal.           Lab Results   Component Value Date    WBC 5.40 10/14/2021    HGB 11.1 (L) 10/14/2021    HCT 37.1 10/14/2021     10/14/2021    CHOL 133 05/06/2021    TRIG 89 05/06/2021    HDL 44 05/06/2021    ALT 16 10/14/2021    AST 14 10/14/2021      12/08/2021    K 4.4 12/08/2021     (H) 12/08/2021    CREATININE 0.9 12/08/2021    BUN 21 (H) 12/08/2021    CO2 19 (L) 12/08/2021    TSH 1.832 10/13/2021    INR 0.9 10/02/2015    HGBA1C 10.1 (H) 12/08/2021       The 10-year ASCVD risk score (Owenana paula FERRER JrAlla, et al., 2013) is: 10.8%    Values used to calculate the score:      Age: 56 years      Sex: Female      Is Non- : Yes      Diabetic: Yes      Tobacco smoker: No      Systolic Blood Pressure: 130 mmHg      Is BP treated: Yes      HDL Cholesterol: 44 mg/dL      Total Cholesterol: 133 mg/dL    (Imaging have been independently reviewed)  Foot xray c OA. No fx.    Assessment:       1. Type 2 diabetes mellitus with hyperglycemia, with long-term current use of insulin    2. Essential hypertension    3. Metabolic acidosis    4. Morbid obesity with BMI of 50.0-59.9, adult          Plan:       Yayo was seen today for diabetes.    Diagnoses and all orders for this visit:    Type 2 diabetes mellitus with hyperglycemia, with long-term current use of insulin  Comments:  A1C uncontrolled, but BG improved! Cont current meds. Likely plan to 1/2 dose insulin while NPO for sx. BG log. Follows c OSH Nutrition. Resched A1C, foot exam    Essential hypertension  Comments:  Staff will call for recent home BP log. Cont Norvasc 10, chlorthalidone 25. Consider Aldactone. H/o ACE angioedema.     Metabolic acidosis  Comments:  Improved. Cont sodium bicarb. Reschedule Nephro labs, Nephro appt.    Morbid obesity with BMI of 50.0-59.9, adult  Comments:  Sched preop appt for bariatric surgery. Following c Bariatrics, nutrition at [facility]. Will start Healthy Back to help c exercise, mobility.    Other orders  The following orders have not been finalized:  -     Cancel: insulin detemir U-100 (LEVEMIR FLEXTOUCH U-100 INSULN) 100 unit/mL (3 mL) InPn pen         Side effects of medication(s) were discussed in detail and patient voiced understanding.  Patient will call back  for any issues or complications.     I have spent a total of 45 minutes with the patient as well as reviewing the chart/medical record and placing orders on the day of the visit. Discussed DM, preop for bariatric sx, metabolic acidosis.      RTC next available for preop, DM. In person.

## 2022-03-24 ENCOUNTER — TELEPHONE (OUTPATIENT)
Dept: INTERNAL MEDICINE | Facility: CLINIC | Age: 57
End: 2022-03-24
Payer: MEDICAID

## 2022-03-24 NOTE — TELEPHONE ENCOUNTER
----- Message from Berta Hastings MD sent at 3/23/2022  3:36 PM CDT -----  Is there another Nephrologist at [facility] that she can see? Please schedule if you can. Thank you!  ----- Message -----  From: Yanira Ward LPN  Sent: 3/23/2022   2:55 PM CDT  To: Berta Hastings MD    Hi, There is no other Nephrologist in this [facility].  ----- Message -----  From: Berta Hastings MD  Sent: 3/23/2022   2:47 PM CDT  To: Yanira Ward LPN, Noemi Ramos Staff    Can you schedule with another nephrologist?    ----- Message -----  From: Yanira Ward LPN  Sent: 3/23/2022   2:12 PM CDT  To: MD Dr Noemi Valentine will be out until the first week in May.  ----- Message -----  From: Berta Hastings MD  Sent: 3/23/2022   2:04 PM CDT  To: Venkata Hampton Staff, Noemi Ramos Staff    Her appt with Dr. Franklin had to be cancelled due to provider availability, and she was never contacted to reschedule.  Please reschedule a visit with Nephrology [facility] since she will undergo bariatric surgery on 4/28.    To my staff:  1. Please schedule an in person appointment with me for preop for bariatric surgery. Next available.  2. Reschedule with podiatry.  3. Link my labs with the labs (blood and urine) from Nephrology.  Labs can be done now.  4.  Please ask the patient what her home blood pressure numbers have been lately.  Please add it in the remote BP vitals. Thanks!

## 2022-03-24 NOTE — TELEPHONE ENCOUNTER
I am unable to schedule Nephrology for this pt as I am likely blocked due to her insurance.    Pt states she had an appointment, needed a urine test that day, went to have it done and then was called that she couldn't be seen as urine needed to be done farther ahead.    Pt has not heard back from Nephrology to reschedule.    As far as your other requests:   1. Please schedule an in person appointment with me for preop for bariatric surgery. Next available. (Done)   2. Reschedule with podiatry. (Unable to schedule - likely due to insurance - gave pt Podiatry # to schedule).  3. Link my labs with the labs (blood and urine) from Nephrology.  Labs can be done now. Labs scheduled next week.   4.  Please ask the patient what her home blood pressure numbers have been lately.  Please add it in the remote BP vitals. Thanks!  Pt has not been taking her blood pressure at home lately. I asked pt to take it and let us know what her readings have been either by phone or portal.

## 2022-03-24 NOTE — PLAN OF CARE
[facility]  Physical Therapy Initial Evaluation    Name: Yayo Carver  Clinic Number: [id number]    Therapy Diagnosis:   Encounter Diagnoses   Name Primary?    Primary osteoarthritis of both knees     Gait abnormality     Decreased strength of lower extremity      Physician: Meghan Simons NP     Physician Orders: PT Eval and Treat   Medical Diagnosis from Referral: Primary osteoarthritis of both knees [M17.0]  Evaluation Date: 3/23/2022  Authorization Period Expiration: 12/31/2022  Plan of Care Expiration: 05/04/2022  Visit # / Visits authorized: 1/ 1    Time In: 0300 pm  Time Out: 0400 pm  Total Appointment Time (timed & untimed codes): 60 minutes (1 LCE, 1 MT, 1 TE)    Precautions: Standard    Subjective   Date of onset: 11/13/2013  History of current condition - Elisa reports: she fell on her R tibia in 2013 when going up stairs during a football game. She has had B knee pain on and off since then. She has participated in aquatic therapy before and is currently on the waiting list for more aquatic therapy. She also reports that she has just gotten approved for gastric bypass and after she loses some weight she'll be getting a TKA on each leg. Until recently she used to walk 2-3 miles at [facility] but had to stop due to increased inflammation in knees per Meghan Reddy NP. She also complains of R LE swelling.  R>L but the left is getting worse every day. Denies buckling, catching, or locking, numbness/tingling/burning. Increased pain and stiffness with walking and standing, none with sitting. Used to ride recumbent bike at gym but has increased pain and popping with this so had to stop    Medical History:   Past Medical History:   Diagnosis Date    Arthritis     Diabetes mellitus     HLD (hyperlipidemia)     Hypertension        Surgical History:   Yayo Carver  has a past surgical history that includes Tubal ligation; Tonsillectomy; Colonoscopy (N/A, 6/27/2017); and  " section (1989).    Medications:   Yayo has a current medication list which includes the following prescription(s): amlodipine, aspirin, atorvastatin, b complex vitamins, chlorthalidone, cyclobenzaprine, diclofenac sodium, ergocalciferol, freestyle zachery 14 day reader, freestyle zachery 14 day sensor, insulin aspart u-100, levemir flextouch u-100 insuln, meloxicam, multivitamin, pantoprazole, pen needle, diabetic, pramlintide, sodium bicarbonate, and tramadol.    Allergies:   Review of patient's allergies indicates:   Allergen Reactions    Ace inhibitors Edema and Swelling        Imaging, B Knee Flexion X- Rays:    "Left: There is moderate DJD and a valgus deformity.  No fracture dislocation bone destruction or OCD seen.   Right knee: There is moderate DJD and a mild varus deformity.  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  Prior Level of Function: no limitations  Current Level of Function: unable to walk for prolonged periods, and avoids social interactions b/c of this    Pain:  Current 5/10, worst 10/10, best 0/10   Location: bilateral knee  Description: Aching and Tight  Aggravating Factors: Standing and Walking  Easing Factors: pain medication, ice, hot bath and rest    Patients goals: to get stronger in preparation for surgery    Objective   Gait:  B genu valgus, B hip drop, forward trunk lean, flexed knee gait throughout      Range of Motion:   Knee Left active Left Passive Right Active R passive   Flexion 110 115 88 deg 96 deg   Extension 0 4 deg hyper -5 deg from 0 -8 deg form 0       Lower Extremity Strength  Right LE  Left LE    Knee extension: 4-/5* Knee extension: 4-/5   Knee flexion: 3+/5 Knee flexion: 4-/5   Hip flexion: 3-/5* Hip " flexion: 3-/5   Hip extension:  3-/5 Hip extension: 3-/5   Hip abduction: 4-/5 Hip abduction: 4-/5   Hip adduction: 4-/5 Hip adduction 4-/5   Ankle dorsiflexion: 4/5 Ankle dorsiflexion: 4/5   Ankle plantarflexion: 4/5 Ankle plantarflexion: 4/5       Special Tests:    Right Left   Valgus Stress Test - -   Varus Stress test + -   Lachman's test - -   Posterior drawer - -   Anterior drawer - -   Amy's Test + +     Meniscal tear CPR:  (4/5 LR+= 4.28, 5/5 LR+ 11.2)  1. History of catching/locking reported   N  2. Joint line tenderness                           Y  3. Pain with bounce knee hyperextension          Y  4. Pain with maximal knee flexion                      Y            5. Pain/audible click with Amy test            Y (pain and click)  --> 4/5     MCL CPR:  1. Trauma by external force to leg                              Y  2. Rotational trauma                                                   N  3. Pain with valgus stress test at 30° (PVLS30)        Y  4. Laxity with valgus stress test at 30° (LVLS30)       Y  * History > 1 out of 2 + Pain with valgus stress test --> LR+ 4.8    Function:    - SLS (R): poor balance, <5 sec, no increase in pain  - SLS (L): poor balance, <5 sec, no increase in pain  - Squat: quad dominant, lack of hip hinge, knee movement prior to pelvic movement,   - Sit <--> Stand: B use of UE, excess forward trunk lean    Joint Mobility: hypomobile superior patellar glide B    Palpation: TTP at R medial and anterior joint line, L lateral and anterior joint line    Sensation: WNL    Flexibility:              Ely's test: R + ; L +               Hamstrings: R - ; L  -   Pacheco Test Right  Left    Iliopsoas + +   Rectus Femoris  + +       Limitation/Restriction for FOTO Knee Survey    Therapist reviewed FOTO scores for Yayo Carver on 3/23/2022.   FOTO documents entered into [employer] - see Media section.    Limitation Score: 24%  Predcited Limitation Score: 19%         TREATMENT   Treatment  "Time In: 0335 pm  Treatment Time Out: 0400 pm  Total Treatment time (time-based codes) separate from Evaluation: 25 minutes    Elisa received therapeutic exercises to develop strength, endurance, ROM, and flexibility for 15 minutes including:   - Shelley HS stretch;3 x 30"    - Quad set; 45 deg, 20 deg, and 0 deg flex; 20x w/ 5" hold    MANUAL THERAPY TECHNIQUES including Joint mobilizations and Soft tissue Mobilization were applied to B knee for 10 minutes.    - lateral patellar taping on R, medial patellar taping on L      Home Exercises and Patient Education Provided    Education provided:   - HEP  - POC/prognosis    Written Home Exercises Provided: yes.  Exercises were reviewed and Elisa was able to demonstrate them prior to the end of the session.  Elisa demonstrated good  understanding of the education provided.     See EMR under Patient Instructions for exercises provided 3/23/2022.    Assessment   Yayo is a 56 y.o. female referred to outpatient Physical Therapy with a medical diagnosis of primary osteoarthritis of both knees. Patient presents with signs and symptoms consistent of R sided meniscal pathology and has pain during functional transitions and walking. Pt presents with limited B knee AROM; B LE weakness; tenderness of B medial and lateral joint lines; tightness of B hip flexors, and functional limitations of decreased community ambulation. Patient would benefit from skilled OP PT to address the above concerns and improve pt's functional independence.     Patient prognosis is Good.   Patientt will benefit from skilled outpatient Physical Therapy to address the deficits stated above and in the chart below, provide patient /family education, and to maximize patientt's level of independence.     Plan of care discussed with patient: Yes  Patient's spiritual, cultural and educational needs considered and patient is agreeable to the plan of care and goals as stated below:     Anticipated Barriers for " therapy: co-morbidities    Medical Necessity is demonstrated by the following  History  Co-morbidities and personal factors that may impact the plan of care Co-morbidities:   diabetes, HTN and HLD    Personal Factors:   no deficits     moderate   Examination  Body Structures and Functions, activity limitations and participation restrictions that may impact the plan of care Body Regions:   lower extremities    Body Systems:    gross symmetry  ROM  strength  gross coordinated movement  balance  gait  transfers  transitions  motor control  motor learning    Participation Restrictions:   Community ambulation    Activity limitations:   Learning and applying knowledge  no deficits    General Tasks and Commands  no deficits    Communication  no deficits    Mobility  lifting and carrying objects  walking  driving (bike, car, motorcycle)    Self care  no deficits    Domestic Life  shopping  cooking  doing house work (cleaning house, washing dishes, laundry)    Interactions/Relationships  no deficits    Life Areas  employment    Community and Social Life  recreation and leisure         high   Clinical Presentation stable and uncomplicated low   Decision Making/ Complexity Score: low     Goals:  Short Term Goals: (3 weeks)  1. Pt will be independent with HEP in order to supplement patient in improving functional mobility. - progressing, not met  2. Pt will improve (R) knee AROM to at least 2-115 in order to improve gait. - progressing, not met  3. Pt will improve hip flexor/quadriceps mobility to WNL in order to improve hip/knee AROM and improved gait function - progressing, not met  4. Pt will improve hip abduction strength from 3+/5 to 4+/5 to improve functional gait deviation. - progressing, not met     Long Term Goals: (6 weeks)  1. Pt will be independent with updated HEP supplement PT in improving functional mobility. - progressing, not met  2. Pt will improve (B) knee AROM to at least 0-120 in order to improve gait and  ability to perform ADLs. - progressing, not met  3. Pt will improve FOTO knee survey score to </= 19 % limited in order to demo improved functional mobility. - progressing, not met  4. Pt will reports </= 2/10 pain with aggravating activities to improve her functional independence. - progressing, not met      Plan   Plan of care Certification: 3/23/2022 to 05/04/2022.    Outpatient Physical Therapy 2 times weekly for 6 weeks to include the following interventions: Aquatic Therapy, Gait Training, Manual Therapy, Moist Heat/ Ice, Neuromuscular Re-ed, Patient Education, Therapeutic Activities and Therapeutic Exercise.     Alida Barr, PT, DPT

## 2022-03-24 NOTE — TELEPHONE ENCOUNTER
----- Message from Berta Hastings MD sent at 3/23/2022  2:01 PM CDT -----  Her appt with Dr. Franklin had to be cancelled due to provider availability, and she was never contacted to reschedule.  Please reschedule a visit with Nephrology ASAP since she will undergo bariatric surgery on 4/28.    To my staff:  1. Please schedule an in person appointment with me for preop for bariatric surgery. Next available.  2. Reschedule with podiatry.  3. Link my labs with the labs (blood and urine) from Nephrology.  Labs can be done now.  4.  Please ask the patient what her home blood pressure numbers have been lately.  Please add it in the remote BP vitals. Thanks!

## 2022-03-28 DIAGNOSIS — M17.0 PRIMARY OSTEOARTHRITIS OF BOTH KNEES: ICD-10-CM

## 2022-03-28 RX ORDER — MELOXICAM 15 MG/1
15 TABLET ORAL DAILY
Qty: 30 TABLET | Refills: 1 | Status: SHIPPED | OUTPATIENT
Start: 2022-03-28 | End: 2022-05-03

## 2022-03-29 ENCOUNTER — DOCUMENTATION ONLY (OUTPATIENT)
Dept: REHABILITATION | Facility: HOSPITAL | Age: 57
End: 2022-03-29
Payer: MEDICAID

## 2022-03-29 NOTE — TELEPHONE ENCOUNTER
Is there another nephrologist that she can see in the [facility] system? Please schedule if you can.  Thank you!

## 2022-03-29 NOTE — PROGRESS NOTES
Physical Therapy: No show  Date: 03/29/2022    Patient was a no show to today's PT appointment. Patient's next scheduled appointment is March 31, 2022.     Initial Evaluation: 3/23/2022  Plan of Care Explanation: 5/4/2022  Cancel: 1  No show: 1    Therapist: Farhana Olguin PTA

## 2022-03-30 ENCOUNTER — TELEPHONE (OUTPATIENT)
Dept: NEPHROLOGY | Facility: CLINIC | Age: 57
End: 2022-03-30
Payer: MEDICAID

## 2022-03-30 NOTE — TELEPHONE ENCOUNTER
Spoke to pt and sent message to Dr. Richard Franklin staff in regards to getting pt rescheduled   ----- Message from Reba Rocha sent at 3/30/2022 10:46 AM CDT -----  Regarding: speak with nurse  Contact: nurse  [phone]   please call patient being referred over waiting on a call back thanks.

## 2022-03-30 NOTE — TELEPHONE ENCOUNTER
Spoke to Nephrology scheduling at [facility] and states that a nurse will contact patient to schedule appt.  Unable to schedule due to insurance.

## 2022-03-31 ENCOUNTER — CLINICAL SUPPORT (OUTPATIENT)
Dept: REHABILITATION | Facility: HOSPITAL | Age: 57
End: 2022-03-31
Payer: MEDICAID

## 2022-03-31 DIAGNOSIS — R26.9 GAIT ABNORMALITY: Primary | ICD-10-CM

## 2022-03-31 DIAGNOSIS — R29.898 DECREASED STRENGTH OF LOWER EXTREMITY: ICD-10-CM

## 2022-03-31 PROCEDURE — 97110 THERAPEUTIC EXERCISES: CPT

## 2022-03-31 NOTE — PROGRESS NOTES
"[facility]   Physical Therapy Treatment Note     Name: Yayo Carver  Clinic Number: [id number]    Therapy Diagnosis: No diagnosis found.  Physician: Meghan Simons NP    Visit Date: 3/31/2022    Physician Orders: PT Eval and Treat   Medical Diagnosis from Referral: Primary osteoarthritis of both knees [M17.0]  Evaluation Date: 3/23/2022  Authorization Period Expiration: 12/31/2022  Plan of Care Expiration: 05/04/2022  Visit # / Visits authorized: 1/ 12 (+eval)  PTA Visit #: 0/5      Time In: 0210 pm (pt late)  Time Out: 0300 pm  Total Appointment Time: 50 minutes (3 TE)     Precautions: Standard    SUBJECTIVE     Pt reports: she has been walking a lot at work and is in a lot of pain today  She was compliant with home exercise program.  Response to previous treatment: decreased pain  Functional change: none    Pain: 10/10  Location: right knee      OBJECTIVE     Objective Measures updated at progress report unless specified.     Treatment       Elisa received the treatments listed below:      THERAPEUTIC EXERCISES to develop strength, endurance, ROM, flexibility, posture and core stabilization for 20 minutes including:  - Recumbent bike; 6 min; Lv 3.0; 1/2 revolutions; for CV endurance and increased tissue extensibility  - Clamshells; 3 x 10 w/ 5" hold  - SAQ; 3 x 15 w/ 5" hold- NP  - LAQ; 2 x 10 w/ 5" hold    MANUAL THERAPY TECHNIQUES including Joint mobilizations and Soft tissue Mobilization were applied to B Knee for 15 minutes.  - patellar mobs Gr 3-4, all directions at 0, 20 and 60 deg of flex  - fat pad mobs at 0, 20 and 60 deg of flex  - knee ext mobs tibia on femur      NEUROMUSCULAR RE-EDUCATION ACTIVITIES to improve Balance, Coordination, Kinesthetic, Sense, Proprioception and Posture for 15 minutes.  The following were included:  - Heel Prop; 4 min w/ strap around toes  - Quad set; 20 deg, and 0 deg flex; 20x w/ 5" hold    THERAPEUTIC ACTIVITIES to improve dynamic and " functional performance for 00 minutes including:         Patient Education and Home Exercises     Home Exercises Provided and Patient Education Provided     Education provided:   - importance of pain free motion    Written Home Exercises Provided: Patient instructed to cont prior HEP. Exercises were reviewed and Elisa was able to demonstrate them prior to the end of the session.  Elisa demonstrated good  understanding of the education provided. See EMR under Patient Instructions for exercises provided during therapy sessions    ASSESSMENT   Elisa presents w/ B knee pain R>L which was exacerbated by extensive weight bearing at work prior to therapy. She demonstrated increased fear avoidance around movement which was not mechanical in nature. This was greatly improved when she was given exercises which allowed quad activation without pain. She will benefit continued performance of these activities until fear avoidance behavior is resolved and focus can shift to increased strengthening.     Elisa Is progressing well towards her goals.   Pt prognosis is Good.     Pt will continue to benefit from skilled outpatient physical therapy to address the deficits listed in the problem list box on initial evaluation, provide pt/family education and to maximize pt's level of independence in the home and community environment.     Pt's spiritual, cultural and educational needs considered and pt agreeable to plan of care and goals.     Anticipated barriers to physical therapy: co-morbidities    Goals:   Short Term Goals: (3 weeks)  1. Pt will be independent with HEP in order to supplement patient in improving functional mobility. - progressing, not met  2. Pt will improve (R) knee AROM to at least 2-115 in order to improve gait. - progressing, not met  3. Pt will improve hip flexor/quadriceps mobility to WNL in order to improve hip/knee AROM and improved gait function - progressing, not met  4. Pt will improve hip abduction strength  from 3+/5 to 4+/5 to improve functional gait deviation. - progressing, not met     Long Term Goals: (6 weeks)  1. Pt will be independent with updated HEP supplement PT in improving functional mobility. - progressing, not met  2. Pt will improve (B) knee AROM to at least 0-120 in order to improve gait and ability to perform ADLs. - progressing, not met  3. Pt will improve FOTO knee survey score to </= 19 % limited in order to demo improved functional mobility. - progressing, not met  4. Pt will reports </= 2/10 pain with aggravating activities to improve her functional independence. - progressing, not met         PLAN     Improve quad activation and range of motion    Alida Barr, PT, DPT

## 2022-04-05 ENCOUNTER — LAB VISIT (OUTPATIENT)
Dept: LAB | Facility: OTHER | Age: 57
End: 2022-04-05
Attending: INTERNAL MEDICINE
Payer: MEDICAID

## 2022-04-05 ENCOUNTER — CLINICAL SUPPORT (OUTPATIENT)
Dept: REHABILITATION | Facility: HOSPITAL | Age: 57
End: 2022-04-05
Payer: MEDICAID

## 2022-04-05 DIAGNOSIS — Z79.4 TYPE 2 DIABETES MELLITUS WITH HYPERGLYCEMIA, WITH LONG-TERM CURRENT USE OF INSULIN: ICD-10-CM

## 2022-04-05 DIAGNOSIS — I10 ESSENTIAL HYPERTENSION: ICD-10-CM

## 2022-04-05 DIAGNOSIS — E78.2 MIXED HYPERLIPIDEMIA: ICD-10-CM

## 2022-04-05 DIAGNOSIS — E11.65 TYPE 2 DIABETES MELLITUS WITH HYPERGLYCEMIA, WITH LONG-TERM CURRENT USE OF INSULIN: ICD-10-CM

## 2022-04-05 DIAGNOSIS — D50.0 IRON DEFICIENCY ANEMIA DUE TO CHRONIC BLOOD LOSS: ICD-10-CM

## 2022-04-05 DIAGNOSIS — R29.898 DECREASED STRENGTH OF LOWER EXTREMITY: ICD-10-CM

## 2022-04-05 DIAGNOSIS — R26.9 GAIT ABNORMALITY: Primary | ICD-10-CM

## 2022-04-05 DIAGNOSIS — E87.20 METABOLIC ACIDOSIS: ICD-10-CM

## 2022-04-05 LAB
ALBUMIN SERPL BCP-MCNC: 3.4 G/DL (ref 3.5–5.2)
ALP SERPL-CCNC: 94 U/L (ref 55–135)
ALT SERPL W/O P-5'-P-CCNC: 27 U/L (ref 10–44)
ANION GAP SERPL CALC-SCNC: 11 MMOL/L (ref 8–16)
AST SERPL-CCNC: 18 U/L (ref 10–40)
BASOPHILS # BLD AUTO: 0.06 K/UL (ref 0–0.2)
BASOPHILS NFR BLD: 0.8 % (ref 0–1.9)
BILIRUB SERPL-MCNC: 0.4 MG/DL (ref 0.1–1)
BUN SERPL-MCNC: 17 MG/DL (ref 6–20)
CALCIUM SERPL-MCNC: 10.3 MG/DL (ref 8.7–10.5)
CHLORIDE SERPL-SCNC: 111 MMOL/L (ref 95–110)
CO2 SERPL-SCNC: 21 MMOL/L (ref 23–29)
CREAT SERPL-MCNC: 1 MG/DL (ref 0.5–1.4)
DIFFERENTIAL METHOD: ABNORMAL
EOSINOPHIL # BLD AUTO: 0.1 K/UL (ref 0–0.5)
EOSINOPHIL NFR BLD: 1.4 % (ref 0–8)
ERYTHROCYTE [DISTWIDTH] IN BLOOD BY AUTOMATED COUNT: 18.1 % (ref 11.5–14.5)
EST. GFR  (AFRICAN AMERICAN): >60 ML/MIN/1.73 M^2
EST. GFR  (NON AFRICAN AMERICAN): >60 ML/MIN/1.73 M^2
ESTIMATED AVG GLUCOSE: 174 MG/DL (ref 68–131)
GLUCOSE SERPL-MCNC: 91 MG/DL (ref 70–110)
HBA1C MFR BLD: 7.7 % (ref 4–5.6)
HCT VFR BLD AUTO: 38.7 % (ref 37–48.5)
HGB BLD-MCNC: 11.8 G/DL (ref 12–16)
IMM GRANULOCYTES # BLD AUTO: 0.01 K/UL (ref 0–0.04)
IMM GRANULOCYTES NFR BLD AUTO: 0.1 % (ref 0–0.5)
LYMPHOCYTES # BLD AUTO: 1.9 K/UL (ref 1–4.8)
LYMPHOCYTES NFR BLD: 24.5 % (ref 18–48)
MCH RBC QN AUTO: 22 PG (ref 27–31)
MCHC RBC AUTO-ENTMCNC: 30.5 G/DL (ref 32–36)
MCV RBC AUTO: 72 FL (ref 82–98)
MONOCYTES # BLD AUTO: 0.5 K/UL (ref 0.3–1)
MONOCYTES NFR BLD: 6.6 % (ref 4–15)
NEUTROPHILS # BLD AUTO: 5.2 K/UL (ref 1.8–7.7)
NEUTROPHILS NFR BLD: 66.6 % (ref 38–73)
NRBC BLD-RTO: 0 /100 WBC
PLATELET # BLD AUTO: 256 K/UL (ref 150–450)
PMV BLD AUTO: 11.6 FL (ref 9.2–12.9)
POTASSIUM SERPL-SCNC: 4.3 MMOL/L (ref 3.5–5.1)
PROT SERPL-MCNC: 7.4 G/DL (ref 6–8.4)
RBC # BLD AUTO: 5.37 M/UL (ref 4–5.4)
SODIUM SERPL-SCNC: 143 MMOL/L (ref 136–145)
WBC # BLD AUTO: 7.83 K/UL (ref 3.9–12.7)

## 2022-04-05 PROCEDURE — 85025 COMPLETE CBC W/AUTO DIFF WBC: CPT | Performed by: INTERNAL MEDICINE

## 2022-04-05 PROCEDURE — 83036 HEMOGLOBIN GLYCOSYLATED A1C: CPT | Performed by: INTERNAL MEDICINE

## 2022-04-05 PROCEDURE — 36415 COLL VENOUS BLD VENIPUNCTURE: CPT | Performed by: INTERNAL MEDICINE

## 2022-04-05 PROCEDURE — 97110 THERAPEUTIC EXERCISES: CPT

## 2022-04-05 PROCEDURE — 80053 COMPREHEN METABOLIC PANEL: CPT | Performed by: INTERNAL MEDICINE

## 2022-04-05 NOTE — PROGRESS NOTES
"[facility]   Physical Therapy Treatment Note     Name: Yayo Carver  Clinic Number: [id number]    Therapy Diagnosis: No diagnosis found.  Physician: Meghan Simons NP    Visit Date: 4/5/2022    Physician Orders: PT Eval and Treat   Medical Diagnosis from Referral: Primary osteoarthritis of both knees [M17.0]  Evaluation Date: 3/23/2022  Authorization Period Expiration: 12/31/2022  Plan of Care Expiration: 05/04/2022  Visit # / Visits authorized: 2/ 12 (+eval)  PTA Visit #: 0/5      Time In: 0200 pm  Time Out: 0255 pm  Total Appointment Time: 55 minutes (4 TE)     Precautions: Standard    SUBJECTIVE     Pt reports: she is very sore after working all weekend for the [employer]" at the [employer]  She was compliant with home exercise program.  Response to previous treatment: decreased pain  Functional change: none    Pain: 7/10  Location: right knee      OBJECTIVE     Objective Measures updated at progress report unless specified.     - Slump Test: (-) B    Treatment     Elisa received the treatments listed below:      THERAPEUTIC EXERCISES to develop strength, endurance, ROM, flexibility, posture and core stabilization for 45 minutes including:  - Nu-Step; 8 min; Lv 4.0; for CV endurance and increased tissue extensibility  - Clamshells; 3 x 10 w/ 5" hold ea- NP  - SAQ; 3 x 15 w/ 5" hold  - LAQ; 3 x 10 w/ 5" hold    - Leg Press; 3 x 10; 100#  - HS Curl; 15#; 3 x 10- w/ 5" ecc- to 90 deg    MANUAL THERAPY TECHNIQUES including Joint mobilizations and Soft tissue Mobilization were applied to B Knee for 00 minutes.  - patellar mobs Gr 3-4, all directions at 0, 20 and 60 deg of flex  - fat pad mobs at 0, 20 and 60 deg of flex  - knee ext mobs tibia on femur    NEUROMUSCULAR RE-EDUCATION ACTIVITIES to improve Balance, Coordination, Kinesthetic, Sense, Proprioception and Posture for 10 minutes.  The following were included:  - Heel Prop; 4 min w/ strap around toes- NP  - Quad set; 20 deg, and 0 " "deg flex; 20x w/ 5" hold    THERAPEUTIC ACTIVITIES to improve dynamic and functional performance for 00 minutes including:       Patient Education and Home Exercises     Home Exercises Provided and Patient Education Provided     Education provided:   - importance of pain free motion    Written Home Exercises Provided: Patient instructed to cont prior HEP. Exercises were reviewed and Elisa was able to demonstrate them prior to the end of the session.  Elisa demonstrated good  understanding of the education provided. See EMR under Patient Instructions for exercises provided during therapy sessions    ASSESSMENT   Elisa presents w/ B knee pain. She presents with poor patellar tracking on R knee which is improved with femoral ER. Meanwhile, her L knee presents with static valgus deformity which results in lateral knee pain which is not relieved with any position changes. Today's session was focused on increased quad activation in pain free ranges, hamstring activation, and some functional strengthening via leg press. She will benefit increased functional strengthening in preparation for gastric sleeve surgery and future TKA.    Elisa Is progressing well towards her goals.   Pt prognosis is Good.     Pt will continue to benefit from skilled outpatient physical therapy to address the deficits listed in the problem list box on initial evaluation, provide pt/family education and to maximize pt's level of independence in the home and community environment.     Pt's spiritual, cultural and educational needs considered and pt agreeable to plan of care and goals.     Anticipated barriers to physical therapy: co-morbidities    Goals:   Short Term Goals: (3 weeks)  1. Pt will be independent with HEP in order to supplement patient in improving functional mobility. - progressing, not met  2. Pt will improve (R) knee AROM to at least 2-115 in order to improve gait. - progressing, not met  3. Pt will improve hip flexor/quadriceps " mobility to WNL in order to improve hip/knee AROM and improved gait function - progressing, not met  4. Pt will improve hip abduction strength from 3+/5 to 4+/5 to improve functional gait deviation. - progressing, not met     Long Term Goals: (6 weeks)  1. Pt will be independent with updated HEP supplement PT in improving functional mobility. - progressing, not met  2. Pt will improve (B) knee AROM to at least 0-120 in order to improve gait and ability to perform ADLs. - progressing, not met  3. Pt will improve FOTO knee survey score to </= 19 % limited in order to demo improved functional mobility. - progressing, not met  4. Pt will reports </= 2/10 pain with aggravating activities to improve her functional independence. - progressing, not met         PLAN     Improve quad activation and range of motion    Alida Barr, PT, DPT

## 2022-04-08 NOTE — TELEPHONE ENCOUNTER
----- Message from Ibis Clemente sent at 1/20/2017  9:22 AM CST -----  Contact: pt  _  1st Request  _  2nd Request  _  3rd Request    Please refill the medication(s) listed below. Please call the patient when the prescription(s) is ready for  at the phone number (___)(___-____[phone] .[phone]    Medication #1metformin (GLUMETZA) 1000 MG (MOD) 24 hr tablet - not the extended because insurance doesn't cover it     Medication #2      Preferred Pharmacy:[facility]  
BMP/CBC

## 2022-04-12 ENCOUNTER — DOCUMENTATION ONLY (OUTPATIENT)
Dept: REHABILITATION | Facility: HOSPITAL | Age: 57
End: 2022-04-12
Payer: MEDICAID

## 2022-04-12 ENCOUNTER — PATIENT OUTREACH (OUTPATIENT)
Dept: ADMINISTRATIVE | Facility: OTHER | Age: 57
End: 2022-04-12
Payer: MEDICAID

## 2022-04-12 NOTE — PROGRESS NOTES
Health Maintenance Due   Topic Date Due    Shingles Vaccine (1 of 2) Never done    Eye Exam  12/18/2020    Mammogram  07/01/2021    Influenza Vaccine (1) 09/01/2021    COVID-19 Vaccine (3 - Booster for Pfizer series) 09/11/2021    Foot Exam  02/10/2022     Updates were requested from care everywhere.  Chart was reviewed for overdue Proactive [facility] Encounters (JON) topics (CRS, Breast Cancer Screening, Eye exam)  Health Maintenance has been updated.  LINKS immunization registry triggered.  Immunizations were reconciled.

## 2022-04-12 NOTE — PROGRESS NOTES
Physical Therapy: No show  Date: 04/12/2022    Patient was a no show to today's PT appointment. Patient's next scheduled appointment is 4/14/2011. LVM regarding no show to today's appointment and to confirm visit on 4/14/2022.     Initial Evaluation: 3/23/2022  Plan of Care Explanation: 5/4/2022  Cancel: 1  No show: 3    Therapist: Farhana Olguin PTA

## 2022-04-14 ENCOUNTER — OFFICE VISIT (OUTPATIENT)
Dept: ORTHOPEDICS | Facility: CLINIC | Age: 57
End: 2022-04-14
Payer: MEDICAID

## 2022-04-14 ENCOUNTER — LAB VISIT (OUTPATIENT)
Dept: LAB | Facility: HOSPITAL | Age: 57
End: 2022-04-14
Payer: MEDICAID

## 2022-04-14 DIAGNOSIS — D50.0 IRON DEFICIENCY ANEMIA DUE TO CHRONIC BLOOD LOSS: ICD-10-CM

## 2022-04-14 DIAGNOSIS — E78.2 MIXED HYPERLIPIDEMIA: ICD-10-CM

## 2022-04-14 DIAGNOSIS — E11.65 TYPE 2 DIABETES MELLITUS WITH HYPERGLYCEMIA, WITH LONG-TERM CURRENT USE OF INSULIN: ICD-10-CM

## 2022-04-14 DIAGNOSIS — E87.20 METABOLIC ACIDOSIS: ICD-10-CM

## 2022-04-14 DIAGNOSIS — M17.0 PRIMARY OSTEOARTHRITIS OF BOTH KNEES: Primary | ICD-10-CM

## 2022-04-14 DIAGNOSIS — Z79.4 TYPE 2 DIABETES MELLITUS WITH HYPERGLYCEMIA, WITH LONG-TERM CURRENT USE OF INSULIN: ICD-10-CM

## 2022-04-14 DIAGNOSIS — I10 ESSENTIAL HYPERTENSION: ICD-10-CM

## 2022-04-14 LAB
MAGNESIUM SERPL-MCNC: 1.9 MG/DL (ref 1.6–2.6)
PHOSPHATE SERPL-MCNC: 3.6 MG/DL (ref 2.7–4.5)
URATE SERPL-MCNC: 6.2 MG/DL (ref 2.4–5.7)

## 2022-04-14 PROCEDURE — 99999 PR PBB SHADOW E&M-EST. PATIENT-LVL III: CPT | Mod: PBBFAC,,, | Performed by: NURSE PRACTITIONER

## 2022-04-14 PROCEDURE — 20610 PR DRAIN/INJECT LARGE JOINT/BURSA: ICD-10-PCS | Mod: 50,S$PBB,, | Performed by: NURSE PRACTITIONER

## 2022-04-14 PROCEDURE — 3061F NEG MICROALBUMINURIA REV: CPT | Mod: CPTII,,, | Performed by: NURSE PRACTITIONER

## 2022-04-14 PROCEDURE — 99999 PR PBB SHADOW E&M-EST. PATIENT-LVL III: ICD-10-PCS | Mod: PBBFAC,,, | Performed by: NURSE PRACTITIONER

## 2022-04-14 PROCEDURE — 36415 COLL VENOUS BLD VENIPUNCTURE: CPT | Performed by: INTERNAL MEDICINE

## 2022-04-14 PROCEDURE — 84100 ASSAY OF PHOSPHORUS: CPT | Performed by: INTERNAL MEDICINE

## 2022-04-14 PROCEDURE — 84550 ASSAY OF BLOOD/URIC ACID: CPT | Performed by: INTERNAL MEDICINE

## 2022-04-14 PROCEDURE — 99499 UNLISTED E&M SERVICE: CPT | Mod: S$PBB,,, | Performed by: NURSE PRACTITIONER

## 2022-04-14 PROCEDURE — 3066F NEPHROPATHY DOC TX: CPT | Mod: CPTII,,, | Performed by: NURSE PRACTITIONER

## 2022-04-14 PROCEDURE — 83735 ASSAY OF MAGNESIUM: CPT | Performed by: INTERNAL MEDICINE

## 2022-04-14 PROCEDURE — 20610 DRAIN/INJ JOINT/BURSA W/O US: CPT | Mod: 50,S$PBB,, | Performed by: NURSE PRACTITIONER

## 2022-04-14 PROCEDURE — 1160F RVW MEDS BY RX/DR IN RCRD: CPT | Mod: CPTII,,, | Performed by: NURSE PRACTITIONER

## 2022-04-14 PROCEDURE — 1159F MED LIST DOCD IN RCRD: CPT | Mod: CPTII,,, | Performed by: NURSE PRACTITIONER

## 2022-04-14 PROCEDURE — 3051F PR MOST RECENT HEMOGLOBIN A1C LEVEL 7.0 - < 8.0%: ICD-10-PCS | Mod: CPTII,,, | Performed by: NURSE PRACTITIONER

## 2022-04-14 PROCEDURE — 99213 OFFICE O/P EST LOW 20 MIN: CPT | Mod: PBBFAC,25 | Performed by: NURSE PRACTITIONER

## 2022-04-14 PROCEDURE — 3061F PR NEG MICROALBUMINURIA RESULT DOCUMENTED/REVIEW: ICD-10-PCS | Mod: CPTII,,, | Performed by: NURSE PRACTITIONER

## 2022-04-14 PROCEDURE — 20610 DRAIN/INJ JOINT/BURSA W/O US: CPT | Mod: 50,PBBFAC | Performed by: NURSE PRACTITIONER

## 2022-04-14 PROCEDURE — 3051F HG A1C>EQUAL 7.0%<8.0%: CPT | Mod: CPTII,,, | Performed by: NURSE PRACTITIONER

## 2022-04-14 PROCEDURE — 99499 NO LOS: ICD-10-PCS | Mod: S$PBB,,, | Performed by: NURSE PRACTITIONER

## 2022-04-14 PROCEDURE — 1159F PR MEDICATION LIST DOCUMENTED IN MEDICAL RECORD: ICD-10-PCS | Mod: CPTII,,, | Performed by: NURSE PRACTITIONER

## 2022-04-14 PROCEDURE — 3066F PR DOCUMENTATION OF TREATMENT FOR NEPHROPATHY: ICD-10-PCS | Mod: CPTII,,, | Performed by: NURSE PRACTITIONER

## 2022-04-14 PROCEDURE — 1160F PR REVIEW ALL MEDS BY PRESCRIBER/CLIN PHARMACIST DOCUMENTED: ICD-10-PCS | Mod: CPTII,,, | Performed by: NURSE PRACTITIONER

## 2022-04-14 RX ADMIN — TRIAMCINOLONE ACETONIDE 80 MG: 40 INJECTION, SUSPENSION INTRA-ARTICULAR; INTRAMUSCULAR at 10:04

## 2022-04-14 NOTE — PROGRESS NOTES
Pt with known djd of both knees. She is awaiting weight loss surgery to become a knee replacement candidate. In the meantime, she has been taking mobic prn and having aspiration and cortisone injections as needed. Her last injections were in December.     Knee Arthrocentesis with Injection Procedure Note    Pre-operative Diagnosis: Left knee degenerative arthritis    Post-operative Diagnosis: same    Indications: Left knee pain    Anesthesia: none    Procedure Details     Verbal consent was obtained for the procedure.An 18 gauge needle was inserted into the superior aspect of the joint from a lateral approach. 35 ml of clear yellow fluid was removed from the joint and discarded. 1% lidocaine 4 ml and 40mg of kenalog was then injected into the joint through the same needle. The needle was removed and the area cleansed and dressed.    Complications:  None; patient tolerated the procedure well.          Knee Arthrocentesis with Injection Procedure Note    Pre-operative Diagnosis: right knee degenerative arthritis    Post-operative Diagnosis: same    Indications: right knee pain    Anesthesia: none    Procedure Details     Verbal consent was obtained for the procedure.An 18 gauge needle was inserted into the superior aspect of the joint from a lateral approach. 15 ml of clear yellow fluid was removed from the joint and discarded. 1% lidocaine 4 ml and 40mg of kenalog was then injected into the joint through the same needle. The needle was removed and the area cleansed and dressed.    Complications:  None; patient tolerated the procedure well.

## 2022-04-18 ENCOUNTER — TELEPHONE (OUTPATIENT)
Dept: PHARMACY | Facility: CLINIC | Age: 57
End: 2022-04-18
Payer: MEDICAID

## 2022-04-19 ENCOUNTER — PATIENT OUTREACH (OUTPATIENT)
Dept: ADMINISTRATIVE | Facility: HOSPITAL | Age: 57
End: 2022-04-19
Payer: MEDICAID

## 2022-04-19 DIAGNOSIS — E11.9 DIABETES MELLITUS WITHOUT COMPLICATION: Primary | ICD-10-CM

## 2022-04-21 ENCOUNTER — OFFICE VISIT (OUTPATIENT)
Dept: NEPHROLOGY | Facility: CLINIC | Age: 57
End: 2022-04-21
Payer: MEDICAID

## 2022-04-21 ENCOUNTER — OFFICE VISIT (OUTPATIENT)
Dept: INTERNAL MEDICINE | Facility: CLINIC | Age: 57
End: 2022-04-21
Payer: MEDICAID

## 2022-04-21 ENCOUNTER — TELEPHONE (OUTPATIENT)
Dept: ADMINISTRATIVE | Facility: OTHER | Age: 57
End: 2022-04-21
Payer: MEDICAID

## 2022-04-21 VITALS
OXYGEN SATURATION: 98 % | HEART RATE: 94 BPM | SYSTOLIC BLOOD PRESSURE: 150 MMHG | HEIGHT: 66 IN | BODY MASS INDEX: 47.09 KG/M2 | WEIGHT: 293 LBS | DIASTOLIC BLOOD PRESSURE: 90 MMHG

## 2022-04-21 VITALS
DIASTOLIC BLOOD PRESSURE: 89 MMHG | SYSTOLIC BLOOD PRESSURE: 135 MMHG | WEIGHT: 293 LBS | BODY MASS INDEX: 47.09 KG/M2 | TEMPERATURE: 98 F | HEART RATE: 108 BPM | RESPIRATION RATE: 18 BRPM | HEIGHT: 66 IN

## 2022-04-21 DIAGNOSIS — R06.83 SNORING: ICD-10-CM

## 2022-04-21 DIAGNOSIS — E11.65 TYPE 2 DIABETES MELLITUS WITH HYPERGLYCEMIA, WITH LONG-TERM CURRENT USE OF INSULIN: ICD-10-CM

## 2022-04-21 DIAGNOSIS — Z01.810 PREOPERATIVE CARDIOVASCULAR EXAMINATION: Primary | ICD-10-CM

## 2022-04-21 DIAGNOSIS — E66.01 MORBID OBESITY: ICD-10-CM

## 2022-04-21 DIAGNOSIS — Z79.4 TYPE 2 DIABETES MELLITUS WITH HYPERGLYCEMIA, WITH LONG-TERM CURRENT USE OF INSULIN: ICD-10-CM

## 2022-04-21 DIAGNOSIS — N18.2 CKD (CHRONIC KIDNEY DISEASE) STAGE 2, GFR 60-89 ML/MIN: Primary | ICD-10-CM

## 2022-04-21 DIAGNOSIS — E66.01 MORBID OBESITY WITH BMI OF 50.0-59.9, ADULT: ICD-10-CM

## 2022-04-21 DIAGNOSIS — I10 ESSENTIAL HYPERTENSION: ICD-10-CM

## 2022-04-21 PROCEDURE — 3051F HG A1C>EQUAL 7.0%<8.0%: CPT | Mod: CPTII,,, | Performed by: INTERNAL MEDICINE

## 2022-04-21 PROCEDURE — 3080F DIAST BP >= 90 MM HG: CPT | Mod: CPTII,,, | Performed by: INTERNAL MEDICINE

## 2022-04-21 PROCEDURE — 3066F NEPHROPATHY DOC TX: CPT | Mod: CPTII,,, | Performed by: INTERNAL MEDICINE

## 2022-04-21 PROCEDURE — 1159F MED LIST DOCD IN RCRD: CPT | Mod: CPTII,,, | Performed by: INTERNAL MEDICINE

## 2022-04-21 PROCEDURE — 3051F PR MOST RECENT HEMOGLOBIN A1C LEVEL 7.0 - < 8.0%: ICD-10-PCS | Mod: CPTII,,, | Performed by: INTERNAL MEDICINE

## 2022-04-21 PROCEDURE — 3077F PR MOST RECENT SYSTOLIC BLOOD PRESSURE >= 140 MM HG: ICD-10-PCS | Mod: CPTII,,, | Performed by: INTERNAL MEDICINE

## 2022-04-21 PROCEDURE — 99215 OFFICE O/P EST HI 40 MIN: CPT | Mod: S$PBB,,, | Performed by: INTERNAL MEDICINE

## 2022-04-21 PROCEDURE — 3008F PR BODY MASS INDEX (BMI) DOCUMENTED: ICD-10-PCS | Mod: CPTII,,, | Performed by: INTERNAL MEDICINE

## 2022-04-21 PROCEDURE — 99215 PR OFFICE/OUTPT VISIT, EST, LEVL V, 40-54 MIN: ICD-10-PCS | Mod: S$PBB,,, | Performed by: INTERNAL MEDICINE

## 2022-04-21 PROCEDURE — 99999 PR PBB SHADOW E&M-EST. PATIENT-LVL V: CPT | Mod: PBBFAC,,, | Performed by: INTERNAL MEDICINE

## 2022-04-21 PROCEDURE — 3066F PR DOCUMENTATION OF TREATMENT FOR NEPHROPATHY: ICD-10-PCS | Mod: CPTII,,, | Performed by: INTERNAL MEDICINE

## 2022-04-21 PROCEDURE — 1160F RVW MEDS BY RX/DR IN RCRD: CPT | Mod: CPTII,,, | Performed by: INTERNAL MEDICINE

## 2022-04-21 PROCEDURE — 3079F DIAST BP 80-89 MM HG: CPT | Mod: CPTII,,, | Performed by: INTERNAL MEDICINE

## 2022-04-21 PROCEDURE — 3008F BODY MASS INDEX DOCD: CPT | Mod: CPTII,,, | Performed by: INTERNAL MEDICINE

## 2022-04-21 PROCEDURE — 99215 OFFICE O/P EST HI 40 MIN: CPT | Mod: PBBFAC,PN | Performed by: INTERNAL MEDICINE

## 2022-04-21 PROCEDURE — 99204 OFFICE O/P NEW MOD 45 MIN: CPT | Mod: S$PBB,,, | Performed by: INTERNAL MEDICINE

## 2022-04-21 PROCEDURE — 3080F PR MOST RECENT DIASTOLIC BLOOD PRESSURE >= 90 MM HG: ICD-10-PCS | Mod: CPTII,,, | Performed by: INTERNAL MEDICINE

## 2022-04-21 PROCEDURE — 99999 PR PBB SHADOW E&M-EST. PATIENT-LVL V: ICD-10-PCS | Mod: PBBFAC,,, | Performed by: INTERNAL MEDICINE

## 2022-04-21 PROCEDURE — 99204 PR OFFICE/OUTPT VISIT, NEW, LEVL IV, 45-59 MIN: ICD-10-PCS | Mod: S$PBB,,, | Performed by: INTERNAL MEDICINE

## 2022-04-21 PROCEDURE — 3077F SYST BP >= 140 MM HG: CPT | Mod: CPTII,,, | Performed by: INTERNAL MEDICINE

## 2022-04-21 PROCEDURE — 3061F NEG MICROALBUMINURIA REV: CPT | Mod: CPTII,,, | Performed by: INTERNAL MEDICINE

## 2022-04-21 PROCEDURE — 1160F PR REVIEW ALL MEDS BY PRESCRIBER/CLIN PHARMACIST DOCUMENTED: ICD-10-PCS | Mod: CPTII,,, | Performed by: INTERNAL MEDICINE

## 2022-04-21 PROCEDURE — 99215 OFFICE O/P EST HI 40 MIN: CPT | Mod: PBBFAC,27 | Performed by: INTERNAL MEDICINE

## 2022-04-21 PROCEDURE — 1159F PR MEDICATION LIST DOCUMENTED IN MEDICAL RECORD: ICD-10-PCS | Mod: CPTII,,, | Performed by: INTERNAL MEDICINE

## 2022-04-21 PROCEDURE — 99417 PR PROLONGED SVC, OUTPT, W/WO DIRECT PT CONTACT,  EA ADDTL 15 MIN: ICD-10-PCS | Mod: S$PBB,,, | Performed by: INTERNAL MEDICINE

## 2022-04-21 PROCEDURE — 3061F PR NEG MICROALBUMINURIA RESULT DOCUMENTED/REVIEW: ICD-10-PCS | Mod: CPTII,,, | Performed by: INTERNAL MEDICINE

## 2022-04-21 PROCEDURE — 99417 PROLNG OP E/M EACH 15 MIN: CPT | Mod: S$PBB,,, | Performed by: INTERNAL MEDICINE

## 2022-04-21 PROCEDURE — 3075F SYST BP GE 130 - 139MM HG: CPT | Mod: CPTII,,, | Performed by: INTERNAL MEDICINE

## 2022-04-21 PROCEDURE — 3075F PR MOST RECENT SYSTOLIC BLOOD PRESS GE 130-139MM HG: ICD-10-PCS | Mod: CPTII,,, | Performed by: INTERNAL MEDICINE

## 2022-04-21 PROCEDURE — 3079F PR MOST RECENT DIASTOLIC BLOOD PRESSURE 80-89 MM HG: ICD-10-PCS | Mod: CPTII,,, | Performed by: INTERNAL MEDICINE

## 2022-04-21 RX ORDER — SPIRONOLACTONE 25 MG/1
25 TABLET ORAL DAILY
Qty: 30 TABLET | Refills: 11 | Status: SHIPPED | OUTPATIENT
Start: 2022-04-21 | End: 2022-08-02

## 2022-04-21 NOTE — PROGRESS NOTES
Subjective:       Patient ID: Yayo Carver is a 56 y.o. female who  has a past medical history of Arthritis, Diabetes mellitus, HLD (hyperlipidemia), and Hypertension.    Chief Complaint: Pre-op Exam     History was obtained from the patient and supplemented through chart review.  Saw Nephro for CKD.     Works nights for [employer].  Her son works as a , rodriguez; he played college football.    HPI    She presents today for preoperative cardiac risk stratification for bariatric sleeve.  No date set yet.  She has an appointment with them on  to prepare for surgery. No form for preop needed.     Exercise: limited mobility due to her knee. Started PT.  METs 6.27. The patient can perform housework and climb stairs without any difficulty.      There is no history of MI, CHF, or CVA.  No history of arrhythmia.    She is on insulin as below.  Will be on a liquid diet for 2 weeks prior to sx.     RCRI score: 2 (10%) for major cardiac event.  She is a intermediate risk patient for an intermediate risk surgery.    STOP BAN  ARISCAT: 34, intermediate risk.    Snoring:    STOP BAN.  +HTN.  +Obesity.    DM2 is uncontrolled, but improved. H/o DKA when she had insurance issues obtaining Trulicity/Ozempic and ran out of insulin and Metformin.  Was admitted 10/2021 for HHS.  On Pramlintide/Symlin TID for weight loss. NovoLog 12 TID. Is on Levemir 30 qHS. No h/o GI SE to Metformin.  Stopped metformin due to non gap metabolic acidosis.      Will be on a liquid diet for 2 weeks prior to bariatric surgery.  This will consist of protein shakes, broth, low sugar jello.  Has not been told instructions from Bariatric Clinic RE DM meds while on liquid diet.    BG used to be 200s. Tati was approved.  No co-pay.    AM fasting B, 112, 98     Diet: cut out bread, rice, potatoes. Eats protein, veggies.  Will be on liquid diet as above.  Following bariatric surgery, will be on liquid diet x 2 week, then pureed.    Exercise:  limited mobility. Started PT.     With normal microalbumin creatinine ratio.  No ACE/ARB d/t angioedema.   Retinal exams: .  No retinopathy.  Has appt.  Foot exams:    DM edu:  Seeing nutrition through bariatrics clinic at [facility]. Reviews BG with them.  Lab Results   Component Value Date/Time    HGBA1C 7.7 (H) 04/05/2022 04:20 PM    HGBA1C 10.1 (H) 12/08/2021 11:18 AM    HGBA1C >14.0 (H) 10/12/2021 06:31 AM     HTN:    H/o DM, but not on ACE d/t h/o angioedema.  Has issues with muscle cramps.      BP is uncontrolled.  Has chronic knee pain. Is on Norvasc 10 daily, chlorthalidone 25. Compliant with meds.    Home BP: none.  Was 135/89 with Nephrology this AM.     FHx: yes  Tobacco: no  No snoring, apnea.    Obesity:    Has had difficulty getting Ozempic approved.  On Symlin.  Following with OSH Bariatrics, nutrition at [facility]. Planning on bariatric sx.     Exercise: used to walk more often, but has been afraid of COVID. Walking inside her home. Walking at work but limited mobility d/t knee pain.  Started PT.  BMI Readings from Last 3 Encounters:   04/21/22 48.89 kg/m²   04/21/22 48.98 kg/m²   02/10/22 48.25 kg/m²                 Not addressed today.  HLD:  Is currently taking Lipitor 40, ASA 81 daily.  FLP controlled. Elevated ASCVD. Cont Lipitor 40,  ASA 81. Monitor FLP annually.  Lab Results   Component Value Date    LDLCALC 71 05/06/2021     The 10-year ASCVD risk score (Woodana paula FERRER Jr., et al., 2013) is: 16.9%    Values used to calculate the score:      Age: 56 years      Sex: Female      Is Non- : Yes      Diabetic: Yes      Tobacco smoker: No      Systolic Blood Pressure: 150 mmHg      Is BP treated: Yes      HDL Cholesterol: 44 mg/dL      Total Cholesterol: 133 mg/dL    Non gap metabolic acidosis, CKD2:  BG was up to 400 with JIMMY to 1.6 during admission that resolved with IVF.  No hypokalemia. Urine pH 6. No ketones. No diarrhea.  Stopped metformin due to non gap metabolic acidosis.   Bicarb 15. Started NaHCO3 pills for possible RTA during admission  for hyperglycemia.  Was finally able to see Nephrology.  Risk factor reduction, working on DM control.    Vitamin D deficiency:  On ergocalciferol weekly.  Improved. Cont ergocalciferol weekly.  Lab Results   Component Value Date    [id number] 25 (L) 10/13/2021    XTQYFOMN36AA 24 (L) 01/15/2021    [id number] 12 (L) 12/18/2019      B12 deficiency, DOM:    Borderline microcytic anemia stable.  On B12.  H/o menorrhagia.  Now menopausal.  Stopped daily iron.  Planning on bariatric surgery as above.              Improved. Continue B12.  Will need to monitor after bariatric sx.              Ferritin controlled.  Stopped daily iron; ok to d/c.    Lab Results   Component Value Date    IRON 53 10/13/2021    TIBC 259 10/13/2021    FERRITIN 59 10/13/2021     Lab Results   Component Value Date    [id number] 1670 (H) 10/13/2021     Lab Results   Component Value Date    FOLATE 11.7 10/13/2021     GERD:  Mid/upper chest burning.  Sometimes a little sour regurg.  No sour taste in her throat.  Noticed it after heavy food, coffee, ice cream.  No ETOH.  Taking peppermint, GasX.  Peppermint helped.  Latest meal at 7 PM.  Sometimes lies down after eating.  Has lost a significant amount of weight before.  Tries to take Mobic sparingly.  Discussed dietary changes, avoid recumbency after meals. Start PPI p.r.n.  If occurring more frequently, rec PPI qAM. and order H pylori test    Chronic back, b/l knee OA:    Fell off porch in 7/2019, resulting in chronic back pain.  Following with Ortho, PM&R.  No relief with Tylenol.  Has a knee sleeve.  On tramadol prescribed by PM&R.  She tries to avoid meds/Mobic.  Following with Ortho for bilateral knee injections with relief. Unable to receive injections unless A1c is less than 8.  Working on weight loss.    Reports that she fell off her porch again d/t the hand rail. Epson salt helps, but recurrent.  Feels tight. Has  "had difficulty rescheduling c PM&R. Is on the wait list.  Ordered Aquatherapy.  Improved with topical Voltaren, but persistent. Follow-up with Ortho, PM&R for injections, Bariatrics for weight loss.   Recurrent. Reschedule c PM&R. Rx Flexeril. Starting PT/Healthy Back Progam to help c exercises, gait, gradual weight loss.     Review of Systems   Constitutional: Negative for activity change and unexpected weight change.   Respiratory: Negative for wheezing.    Cardiovascular: Negative for leg swelling.   Musculoskeletal: Positive for arthralgias and back pain.   Psychiatric/Behavioral: Negative for confusion.       I personally reviewed Past Medical History, Past Surgical History, Social History, and Family History.    Objective:      Vitals:    04/21/22 1253 04/21/22 1319   BP: (!) 142/80 (!) 150/90   Pulse: 94    SpO2: 98%    Weight: (!) 137.4 kg (302 lb 14.6 oz)    Height: 5' 6" (1.676 m)       Physical Exam  Constitutional:       General: She is not in acute distress.     Appearance: She is well-developed. She is not diaphoretic.   HENT:      Head: Normocephalic and atraumatic.      Nose: Nose normal.      Mouth/Throat:      Pharynx: No oropharyngeal exudate.      Comments: mallampati IV  Eyes:      General: No scleral icterus.        Right eye: No discharge.         Left eye: No discharge.   Neck:      Thyroid: No thyromegaly.      Trachea: No tracheal deviation.   Cardiovascular:      Rate and Rhythm: Normal rate and regular rhythm.      Heart sounds: Normal heart sounds. No murmur heard.  Pulmonary:      Effort: Pulmonary effort is normal. No respiratory distress.      Breath sounds: Normal breath sounds. No wheezing.   Abdominal:      General: Bowel sounds are normal. There is no distension.      Palpations: Abdomen is soft.      Tenderness: There is no abdominal tenderness.   Musculoskeletal:         General: No deformity.      Cervical back: Neck supple.      Right lower leg: Edema present.      Left lower " leg: Edema present.      Comments: Nonpitting BLE edema   Lymphadenopathy:      Cervical: No cervical adenopathy.   Skin:     General: Skin is warm and dry.      Findings: No erythema.      Comments: hirsutism   Neurological:      Mental Status: She is alert.      Gait: Gait normal.   Psychiatric:         Behavior: Behavior normal.           Lab Results   Component Value Date    WBC 7.83 04/05/2022    HGB 11.8 (L) 04/05/2022    HCT 38.7 04/05/2022     04/05/2022    CHOL 133 05/06/2021    TRIG 89 05/06/2021    HDL 44 05/06/2021    ALT 27 04/05/2022    AST 18 04/05/2022     04/05/2022    K 4.3 04/05/2022     (H) 04/05/2022    CREATININE 1.0 04/05/2022    BUN 17 04/05/2022    CO2 21 (L) 04/05/2022    TSH 1.832 10/13/2021    INR 0.9 10/02/2015    HGBA1C 7.7 (H) 04/05/2022       The 10-year ASCVD risk score (Owenana paula FERRER Jr., et al., 2013) is: 16.9%    Values used to calculate the score:      Age: 56 years      Sex: Female      Is Non- : Yes      Diabetic: Yes      Tobacco smoker: No      Systolic Blood Pressure: 150 mmHg      Is BP treated: Yes      HDL Cholesterol: 44 mg/dL      Total Cholesterol: 133 mg/dL    (Imaging have been independently reviewed)  Foot xray c OA. No fx.    Assessment:       1. Preoperative cardiovascular examination    2. Snoring    3. Type 2 diabetes mellitus with hyperglycemia, with long-term current use of insulin    4. Essential hypertension    5. Morbid obesity          Plan:       Yayo was seen today for pre-op exam.    Diagnoses and all orders for this visit:    Preoperative cardiovascular examination  Comments:  RCRI 10%. Intemerdiate risk pt for intermediates low risk sx. No further studies needed other than SHORTY eval.  See management of comorbidities as below.  Orders:  -     Ambulatory referral/consult to Sleep Disorders; Future    Snoring  Comments:  +HTN. Refer to sleep clinic for sleep study.  She needs to notify Anesthesia of this as  well.  Orders:  -     Ambulatory referral/consult to Sleep Disorders; Future    Type 2 diabetes mellitus with hyperglycemia, with long-term current use of insulin  Comments:  A1C elevated, but improved! BG at goal. A1C might be lagging behind. She will ask Bariatric Clinic for instructions RE DM meds. Addl notes below.    Essential hypertension  Comments:  Needs tighter BP control, kenneth given upcoming sx. Add Aldactone 25 d/t hirsutism, mild edema. BP check, might have BMP checked during preop visit c Bariatric.  Orders:  -     Ambulatory referral/consult to Sleep Disorders; Future  -     spironolactone (ALDACTONE) 25 MG tablet; Take 1 tablet (25 mg total) by mouth once daily.    Morbid obesity  Comments:  Stable. Following c Bariatrics, nutrition at [facility]. Planning on bariatric sx. Starting PT/Healthy Back to help c exercise, mobility.         Halve insulin to 15 qHS, hold prandials while NPO for sx. Provided insulin titration instructions. Cont BG log. Follows c OSH Nutrition. Reschedule foot exam    Side effects of medication(s) were discussed in detail and patient voiced understanding.  Patient will call back for any issues or complications.     I have spent a total of 69 minutes with the patient as well as reviewing the chart/medical record and placing orders on the day of the visit. Discussed preop, DM, HTN, SHORTY eval.      RTC in 2 month(s) or sooner PRN for DM after bariatric sx.

## 2022-04-21 NOTE — PROGRESS NOTES
Name: Yayo Carver  Medical Record Number: [id number]  Date of Service: 2022  Note By: Richard Franklin DO    [facility]    Reason for Consultation: CKD3  Referring Provider: Dr. Hastings    History of Present Illness:  Yayo Carver is a 56 y.o. female with past medical history of CKD 3, DM2, Htn who presents for evaluation of CKD.  The patient denies any CP, SOB, N/V, C/F.  Denies any foam or blood in urine.  Sts she has a little sting on urination but attributes it to the soap she uses to wash.  Sts has had COVID-19 vaccine (Pfizer) X 2. The patient is struggling with her weight and is hoping to go through bariatric surgery.  Her blood sugar was uncontrolled in the past but HbA1c is now 7.7 from a high of 14. Her current BMI is 49. Sts she did use to use Ibuprofen for her knee pain and is using Meloxicam 10 mg qd. Pt hopes to get a total knee replacement after gaining control of her weight.    History of CKD 2&3  Nephrologist none  Access none?  Dialysis center none?    Past Medical History:  Past Medical History:   Diagnosis Date    Arthritis     Diabetes mellitus     HLD (hyperlipidemia)     Hypertension        Past Surgical History:  Past Surgical History:   Procedure Laterality Date     SECTION  1989    COLONOSCOPY N/A 2017    Procedure: COLONOSCOPY;  Surgeon: Evelin Arceo MD;  Location: [address]);  Service: Endoscopy;  Laterality: N/A;  2 nd floor d/t BMI > 50 kg/m3    TONSILLECTOMY      TUBAL LIGATION         Family History:  Family History   Problem Relation Age of Onset    Breast cancer Mother 65    Diabetes Mother     Colon polyps Mother     Hypertension Mother     Diabetes Father     Breast cancer Sister 56    Cancer Sister     No Known Problems Brother     Diabetes Paternal Uncle     Breast cancer Maternal Grandmother     Colon cancer Maternal Grandfather     Diabetes Paternal Grandmother     Heart disease Paternal Grandmother     No  Known Problems Paternal Grandfather     Breast cancer Maternal Aunt     Arthritis Brother     Esophageal cancer Neg Hx     Irritable bowel syndrome Neg Hx     Rectal cancer Neg Hx     Stomach cancer Neg Hx     Ulcerative colitis Neg Hx     Celiac disease Neg Hx     Crohn's disease Neg Hx     Amblyopia Neg Hx     Blindness Neg Hx     Cataracts Neg Hx     Glaucoma Neg Hx     Macular degeneration Neg Hx     Retinal detachment Neg Hx     Strabismus Neg Hx     Stroke Neg Hx     Thyroid disease Neg Hx        Social History:  Social History     Socioeconomic History    Marital status:    Occupational History    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  Has a college-age son who comes and goes.  Has 3 other adult children.       Social Determinants of Health     Financial Resource Strain: Low Risk     Difficulty of Paying Living Expenses: Not very hard   Food Insecurity: No Food Insecurity    Worried About Running Out of Food in the Last Year: Never true    Ran Out of Food in the Last Year: Never true   Transportation Needs: Unmet Transportation Needs    Lack of Transportation (Medical): Yes    Lack of Transportation (Non-Medical): No   Physical Activity: Insufficiently Active    Days of Exercise per Week: 3 days    Minutes of Exercise per Session: 30 min   Stress: No Stress Concern Present    Feeling of Stress : Not at all   Social Connections: Unknown    Frequency of Communication with Friends and Family: More  than three times a week    Frequency of Social Gatherings with Friends and Family: More than three times a week    Active Member of Clubs or Organizations: No    Attends Club or Organization Meetings: 1 to 4 times per year    Marital Status: Never    Housing Stability: Low Risk     Unable to Pay for Housing in the Last Year: No    Number of Places Lived in the Last Year: 1    Unstable Housing in the Last Year: No       Home Medications:   (Not in a hospital admission)  Reviewed    Allergies:  Ace inhibitors    Review of Systems:  10 point review of systems was conducted and was negative except as mentioned in the HPI.    Physical Exam:  Vitals:  Vitals:    04/21/22 0826   BP: 135/89   Pulse: 108   Resp: 18   Temp: 97.6 °F (36.4 °C)     [unfilled]      Exam  General: No acute distress, well groomed, alert and oriented x 3  HEENT: Normocephalic, atraumatic, EOM's intact bilaterally, external inspection of ears and nose normal, moist mucous membranes, no oral ulcerations/lesions  Neck: Supple, symmetrical, trachea midline, no thyromegaly, no JVD  Respiratory: Clear to auscultation bilaterally, respirations unlabored, no rales/rhonchi/wheezing  Cardiovacular: Regular rate and rhythm, S1, S2 normal, no murmurs, rubs or gallops  Gastrointestinal: Soft, non-tender, bowel sounds normal, no hepatosplenomegaly  Musculoskeletal: No knee or ankle joint tenderness or swelling.   Extremities: No clubbing or cyanosis of bilateral upper extremities; no lower extremity edema bilaterally, radial pulses 2+ bilaterally, symmetric  Skin: warm and dry; no rash on exposed skin  Neurologic: CN grossly intact, no asterixis    Labs:  A1C:  Recent Labs   Lab 10/12/21  0631 12/08/21  1118 04/05/22  1620   Hemoglobin A1C >14.0 H 10.1 H 7.7 H     CBC:  Recent Labs   Lab 10/13/21  0438 10/14/21  0443 04/05/22  1620   WBC 5.27 5.40 7.83   RBC 5.51 H 5.38 5.37   Hemoglobin 11.5 L 11.1 L 11.8 L   Hematocrit 38.3 37.1 38.7   Platelets  208 225 256   MCV 70 L 69 L 72 L   MCH 20.9 L 20.6 L 22.0 L   MCHC 30.0 L 29.9 L 30.5 L     CMP:  Recent Labs   Lab 04/05/22  1620   Glucose 91   Calcium 10.3   Albumin 3.4 L   Total Protein 7.4   Sodium 143   Potassium 4.3   CO2 21 L   Chloride 111 H   BUN 17   Creatinine 1.0   Alkaline Phosphatase 94   ALT 27   AST 18   Total Bilirubin 0.4   eGFR if  >60     LIPIDS:  Recent Labs   Lab 12/18/19  1123 01/15/21  1003 05/06/21  1029 10/13/21  0438   TSH  --   --   --  1.832   HDL 70 52 44  --    Cholesterol 202 H 141 133  --    Triglycerides 75 73 89  --    LDL Calculated  --   --  71  --    LDL Cholesterol 117.0 74.4  --   --    HDL/Cholesterol Ratio 34.7 36.9  --   --    Hdl/Cholesterol Ratio  --   --  3.02  --    Non-HDL Cholesterol 132 89 89  --    Total Cholesterol/HDL Ratio 2.9 2.7  --   --      TSH:  Recent Labs   Lab 10/13/21  0438   TSH 1.832     URINALYSIS:  No results for input(s): COLORU, CLARITYU, SPECGRAV, PHUR, PROTEINUA, GLUCOSEU, BILIRUBINCON, BLOODU, WBCU, RBCU, BACTERIA, MUCUS, NITRITE, LEUKOCYTESUR, UROBILINOGEN, HYALINECASTS in the last 2160 hours.     Lab Results   Component Value Date    WBC 7.83 04/05/2022    HGB 11.8 (L) 04/05/2022    HCT 38.7 04/05/2022     04/05/2022    K 4.3 04/05/2022     (H) 04/05/2022    CO2 21 (L) 04/05/2022    BUN 17 04/05/2022    CREATININE 1.0 04/05/2022    EGFRNONAA >60 04/05/2022    CALCIUM 10.3 04/05/2022    PHOS 3.6 04/14/2022    MG 1.9 04/14/2022    ALBUMIN 3.4 (L) 04/05/2022    AST 18 04/05/2022    ALT 27 04/05/2022       No results found for: EXTANC, EXTWBC, EXTSEGS, EXTPLATELETS, EXTHEMOGLOBI, EXTHEMATOCRI, EXTCREATININ, EXTSODIUM, EXTPOTASSIUM, EXTBUN, EXTCO2, EXTCALCIUM, EXTPHOSPHORU, EXTGLUCOSE, EXTALBUMIN, EXTAST, EXTALT, EXTBILITOTAL, EXTLIPASE, EXTAMYLASE    No results found for: EXTCYCLOSLVL, EXTSIROLIMUS, EXTTACROLVL, EXTPROTCRE, EXTPTHINTACT, EXTPROTEINUA, EXTWBCUA, EXTRBCUA    Imaging Studies:  n/a  ?    Assessment/Plan:  56 y.o. female with:    1- CKD 2 - Renal function has improved significantly in the past 6 months with great glycemic control and weight loss. Continue to control BS and get A1c below 7. Avoid dehydration/volume depletion.  Avoid NSAIDs.  Use Tylenol for pain. Avoid adding salt to diet. Return for f/u in 3 months.    2. Hypertension - relatively well controlled.    3. Diabetes - Relatively well controlled. Continue to bring A1c down.     4. Obesity - Planning   Clearance: current BUN/Cr 17/1.0.         Richard Franklin, DO  [facility]phrology [facility]

## 2022-04-21 NOTE — PATIENT INSTRUCTIONS
When you're completely fasting before surgery, decrease Levemir 30 -> 15 nightly.   Would hold Pramlintide/Symlin, NovoLog while not eating.    For long acting insulin, check blood sugar every morning when you wake up. Take a 3 day average and if average is:  >300 add 8 units  >250 add 6 units   >200 add 4 units  >150 add 2 units

## 2022-04-24 RX ORDER — TRIAMCINOLONE ACETONIDE 40 MG/ML
80 INJECTION, SUSPENSION INTRA-ARTICULAR; INTRAMUSCULAR
Status: COMPLETED | OUTPATIENT
Start: 2022-04-14 | End: 2022-04-14

## 2022-04-26 ENCOUNTER — PATIENT OUTREACH (OUTPATIENT)
Dept: ADMINISTRATIVE | Facility: HOSPITAL | Age: 57
End: 2022-04-26
Payer: MEDICAID

## 2022-05-02 DIAGNOSIS — M25.561 ACUTE PAIN OF BOTH KNEES: Primary | ICD-10-CM

## 2022-05-02 DIAGNOSIS — M25.562 ACUTE PAIN OF BOTH KNEES: Primary | ICD-10-CM

## 2022-05-02 RX ORDER — HYDROCODONE BITARTRATE AND ACETAMINOPHEN 5; 325 MG/1; MG/1
1 TABLET ORAL EVERY 6 HOURS PRN
Qty: 28 TABLET | Refills: 0 | Status: SHIPPED | OUTPATIENT
Start: 2022-05-02 | End: 2022-05-11

## 2022-05-03 ENCOUNTER — OFFICE VISIT (OUTPATIENT)
Dept: SLEEP MEDICINE | Facility: CLINIC | Age: 57
End: 2022-05-03
Payer: MEDICAID

## 2022-05-03 VITALS — BODY MASS INDEX: 47.09 KG/M2 | WEIGHT: 293 LBS | HEIGHT: 66 IN

## 2022-05-03 DIAGNOSIS — I10 HYPERTENSION, UNSPECIFIED TYPE: Primary | ICD-10-CM

## 2022-05-03 DIAGNOSIS — R06.83 SNORING: ICD-10-CM

## 2022-05-03 DIAGNOSIS — I10 ESSENTIAL HYPERTENSION: ICD-10-CM

## 2022-05-03 DIAGNOSIS — Z01.810 PREOPERATIVE CARDIOVASCULAR EXAMINATION: ICD-10-CM

## 2022-05-03 DIAGNOSIS — Z79.891 CHRONIC USE OF OPIATE FOR THERAPEUTIC PURPOSE: ICD-10-CM

## 2022-05-03 PROCEDURE — 3061F PR NEG MICROALBUMINURIA RESULT DOCUMENTED/REVIEW: ICD-10-PCS | Mod: CPTII,,, | Performed by: INTERNAL MEDICINE

## 2022-05-03 PROCEDURE — 99213 OFFICE O/P EST LOW 20 MIN: CPT | Mod: PBBFAC | Performed by: INTERNAL MEDICINE

## 2022-05-03 PROCEDURE — 1159F PR MEDICATION LIST DOCUMENTED IN MEDICAL RECORD: ICD-10-PCS | Mod: CPTII,,, | Performed by: INTERNAL MEDICINE

## 2022-05-03 PROCEDURE — 3061F NEG MICROALBUMINURIA REV: CPT | Mod: CPTII,,, | Performed by: INTERNAL MEDICINE

## 2022-05-03 PROCEDURE — 99999 PR PBB SHADOW E&M-EST. PATIENT-LVL III: ICD-10-PCS | Mod: PBBFAC,,, | Performed by: INTERNAL MEDICINE

## 2022-05-03 PROCEDURE — 3066F PR DOCUMENTATION OF TREATMENT FOR NEPHROPATHY: ICD-10-PCS | Mod: CPTII,,, | Performed by: INTERNAL MEDICINE

## 2022-05-03 PROCEDURE — 3008F BODY MASS INDEX DOCD: CPT | Mod: CPTII,,, | Performed by: INTERNAL MEDICINE

## 2022-05-03 PROCEDURE — 3051F HG A1C>EQUAL 7.0%<8.0%: CPT | Mod: CPTII,,, | Performed by: INTERNAL MEDICINE

## 2022-05-03 PROCEDURE — 1159F MED LIST DOCD IN RCRD: CPT | Mod: CPTII,,, | Performed by: INTERNAL MEDICINE

## 2022-05-03 PROCEDURE — 99204 PR OFFICE/OUTPT VISIT, NEW, LEVL IV, 45-59 MIN: ICD-10-PCS | Mod: S$PBB,,, | Performed by: INTERNAL MEDICINE

## 2022-05-03 PROCEDURE — 3066F NEPHROPATHY DOC TX: CPT | Mod: CPTII,,, | Performed by: INTERNAL MEDICINE

## 2022-05-03 PROCEDURE — 3008F PR BODY MASS INDEX (BMI) DOCUMENTED: ICD-10-PCS | Mod: CPTII,,, | Performed by: INTERNAL MEDICINE

## 2022-05-03 PROCEDURE — 99204 OFFICE O/P NEW MOD 45 MIN: CPT | Mod: S$PBB,,, | Performed by: INTERNAL MEDICINE

## 2022-05-03 PROCEDURE — 99999 PR PBB SHADOW E&M-EST. PATIENT-LVL III: CPT | Mod: PBBFAC,,, | Performed by: INTERNAL MEDICINE

## 2022-05-03 PROCEDURE — 3051F PR MOST RECENT HEMOGLOBIN A1C LEVEL 7.0 - < 8.0%: ICD-10-PCS | Mod: CPTII,,, | Performed by: INTERNAL MEDICINE

## 2022-05-03 NOTE — PROGRESS NOTES
Referred by Berta Hastings MD     NEW PATIENT VISIT    Yayo Carver  is a pleasant 56 y.o. female  with PMH significant for HTN, DOM, vitD def, GERD, BMI <48 who presents today for evaluation of long sleep time, fatigue..    SLEEP SCHEDULE   Bed Time 10p   Sleep Latency 30min   Arousals x2   Nocturia 2   Back to sleep Not long   Wake time 10A   Naps 0-1   Work          Past Medical History:   Diagnosis Date    Arthritis     Diabetes mellitus     HLD (hyperlipidemia)     Hypertension      Patient Active Problem List   Diagnosis    Morbid obesity with BMI of 50.0-59.9, adult    Osteoarthritis of both knees    DOM (iron deficiency anemia)    GERD (gastroesophageal reflux disease)    Essential hypertension    Mixed hyperlipidemia    Vitamin D deficiency    Type 2 diabetes mellitus with hyperglycemia, with long-term current use of insulin    Cyanocobalamin deficiency    Morbid obesity    Muscle cramps    Metabolic acidosis    Hyperglycemia    Gait abnormality    Decreased strength of lower extremity       Current Outpatient Medications:     acetaminophen (TYLENOL) 100 mg/mL suspension, Take by mouth every 4 (four) hours as needed for Temperature greater than., Disp: , Rfl:     amLODIPine (NORVASC) 10 MG tablet, Take 1 tablet (10 mg total) by mouth once daily., Disp: 90 tablet, Rfl: 3    aspirin (ECOTRIN) 81 MG EC tablet, Take 1 tablet (81 mg total) by mouth once daily., Disp: 90 tablet, Rfl: 3    atorvastatin (LIPITOR) 40 MG tablet, Take 1 tablet (40 mg total) by mouth once daily., Disp: 90 tablet, Rfl: 3    b complex vitamins capsule, Take 1 capsule by mouth once daily., Disp: , Rfl:     chlorthalidone (HYGROTEN) 25 MG Tab, Take 1 tablet (25 mg total) by mouth once daily., Disp: 90 tablet, Rfl: 3    cyclobenzaprine (FLEXERIL) 5 MG tablet, Take 1 tablet (5 mg total) by mouth 3 (three) times daily as needed for Muscle spasms., Disp: 30 tablet, Rfl: 1    diclofenac sodium (VOLTAREN) 1 % Gel,  "Apply 2 g topically 4 (four) times daily as needed (knee pain)., Disp: 300 g, Rfl: 3    ergocalciferol (ERGOCALCIFEROL) 50,000 unit Cap, Take 1 capsule (50,000 Units total) by mouth every 7 days., Disp: 12 capsule, Rfl: 3    flash glucose scanning reader (FREESTYLE HEENA 14 DAY READER) Misc, Check blood glucose before meals and nightly., Disp: 1 each, Rfl: 0    flash glucose sensor (FREESTYLE HEENA 14 DAY SENSOR) Kit, Apply to skin for 14 days.  Check blood glucose before meals and nightly., Disp: 1 kit, Rfl: 11    HYDROcodone-acetaminophen (NORCO) 5-325 mg per tablet, Take 1 tablet by mouth every 6 (six) hours as needed for Pain., Disp: 28 tablet, Rfl: 0    insulin aspart U-100 (NOVOLOG) 100 unit/mL (3 mL) InPn pen, Inject 10 Units into the skin 3 (three) times daily with meals., Disp: 15 mL, Rfl: 11    insulin detemir U-100 (LEVEMIR FLEXTOUCH U-100 INSULN) 100 unit/mL (3 mL) InPn pen, Inject 30 Units into the skin every evening., Disp: 27 mL, Rfl: 0    meloxicam (MOBIC) 15 MG tablet, Take 1 tablet (15 mg total) by mouth once daily. (Patient not taking: Reported on 4/21/2022), Disp: 30 tablet, Rfl: 1    multivitamin (THERAGRAN) per tablet, Take 1 tablet by mouth once daily., Disp: , Rfl:     pantoprazole (PROTONIX) 40 MG tablet, TAKE 1 TABLET(40 MG) BY MOUTH DAILY AS NEEDED FOR HEARTBURN, Disp: 30 tablet, Rfl: 3    pen needle, diabetic (BD ULTRA-FINE SHORT PEN NEEDLE) 31 gauge x 5/16" Ndle, Use 1 pen needle 4 times daily, Disp: 200 each, Rfl: 11    pramlintide (SYMLINPEN 120) 2,700 mcg/2.7 mL PnIj, Inject 120 mcg into the skin 3 (three) times daily before meals. Start 60 mcg prior to meals for one week, Disp: 10.8 mL, Rfl: 3    sodium bicarbonate 650 MG tablet, Take 1 tablet (650 mg total) by mouth 2 (two) times daily., Disp: 60 tablet, Rfl: 2    spironolactone (ALDACTONE) 25 MG tablet, Take 1 tablet (25 mg total) by mouth once daily., Disp: 30 tablet, Rfl: 11    traMADoL (ULTRAM) 50 mg tablet, Take " "1-2 tablets ( mg total) by mouth 3 (three) times daily as needed for Pain., Disp: 180 tablet, Rfl: 3     Vitals:    05/03/22 1531   Weight: 136 kg (299 lb 13.2 oz)   Height: 5' 6" (1.676 m)     Physical Exam:    GEN:   Well-appearing  Psych:  Appropriate affect, demonstrates insight  SKIN:  No rash on the face or bridge of the nose  HEENT: MP2,      LABS:   Lab Results   Component Value Date    HGB 11.8 (L) 04/05/2022    CO2 21 (L) 04/05/2022       RECORDS REVIEWED PREVIOUSLY:    No prior sleep testing.    ASSESSMENT    PROBLEM DESCRIPTION/ Sx on Presentation  STATUS   Risk factors SHORTY   No witnessed apneas, no snoring reported (no recent bed partner,  [place]ed [place])    New   Daytime Sx   denies sleepiness when inactive   denies sleepiness when driving   Reports 10-11 hours of sleep time   ESS 3/24 on intake  New   Insomnia   Tosses and turns due to knee pain  Prior hypnotics:        Current hypnotics:     New   Nocturia   x 2 per sleep period  New   Chronic opiate use   ultram 50mg TID (does not take prior to sleep)  New   Other issues:     PLAN     -recommend sleep testing to evaluate for possible underlying SHORTY  -discussed trial therapy if SHORTY present and the patient is  open to a trial of CPAP therapy  -driving precautions were discussed with the patient    RTC          The patient was given open opportunity to ask questions and/or express concerns about treatment plan.   All questions/concerns were discussed.     Two patient identifiers used prior to evaluation.           "

## 2022-05-05 ENCOUNTER — PATIENT OUTREACH (OUTPATIENT)
Dept: ADMINISTRATIVE | Facility: HOSPITAL | Age: 57
End: 2022-05-05
Payer: MEDICAID

## 2022-05-05 ENCOUNTER — TELEPHONE (OUTPATIENT)
Dept: INTERNAL MEDICINE | Facility: CLINIC | Age: 57
End: 2022-05-05
Payer: MEDICAID

## 2022-05-05 VITALS — SYSTOLIC BLOOD PRESSURE: 128 MMHG | DIASTOLIC BLOOD PRESSURE: 75 MMHG

## 2022-05-05 NOTE — TELEPHONE ENCOUNTER
Pt reports bp taken on 05/04/22 at 128/75, posted in bp remote field.    Krista Benavides  Panel Care Coordinator  [facility]  P: [phone]  F: [phone]

## 2022-05-11 ENCOUNTER — OFFICE VISIT (OUTPATIENT)
Dept: PHYSICAL MEDICINE AND REHAB | Facility: CLINIC | Age: 57
End: 2022-05-11
Payer: MEDICAID

## 2022-05-11 VITALS
DIASTOLIC BLOOD PRESSURE: 106 MMHG | WEIGHT: 293 LBS | BODY MASS INDEX: 47.09 KG/M2 | HEIGHT: 66 IN | SYSTOLIC BLOOD PRESSURE: 181 MMHG | HEART RATE: 106 BPM

## 2022-05-11 DIAGNOSIS — E11.9 TYPE 2 DIABETES MELLITUS WITHOUT COMPLICATION: ICD-10-CM

## 2022-05-11 DIAGNOSIS — M25.562 CHRONIC PAIN OF BOTH KNEES: ICD-10-CM

## 2022-05-11 DIAGNOSIS — E66.01 MORBID OBESITY WITH BMI OF 45.0-49.9, ADULT: ICD-10-CM

## 2022-05-11 DIAGNOSIS — G89.29 CHRONIC PAIN OF BOTH KNEES: ICD-10-CM

## 2022-05-11 DIAGNOSIS — M17.0 PRIMARY OSTEOARTHRITIS OF BOTH KNEES: ICD-10-CM

## 2022-05-11 DIAGNOSIS — M17.0 PRIMARY OSTEOARTHRITIS OF BOTH KNEES: Primary | ICD-10-CM

## 2022-05-11 DIAGNOSIS — M25.561 CHRONIC PAIN OF BOTH KNEES: ICD-10-CM

## 2022-05-11 PROCEDURE — 99999 PR PBB SHADOW E&M-EST. PATIENT-LVL II: CPT | Mod: PBBFAC,,, | Performed by: PHYSICAL MEDICINE & REHABILITATION

## 2022-05-11 PROCEDURE — 3008F BODY MASS INDEX DOCD: CPT | Mod: CPTII,,, | Performed by: PHYSICAL MEDICINE & REHABILITATION

## 2022-05-11 PROCEDURE — 3077F SYST BP >= 140 MM HG: CPT | Mod: CPTII,,, | Performed by: PHYSICAL MEDICINE & REHABILITATION

## 2022-05-11 PROCEDURE — 3080F PR MOST RECENT DIASTOLIC BLOOD PRESSURE >= 90 MM HG: ICD-10-PCS | Mod: CPTII,,, | Performed by: PHYSICAL MEDICINE & REHABILITATION

## 2022-05-11 PROCEDURE — 99213 PR OFFICE/OUTPT VISIT, EST, LEVL III, 20-29 MIN: ICD-10-PCS | Mod: S$PBB,,, | Performed by: PHYSICAL MEDICINE & REHABILITATION

## 2022-05-11 PROCEDURE — 3080F DIAST BP >= 90 MM HG: CPT | Mod: CPTII,,, | Performed by: PHYSICAL MEDICINE & REHABILITATION

## 2022-05-11 PROCEDURE — 3066F NEPHROPATHY DOC TX: CPT | Mod: CPTII,,, | Performed by: PHYSICAL MEDICINE & REHABILITATION

## 2022-05-11 PROCEDURE — 3066F PR DOCUMENTATION OF TREATMENT FOR NEPHROPATHY: ICD-10-PCS | Mod: CPTII,,, | Performed by: PHYSICAL MEDICINE & REHABILITATION

## 2022-05-11 PROCEDURE — 99212 OFFICE O/P EST SF 10 MIN: CPT | Mod: PBBFAC | Performed by: PHYSICAL MEDICINE & REHABILITATION

## 2022-05-11 PROCEDURE — 99213 OFFICE O/P EST LOW 20 MIN: CPT | Mod: S$PBB,,, | Performed by: PHYSICAL MEDICINE & REHABILITATION

## 2022-05-11 PROCEDURE — 3008F PR BODY MASS INDEX (BMI) DOCUMENTED: ICD-10-PCS | Mod: CPTII,,, | Performed by: PHYSICAL MEDICINE & REHABILITATION

## 2022-05-11 PROCEDURE — 3077F PR MOST RECENT SYSTOLIC BLOOD PRESSURE >= 140 MM HG: ICD-10-PCS | Mod: CPTII,,, | Performed by: PHYSICAL MEDICINE & REHABILITATION

## 2022-05-11 PROCEDURE — 3051F HG A1C>EQUAL 7.0%<8.0%: CPT | Mod: CPTII,,, | Performed by: PHYSICAL MEDICINE & REHABILITATION

## 2022-05-11 PROCEDURE — 3051F PR MOST RECENT HEMOGLOBIN A1C LEVEL 7.0 - < 8.0%: ICD-10-PCS | Mod: CPTII,,, | Performed by: PHYSICAL MEDICINE & REHABILITATION

## 2022-05-11 PROCEDURE — 99999 PR PBB SHADOW E&M-EST. PATIENT-LVL II: ICD-10-PCS | Mod: PBBFAC,,, | Performed by: PHYSICAL MEDICINE & REHABILITATION

## 2022-05-11 PROCEDURE — 3061F PR NEG MICROALBUMINURIA RESULT DOCUMENTED/REVIEW: ICD-10-PCS | Mod: CPTII,,, | Performed by: PHYSICAL MEDICINE & REHABILITATION

## 2022-05-11 PROCEDURE — 3061F NEG MICROALBUMINURIA REV: CPT | Mod: CPTII,,, | Performed by: PHYSICAL MEDICINE & REHABILITATION

## 2022-05-11 RX ORDER — TRAMADOL HYDROCHLORIDE 50 MG/1
50-100 TABLET ORAL 3 TIMES DAILY PRN
Qty: 180 TABLET | Refills: 3 | Status: SHIPPED | OUTPATIENT
Start: 2022-05-11 | End: 2022-09-28 | Stop reason: SDUPTHER

## 2022-05-11 RX ORDER — DICLOFENAC SODIUM 10 MG/G
2 GEL TOPICAL 4 TIMES DAILY PRN
Qty: 300 G | Refills: 3 | Status: SHIPPED | OUTPATIENT
Start: 2022-05-11 | End: 2022-09-28 | Stop reason: SDUPTHER

## 2022-05-11 NOTE — PROGRESS NOTES
Subjective:       Patient ID: Yayo Carver is a 56 y.o. female.    Chief Complaint: No chief complaint on file.    HPI.    HISTORY OF PRESENT ILLNESS:  Mrs. Carver is a 56-year-old black female with past medical history of hypertension, diabetes mellitus, osteoarthritis and morbid obesity.  She is followed up at the [facility]  for chronic bilateral knee pain due to advanced OA.  She was last seen on 03/10/2021.  She was maintained on meloxicam and p.r.n. tramadol.    Since her last visit, the patient continue to follow up with Orthopedic surgery.  She received intra-articular steroid injections on 4/14/2022.  She reports temporary relief.  She is also candidate for knee replacement surgery but she does not qualify yet due to morbid obesity (BMI in the 50s).  She reports weight reduction gastric surgery is planned next month.    The patient is coming to the clinic for follow-up.  Her bilateral knee pain has been worse. The pain is recently worse on the left.  It is a constant aching pain.  It is aggravated by standing, walking or getting up after sitting for too long.  It is better with lying flat.  Her maximum pain is 10/10 and minimum 5-6/10.  Today, it is 8/10.  The patient has occasional bilateral knee swelling.  She denies warmth    She is currently taking:  - tramadol 50 mg p.r.n., 1-2 tablets 3 times per day, with fair relief.  - she was on hydrocodone/APAP 5/325 p.o. t.i.d. p.r.n., prescribed by Orthopedics.  However it is not help as much as tramadol.  - diclofenac gel as needed to her knees, usually 3-4 times per day with some relief.  She was on meloxicam but it was stopped by her Primary Care Provider few months ago due to elevation of her BUN, possibly due to dehydration.  Her levels normalized but she is still not interested in trying NSAIDs.  She is getting physical therapy.      Past Medical History:   Diagnosis Date    Arthritis     Diabetes mellitus     HLD (hyperlipidemia)      Hypertension        Review of patient's allergies indicates:   Allergen Reactions    Ace inhibitors Edema and Swelling         Review of Systems   Constitutional: Negative for fatigue.   Eyes: Negative for visual disturbance.   Respiratory: Negative for shortness of breath.    Cardiovascular: Negative for chest pain.   Gastrointestinal: Negative for blood in stool, constipation, nausea and vomiting.   Genitourinary: Negative for difficulty urinating.   Musculoskeletal: Positive for arthralgias, back pain and gait problem. Negative for joint swelling and neck pain.   Neurological: Negative for dizziness and headaches.   Psychiatric/Behavioral: Negative for behavioral problems.       Objective:      Physical Exam  Vitals reviewed.   Constitutional:       Appearance: She is well-developed.   HENT:      Head: Normocephalic and atraumatic.   Musculoskeletal:      Comments: BLE:  ROM:full.  Strength:    RLE: 4/5 at hip flexion, 4 knee extension, 5 ankle DF, 5 PF.   LLE: 4/5 at hip flexion, 4 knee extension, 5 ankle DF, 5 PF.  Sensation to pinprick:     RLE: intact.      LLE: intact.       Gait: waddling.     Skin:     General: Skin is warm.   Neurological:      Mental Status: She is alert and oriented to person, place, and time.         Assessment:       1. Primary osteoarthritis of both knees    2. Chronic pain of both knees    3. Morbid obesity with BMI of 45.0-49.9, adult        Plan:       - Oral NSAIDs will be avoided due to mild renal function impairment.  She is not interested in trying Celebrex.  - Continue  traMADol (ULTRAM) 50 mg tablet; Take 1-2 tablets ( mg total) by mouth 3 (three) times daily as needed.  - Continue diclofenac sodium (VOLTAREN) 1 % Gel; Apply 2 g topically 4 (four) times daily as needed (knee pain).  - Follow-up with Orthopedic surgery as needed for intra-articular injections and for knee replacement surgery when ready after weight loss.  - Continue Physical therapy, followed by a  regular home exercise program.  - Follow up in about 4 months (around 9/11/2022).     This was a 30 minute visit, more than 50% of which was spent counseling the patient about the diagnosis and the treatment plan.    This note was partly generated with [employer] voice recognition software. I apologize for any possible typographical errors.

## 2022-05-16 DIAGNOSIS — M25.562 ACUTE PAIN OF BOTH KNEES: Primary | ICD-10-CM

## 2022-05-16 DIAGNOSIS — M25.561 ACUTE PAIN OF BOTH KNEES: Primary | ICD-10-CM

## 2022-05-16 RX ORDER — HYDROCODONE BITARTRATE AND ACETAMINOPHEN 10; 325 MG/1; MG/1
1 TABLET ORAL EVERY 6 HOURS PRN
Qty: 20 TABLET | Refills: 0 | Status: SHIPPED | OUTPATIENT
Start: 2022-05-16 | End: 2022-05-23 | Stop reason: SDUPTHER

## 2022-05-16 NOTE — PROGRESS NOTES
Pt with severe left knee pain today. She is unable to get relief with tramadol and can't take nsaids due to kidney function in the past. Will give her 20 norco to get her over the severe pain and then she will try tramadol 3 times a day as prescribed by Dr. Lawson.

## 2022-05-20 ENCOUNTER — PATIENT MESSAGE (OUTPATIENT)
Dept: SLEEP MEDICINE | Facility: CLINIC | Age: 57
End: 2022-05-20
Payer: MEDICAID

## 2022-05-20 DIAGNOSIS — R53.83 FATIGUE, UNSPECIFIED TYPE: ICD-10-CM

## 2022-05-20 DIAGNOSIS — I10 HYPERTENSION, UNSPECIFIED TYPE: Primary | ICD-10-CM

## 2022-05-23 ENCOUNTER — OFFICE VISIT (OUTPATIENT)
Dept: OPTOMETRY | Facility: CLINIC | Age: 57
End: 2022-05-23
Payer: MEDICAID

## 2022-05-23 ENCOUNTER — TELEPHONE (OUTPATIENT)
Dept: SLEEP MEDICINE | Facility: OTHER | Age: 57
End: 2022-05-23
Payer: MEDICAID

## 2022-05-23 DIAGNOSIS — M25.562 ACUTE PAIN OF BOTH KNEES: ICD-10-CM

## 2022-05-23 DIAGNOSIS — M25.562 CHRONIC PAIN OF BOTH KNEES: Primary | ICD-10-CM

## 2022-05-23 DIAGNOSIS — I10 ESSENTIAL HYPERTENSION: ICD-10-CM

## 2022-05-23 DIAGNOSIS — M25.561 CHRONIC PAIN OF BOTH KNEES: Primary | ICD-10-CM

## 2022-05-23 DIAGNOSIS — M25.561 ACUTE PAIN OF BOTH KNEES: ICD-10-CM

## 2022-05-23 DIAGNOSIS — E11.65 TYPE 2 DIABETES MELLITUS WITH HYPERGLYCEMIA, WITH LONG-TERM CURRENT USE OF INSULIN: ICD-10-CM

## 2022-05-23 DIAGNOSIS — Z79.4 TYPE 2 DIABETES MELLITUS WITH HYPERGLYCEMIA, WITH LONG-TERM CURRENT USE OF INSULIN: ICD-10-CM

## 2022-05-23 DIAGNOSIS — E11.9 TYPE 2 DIABETES MELLITUS WITHOUT OPHTHALMIC MANIFESTATIONS: Primary | ICD-10-CM

## 2022-05-23 DIAGNOSIS — G89.29 CHRONIC PAIN OF BOTH KNEES: Primary | ICD-10-CM

## 2022-05-23 DIAGNOSIS — H52.4 PRESBYOPIA: ICD-10-CM

## 2022-05-23 DIAGNOSIS — Q14.1 RETINAL PIGMENTATION: ICD-10-CM

## 2022-05-23 PROCEDURE — 92014 PR EYE EXAM, EST PATIENT,COMPREHESV: ICD-10-PCS | Mod: S$PBB,,, | Performed by: OPTOMETRIST

## 2022-05-23 PROCEDURE — 1159F MED LIST DOCD IN RCRD: CPT | Mod: CPTII,,, | Performed by: OPTOMETRIST

## 2022-05-23 PROCEDURE — 92250 COLOR FUNDUS PHOTOGRAPHY - OU - BOTH EYES: ICD-10-PCS | Mod: 26,S$PBB,, | Performed by: OPTOMETRIST

## 2022-05-23 PROCEDURE — 3051F HG A1C>EQUAL 7.0%<8.0%: CPT | Mod: CPTII,,, | Performed by: OPTOMETRIST

## 2022-05-23 PROCEDURE — 3066F NEPHROPATHY DOC TX: CPT | Mod: CPTII,,, | Performed by: OPTOMETRIST

## 2022-05-23 PROCEDURE — 92014 COMPRE OPH EXAM EST PT 1/>: CPT | Mod: S$PBB,,, | Performed by: OPTOMETRIST

## 2022-05-23 PROCEDURE — 2023F DILAT RTA XM W/O RTNOPTHY: CPT | Mod: CPTII,,, | Performed by: OPTOMETRIST

## 2022-05-23 PROCEDURE — 2023F PR DILATED RETINAL EXAM W/O EVID OF RETINOPATHY: ICD-10-PCS | Mod: CPTII,,, | Performed by: OPTOMETRIST

## 2022-05-23 PROCEDURE — 3061F NEG MICROALBUMINURIA REV: CPT | Mod: CPTII,,, | Performed by: OPTOMETRIST

## 2022-05-23 PROCEDURE — 1160F PR REVIEW ALL MEDS BY PRESCRIBER/CLIN PHARMACIST DOCUMENTED: ICD-10-PCS | Mod: CPTII,,, | Performed by: OPTOMETRIST

## 2022-05-23 PROCEDURE — 1160F RVW MEDS BY RX/DR IN RCRD: CPT | Mod: CPTII,,, | Performed by: OPTOMETRIST

## 2022-05-23 PROCEDURE — 99999 PR PBB SHADOW E&M-EST. PATIENT-LVL II: CPT | Mod: PBBFAC,,, | Performed by: OPTOMETRIST

## 2022-05-23 PROCEDURE — 3061F PR NEG MICROALBUMINURIA RESULT DOCUMENTED/REVIEW: ICD-10-PCS | Mod: CPTII,,, | Performed by: OPTOMETRIST

## 2022-05-23 PROCEDURE — 3066F PR DOCUMENTATION OF TREATMENT FOR NEPHROPATHY: ICD-10-PCS | Mod: CPTII,,, | Performed by: OPTOMETRIST

## 2022-05-23 PROCEDURE — 99999 PR PBB SHADOW E&M-EST. PATIENT-LVL II: ICD-10-PCS | Mod: PBBFAC,,, | Performed by: OPTOMETRIST

## 2022-05-23 PROCEDURE — 99212 OFFICE O/P EST SF 10 MIN: CPT | Mod: PBBFAC | Performed by: OPTOMETRIST

## 2022-05-23 PROCEDURE — 92250 FUNDUS PHOTOGRAPHY W/I&R: CPT | Mod: PBBFAC | Performed by: OPTOMETRIST

## 2022-05-23 PROCEDURE — 3051F PR MOST RECENT HEMOGLOBIN A1C LEVEL 7.0 - < 8.0%: ICD-10-PCS | Mod: CPTII,,, | Performed by: OPTOMETRIST

## 2022-05-23 PROCEDURE — 1159F PR MEDICATION LIST DOCUMENTED IN MEDICAL RECORD: ICD-10-PCS | Mod: CPTII,,, | Performed by: OPTOMETRIST

## 2022-05-23 RX ORDER — HYDROCODONE BITARTRATE AND ACETAMINOPHEN 10; 325 MG/1; MG/1
1 TABLET ORAL EVERY 6 HOURS PRN
Qty: 20 TABLET | Refills: 0 | Status: SHIPPED | OUTPATIENT
Start: 2022-05-23 | End: 2022-06-01

## 2022-05-23 NOTE — PROGRESS NOTES
HPI     Diabetic eye exam  DLS 12/18/2019 Dr. Miranda    Pt doing well overall. No pain or discomfort.   Would like to know if she needs glasses. Using OTC readers only now.     Hemoglobin A1C       Date                     Value               Ref Range             Status                04/05/2022               7.7 (H)             4.0 - 5.6 %           Final                       12/08/2021               10.1 (H)            4.0 - 5.6 %           Final                    10/12/2021               >14.0 (H)           4.0 - 5.6 %           Final                  Last edited by Janki Blancas on 5/23/2022 11:25 AM. (History)            Assessment /Plan     For exam results, see Encounter Report.    Type 2 diabetes mellitus without ophthalmic manifestations  Type 2 diabetes mellitus with hyperglycemia, with long-term current use of insulin   Photos today  Retinal pigmentation   Adjacent to disc, photos today  Essential hypertension    Presbyopia   Rx specs      RTC 1 year

## 2022-05-25 ENCOUNTER — PATIENT MESSAGE (OUTPATIENT)
Dept: ORTHOPEDICS | Facility: CLINIC | Age: 57
End: 2022-05-25
Payer: MEDICAID

## 2022-05-30 ENCOUNTER — PATIENT MESSAGE (OUTPATIENT)
Dept: ADMINISTRATIVE | Facility: HOSPITAL | Age: 57
End: 2022-05-30
Payer: MEDICAID

## 2022-05-31 ENCOUNTER — PATIENT MESSAGE (OUTPATIENT)
Dept: PHYSICAL MEDICINE AND REHAB | Facility: CLINIC | Age: 57
End: 2022-05-31
Payer: MEDICAID

## 2022-06-01 ENCOUNTER — TELEPHONE (OUTPATIENT)
Dept: SLEEP MEDICINE | Facility: OTHER | Age: 57
End: 2022-06-01
Payer: MEDICAID

## 2022-06-01 ENCOUNTER — TELEPHONE (OUTPATIENT)
Dept: PHYSICAL MEDICINE AND REHAB | Facility: CLINIC | Age: 57
End: 2022-06-01
Payer: MEDICAID

## 2022-06-01 RX ORDER — GABAPENTIN 300 MG/1
300 CAPSULE ORAL NIGHTLY
Qty: 90 CAPSULE | Refills: 2 | Status: SHIPPED | OUTPATIENT
Start: 2022-06-01 | End: 2022-11-29

## 2022-06-01 RX ORDER — HYDROCODONE BITARTRATE AND ACETAMINOPHEN 7.5; 325 MG/1; MG/1
1 TABLET ORAL EVERY 6 HOURS PRN
Qty: 60 TABLET | Refills: 0 | Status: SHIPPED | OUTPATIENT
Start: 2022-06-01 | End: 2022-06-09 | Stop reason: SDUPTHER

## 2022-06-01 NOTE — TELEPHONE ENCOUNTER
----- Message from Farhana Kelley sent at 6/1/2022 10:45 AM CDT -----  Needs advice from nurse:      Who Called:pt  Regarding:knees are on fire/stiff/barely walking would like something called in  Would the patient rather a call back or VIA [facility]?  Best Call Back number:[phone]  Additional Info:[id number] - [address] (Ph: [phone])

## 2022-06-01 NOTE — TELEPHONE ENCOUNTER
I called the patient.  He is having severe knee pain with burning sensations.  She was prescribed hydrocodone/APAP by Orthopedics with some relief.  I told her the pain seems neuropathic and I will start her on gabapentin.  I can also continue her prescription for hydrocodone/APAP, though at slightly reduced dose (from 10/03 25 to 7.5/325 p.o. q.6 hours p.r.n.)

## 2022-06-09 DIAGNOSIS — M17.0 PRIMARY OSTEOARTHRITIS OF BOTH KNEES: Primary | ICD-10-CM

## 2022-06-09 RX ORDER — HYDROCODONE BITARTRATE AND ACETAMINOPHEN 7.5; 325 MG/1; MG/1
1 TABLET ORAL EVERY 6 HOURS PRN
Qty: 60 TABLET | Refills: 0 | Status: SHIPPED | OUTPATIENT
Start: 2022-06-09 | End: 2022-06-24

## 2022-06-09 NOTE — PROGRESS NOTES
Pt with severe, acute knee pain due to having to be off nsaids for upcoming bariatric surgery. Refilled norco one more time.

## 2022-06-20 ENCOUNTER — TELEPHONE (OUTPATIENT)
Dept: SLEEP MEDICINE | Facility: OTHER | Age: 57
End: 2022-06-20
Payer: MEDICAID

## 2022-06-20 ENCOUNTER — PATIENT MESSAGE (OUTPATIENT)
Dept: SLEEP MEDICINE | Facility: OTHER | Age: 57
End: 2022-06-20
Payer: MEDICAID

## 2022-06-21 ENCOUNTER — TELEPHONE (OUTPATIENT)
Dept: INTERNAL MEDICINE | Facility: CLINIC | Age: 57
End: 2022-06-21
Payer: MEDICAID

## 2022-06-21 NOTE — TELEPHONE ENCOUNTER
----- Message from Lia Armstrong sent at 6/21/2022  2:46 PM CDT -----  Regarding: Patient call back  Who called:DEMARCO DELACRUZ [[id number]]    What is the request in detail: Patient is requesting a call back. She states she had a procedure 6/16 and would like a f/u appt. Attempted to schedule, no appts available. She would like to further discuss.    Please advise.    Can the clinic reply by [facility]? No    Best call back number: [phone]    Additional Information: N/A

## 2022-06-21 NOTE — TELEPHONE ENCOUNTER
Called and spoke with patient, patient is requesting an appointment to follow up from a gastric sleeve procedure. Patient was offered the clinic next available appointment and declined. Patient was informed that PCP was unavailable and that I am only able to see schedule for 6 months in advance. Thanks.

## 2022-06-29 DIAGNOSIS — K21.00 GASTROESOPHAGEAL REFLUX DISEASE WITH ESOPHAGITIS: ICD-10-CM

## 2022-06-29 NOTE — TELEPHONE ENCOUNTER
Care Due:                  Date            Visit Type   Department     Provider  --------------------------------------------------------------------------------                                EP -                              PRIMARY      [facility] INTERNAL  Last Visit: 04-      CARE ([facility])   MEDICINE       Berta Hastings  Next Visit: None Scheduled  None         None Found                                                            Last  Test          Frequency    Reason                     Performed    Due Date  --------------------------------------------------------------------------------    Lipid Panel.  12 months..  atorvastatin.............  01-   01-    [facility] Embedded Care Gaps. Reference number: [id number]. 6/29/2022   12:54:49 PM CDT

## 2022-06-30 RX ORDER — PANTOPRAZOLE SODIUM 40 MG/1
40 TABLET, DELAYED RELEASE ORAL DAILY PRN
Qty: 30 TABLET | Refills: 3 | Status: SHIPPED | OUTPATIENT
Start: 2022-06-30 | End: 2022-09-28 | Stop reason: SDUPTHER

## 2022-07-17 DIAGNOSIS — M25.561 ACUTE BILATERAL KNEE PAIN: ICD-10-CM

## 2022-07-17 DIAGNOSIS — M17.0 PRIMARY OSTEOARTHRITIS OF BOTH KNEES: Primary | ICD-10-CM

## 2022-07-17 DIAGNOSIS — M25.562 ACUTE BILATERAL KNEE PAIN: ICD-10-CM

## 2022-07-17 RX ORDER — HYDROCODONE BITARTRATE AND ACETAMINOPHEN 7.5; 325 MG/1; MG/1
1 TABLET ORAL EVERY 6 HOURS PRN
Qty: 28 TABLET | Refills: 0 | Status: SHIPPED | OUTPATIENT
Start: 2022-07-17 | End: 2022-12-15 | Stop reason: HOSPADM

## 2022-07-22 ENCOUNTER — PATIENT OUTREACH (OUTPATIENT)
Dept: ADMINISTRATIVE | Facility: HOSPITAL | Age: 57
End: 2022-07-22
Payer: MEDICAID

## 2022-07-26 ENCOUNTER — TELEPHONE (OUTPATIENT)
Dept: INTERNAL MEDICINE | Facility: CLINIC | Age: 57
End: 2022-07-26
Payer: MEDICAID

## 2022-07-26 NOTE — TELEPHONE ENCOUNTER
----- Message from Robbie Wu sent at 7/26/2022  2:43 PM CDT -----  Name of Who is Calling: DEMARCO Galvan [[id number]]            What is the request in detail: patient request call back to schedule an appointment              Can the clinic reply by [facility]: no              What Number to Call Back if not in [facility]: [phone]

## 2022-07-26 NOTE — TELEPHONE ENCOUNTER
Patient is requesting a post op appointment for gastric sleeve surgery that was completed on June 16, 2022. Please call patient to schedule an post op appointment I am unable to schedule patient appointment due to no availability. Routed to Dr. Hastings staff for scheduling. Thanks.

## 2022-07-27 ENCOUNTER — OFFICE VISIT (OUTPATIENT)
Dept: ORTHOPEDICS | Facility: CLINIC | Age: 57
End: 2022-07-27
Payer: MEDICAID

## 2022-07-27 DIAGNOSIS — M17.0 PRIMARY OSTEOARTHRITIS OF BOTH KNEES: Primary | ICD-10-CM

## 2022-07-27 PROCEDURE — 3066F PR DOCUMENTATION OF TREATMENT FOR NEPHROPATHY: ICD-10-PCS | Mod: CPTII,,, | Performed by: NURSE PRACTITIONER

## 2022-07-27 PROCEDURE — 99213 OFFICE O/P EST LOW 20 MIN: CPT | Mod: PBBFAC,25 | Performed by: NURSE PRACTITIONER

## 2022-07-27 PROCEDURE — 99999 PR PBB SHADOW E&M-EST. PATIENT-LVL III: ICD-10-PCS | Mod: PBBFAC,,, | Performed by: NURSE PRACTITIONER

## 2022-07-27 PROCEDURE — 20610 DRAIN/INJ JOINT/BURSA W/O US: CPT | Mod: 50,PBBFAC | Performed by: NURSE PRACTITIONER

## 2022-07-27 PROCEDURE — 1160F RVW MEDS BY RX/DR IN RCRD: CPT | Mod: CPTII,,, | Performed by: NURSE PRACTITIONER

## 2022-07-27 PROCEDURE — 99213 OFFICE O/P EST LOW 20 MIN: CPT | Mod: S$PBB,25,, | Performed by: NURSE PRACTITIONER

## 2022-07-27 PROCEDURE — 20610 DRAIN/INJ JOINT/BURSA W/O US: CPT | Mod: 50,S$PBB,, | Performed by: NURSE PRACTITIONER

## 2022-07-27 PROCEDURE — 3051F PR MOST RECENT HEMOGLOBIN A1C LEVEL 7.0 - < 8.0%: ICD-10-PCS | Mod: CPTII,,, | Performed by: NURSE PRACTITIONER

## 2022-07-27 PROCEDURE — 3051F HG A1C>EQUAL 7.0%<8.0%: CPT | Mod: CPTII,,, | Performed by: NURSE PRACTITIONER

## 2022-07-27 PROCEDURE — 3008F PR BODY MASS INDEX (BMI) DOCUMENTED: ICD-10-PCS | Mod: CPTII,,, | Performed by: NURSE PRACTITIONER

## 2022-07-27 PROCEDURE — 1159F MED LIST DOCD IN RCRD: CPT | Mod: CPTII,,, | Performed by: NURSE PRACTITIONER

## 2022-07-27 PROCEDURE — 1159F PR MEDICATION LIST DOCUMENTED IN MEDICAL RECORD: ICD-10-PCS | Mod: CPTII,,, | Performed by: NURSE PRACTITIONER

## 2022-07-27 PROCEDURE — 3061F NEG MICROALBUMINURIA REV: CPT | Mod: CPTII,,, | Performed by: NURSE PRACTITIONER

## 2022-07-27 PROCEDURE — 3008F BODY MASS INDEX DOCD: CPT | Mod: CPTII,,, | Performed by: NURSE PRACTITIONER

## 2022-07-27 PROCEDURE — 99213 PR OFFICE/OUTPT VISIT, EST, LEVL III, 20-29 MIN: ICD-10-PCS | Mod: S$PBB,25,, | Performed by: NURSE PRACTITIONER

## 2022-07-27 PROCEDURE — 3066F NEPHROPATHY DOC TX: CPT | Mod: CPTII,,, | Performed by: NURSE PRACTITIONER

## 2022-07-27 PROCEDURE — 20610 PR DRAIN/INJECT LARGE JOINT/BURSA: ICD-10-PCS | Mod: 50,S$PBB,, | Performed by: NURSE PRACTITIONER

## 2022-07-27 PROCEDURE — 1160F PR REVIEW ALL MEDS BY PRESCRIBER/CLIN PHARMACIST DOCUMENTED: ICD-10-PCS | Mod: CPTII,,, | Performed by: NURSE PRACTITIONER

## 2022-07-27 PROCEDURE — 3061F PR NEG MICROALBUMINURIA RESULT DOCUMENTED/REVIEW: ICD-10-PCS | Mod: CPTII,,, | Performed by: NURSE PRACTITIONER

## 2022-07-27 PROCEDURE — 99999 PR PBB SHADOW E&M-EST. PATIENT-LVL III: CPT | Mod: PBBFAC,,, | Performed by: NURSE PRACTITIONER

## 2022-07-27 RX ADMIN — TRIAMCINOLONE ACETONIDE 80 MG: 40 INJECTION, SUSPENSION INTRA-ARTICULAR; INTRAMUSCULAR at 08:07

## 2022-07-27 NOTE — PROGRESS NOTES
CC: Pain of the Left Knee and Pain of the Right Knee      HPI: Pt with c/o bilateral knee pain left>right for the past few weeks.The pain is aching and worse with increased activity. There is stiffness with sitting for long periods of time. She has known bilateral knee djd and she had weight loss surgery last month. She has already lost approx 30lbs. She has been getting cortisone injections and working with pain management until she is ready and approved to proceed with knee replacement. She comes in today for aspiration of her knees and cortisone injections. Her last injections were in April. She wants to return to work soon and she works on her feet at the [employer]. She is ambulating without assistive device. There is a limp and some balance issues due to pain.    ROS  General: denies fever and chills  Resp: no c/o sob  CVS: no c/o cp  MSK: c/o bilateral knee pain    PE  General: AAOx3, pleasant and cooperative  Resp: respirations even and unlabored  MSK: right knee exam  0 degrees extension  110 degrees flexion  No warmth or erythema   + effusion  + crepitus  + tenderness over the lateral and medial joint lines  4/5 quad strength    Left knee exam  5 degrees extension  100 degrees flexion  No warmth or erythema  + effusion  + crepitus  + tenderness over the lateral and medial joint lines  4/5 quad strength    Assessment:  Bilateral knee djd    Plan:  Aspiration of bilateral knees today with cortisone injections  Quad sets demonstrated with return demonstration to help strengthen her legs  Cane dispensed by DME to prevent falls  RICE  Pain medication for severe pain as prescribed until knee replacement ( she is currently taking norco once a day prn severe pain)  F/u as needed        Knee Arthrocentesis with Injection Procedure Note    Pre-operative Diagnosis: Left knee degenerative arthritis    Post-operative Diagnosis: same    Indications: Left knee pain    Anesthesia: none    Procedure  Details     Verbal consent was obtained for the procedure.An 18 gauge needle was inserted into the superior aspect of the joint from a lateral approach. 40 ml of clear yellow fluid was removed from the joint and discarded. 2% marcaine 4 ml and 40mg of kenalog was then injected into the joint through the same needle. The needle was removed and the area cleansed and dressed.    Complications:  None; patient tolerated the procedure well.        Knee Arthrocentesis with Injection Procedure Note    Pre-operative Diagnosis: right knee degenerative arthritis    Post-operative Diagnosis: same    Indications: right knee pain    Anesthesia: none    Procedure Details     Verbal consent was obtained for the procedure.An 18 gauge needle was inserted into the superior aspect of the joint from a lateral approach. 15 ml of clear yellow fluid was removed from the joint and discarded. 2% marcaine 4 ml and 40mg of kenalog was then injected into the joint through the same needle. The needle was removed and the area cleansed and dressed.    Complications:  None; patient tolerated the procedure well.

## 2022-07-28 VITALS — WEIGHT: 283 LBS | HEIGHT: 66 IN | BODY MASS INDEX: 45.48 KG/M2

## 2022-07-28 RX ORDER — TRIAMCINOLONE ACETONIDE 40 MG/ML
80 INJECTION, SUSPENSION INTRA-ARTICULAR; INTRAMUSCULAR
Status: COMPLETED | OUTPATIENT
Start: 2022-07-27 | End: 2022-07-27

## 2022-08-02 ENCOUNTER — PATIENT MESSAGE (OUTPATIENT)
Dept: INTERNAL MEDICINE | Facility: CLINIC | Age: 57
End: 2022-08-02

## 2022-08-02 ENCOUNTER — OFFICE VISIT (OUTPATIENT)
Dept: INTERNAL MEDICINE | Facility: CLINIC | Age: 57
End: 2022-08-02
Payer: MEDICAID

## 2022-08-02 VITALS
BODY MASS INDEX: 43.36 KG/M2 | HEART RATE: 117 BPM | HEIGHT: 66 IN | WEIGHT: 269.81 LBS | DIASTOLIC BLOOD PRESSURE: 90 MMHG | SYSTOLIC BLOOD PRESSURE: 130 MMHG | OXYGEN SATURATION: 98 %

## 2022-08-02 DIAGNOSIS — Z79.4 TYPE 2 DIABETES MELLITUS WITH HYPERGLYCEMIA, WITH LONG-TERM CURRENT USE OF INSULIN: ICD-10-CM

## 2022-08-02 DIAGNOSIS — I10 ESSENTIAL HYPERTENSION: Primary | ICD-10-CM

## 2022-08-02 DIAGNOSIS — Z00.00 ANNUAL PHYSICAL EXAM: ICD-10-CM

## 2022-08-02 DIAGNOSIS — K59.03 DRUG-INDUCED CONSTIPATION: Primary | ICD-10-CM

## 2022-08-02 DIAGNOSIS — E66.01 MORBID OBESITY WITH BMI OF 50.0-59.9, ADULT: ICD-10-CM

## 2022-08-02 DIAGNOSIS — L68.0 HIRSUTIES: ICD-10-CM

## 2022-08-02 DIAGNOSIS — E78.2 MIXED HYPERLIPIDEMIA: ICD-10-CM

## 2022-08-02 DIAGNOSIS — I10 ESSENTIAL HYPERTENSION: ICD-10-CM

## 2022-08-02 DIAGNOSIS — E11.65 TYPE 2 DIABETES MELLITUS WITH HYPERGLYCEMIA, WITH LONG-TERM CURRENT USE OF INSULIN: ICD-10-CM

## 2022-08-02 DIAGNOSIS — Z12.31 ENCOUNTER FOR SCREENING MAMMOGRAM FOR MALIGNANT NEOPLASM OF BREAST: ICD-10-CM

## 2022-08-02 DIAGNOSIS — L68.0 HIRSUTISM: ICD-10-CM

## 2022-08-02 PROCEDURE — 3061F NEG MICROALBUMINURIA REV: CPT | Mod: CPTII,,, | Performed by: INTERNAL MEDICINE

## 2022-08-02 PROCEDURE — 3066F NEPHROPATHY DOC TX: CPT | Mod: CPTII,,, | Performed by: INTERNAL MEDICINE

## 2022-08-02 PROCEDURE — 3051F HG A1C>EQUAL 7.0%<8.0%: CPT | Mod: CPTII,,, | Performed by: INTERNAL MEDICINE

## 2022-08-02 PROCEDURE — 1160F PR REVIEW ALL MEDS BY PRESCRIBER/CLIN PHARMACIST DOCUMENTED: ICD-10-PCS | Mod: CPTII,,, | Performed by: INTERNAL MEDICINE

## 2022-08-02 PROCEDURE — 3008F PR BODY MASS INDEX (BMI) DOCUMENTED: ICD-10-PCS | Mod: CPTII,,, | Performed by: INTERNAL MEDICINE

## 2022-08-02 PROCEDURE — 3075F SYST BP GE 130 - 139MM HG: CPT | Mod: CPTII,,, | Performed by: INTERNAL MEDICINE

## 2022-08-02 PROCEDURE — 3080F DIAST BP >= 90 MM HG: CPT | Mod: CPTII,,, | Performed by: INTERNAL MEDICINE

## 2022-08-02 PROCEDURE — 3008F BODY MASS INDEX DOCD: CPT | Mod: CPTII,,, | Performed by: INTERNAL MEDICINE

## 2022-08-02 PROCEDURE — 3051F PR MOST RECENT HEMOGLOBIN A1C LEVEL 7.0 - < 8.0%: ICD-10-PCS | Mod: CPTII,,, | Performed by: INTERNAL MEDICINE

## 2022-08-02 PROCEDURE — 99999 PR PBB SHADOW E&M-EST. PATIENT-LVL IV: CPT | Mod: PBBFAC,,, | Performed by: INTERNAL MEDICINE

## 2022-08-02 PROCEDURE — 1159F MED LIST DOCD IN RCRD: CPT | Mod: CPTII,,, | Performed by: INTERNAL MEDICINE

## 2022-08-02 PROCEDURE — 1160F RVW MEDS BY RX/DR IN RCRD: CPT | Mod: CPTII,,, | Performed by: INTERNAL MEDICINE

## 2022-08-02 PROCEDURE — 3061F PR NEG MICROALBUMINURIA RESULT DOCUMENTED/REVIEW: ICD-10-PCS | Mod: CPTII,,, | Performed by: INTERNAL MEDICINE

## 2022-08-02 PROCEDURE — 99214 OFFICE O/P EST MOD 30 MIN: CPT | Mod: PBBFAC | Performed by: INTERNAL MEDICINE

## 2022-08-02 PROCEDURE — 3080F PR MOST RECENT DIASTOLIC BLOOD PRESSURE >= 90 MM HG: ICD-10-PCS | Mod: CPTII,,, | Performed by: INTERNAL MEDICINE

## 2022-08-02 PROCEDURE — 99999 PR PBB SHADOW E&M-EST. PATIENT-LVL IV: ICD-10-PCS | Mod: PBBFAC,,, | Performed by: INTERNAL MEDICINE

## 2022-08-02 PROCEDURE — 99215 OFFICE O/P EST HI 40 MIN: CPT | Mod: S$PBB,,, | Performed by: INTERNAL MEDICINE

## 2022-08-02 PROCEDURE — 3075F PR MOST RECENT SYSTOLIC BLOOD PRESS GE 130-139MM HG: ICD-10-PCS | Mod: CPTII,,, | Performed by: INTERNAL MEDICINE

## 2022-08-02 PROCEDURE — 3066F PR DOCUMENTATION OF TREATMENT FOR NEPHROPATHY: ICD-10-PCS | Mod: CPTII,,, | Performed by: INTERNAL MEDICINE

## 2022-08-02 PROCEDURE — 1159F PR MEDICATION LIST DOCUMENTED IN MEDICAL RECORD: ICD-10-PCS | Mod: CPTII,,, | Performed by: INTERNAL MEDICINE

## 2022-08-02 PROCEDURE — 99215 PR OFFICE/OUTPT VISIT, EST, LEVL V, 40-54 MIN: ICD-10-PCS | Mod: S$PBB,,, | Performed by: INTERNAL MEDICINE

## 2022-08-02 RX ORDER — INSULIN ASPART 100 [IU]/ML
10 INJECTION, SOLUTION INTRAVENOUS; SUBCUTANEOUS
Qty: 15 ML | Refills: 11 | Status: CANCELLED
Start: 2022-08-02

## 2022-08-02 RX ORDER — INSULIN DETEMIR 100 [IU]/ML
30 INJECTION, SOLUTION SUBCUTANEOUS NIGHTLY
Qty: 27 ML | Refills: 0 | Status: CANCELLED | OUTPATIENT
Start: 2022-08-02

## 2022-08-02 RX ORDER — SPIRONOLACTONE 25 MG/1
25 TABLET ORAL DAILY
Qty: 30 TABLET | Refills: 11 | Status: CANCELLED | OUTPATIENT
Start: 2022-08-02 | End: 2023-08-02

## 2022-08-02 RX ORDER — DOCUSATE SODIUM 100 MG/1
100 CAPSULE, LIQUID FILLED ORAL 2 TIMES DAILY PRN
Qty: 60 CAPSULE | Refills: 11 | Status: CANCELLED | OUTPATIENT
Start: 2022-08-02

## 2022-08-02 NOTE — PROGRESS NOTES
Subjective:       Patient ID: Yayo Carver is a 56 y.o. female who  has a past medical history of Arthritis, Diabetes mellitus, HLD (hyperlipidemia), and Hypertension.    Chief Complaint: Constipation     History was obtained from the patient and supplemented through chart review.  -following with Ortho for OA of the knee.  -Reviewed outside records from [facility] RE bariatric surgery.     Works nights for [employer].  Her son works as a , rodriguez; he played college football.    HPI    Constipation:  Feels uncomfortable. Had straining, hard stools. Caused fatigue. No hematochezia.  Improved with OTC doculax. Prefers liquid rather than tablets. Stays hydrated.  Exercise is limited as below. Is on Norco.    HTN:    H/o DM, but not on ACE d/t h/o angioedema.  Has issues with muscle cramps.   SHORTY eval as below.      BP is borderline high. Has chronic knee pain. Is on Norvasc 10 daily. Stopped chlorthalidone 25, Aldactone 25 (d/t hirsutism, mild edema) after bariatric sx.  No issues with edema.  Home BP: hasn't been checking    DM2 is uncontrolled, but improved. H/o DKA when she had insurance issues obtaining Trulicity/Ozempic and ran out of insulin and Metformin.  Was admitted 10/2021 for HHS.      Stopped metformin due to non gap metabolic acidosis.  No h/o GI SE to Metformin.    Was on Pramlintide/Symlin 120 TID for weight loss, NovoLog 12 TID, Levemir 30 qHS. No longer on any meds s/p gastric sleeve .    BG was controlled, so hasn't checked recently. Has Tati.     Diet: cut out bread, rice, potatoes. Eats protein, veggies.  Is following bariatric low carb lean protein diet.  Drinks protein shakes.  Decreased appetite since bariatric sx.    Exercise: limited mobility. Started PT.      With normal microalbumin creatinine ratio.  No ACE/ARB d/t angioedema.   Retinal exams:    Foot exams:  . Has foot pain from bunions.  DM edu:  Seeing nutrition through bariatrics clinic at [facility]. Reviews BG  with them.  Lab Results   Component Value Date/Time    HGBA1C 7.5 (H) 2022 03:10 PM    HGBA1C 7.7 (H) 2022 04:20 PM    HGBA1C 10.1 (H) 2021 11:18 AM    HGBA1C >14.0 (H) 10/12/2021 06:31 AM     Obesity:    Has had difficulty getting Ozempic approved. Was on Symlin.  Following with OSH Bariatrics, [place] [facility]. S/p gastric sleeve  and has lost a significant amount of weight.     Exercise: limited mobility due to her knee. Started PT.    BMI Readings from Last 3 Encounters:   22 43.55 kg/m²   22 45.68 kg/m²   22 49.32 kg/m²     HLD:  Is currently taking Lipitor 40, ASA 81 daily.  Lab Results   Component Value Date    LDLCALC 71 2021     The 10-year ASCVD risk score (Owen FERRER Jr., et al., 2013) is: 10.8%    Values used to calculate the score:      Age: 56 years      Sex: Female      Is Non- : Yes      Diabetic: Yes      Tobacco smoker: No      Systolic Blood Pressure: 130 mmHg      Is BP treated: Yes      HDL Cholesterol: 44 mg/dL      Total Cholesterol: 133 mg/dL              Not addressed today.  Snoring:    STOP BAN.  +HTN.  +Obesity.  She saw sleep clinic, but declined PSG.  +HTN. She declined sleep study.      Non gap metabolic acidosis, CKD2:  BG was up to 400 with JIMMY to 1.6 during admission that resolved with IVF.  No hypokalemia. Urine pH 6. No ketones. No diarrhea.  Stopped metformin due to non gap metabolic acidosis.  Bicarb 15. Started NaHCO3 pills for possible RTA during admission  for hyperglycemia.  Following c Nephrology.  Risk factor reduction, working on DM control.    Vitamin D deficiency:  On ergocalciferol weekly.  Improved. Cont ergocalciferol weekly.  Lab Results   Component Value Date    EHQJXOVJ37CI 25 (L) 10/13/2021    GVWTCWCN42MB 24 (L) 01/15/2021    XBLPOGPV91KZ 12 (L) 2019      B12 deficiency, DOM:    Borderline microcytic anemia stable.  On B12.  H/o menorrhagia.  Now menopausal.  Stopped daily  iron. s/p bariatric surgery as above.              Improved. Continue B12.  Will need to monitor after bariatric sx.              Ferritin controlled.  Stopped daily iron; ok to d/c.    Lab Results   Component Value Date    IRON 53 10/13/2021    TIBC 259 10/13/2021    FERRITIN 59 10/13/2021     Lab Results   Component Value Date    ZHBVAQDK68 1670 (H) 10/13/2021     Lab Results   Component Value Date    FOLATE 11.7 10/13/2021     GERD:  Mid/upper chest burning.  Sometimes a little sour regurg.  No sour taste in her throat.  Noticed it after heavy food, coffee, ice cream.  No ETOH.  Taking peppermint, GasX.  Peppermint helped.  Latest meal at 7 PM.  Sometimes lies down after eating.  Has lost a significant amount of weight before.  Tries to take Mobic sparingly.  Discussed dietary changes, avoid recumbency after meals. Start PPI p.r.n.  If occurring more frequently, rec PPI qAM. and order H pylori test    Chronic back, b/l knee OA:    Fell off porch in 7/2019, resulting in chronic back pain.  Following with Ortho, PM&R.  No relief with Tylenol.  Has a knee sleeve.  On gabapentin, tramadol prescribed by PM&R.  She tries to avoid meds/Mobic.  Following with Ortho for bilateral knee injections with relief. Unable to receive injections unless A1c is less than 8.  Working on weight loss.  Improved with topical Voltaren, but persistent. Follow-up with Ortho, PM&R for injections, Bariatrics for weight loss.  Avoid NSAIDs due to mild CKD.     Review of Systems   Constitutional: Positive for fatigue. Negative for activity change.   Respiratory: Negative for wheezing.    Cardiovascular: Negative for leg swelling.   Gastrointestinal: Positive for constipation.   Musculoskeletal: Positive for arthralgias and back pain.   Psychiatric/Behavioral: Negative for confusion.       I personally reviewed Past Medical History, Past Surgical History, Social History, and Family History.    Objective:      Vitals:    08/02/22 1258 08/02/22  "1315   BP: (!) 140/72 (!) 130/90   Pulse: (!) 117    SpO2: 98%    Weight: 122.4 kg (269 lb 13.5 oz)    Height: 5' 6" (1.676 m)       Physical Exam  Constitutional:       General: She is not in acute distress.     Appearance: She is well-developed. She is not diaphoretic.   HENT:      Head: Normocephalic and atraumatic.   Eyes:      General: No scleral icterus.        Right eye: No discharge.         Left eye: No discharge.   Neck:      Thyroid: No thyromegaly.      Trachea: No tracheal deviation.   Cardiovascular:      Rate and Rhythm: Normal rate and regular rhythm.      Heart sounds: Normal heart sounds. No murmur heard.  Pulmonary:      Effort: Pulmonary effort is normal. No respiratory distress.      Breath sounds: Normal breath sounds. No wheezing.   Abdominal:      General: Bowel sounds are normal. There is no distension.      Palpations: Abdomen is soft.      Tenderness: There is no abdominal tenderness.   Musculoskeletal:         General: No deformity.      Cervical back: Neck supple.      Right lower leg: No edema.      Left lower leg: No edema.   Lymphadenopathy:      Cervical: No cervical adenopathy.   Skin:     General: Skin is warm and dry.      Findings: No erythema.   Neurological:      Mental Status: She is alert.      Gait: Gait abnormal (difficulty climbing steps d/t knee pain).   Psychiatric:         Behavior: Behavior normal.           Lab Results   Component Value Date    WBC 7.83 04/05/2022    HGB 11.8 (L) 04/05/2022    HCT 38.7 04/05/2022     04/05/2022    CHOL 133 05/06/2021    TRIG 89 05/06/2021    HDL 44 05/06/2021    ALT 27 04/05/2022    AST 18 04/05/2022     04/05/2022    K 4.3 04/05/2022     (H) 04/05/2022    CREATININE 1.0 04/05/2022    BUN 17 04/05/2022    CO2 21 (L) 04/05/2022    TSH 1.832 10/13/2021    INR 0.9 10/02/2015    HGBA1C 7.5 (H) 05/24/2022       The 10-year ASCVD risk score (Owenana paula FERRER Jr., et al., 2013) is: 10.8%    Values used to calculate the score:      " Age: 56 years      Sex: Female      Is Non- : Yes      Diabetic: Yes      Tobacco smoker: No      Systolic Blood Pressure: 130 mmHg      Is BP treated: Yes      HDL Cholesterol: 44 mg/dL      Total Cholesterol: 133 mg/dL    (Imaging have been independently reviewed)  Foot xray c OA. No fx.    Assessment:       1. Drug-induced constipation    2. Essential hypertension    3. Type 2 diabetes mellitus with hyperglycemia, with long-term current use of insulin    4. Morbid obesity with BMI of 50.0-59.9, adult    5. Mixed hyperlipidemia    6. Annual physical exam    7. Encounter for screening mammogram for malignant neoplasm of breast          Plan:       Yayo was seen today for constipation.    Diagnoses and all orders for this visit:    Drug-induced constipation  Comments:  Improved. Analgesics likely contributing. May take liquid docusate PRN.    Essential hypertension  Comments:  Borderline high. Pain contributing. Monitor home BP; msg c home BP in 1 wk. Cont Norvasc 10. Consider restarting Aldactone 25 d/t hirsutism.    Type 2 diabetes mellitus with hyperglycemia, with long-term current use of insulin  Comments:  A1C elevated, but improved. Off all DM meds s/p bariatric sx. A1C now, in 3mo. Encouraged BG log. Follows c OSH Nutrition. Reschedule foot exam  Orders:  -     Hemoglobin A1C; Future  -     Microalbumin/Creatinine Ratio, Urine; Future  -     Ambulatory referral/consult to Podiatry; Future  -     Hemoglobin A1C; Future  -     Microalbumin/Creatinine Ratio, Urine; Future    Morbid obesity with BMI of 50.0-59.9, adult  Comments:  Improved!  Following c Bariatrics, nutrition at [facility]. S/p bariatric sx. Cont dietary changes.     Mixed hyperlipidemia  Comments:  FLP controlled. Elevated ASCVD. Cont Lipitor 40,  ASA 81. Monitor FLP annually.  Orders:  -     Lipid Panel; Future    Annual physical exam  -     Hemoglobin A1C; Future  -     Microalbumin/Creatinine Ratio, Urine; Future  -      Mammo Digital Screening Bilat w/ Zachery; Future  -     Lipid Panel; Future    Encounter for screening mammogram for malignant neoplasm of breast  -     Mammo Digital Screening Bilat w/ Zachery; Future    Other orders  The following orders have not been finalized:  -     Cancel: insulin detemir U-100 (LEVEMIR FLEXTOUCH U-100 INSULN) 100 unit/mL (3 mL) InPn pen  -     Cancel: insulin aspart U-100 (NOVOLOG) 100 unit/mL (3 mL) InPn pen  -     Cancel: spironolactone (ALDACTONE) 25 MG tablet  -     Cancel: docusate sodium (COLACE) 100 MG capsule         Side effects of medication(s) were discussed in detail and patient voiced understanding.  Patient will call back for any issues or complications.     I have spent a total of 55 minutes with the patient as well as reviewing the chart/medical record and placing orders on the day of the visit. Discussed DM, HTN, weight, constipation.      RTC in 3 month(s) or sooner PRN for DM with labs prior or sooner depending on labs.

## 2022-08-03 ENCOUNTER — PATIENT MESSAGE (OUTPATIENT)
Dept: ORTHOPEDICS | Facility: CLINIC | Age: 57
End: 2022-08-03
Payer: MEDICAID

## 2022-08-03 ENCOUNTER — PATIENT MESSAGE (OUTPATIENT)
Dept: BARIATRICS | Facility: CLINIC | Age: 57
End: 2022-08-03
Payer: MEDICAID

## 2022-08-24 ENCOUNTER — PATIENT MESSAGE (OUTPATIENT)
Dept: ADMINISTRATIVE | Facility: HOSPITAL | Age: 57
End: 2022-08-24
Payer: MEDICAID

## 2022-09-08 ENCOUNTER — OFFICE VISIT (OUTPATIENT)
Dept: PODIATRY | Facility: CLINIC | Age: 57
End: 2022-09-08
Payer: MEDICAID

## 2022-09-08 VITALS — HEIGHT: 66 IN | WEIGHT: 269 LBS | BODY MASS INDEX: 43.23 KG/M2

## 2022-09-08 DIAGNOSIS — M20.41 HAMMER TOE OF RIGHT FOOT: ICD-10-CM

## 2022-09-08 DIAGNOSIS — M20.11 HALLUX VALGUS OF RIGHT FOOT: ICD-10-CM

## 2022-09-08 DIAGNOSIS — E11.65 TYPE 2 DIABETES MELLITUS WITH HYPERGLYCEMIA, WITH LONG-TERM CURRENT USE OF INSULIN: ICD-10-CM

## 2022-09-08 DIAGNOSIS — B35.1 ONYCHOMYCOSIS DUE TO DERMATOPHYTE: ICD-10-CM

## 2022-09-08 DIAGNOSIS — M79.671 FOOT PAIN, RIGHT: Primary | ICD-10-CM

## 2022-09-08 DIAGNOSIS — M24.574 JOINT CONTRACTURE OF FOOT, RIGHT: ICD-10-CM

## 2022-09-08 DIAGNOSIS — Z79.4 TYPE 2 DIABETES MELLITUS WITH HYPERGLYCEMIA, WITH LONG-TERM CURRENT USE OF INSULIN: ICD-10-CM

## 2022-09-08 PROCEDURE — 3066F NEPHROPATHY DOC TX: CPT | Mod: CPTII,,, | Performed by: PODIATRIST

## 2022-09-08 PROCEDURE — 1160F PR REVIEW ALL MEDS BY PRESCRIBER/CLIN PHARMACIST DOCUMENTED: ICD-10-PCS | Mod: CPTII,,, | Performed by: PODIATRIST

## 2022-09-08 PROCEDURE — 3008F PR BODY MASS INDEX (BMI) DOCUMENTED: ICD-10-PCS | Mod: CPTII,,, | Performed by: PODIATRIST

## 2022-09-08 PROCEDURE — 3061F PR NEG MICROALBUMINURIA RESULT DOCUMENTED/REVIEW: ICD-10-PCS | Mod: CPTII,,, | Performed by: PODIATRIST

## 2022-09-08 PROCEDURE — 99214 PR OFFICE/OUTPT VISIT, EST, LEVL IV, 30-39 MIN: ICD-10-PCS | Mod: S$PBB,,, | Performed by: PODIATRIST

## 2022-09-08 PROCEDURE — 3008F BODY MASS INDEX DOCD: CPT | Mod: CPTII,,, | Performed by: PODIATRIST

## 2022-09-08 PROCEDURE — 99999 PR PBB SHADOW E&M-EST. PATIENT-LVL IV: ICD-10-PCS | Mod: PBBFAC,,, | Performed by: PODIATRIST

## 2022-09-08 PROCEDURE — 1159F MED LIST DOCD IN RCRD: CPT | Mod: CPTII,,, | Performed by: PODIATRIST

## 2022-09-08 PROCEDURE — 99214 OFFICE O/P EST MOD 30 MIN: CPT | Mod: PBBFAC,PO | Performed by: PODIATRIST

## 2022-09-08 PROCEDURE — 3061F NEG MICROALBUMINURIA REV: CPT | Mod: CPTII,,, | Performed by: PODIATRIST

## 2022-09-08 PROCEDURE — 1159F PR MEDICATION LIST DOCUMENTED IN MEDICAL RECORD: ICD-10-PCS | Mod: CPTII,,, | Performed by: PODIATRIST

## 2022-09-08 PROCEDURE — 99999 PR PBB SHADOW E&M-EST. PATIENT-LVL IV: CPT | Mod: PBBFAC,,, | Performed by: PODIATRIST

## 2022-09-08 PROCEDURE — 3051F HG A1C>EQUAL 7.0%<8.0%: CPT | Mod: CPTII,,, | Performed by: PODIATRIST

## 2022-09-08 PROCEDURE — 99214 OFFICE O/P EST MOD 30 MIN: CPT | Mod: S$PBB,,, | Performed by: PODIATRIST

## 2022-09-08 PROCEDURE — 3066F PR DOCUMENTATION OF TREATMENT FOR NEPHROPATHY: ICD-10-PCS | Mod: CPTII,,, | Performed by: PODIATRIST

## 2022-09-08 PROCEDURE — 3051F PR MOST RECENT HEMOGLOBIN A1C LEVEL 7.0 - < 8.0%: ICD-10-PCS | Mod: CPTII,,, | Performed by: PODIATRIST

## 2022-09-08 PROCEDURE — 1160F RVW MEDS BY RX/DR IN RCRD: CPT | Mod: CPTII,,, | Performed by: PODIATRIST

## 2022-09-08 RX ORDER — TERBINAFINE HYDROCHLORIDE 250 MG/1
250 TABLET ORAL DAILY
Qty: 40 TABLET | Refills: 0 | Status: SHIPPED | OUTPATIENT
Start: 2022-09-08 | End: 2022-10-17 | Stop reason: SDUPTHER

## 2022-09-08 NOTE — PATIENT INSTRUCTIONS
Over the counter pain creams: Voltaren Gel, Biofreeze, Bengay, tiger balm, two old goat, lidocaine gel,  Absorbine Veterinary Liniment Gel Topical Analgesic Sore Muscle and Joint Pain Relief    Recommend lotions: eucerin, eucerin for diabetics, aquaphor, A&D ointment, gold bond for diabetics, sween, Rising Sun's Bees all purpose baby ointment,  urea 40 with aloe (found on amazon.com)  Shoe recommendations: (try 6pm.com, zappos.com , nordstromrack.[employer], or shoes.com for discounted prices) you can visit [employer]SW shoes in [place]  or [facility] in the [facility] (there are also several shoe brand outlets in the [facility])  Asics (GT 2000 or gel foundations), new balance stability type shoes (such as the 940 series), saucony (stabil c3),  Moralez (GTS or Beast or transcend), propet, HokaOne (tennis shoe)  Randi Willett (women) Brien&Israel (men), clarks, crocs, aerosoles, naturalizers, SAS, ecco, born, ludwin fields, rockports (dress shoes)  Vionic, burkenstocks, fitflops, propet (sandals)  HokaOne, crocs, propet (house shoes)    Nail Home remedy:  Vicks Vapor rub or Emuaid to nails for easier manageability    Diabetes: Inspecting Your Feet  Diabetes increases your chances of developing foot problems. So inspect your feet every day. This helps you find small skin irritations before they become serious infections. If you have trouble seeing the bottoms of your feet, use a mirror or ask a family member or friend to help.     Pressure spots on the bottom of the foot are common areas where problems develop.   How to check your feet  Below are tips to help you look for foot problems. Try to check your feet at the same time each day, such as when you get out of bed in the morning:  Check the top of each foot. The tops of toes, back of the heel, and outer edge of the foot can get a lot of rubbing from poor-fitting shoes.  Check the bottom of each foot. Daily wear and tear often leads to problems at pressure spots.  Check the toes and  nails. Fungal infections often occur between toes. Toenail problems can also be a sign of fungal infections or lead to breaks in the skin.  Check your shoes, too. Loose objects inside a shoe can injure the foot. Use your hand to feel inside your shoes for things like terri, loose stitching, or rough areas that could irritate your skin.  Warning signs  Look for any color changes in the foot. Redness with streaks can signal a severe infection, which needs immediate medical attention. Tell your doctor right away if you have any of these problems:  Swelling, sometimes with color changes, may be a sign of poor blood flow or infection. Symptoms include tenderness and an increase in the size of your foot.  Warm or hot areas on your feet may be signs of infection. A foot that is cold may not be getting enough blood.  Sensations such as burning, tingling, or pins and needles can be signs of a problem. Also check for areas that may be numb.  Hot spots are caused by friction or pressure. Look for hot spots in areas that get a lot of rubbing. Hot spots can turn into blisters, calluses, or sores.  Cracks and sores are caused by dry or irritated skin. They are a sign that the skin is breaking down, which can lead to infection.  Toenail problems to watch for include nails growing into the skin (ingrown toenail) and causing redness or pain. Thick, yellow, or discolored nails can signal a fungal infection.  Drainage and odor can develop from untreated sores and ulcers. Call your doctor right away if you notice white or yellow drainage, bleeding, or unpleasant odor.   © 1655-4321 [employer]. [address] [place] [address]. All rights reserved. This information is not intended as a substitute for professional medical care. Always follow your healthcare professional's instructions.        Step-by-Step:  Inspecting Your Feet (Diabetes)    Date Last Reviewed: 10/1/2016  © 6084-0105 [employer]  [address] [place] [address]. All rights reserved. This information is not intended as a substitute for professional medical care. Always follow your healthcare professional's instructions.    Athletes Foot     Athletes Foot is caused by a fungal infection in the skin. It affects the skin between the toes where it causes fissures (cracks in the skin). It can also affect the bottom of the foot where it causes dry white scales and peeling of the skin. This infection is more likely to occur when the foot is in hot, sweaty socks and shoes for long periods of time.   This infection is treated with skin creams or oral medicine.     Home Care:   It is important to keep the feet dry. Use absorbent cotton socks and change them if they become sweaty; or wear an open-toe shoe or sandal. Wash the feet at least once a day with soap and water.   Rotate your shoes. If you must wear the same shoes everyday then spray the shoes with lysol or antifungal spray and allow that to dry overnight before wearing the shoes again  Apply the antifungal cream as prescribed. Some antifungal creams are available without a prescription (Lotrimin, Tinactin).   It may take a week before the rash starts to improve and it can take about three to four weeks to completely clear. Continue the medicine until the rash is all gone.   Use over-the-counter antifungal powders or sprays on your feet after exposure to high-risk environments (public showers, gyms and locker rooms) may prevent future infections. You may wish to use appropriate footwear to reduce exposure.  Clean tubs and bathroom floor with bleach  Clean feet with Nizoral shampoo or dial antibacterial soap and then dry completely.    Follow Up   with your doctor as recommended by our staff if the rash is not starting to improve after TEN days of treatment, or if the rash continues to spread.     Get Prompt Medical Attention   if any of the following occur:   Increasing redness or  swelling of the foot   Pus draining from cracks in the skin   Fever of 100.4ºF (38ºC) or higher, or as directed by your healthcare provider    © 3887-6186 [employer] Evangel[employer], [address] [place] [address], [place] [address]. All rights reserved. This information is not intended as a substitute for professional medical care. Always follow your healthcare professional's instructions.               Understanding Thickened Nails    There are several causes of very thick or crumbling nails. They can be caused by injuries or pressure from shoes. Fungal infections are a common cause. Diabetes, psoriasis, or vascular disease are other possible causes.  Symptoms  Along with thickening, the nail may appear ridged, brittle, or yellowish. It may also feel painful when pressure is put on it. After a while, a thickened nail may loosen and fall off.  Evaluation  Because thickened nails may be a symptom of a medical condition, your healthcare provider will look at your medical history. A test may be done to check for fungal infection. The thickness and color of the nail are examined carefully. This helps determine the cause.  Treatment  For infection: Oral or topical antifungal medicines may be used to treat infection. These can help prevent sores under the nail. They also keep the fungus from spreading to other nails.  For thick nails not caused by infection: Thinning the nail may be an option. This can be done by trimming, filing, or grinding.  For pain: If the nail causes pain, part or all of the nail can be removed with surgery. Never try to remove a nail by yourself.  Prevention  Many nail problems can be prevented by wearing the right shoes and trimming your nails properly. Keep your feet clean and dry to help avoid infection. If you have diabetes, talk with your healthcare provider before doing any foot self-care.  The right shoes: Get your feet measured. Your size may change as you age. This is due to ligaments that loosen with age  and allow the bones in your foot to change position or spread. Wear shoes that are supportive and roomy enough for your toes to wiggle. Look for shoes made of natural materials, such as leather, which allow your feet to breathe.  Proper trimming: To avoid problems, trim your toenails straight across. Then file the edges with a nail file. If you cant trim your own nails, ask your healthcare provider to do so for you.      Wearing Proper Shoes                    You walk on your feet every day, forcing them to support the weight of your body. Repeated stress on your feet can cause damage over time. The right shoes can help protect your feet. The wrong shoes can cause more foot problems. Read the information below to help you find a shoe that fits your foot needs.     A good shoe fit will cover your foot outline.       A shoe that doesnt cover the outline is a bad fit.      Whats Your Foot Shape?  To get a good fit, you need to know the shape of your foot. Do this simple test: While standing, place your foot on a piece of paper and trace around it. Is your foot straight or curved? Do you have a foot problem, such as a bunion, that causes your foot outline to show a bulge on the side of your big toe?  Finding Your Fit  Bring your foot outline to the shore store to help you find the right shoe. Place a shoe you like on top of the outline to see if it matches the shape. The shoe should cover the outline. (If you have a bunion, the shoe may not cover the bulge on the outline. Look for soft leather shoes to stretch over the bunion.) Once youve found a pair of proper shoes, put them on. Walk around. Be sure the shoes dont rub or pinch. If the shoes feel good, youve found your fit!  The Right Shoe for You  A good shoe has features that provide comfort and support. It must also be the right size and shape for your feet. Look for a shoe made of breathable fabric and lining, such as leather or canvas. Be sure that shoes  have enough tread to prevent slipping. Go to a good shoe store for help finding the right shoe.  Good Shoe Features  An ideal shoe has the following:  Laces for support. If tying laces is a problem for you, try shoes with Velcro fasteners or darin.  A front of the shoe (toe box) with ½ inch space in front of your longest toes.  An arch shape that supports your foot.  No more than 1½ inches of heel.  A stiff, snug back of the shoe to keep your foot from sliding around.  A smooth lining with no rough seams.  Shoe Shopping Tips  Below are some dos and donts for when you go to the shoe store.  Do:  Select the shoes that feel right. Wear them around the house. Then bring them to your foot doctor to check for fit. If they dont fit well, return them.  Shop late in the day, when your feet will be slightly bigger.  Each time you buy shoes, have both your feet measured while you are standing. Foot size changes with time.  Pick shoes to suit their purpose. High heels are okay for an occasional night on the town. But for everyday wear, choose a more sensible shoe.  Try on shoes while wearing any inserts specially made for your feet (orthoses).  Try on both the right and left shoes. If your feet are different sizes, pick a pair that fits the larger foot.  Dont:  Dont buy shoes based on shoe size alone. Always try on shoes, as sizes differ from brand to brand and within brands.  Dont expect shoes to break in. If they dont fit at the store, dont buy them.  Dont buy a shoe that doesnt match your foot shape.  What About Socks?  Always wear socks with shoes. Socks help absorb sweat and reduce friction and blistering. When shopping for shoes, choose soft, padded socks with seams that dont irritate your feet.  If You Have Foot Problems  Some foot problems cause deformities. This can make it hard to find a good fit. Look for shoes made of soft leather to stretch over the deformity. If you have bunions, buy shoes with a  wider toe box. To fit hammertoes, look for shoes with a tall toe box. If you have arch problems, you may need inserts. In some cases, youll need to have custom footwear or orthoses made for your feet.  Suggested Footwear  Ask your healthcare provider what kind of footwear you need. He or she may recommend a certain brand or shoe store.  © 3571-5275 [employer]. [address] [place] [address]. All rights reserved. This information is not intended as a substitute for professional medical care. Always follow your healthcare professional's instructions.        Toe Extension (Flexibility)    These instructions are for your right foot. Switch sides for your left foot.  Sit in a chair. Rest your right ankle on your left knee.  Hold your toes with your right hand. Gently bend the toes backward. Feel a stretch in the undersides of the toes and ball of the foot. Hold for 30 to 60 seconds.  Then gently bend the toes in the other direction. Gently press on them until your foot is pointed. Hold for 30 to 60 seconds.  Repeat 5 times, or as instructed.  © 3632-8089 [employer]. [address] [place] [address]. All rights reserved. This information is not intended as a substitute for professional medical care. Always follow your healthcare professional's instructions.

## 2022-09-08 NOTE — PROGRESS NOTES
Subjective:      Patient ID: Yayo Carver is a 56 y.o. female.    Chief Complaint: Bunions (Right Foot)      Yayo Carver is a 56 y.o. female who presents to the podiatry clinic  with complaint of right foot bunion deformity and nail changes. Mildly painful, unsightly, deforming shoes. Onset of the symptoms was several weeks ago. Precipitating event: none known.  History of injury: no Current symptoms include: ability to bear weight, but with some pain, stiffness, and worsening symptoms after a period of activity. Alleviating factors: rest and certain shoes Symptoms have progressed to a point and plateaued. Treatment to date:  change in shoes . Patients rates pain 8/10 on pain scale. Patient has bilateral knee pain with plans for surgeries in the future    Shoe gear:sea camacho      Patient Active Problem List   Diagnosis    Morbid obesity with BMI of 50.0-59.9, adult    Osteoarthritis of both knees    DOM (iron deficiency anemia)    GERD (gastroesophageal reflux disease)    Essential hypertension    Mixed hyperlipidemia    Vitamin D deficiency    Type 2 diabetes mellitus with hyperglycemia, with long-term current use of insulin    Cyanocobalamin deficiency    Morbid obesity    Muscle cramps    Metabolic acidosis    Hyperglycemia    Gait abnormality    Decreased strength of lower extremity       Current Outpatient Medications on File Prior to Visit   Medication Sig Dispense Refill    acetaminophen (TYLENOL) 100 mg/mL suspension Take by mouth every 4 (four) hours as needed for Temperature greater than.      amLODIPine (NORVASC) 10 MG tablet Take 1 tablet (10 mg total) by mouth once daily. 90 tablet 3    atorvastatin (LIPITOR) 40 MG tablet Take 1 tablet (40 mg total) by mouth once daily. 90 tablet 3    b complex vitamins capsule Take 1 capsule by mouth once daily.      diclofenac sodium (VOLTAREN) 1 % Gel Apply 2 g topically 4 (four) times daily as needed (knee pain). 300 g 3    ergocalciferol (ERGOCALCIFEROL)  50,000 unit Cap Take 1 capsule (50,000 Units total) by mouth every 7 days. 12 capsule 3    flash glucose scanning reader (FREESTYLE HEENA 14 DAY READER) Misc Check blood glucose before meals and nightly. 1 each 0    flash glucose sensor (FREESTYLE HEENA 14 DAY SENSOR) Kit Apply to skin for 14 days.  Check blood glucose before meals and nightly. 1 kit 11    HYDROcodone-acetaminophen (NORCO) 7.5-325 mg per tablet Take 1 tablet by mouth every 6 (six) hours as needed for Pain. 28 tablet 0    multivitamin (THERAGRAN) per tablet Take 1 tablet by mouth once daily.      pantoprazole (PROTONIX) 40 MG tablet Take 1 tablet (40 mg total) by mouth daily as needed (acid reflux). 30 tablet 3    sodium bicarbonate 650 MG tablet Take 1 tablet (650 mg total) by mouth 2 (two) times daily. 60 tablet 2    traMADoL (ULTRAM) 50 mg tablet Take 1-2 tablets ( mg total) by mouth 3 (three) times daily as needed for Pain. 180 tablet 3    aspirin (ECOTRIN) 81 MG EC tablet Take 1 tablet (81 mg total) by mouth once daily. 90 tablet 3    gabapentin (NEURONTIN) 300 MG capsule Take 1 capsule (300 mg total) by mouth every evening. In 1 wk, if no relief, increase to twice daily.In another wk, may increase to 3 times. (Patient not taking: Reported on 2022) 90 capsule 2     No current facility-administered medications on file prior to visit.       Review of patient's allergies indicates:   Allergen Reactions    Ace inhibitors Edema and Swelling       Past Surgical History:   Procedure Laterality Date    BARIATRIC SURGERY  2022    Gastric sleeve     SECTION  1989    COLONOSCOPY N/A 2017    Procedure: COLONOSCOPY;  Surgeon: Evelin Arceo MD;  Location: [facility] ([address] [place] [address]);  Service: Endoscopy;  Laterality: N/A;  2 nd floor d/t BMI > 50 kg/m3    TONSILLECTOMY      TUBAL LIGATION         Family History   Problem Relation Age of Onset    Breast cancer Mother 65    Diabetes Mother     Colon polyps Mother     Hypertension  Mother     Diabetes Father     Breast cancer Sister 56    Cancer Sister     No Known Problems Brother     Diabetes Paternal Uncle     Breast cancer Maternal Grandmother     Colon cancer Maternal Grandfather     Diabetes Paternal Grandmother     Heart disease Paternal Grandmother     No Known Problems Paternal Grandfather     Breast cancer Maternal Aunt     Arthritis Brother     Esophageal cancer Neg Hx     Irritable bowel syndrome Neg Hx     Rectal cancer Neg Hx     Stomach cancer Neg Hx     Ulcerative colitis Neg Hx     Celiac disease Neg Hx     Crohn's disease Neg Hx     Amblyopia Neg Hx     Blindness Neg Hx     Cataracts Neg Hx     Glaucoma Neg Hx     Macular degeneration Neg Hx     Retinal detachment Neg Hx     Strabismus Neg Hx     Stroke Neg Hx     Thyroid disease Neg Hx        Social History     Socioeconomic History    Marital status:    Occupational History    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  Has a college-age son who comes and goes.  Has 3 other adult children.       Social Determinants of Health     Financial Resource Strain: Low Risk     Difficulty of Paying Living Expenses: Not very hard   Food Insecurity: No Food Insecurity    Worried About Running Out of Food in the Last Year: Never true    Ran Out of Food in the Last Year: Never true   Transportation Needs: Unmet Transportation Needs    Lack of Transportation (Medical): Yes    Lack of Transportation (Non-Medical): No   Physical Activity: Insufficiently Active    Days of  "Exercise per Week: 3 days    Minutes of Exercise per Session: 30 min   Stress: No Stress Concern Present    Feeling of Stress : Not at all   Social Connections: Unknown    Frequency of Communication with Friends and Family: More than three times a week    Frequency of Social Gatherings with Friends and Family: More than three times a week    Active Member of Clubs or Organizations: No    Attends Club or Organization Meetings: 1 to 4 times per year    Marital Status: Never    Housing Stability: Low Risk     Unable to Pay for Housing in the Last Year: No    Number of Places Lived in the Last Year: 1    Unstable Housing in the Last Year: No       Review of Systems   Constitutional: Negative for chills and fever.   Cardiovascular:  Negative for claudication and leg swelling.   Respiratory:  Negative for cough and shortness of breath.    Skin:  Positive for dry skin and nail changes. Negative for itching and rash.   Musculoskeletal:  Positive for joint pain, myalgias and stiffness. Negative for falls, joint swelling and muscle weakness.   Gastrointestinal:  Negative for diarrhea, nausea and vomiting.   Neurological:  Negative for numbness, paresthesias, tremors and weakness.   Psychiatric/Behavioral:  Negative for altered mental status and hallucinations.          Objective:      Vitals:    09/08/22 1031   Weight: 122 kg (269 lb)   Height: 5' 6" (1.676 m)   PainSc:   8   PainLoc: Foot       Physical Exam  Vitals and nursing note reviewed.   Constitutional:       General: She is not in acute distress.     Appearance: She is well-developed. She is not toxic-appearing or diaphoretic.      Comments: alert and oriented x 3.    Cardiovascular:      Pulses:           Dorsalis pedis pulses are 2+ on the right side and 2+ on the left side.        Posterior tibial pulses are 2+ on the right side and 2+ on the left side.      Comments:  Capillary refill time is within normal limits. Digital hair present.   Pulmonary:      " Effort: No respiratory distress.   Musculoskeletal:         General: No deformity.      Right ankle: No swelling. No tenderness. No lateral malleolus, medial malleolus, AITF ligament, CF ligament or posterior TF ligament tenderness. Decreased range of motion.      Right Achilles Tendon: No defects. Morgan's test negative.      Left ankle: No swelling. No tenderness. No lateral malleolus, medial malleolus, AITF ligament, CF ligament or posterior TF ligament tenderness. Decreased range of motion.      Left Achilles Tendon: No defects. Morgan's test negative.      Right foot: No tenderness or bony tenderness.      Left foot: No tenderness or bony tenderness.      Comments: There is equinus deformity bilateral with decreased dorsiflexion at the ankle joint bilateral. No tenderness with compression of heel. Negative tinels sign.     Gait analysis reveals excessive pronation through midstance and propulsion with early heel off. Shoes reveals lateral heel counter wear bilateral     Decreased first MPJ range of motion both weightbearing and nonweightbearing, + crepitus observed the first MP joint, + dorsal flag sign.  Moderate- severe bunion deformity is observed to right foot .    Patient has hammertoes of digits 2-5 bilateral partially reducible      Feet:      Right foot:      Protective Sensation: 5 sites tested.  5 sites sensed.      Skin integrity: Skin integrity normal.      Left foot:      Protective Sensation: 5 sites tested.  5 sites sensed.      Skin integrity: Skin integrity normal.   Lymphadenopathy:      Comments: No lymphatic streaking     Skin:     General: Skin is warm and dry.      Coloration: Skin is not pale.      Findings: No bruising, burn, laceration, lesion or rash.      Nails: There is no clubbing.      Comments: Right hallux toenail thickened by 2-3 mm, discolored/yellowed, dystrophic, brittle with subungual debris. Tender to distal nail plate pressure, without periungual skin abnormality of  each.     Neurological:      Sensory: No sensory deficit.      Motor: No tremor, atrophy or abnormal muscle tone.      Deep Tendon Reflexes: Reflexes are normal and symmetric.      Comments: Light touch present     Psychiatric:         Attention and Perception: She is attentive.         Mood and Affect: Mood is not anxious. Affect is not inappropriate.         Speech: She is communicative. Speech is not slurred.         Behavior: Behavior is not combative.             Hemoglobin A1C   Date Value Ref Range Status   05/24/2022 7.5 (H) 4.7 - 5.6 % Final   04/05/2022 7.7 (H) 4.0 - 5.6 % Final     Comment:     ADA Screening Guidelines:  5.7-6.4%  Consistent with prediabetes  >or=6.5%  Consistent with diabetes    High levels of fetal hemoglobin interfere with the HbA1C  assay. Heterozygous hemoglobin variants (HbS, HgC, etc)do  not significantly interfere with this assay.   However, presence of multiple variants may affect accuracy.     12/08/2021 10.1 (H) 4.0 - 5.6 % Final     Comment:     ADA Screening Guidelines:  5.7-6.4%  Consistent with prediabetes  >or=6.5%  Consistent with diabetes    High levels of fetal hemoglobin interfere with the HbA1C  assay. Heterozygous hemoglobin variants (HbS, HgC, etc)do  not significantly interfere with this assay.   However, presence of multiple variants may affect accuracy.     10/12/2021 >14.0 (H) 4.0 - 5.6 % Final     Comment:     ADA Screening Guidelines:  5.7-6.4%  Consistent with prediabetes  >or=6.5%  Consistent with diabetes    High levels of fetal hemoglobin interfere with the HbA1C  assay. Heterozygous hemoglobin variants (HbS, HgC, etc)do  not significantly interfere with this assay.   However, presence of multiple variants may affect accuracy.         Assessment:       Encounter Diagnoses   Name Primary?    Type 2 diabetes mellitus with hyperglycemia, with long-term current use of insulin     Foot pain, right Yes    Hallux valgus of right foot     Hammer toe of right foot      Joint contracture of foot, right     Onychomycosis due to dermatophyte          Plan:     Problem List Items Addressed This Visit       Type 2 diabetes mellitus with hyperglycemia, with long-term current use of insulin     Other Visit Diagnoses       Foot pain, right    -  Primary    Hallux valgus of right foot        Hammer toe of right foot        Joint contracture of foot, right        Onychomycosis due to dermatophyte        Relevant Orders    HEPATIC FUNCTION PANEL           I counseled the patient on her conditions, their implications and medical management.    Orders Placed This Encounter    HEPATIC FUNCTION PANEL    terbinafine HCL (LAMISIL) 250 mg tablet       Greater than 50% of this 52 min  visit spent on counseling and coordination of care.    Shoe inspection. Diabetic Foot Education. Patient reminded of the importance of good nutrition/healthy diet/weight management and blood sugar control to help prevent podiatric complications of diabetes. Patient instructed on proper foot hygeine. Wear comfortable, proper fitting shoes. Wash feet daily. Dry well. After drying, apply moisturizer to feet (no lotion to webspaces). Inspect feet daily for skin breaks, blisters, swelling, or redness. Wear cotton socks (preferably white)  Change socks every day. Do NOT walk barefoot. Do NOT use heating pads or hot water soaks. We discussed wearing proper shoe gear, daily foot inspections, never walking without protective shoe gear.     Based on chart review this patient does not qualify for nail care (Patients who qualify for nail care are usually as follows: diabetic with neurological manifestations, PVD, pernicious anemia, or CKD with appropriate modifiers that indicate high amputation risk).     We discussed using Lamisil for onychomycosis. This drug offers a fairly high but not universal cure rate. A 12 week treatment course is recommended. The patient is aware that rare cases of liver injury have been reported; and  agrees to come in for liver function tests prior to rx and again at ~5 weeks of treatment. The symptoms of liver disease have been discussed; call if such occurs. In addition, some insurance plans do not cover the expense of this drug for treating a cosmetic condition, and the patient understands they may have to pay for the medication. Other side effects, such as headaches and rashes, have also been discussed.    Instructed patient on the importance of keeping feet dry. Patient instructed to use absorbent cotton socks and change them if they become sweaty; or wear an open-toe shoe or sandal. Wash the feet at least once a day with soap and water. Patient instructed to use lysol or over-the-counter antifungal powders or sprays to shoes daily and allow them to air dry, switching shoes from every other day would be optimal. Patient is to avoid barefoot walking in high-risk environments (public showers, gyms and locker rooms) may prevent future infections.     Patient to RTC if:  Increasing redness or swelling of the foot   Pus draining from cracks in the skin   Fever of 100.4ºF (38ºC) or higher    Discussed continued conservative care such as shoe gear modifications, orthotics and activity modifications vs surgical intervention consisting of bunionectomy with possible osteotomy or fusion proximally in detail outlining associated risks and benefits of each. Reviewed foot type appropriate shoe gear.  Patient will follow with my colleague for elective surgical consult following her knee surgeries

## 2022-09-09 RX ORDER — SPIRONOLACTONE 25 MG/1
25 TABLET ORAL DAILY
Qty: 30 TABLET | Refills: 11 | Status: SHIPPED | OUTPATIENT
Start: 2022-09-09 | End: 2023-03-30

## 2022-09-09 NOTE — TELEPHONE ENCOUNTER
Spoke with pt, she states her bp last she checked was 152/72. She does not have any other readings.

## 2022-09-09 NOTE — TELEPHONE ENCOUNTER
I noticed that her last blood pressure number in our records was high.  Please ask the patient what her home blood pressure numbers have been.  Please add it in the remote BP vitals and let me know. Thanks!

## 2022-09-09 NOTE — TELEPHONE ENCOUNTER
Her BP is still high.  Please call with med changes.  Thanks!      Continue taking her current meds, but add:  -Spironolactone/Aldactone once a day (this is helpful for blood pressure and unwanted body hair).  I recommend taking this in the morning in case it causes her to urinate in the evening.    Please keep checking your home blood pressures about once a day, and we'll send you a message in 1-2 weeks to get an idea of your home blood pressure numbers in case further medication adjustments are needed.

## 2022-09-10 NOTE — TELEPHONE ENCOUNTER
Attempted to return call to Laurie, but number is busy and unable to leave voice message.    Opt out

## 2022-09-12 DIAGNOSIS — M25.562 LEFT KNEE PAIN, UNSPECIFIED CHRONICITY: Primary | ICD-10-CM

## 2022-09-13 ENCOUNTER — HOSPITAL ENCOUNTER (OUTPATIENT)
Dept: RADIOLOGY | Facility: HOSPITAL | Age: 57
Discharge: HOME | End: 2022-09-13
Attending: ORTHOPAEDIC SURGERY
Payer: MEDICAID

## 2022-09-13 ENCOUNTER — OFFICE VISIT (OUTPATIENT)
Dept: ORTHOPEDICS | Facility: CLINIC | Age: 57
End: 2022-09-13
Payer: MEDICAID

## 2022-09-13 VITALS — HEIGHT: 66 IN | WEIGHT: 271.5 LBS | BODY MASS INDEX: 43.63 KG/M2

## 2022-09-13 DIAGNOSIS — M25.562 LEFT KNEE PAIN, UNSPECIFIED CHRONICITY: ICD-10-CM

## 2022-09-13 DIAGNOSIS — M17.0 PRIMARY OSTEOARTHRITIS OF BOTH KNEES: Primary | ICD-10-CM

## 2022-09-13 PROCEDURE — 99213 PR OFFICE/OUTPT VISIT, EST, LEVL III, 20-29 MIN: ICD-10-PCS | Mod: S$PBB,,, | Performed by: ORTHOPAEDIC SURGERY

## 2022-09-13 PROCEDURE — 3061F NEG MICROALBUMINURIA REV: CPT | Mod: CPTII,,, | Performed by: ORTHOPAEDIC SURGERY

## 2022-09-13 PROCEDURE — 3061F PR NEG MICROALBUMINURIA RESULT DOCUMENTED/REVIEW: ICD-10-PCS | Mod: CPTII,,, | Performed by: ORTHOPAEDIC SURGERY

## 2022-09-13 PROCEDURE — 3051F PR MOST RECENT HEMOGLOBIN A1C LEVEL 7.0 - < 8.0%: ICD-10-PCS | Mod: CPTII,,, | Performed by: ORTHOPAEDIC SURGERY

## 2022-09-13 PROCEDURE — 3008F PR BODY MASS INDEX (BMI) DOCUMENTED: ICD-10-PCS | Mod: CPTII,,, | Performed by: ORTHOPAEDIC SURGERY

## 2022-09-13 PROCEDURE — 99999 PR PBB SHADOW E&M-EST. PATIENT-LVL IV: ICD-10-PCS | Mod: PBBFAC,,, | Performed by: ORTHOPAEDIC SURGERY

## 2022-09-13 PROCEDURE — 1160F RVW MEDS BY RX/DR IN RCRD: CPT | Mod: CPTII,,, | Performed by: ORTHOPAEDIC SURGERY

## 2022-09-13 PROCEDURE — 73562 XR KNEE ORTHO LEFT WITH FLEXION: ICD-10-PCS | Mod: 26,RT,, | Performed by: RADIOLOGY

## 2022-09-13 PROCEDURE — 99999 PR PBB SHADOW E&M-EST. PATIENT-LVL IV: CPT | Mod: PBBFAC,,, | Performed by: ORTHOPAEDIC SURGERY

## 2022-09-13 PROCEDURE — 3066F NEPHROPATHY DOC TX: CPT | Mod: CPTII,,, | Performed by: ORTHOPAEDIC SURGERY

## 2022-09-13 PROCEDURE — 73562 X-RAY EXAM OF KNEE 3: CPT | Mod: TC,RT

## 2022-09-13 PROCEDURE — 1160F PR REVIEW ALL MEDS BY PRESCRIBER/CLIN PHARMACIST DOCUMENTED: ICD-10-PCS | Mod: CPTII,,, | Performed by: ORTHOPAEDIC SURGERY

## 2022-09-13 PROCEDURE — 73564 XR KNEE ORTHO LEFT WITH FLEXION: ICD-10-PCS | Mod: 26,LT,, | Performed by: RADIOLOGY

## 2022-09-13 PROCEDURE — 1159F MED LIST DOCD IN RCRD: CPT | Mod: CPTII,,, | Performed by: ORTHOPAEDIC SURGERY

## 2022-09-13 PROCEDURE — 3051F HG A1C>EQUAL 7.0%<8.0%: CPT | Mod: CPTII,,, | Performed by: ORTHOPAEDIC SURGERY

## 2022-09-13 PROCEDURE — 99213 OFFICE O/P EST LOW 20 MIN: CPT | Mod: S$PBB,,, | Performed by: ORTHOPAEDIC SURGERY

## 2022-09-13 PROCEDURE — 3066F PR DOCUMENTATION OF TREATMENT FOR NEPHROPATHY: ICD-10-PCS | Mod: CPTII,,, | Performed by: ORTHOPAEDIC SURGERY

## 2022-09-13 PROCEDURE — 73562 X-RAY EXAM OF KNEE 3: CPT | Mod: 26,RT,, | Performed by: RADIOLOGY

## 2022-09-13 PROCEDURE — 1159F PR MEDICATION LIST DOCUMENTED IN MEDICAL RECORD: ICD-10-PCS | Mod: CPTII,,, | Performed by: ORTHOPAEDIC SURGERY

## 2022-09-13 PROCEDURE — 73564 X-RAY EXAM KNEE 4 OR MORE: CPT | Mod: 26,LT,, | Performed by: RADIOLOGY

## 2022-09-13 PROCEDURE — 3008F BODY MASS INDEX DOCD: CPT | Mod: CPTII,,, | Performed by: ORTHOPAEDIC SURGERY

## 2022-09-13 PROCEDURE — 99214 OFFICE O/P EST MOD 30 MIN: CPT | Mod: PBBFAC | Performed by: ORTHOPAEDIC SURGERY

## 2022-09-16 ENCOUNTER — TELEPHONE (OUTPATIENT)
Dept: ORTHOPEDICS | Facility: CLINIC | Age: 57
End: 2022-09-16
Payer: MEDICAID

## 2022-09-16 DIAGNOSIS — M25.561 RIGHT KNEE PAIN, UNSPECIFIED CHRONICITY: Primary | ICD-10-CM

## 2022-09-16 NOTE — TELEPHONE ENCOUNTER
I called and spoke to the patient regarding the message below. The patient stated that she is having trouble with both knees but more so her left. I informed the patient that we only have an xray of her left knee and that we will have to get an xray of her right knee during her visit. The patient verbalized understanding and has no further questions.     ----- Message from Onur Aguayo sent at 9/16/2022  9:30 AM CDT -----  Regarding: Clinic Patient  Nia Hameed patient is scheduled for an appointment on 9/23. Can you please call to find out which body part they are coming in for?    Thank you!    Sincerely,   Kushal Aguayo MS, OTC  OR & Clinical Assistant to Dr. Jc Padgett III  Phone: [phone]  Fax: [phone]

## 2022-09-16 NOTE — PROGRESS NOTES
Subjective:      Patient ID: Yayo Carver is a 56 y.o. female.    Chief Complaint:   Pain of the Left Knee    HPI  She comes in for chronic left knee osteoarthritis pain.  She has had a series of injections, which are no longer effective.  She has severe daily pain in the left knee and night pain interfering with sleep.  No fall, accident, injury, history of pertinent surgery.  She is interested in knee replacement.  She had weight loss surgery last month. She has already lost approx 30 lbs. She has been getting cortisone injections and working with pain management until she is ready and approved to proceed with knee replacement. She wants to return to work soon and she works on her feet at the [employer]. She is ambulating without assistive device. There is a limp and some balance issues due to pain.      Objective:        Ortho/SPM Exam    There is significant valgus deformity, which is partially passively correctable.  Active range of motion is 5-100 degrees.  Anterior crepitus with active extension.  Patellar mobility is limited.  Boggy synovitis without effusion.  Lateral joint line tenderness predominates.  No instability to varus/valgus/Lachman's stressing.  No pain in the groin with flexion and internal rotation of the hip which is not limited.  Skin intact.  Distal neurovascular intact.  Flip test negative.        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  There is also advanced tricompartmental gonarthrosis on the right, with bone-on-bone medially.  Assessment:   End-stage DJD, knees      Plan:   She wants to proceed with knee replacement, but she does not want to wait until my next available slot.  She will see Dr. Padgett to consider earlier surgery.    The patient's pathophysiology was explained in detail with reference to x-rays, models, other visual aids as appropriate.  Treatment options were discussed in detail.  Questions were invited and answered  to the patient's satisfaction.      Jonathon Shrestha MD  Orthopedic Surgery

## 2022-09-23 ENCOUNTER — OFFICE VISIT (OUTPATIENT)
Dept: ORTHOPEDICS | Facility: CLINIC | Age: 57
End: 2022-09-23
Payer: MEDICAID

## 2022-09-23 ENCOUNTER — HOSPITAL ENCOUNTER (OUTPATIENT)
Dept: RADIOLOGY | Facility: HOSPITAL | Age: 57
Discharge: HOME | End: 2022-09-23
Attending: ORTHOPAEDIC SURGERY
Payer: MEDICAID

## 2022-09-23 VITALS — HEIGHT: 66 IN | WEIGHT: 271 LBS | BODY MASS INDEX: 43.55 KG/M2

## 2022-09-23 DIAGNOSIS — M17.0 PRIMARY OSTEOARTHRITIS OF BOTH KNEES: ICD-10-CM

## 2022-09-23 DIAGNOSIS — M17.12 PRIMARY OSTEOARTHRITIS OF LEFT KNEE: Primary | ICD-10-CM

## 2022-09-23 DIAGNOSIS — M25.561 RIGHT KNEE PAIN, UNSPECIFIED CHRONICITY: ICD-10-CM

## 2022-09-23 PROCEDURE — 99999 PR PBB SHADOW E&M-EST. PATIENT-LVL III: CPT | Mod: PBBFAC,,, | Performed by: ORTHOPAEDIC SURGERY

## 2022-09-23 PROCEDURE — 73560 XR KNEE 1 OR 2 VIEW RIGHT: ICD-10-PCS | Mod: 26,RT,, | Performed by: RADIOLOGY

## 2022-09-23 PROCEDURE — 1159F PR MEDICATION LIST DOCUMENTED IN MEDICAL RECORD: ICD-10-PCS | Mod: CPTII,,, | Performed by: ORTHOPAEDIC SURGERY

## 2022-09-23 PROCEDURE — 3008F PR BODY MASS INDEX (BMI) DOCUMENTED: ICD-10-PCS | Mod: CPTII,,, | Performed by: ORTHOPAEDIC SURGERY

## 2022-09-23 PROCEDURE — 3051F HG A1C>EQUAL 7.0%<8.0%: CPT | Mod: CPTII,,, | Performed by: ORTHOPAEDIC SURGERY

## 2022-09-23 PROCEDURE — 99213 OFFICE O/P EST LOW 20 MIN: CPT | Mod: PBBFAC | Performed by: ORTHOPAEDIC SURGERY

## 2022-09-23 PROCEDURE — 99215 PR OFFICE/OUTPT VISIT, EST, LEVL V, 40-54 MIN: ICD-10-PCS | Mod: S$PBB,,, | Performed by: ORTHOPAEDIC SURGERY

## 2022-09-23 PROCEDURE — 3066F NEPHROPATHY DOC TX: CPT | Mod: CPTII,,, | Performed by: ORTHOPAEDIC SURGERY

## 2022-09-23 PROCEDURE — 3061F NEG MICROALBUMINURIA REV: CPT | Mod: CPTII,,, | Performed by: ORTHOPAEDIC SURGERY

## 2022-09-23 PROCEDURE — 3051F PR MOST RECENT HEMOGLOBIN A1C LEVEL 7.0 - < 8.0%: ICD-10-PCS | Mod: CPTII,,, | Performed by: ORTHOPAEDIC SURGERY

## 2022-09-23 PROCEDURE — 73560 X-RAY EXAM OF KNEE 1 OR 2: CPT | Mod: 26,RT,, | Performed by: RADIOLOGY

## 2022-09-23 PROCEDURE — 99999 PR PBB SHADOW E&M-EST. PATIENT-LVL III: ICD-10-PCS | Mod: PBBFAC,,, | Performed by: ORTHOPAEDIC SURGERY

## 2022-09-23 PROCEDURE — 3008F BODY MASS INDEX DOCD: CPT | Mod: CPTII,,, | Performed by: ORTHOPAEDIC SURGERY

## 2022-09-23 PROCEDURE — 3066F PR DOCUMENTATION OF TREATMENT FOR NEPHROPATHY: ICD-10-PCS | Mod: CPTII,,, | Performed by: ORTHOPAEDIC SURGERY

## 2022-09-23 PROCEDURE — 3061F PR NEG MICROALBUMINURIA RESULT DOCUMENTED/REVIEW: ICD-10-PCS | Mod: CPTII,,, | Performed by: ORTHOPAEDIC SURGERY

## 2022-09-23 PROCEDURE — 1159F MED LIST DOCD IN RCRD: CPT | Mod: CPTII,,, | Performed by: ORTHOPAEDIC SURGERY

## 2022-09-23 PROCEDURE — 73560 X-RAY EXAM OF KNEE 1 OR 2: CPT | Mod: TC,RT

## 2022-09-23 PROCEDURE — 99215 OFFICE O/P EST HI 40 MIN: CPT | Mod: S$PBB,,, | Performed by: ORTHOPAEDIC SURGERY

## 2022-09-23 NOTE — PROGRESS NOTES
Subjective:     HPI:   Yayo Carver is a 56 y.o. female who presents for eval L>R knee arthritis, referred by Dr Shrestha for an earlier surgery date, has known latonia canela for a long time    She has had years of left greater than right knee pain moderate to severe global nature activity related relieved with rest.  She has noticed progressive valgus deformity in her left knee over the past year which is been a company with increasing functional limitations.      She had a gastric sleeve 2022 she has lost 64 pounds    Medications:  Tylenol, Voltaren gel, tramadol 180 tablets a month    Injections: multiple rounds of CSI injections, last 2022    Physical Therapy: has been ordered, however, not indicated due to severe bone on bone knee arthritis    Bracing: KT taping    Assistive Devices: none    Walkin - 5 blocks    Limitations: General walking, difficulty going up/down steps, difficulty getting in/out of the car, difficulty sleeping at night , difficulty rising from sitting , difficulty driving, and difficulty standing for long periods of time        Social support: The patient stated that their mom  would be able to help take care of them if they were to have surgery.        ROS:  The updated medical history is in the chart and has been reviewed. A review of systems is updated and there is no reported vision changes, ear/nose/mouth/throat complaints,  chest pain, shortness of breath, abdominal pain, urological complaints, fevers or chills, psychiatric complaints. Musculoskeletal and neurologcial symptoms are as documented. All other systems are negative.      Objective:   Exam:  There were no vitals filed for this visit.  Body mass index is 43.74 kg/m².    Physical examination included assessment of the patient's general appearance with particular attention to development, nutrition, body habitus, attention to grooming, and any evidence of distress.  Constitutional: The patient is a  well-developed, well-nourished patient in no acute distress.   Cardiovascular: Vascular examination included warmth and capillary refill as well inspection for edema and assessment of pedal pulses. Pulses are palpable and regular.  Musculoskeletal: Gait was assessed as to whether it was steady, non-antalgic, and/or required the use of an assist device. The patient was also asked to walk independently and get onto the examination table.  Skin: The skin was examined for any obvious rashes or lesions in the extremity.  Neurologic: Sensation is intact to light touch in the extremity. The patient has good coordination without hyperreflexia and is alert and oriented to person, place and time and has normal mood and affect.     All of the above were examined and found to be within normal limits except for the following pertinent clinical findings:    Antalgic gait with a limp negative Trendelenburg.  Left knee 0-115 degree knee range of motion 12° valgus alignment which does correct she is tender palpation predominantly lateral but also medial patellofemoral joint lines with mild-to-moderate effusion knee stable anterior-posterior varus valgus stresses no flexion contracture or extensor lag      Imaging:    KNEE R ARTHRITIS    Indication:  Right knee pain  Exam Ordered: Radiographs of the right knee include a standing anteroposterior view, a standing posterioanterior view, a lateral view in full flexion, and a sunrise view  Details of Examination: Exam shows evidence of joint space narrowing, osteophyte formation, and subchondral sclerosis, all consistent with degenerative arthritis of the knee.  No other significant findings are noted.  Impression:  Degenerative Arthritis, Right Knee and KNEE L ARTHRITIS     Indication:  Left knee pain  Exam Ordered: Radiographs of the left knee include a standing anteroposterior view, a standing posterioanterior view, a lateral view in full flexion, and a sunrise view  Details of  Examination: Exam shows evidence of joint space narrowing, osteophyte formation, and subchondral sclerosis, all consistent with degenerative arthritis of the knee.  No other significant findings are noted.  Impression:  Degenerative Arthritis, Left Knee    Bilateral knee kl grade 4 arthritis right side varus, left side severe valgus bone on bone      Assessment:       ICD-10-CM ICD-9-CM   1. Primary osteoarthritis of left knee  M17.12 715.16   2. Primary osteoarthritis of both knees  M17.0 715.16      BMI 43.7 down from 47.8  Gastric sleeve June 2022 at [facility], Dr Laura, plan for f/u Dec 12th for recheck labs, has lost 64 lbs  Anemia 11.5  DM 7.5, off meds after gastric sleeve     Plan:       This patient has significant symptoms in their knee that are affecting their quality of life and daily activities.  They have tried non-operative treatment including analgesics, an exercise program, and activity modification, but the symptoms have persisted. I believe they make a good candidate for knee arthroplasty.     The risks and benefits of knee arthroplasty have been discussed with the patient which include, but are not limited to infections (including severe sequelae), potential component failure, fracture, DVT, pulmonary embolus, nerve palsy, poor range of motion, the possibility of bone grafting, persistent pain, wound healing complications, transfusions, medical complications and death.     We discussed surgical options including implant type and why I believe the implant selected is a good match for the patient's needs. The patient expressed understanding and agrees to proceed with the planned surgery.     Pre-operative planning will include the following:  A pre-surgical evaluation by will be arranged.  Pre-op orders will be placed.  We will make arrangements with the operating room for proper time and staffing.  We will make Social Service arrangements for the patient.    Implants:   Company: [employer]  System:  Attune  RP revision tibia, rev femur backup    DVT prophylaxis: ASA 81mg x1 @12hrs post op, Eliquis 2.5mg BID x30 days @24hrs post op    Dispo: outpatient PT    Admission status: Outpatient/23hr OBS    Location: [place]     Rx cefadroxil  Nutrition support    Plan for L TKA 22 after  bariatric surgery follow up.   We discussed the general recommendation for surgical timing after gastric sleeve is 6 months        No orders of the defined types were placed in this encounter.            Past Medical History:   Diagnosis Date    Arthritis     Diabetes mellitus     HLD (hyperlipidemia)     Hypertension        Past Surgical History:   Procedure Laterality Date    BARIATRIC SURGERY  2022    Gastric sleeve     SECTION  1989    COLONOSCOPY N/A 2017    Procedure: COLONOSCOPY;  Surgeon: Evelin Arceo MD;  Location: [facility] ([address] [place] [address]);  Service: Endoscopy;  Laterality: N/A;  2 nd floor d/t BMI > 50 kg/m3    TONSILLECTOMY      TUBAL LIGATION         Family History   Problem Relation Age of Onset    Breast cancer Mother 65    Diabetes Mother     Colon polyps Mother     Hypertension Mother     Diabetes Father     Breast cancer Sister 56    Cancer Sister     No Known Problems Brother     Diabetes Paternal Uncle     Breast cancer Maternal Grandmother     Colon cancer Maternal Grandfather     Diabetes Paternal Grandmother     Heart disease Paternal Grandmother     No Known Problems Paternal Grandfather     Breast cancer Maternal Aunt     Arthritis Brother     Esophageal cancer Neg Hx     Irritable bowel syndrome Neg Hx     Rectal cancer Neg Hx     Stomach cancer Neg Hx     Ulcerative colitis Neg Hx     Celiac disease Neg Hx     Crohn's disease Neg Hx     Amblyopia Neg Hx     Blindness Neg Hx     Cataracts Neg Hx     Glaucoma Neg Hx     Macular degeneration Neg Hx     Retinal detachment Neg Hx     Strabismus Neg Hx     Stroke Neg Hx     Thyroid disease Neg Hx        Social History      Socioeconomic History    Marital status:    Occupational History    Occupation: Supervisor for cyril at [employer]     Employer: [employer]   Tobacco Use    Smoking status: Never    Smokeless tobacco: Never   Substance and Sexual Activity    Alcohol use: Yes     Alcohol/week: 4.0 standard drinks     Types: 4 Glasses of wine per week     Comment: occasional    Drug use: No    Sexual activity: Yes     Partners: Male     Birth control/protection: None     Comment: occasionally   Social History Narrative    ** Merged History Encounter **         Pt lives alone.  Has a college-age son who comes and goes.  Has 3 other adult children.       Social Determinants of Health     Financial Resource Strain: Low Risk     Difficulty of Paying Living Expenses: Not very hard   Food Insecurity: No Food Insecurity    Worried About Running Out of Food in the Last Year: Never true    Ran Out of Food in the Last Year: Never true   Transportation Needs: Unmet Transportation Needs    Lack of Transportation (Medical): Yes    Lack of Transportation (Non-Medical): No   Physical Activity: Insufficiently Active    Days of Exercise per Week: 3 days    Minutes of Exercise per Session: 30 min   Stress: No Stress Concern Present    Feeling of Stress : Not at all   Social Connections: Unknown    Frequency of Communication with Friends and Family: More than three times a week    Frequency of Social Gatherings with Friends and Family: More than three times a week    Active Member of Clubs or Organizations: No    Attends Club or Organization Meetings: 1 to 4 times per year    Marital Status: Never    Housing Stability: Low Risk     Unable to Pay for Housing in the Last Year: No    Number of Places Lived in the Last Year: 1    Unstable Housing in the Last Year: No

## 2022-09-27 ENCOUNTER — PATIENT MESSAGE (OUTPATIENT)
Dept: PHYSICAL MEDICINE AND REHAB | Facility: CLINIC | Age: 57
End: 2022-09-27
Payer: MEDICAID

## 2022-09-27 ENCOUNTER — CLINICAL SUPPORT (OUTPATIENT)
Dept: REHABILITATION | Facility: HOSPITAL | Age: 57
End: 2022-09-27
Attending: ORTHOPAEDIC SURGERY
Payer: MEDICAID

## 2022-09-27 DIAGNOSIS — R29.898 DECREASED STRENGTH OF LOWER EXTREMITY: Primary | ICD-10-CM

## 2022-09-27 DIAGNOSIS — M25.561 CHRONIC PAIN OF BOTH KNEES: ICD-10-CM

## 2022-09-27 DIAGNOSIS — M25.562 CHRONIC PAIN OF BOTH KNEES: ICD-10-CM

## 2022-09-27 DIAGNOSIS — M17.0 PRIMARY OSTEOARTHRITIS OF BOTH KNEES: ICD-10-CM

## 2022-09-27 DIAGNOSIS — Z74.09 DECREASED MOBILITY AND ENDURANCE: ICD-10-CM

## 2022-09-27 DIAGNOSIS — G89.29 CHRONIC PAIN OF BOTH KNEES: ICD-10-CM

## 2022-09-27 DIAGNOSIS — M17.12 PRIMARY OSTEOARTHRITIS OF LEFT KNEE: Primary | ICD-10-CM

## 2022-09-27 PROCEDURE — 97162 PT EVAL MOD COMPLEX 30 MIN: CPT

## 2022-09-27 PROCEDURE — 97110 THERAPEUTIC EXERCISES: CPT

## 2022-09-27 NOTE — PROGRESS NOTES
See evaluation in POC for goals and assessment     Eval Date: 09/28/2022    Meghan Rivera, PT, DPT, CLT

## 2022-09-28 ENCOUNTER — PATIENT MESSAGE (OUTPATIENT)
Dept: INTERNAL MEDICINE | Facility: CLINIC | Age: 57
End: 2022-09-28
Payer: MEDICAID

## 2022-09-28 VITALS — SYSTOLIC BLOOD PRESSURE: 136 MMHG | DIASTOLIC BLOOD PRESSURE: 87 MMHG

## 2022-09-28 DIAGNOSIS — K21.00 GASTROESOPHAGEAL REFLUX DISEASE WITH ESOPHAGITIS: ICD-10-CM

## 2022-09-28 PROBLEM — Z74.09 DECREASED MOBILITY AND ENDURANCE: Status: ACTIVE | Noted: 2022-09-28

## 2022-09-28 RX ORDER — TRAMADOL HYDROCHLORIDE 50 MG/1
50-100 TABLET ORAL 3 TIMES DAILY PRN
Qty: 180 TABLET | Refills: 3 | Status: SHIPPED | OUTPATIENT
Start: 2022-10-08 | End: 2023-03-06 | Stop reason: SDUPTHER

## 2022-09-28 RX ORDER — DICLOFENAC SODIUM 10 MG/G
2 GEL TOPICAL 4 TIMES DAILY PRN
Qty: 300 G | Refills: 3 | Status: SHIPPED | OUTPATIENT
Start: 2022-09-28 | End: 2023-04-12 | Stop reason: SDUPTHER

## 2022-09-28 RX ORDER — PANTOPRAZOLE SODIUM 40 MG/1
40 TABLET, DELAYED RELEASE ORAL DAILY PRN
Qty: 90 TABLET | Refills: 1 | Status: SHIPPED | OUTPATIENT
Start: 2022-09-28 | End: 2023-04-11 | Stop reason: SDUPTHER

## 2022-09-28 NOTE — TELEPHONE ENCOUNTER
Care Due:                  Date            Visit Type   Department     Provider  --------------------------------------------------------------------------------                                EP -                              PRIMARY      [facility] INTERNAL  Last Visit: 08-      CARE ([facility])   MEDICINE       Berta Hastings  Next Visit: None Scheduled  None         None Found                                                            Last  Test          Frequency    Reason                     Performed    Due Date  --------------------------------------------------------------------------------    Lipid Panel.  12 months..  atorvastatin.............  01-   01-    [facility] Embedded Care Gaps. Reference number: [id number]. 9/28/2022   8:15:08 AM CDT

## 2022-09-28 NOTE — PLAN OF CARE
[facility]   Physical Therapy Initial Evaluation     Date: 9/27/2022   Name: Yayo Carver  [facility] Number: [id number]    Therapy Diagnosis:   Encounter Diagnoses   Name Primary?    Primary osteoarthritis of both knees     Decreased strength of lower extremity Yes    Decreased mobility and endurance      Physician: Jonathon Shrestha MD    Physician Orders: PT Eval and Treat   Medical Diagnosis from Referral: M17.12 (ICD-10-CM) - Primary osteoarthritis of left knee   Evaluation Date: 9/27/2022  Authorization Period Expiration: 9/13/2023  Plan of Care Expiration: 11/9/2022  Progress Note Due: 10/19/2022  Visit # / Visits authorized: 1/ 1   FOTO: Not performed     Precautions: Standard and Diabetes     Time In: 1:15pm   Time Out: 2:00pm   Total Appointment Time (timed & untimed codes): 45 minutes      SUBJECTIVE     Date of onset: over a year and a half     History of current condition - Elisa reports: Her R knee was injured first, she fell and hit her knee on a step. Then over time the L knee was having more pain. Her MD noticed in 2016 that the knee was going in. She doesn't have any strength in her knee. She is schdeuled to have a L knee replacement in December. Uses no AD. She had a R knee scope in 1999.     Falls: none     Imaging,     FINDINGS:  Narrowing of the patellofemoral joint is again identified.  There is new superior subluxation of the patella when compared to the previous lateral right knee radiograph from January 21, 2021.  No acute fracture is identified in this single projection.  There is a calcified loose body projected anterior to the tibiofemoral joint.  There is a suprapatellar effusion.       Prior Therapy: yes   Social History: lives alone, but has kids who will help her out after surgery   Occupation: yes- in retail, works at [employer]   Prior Level of Function: Independent   Current Level of Function: walking and standing for so long    Pain:     Current 10/10, worst 10/10, best 3/10   Location: outside of the knee, above the knee cap   Description: pain, stiff   Aggravating Factors: standing and walking for too long, sitting for too long   Easing Factors: medicine, ice, rest, voltaren     Patients goals: Feel better, ease the pain      Medical History:   Past Medical History:   Diagnosis Date    Arthritis     Diabetes mellitus     HLD (hyperlipidemia)     Hypertension        Surgical History:   Yayo Carver  has a past surgical history that includes Tubal ligation; Tonsillectomy; Colonoscopy (N/A, 2017);  section (1989); and Bariatric Surgery (2022).    Medications:   Yayo has a current medication list which includes the following prescription(s): acetaminophen, amlodipine, aspirin, atorvastatin, b complex vitamins, diclofenac sodium, ergocalciferol, freestyle zachery 14 day reader, freestyle zachery 14 day sensor, gabapentin, hydrocodone-acetaminophen, multivitamin, pantoprazole, sodium bicarbonate, spironolactone, terbinafine hcl, and tramadol.    Allergies:   Review of patient's allergies indicates:   Allergen Reactions    Ace inhibitors Edema and Swelling          OBJECTIVE     Physical Appearance:   Pt arrives with AD   Posture Alignment: forward head  Sensation: Light Touch: Intact  Palpation: tender along L joint line     Knee  Right   Left  Pain/Dysfunction with Movement    AROM PROM MMT AROM PROM MMT    Flexion  115 4/5 110  3+/5 Pain on L    Extension  0 4/5 +5  3+/5 Pain on L      MMT:                       R                   L  Hip flexors              4/5                4/5  Gluteus medius     3/5                   3/5  Gluteus coleman  3/5                 3/5      DTR's:   Left Right   Patella Tendon 2+ 2+   Achilles Tendon 2+ 2+          Special Tests: Knee   Left Right   Ant. Drawer - instability and - pain - instability and - pain   Post. Drawer - instability and - pain - instability and - pain   Lachman's -  instability and - pain - instability and - pain   Valgus at 0 Deg + pain and - instability - instability and - pain   Valgus at 30 Deg - instability and - pain - instability and - pain   Varus at 0 Deg - instability and - pain + pain and - instability   Varus at 30 Deg - instability and - pain - instability and - pain                   Functional Movement:  Squat   Poor form, poor control       30 second sit to stand test: unable to perform, pt requires UE support and significant compensations to get up from sitting position   TU seconds       Gait: Without AD  Analysis: significant valgus on LLE, ankle supination on LLE,   Bed Mobility:Independent  Transfers: Independent    Limitation/Restriction for FOTO  Survey    Therapist reviewed FOTO scores for Yayo Carver on 2022.   FOTO documents entered into [employer] - see Media section.    Limitation Score: not completed          TREATMENT     Total Treatment time (time-based codes) separate from Evaluation: 15 minutes      Elisa received the treatments listed below:      therapeutic exercises to develop strength, endurance, ROM, flexibility, and posture for 15 minutes including:  Quad set 3x10   Sidelying hip ABD 3x10  Long arc quads 3x10      PATIENT EDUCATION AND HOME EXERCISES     Education provided:   - HEP, POC, stop HEP if painful, benefits of prehab     Written Home Exercises Provided: yes. Exercises were reviewed and Elisa was able to demonstrate them prior to the end of the session.  Elisa demonstrated good  understanding of the education provided. See EMR under Patient Instructions for exercises provided during therapy sessions.    ASSESSMENT     Yayo is a 56 y.o. female referred to outpatient Physical Therapy with a medical diagnosis of M17.12 (ICD-10-CM) - Primary osteoarthritis of left knee . Patient presents with decreased knee ROM,  LE weakness, and decreased functional mobility and endurance. Pt requires her UE to stand from a standard chair. She  has significant valgus deformity on her L knee with L ankle supination to compensate. Pt was educated on benefits of therapy before TKAs and is in agreement to start therapy for 6 weeks to improve function and increase readiness of surgery.     Patient prognosis is Good.   Patient will benefit from skilled outpatient Physical Therapy to address the deficits stated above and in the chart below, provide patient /family education, and to maximize patientt's level of independence.     Plan of care discussed with patient: Yes  Patient's spiritual, cultural and educational needs considered and patient is agreeable to the plan of care and goals as stated below:     Anticipated Barriers for therapy: none    Medical Necessity is demonstrated by the following  History  Co-morbidities and personal factors that may impact the plan of care Co-morbidities:   diabetes, high BMI, and HTN    Personal Factors:   no deficits     high   Examination  Body Structures and Functions, activity limitations and participation restrictions that may impact the plan of care Body Regions:   lower extremities    Body Systems:    gross symmetry  ROM  strength  gross coordinated movement  balance  gait  transfers  transitions  motor control    Participation Restrictions:   none    Activity limitations:   Learning and applying knowledge  no deficits    General Tasks and Commands  no deficits    Communication  no deficits    Mobility  lifting and carrying objects  walking    Self care  no deficits    Domestic Life  no deficits    Interactions/Relationships  no deficits    Life Areas  no deficits    Community and Social Life  no deficits         moderate   Clinical Presentation evolving clinical presentation with changing clinical characteristics moderate   Decision Making/ Complexity Score: moderate     Goals:  Short Term Goals: 3 weeks   Pt will report 8/10 pain at rest   Pt will be able to perform at sit to stand with no UE use  Pt will be  independent with HEP       Long Term Goals: 6 weeks   Pt will report 7/10 pain   Pt will have 4/5 LE MMTs   Pt will be able to perform a TUG in under 13.5 seconds     PLAN   Plan of care Certification: 9/27/2022 to 11/9/2022.    Outpatient Physical Therapy 2 times weekly for 6 weeks to include the following interventions: Gait Training, Manual Therapy, Neuromuscular Re-ed, Patient Education, Self Care, Therapeutic Activities, and Therapeutic Exercise.     Meghan Rivera, PT      I CERTIFY THE NEED FOR THESE SERVICES FURNISHED UNDER THIS PLAN OF TREATMENT AND WHILE UNDER MY CARE   Physician's comments:     Physician's Signature: ___________________________________________________

## 2022-09-29 DIAGNOSIS — N18.2 CKD (CHRONIC KIDNEY DISEASE) STAGE 2, GFR 60-89 ML/MIN: ICD-10-CM

## 2022-09-29 DIAGNOSIS — Z79.4 TYPE 2 DIABETES MELLITUS WITH HYPERGLYCEMIA, WITH LONG-TERM CURRENT USE OF INSULIN: ICD-10-CM

## 2022-09-29 DIAGNOSIS — E11.65 TYPE 2 DIABETES MELLITUS WITH HYPERGLYCEMIA, WITH LONG-TERM CURRENT USE OF INSULIN: ICD-10-CM

## 2022-09-29 DIAGNOSIS — I10 ESSENTIAL HYPERTENSION: Primary | ICD-10-CM

## 2022-09-29 DIAGNOSIS — E78.2 MIXED HYPERLIPIDEMIA: ICD-10-CM

## 2022-10-03 ENCOUNTER — PATIENT MESSAGE (OUTPATIENT)
Dept: ADMINISTRATIVE | Facility: OTHER | Age: 57
End: 2022-10-03
Payer: MEDICAID

## 2022-10-04 ENCOUNTER — DOCUMENTATION ONLY (OUTPATIENT)
Dept: REHABILITATION | Facility: HOSPITAL | Age: 57
End: 2022-10-04
Payer: MEDICAID

## 2022-10-04 NOTE — PROGRESS NOTES
Physical Therapy: No show/Cancellation of Visit  Date: 10/04/2022    Patient cancelled today's PT appointment. Reason for cancellation: no reason given. Patient's next scheduled appointment is 10/10/2022.     Initial Evaluation: 9/27/2022  Plan of Care Explanation: 11/9/2022  Cancel: 2  No show: 0    Therapist: Farhana Olguin PTA

## 2022-10-06 ENCOUNTER — PATIENT MESSAGE (OUTPATIENT)
Dept: BARIATRICS | Facility: CLINIC | Age: 57
End: 2022-10-06
Payer: MEDICAID

## 2022-10-07 ENCOUNTER — HOSPITAL ENCOUNTER (EMERGENCY)
Facility: OTHER | Age: 57
Discharge: HOME | End: 2022-10-07
Attending: EMERGENCY MEDICINE
Payer: MEDICAID

## 2022-10-07 ENCOUNTER — OFFICE VISIT (OUTPATIENT)
Dept: PHYSICAL MEDICINE AND REHAB | Facility: CLINIC | Age: 57
End: 2022-10-07
Payer: MEDICAID

## 2022-10-07 VITALS
BODY MASS INDEX: 42.32 KG/M2 | DIASTOLIC BLOOD PRESSURE: 72 MMHG | OXYGEN SATURATION: 97 % | WEIGHT: 254 LBS | HEART RATE: 92 BPM | RESPIRATION RATE: 18 BRPM | SYSTOLIC BLOOD PRESSURE: 136 MMHG | TEMPERATURE: 99 F | HEIGHT: 65 IN

## 2022-10-07 DIAGNOSIS — M43.16 SPONDYLOLISTHESIS OF LUMBAR REGION: ICD-10-CM

## 2022-10-07 DIAGNOSIS — G89.29 CHRONIC PAIN OF BOTH KNEES: ICD-10-CM

## 2022-10-07 DIAGNOSIS — S39.012A LUMBAR STRAIN, INITIAL ENCOUNTER: Primary | ICD-10-CM

## 2022-10-07 DIAGNOSIS — E66.01 MORBID OBESITY WITH BMI OF 45.0-49.9, ADULT: ICD-10-CM

## 2022-10-07 DIAGNOSIS — M47.816 LUMBAR FACET ARTHROPATHY: ICD-10-CM

## 2022-10-07 DIAGNOSIS — M17.0 PRIMARY OSTEOARTHRITIS OF BOTH KNEES: Primary | ICD-10-CM

## 2022-10-07 DIAGNOSIS — M54.50 ACUTE BILATERAL LOW BACK PAIN WITHOUT SCIATICA: ICD-10-CM

## 2022-10-07 DIAGNOSIS — M25.562 CHRONIC PAIN OF BOTH KNEES: ICD-10-CM

## 2022-10-07 DIAGNOSIS — M47.816 SPONDYLOSIS OF LUMBAR REGION WITHOUT MYELOPATHY OR RADICULOPATHY: ICD-10-CM

## 2022-10-07 DIAGNOSIS — M25.561 CHRONIC PAIN OF BOTH KNEES: ICD-10-CM

## 2022-10-07 PROCEDURE — 3051F HG A1C>EQUAL 7.0%<8.0%: CPT | Mod: CPTII,95,, | Performed by: PHYSICAL MEDICINE & REHABILITATION

## 2022-10-07 PROCEDURE — 99283 EMERGENCY DEPT VISIT LOW MDM: CPT | Mod: 25

## 2022-10-07 PROCEDURE — 3051F PR MOST RECENT HEMOGLOBIN A1C LEVEL 7.0 - < 8.0%: ICD-10-PCS | Mod: CPTII,95,, | Performed by: PHYSICAL MEDICINE & REHABILITATION

## 2022-10-07 PROCEDURE — 3066F PR DOCUMENTATION OF TREATMENT FOR NEPHROPATHY: ICD-10-PCS | Mod: CPTII,95,, | Performed by: PHYSICAL MEDICINE & REHABILITATION

## 2022-10-07 PROCEDURE — 99215 PR OFFICE/OUTPT VISIT, EST, LEVL V, 40-54 MIN: ICD-10-PCS | Mod: 95,,, | Performed by: PHYSICAL MEDICINE & REHABILITATION

## 2022-10-07 PROCEDURE — 3061F NEG MICROALBUMINURIA REV: CPT | Mod: CPTII,95,, | Performed by: PHYSICAL MEDICINE & REHABILITATION

## 2022-10-07 PROCEDURE — 99215 OFFICE O/P EST HI 40 MIN: CPT | Mod: 95,,, | Performed by: PHYSICAL MEDICINE & REHABILITATION

## 2022-10-07 PROCEDURE — 3066F NEPHROPATHY DOC TX: CPT | Mod: CPTII,95,, | Performed by: PHYSICAL MEDICINE & REHABILITATION

## 2022-10-07 PROCEDURE — 3061F PR NEG MICROALBUMINURIA RESULT DOCUMENTED/REVIEW: ICD-10-PCS | Mod: CPTII,95,, | Performed by: PHYSICAL MEDICINE & REHABILITATION

## 2022-10-07 RX ORDER — DULOXETIN HYDROCHLORIDE 30 MG/1
30 CAPSULE, DELAYED RELEASE ORAL DAILY
Qty: 30 CAPSULE | Refills: 1 | Status: SHIPPED | OUTPATIENT
Start: 2022-10-07 | End: 2022-11-29

## 2022-10-07 RX ORDER — METHOCARBAMOL 500 MG/1
1000 TABLET, FILM COATED ORAL 3 TIMES DAILY PRN
Qty: 30 TABLET | Refills: 2 | Status: SHIPPED | OUTPATIENT
Start: 2022-10-07 | End: 2022-10-12
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
## 2022-10-10 ENCOUNTER — PATIENT MESSAGE (OUTPATIENT)
Dept: ADMINISTRATIVE | Facility: HOSPITAL | Age: 57
End: 2022-10-10
Payer: MEDICAID

## 2022-10-10 ENCOUNTER — CLINICAL SUPPORT (OUTPATIENT)
Dept: REHABILITATION | Facility: HOSPITAL | Age: 57
End: 2022-10-10
Payer: MEDICAID

## 2022-10-10 DIAGNOSIS — Z74.09 DECREASED MOBILITY AND ENDURANCE: ICD-10-CM

## 2022-10-10 DIAGNOSIS — R29.898 DECREASED STRENGTH OF LOWER EXTREMITY: Primary | ICD-10-CM

## 2022-10-10 PROCEDURE — 97110 THERAPEUTIC EXERCISES: CPT | Mod: CQ

## 2022-10-10 NOTE — PROGRESS NOTES
[facility]   Physical Therapy Treatment Note     Name: Yayo Carver  [facility] Number: [id number]    Therapy Diagnosis:   Encounter Diagnoses   Name Primary?    Decreased strength of lower extremity Yes    Decreased mobility and endurance      Physician: Jonathon Shrestha MD    Visit Date: 10/10/2022    Physician Orders: PT Eval and Treat   Medical Diagnosis from Referral: M17.12 (ICD-10-CM) - Primary osteoarthritis of left knee   Evaluation Date: 9/27/2022  Authorization Period Expiration: 11/09/2022  Plan of Care Expiration: 11/9/2022  Progress Note Due: 10/19/2022  Visit # / Visits authorized: 1 / 20 + eval     PTA Visit #: 1 / 5     Time In: 1500  Time Out: 1610  Total Treatment Time: 70 minutes  Total Billable Time: 60 minutes (4 TE)    Precautions: Standard and Diabetes     SUBJECTIVE     Patient reports: that she feels very stiff today. She worked 8+ hours yesterday for the game and was on her feet the entire time. Has been trying to keep up with her home exercises, but she has felt very sore recently.  She  was somewhat  compliant with home exercise program.  Response to previous treatment: fair  Functional change: none to note today    Pain: 8/10, currently  Location: Left knee and Right hip    Patient goals: Feel better, ease the pain     OBJECTIVE     Objective Measures updated at progress report unless specified.     Treatment     Elisa received the treatments listed below:      THERAPEUTIC EXERCISES to develop strength, endurance, ROM, flexibility, posture, and core stabilization for 60 minutes including;  *All exercises performed bilaterally.  Aerobic Activity to help improve mobility and LE strength; Nustep for 10 minutes, Level 3    Quad sets with towel under knee; 5 second hold, 3 x 10 reps  Quad sets with heel prop; 5 second hold, 3 x 10 reps  Short arc quads with medium bolster; 5 second hold, 3 x 10 reps  Short arc quads with small bolster; 5 second hold, 3 x 10  reps  Gastroc/Heel cord stretch; 30 second hold, 3 reps   Long arc quads at EOM; 5 second hold, 3 x 10 reps     SLR with quad set; 4 x 5 reps  SL Clamshells; 2 x 10 reps    cold pack for 10 minutes to B knees post session.     Patient Education and Home Exercises     Home Exercises Provided and Patient Education Provided     Education provided:   - Continued compliance with HEP    Written Home Exercises Provided: Patient instructed to cont prior HEP. Exercises were reviewed and Elisa was able to demonstrate them prior to the end of the session.  Elisa demonstrated good  understanding of the education provided. See EMR under Patient Instructions for exercises provided during therapy sessions    ASSESSMENT     Fair tolerance to exercises performed. She notes increased discomfort with exercise in bilateral knees. Cueing required for hold time and proper muscle activation.   Elisa Is progressing well towards her goals.   Pt prognosis is Good.     Pt will continue to benefit from skilled outpatient physical therapy to address the deficits listed in the problem list box on initial evaluation, provide pt/family education and to maximize pt's level of independence in the home and community environment.     Pt's spiritual, cultural and educational needs considered and pt agreeable to plan of care and goals.     Anticipated barriers to physical therapy: none    Goals: Short Term Goals: 3 weeks   Pt will report 8/10 pain at rest   Pt will be able to perform at sit to stand with no UE use  Pt will be independent with HEP      Long Term Goals: 6 weeks   Pt will report 7/10 pain   Pt will have 4/5 LE MMTs   Pt will be able to perform a TUG in under 13.5 seconds     PLAN     Emphasize quad control.    Farhana Olguin, PTA

## 2022-10-11 ENCOUNTER — CLINICAL SUPPORT (OUTPATIENT)
Dept: REHABILITATION | Facility: HOSPITAL | Age: 57
End: 2022-10-11
Payer: MEDICAID

## 2022-10-11 DIAGNOSIS — R26.9 GAIT ABNORMALITY: Primary | ICD-10-CM

## 2022-10-11 DIAGNOSIS — R29.898 DECREASED STRENGTH OF LOWER EXTREMITY: ICD-10-CM

## 2022-10-11 PROCEDURE — 97110 THERAPEUTIC EXERCISES: CPT

## 2022-10-11 NOTE — PROGRESS NOTES
[facility]   Physical Therapy Treatment Note     Name: Yayo Carver  [facility] Number: [id number]    Therapy Diagnosis:   Encounter Diagnoses   Name Primary?    Gait abnormality Yes    Decreased strength of lower extremity        Physician: Jonathon Shrestha MD    Visit Date: 10/11/2022    Physician Orders: PT Eval and Treat   Medical Diagnosis from Referral: M17.12 (ICD-10-CM) - Primary osteoarthritis of left knee   Evaluation Date: 9/27/2022  Authorization Period Expiration: 11/09/2022  Plan of Care Expiration: 11/9/2022  Progress Note Due: 10/19/2022  Visit # / Visits authorized: 2 / 20 + eval     PTA Visit #: 1 / 5     Time In: 1:55pm   Time Out: 2:40pm   Total Treatment Time: 45 minutes  Total Billable Time: 30 minutes ( TE)    Precautions: Standard and Diabetes     SUBJECTIVE     Patient reports: She felt good after last session. Her pain is better today   She  was somewhat  compliant with home exercise program.  Response to previous treatment: fair  Functional change: none to note today    Pain: 6/10, currently  Location: Left knee and Right hip    Patient goals: Feel better, ease the pain     OBJECTIVE     Objective Measures updated at progress report unless specified.     Treatment     Elisa received the treatments listed below:      THERAPEUTIC EXERCISES to develop strength, endurance, ROM, flexibility, posture, and core stabilization for 40 minutes including;  *All exercises performed bilaterally.  Aerobic Activity to help improve mobility and LE strength; Nustep for 10 minutes, Level 3    Quad sets with towel under knee; 5 second hold, 3 x 10 reps  Quad sets with heel prop; 5 second hold, 3 x 10 reps  Short arc quads with medium bolster; 5 second hold, 3 x 10 reps  Short arc quads with small bolster; 5 second hold, 3 x 10 reps- NP, unable to perform full SAQ on med bolster  and knee pain   Gastroc/Heel cord stretch; 30 second hold, 3 reps   Long arc quads at EOM; 5 second hold, 3  x 10 reps     SLR with quad set; 4 x 5 reps  SL Clamshells GTB ; 2 x 10 reps    Manual Therapy: thera roller lateral L leg- pt reported decreased pain     cold pack for 10 minutes to B knees post session.     Patient Education and Home Exercises     Home Exercises Provided and Patient Education Provided     Education provided:   - Continued compliance with HEP    Written Home Exercises Provided: Patient instructed to cont prior HEP. Exercises were reviewed and Elisa was able to demonstrate them prior to the end of the session.  Elisa demonstrated good  understanding of the education provided. See EMR under Patient Instructions for exercises provided during therapy sessions    ASSESSMENT     Fair tolerance to exercises performed. Pt continues to show discomfort with exercise so progressions will be mostly focused on  hip strength   Elisa Is progressing well towards her goals.   Pt prognosis is Good.     Pt will continue to benefit from skilled outpatient physical therapy to address the deficits listed in the problem list box on initial evaluation, provide pt/family education and to maximize pt's level of independence in the home and community environment.     Pt's spiritual, cultural and educational needs considered and pt agreeable to plan of care and goals.     Anticipated barriers to physical therapy: none    Goals: Short Term Goals: 3 weeks   Pt will report 8/10 pain at rest   Pt will be able to perform at sit to stand with no UE use  Pt will be independent with HEP      Long Term Goals: 6 weeks   Pt will report 7/10 pain   Pt will have 4/5 LE MMTs   Pt will be able to perform a TUG in under 13.5 seconds     PLAN     Emphasize quad control.    Meghan Rivera, PT

## 2022-10-17 ENCOUNTER — PATIENT MESSAGE (OUTPATIENT)
Dept: ADMINISTRATIVE | Facility: OTHER | Age: 57
End: 2022-10-17
Payer: MEDICAID

## 2022-10-17 ENCOUNTER — DOCUMENTATION ONLY (OUTPATIENT)
Dept: REHABILITATION | Facility: HOSPITAL | Age: 57
End: 2022-10-17
Payer: MEDICAID

## 2022-10-17 ENCOUNTER — LAB VISIT (OUTPATIENT)
Dept: LAB | Facility: HOSPITAL | Age: 57
End: 2022-10-17
Attending: PODIATRIST
Payer: MEDICAID

## 2022-10-17 DIAGNOSIS — E11.65 TYPE 2 DIABETES MELLITUS WITH HYPERGLYCEMIA, WITH LONG-TERM CURRENT USE OF INSULIN: ICD-10-CM

## 2022-10-17 DIAGNOSIS — N18.2 CKD (CHRONIC KIDNEY DISEASE) STAGE 2, GFR 60-89 ML/MIN: ICD-10-CM

## 2022-10-17 DIAGNOSIS — E78.2 MIXED HYPERLIPIDEMIA: ICD-10-CM

## 2022-10-17 DIAGNOSIS — B35.1 ONYCHOMYCOSIS DUE TO DERMATOPHYTE: ICD-10-CM

## 2022-10-17 DIAGNOSIS — I10 ESSENTIAL HYPERTENSION: ICD-10-CM

## 2022-10-17 DIAGNOSIS — Z79.4 TYPE 2 DIABETES MELLITUS WITH HYPERGLYCEMIA, WITH LONG-TERM CURRENT USE OF INSULIN: ICD-10-CM

## 2022-10-17 DIAGNOSIS — B35.1 ONYCHOMYCOSIS DUE TO DERMATOPHYTE: Primary | ICD-10-CM

## 2022-10-17 LAB
ALBUMIN SERPL BCP-MCNC: 3.7 G/DL (ref 3.5–5.2)
ALBUMIN SERPL BCP-MCNC: 3.7 G/DL (ref 3.5–5.2)
ALP SERPL-CCNC: 79 U/L (ref 55–135)
ALP SERPL-CCNC: 79 U/L (ref 55–135)
ALT SERPL W/O P-5'-P-CCNC: 18 U/L (ref 10–44)
ALT SERPL W/O P-5'-P-CCNC: 18 U/L (ref 10–44)
ANION GAP SERPL CALC-SCNC: 7 MMOL/L (ref 8–16)
AST SERPL-CCNC: 20 U/L (ref 10–40)
AST SERPL-CCNC: 20 U/L (ref 10–40)
BASOPHILS # BLD AUTO: 0.06 K/UL (ref 0–0.2)
BASOPHILS NFR BLD: 0.9 % (ref 0–1.9)
BILIRUB DIRECT SERPL-MCNC: 0.2 MG/DL (ref 0.1–0.3)
BILIRUB SERPL-MCNC: 0.5 MG/DL (ref 0.1–1)
BILIRUB SERPL-MCNC: 0.5 MG/DL (ref 0.1–1)
BUN SERPL-MCNC: 14 MG/DL (ref 6–20)
CALCIUM SERPL-MCNC: 10.6 MG/DL (ref 8.7–10.5)
CHLORIDE SERPL-SCNC: 105 MMOL/L (ref 95–110)
CO2 SERPL-SCNC: 27 MMOL/L (ref 23–29)
CREAT SERPL-MCNC: 0.9 MG/DL (ref 0.5–1.4)
DIFFERENTIAL METHOD: ABNORMAL
EOSINOPHIL # BLD AUTO: 0.2 K/UL (ref 0–0.5)
EOSINOPHIL NFR BLD: 2.4 % (ref 0–8)
ERYTHROCYTE [DISTWIDTH] IN BLOOD BY AUTOMATED COUNT: 17.3 % (ref 11.5–14.5)
EST. GFR  (NO RACE VARIABLE): >60 ML/MIN/1.73 M^2
GLUCOSE SERPL-MCNC: 80 MG/DL (ref 70–110)
HCT VFR BLD AUTO: 39.1 % (ref 37–48.5)
HGB BLD-MCNC: 11.6 G/DL (ref 12–16)
IMM GRANULOCYTES # BLD AUTO: 0.02 K/UL (ref 0–0.04)
IMM GRANULOCYTES NFR BLD AUTO: 0.3 % (ref 0–0.5)
LYMPHOCYTES # BLD AUTO: 1.9 K/UL (ref 1–4.8)
LYMPHOCYTES NFR BLD: 28.3 % (ref 18–48)
MAGNESIUM SERPL-MCNC: 1.8 MG/DL (ref 1.6–2.6)
MCH RBC QN AUTO: 22.3 PG (ref 27–31)
MCHC RBC AUTO-ENTMCNC: 29.7 G/DL (ref 32–36)
MCV RBC AUTO: 75 FL (ref 82–98)
MONOCYTES # BLD AUTO: 0.6 K/UL (ref 0.3–1)
MONOCYTES NFR BLD: 9.4 % (ref 4–15)
NEUTROPHILS # BLD AUTO: 3.9 K/UL (ref 1.8–7.7)
NEUTROPHILS NFR BLD: 58.7 % (ref 38–73)
NRBC BLD-RTO: 0 /100 WBC
PHOSPHATE SERPL-MCNC: 2.8 MG/DL (ref 2.7–4.5)
PLATELET # BLD AUTO: 206 K/UL (ref 150–450)
PMV BLD AUTO: ABNORMAL FL (ref 9.2–12.9)
POTASSIUM SERPL-SCNC: 4.3 MMOL/L (ref 3.5–5.1)
PROT SERPL-MCNC: 7.1 G/DL (ref 6–8.4)
PROT SERPL-MCNC: 7.1 G/DL (ref 6–8.4)
RBC # BLD AUTO: 5.21 M/UL (ref 4–5.4)
SODIUM SERPL-SCNC: 139 MMOL/L (ref 136–145)
URATE SERPL-MCNC: 6.3 MG/DL (ref 2.4–5.7)
WBC # BLD AUTO: 6.71 K/UL (ref 3.9–12.7)

## 2022-10-17 PROCEDURE — 36415 COLL VENOUS BLD VENIPUNCTURE: CPT | Performed by: PODIATRIST

## 2022-10-17 PROCEDURE — 84550 ASSAY OF BLOOD/URIC ACID: CPT | Performed by: INTERNAL MEDICINE

## 2022-10-17 PROCEDURE — 80053 COMPREHEN METABOLIC PANEL: CPT | Performed by: INTERNAL MEDICINE

## 2022-10-17 PROCEDURE — 83735 ASSAY OF MAGNESIUM: CPT | Performed by: INTERNAL MEDICINE

## 2022-10-17 PROCEDURE — 85025 COMPLETE CBC W/AUTO DIFF WBC: CPT | Performed by: INTERNAL MEDICINE

## 2022-10-17 PROCEDURE — 80076 HEPATIC FUNCTION PANEL: CPT | Performed by: PODIATRIST

## 2022-10-17 PROCEDURE — 84100 ASSAY OF PHOSPHORUS: CPT | Performed by: INTERNAL MEDICINE

## 2022-10-17 RX ORDER — TERBINAFINE HYDROCHLORIDE 250 MG/1
250 TABLET ORAL DAILY
Qty: 50 TABLET | Refills: 0 | Status: SHIPPED | OUTPATIENT
Start: 2022-10-17 | End: 2023-03-30 | Stop reason: SDUPTHER

## 2022-10-17 NOTE — PROGRESS NOTES
Physical Therapy: No show/Cancellation of Visit  Date: 10/17/2022    Patient was a no show to today's PT appointment. Patient's next scheduled appointment is 10/24/2022.     Initial Evaluation: 9/27/2022  Plan of Care Explanation: 11/9/2022  Cancel: 2  No show: 1    Therapist: Farhana Olguin PTA

## 2022-10-18 ENCOUNTER — CLINICAL SUPPORT (OUTPATIENT)
Dept: REHABILITATION | Facility: HOSPITAL | Age: 57
End: 2022-10-18
Payer: MEDICAID

## 2022-10-18 DIAGNOSIS — R29.898 DECREASED STRENGTH OF LOWER EXTREMITY: Primary | ICD-10-CM

## 2022-10-18 DIAGNOSIS — Z74.09 DECREASED MOBILITY AND ENDURANCE: ICD-10-CM

## 2022-10-18 PROCEDURE — 97110 THERAPEUTIC EXERCISES: CPT | Mod: CQ

## 2022-10-18 NOTE — PROGRESS NOTES
[facility]   Physical Therapy Treatment Note     Name: Yayo Carver  [facility] Number: [id number]    Therapy Diagnosis:   Encounter Diagnoses   Name Primary?    Decreased strength of lower extremity Yes    Decreased mobility and endurance      Physician: Jonathon Shrestha MD    Visit Date: 10/18/2022    Physician Orders: PT Eval and Treat   Medical Diagnosis from Referral: M17.12 (ICD-10-CM) - Primary osteoarthritis of left knee   Evaluation Date: 9/27/2022  Authorization Period Expiration: 11/09/2022  Plan of Care Expiration: 11/9/2022  Progress Note Due: 10/19/2022  Visit # / Visits authorized: 3 / 20 + eval     PTA Visit #: 1 / 5     Time In: 1413  Time Out: 1520  Total Treatment Time: 67 minutes  Total Billable Time: 30 minutes (2 TE)    Precautions: Standard and Diabetes     SUBJECTIVE     Patient reports: increased pain over the weekend 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 States she feels stiff today and it is worse when she stands in one place for too long.  She  was somewhat  compliant with home exercise program.  Response to previous treatment: fair  Functional change: none to note today    Pain: 5/10, currently  Location: Left knee and Right hip    Patient goals: Feel better, ease the pain     OBJECTIVE     Objective Measures updated at progress report unless specified.     Treatment     Elisa received the treatments listed below:      THERAPEUTIC EXERCISES to develop strength, endurance, ROM, flexibility, posture, and core stabilization for 57 minutes including;  *All exercises performed bilaterally.  Aerobic Activity to help improve mobility and LE strength; Nustep for 10 minutes, Level 3    Quad sets with towel under knee; 5 second hold, 3 x 10 reps  Quad sets with heel prop; 5 second hold, 3 x 10 reps  Short arc quads with medium bolster; 5 second hold, 3 x 10 reps  Short arc quads with small bolster; 5 second hold, 3 x 10 reps  Gastroc/Heel cord stretch; 30  second hold, 3 reps   Long arc quads at EOM; 5 second hold, 3 x 10 reps     SLR with quad set; 4 x 5 reps  SL Clamshells + Green TB; 2 x 10 reps    cold pack for 10 minutes to B knees post session.     Patient Education and Home Exercises     Home Exercises Provided and Patient Education Provided     Education provided:   - Continued compliance with HEP    Written Home Exercises Provided: Patient instructed to cont prior HEP. Exercises were reviewed and Elisa was able to demonstrate them prior to the end of the session.  Elisa demonstrated good  understanding of the education provided. See EMR under Patient Instructions for exercises provided during therapy sessions    ASSESSMENT     Ms. Pérez had good tolerance to exercises performed noting appropriate muscle response post session. She requires mod cueing to maintain quadriceps activation with fatigue. Increased difficulty with SLR exercise likely due to decreased muscle strength and motor control.  Elisa Is progressing well towards her goals.   Pt prognosis is Good.     Pt will continue to benefit from skilled outpatient physical therapy to address the deficits listed in the problem list box on initial evaluation, provide pt/family education and to maximize pt's level of independence in the home and community environment.     Pt's spiritual, cultural and educational needs considered and pt agreeable to plan of care and goals.     Anticipated barriers to physical therapy: none    Goals: Short Term Goals: 3 weeks   Pt will report 8/10 pain at rest   Pt will be able to perform at sit to stand with no UE use  Pt will be independent with HEP      Long Term Goals: 6 weeks   Pt will report 7/10 pain   Pt will have 4/5 LE MMTs   Pt will be able to perform a TUG in under 13.5 seconds     PLAN     Emphasize quad control.    Farhana Olguin, PTA

## 2022-10-24 ENCOUNTER — TELEPHONE (OUTPATIENT)
Dept: REHABILITATION | Facility: HOSPITAL | Age: 57
End: 2022-10-24
Payer: MEDICAID

## 2022-10-24 ENCOUNTER — DOCUMENTATION ONLY (OUTPATIENT)
Dept: REHABILITATION | Facility: HOSPITAL | Age: 57
End: 2022-10-24
Payer: MEDICAID

## 2022-10-24 NOTE — PROGRESS NOTES
Physical Therapy: No show/Cancellation of Visit  Date: 10/24/2022    Patient was a no show to today's PT appointment. Patient's next scheduled appointment is 10/25/2022.     Initial Evaluation: 9/27/2022  Plan of Care Explanation: 11/9/2022  Cancel: 2  No show: 2    Therapist: Farhana Olguin PTA

## 2022-10-24 NOTE — TELEPHONE ENCOUNTER
LVM for patient regarding no show to today's PT appointment on 10/24/2022. Stated date and time for upcoming appointment on Tuesday, 10/25/2022 as a reminder. Left clinic number for call back if needed.    Farhana Olguin, PTA  10/24/2022

## 2022-10-25 ENCOUNTER — TELEPHONE (OUTPATIENT)
Dept: REHABILITATION | Facility: HOSPITAL | Age: 57
End: 2022-10-25
Payer: MEDICAID

## 2022-10-25 NOTE — TELEPHONE ENCOUNTER
Patient returned phone call to therapist. She reported missing therapy yesterday and having to cancel today secondary to a family emergency. She will return next week on Monday, October 31st at 12pm.    Farhana Olguin, PTA  10/25/2022

## 2022-10-31 ENCOUNTER — CLINICAL SUPPORT (OUTPATIENT)
Dept: REHABILITATION | Facility: HOSPITAL | Age: 57
End: 2022-10-31
Payer: MEDICAID

## 2022-10-31 DIAGNOSIS — Z74.09 DECREASED MOBILITY AND ENDURANCE: ICD-10-CM

## 2022-10-31 DIAGNOSIS — R29.898 DECREASED STRENGTH OF LOWER EXTREMITY: Primary | ICD-10-CM

## 2022-10-31 PROCEDURE — 97110 THERAPEUTIC EXERCISES: CPT | Mod: CQ

## 2022-10-31 NOTE — PROGRESS NOTES
[facility]   Physical Therapy Treatment Note     Name: Yayo Carver  [facility] Number: [id number]    Therapy Diagnosis:   Encounter Diagnoses   Name Primary?    Decreased strength of lower extremity Yes    Decreased mobility and endurance        Physician: Jonathon Shrestha MD    Visit Date: 10/31/2022    Physician Orders: PT Eval and Treat   Medical Diagnosis from Referral: M17.12 (ICD-10-CM) - Primary osteoarthritis of left knee   Evaluation Date: 9/27/2022  Authorization Period Expiration: 11/09/2022  Plan of Care Expiration: 11/9/2022  Progress Note Due: 10/19/2022  Visit # / Visits authorized: 4 / 20 + eval     PTA Visit #: 2 / 5     Time In: 1215; presented 15 minutes late secondary to filling out FOTO survey  Time Out: 1305  Total Treatment Time: 50 minutes  Total Billable Time: 40 minutes (3 TE)    Precautions: Standard and Diabetes     SUBJECTIVE     Patient reports: that her knee is hurting her secondary to 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  She  was somewhat  compliant with home exercise program.  Response to previous treatment: fair; appropriate muscle response  Functional change: none to note today    Pain: 8/10, currently  Location: Left knee and Right hip    Patient goals: Feel better, ease the pain     OBJECTIVE     Objective Measures updated at progress report unless specified.     Treatment     Elisa received the treatments listed below:      THERAPEUTIC EXERCISES to develop strength, endurance, ROM, flexibility, posture, and core stabilization for 40 minutes including;  *All exercises performed bilaterally.  Aerobic Activity to help improve mobility and LE strength; Nustep for 10 minutes, Level 4    Quad sets with towel under knee; 5 second hold, 3 x 10 reps  Quad sets with heel prop; 5 second hold, 3 x 10 reps - NT  Short arc quads with medium bolster; 5 second hold, 2 x 10 reps   - 2lb AW on LLE; 4 lb AW on RLE  Short arc quads with small bolster; 5 second hold, 3  x 10 reps - NT  Gastroc/Heel cord stretch; 30 second hold, 3 reps - NT  Long arc quads at EOM; 5 second hold, 2 x 10 reps    - 2lb AW on LLE; 4lb AW on RLE    SLR with quad set; 4 x 5 reps  SL Clamshells + Green TB; 2 x 10 reps    +Shuttle DL Squats; add next visit  +Step ups; add next visit  +Standing hip abduction; add next visit  +Standing TKE; add next visit    cold pack for 10 minutes to B knees post session.     Patient Education and Home Exercises     Home Exercises Provided and Patient Education Provided     Education provided:   - Continued compliance with HEP    Written Home Exercises Provided: Patient instructed to cont prior HEP. Exercises were reviewed and Elisa was able to demonstrate them prior to the end of the session.  Elisa demonstrated good  understanding of the education provided. See EMR under Patient Instructions for exercises provided during therapy sessions    ASSESSMENT     Shortened treatment session performed today secondary to patients late presentation and breaks during session. Added weight to short arc and long arc quads today. Increased difficulty with 2lb AW on Left LE, however was able to use 4lb AW on Right LE. Consider progression to closed chain exercises in future visits.  Elisa Is progressing well towards her goals.   Pt prognosis is Good.     Pt will continue to benefit from skilled outpatient physical therapy to address the deficits listed in the problem list box on initial evaluation, provide pt/family education and to maximize pt's level of independence in the home and community environment.     Pt's spiritual, cultural and educational needs considered and pt agreeable to plan of care and goals.     Anticipated barriers to physical therapy: none    Goals: Short Term Goals: 3 weeks   Pt will report 8/10 pain at rest   Pt will be able to perform at sit to stand with no UE use  Pt will be independent with HEP      Long Term Goals: 6 weeks   Pt will report 7/10 pain   Pt will  have 4/5 LE MMTs   Pt will be able to perform a TUG in under 13.5 seconds     PLAN     Emphasize quad control.    Farhana Olguin, PTA

## 2022-11-01 ENCOUNTER — PATIENT MESSAGE (OUTPATIENT)
Dept: ADMINISTRATIVE | Facility: OTHER | Age: 57
End: 2022-11-01
Payer: MEDICAID

## 2022-11-07 ENCOUNTER — TELEPHONE (OUTPATIENT)
Dept: REHABILITATION | Facility: HOSPITAL | Age: 57
End: 2022-11-07
Payer: MEDICAID

## 2022-11-07 ENCOUNTER — PATIENT MESSAGE (OUTPATIENT)
Dept: ADMINISTRATIVE | Facility: OTHER | Age: 57
End: 2022-11-07
Payer: MEDICAID

## 2022-11-07 NOTE — TELEPHONE ENCOUNTER
Pt called on 11/4/2022. Due to pt's schedule, pt asked if 1x a week would be better for her. Pt in agreement, next visit will be 11/8/2022.     Meghan Rivera, PT, DPT, CLT

## 2022-11-11 ENCOUNTER — PATIENT MESSAGE (OUTPATIENT)
Dept: ORTHOPEDICS | Facility: CLINIC | Age: 57
End: 2022-11-11
Payer: MEDICAID

## 2022-11-14 ENCOUNTER — PATIENT MESSAGE (OUTPATIENT)
Dept: ADMINISTRATIVE | Facility: OTHER | Age: 57
End: 2022-11-14
Payer: MEDICAID

## 2022-11-21 ENCOUNTER — PATIENT MESSAGE (OUTPATIENT)
Dept: ADMINISTRATIVE | Facility: OTHER | Age: 57
End: 2022-11-21
Payer: MEDICAID

## 2022-11-21 ENCOUNTER — OFFICE VISIT (OUTPATIENT)
Dept: ORTHOPEDICS | Facility: CLINIC | Age: 57
End: 2022-11-21
Payer: MEDICAID

## 2022-11-21 VITALS — BODY MASS INDEX: 42.59 KG/M2 | HEIGHT: 65 IN | WEIGHT: 255.63 LBS

## 2022-11-21 DIAGNOSIS — M17.11 PRIMARY OSTEOARTHRITIS OF RIGHT KNEE: Primary | ICD-10-CM

## 2022-11-21 PROCEDURE — 99999 PR PBB SHADOW E&M-EST. PATIENT-LVL III: CPT | Mod: PBBFAC,,, | Performed by: NURSE PRACTITIONER

## 2022-11-21 PROCEDURE — 3066F NEPHROPATHY DOC TX: CPT | Mod: CPTII,,, | Performed by: NURSE PRACTITIONER

## 2022-11-21 PROCEDURE — 1159F MED LIST DOCD IN RCRD: CPT | Mod: CPTII,,, | Performed by: NURSE PRACTITIONER

## 2022-11-21 PROCEDURE — 20610 DRAIN/INJ JOINT/BURSA W/O US: CPT | Mod: PBBFAC,50 | Performed by: NURSE PRACTITIONER

## 2022-11-21 PROCEDURE — 3051F HG A1C>EQUAL 7.0%<8.0%: CPT | Mod: CPTII,,, | Performed by: NURSE PRACTITIONER

## 2022-11-21 PROCEDURE — 3061F NEG MICROALBUMINURIA REV: CPT | Mod: CPTII,,, | Performed by: NURSE PRACTITIONER

## 2022-11-21 PROCEDURE — 20610 PR DRAIN/INJECT LARGE JOINT/BURSA: ICD-10-PCS | Mod: S$PBB,50,, | Performed by: NURSE PRACTITIONER

## 2022-11-21 PROCEDURE — 20610 DRAIN/INJ JOINT/BURSA W/O US: CPT | Mod: S$PBB,50,, | Performed by: NURSE PRACTITIONER

## 2022-11-21 PROCEDURE — 99999 PR PBB SHADOW E&M-EST. PATIENT-LVL III: ICD-10-PCS | Mod: PBBFAC,,, | Performed by: NURSE PRACTITIONER

## 2022-11-21 PROCEDURE — 3008F BODY MASS INDEX DOCD: CPT | Mod: CPTII,,, | Performed by: NURSE PRACTITIONER

## 2022-11-21 PROCEDURE — 99499 UNLISTED E&M SERVICE: CPT | Mod: S$PBB,,, | Performed by: NURSE PRACTITIONER

## 2022-11-21 PROCEDURE — 3066F PR DOCUMENTATION OF TREATMENT FOR NEPHROPATHY: ICD-10-PCS | Mod: CPTII,,, | Performed by: NURSE PRACTITIONER

## 2022-11-21 PROCEDURE — 99213 OFFICE O/P EST LOW 20 MIN: CPT | Mod: PBBFAC | Performed by: NURSE PRACTITIONER

## 2022-11-21 PROCEDURE — 3008F PR BODY MASS INDEX (BMI) DOCUMENTED: ICD-10-PCS | Mod: CPTII,,, | Performed by: NURSE PRACTITIONER

## 2022-11-21 PROCEDURE — 3051F PR MOST RECENT HEMOGLOBIN A1C LEVEL 7.0 - < 8.0%: ICD-10-PCS | Mod: CPTII,,, | Performed by: NURSE PRACTITIONER

## 2022-11-21 PROCEDURE — 99499 NO LOS: ICD-10-PCS | Mod: S$PBB,,, | Performed by: NURSE PRACTITIONER

## 2022-11-21 PROCEDURE — 1160F RVW MEDS BY RX/DR IN RCRD: CPT | Mod: CPTII,,, | Performed by: NURSE PRACTITIONER

## 2022-11-21 PROCEDURE — 1159F PR MEDICATION LIST DOCUMENTED IN MEDICAL RECORD: ICD-10-PCS | Mod: CPTII,,, | Performed by: NURSE PRACTITIONER

## 2022-11-21 PROCEDURE — 1160F PR REVIEW ALL MEDS BY PRESCRIBER/CLIN PHARMACIST DOCUMENTED: ICD-10-PCS | Mod: CPTII,,, | Performed by: NURSE PRACTITIONER

## 2022-11-21 PROCEDURE — 3061F PR NEG MICROALBUMINURIA RESULT DOCUMENTED/REVIEW: ICD-10-PCS | Mod: CPTII,,, | Performed by: NURSE PRACTITIONER

## 2022-11-21 RX ADMIN — TRIAMCINOLONE ACETONIDE 40 MG: 40 INJECTION, SUSPENSION INTRA-ARTICULAR; INTRAMUSCULAR at 04:11

## 2022-11-22 ENCOUNTER — CLINICAL SUPPORT (OUTPATIENT)
Dept: REHABILITATION | Facility: HOSPITAL | Age: 57
End: 2022-11-22
Payer: MEDICAID

## 2022-11-22 DIAGNOSIS — R29.898 DECREASED STRENGTH OF LOWER EXTREMITY: ICD-10-CM

## 2022-11-22 DIAGNOSIS — R26.9 GAIT ABNORMALITY: Primary | ICD-10-CM

## 2022-11-22 PROCEDURE — 97110 THERAPEUTIC EXERCISES: CPT

## 2022-11-22 RX ORDER — TRIAMCINOLONE ACETONIDE 40 MG/ML
40 INJECTION, SUSPENSION INTRA-ARTICULAR; INTRAMUSCULAR
Status: COMPLETED | OUTPATIENT
Start: 2022-11-21 | End: 2022-11-21

## 2022-11-22 NOTE — PROGRESS NOTES
See evaluation in POC for goals and assessment     Eval Date: 11/28/2022    Meghan Rivera, PT, DPT, CLT

## 2022-11-23 DIAGNOSIS — E87.20 METABOLIC ACIDOSIS: ICD-10-CM

## 2022-11-23 NOTE — TELEPHONE ENCOUNTER
Refill Routing Note   Medication(s) are not appropriate for processing by [facility] for the following reason(s):      - Outside of protocol    [facility] action(s):  Route          Medication reconciliation completed: No     Appointments  past 12m or future 3m with PCP    Date Provider   Last Visit   8/2/2022 Berta Hastings MD   Next Visit   Visit date not found Berta Hastings MD   ED visits in past 90 days: 1        Note composed:11:53 AM 11/23/2022

## 2022-11-25 RX ORDER — SODIUM BICARBONATE 650 MG/1
650 TABLET ORAL 2 TIMES DAILY
Qty: 60 TABLET | Refills: 2 | Status: CANCELLED | OUTPATIENT
Start: 2022-11-25

## 2022-11-28 ENCOUNTER — PATIENT MESSAGE (OUTPATIENT)
Dept: ADMINISTRATIVE | Facility: OTHER | Age: 57
End: 2022-11-28
Payer: MEDICAID

## 2022-11-28 DIAGNOSIS — M79.605 PAIN OF LEFT LOWER EXTREMITY: ICD-10-CM

## 2022-11-28 DIAGNOSIS — Z01.818 PRE-OP TESTING: ICD-10-CM

## 2022-11-28 DIAGNOSIS — E08.65 DIABETES MELLITUS DUE TO UNDERLYING CONDITION WITH HYPERGLYCEMIA, WITHOUT LONG-TERM CURRENT USE OF INSULIN: Primary | ICD-10-CM

## 2022-11-28 NOTE — ASSESSMENT & PLAN NOTE
Patient would benefit from weight loss and has set goals to achieve success.   S/P Gastric Sleeve 6/2022 at [facility]; last seen by outside Bariatric Clinic 7/21/22. She has lost around 60 lbs since surgery.  Currently on vitamin B-complex/MVI  Recommend avoiding NSAIDs and ASA use for the increased risk of ulcer development.

## 2022-11-28 NOTE — ASSESSMENT & PLAN NOTE
Admitted October 2021: high glucose levels with JIMMY and bicarb of 15.  Treated with IV fluids and started on Levemir as A1c was > 14.  New labs 10/17/22 are good; currently taking with sodium bicarb 650 mg twice daily.   Last seen by Nephrology 7/21/22- overdue for follow-up

## 2022-11-28 NOTE — ASSESSMENT & PLAN NOTE
Current BP  not at goal today.  Attributes to pain today and recent stress of son's health problems.   Taking: amlodipine  Clinic readings since February 2022: Systolic range  (128-181) and Diastolic range ()  No regular home BP checks  Keeping a healthy weight/BMI can help with better BP control    Lifestyle changes to reduce systolic BP:   exercise 30 minutes per day,  5 days per week or 150 minutes weekly; sodium reduction and avoidance of high salt foods such as processed meats, frozen meals and  fast foods.   I recommend monitoring BP during perioperative period as uncontrolled pain can elevate blood pressure.

## 2022-11-28 NOTE — ASSESSMENT & PLAN NOTE
Current labs:  Hgb-  11.6    Hct- 39.1; not currently on iron therapy.  Recommend monitoring during perioperative period.

## 2022-11-28 NOTE — ASSESSMENT & PLAN NOTE
Encouraged weight loss, healthy diet (DASH/Mediterranean) and exercise.   Patient should exercise 30 minutes at least five times weekly. Limit alcohol.  Treated with: atorvastatin 40 mg

## 2022-11-28 NOTE — PLAN OF CARE
[facility]   Physical Therapy Treatment Note / POC Update     Name: Yayo Carver  Clinic Number: [id number]    Therapy Diagnosis:   No diagnosis found.      Physician: Jonathon Shrestha MD    Visit Date: 2022    Physician Orders: PT Eval and Treat   Medical Diagnosis from Referral: M17.12 (ICD-10-CM) - Primary osteoarthritis of left knee   Evaluation Date: 2022  Authorization Period Expiration: 2022  Plan of Care Expiration: 2022  Progress Note Due: 2022  Visit # / Visits authorized:  + eval     PTA Visit #: 0 / 5     Time In: 11:00am  Time Out: 11:50am  Total Treatment Time: 50 minutes  Total Billable Time: 30 minutes (2 TE)    Precautions: Standard and Diabetes     SUBJECTIVE     Patient reports: Her knee has been feeling much better. She is planning on taking a long break before her surgery from work as she has frequent flare ups with prolonged standing. She saw Meghan King recently and had fluid aspirated from her knee, which has helped significantly with pain   She was somewhat compliant with home exercise program.  Response to previous treatment: fair; appropriate muscle response  Functional change: none to note today    Pain: 0/10, currently  Location: Left knee and Right hip    Patient goals: Feel better, ease the pain     OBJECTIVE     TU seconds   Pt able to perform a sit to stand with no UE use after some cueing from PT, mostly due to fear avoidance. Able to perform 5 sit to stands afterwards  LE MMTs: 4/5     Treatment     Elisa received the treatments listed below:      THERAPEUTIC EXERCISES to develop strength, endurance, ROM, flexibility, posture, and core stabilization for 30 minutes including;  *All exercises performed bilaterally.    HEP Prehab:   Supine quad sets   Heel slides   Supine SLR   Knee extension stretch       cold pack for 10 minutes to B knees post session.     Patient Education and Home Exercises     Home  Exercises Provided and Patient Education Provided     Education provided:   - Continued compliance with HEP  - Expectations after surgery, importance and purpose of HEP, Goals for rehab after surgery    Written Home Exercises Provided: Patient instructed to cont prior HEP. Exercises were reviewed and Elisa was able to demonstrate them prior to the end of the session.  Elisa demonstrated good  understanding of the education provided. See EMR under Patient Instructions for exercises provided during therapy sessions    ASSESSMENT     Pt presents to clinic with reports of decreased pain since fluid was removed from her knee. Measurements taken today show an increase in function, strength, and mobility since starting therapy. Pt and therapist reviewed purpose of prehab, goals after surgery, and importance of therapy after surgery. Pt was given the TKA prehab HEP which she was able to perform in clinic. Pt in agreement for one additional follow up before surgery to confirm independence with HEP     Elisa Is progressing well towards her goals.   Pt prognosis is Good.     Pt will continue to benefit from skilled outpatient physical therapy to address the deficits listed in the problem list box on initial evaluation, provide pt/family education and to maximize pt's level of independence in the home and community environment.     Pt's spiritual, cultural and educational needs considered and pt agreeable to plan of care and goals.     Anticipated barriers to physical therapy: none    Goals: Short Term Goals: 3 weeks   Pt will report 8/10 pain at rest MET   Pt will be able to perform at sit to stand with no UE use MET   Pt will be independent with HEP MET      Long Term Goals: 6 weeks   Pt will report 7/10 pain MET   Pt will have 4/5 LE MMTs MET   Pt will be able to perform a TUG in under 13.5 seconds MET 13 seconds \    NEW STGs:   Pt will be independent with HEP     PLAN     One additional follow up to confirm HEP     Meghan  Rivera, PT

## 2022-11-28 NOTE — ASSESSMENT & PLAN NOTE
Not currently treated due to recent gastric sleeve.      Most recent A1c is 5.8 .    No regular accuchecks.      Reports peripheral neuropathy- left hand only.   Denies visual changes. Up to date with eye exams.  Micro changes: Denies- Retinopathy, Nephropathy   Macro changes: Denies-  Carotid, Coronary , Peripheral disease     Maintain healthy weight. Exercise at least 150 minutes weekly. Encouraged diet rich in nutrients such as fruits, vegetables, and whole grains; reduce sugar intake from cakes, candy, and sugared drinks.

## 2022-11-28 NOTE — ASSESSMENT & PLAN NOTE
Currently being treated with Protonix 40 mg, controlled.  Encouraged to elevate HOB and avoid laying down for 2-3 hours after meals.

## 2022-11-28 NOTE — ANESTHESIA PAT ROS NOTE
11/28/2022  Yayo Carver is a 56 y.o., female.      Pre-op Assessment          Review of Systems         Anesthesia Assessment: Preoperative EQUATION    Planned Procedure: Procedure(s) (LRB):  ARTHROPLASTY, KNEE, TOTAL: LEFT: DEPUY - ATTUNE (Left)  Requested Anesthesia Type:Regional  Surgeon: Jc Padgett III, MD  Service: Orthopedics  Known or anticipated Date of Surgery:12/19/2022    Surgeon notes: reviewed    Previous anesthesia records: Outside record available in anesthesia tab-GASTRIC SLEEVE 6/16/22 GET[facility]    Last PCP note: within [facility] , Dr Hastings 8/2/22  Subspecialty notes: Nephrology, PM&R, outside Bariatric notes Dr Estevez 7/21/22 in Care Everywhere    Other important co-morbidities: DM2, GERD, HLD, HTN, and CKD 2, s/p gastric sleeve 6/16/22, lumbar DDD, anemia       Tests already available:   CMP, CBC 10/17/22, XR Lumbar spine 10/7/22, outside EKG (no tracing) in Care Everywhere 5/24/22   (will repeat per guidelines), CT Lumbar spine 7/8/19, ECHO 2/2/16            Plan:    Testing:  A1C, EKG, and PT/INR   Pre-anesthesia  visit       Visit focus: possible regional anesthesia and/or nerve block      Consultation: POC NP for anesthesia and medical optimization       Navigation: Tests Scheduled.              Consults scheduled.             Results will be tracked by Preop Clinic.    Addendum 12/1/22 per SHAHNAZ Batista: Patient is optimized for surgery.   A1C=5.8/bg

## 2022-11-29 ENCOUNTER — HOSPITAL ENCOUNTER (OUTPATIENT)
Dept: CARDIOLOGY | Facility: CLINIC | Age: 57
Discharge: HOME | End: 2022-11-29
Payer: MEDICAID

## 2022-11-29 ENCOUNTER — OFFICE VISIT (OUTPATIENT)
Dept: ORTHOPEDICS | Facility: CLINIC | Age: 57
End: 2022-11-29
Payer: MEDICAID

## 2022-11-29 ENCOUNTER — OFFICE VISIT (OUTPATIENT)
Dept: INTERNAL MEDICINE | Facility: CLINIC | Age: 57
End: 2022-11-29
Payer: MEDICAID

## 2022-11-29 ENCOUNTER — HOSPITAL ENCOUNTER (OUTPATIENT)
Dept: RADIOLOGY | Facility: HOSPITAL | Age: 57
Discharge: HOME | End: 2022-11-29
Attending: NURSE PRACTITIONER
Payer: MEDICAID

## 2022-11-29 VITALS
WEIGHT: 246 LBS | HEART RATE: 101 BPM | HEIGHT: 66 IN | SYSTOLIC BLOOD PRESSURE: 166 MMHG | BODY MASS INDEX: 39.53 KG/M2 | TEMPERATURE: 99 F | DIASTOLIC BLOOD PRESSURE: 97 MMHG | OXYGEN SATURATION: 98 %

## 2022-11-29 VITALS — HEIGHT: 66 IN | WEIGHT: 246 LBS | BODY MASS INDEX: 39.53 KG/M2

## 2022-11-29 DIAGNOSIS — E66.01 MORBID OBESITY: ICD-10-CM

## 2022-11-29 DIAGNOSIS — Z01.818 PRE-OP TESTING: ICD-10-CM

## 2022-11-29 DIAGNOSIS — E78.2 MIXED HYPERLIPIDEMIA: Chronic | ICD-10-CM

## 2022-11-29 DIAGNOSIS — E87.20 METABOLIC ACIDOSIS: ICD-10-CM

## 2022-11-29 DIAGNOSIS — M25.562 LEFT KNEE PAIN, UNSPECIFIED CHRONICITY: Primary | ICD-10-CM

## 2022-11-29 DIAGNOSIS — M25.562 LEFT KNEE PAIN, UNSPECIFIED CHRONICITY: ICD-10-CM

## 2022-11-29 DIAGNOSIS — E11.65 TYPE 2 DIABETES MELLITUS WITH HYPERGLYCEMIA, WITH LONG-TERM CURRENT USE OF INSULIN: ICD-10-CM

## 2022-11-29 DIAGNOSIS — Z79.4 TYPE 2 DIABETES MELLITUS WITH HYPERGLYCEMIA, WITH LONG-TERM CURRENT USE OF INSULIN: ICD-10-CM

## 2022-11-29 DIAGNOSIS — M17.12 PRIMARY OSTEOARTHRITIS OF LEFT KNEE: Primary | ICD-10-CM

## 2022-11-29 DIAGNOSIS — D50.9 IRON DEFICIENCY ANEMIA, UNSPECIFIED IRON DEFICIENCY ANEMIA TYPE: ICD-10-CM

## 2022-11-29 DIAGNOSIS — M17.12 PRIMARY OSTEOARTHRITIS OF LEFT KNEE: ICD-10-CM

## 2022-11-29 DIAGNOSIS — I10 ESSENTIAL HYPERTENSION: ICD-10-CM

## 2022-11-29 PROCEDURE — 3066F PR DOCUMENTATION OF TREATMENT FOR NEPHROPATHY: ICD-10-PCS | Mod: CPTII,,, | Performed by: NURSE PRACTITIONER

## 2022-11-29 PROCEDURE — 3077F SYST BP >= 140 MM HG: CPT | Mod: CPTII,,, | Performed by: NURSE PRACTITIONER

## 2022-11-29 PROCEDURE — 3066F NEPHROPATHY DOC TX: CPT | Mod: CPTII,,, | Performed by: NURSE PRACTITIONER

## 2022-11-29 PROCEDURE — 99214 OFFICE O/P EST MOD 30 MIN: CPT | Mod: S$PBB,,, | Performed by: NURSE PRACTITIONER

## 2022-11-29 PROCEDURE — 3080F DIAST BP >= 90 MM HG: CPT | Mod: CPTII,,, | Performed by: NURSE PRACTITIONER

## 2022-11-29 PROCEDURE — 99214 OFFICE O/P EST MOD 30 MIN: CPT | Mod: PBBFAC | Performed by: NURSE PRACTITIONER

## 2022-11-29 PROCEDURE — 3061F PR NEG MICROALBUMINURIA RESULT DOCUMENTED/REVIEW: ICD-10-PCS | Mod: CPTII,,, | Performed by: NURSE PRACTITIONER

## 2022-11-29 PROCEDURE — 3008F PR BODY MASS INDEX (BMI) DOCUMENTED: ICD-10-PCS | Mod: CPTII,,, | Performed by: NURSE PRACTITIONER

## 2022-11-29 PROCEDURE — 3008F BODY MASS INDEX DOCD: CPT | Mod: CPTII,,, | Performed by: NURSE PRACTITIONER

## 2022-11-29 PROCEDURE — 73560 X-RAY EXAM OF KNEE 1 OR 2: CPT | Mod: TC,LT

## 2022-11-29 PROCEDURE — 1159F MED LIST DOCD IN RCRD: CPT | Mod: CPTII,,, | Performed by: NURSE PRACTITIONER

## 2022-11-29 PROCEDURE — 93005 ELECTROCARDIOGRAM TRACING: CPT | Mod: PBBFAC | Performed by: INTERNAL MEDICINE

## 2022-11-29 PROCEDURE — 3051F PR MOST RECENT HEMOGLOBIN A1C LEVEL 7.0 - < 8.0%: ICD-10-PCS | Mod: CPTII,,, | Performed by: NURSE PRACTITIONER

## 2022-11-29 PROCEDURE — 99999 PR PBB SHADOW E&M-EST. PATIENT-LVL IV: CPT | Mod: PBBFAC,,, | Performed by: NURSE PRACTITIONER

## 2022-11-29 PROCEDURE — 93010 ELECTROCARDIOGRAM REPORT: CPT | Mod: S$PBB,,, | Performed by: INTERNAL MEDICINE

## 2022-11-29 PROCEDURE — 99999 PR PBB SHADOW E&M-EST. PATIENT-LVL III: CPT | Mod: PBBFAC,,, | Performed by: NURSE PRACTITIONER

## 2022-11-29 PROCEDURE — 73560 X-RAY EXAM OF KNEE 1 OR 2: CPT | Mod: 26,LT,, | Performed by: RADIOLOGY

## 2022-11-29 PROCEDURE — 3080F PR MOST RECENT DIASTOLIC BLOOD PRESSURE >= 90 MM HG: ICD-10-PCS | Mod: CPTII,,, | Performed by: NURSE PRACTITIONER

## 2022-11-29 PROCEDURE — 3061F NEG MICROALBUMINURIA REV: CPT | Mod: CPTII,,, | Performed by: NURSE PRACTITIONER

## 2022-11-29 PROCEDURE — 99999 PR PBB SHADOW E&M-EST. PATIENT-LVL III: ICD-10-PCS | Mod: PBBFAC,,, | Performed by: NURSE PRACTITIONER

## 2022-11-29 PROCEDURE — 99214 PR OFFICE/OUTPT VISIT, EST, LEVL IV, 30-39 MIN: ICD-10-PCS | Mod: S$PBB,,, | Performed by: NURSE PRACTITIONER

## 2022-11-29 PROCEDURE — 1160F RVW MEDS BY RX/DR IN RCRD: CPT | Mod: CPTII,,, | Performed by: NURSE PRACTITIONER

## 2022-11-29 PROCEDURE — 3044F PR MOST RECENT HEMOGLOBIN A1C LEVEL <7.0%: ICD-10-PCS | Mod: CPTII,,, | Performed by: NURSE PRACTITIONER

## 2022-11-29 PROCEDURE — 3044F HG A1C LEVEL LT 7.0%: CPT | Mod: CPTII,,, | Performed by: NURSE PRACTITIONER

## 2022-11-29 PROCEDURE — 99213 OFFICE O/P EST LOW 20 MIN: CPT | Mod: PBBFAC,27 | Performed by: NURSE PRACTITIONER

## 2022-11-29 PROCEDURE — 3051F HG A1C>EQUAL 7.0%<8.0%: CPT | Mod: CPTII,,, | Performed by: NURSE PRACTITIONER

## 2022-11-29 PROCEDURE — 73560 XR KNEE 1 OR 2 VIEW LEFT: ICD-10-PCS | Mod: 26,LT,, | Performed by: RADIOLOGY

## 2022-11-29 PROCEDURE — 1160F PR REVIEW ALL MEDS BY PRESCRIBER/CLIN PHARMACIST DOCUMENTED: ICD-10-PCS | Mod: CPTII,,, | Performed by: NURSE PRACTITIONER

## 2022-11-29 PROCEDURE — 93010 EKG 12-LEAD: ICD-10-PCS | Mod: S$PBB,,, | Performed by: INTERNAL MEDICINE

## 2022-11-29 PROCEDURE — 99999 PR PBB SHADOW E&M-EST. PATIENT-LVL IV: ICD-10-PCS | Mod: PBBFAC,,, | Performed by: NURSE PRACTITIONER

## 2022-11-29 PROCEDURE — 3077F PR MOST RECENT SYSTOLIC BLOOD PRESSURE >= 140 MM HG: ICD-10-PCS | Mod: CPTII,,, | Performed by: NURSE PRACTITIONER

## 2022-11-29 PROCEDURE — 1159F PR MEDICATION LIST DOCUMENTED IN MEDICAL RECORD: ICD-10-PCS | Mod: CPTII,,, | Performed by: NURSE PRACTITIONER

## 2022-11-29 NOTE — PRE-PROCEDURE INSTRUCTIONS
Medication and surgery instructions were given to patient verbally and hard copy given to patient. Patient verbalized understanding.

## 2022-11-29 NOTE — PROGRESS NOTES
[facility]  Progress Note    Patient Name: Yayo Carver  MRN: [id number]  Date of Evaluation- 11/30/2022  PCP- Berta Hastings MD    Future cases for Elisa Carver [[id number]]       Case ID Status Date Time Naveen Procedure Provider Location    [id number] [facility] 12/19/2022 10:28  ARTHROPLASTY, KNEE, TOTAL: LEFT: DEPUY - ATTUNE Jc Padgett III, MD [[id number]] [facility]            HPI:  This is a 56 y.o. female  who presents today for a preoperative evaluation in preparation for an Orthopedic  procedure.  Scheduled for  left knee arthroplasty.   Surgery is indicated for osteoarthritis of left knee .   Patient is new to me.  Details of current problem: The duration of problem is 9 years. Reports remote history of trauma; she tripped on stairwell and landed on left knee. She has tried multiple steroid injections in past with minimal relief.      Reports symptoms of  intermittent throbbing pain to left knee with stiffness.  Aggravating factors include:  standing, sitting and decreased ADLs.   Relieving factors are  walking- movement and heat/ice therapy.   Reports pain: 5/10 to left knee today; uses knee sleeve for support.     The history has been obtained by speaking with the patient and reviewing the electronic medical record and/or outside health information. Significant health conditions for the perioperative period are discussed below in assessment and plan.     Patient reports current health status to be Good.  Denies any new symptoms before surgery.        Subjective/ Objective:     Chief Complaint: Preoperative evaulation, perioperative medical management, and complication reduction plan.     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       Anesthesia issues: cough with GETA (gastric sleeve).    Difficulty mouth opening: No    Steroid use in the last 12 months: Yes to right knee     Dental Issues:  No    Family anesthesia difficulty: Father- did not come out of anesthesia after I&D- passed away.        Family Hx of Thrombosis: None    Past Medical History:   Diagnosis Date    Arthritis     Diabetes mellitus     Diabetes mellitus, type 2     GERD (gastroesophageal reflux disease)     HLD (hyperlipidemia)     Hypertension          Past Medical History Pertinent Negatives:   Diagnosis Date Noted    Amblyopia 2018    Anxiety 2022    Asthma 2022    Cataract 2018    CHF (congestive heart failure) 2022    COPD (chronic obstructive pulmonary disease) 2022    Coronary artery disease 2022    Deep vein thrombosis 2022    Depression 2022    Diabetic retinopathy 2018    Glaucoma 2018    Macular degeneration 2018    Myocardial infarction 2022    Pulmonary embolism 2022    Retinal detachment 2018    Sickle cell anemia 2018    Sickle cell trait 2018    Strabismus 2018    Thyroid disease 2022    Uveitis 2018         Past Surgical History:   Procedure Laterality Date    BARIATRIC SURGERY  2022    Gastric sleeve     SECTION  1989    COLONOSCOPY N/A 2017    Procedure: COLONOSCOPY;  Surgeon: Evelin Arceo MD;  Location: [address]);  Service: Endoscopy;  Laterality: N/A;  2 nd floor d/t BMI > 50 kg/m3    KNEE ARTHROSCOPY Right     TONSILLECTOMY      TUBAL LIGATION         Review of Systems   Constitutional:  Negative for chills, fatigue, fever and unexpected weight change.   HENT:  Negative for dental problem, hearing loss, postnasal drip, rhinorrhea, sore throat, tinnitus and trouble swallowing.    Eyes:  Negative for photophobia, pain, discharge and visual disturbance.   Respiratory:  Negative for apnea, cough, chest tightness, shortness of breath and wheezing.         STOP BANG risk factors:  Snoring  HTN  BMI > 35     Cardiovascular:  Negative for chest pain, palpitations  "and leg swelling.   Gastrointestinal:  Negative for abdominal pain, blood in stool, constipation, nausea and vomiting.        Denies Fatty liver, Hepatitis   Endocrine: Negative for cold intolerance and heat intolerance.   Genitourinary:  Negative for decreased urine volume, difficulty urinating, dysuria, frequency, hematuria and urgency.   Musculoskeletal:  Positive for arthralgias (bilateral knee). Negative for neck pain and neck stiffness.   Skin:  Negative for rash and wound.   Neurological:  Positive for numbness (left hand). Negative for dizziness, tremors, seizures, syncope, weakness and headaches.   Hematological:  Negative for adenopathy. Does not bruise/bleed easily.   Psychiatric/Behavioral:  Negative for confusion, sleep disturbance and suicidal ideas.             VITALS  Visit Vitals  BP (!) 166/97 (BP Location: Left arm, Patient Position: Sitting)   Pulse 101   Temp 99.2 °F (37.3 °C) (Oral)   Ht 5' 6" (1.676 m)   Wt 111.6 kg (246 lb)   SpO2 98%   BMI 39.71 kg/m²          Physical Exam  Vitals reviewed.   Constitutional:       General: She is not in acute distress.     Appearance: She is well-developed. She is obese.   HENT:      Head: Normocephalic.      Nose: Nose normal.      Mouth/Throat:      Pharynx: No oropharyngeal exudate.   Eyes:      General:         Right eye: No discharge.         Left eye: No discharge.      Conjunctiva/sclera: Conjunctivae normal.      Pupils: Pupils are equal, round, and reactive to light.   Neck:      Thyroid: No thyromegaly.      Vascular: No carotid bruit or JVD.      Trachea: No tracheal deviation.   Cardiovascular:      Rate and Rhythm: Normal rate and regular rhythm.      Pulses:           Carotid pulses are 2+ on the right side and 2+ on the left side.       Dorsalis pedis pulses are 2+ on the right side and 2+ on the left side.        Posterior tibial pulses are 2+ on the right side and 2+ on the left side.      Heart sounds: Normal heart sounds. No murmur " heard.  Pulmonary:      Effort: Pulmonary effort is normal. No respiratory distress.      Breath sounds: Normal breath sounds. No stridor. No wheezing, rhonchi or rales.   Abdominal:      General: Bowel sounds are normal. There is no distension.      Palpations: Abdomen is soft.      Tenderness: There is no abdominal tenderness. There is no guarding.   Musculoskeletal:      Cervical back: Normal range of motion. No pain with movement.      Right lower leg: No edema.      Left lower leg: No edema.   Lymphadenopathy:      Cervical: No cervical adenopathy.   Skin:     General: Skin is warm and dry.      Capillary Refill: Capillary refill takes less than 2 seconds.      Findings: No erythema or rash.   Neurological:      Mental Status: She is alert and oriented to person, place, and time.      Coordination: Coordination normal.        Significant Labs:  Lab Results   Component Value Date    WBC 6.71 10/17/2022    HGB 11.6 (L) 10/17/2022    HCT 39.1 10/17/2022     10/17/2022    CHOL 133 05/06/2021    TRIG 89 05/06/2021    HDL 44 05/06/2021    ALT 18 10/17/2022    ALT 18 10/17/2022    AST 20 10/17/2022    AST 20 10/17/2022     10/17/2022    K 4.3 10/17/2022     10/17/2022    CREATININE 0.9 10/17/2022    BUN 14 10/17/2022    CO2 27 10/17/2022    TSH 1.832 10/13/2021    INR 0.9 11/29/2022    HGBA1C 5.8 (H) 11/29/2022       Diagnostic Studies: No relevant studies.    EKG:   Results for orders placed or performed during the hospital encounter of 11/29/22   EKG 12-lead    Collection Time: 11/29/22  2:41 PM    Narrative    Test Reason : Z01.818,    Vent. Rate : 093 BPM     Atrial Rate : 093 BPM     P-R Int : 120 ms          QRS Dur : 076 ms      QT Int : 336 ms       P-R-T Axes : 076 024 055 degrees     QTc Int : 417 ms    Normal sinus rhythm  Normal ECG  When compared with ECG of 12-OCT-2021 00:05,  No significant change was found  Confirmed by Danita Altamirano MD ([id number]) on 11/30/2022 9:06:10 AM    Referred By:  MAKI APOLONIA           Confirmed By:Danita Altamirano MD       2D ECHO: old 2016  TTE:  CONCLUSIONS     1 - Normal left ventricular systolic function (EF 60-65%).     2 - Left ventricular diastolic dysfunction.     3 - Moderate left atrial enlargement.     4 - Normal right ventricular systolic function .     5 - Mild mitral regurgitation.         This document has been electronically    SIGNED BY: Hugo Rogers MD On: 02/02/2016 20:27       Imaging   Xray L-Spine 10/7/22  FINDINGS:  There is grade 1 anterolisthesis of L3 on L4.  Lumbar spine alignment otherwise appears within normal limits.  No evidence of acute lumbar spine fracture or subluxation.  Extensive multilevel degenerative changes are seen throughout the lumbar spine with intervertebral disc space narrowing and degenerative endplate changes seen most pronounced at L3-4.  There is lower lumbar facet arthropathy.  Visualized sacrum is normal in appearance.     Impression:     No acute lumbar spine abnormalities identified.  Multilevel lumbar DJD.        Electronically signed by: Astrid Kowalski MD  Date:                                            10/07/2022  Time:                                           01:22    CT L-Spine 7/8/19  Impression:     No evidence of acute fracture of the lumbar spine.     2 mm anterolisthesis of L3 on L4.  The findings are most likely on the basis of degenerative changes.     Advanced multilevel degenerative changes of the lumbar spine.  Follow-up with MRI of the lumbar spine may be obtained, as clinically warranted.        Electronically signed by: Nikko Willingham MD  Date:                                            07/08/2019  Time:                                           17:21      Active Cardiac Conditions: None      Revised Cardiac Risk Index   High -Risk Surgery  Intraperitoneal; Intrathoracic; suprainguinal vascular Yes- + 1 No- 0   History of Ischemic Heart Disease   (Hx of MI/positive exercise test/current chest pain due  to ischemia/use of nitrate therapy/EKG with pathological Q waves) Yes- + 1 No- 0   History of CHF  (Pulmonary edema/bilateral rales or S3 gallop/PND/CXR showing pulmonary vascular redistribution) Yes- + 1 No- 0   History of CVA   (Prior stroke or TIA) Yes- + 1 No- 0   Pre-operative treatment with insulin Yes- + 1 No- 0   Pre-operative creatinine > 2mg/dl Yes- + 1 No- 0   Total: 0      Risk Status:  Estimated risk of cardiac complications after non-cardiac surgery using the Revised Cardiac Risk Index for Preoperative risk is 3.9 %      ARISCAT (Canet) risk index: Low: 1.6% risk of post-op pulmonary complications; Intermediate: 13.3% risk of post-op pulmonary complications if surgery is > 3 hours.     American Society of Anesthesiologists Physical Status classification (ASA): 3               No further cardiac workup needed prior to surgery.                 Assessment/Plan:     Mixed hyperlipidemia  Encouraged weight loss, healthy diet (DASH/Mediterranean) and exercise.   Patient should exercise 30 minutes at least five times weekly. Limit alcohol.  Treated with: atorvastatin 40 mg     Essential hypertension  Current BP  not at goal today.  Attributes to pain today and recent stress of son's health problems.   Taking: amlodipine  Clinic readings since February 2022: Systolic range  (128-181) and Diastolic range ()  No regular home BP checks  Keeping a healthy weight/BMI can help with better BP control    Lifestyle changes to reduce systolic BP:   exercise 30 minutes per day,  5 days per week or 150 minutes weekly; sodium reduction and avoidance of high salt foods such as processed meats, frozen meals and  fast foods.   I recommend monitoring BP during perioperative period as uncontrolled pain can elevate blood pressure.           Metabolic acidosis  Admitted October 2021: high glucose levels with JIMMY and bicarb of 15.  Treated with IV fluids and started on Levemir as A1c was > 14.  New labs 10/17/22 are good;  currently taking with sodium bicarb 650 mg twice daily.   Last seen by Nephrology 7/21/22- overdue for follow-up      DOM (iron deficiency anemia)  Current labs:  Hgb-  11.6    Hct- 39.1; not currently on iron therapy.  Recommend monitoring during perioperative period.     Morbid obesity  Patient would benefit from weight loss and has set goals to achieve success.   S/P Gastric Sleeve 6/2022 at [facility]; last seen by outside Bariatric Clinic 7/21/22. She has lost around 60 lbs since surgery.  Currently on vitamin B-complex/MVI  Recommend avoiding NSAIDs and ASA use for the increased risk of ulcer development.        Type 2 diabetes mellitus with hyperglycemia, with long-term current use of insulin  Not currently treated due to recent gastric sleeve.      Most recent A1c is 5.8 .    No regular accuchecks.      Reports peripheral neuropathy- left hand only.   Denies visual changes. Up to date with eye exams.  Micro changes: Denies- Retinopathy, Nephropathy   Macro changes: Denies-  Carotid, Coronary , Peripheral disease     Maintain healthy weight. Exercise at least 150 minutes weekly. Encouraged diet rich in nutrients such as fruits, vegetables, and whole grains; reduce sugar intake from cakes, candy, and sugared drinks.    GERD (gastroesophageal reflux disease)  Currently being treated with Protonix 40 mg, controlled.  Encouraged to elevate HOB and avoid laying down for 2-3 hours after meals.         Primary osteoarthritis of left knee  Scheduled with Dr. Padgett on 12/19/22 for left knee arthroplasty.       Discussion/Management of Perioperative Care    Thromboembolic prophylaxis (VTE) Care: Risk factors for thrombosis include: obesity and surgical procedure.  I recommend prophylaxis of thromboembolism with the use of compression stockings/pneumatic devices, and/or pharmacologic agents. The benefits should outweigh the risks for pharmacologic prophylaxis in the perioperative period. I also encourage  early ambulation if not contraindicated during the post-operative period.    Risk factors for post-operative pulmonary complications include:diabetes mellitus and HTN. To reduce the risk of pulmonary complications, prophylactic recommendations include: incentive spirometry use/deep breathing, early ambulation, and oral care.    Risk factors for renal complications include: diabetes mellitus and HTN. To reduce the risk of postoperative renal complications, I recommend the patient maintain adequate fluid volume status by drinking 2 liters of water daily.  Avoid/reduce NSAIDS and DODD-2 inhibitors use as well as IV contrast for renal protection.    I recommend the use of appropriate prophylactic antibiotics to reduce the risk of surgical site infections.    The patient is at an increased risk for urinary retention due to : possible regional anesthesia. I recommend to avoid/decrease the use of benzodiazepines, anticholinergics, and Benadryl in the perioperative period. I also recommend using opioids for the shortest period of time if possible.          This visit was focused on Preoperative evaluation, Perioperative Medical management, complication reduction plans. I suggest that the patient follows up with primary care or relevant sub specialists for ongoing health care.    I appreciate the opportunity to be involved in this patients care. Please feel free to contact me if there were any questions about this consultation.    Patient is optimized for surgery.       Ivan Cifuentes NP  Perioperative Medicine  [facility]

## 2022-11-29 NOTE — HPI
This is a 56 y.o. female  who presents today for a preoperative evaluation in preparation for an Orthopedic  procedure.  Scheduled for  left knee arthroplasty.   Surgery is indicated for osteoarthritis of left knee .   Patient is new to me.  Details of current problem: The duration of problem is 9 years. Reports remote history of trauma; she tripped on stairwell and landed on left knee. She has tried multiple steroid injections in past with minimal relief.      Reports symptoms of  intermittent throbbing pain to left knee with stiffness.  Aggravating factors include:  standing, sitting and decreased ADLs.   Relieving factors are  walking- movement and heat/ice therapy.   Reports pain: 5/10 to left knee today; uses knee sleeve for support.     The history has been obtained by speaking with the patient and reviewing the electronic medical record and/or outside health information. Significant health conditions for the perioperative period are discussed below in assessment and plan.     Patient reports current health status to be Good.  Denies any new symptoms before surgery.

## 2022-11-29 NOTE — DISCHARGE INSTRUCTIONS
Your surgery has been scheduled for:__________12/19/22________________________________    You should report to:  ____[facility], located on the [place] side of the first floor of the           [facility] ([phone])  ____[facility], located on the [facility] side of the            Second floor of the [facility] ([phone])  ____3rd Floor [facility] located on the [facility] side of the [facility] [phone]  _X___[facility]: located at [address] [facility] [place] [address]. [address].     Please Note   Tell your doctor if you take Aspirin, products containing Aspirin, herbal medications  or blood thinners, such as Coumadin, Ticlid, or Plavix.  (Consult your provider regarding holding or stopping before surgery).  Arrange for someone to drive you home following surgery.  You will not be allowed to leave the surgical facility alone or drive yourself home following sedation and anesthesia.    Before Surgery  Stop taking all herbal medications, vitamins, and supplements 7 days prior to surgery  No Motrin/Advil (Ibuprofen) 7 days before surgery  No Aleve (Naproxen) 7 days before surgery  Stop Taking Asprin, products containing Aspirin __7___days before surgery   No Goody's/BC Powder 7 days before surgery  Refrain from drinking alcoholic beverages for 24 hours before and after surgery  Stop or limit smoking at least 24 hours prior to surgery  You may take Tylenol for pain    Night before Surgery  Do not eat or drink after midnight  Take a shower or bath (shower is recommended).  Bathe with Hibiclens soap or an antibacterial soap from the neck down.  If not supplied by your surgeon, hibiclens soap will need to be purchased over the counter in pharmacy.  Rinse soap off thoroughly.  Shampoo your hair with your regular shampoo    The Day of Surgery  Take another bath or shower with hibiclens  or any antibacterial soap, to reduce the chance of infection.  Take heart and blood pressure medications with a small sip of water, as advised by the perioperative team.  Do not take fluid pills  You may brush your teeth and rinse your mouth, but do not swallow any additional water.   Do not apply perfumes, powder, body lotions or deodorant on the day of surgery.  Nail polish should be removed.  Do not wear makeup or moisturizer  Wear comfortable clothes, such as a button front shirt and loose fitting pants.  Leave all jewelry, including body piercings, and valuables at home.    Bring any devices you will neeed after surgery such as crutches or canes.  If you have sleep apnea, please bring your CPAP machine  In the event that your physical condition changes including the onset of a cold or respiratory illness, or if you have to delay or cancel your surgery, please notify your surgeon.

## 2022-11-29 NOTE — OUTPATIENT SUBJECTIVE & OBJECTIVE
Outpatient Subjective & Objective      Chief Complaint: Preoperative evaulation, perioperative medical management, and complication reduction plan.     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       Anesthesia issues: cough with GETA (gastric sleeve).    Difficulty mouth opening: No    Steroid use in the last 12 months: Yes to right knee     Dental Issues: No    Family anesthesia difficulty: Father- did not come out of anesthesia after I&D- passed away.        Family Hx of Thrombosis: None    Past Medical History:   Diagnosis Date    Arthritis     Diabetes mellitus     Diabetes mellitus, type 2     GERD (gastroesophageal reflux disease)     HLD (hyperlipidemia)     Hypertension          Past Medical History Pertinent Negatives:   Diagnosis Date Noted    Amblyopia 2018    Anxiety 2022    Asthma 2022    Cataract 2018    CHF (congestive heart failure) 2022    COPD (chronic obstructive pulmonary disease) 2022    Coronary artery disease 2022    Deep vein thrombosis 2022    Depression 2022    Diabetic retinopathy 2018    Glaucoma 2018    Macular degeneration 2018    Myocardial infarction 2022    Pulmonary embolism 2022    Retinal detachment 2018    Sickle cell anemia 2018    Sickle cell trait 2018    Strabismus 2018    Thyroid disease 2022    Uveitis 2018         Past Surgical History:   Procedure Laterality Date    BARIATRIC SURGERY  2022    Gastric sleeve     SECTION  1989    COLONOSCOPY N/A 2017    Procedure: COLONOSCOPY;  Surgeon: Evelin Arceo MD;  Location: [address];  Service: Endoscopy;  Laterality: N/A;  2 nd floor d/t BMI > 50 kg/m3    KNEE ARTHROSCOPY Right     TONSILLECTOMY      TUBAL LIGATION         Review of Systems   Constitutional:  Negative  "for chills, fatigue, fever and unexpected weight change.   HENT:  Negative for dental problem, hearing loss, postnasal drip, rhinorrhea, sore throat, tinnitus and trouble swallowing.    Eyes:  Negative for photophobia, pain, discharge and visual disturbance.   Respiratory:  Negative for apnea, cough, chest tightness, shortness of breath and wheezing.         STOP BANG risk factors:  Snoring  HTN  BMI > 35     Cardiovascular:  Negative for chest pain, palpitations and leg swelling.   Gastrointestinal:  Negative for abdominal pain, blood in stool, constipation, nausea and vomiting.        Denies Fatty liver, Hepatitis   Endocrine: Negative for cold intolerance and heat intolerance.   Genitourinary:  Negative for decreased urine volume, difficulty urinating, dysuria, frequency, hematuria and urgency.   Musculoskeletal:  Positive for arthralgias (bilateral knee). Negative for neck pain and neck stiffness.   Skin:  Negative for rash and wound.   Neurological:  Positive for numbness (left hand). Negative for dizziness, tremors, seizures, syncope, weakness and headaches.   Hematological:  Negative for adenopathy. Does not bruise/bleed easily.   Psychiatric/Behavioral:  Negative for confusion, sleep disturbance and suicidal ideas.             VITALS  Visit Vitals  BP (!) 166/97 (BP Location: Left arm, Patient Position: Sitting)   Pulse 101   Temp 99.2 °F (37.3 °C) (Oral)   Ht 5' 6" (1.676 m)   Wt 111.6 kg (246 lb)   SpO2 98%   BMI 39.71 kg/m²          Physical Exam  Vitals reviewed.   Constitutional:       General: She is not in acute distress.     Appearance: She is well-developed. She is obese.   HENT:      Head: Normocephalic.      Nose: Nose normal.      Mouth/Throat:      Pharynx: No oropharyngeal exudate.   Eyes:      General:         Right eye: No discharge.         Left eye: No discharge.      Conjunctiva/sclera: Conjunctivae normal.      Pupils: Pupils are equal, round, and reactive to light.   Neck:      Thyroid: " No thyromegaly.      Vascular: No carotid bruit or JVD.      Trachea: No tracheal deviation.   Cardiovascular:      Rate and Rhythm: Normal rate and regular rhythm.      Pulses:           Carotid pulses are 2+ on the right side and 2+ on the left side.       Dorsalis pedis pulses are 2+ on the right side and 2+ on the left side.        Posterior tibial pulses are 2+ on the right side and 2+ on the left side.      Heart sounds: Normal heart sounds. No murmur heard.  Pulmonary:      Effort: Pulmonary effort is normal. No respiratory distress.      Breath sounds: Normal breath sounds. No stridor. No wheezing, rhonchi or rales.   Abdominal:      General: Bowel sounds are normal. There is no distension.      Palpations: Abdomen is soft.      Tenderness: There is no abdominal tenderness. There is no guarding.   Musculoskeletal:      Cervical back: Normal range of motion. No pain with movement.      Right lower leg: No edema.      Left lower leg: No edema.   Lymphadenopathy:      Cervical: No cervical adenopathy.   Skin:     General: Skin is warm and dry.      Capillary Refill: Capillary refill takes less than 2 seconds.      Findings: No erythema or rash.   Neurological:      Mental Status: She is alert and oriented to person, place, and time.      Coordination: Coordination normal.        Significant Labs:  Lab Results   Component Value Date    WBC 6.71 10/17/2022    HGB 11.6 (L) 10/17/2022    HCT 39.1 10/17/2022     10/17/2022    CHOL 133 05/06/2021    TRIG 89 05/06/2021    HDL 44 05/06/2021    ALT 18 10/17/2022    ALT 18 10/17/2022    AST 20 10/17/2022    AST 20 10/17/2022     10/17/2022    K 4.3 10/17/2022     10/17/2022    CREATININE 0.9 10/17/2022    BUN 14 10/17/2022    CO2 27 10/17/2022    TSH 1.832 10/13/2021    INR 0.9 11/29/2022    HGBA1C 5.8 (H) 11/29/2022       Diagnostic Studies: No relevant studies.    EKG:   Results for orders placed or performed during the hospital encounter of 11/29/22    EKG 12-lead    Collection Time: 11/29/22  2:41 PM    Narrative    Test Reason : Z01.818,    Vent. Rate : 093 BPM     Atrial Rate : 093 BPM     P-R Int : 120 ms          QRS Dur : 076 ms      QT Int : 336 ms       P-R-T Axes : 076 024 055 degrees     QTc Int : 417 ms    Normal sinus rhythm  Normal ECG  When compared with ECG of 12-OCT-2021 00:05,  No significant change was found  Confirmed by Danita Altamirano MD ([id number]) on 11/30/2022 9:06:10 AM    Referred By: MAKI BARKSDALE           Confirmed By:Danita Altamirano MD       2D ECHO: old 2016  TTE:  CONCLUSIONS     1 - Normal left ventricular systolic function (EF 60-65%).     2 - Left ventricular diastolic dysfunction.     3 - Moderate left atrial enlargement.     4 - Normal right ventricular systolic function .     5 - Mild mitral regurgitation.         This document has been electronically    SIGNED BY: Hugo Rogers MD On: 02/02/2016 20:27       Imaging   Xray L-Spine 10/7/22  FINDINGS:  There is grade 1 anterolisthesis of L3 on L4.  Lumbar spine alignment otherwise appears within normal limits.  No evidence of acute lumbar spine fracture or subluxation.  Extensive multilevel degenerative changes are seen throughout the lumbar spine with intervertebral disc space narrowing and degenerative endplate changes seen most pronounced at L3-4.  There is lower lumbar facet arthropathy.  Visualized sacrum is normal in appearance.     Impression:     No acute lumbar spine abnormalities identified.  Multilevel lumbar DJD.        Electronically signed by: Astrid Kowalski MD  Date:                                            10/07/2022  Time:                                           01:22    CT L-Spine 7/8/19  Impression:     No evidence of acute fracture of the lumbar spine.     2 mm anterolisthesis of L3 on L4.  The findings are most likely on the basis of degenerative changes.     Advanced multilevel degenerative changes of the lumbar spine.  Follow-up with MRI of the lumbar spine  may be obtained, as clinically warranted.        Electronically signed by: Nikko Willingham MD  Date:                                            07/08/2019  Time:                                           17:21      Active Cardiac Conditions: None      Revised Cardiac Risk Index   High -Risk Surgery  Intraperitoneal; Intrathoracic; suprainguinal vascular Yes- + 1 No- 0   History of Ischemic Heart Disease   (Hx of MI/positive exercise test/current chest pain due to ischemia/use of nitrate therapy/EKG with pathological Q waves) Yes- + 1 No- 0   History of CHF  (Pulmonary edema/bilateral rales or S3 gallop/PND/CXR showing pulmonary vascular redistribution) Yes- + 1 No- 0   History of CVA   (Prior stroke or TIA) Yes- + 1 No- 0   Pre-operative treatment with insulin Yes- + 1 No- 0   Pre-operative creatinine > 2mg/dl Yes- + 1 No- 0   Total: 0      Risk Status:  Estimated risk of cardiac complications after non-cardiac surgery using the Revised Cardiac Risk Index for Preoperative risk is 3.9 %      ARISCAT (Canet) risk index: Low: 1.6% risk of post-op pulmonary complications; Intermediate: 13.3% risk of post-op pulmonary complications if surgery is > 3 hours.     American Society of Anesthesiologists Physical Status classification (ASA): 3               No further cardiac workup needed prior to surgery.    Outpatient Subjective & Objective

## 2022-12-01 RX ORDER — ACETAMINOPHEN 500 MG
1000 TABLET ORAL EVERY 6 HOURS
Status: CANCELLED | OUTPATIENT
Start: 2022-12-01

## 2022-12-01 RX ORDER — MIDAZOLAM HYDROCHLORIDE 1 MG/ML
4 INJECTION INTRAMUSCULAR; INTRAVENOUS
Status: CANCELLED | OUTPATIENT
Start: 2022-12-01 | End: 2022-12-02

## 2022-12-01 RX ORDER — PROCHLORPERAZINE EDISYLATE 5 MG/ML
5 INJECTION INTRAMUSCULAR; INTRAVENOUS EVERY 6 HOURS PRN
Status: CANCELLED | OUTPATIENT
Start: 2022-12-01

## 2022-12-01 RX ORDER — FENTANYL CITRATE 50 UG/ML
100 INJECTION, SOLUTION INTRAMUSCULAR; INTRAVENOUS
Status: CANCELLED | OUTPATIENT
Start: 2022-12-01 | End: 2022-12-02

## 2022-12-01 RX ORDER — METHOCARBAMOL 750 MG/1
750 TABLET, FILM COATED ORAL 3 TIMES DAILY
Status: CANCELLED | OUTPATIENT
Start: 2022-12-01

## 2022-12-01 RX ORDER — AMOXICILLIN 250 MG
1 CAPSULE ORAL 2 TIMES DAILY
Status: CANCELLED | OUTPATIENT
Start: 2022-12-01

## 2022-12-01 RX ORDER — CELECOXIB 200 MG/1
200 CAPSULE ORAL DAILY
Status: CANCELLED | OUTPATIENT
Start: 2022-12-01

## 2022-12-01 RX ORDER — MUPIROCIN 20 MG/G
1 OINTMENT TOPICAL 2 TIMES DAILY
Status: CANCELLED | OUTPATIENT
Start: 2022-12-01 | End: 2022-12-06

## 2022-12-01 RX ORDER — POLYETHYLENE GLYCOL 3350 17 G/17G
17 POWDER, FOR SOLUTION ORAL DAILY
Status: CANCELLED | OUTPATIENT
Start: 2022-12-01

## 2022-12-01 RX ORDER — SODIUM CHLORIDE 9 MG/ML
INJECTION, SOLUTION INTRAVENOUS CONTINUOUS
Status: CANCELLED | OUTPATIENT
Start: 2022-12-01 | End: 2022-12-02

## 2022-12-01 RX ORDER — SODIUM CHLORIDE 9 MG/ML
INJECTION, SOLUTION INTRAVENOUS
Status: CANCELLED | OUTPATIENT
Start: 2022-12-01

## 2022-12-01 RX ORDER — NALOXONE HCL 0.4 MG/ML
0.02 VIAL (ML) INJECTION
Status: CANCELLED | OUTPATIENT
Start: 2022-12-01

## 2022-12-01 RX ORDER — FENTANYL CITRATE 50 UG/ML
25 INJECTION, SOLUTION INTRAMUSCULAR; INTRAVENOUS EVERY 5 MIN PRN
Status: CANCELLED | OUTPATIENT
Start: 2022-12-01

## 2022-12-01 RX ORDER — MORPHINE SULFATE 2 MG/ML
2 INJECTION, SOLUTION INTRAMUSCULAR; INTRAVENOUS
Status: CANCELLED | OUTPATIENT
Start: 2022-12-01

## 2022-12-01 RX ORDER — MUPIROCIN 20 MG/G
1 OINTMENT TOPICAL
Status: CANCELLED | OUTPATIENT
Start: 2022-12-01

## 2022-12-01 RX ORDER — LIDOCAINE HYDROCHLORIDE 10 MG/ML
1 INJECTION, SOLUTION EPIDURAL; INFILTRATION; INTRACAUDAL; PERINEURAL
Status: CANCELLED | OUTPATIENT
Start: 2022-12-01

## 2022-12-01 RX ORDER — CELECOXIB 200 MG/1
400 CAPSULE ORAL ONCE
Status: CANCELLED | OUTPATIENT
Start: 2022-12-01 | End: 2022-12-01

## 2022-12-01 RX ORDER — ACETAMINOPHEN 500 MG
1000 TABLET ORAL
Status: CANCELLED | OUTPATIENT
Start: 2022-12-01

## 2022-12-01 RX ORDER — OXYCODONE HYDROCHLORIDE 5 MG/1
5 TABLET ORAL
Status: CANCELLED | OUTPATIENT
Start: 2022-12-01

## 2022-12-01 RX ORDER — SODIUM CHLORIDE 0.9 % (FLUSH) 0.9 %
10 SYRINGE (ML) INJECTION
Status: CANCELLED | OUTPATIENT
Start: 2022-12-01

## 2022-12-01 RX ORDER — ONDANSETRON 2 MG/ML
4 INJECTION INTRAMUSCULAR; INTRAVENOUS EVERY 8 HOURS PRN
Status: CANCELLED | OUTPATIENT
Start: 2022-12-01

## 2022-12-01 RX ORDER — PREGABALIN 75 MG/1
75 CAPSULE ORAL
Status: CANCELLED | OUTPATIENT
Start: 2022-12-01

## 2022-12-01 RX ORDER — TALC
6 POWDER (GRAM) TOPICAL NIGHTLY PRN
Status: CANCELLED | OUTPATIENT
Start: 2022-12-01

## 2022-12-01 RX ORDER — BISACODYL 10 MG
10 SUPPOSITORY, RECTAL RECTAL EVERY 12 HOURS PRN
Status: CANCELLED | OUTPATIENT
Start: 2022-12-01

## 2022-12-01 RX ORDER — FAMOTIDINE 20 MG/1
20 TABLET, FILM COATED ORAL 2 TIMES DAILY
Status: CANCELLED | OUTPATIENT
Start: 2022-12-01

## 2022-12-01 RX ORDER — PREGABALIN 75 MG/1
75 CAPSULE ORAL NIGHTLY
Status: CANCELLED | OUTPATIENT
Start: 2022-12-01

## 2022-12-01 RX ORDER — ASPIRIN 81 MG/1
81 TABLET ORAL 2 TIMES DAILY
Status: CANCELLED | OUTPATIENT
Start: 2022-12-01

## 2022-12-01 RX ORDER — OXYCODONE HYDROCHLORIDE 5 MG/1
10 TABLET ORAL
Status: CANCELLED | OUTPATIENT
Start: 2022-12-01

## 2022-12-01 NOTE — H&P
CC:  Left knee pain    Yayo Carver is a 56 y.o. female with history of Left knee pain. Pain is worse with activity and weight bearing.  Patient has experienced interference of activities of daily living due to decreased range of motion and an increase in joint pain and swelling.  Patient has failed non-operative treatment including NSAIDs, corticosteroid injections, viscosupplement injections, and activity modification.  Yayo Carver currently ambulates independently.     Relevant medical conditions of significance in perioperative period:  DM- on medication managed by pcp  Obesity- s/p weight loss surgery  HTN- on medication managed by pcp  GERD- on medication managed by pcp  HLD- on medication managed by pcp      Past Medical History:   Diagnosis Date    Arthritis     Diabetes mellitus     Diabetes mellitus, type 2     GERD (gastroesophageal reflux disease)     HLD (hyperlipidemia)     Hypertension        Past Surgical History:   Procedure Laterality Date    BARIATRIC SURGERY  2022    Gastric sleeve     SECTION  1989    COLONOSCOPY N/A 2017    Procedure: COLONOSCOPY;  Surgeon: Evelin Arceo MD;  Location: [facility] ([address] [place] [address]);  Service: Endoscopy;  Laterality: N/A;  2 nd floor d/t BMI > 50 kg/m3    KNEE ARTHROSCOPY Right     TONSILLECTOMY      TUBAL LIGATION         Family History   Problem Relation Age of Onset    Cancer Mother     Breast cancer Mother 65    Diabetes Mother     Colon polyps Mother     Hypertension Mother     Diabetes Father     Breast cancer Sister 56    Cancer Sister     No Known Problems Brother     Arthritis Brother     No Known Problems Daughter     Kidney disease Son     Diabetes Son     Breast cancer Maternal Aunt     Diabetes Paternal Uncle     Breast cancer Maternal Grandmother     Colon cancer Maternal Grandfather     Diabetes Paternal Grandmother     Heart disease Paternal Grandmother     No Known Problems Paternal Grandfather     Esophageal cancer Neg Hx      Irritable bowel syndrome Neg Hx     Rectal cancer Neg Hx     Stomach cancer Neg Hx     Ulcerative colitis Neg Hx     Celiac disease Neg Hx     Crohn's disease Neg Hx     Amblyopia Neg Hx     Blindness Neg Hx     Cataracts Neg Hx     Glaucoma Neg Hx     Macular degeneration Neg Hx     Retinal detachment Neg Hx     Strabismus Neg Hx     Stroke Neg Hx     Thyroid disease Neg Hx        Review of patient's allergies indicates:   Allergen Reactions    Ace inhibitors Edema and Swelling         Current Outpatient Medications:     amLODIPine (NORVASC) 10 MG tablet, Take 1 tablet (10 mg total) by mouth once daily., Disp: 90 tablet, Rfl: 3    aspirin (ECOTRIN) 81 MG EC tablet, Take 1 tablet (81 mg total) by mouth once daily., Disp: 90 tablet, Rfl: 3    atorvastatin (LIPITOR) 40 MG tablet, Take 1 tablet (40 mg total) by mouth once daily., Disp: 90 tablet, Rfl: 3    b complex vitamins capsule, Take 1 capsule by mouth once daily., Disp: , Rfl:     diclofenac sodium (VOLTAREN) 1 % Gel, Apply 2 g topically 4 (four) times daily as needed (knee pain)., Disp: 300 g, Rfl: 3    ergocalciferol (ERGOCALCIFEROL) 50,000 unit Cap, Take 1 capsule (50,000 Units total) by mouth every 7 days., Disp: 12 capsule, Rfl: 3    flash glucose scanning reader (FREESTYLE HEENA 14 DAY READER) Misc, Check blood glucose before meals and nightly., Disp: 1 each, Rfl: 0    flash glucose sensor (FREESTYLE HEENA 14 DAY SENSOR) Kit, Apply to skin for 14 days.  Check blood glucose before meals and nightly., Disp: 1 kit, Rfl: 11    HYDROcodone-acetaminophen (NORCO) 7.5-325 mg per tablet, Take 1 tablet by mouth every 6 (six) hours as needed for Pain. (Patient not taking: Reported on 11/29/2022), Disp: 28 tablet, Rfl: 0    multivitamin (THERAGRAN) per tablet, Take 1 tablet by mouth once daily., Disp: , Rfl:     pantoprazole (PROTONIX) 40 MG tablet, Take 1 tablet (40 mg total) by mouth daily as needed (acid reflux)., Disp: 90 tablet, Rfl: 1    sodium bicarbonate  "650 MG tablet, Take 1 tablet (650 mg total) by mouth 2 (two) times daily., Disp: 60 tablet, Rfl: 2    spironolactone (ALDACTONE) 25 MG tablet, Take 1 tablet (25 mg total) by mouth once daily. (Patient not taking: Reported on 11/29/2022), Disp: 30 tablet, Rfl: 11    terbinafine HCL (LAMISIL) 250 mg tablet, Take 1 tablet (250 mg total) by mouth once daily. Avoid alcohol while on medication, Disp: 50 tablet, Rfl: 0    traMADoL (ULTRAM) 50 mg tablet, Take 1-2 tablets ( mg total) by mouth 3 (three) times daily as needed for Pain., Disp: 180 tablet, Rfl: 3    Review of Systems:  Constitutional: no fever or chills  Eyes: no visual changes  ENT: no nasal congestion or sore throat  Respiratory: no cough or shortness of breath  Cardiovascular: no chest pain or palpitations  Gastrointestinal: no nausea or vomiting, tolerating diet  Genitourinary: no hematuria or dysuria  Integument/Breast: no rash or pruritis  Hematologic/Lymphatic: no easy bruising or lymphadenopathy  Musculoskeletal: positive for knee pain  Neurological: no seizures or tremors  Behavioral/Psych: no auditory or visual hallucinations  Endocrine: no heat or cold intolerance    PE:  Ht 5' 6" (1.676 m)   Wt 111.6 kg (246 lb)   BMI 39.71 kg/m²   General: Pleasant, cooperative, NAD   Gait: antalgic  HEENT: NCAT, sclera nonicteric   Lungs: Respirations clear bilaterally; equal and unlabored.   CV: S1S2; 2+ bilateral upper and lower extremity pulses.   Skin: Intact throughout with no rashes, erythema, or lesions  Extremities: No LE edema,  no erythema or warmth of the skin in either lower extremity.    Left knee exam:  Knee Range of Motion:normal, pain with passive range of motion  Effusion:none  Condition of skin:intact  Location of tenderness:Medial joint line   Strength:normal  Stability: stable to testing    Hip Examination: painless PROM of hip     Radiographs: Radiographs reveal advanced degenerative changes including subchondral cyst formation, " subchondral sclerosis, osteophyte formation, joint space narrowing.     Knee Alignment:  Moderate valgus    Diagnosis: Primary osteoarthritis Left knee    Plan: Left total knee arthroplasty    Due to the serious nature of total joint infection and the high prevalence of community acquired MRSA, vancomycin will be used perioperatively.

## 2022-12-01 NOTE — PROGRESS NOTES
Yayo Carver is a 56 y.o. year old here today for pre surgery optimization visit  in preparation for a Left total knee arthroplasty to be performed by Dr. Padgett  on 12/19/2022.  she was last seen and treated in the clinic on 11/21/2022. she will be medically optimized by the pre op center. There has been no significant change in medical status since last visit. No fever, chills, malaise, or unexplained weight change.      Allergies, Medications, past medical and surgical history reviewed.    Focused examination performed.    Patient declined to see surgeon today. All questions answered. Patient encouraged to call with questions. Contact information given.     Pre, poncho, and post operative procedures and expectations discussed. Goals of successful surgery reviewed and include manageable pain levels, surgical site free of infection, medication management, and ambulation with PT/OT assistance. Healthy weight management discussed with patient and caregiver who were receptive to eduction of healthy diet and activity. No other necessary lifestyle changes identified. Educated patient about signs and symptoms of infection, medication management, anticoagulation therapy, risk of tobacco and alcohol use, and self-care to promote healing. Surgical guide given for future reference. Hibiclens given to patient with instructions. All questions were answered.     Yayo Carver verbalized an understanding to the education and goals. Patient has displayed readiness to engage in care and is ready to proceed with surgery.  Patient reports her son is able and ready to provide assistance at home after discharge.    Surgical and blood consents signed.    Yayo Carver will contact us if there are any questions, concerns, or changes in medical status prior to surgery.       Joint class: 11/14 zoom    Patient has discussed discharge planning with surgeon. Patient will be discharged to home following surgery.   patient will be scheduled  with [facility].  She is scheduled at the [place] location.    30 minutes of time was spent on patient education, review of records, templating, H&P, , appointment scheduling and optimizing patient for surgery.

## 2022-12-01 NOTE — H&P (VIEW-ONLY)
CC:  Left knee pain    Yayo Carver is a 56 y.o. female with history of Left knee pain. Pain is worse with activity and weight bearing.  Patient has experienced interference of activities of daily living due to decreased range of motion and an increase in joint pain and swelling.  Patient has failed non-operative treatment including NSAIDs, corticosteroid injections, viscosupplement injections, and activity modification.  Yayo Carver currently ambulates independently.     Relevant medical conditions of significance in perioperative period:  DM- on medication managed by pcp  Obesity- s/p weight loss surgery  HTN- on medication managed by pcp  GERD- on medication managed by pcp  HLD- on medication managed by pcp      Past Medical History:   Diagnosis Date    Arthritis     Diabetes mellitus     Diabetes mellitus, type 2     GERD (gastroesophageal reflux disease)     HLD (hyperlipidemia)     Hypertension        Past Surgical History:   Procedure Laterality Date    BARIATRIC SURGERY  2022    Gastric sleeve     SECTION  1989    COLONOSCOPY N/A 2017    Procedure: COLONOSCOPY;  Surgeon: Evelin Arceo MD;  Location: [facility] ([address] [place] [address]);  Service: Endoscopy;  Laterality: N/A;  2 nd floor d/t BMI > 50 kg/m3    KNEE ARTHROSCOPY Right     TONSILLECTOMY      TUBAL LIGATION         Family History   Problem Relation Age of Onset    Cancer Mother     Breast cancer Mother 65    Diabetes Mother     Colon polyps Mother     Hypertension Mother     Diabetes Father     Breast cancer Sister 56    Cancer Sister     No Known Problems Brother     Arthritis Brother     No Known Problems Daughter     Kidney disease Son     Diabetes Son     Breast cancer Maternal Aunt     Diabetes Paternal Uncle     Breast cancer Maternal Grandmother     Colon cancer Maternal Grandfather     Diabetes Paternal Grandmother     Heart disease Paternal Grandmother     No Known Problems Paternal Grandfather     Esophageal cancer Neg Hx      Irritable bowel syndrome Neg Hx     Rectal cancer Neg Hx     Stomach cancer Neg Hx     Ulcerative colitis Neg Hx     Celiac disease Neg Hx     Crohn's disease Neg Hx     Amblyopia Neg Hx     Blindness Neg Hx     Cataracts Neg Hx     Glaucoma Neg Hx     Macular degeneration Neg Hx     Retinal detachment Neg Hx     Strabismus Neg Hx     Stroke Neg Hx     Thyroid disease Neg Hx        Review of patient's allergies indicates:   Allergen Reactions    Ace inhibitors Edema and Swelling         Current Outpatient Medications:     amLODIPine (NORVASC) 10 MG tablet, Take 1 tablet (10 mg total) by mouth once daily., Disp: 90 tablet, Rfl: 3    aspirin (ECOTRIN) 81 MG EC tablet, Take 1 tablet (81 mg total) by mouth once daily., Disp: 90 tablet, Rfl: 3    atorvastatin (LIPITOR) 40 MG tablet, Take 1 tablet (40 mg total) by mouth once daily., Disp: 90 tablet, Rfl: 3    b complex vitamins capsule, Take 1 capsule by mouth once daily., Disp: , Rfl:     diclofenac sodium (VOLTAREN) 1 % Gel, Apply 2 g topically 4 (four) times daily as needed (knee pain)., Disp: 300 g, Rfl: 3    ergocalciferol (ERGOCALCIFEROL) 50,000 unit Cap, Take 1 capsule (50,000 Units total) by mouth every 7 days., Disp: 12 capsule, Rfl: 3    flash glucose scanning reader (FREESTYLE HEENA 14 DAY READER) Misc, Check blood glucose before meals and nightly., Disp: 1 each, Rfl: 0    flash glucose sensor (FREESTYLE HEENA 14 DAY SENSOR) Kit, Apply to skin for 14 days.  Check blood glucose before meals and nightly., Disp: 1 kit, Rfl: 11    HYDROcodone-acetaminophen (NORCO) 7.5-325 mg per tablet, Take 1 tablet by mouth every 6 (six) hours as needed for Pain. (Patient not taking: Reported on 11/29/2022), Disp: 28 tablet, Rfl: 0    multivitamin (THERAGRAN) per tablet, Take 1 tablet by mouth once daily., Disp: , Rfl:     pantoprazole (PROTONIX) 40 MG tablet, Take 1 tablet (40 mg total) by mouth daily as needed (acid reflux)., Disp: 90 tablet, Rfl: 1    sodium bicarbonate  "650 MG tablet, Take 1 tablet (650 mg total) by mouth 2 (two) times daily., Disp: 60 tablet, Rfl: 2    spironolactone (ALDACTONE) 25 MG tablet, Take 1 tablet (25 mg total) by mouth once daily. (Patient not taking: Reported on 11/29/2022), Disp: 30 tablet, Rfl: 11    terbinafine HCL (LAMISIL) 250 mg tablet, Take 1 tablet (250 mg total) by mouth once daily. Avoid alcohol while on medication, Disp: 50 tablet, Rfl: 0    traMADoL (ULTRAM) 50 mg tablet, Take 1-2 tablets ( mg total) by mouth 3 (three) times daily as needed for Pain., Disp: 180 tablet, Rfl: 3    Review of Systems:  Constitutional: no fever or chills  Eyes: no visual changes  ENT: no nasal congestion or sore throat  Respiratory: no cough or shortness of breath  Cardiovascular: no chest pain or palpitations  Gastrointestinal: no nausea or vomiting, tolerating diet  Genitourinary: no hematuria or dysuria  Integument/Breast: no rash or pruritis  Hematologic/Lymphatic: no easy bruising or lymphadenopathy  Musculoskeletal: positive for knee pain  Neurological: no seizures or tremors  Behavioral/Psych: no auditory or visual hallucinations  Endocrine: no heat or cold intolerance    PE:  Ht 5' 6" (1.676 m)   Wt 111.6 kg (246 lb)   BMI 39.71 kg/m²   General: Pleasant, cooperative, NAD   Gait: antalgic  HEENT: NCAT, sclera nonicteric   Lungs: Respirations clear bilaterally; equal and unlabored.   CV: S1S2; 2+ bilateral upper and lower extremity pulses.   Skin: Intact throughout with no rashes, erythema, or lesions  Extremities: No LE edema,  no erythema or warmth of the skin in either lower extremity.    Left knee exam:  Knee Range of Motion:normal, pain with passive range of motion  Effusion:none  Condition of skin:intact  Location of tenderness:Medial joint line   Strength:normal  Stability: stable to testing    Hip Examination: painless PROM of hip     Radiographs: Radiographs reveal advanced degenerative changes including subchondral cyst formation, " subchondral sclerosis, osteophyte formation, joint space narrowing.     Knee Alignment:  Moderate valgus    Diagnosis: Primary osteoarthritis Left knee    Plan: Left total knee arthroplasty    Due to the serious nature of total joint infection and the high prevalence of community acquired MRSA, vancomycin will be used perioperatively.

## 2022-12-05 ENCOUNTER — PATIENT MESSAGE (OUTPATIENT)
Dept: ORTHOPEDICS | Facility: CLINIC | Age: 57
End: 2022-12-05
Payer: MEDICAID

## 2022-12-05 ENCOUNTER — PATIENT MESSAGE (OUTPATIENT)
Dept: ADMINISTRATIVE | Facility: OTHER | Age: 57
End: 2022-12-05
Payer: MEDICAID

## 2022-12-06 ENCOUNTER — TELEPHONE (OUTPATIENT)
Dept: INTERNAL MEDICINE | Facility: CLINIC | Age: 57
End: 2022-12-06
Payer: MEDICAID

## 2022-12-13 ENCOUNTER — CLINICAL SUPPORT (OUTPATIENT)
Dept: REHABILITATION | Facility: HOSPITAL | Age: 57
End: 2022-12-13
Attending: ORTHOPAEDIC SURGERY
Payer: MEDICAID

## 2022-12-13 DIAGNOSIS — M17.12 PRIMARY OSTEOARTHRITIS OF LEFT KNEE: ICD-10-CM

## 2022-12-13 DIAGNOSIS — R29.898 DECREASED STRENGTH OF LOWER EXTREMITY: Primary | ICD-10-CM

## 2022-12-13 DIAGNOSIS — Z74.09 DECREASED MOBILITY AND ENDURANCE: ICD-10-CM

## 2022-12-13 PROCEDURE — 97110 THERAPEUTIC EXERCISES: CPT

## 2022-12-13 NOTE — PROGRESS NOTES
[facility]   Physical Therapy Treatment Note     Name: Yayo Carver  [facility] Number: [id number]    Therapy Diagnosis:   Encounter Diagnoses   Name Primary?    Primary osteoarthritis of left knee     Decreased strength of lower extremity Yes    Decreased mobility and endurance      Physician: Jc Padgett III, *    Visit Date: 12/13/2022    Physician Orders: PT Eval and Treat   Medical Diagnosis from Referral: M17.12 (ICD-10-CM) - Primary osteoarthritis of left knee   Evaluation Date: 9/27/2022  Authorization Period Expiration: 11/09/2022  Plan of Care Expiration: 12/19/2022  Progress Note Due: 12/19/2022  Visit # / Visits authorized: 6 / 20 + eval     PTA Visit #: 0/5     Time In: 2:00 pm  Time Out: 3:10 pm  Total Billable Time: 60 minutes    SUBJECTIVE     Pt reports: she is scheduled to have her total knee arthroplasty on 12/19/2022. Patient reports she has been doing her HEP.   She was compliant with home exercise program.  Response to previous treatment: no adverse effects  Functional change: nothing new to note    Pain: 5/10  Location: left knee      OBJECTIVE     Objective Measures updated at progress report unless specified.     Treatment     Elisa received the treatments listed below:      therapeutic exercises to develop strength, endurance, ROM, and flexibility for 60 minutes including:  Quad sets with towel under knee 10 second, 20x each  Short arc quads with medium bolster 10 seconds, 20x  Short arc qauds with small bolster 5 seconds, 15x  Sidelying hip abduction 5 seconds, 2x10  Straight leg raise: 3x10  Bridges: 5 seconds, 2x10  Long arc quads 5 seconds, 15x    cold pack for 10 minutes to B knees.        Patient Education and Home Exercises     Home Exercises Provided and Patient Education Provided     Education provided:   - continue HEP  - prognosis and plan of care post surgery in PT  - knee extension is primary focus in the early phase of rehab    Written Home  Exercises Provided: Patient instructed to cont prior HEP. Exercises were reviewed and Elisa was able to demonstrate them prior to the end of the session.  Elisa demonstrated good  understanding of the education provided. See EMR under Patient Instructions for exercises provided during therapy sessions    ASSESSMENT     Patient tolerated therapeutic exercise well. Difficulty with achieving terminal knee extension with short arc quads. Update POC next visit.     Elisa Is progressing well towards her goals.   Pt prognosis is Good.     Pt will continue to benefit from skilled outpatient physical therapy to address the deficits listed in the problem list box on initial evaluation, provide pt/family education and to maximize pt's level of independence in the home and community environment.     Pt's spiritual, cultural and educational needs considered and pt agreeable to plan of care and goals.     Anticipated barriers to physical therapy: none    Goals:   Goals: Short Term Goals: 3 weeks   Pt will report 8/10 pain at rest MET   Pt will be able to perform at sit to stand with no UE use MET   Pt will be independent with HEP MET      Long Term Goals: 6 weeks   Pt will report 7/10 pain MET   Pt will have 4/5 LE MMTs MET   Pt will be able to perform a TUG in under 13.5 seconds MET 13 seconds \     NEW STGs:   Pt will be independent with HEP     PLAN     Update plan of care next session.     Deja Kennedy, PT

## 2022-12-14 ENCOUNTER — PATIENT MESSAGE (OUTPATIENT)
Dept: ORTHOPEDICS | Facility: CLINIC | Age: 57
End: 2022-12-14
Payer: MEDICAID

## 2022-12-14 ENCOUNTER — TELEPHONE (OUTPATIENT)
Dept: ORTHOPEDICS | Facility: CLINIC | Age: 57
End: 2022-12-14
Payer: MEDICAID

## 2022-12-14 NOTE — TELEPHONE ENCOUNTER
I called the patient today regarding surgery on 12/19/2022 with Dr. Jc Padgett. I informed the patient that her surgery will be at  [facility] ([facility] [place] [address]). I informed the patient they must arrive at 5:00am and their surgery will start at 7:00am.     Per the [facility] COVID-19 Pre-Procedural Testing Policy (administered 10/26/2022), patients do not need tested for COVID-19 regardless of vaccination status.    I reminded the patient that they cannot eat or drink after midnight, the night before surgery.     I reminded the patient to be careful of their skin over the next few days to make sure they do not get any cuts, scratches or scrapes.    The patient has not yet received their walker, bedside commode or shower chair from [facility]. I will reach out to [facility] to set up delivery to the hospital. The patient has not heard from [facility]. I provided her with the contact information to Merlyn with [facility].    The patient verbalized understanding and has no further questions.     ----- Message from Allison Rogel sent at 12/14/2022  9:21 AM CST -----  Regarding: RETURN CALL  Contact: Self  Pt stated she received a message from david Fatima to contact the office, believe it's regarding her upcoming surgery Pt ask for a call back please      Contact info   [phone] (home)

## 2022-12-15 DIAGNOSIS — E87.20 METABOLIC ACIDOSIS: ICD-10-CM

## 2022-12-15 RX ORDER — CELECOXIB 200 MG/1
200 CAPSULE ORAL DAILY
Qty: 30 CAPSULE | Refills: 0 | Status: SHIPPED | OUTPATIENT
Start: 2022-12-15 | End: 2023-01-19

## 2022-12-15 RX ORDER — OXYCODONE HYDROCHLORIDE 5 MG/1
TABLET ORAL
Qty: 50 TABLET | Refills: 0 | Status: SHIPPED | OUTPATIENT
Start: 2022-12-15 | End: 2022-12-28 | Stop reason: SDUPTHER

## 2022-12-15 RX ORDER — CEFADROXIL 500 MG/1
500 CAPSULE ORAL EVERY 12 HOURS
Qty: 14 CAPSULE | Refills: 0 | Status: SHIPPED | OUTPATIENT
Start: 2022-12-15 | End: 2022-12-27

## 2022-12-15 RX ORDER — DOCUSATE SODIUM 100 MG/1
100 CAPSULE, LIQUID FILLED ORAL 2 TIMES DAILY PRN
Qty: 60 CAPSULE | Refills: 0 | Status: SHIPPED | OUTPATIENT
Start: 2022-12-15 | End: 2023-12-21 | Stop reason: ALTCHOICE

## 2022-12-15 RX ORDER — IBUPROFEN 100 MG/5ML
1000 SUSPENSION, ORAL (FINAL DOSE FORM) ORAL 2 TIMES DAILY
Qty: 28 TABLET | COMMUNITY
Start: 2022-12-15 | End: 2022-12-29

## 2022-12-15 RX ORDER — DEXTROMETHORPHAN HYDROBROMIDE, GUAIFENESIN 5; 100 MG/5ML; MG/5ML
650 LIQUID ORAL
Qty: 120 TABLET | Refills: 0 | Status: ON HOLD | OUTPATIENT
Start: 2022-12-15 | End: 2023-06-26 | Stop reason: HOSPADM

## 2022-12-15 RX ORDER — METHOCARBAMOL 750 MG/1
750 TABLET, FILM COATED ORAL 3 TIMES DAILY PRN
Qty: 30 TABLET | Refills: 0 | Status: SHIPPED | OUTPATIENT
Start: 2022-12-15 | End: 2023-01-18 | Stop reason: SDUPTHER

## 2022-12-15 RX ORDER — ARGININE/GLUTAMINE/CALCIUM BMB 7G-7G-1.5G
1 POWDER IN PACKET (EA) ORAL 2 TIMES DAILY WITH MEALS
Qty: 28 EACH | Refills: 0 | COMMUNITY
Start: 2022-12-15 | End: 2022-12-19 | Stop reason: HOSPADM

## 2022-12-15 NOTE — TELEPHONE ENCOUNTER
Refill Routing Note   Medication(s) are not appropriate for processing by [facility] for the following reason(s):      - Outside of protocol    [facility] action(s):  Route          Medication reconciliation completed: No     Appointments  past 12m or future 3m with PCP    Date Provider   Last Visit   8/2/2022 Berta Hastings MD   Next Visit   Visit date not found Berta Hastings MD   ED visits in past 90 days: 1        Note composed:10:32 AM 12/15/2022

## 2022-12-19 ENCOUNTER — ANESTHESIA EVENT (OUTPATIENT)
Dept: SURGERY | Facility: HOSPITAL | Age: 57
End: 2022-12-19
Payer: MEDICAID

## 2022-12-19 ENCOUNTER — ANESTHESIA (OUTPATIENT)
Dept: SURGERY | Facility: HOSPITAL | Age: 57
End: 2022-12-19
Payer: MEDICAID

## 2022-12-19 ENCOUNTER — TELEPHONE (OUTPATIENT)
Dept: PHARMACY | Facility: CLINIC | Age: 57
End: 2022-12-19
Payer: MEDICAID

## 2022-12-19 ENCOUNTER — HOSPITAL ENCOUNTER (OUTPATIENT)
Facility: HOSPITAL | Age: 57
Discharge: HOME | End: 2022-12-20
Attending: ORTHOPAEDIC SURGERY | Admitting: ORTHOPAEDIC SURGERY
Payer: MEDICAID

## 2022-12-19 DIAGNOSIS — M17.12 PRIMARY OSTEOARTHRITIS OF LEFT KNEE: ICD-10-CM

## 2022-12-19 DIAGNOSIS — E78.2 MIXED HYPERLIPIDEMIA: ICD-10-CM

## 2022-12-19 LAB
POCT GLUCOSE: 130 MG/DL (ref 70–110)
POCT GLUCOSE: 145 MG/DL (ref 70–110)
POCT GLUCOSE: 153 MG/DL (ref 70–110)
POCT GLUCOSE: 198 MG/DL (ref 70–110)

## 2022-12-19 PROCEDURE — 97535 SELF CARE MNGMENT TRAINING: CPT

## 2022-12-19 PROCEDURE — 63600175 PHARM REV CODE 636 W HCPCS: Performed by: NURSE PRACTITIONER

## 2022-12-19 PROCEDURE — 27447 TOTAL KNEE ARTHROPLASTY: CPT | Mod: 22,LT,, | Performed by: ORTHOPAEDIC SURGERY

## 2022-12-19 PROCEDURE — 25000003 PHARM REV CODE 250: Performed by: STUDENT IN AN ORGANIZED HEALTH CARE EDUCATION/TRAINING PROGRAM

## 2022-12-19 PROCEDURE — 71000033 HC RECOVERY, INTIAL HOUR: Performed by: ORTHOPAEDIC SURGERY

## 2022-12-19 PROCEDURE — 63600175 PHARM REV CODE 636 W HCPCS: Performed by: NURSE ANESTHETIST, CERTIFIED REGISTERED

## 2022-12-19 PROCEDURE — 63600175 PHARM REV CODE 636 W HCPCS: Performed by: ANESTHESIOLOGY

## 2022-12-19 PROCEDURE — 97161 PT EVAL LOW COMPLEX 20 MIN: CPT

## 2022-12-19 PROCEDURE — 27447 PR TOTAL KNEE ARTHROPLASTY: ICD-10-PCS | Mod: 22,LT,, | Performed by: ORTHOPAEDIC SURGERY

## 2022-12-19 PROCEDURE — 36000711: Performed by: ORTHOPAEDIC SURGERY

## 2022-12-19 PROCEDURE — 71000039 HC RECOVERY, EACH ADD'L HOUR: Performed by: ORTHOPAEDIC SURGERY

## 2022-12-19 PROCEDURE — 99900035 HC TECH TIME PER 15 MIN (STAT)

## 2022-12-19 PROCEDURE — 25000003 PHARM REV CODE 250: Performed by: ORTHOPAEDIC SURGERY

## 2022-12-19 PROCEDURE — 25000003 PHARM REV CODE 250: Performed by: NURSE PRACTITIONER

## 2022-12-19 PROCEDURE — 25000003 PHARM REV CODE 250: Performed by: NURSE ANESTHETIST, CERTIFIED REGISTERED

## 2022-12-19 PROCEDURE — 27201423 OPTIME MED/SURG SUP & DEVICES STERILE SUPPLY: Performed by: ORTHOPAEDIC SURGERY

## 2022-12-19 PROCEDURE — C1776 JOINT DEVICE (IMPLANTABLE): HCPCS | Performed by: ORTHOPAEDIC SURGERY

## 2022-12-19 PROCEDURE — 94761 N-INVAS EAR/PLS OXIMETRY MLT: CPT

## 2022-12-19 PROCEDURE — D9220A PRA ANESTHESIA: Mod: CRNA,,, | Performed by: NURSE ANESTHETIST, CERTIFIED REGISTERED

## 2022-12-19 PROCEDURE — 63600175 PHARM REV CODE 636 W HCPCS: Performed by: ORTHOPAEDIC SURGERY

## 2022-12-19 PROCEDURE — C1713 ANCHOR/SCREW BN/BN,TIS/BN: HCPCS | Performed by: ORTHOPAEDIC SURGERY

## 2022-12-19 PROCEDURE — 97116 GAIT TRAINING THERAPY: CPT

## 2022-12-19 PROCEDURE — 01402 ANES OPN/ARTH TOT KNE ARTHRP: CPT | Performed by: ORTHOPAEDIC SURGERY

## 2022-12-19 PROCEDURE — 36000710: Performed by: ORTHOPAEDIC SURGERY

## 2022-12-19 PROCEDURE — D9220A PRA ANESTHESIA: ICD-10-PCS | Mod: ANES,,, | Performed by: ANESTHESIOLOGY

## 2022-12-19 PROCEDURE — 37000008 HC ANESTHESIA 1ST 15 MINUTES: Performed by: ORTHOPAEDIC SURGERY

## 2022-12-19 PROCEDURE — 25000003 PHARM REV CODE 250: Performed by: ANESTHESIOLOGY

## 2022-12-19 PROCEDURE — 97165 OT EVAL LOW COMPLEX 30 MIN: CPT

## 2022-12-19 PROCEDURE — 37000009 HC ANESTHESIA EA ADD 15 MINS: Performed by: ORTHOPAEDIC SURGERY

## 2022-12-19 PROCEDURE — 82962 GLUCOSE BLOOD TEST: CPT | Performed by: ORTHOPAEDIC SURGERY

## 2022-12-19 PROCEDURE — D9220A PRA ANESTHESIA: Mod: ANES,,, | Performed by: ANESTHESIOLOGY

## 2022-12-19 PROCEDURE — D9220A PRA ANESTHESIA: ICD-10-PCS | Mod: CRNA,,, | Performed by: NURSE ANESTHETIST, CERTIFIED REGISTERED

## 2022-12-19 DEVICE — CEMENT BONE SURG SMPLX P RADPQ: Type: IMPLANTABLE DEVICE | Site: KNEE | Status: FUNCTIONAL

## 2022-12-19 DEVICE — PATELLA ATTUNE MEDLZD DOME 35: Type: IMPLANTABLE DEVICE | Site: KNEE | Status: FUNCTIONAL

## 2022-12-19 DEVICE — IMPLANTABLE DEVICE: Type: IMPLANTABLE DEVICE | Site: KNEE | Status: FUNCTIONAL

## 2022-12-19 RX ORDER — LIDOCAINE HCL/PF 100 MG/5ML
SYRINGE (ML) INTRAVENOUS
Status: DISCONTINUED | OUTPATIENT
Start: 2022-12-19 | End: 2022-12-19

## 2022-12-19 RX ORDER — FENTANYL CITRATE 50 UG/ML
100 INJECTION, SOLUTION INTRAMUSCULAR; INTRAVENOUS
Status: DISCONTINUED | OUTPATIENT
Start: 2022-12-19 | End: 2022-12-19 | Stop reason: HOSPADM

## 2022-12-19 RX ORDER — PROCHLORPERAZINE EDISYLATE 5 MG/ML
5 INJECTION INTRAMUSCULAR; INTRAVENOUS EVERY 6 HOURS PRN
Status: DISCONTINUED | OUTPATIENT
Start: 2022-12-19 | End: 2022-12-20 | Stop reason: HOSPADM

## 2022-12-19 RX ORDER — POLYETHYLENE GLYCOL 3350 17 G/17G
17 POWDER, FOR SOLUTION ORAL DAILY
Status: DISCONTINUED | OUTPATIENT
Start: 2022-12-19 | End: 2022-12-20 | Stop reason: HOSPADM

## 2022-12-19 RX ORDER — AMLODIPINE BESYLATE 10 MG/1
10 TABLET ORAL DAILY
Status: DISCONTINUED | OUTPATIENT
Start: 2022-12-20 | End: 2022-12-20 | Stop reason: HOSPADM

## 2022-12-19 RX ORDER — MUPIROCIN 20 MG/G
1 OINTMENT TOPICAL
Status: COMPLETED | OUTPATIENT
Start: 2022-12-19 | End: 2022-12-19

## 2022-12-19 RX ORDER — PROPOFOL 10 MG/ML
VIAL (ML) INTRAVENOUS CONTINUOUS PRN
Status: DISCONTINUED | OUTPATIENT
Start: 2022-12-19 | End: 2022-12-19

## 2022-12-19 RX ORDER — PROPOFOL 10 MG/ML
VIAL (ML) INTRAVENOUS
Status: DISCONTINUED | OUTPATIENT
Start: 2022-12-19 | End: 2022-12-19

## 2022-12-19 RX ORDER — TALC
6 POWDER (GRAM) TOPICAL NIGHTLY PRN
Status: DISCONTINUED | OUTPATIENT
Start: 2022-12-19 | End: 2022-12-19 | Stop reason: HOSPADM

## 2022-12-19 RX ORDER — ACETAMINOPHEN 500 MG
1000 TABLET ORAL EVERY 6 HOURS
Status: DISCONTINUED | OUTPATIENT
Start: 2022-12-19 | End: 2022-12-20 | Stop reason: HOSPADM

## 2022-12-19 RX ORDER — NUT.TX.GLUC INTOL,LF,SOY/FIBER 0.06 G-1.1
LIQUID (ML) ORAL
Qty: 237 ML | Refills: 0 | COMMUNITY
Start: 2022-12-19 | End: 2023-12-21 | Stop reason: ALTCHOICE

## 2022-12-19 RX ORDER — INSULIN ASPART 100 [IU]/ML
1-10 INJECTION, SOLUTION INTRAVENOUS; SUBCUTANEOUS
Status: DISCONTINUED | OUTPATIENT
Start: 2022-12-19 | End: 2022-12-20 | Stop reason: HOSPADM

## 2022-12-19 RX ORDER — PREGABALIN 75 MG/1
75 CAPSULE ORAL
Status: COMPLETED | OUTPATIENT
Start: 2022-12-19 | End: 2022-12-19

## 2022-12-19 RX ORDER — MUPIROCIN 20 MG/G
1 OINTMENT TOPICAL 2 TIMES DAILY
Status: DISCONTINUED | OUTPATIENT
Start: 2022-12-19 | End: 2022-12-20 | Stop reason: HOSPADM

## 2022-12-19 RX ORDER — ONDANSETRON 2 MG/ML
4 INJECTION INTRAMUSCULAR; INTRAVENOUS EVERY 8 HOURS PRN
Status: DISCONTINUED | OUTPATIENT
Start: 2022-12-19 | End: 2022-12-20 | Stop reason: HOSPADM

## 2022-12-19 RX ORDER — GLUCAGON 1 MG
1 KIT INJECTION
Status: DISCONTINUED | OUTPATIENT
Start: 2022-12-19 | End: 2022-12-20 | Stop reason: HOSPADM

## 2022-12-19 RX ORDER — METHOCARBAMOL 500 MG/1
1000 TABLET, FILM COATED ORAL ONCE AS NEEDED
Status: COMPLETED | OUTPATIENT
Start: 2022-12-19 | End: 2022-12-19

## 2022-12-19 RX ORDER — SODIUM CHLORIDE 9 MG/ML
INJECTION, SOLUTION INTRAVENOUS CONTINUOUS
Status: ACTIVE | OUTPATIENT
Start: 2022-12-19 | End: 2022-12-20

## 2022-12-19 RX ORDER — FAMOTIDINE 20 MG/1
20 TABLET, FILM COATED ORAL 2 TIMES DAILY
Status: DISCONTINUED | OUTPATIENT
Start: 2022-12-19 | End: 2022-12-20 | Stop reason: HOSPADM

## 2022-12-19 RX ORDER — TRANEXAMIC ACID 100 MG/ML
INJECTION, SOLUTION INTRAVENOUS
Status: DISCONTINUED | OUTPATIENT
Start: 2022-12-19 | End: 2022-12-19

## 2022-12-19 RX ORDER — ATORVASTATIN CALCIUM 20 MG/1
40 TABLET, FILM COATED ORAL DAILY
Status: DISCONTINUED | OUTPATIENT
Start: 2022-12-20 | End: 2022-12-20 | Stop reason: HOSPADM

## 2022-12-19 RX ORDER — ASCORBIC ACID 250 MG
1000 TABLET ORAL 2 TIMES DAILY
Status: DISCONTINUED | OUTPATIENT
Start: 2022-12-19 | End: 2022-12-20 | Stop reason: HOSPADM

## 2022-12-19 RX ORDER — VANCOMYCIN HYDROCHLORIDE 1 G/20ML
INJECTION, POWDER, LYOPHILIZED, FOR SOLUTION INTRAVENOUS
Status: DISCONTINUED | OUTPATIENT
Start: 2022-12-19 | End: 2022-12-19 | Stop reason: HOSPADM

## 2022-12-19 RX ORDER — SODIUM CHLORIDE 9 MG/ML
INJECTION, SOLUTION INTRAVENOUS
Status: COMPLETED | OUTPATIENT
Start: 2022-12-19 | End: 2022-12-19

## 2022-12-19 RX ORDER — NALOXONE HCL 0.4 MG/ML
0.02 VIAL (ML) INJECTION
Status: DISCONTINUED | OUTPATIENT
Start: 2022-12-19 | End: 2022-12-20 | Stop reason: HOSPADM

## 2022-12-19 RX ORDER — OXYCODONE HYDROCHLORIDE 5 MG/1
5 TABLET ORAL
Status: DISCONTINUED | OUTPATIENT
Start: 2022-12-19 | End: 2022-12-20 | Stop reason: HOSPADM

## 2022-12-19 RX ORDER — CEFAZOLIN SODIUM 1 G/3ML
INJECTION, POWDER, FOR SOLUTION INTRAMUSCULAR; INTRAVENOUS
Status: DISCONTINUED | OUTPATIENT
Start: 2022-12-19 | End: 2022-12-19

## 2022-12-19 RX ORDER — PANTOPRAZOLE SODIUM 40 MG/1
40 TABLET, DELAYED RELEASE ORAL DAILY PRN
Status: DISCONTINUED | OUTPATIENT
Start: 2022-12-20 | End: 2022-12-20 | Stop reason: HOSPADM

## 2022-12-19 RX ORDER — ONDANSETRON 2 MG/ML
INJECTION INTRAMUSCULAR; INTRAVENOUS
Status: DISCONTINUED | OUTPATIENT
Start: 2022-12-19 | End: 2022-12-19

## 2022-12-19 RX ORDER — SODIUM CHLORIDE 0.9 % (FLUSH) 0.9 %
10 SYRINGE (ML) INJECTION
Status: DISCONTINUED | OUTPATIENT
Start: 2022-12-19 | End: 2022-12-19 | Stop reason: HOSPADM

## 2022-12-19 RX ORDER — AMOXICILLIN 250 MG
1 CAPSULE ORAL 2 TIMES DAILY
Status: DISCONTINUED | OUTPATIENT
Start: 2022-12-19 | End: 2022-12-20 | Stop reason: HOSPADM

## 2022-12-19 RX ORDER — IBUPROFEN 200 MG
16 TABLET ORAL
Status: DISCONTINUED | OUTPATIENT
Start: 2022-12-19 | End: 2022-12-20 | Stop reason: HOSPADM

## 2022-12-19 RX ORDER — OXYCODONE HYDROCHLORIDE 10 MG/1
10 TABLET ORAL
Status: DISCONTINUED | OUTPATIENT
Start: 2022-12-19 | End: 2022-12-20 | Stop reason: HOSPADM

## 2022-12-19 RX ORDER — KETAMINE HCL IN 0.9 % NACL 50 MG/5 ML
SYRINGE (ML) INTRAVENOUS
Status: DISCONTINUED | OUTPATIENT
Start: 2022-12-19 | End: 2022-12-19

## 2022-12-19 RX ORDER — ASPIRIN 81 MG/1
81 TABLET ORAL DAILY
Qty: 90 TABLET | Refills: 3
Start: 2023-01-18 | End: 2024-01-18

## 2022-12-19 RX ORDER — ONDANSETRON 2 MG/ML
4 INJECTION INTRAMUSCULAR; INTRAVENOUS ONCE AS NEEDED
Status: DISCONTINUED | OUTPATIENT
Start: 2022-12-19 | End: 2022-12-19 | Stop reason: HOSPADM

## 2022-12-19 RX ORDER — MIDAZOLAM HYDROCHLORIDE 1 MG/ML
4 INJECTION INTRAMUSCULAR; INTRAVENOUS
Status: DISCONTINUED | OUTPATIENT
Start: 2022-12-19 | End: 2022-12-19 | Stop reason: HOSPADM

## 2022-12-19 RX ORDER — FENTANYL CITRATE 50 UG/ML
25 INJECTION, SOLUTION INTRAMUSCULAR; INTRAVENOUS EVERY 5 MIN PRN
Status: COMPLETED | OUTPATIENT
Start: 2022-12-19 | End: 2022-12-19

## 2022-12-19 RX ORDER — METHOCARBAMOL 750 MG/1
750 TABLET, FILM COATED ORAL 3 TIMES DAILY
Status: DISCONTINUED | OUTPATIENT
Start: 2022-12-19 | End: 2022-12-20 | Stop reason: HOSPADM

## 2022-12-19 RX ORDER — CELECOXIB 200 MG/1
400 CAPSULE ORAL ONCE
Status: COMPLETED | OUTPATIENT
Start: 2022-12-19 | End: 2022-12-19

## 2022-12-19 RX ORDER — ASPIRIN 81 MG/1
81 TABLET ORAL ONCE
Status: COMPLETED | OUTPATIENT
Start: 2022-12-19 | End: 2022-12-19

## 2022-12-19 RX ORDER — PREGABALIN 75 MG/1
75 CAPSULE ORAL NIGHTLY
Status: DISCONTINUED | OUTPATIENT
Start: 2022-12-19 | End: 2022-12-20 | Stop reason: HOSPADM

## 2022-12-19 RX ORDER — IBUPROFEN 200 MG
24 TABLET ORAL
Status: DISCONTINUED | OUTPATIENT
Start: 2022-12-19 | End: 2022-12-20 | Stop reason: HOSPADM

## 2022-12-19 RX ORDER — SODIUM CHLORIDE 9 MG/ML
INJECTION, SOLUTION INTRAVENOUS CONTINUOUS PRN
Status: DISCONTINUED | OUTPATIENT
Start: 2022-12-19 | End: 2022-12-19

## 2022-12-19 RX ORDER — LIDOCAINE HYDROCHLORIDE 10 MG/ML
1 INJECTION, SOLUTION EPIDURAL; INFILTRATION; INTRACAUDAL; PERINEURAL
Status: DISCONTINUED | OUTPATIENT
Start: 2022-12-19 | End: 2022-12-19 | Stop reason: HOSPADM

## 2022-12-19 RX ORDER — SPIRONOLACTONE 25 MG/1
25 TABLET ORAL DAILY
Status: DISCONTINUED | OUTPATIENT
Start: 2022-12-20 | End: 2022-12-20 | Stop reason: HOSPADM

## 2022-12-19 RX ORDER — MIDAZOLAM HYDROCHLORIDE 1 MG/ML
INJECTION, SOLUTION INTRAMUSCULAR; INTRAVENOUS
Status: DISCONTINUED | OUTPATIENT
Start: 2022-12-19 | End: 2022-12-19

## 2022-12-19 RX ORDER — FAMOTIDINE 10 MG/ML
INJECTION INTRAVENOUS
Status: DISCONTINUED | OUTPATIENT
Start: 2022-12-19 | End: 2022-12-19

## 2022-12-19 RX ORDER — DEXAMETHASONE SODIUM PHOSPHATE 4 MG/ML
INJECTION, SOLUTION INTRA-ARTICULAR; INTRALESIONAL; INTRAMUSCULAR; INTRAVENOUS; SOFT TISSUE
Status: DISCONTINUED | OUTPATIENT
Start: 2022-12-19 | End: 2022-12-19

## 2022-12-19 RX ORDER — MORPHINE SULFATE 2 MG/ML
2 INJECTION, SOLUTION INTRAMUSCULAR; INTRAVENOUS
Status: DISCONTINUED | OUTPATIENT
Start: 2022-12-19 | End: 2022-12-20 | Stop reason: HOSPADM

## 2022-12-19 RX ORDER — BISACODYL 10 MG
10 SUPPOSITORY, RECTAL RECTAL EVERY 12 HOURS PRN
Status: DISCONTINUED | OUTPATIENT
Start: 2022-12-19 | End: 2022-12-20 | Stop reason: HOSPADM

## 2022-12-19 RX ORDER — SODIUM CHLORIDE 0.9 % (FLUSH) 0.9 %
3 SYRINGE (ML) INJECTION
Status: DISCONTINUED | OUTPATIENT
Start: 2022-12-19 | End: 2022-12-19 | Stop reason: HOSPADM

## 2022-12-19 RX ORDER — ROPIVACAINE/EPI/CLONIDINE/KET 2.46-0.005
SYRINGE (ML) INJECTION
Status: DISCONTINUED | OUTPATIENT
Start: 2022-12-19 | End: 2022-12-19 | Stop reason: HOSPADM

## 2022-12-19 RX ORDER — ACETAMINOPHEN 500 MG
1000 TABLET ORAL
Status: COMPLETED | OUTPATIENT
Start: 2022-12-19 | End: 2022-12-19

## 2022-12-19 RX ADMIN — PROPOFOL 50 MG: 10 INJECTION, EMULSION INTRAVENOUS at 07:12

## 2022-12-19 RX ADMIN — TRANEXAMIC ACID 1000 MG: 100 INJECTION, SOLUTION INTRAVENOUS at 07:12

## 2022-12-19 RX ADMIN — MEPIVACAINE HYDROCHLORIDE 3 ML: 15 INJECTION, SOLUTION EPIDURAL; INFILTRATION at 07:12

## 2022-12-19 RX ADMIN — ACETAMINOPHEN 1000 MG: 500 TABLET ORAL at 06:12

## 2022-12-19 RX ADMIN — CEFAZOLIN 2 G: 330 INJECTION, POWDER, FOR SOLUTION INTRAMUSCULAR; INTRAVENOUS at 07:12

## 2022-12-19 RX ADMIN — FENTANYL CITRATE 25 MCG: 50 INJECTION INTRAMUSCULAR; INTRAVENOUS at 10:12

## 2022-12-19 RX ADMIN — Medication 25 MG: at 07:12

## 2022-12-19 RX ADMIN — ASPIRIN 81 MG: 81 TABLET, COATED ORAL at 09:12

## 2022-12-19 RX ADMIN — TRANEXAMIC ACID 1000 MG: 100 INJECTION, SOLUTION INTRAVENOUS at 06:12

## 2022-12-19 RX ADMIN — FAMOTIDINE 20 MG: 10 INJECTION, SOLUTION INTRAVENOUS at 06:12

## 2022-12-19 RX ADMIN — PREGABALIN 75 MG: 75 CAPSULE ORAL at 09:12

## 2022-12-19 RX ADMIN — OXYCODONE HYDROCHLORIDE 10 MG: 10 TABLET ORAL at 10:12

## 2022-12-19 RX ADMIN — DEXAMETHASONE SODIUM PHOSPHATE 8 MG: 4 INJECTION, SOLUTION INTRAMUSCULAR; INTRAVENOUS at 07:12

## 2022-12-19 RX ADMIN — ACETAMINOPHEN 1000 MG: 500 TABLET ORAL at 12:12

## 2022-12-19 RX ADMIN — CEFAZOLIN 2 G: 2 INJECTION, POWDER, FOR SOLUTION INTRAMUSCULAR; INTRAVENOUS at 03:12

## 2022-12-19 RX ADMIN — ONDANSETRON 4 MG: 2 INJECTION INTRAMUSCULAR; INTRAVENOUS at 06:12

## 2022-12-19 RX ADMIN — PREGABALIN 75 MG: 75 CAPSULE ORAL at 06:12

## 2022-12-19 RX ADMIN — SODIUM CHLORIDE: 0.9 INJECTION, SOLUTION INTRAVENOUS at 06:12

## 2022-12-19 RX ADMIN — SODIUM CHLORIDE: 9 INJECTION, SOLUTION INTRAVENOUS at 10:12

## 2022-12-19 RX ADMIN — SENNOSIDES AND DOCUSATE SODIUM 1 TABLET: 50; 8.6 TABLET ORAL at 09:12

## 2022-12-19 RX ADMIN — POLYETHYLENE GLYCOL 3350 17 G: 17 POWDER, FOR SOLUTION ORAL at 12:12

## 2022-12-19 RX ADMIN — METHOCARBAMOL 750 MG: 750 TABLET ORAL at 09:12

## 2022-12-19 RX ADMIN — VANCOMYCIN HYDROCHLORIDE 1500 MG: 1.5 INJECTION, POWDER, LYOPHILIZED, FOR SOLUTION INTRAVENOUS at 06:12

## 2022-12-19 RX ADMIN — OXYCODONE HYDROCHLORIDE 10 MG: 10 TABLET ORAL at 02:12

## 2022-12-19 RX ADMIN — CELECOXIB 400 MG: 200 CAPSULE ORAL at 06:12

## 2022-12-19 RX ADMIN — SODIUM CHLORIDE, SODIUM GLUCONATE, SODIUM ACETATE, POTASSIUM CHLORIDE, MAGNESIUM CHLORIDE, SODIUM PHOSPHATE, DIBASIC, AND POTASSIUM PHOSPHATE: .53; .5; .37; .037; .03; .012; .00082 INJECTION, SOLUTION INTRAVENOUS at 08:12

## 2022-12-19 RX ADMIN — CEFAZOLIN 2 G: 2 INJECTION, POWDER, FOR SOLUTION INTRAMUSCULAR; INTRAVENOUS at 10:12

## 2022-12-19 RX ADMIN — PROPOFOL 125 MCG/KG/MIN: 10 INJECTION, EMULSION INTRAVENOUS at 07:12

## 2022-12-19 RX ADMIN — INSULIN ASPART 2 UNITS: 100 INJECTION, SOLUTION INTRAVENOUS; SUBCUTANEOUS at 12:12

## 2022-12-19 RX ADMIN — FAMOTIDINE 20 MG: 20 TABLET ORAL at 09:12

## 2022-12-19 RX ADMIN — SODIUM CHLORIDE: 9 INJECTION, SOLUTION INTRAVENOUS at 07:12

## 2022-12-19 RX ADMIN — Medication 1000 MG: at 09:12

## 2022-12-19 RX ADMIN — SODIUM CHLORIDE, SODIUM GLUCONATE, SODIUM ACETATE, POTASSIUM CHLORIDE, MAGNESIUM CHLORIDE, SODIUM PHOSPHATE, DIBASIC, AND POTASSIUM PHOSPHATE: .53; .5; .37; .037; .03; .012; .00082 INJECTION, SOLUTION INTRAVENOUS at 09:12

## 2022-12-19 RX ADMIN — MUPIROCIN 1 G: 20 OINTMENT TOPICAL at 06:12

## 2022-12-19 RX ADMIN — MUPIROCIN 1 G: 20 OINTMENT TOPICAL at 09:12

## 2022-12-19 RX ADMIN — LIDOCAINE HYDROCHLORIDE 50 MG: 20 INJECTION, SOLUTION INTRAVENOUS at 07:12

## 2022-12-19 RX ADMIN — INSULIN ASPART 2 UNITS: 100 INJECTION, SOLUTION INTRAVENOUS; SUBCUTANEOUS at 05:12

## 2022-12-19 RX ADMIN — METHOCARBAMOL 1000 MG: 500 TABLET ORAL at 10:12

## 2022-12-19 RX ADMIN — SENNOSIDES AND DOCUSATE SODIUM 1 TABLET: 50; 8.6 TABLET ORAL at 12:12

## 2022-12-19 RX ADMIN — TRANEXAMIC ACID 1000 MG: 100 INJECTION, SOLUTION INTRAVENOUS at 08:12

## 2022-12-19 RX ADMIN — THERA TABS 1 TABLET: TAB at 09:12

## 2022-12-19 RX ADMIN — OXYCODONE HYDROCHLORIDE 10 MG: 10 TABLET ORAL at 07:12

## 2022-12-19 RX ADMIN — METHOCARBAMOL 750 MG: 750 TABLET ORAL at 03:12

## 2022-12-19 RX ADMIN — MIDAZOLAM HYDROCHLORIDE 2 MG: 1 INJECTION, SOLUTION INTRAMUSCULAR; INTRAVENOUS at 06:12

## 2022-12-19 NOTE — PLAN OF CARE
Pre Op complete.  Felton beauchamp on patient.  Call light in reach.  No distress or questions at this time.  Family not available.

## 2022-12-19 NOTE — NURSING TRANSFER
Nursing Transfer Note      12/19/2022     Reason patient is being transferred: [facility]    Transfer : [facility] to [facility]    Transfer via bed    Transfer with ice pack    Transported by [facility]uelos    Medicines sent: none    Any special needs or follow-up needed: n/a    Chart send with patient: yes    Notified: [facility] Mahajan and son, Blake Carver    Patient reassessed at: 12/19/2022 at 1002 am

## 2022-12-19 NOTE — PROGRESS NOTES
Pt stating that she had a gastric sleeve recently and is no longer a diabetic nor on diabetic meds.  A1C = 5.8.  Dr. Madison aware and would like to continue treating as a diabetic d/t A1C.  Continue diabetic diet, accuchecks, and SSI.  Pt made aware of situation.

## 2022-12-19 NOTE — PLAN OF CARE
Patient tolerated PT session well. Patient ambulated 60ft + 10ft with RW and contact guard assistance . No LOB or SOB noted. Patient has an OPPT appointment on 22. Patient to be seen for one more PT session prior to D/C.  Problem: Physical Therapy  Goal: Physical Therapy Goal  Description: Goals to be met by: 22     Patient will increase functional independence with mobility by performin. Supine to sit with supervision  2. Sit to stand transfer with Supervision  3. Gait x300 feet with Supervision using Rolling Walker  4. Ascend/Descend 4 steps with Stand by assistance using one hand rail with patient preferred side  5. Lower extremity exercise program x30 reps per handout, with supervision       Outcome: Ongoing, Progressing

## 2022-12-19 NOTE — PLAN OF CARE
Contacted patient's son Blake Carver. Explained that surgery was over and she was in recovery. Explained that she would go upstairs once she her pain was manageable. Explained about the visiting policy and gave him the patient's room number of 302.

## 2022-12-19 NOTE — PT/OT/SLP EVAL
Physical Therapy Evaluation and Treatment    Patient Name: Yayo Carver   MRN: [id number]  Recent Surgery: Procedure(s) (LRB):  ARTHROPLASTY, KNEE, TOTAL: LEFT: DEPUY - ATTUNE (Left) Day of Surgery    Recommendations:     Discharge Recommendations: outpatient PT   Discharge Equipment Recommendations: bath bench   Barriers to discharge: None    Assessment:     Yayo Carver is a 57 y.o. female admitted with a medical diagnosis of <principal problem not specified>. She presents with the following impairments/functional limitations: weakness, gait instability, decreased lower extremity function, impaired functional mobility, decreased safety awareness, pain, decreased ROM, edema, orthopedic precautions. Patient tolerated PT session well. Patient ambulated 60ft + 10ft with RW and contact guard assistance . No LOB or SOB noted. Patient has an OPPT appointment on 12/21/22. Patient to be seen for one more PT session prior to D/C.    Rehab Prognosis: Good; patient would benefit from acute skilled PT services to address these deficits and reach maximum level of function.  Recent Surgery: Procedure(s) (LRB):  ARTHROPLASTY, KNEE, TOTAL: LEFT: DEPUY - ATTUNE (Left) Day of Surgery    Plan:     During this hospitalization, patient to be seen daily to address the identified rehab impairments via gait training, therapeutic activities, therapeutic exercises and progress toward the following goals:    Plan of Care Expires: 12/21/22    Subjective     Chief Complaint: pain and stiffness in L knee  Patient/Family Comments/Goals: patient wishes to get back to walking without pain and working at the [employer]  Pain/Comfort:  Pain Rating 1: 8/10  Location - Side 1: Left  Location - Orientation 1: generalized  Location 1: knee  Pain Addressed 1: Pre-medicate for activity, Reposition, Distraction    Patients cultural, spiritual, Mormon conflicts given the current situation: no    Social History:  Living Environment: Patient lives alone in  a single story home, number of outside stairs: 5-6 with b/l hand rail not within reach at same time .  Prior Level of Function: Prior to admission, patient was independent with ADLs.  DME owned (not currently used): rolling walker and bedside commode  Assistance Upon Discharge: family    Objective:     Communicated with nurse prior to session. Patient found up in chair with cryotherapy, FCD, peripheral IV upon PT entry to room.    General Precautions: Standard, fall   Orthopedic Precautions:LLE weight bearing as tolerated   Braces: N/A   Respiratory Status: Room air    Exams:  RLE ROM: WNL  RLE Strength: WFL  LLE ROM: WFL  LLE Strength: WFL  Cognitive Exam: Patient is oriented to Person, Place, Time, Situation    Functional Mobility:  Bed Mobility:   Seated in chair at start of session and returned to chair  Transfers:  Sit to Stand: contact guard assistance with rolling walker with cues for hand placement and foot placement  Toilet Transfer with bedside commode frame over toilet: stand by assistance with rolling walker with cues for hand placement and foot placement  Gait:   Patient ambulated 60ft + 10ft with rolling walker and contact guard assistance. Patient required cues for position in walker, sequencing, and upright posture to increase independence and safety. Patient required cues ~ 25% of the time.  Balance:   Sitting: NORMAL: No deviations seen in posture held dynamically  Standing: GOOD-: Needs SUPERVISION only during gait and able to self right with moderate LOB inconsistently    Therapeutic Activities and Exercises:  Patient educated on role of acute care PT and PT POC, safety while in hospital including calling nurse for mobility, and call light usage.  Educated about weightbearing precautions and provided cuing for adherence as appropriate during session.  Educated about importance of OOB mobility and remaining UIC most of the day.  -safety with transfers including hand placement  -gait sequencing and  RW management    AM-PAC 6 CLICK MOBILITY  Total Score:18     Patient left up in chair with all lines intact, call button in reach, and RN notified.    GOALS:   Multidisciplinary Problems       Physical Therapy Goals          Problem: Physical Therapy    Goal Priority Disciplines Outcome Goal Variances Interventions   Physical Therapy Goal     PT, PT/OT Ongoing, Progressing     Description: Goals to be met by: 22     Patient will increase functional independence with mobility by performin. Supine to sit with supervision  2. Sit to stand transfer with Supervision  3. Gait x300 feet with Supervision using Rolling Walker  4. Ascend/Descend 4 steps with Stand by assistance using one hand rail with patient preferred side  5. Lower extremity exercise program x30 reps per handout, with supervision                            History:     Past Medical History:   Diagnosis Date    Arthritis     Diabetes mellitus     Diabetes mellitus, type 2     GERD (gastroesophageal reflux disease)     HLD (hyperlipidemia)     Hypertension        Past Surgical History:   Procedure Laterality Date    BARIATRIC SURGERY  2022    Gastric sleeve     SECTION  1989    COLONOSCOPY N/A 2017    Procedure: COLONOSCOPY;  Surgeon: Evelin Arceo MD;  Location: [address];  Service: Endoscopy;  Laterality: N/A;  2 nd floor d/t BMI > 50 kg/m3    KNEE ARTHROSCOPY Right     TONSILLECTOMY      TUBAL LIGATION         Time Tracking:     PT Received On: 22  PT Start Time: 1348  PT Stop Time: 1418  PT Total Time (min): 30 min     Billable Minutes: Evaluation 10 and Gait Training 20    2022

## 2022-12-19 NOTE — ANESTHESIA PREPROCEDURE EVALUATION
12/19/2022  Pre-operative evaluation for Procedure(s) (LRB):  ARTHROPLASTY, KNEE, TOTAL: LEFT: DEPUY - ATTUNE (Left)    Yayo Carver is a 57 y.o. female     Patient Active Problem List   Diagnosis    Morbid obesity with BMI of 50.0-59.9, adult    Osteoarthritis of both knees    DOM (iron deficiency anemia)    GERD (gastroesophageal reflux disease)    Essential hypertension    Mixed hyperlipidemia    Vitamin D deficiency    Type 2 diabetes mellitus with hyperglycemia, with long-term current use of insulin    Cyanocobalamin deficiency    Morbid obesity    Muscle cramps    Metabolic acidosis    Hyperglycemia    Gait abnormality    Decreased strength of lower extremity    Decreased mobility and endurance    Primary osteoarthritis of left knee       Review of patient's allergies indicates:   Allergen Reactions    Ace inhibitors Edema and Swelling       No current facility-administered medications on file prior to encounter.     Current Outpatient Medications on File Prior to Encounter   Medication Sig Dispense Refill    amLODIPine (NORVASC) 10 MG tablet Take 1 tablet (10 mg total) by mouth once daily. 90 tablet 3    aspirin (ECOTRIN) 81 MG EC tablet Take 1 tablet (81 mg total) by mouth once daily. 90 tablet 3    atorvastatin (LIPITOR) 40 MG tablet Take 1 tablet (40 mg total) by mouth once daily. 90 tablet 3    b complex vitamins capsule Take 1 capsule by mouth once daily.      ergocalciferol (ERGOCALCIFEROL) 50,000 unit Cap Take 1 capsule (50,000 Units total) by mouth every 7 days. 12 capsule 3    flash glucose scanning reader (FREESTYLE HEENA 14 DAY READER) Misc Check blood glucose before meals and nightly. 1 each 0    flash glucose sensor (FREESTYLE HEENA 14 DAY SENSOR) Kit Apply to skin for 14 days.  Check blood glucose before meals and nightly. 1 kit 11    sodium bicarbonate 650 MG  tablet Take 1 tablet (650 mg total) by mouth 2 (two) times daily. 60 tablet 2    spironolactone (ALDACTONE) 25 MG tablet Take 1 tablet (25 mg total) by mouth once daily. 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  Has a college-age son who comes and goes.  Has 3 other adult children.       Social Determinants of Health     Financial Resource Strain: Low Risk     Difficulty of Paying Living Expenses: Not very hard   Food Insecurity: No Food Insecurity    Worried About Running Out of Food in the Last Year: Never true    Ran Out of Food in the Last Year: Never true   Transportation Needs: Unmet Transportation Needs    Lack of Transportation (Medical): Yes    Lack of Transportation (Non-Medical): No   Physical Activity: Insufficiently Active    Days of Exercise per Week: 3 days    Minutes of Exercise per Session: 30 min   Stress: No Stress Concern Present    Feeling of Stress : Not at all   Social Connections: Unknown    Frequency of Communication with Friends  and Family: More than three times a week    Frequency of Social Gatherings with Friends and Family: More than three times a week    Active Member of Clubs or Organizations: No    Attends Club or Organization Meetings: 1 to 4 times per year    Marital Status: Never    Housing Stability: Low Risk     Unable to Pay for Housing in the Last Year: No    Number of Places Lived in the Last Year: 1    Unstable Housing in the Last Year: No         CBC: No results for input(s): WBC, RBC, HGB, HCT, PLT, MCV, MCH, MCHC in the last 72 hours.    CMP: No results for input(s): NA, K, CL, CO2, BUN, CREATININE, GLU, MG, PHOS, CALCIUM, ALBUMIN, PROT, ALKPHOS, ALT, AST, BILITOT in the last 72 hours.    INR  No results for input(s): PT, INR, PROTIME, APTT in the last 72 hours.        Diagnostic Studies:      EKG:  Normal sinus rhythm   Normal ECG   When compared with ECG of 12-OCT-2021 00:05,   No significant change was found   Confirmed by Danita Altamirano MD ([id number]) on 11/30/2022 9:06:10 AM     2D Echo:  CONCLUSIONS     1 - Normal left ventricular systolic function (EF 60-65%).     2 - Left ventricular diastolic dysfunction.     3 - Moderate left atrial enlargement.     4 - Normal right ventricular systolic function .     5 - Mild mitral regurgitation.       Pre-op Assessment    I have reviewed the Patient Summary Reports.     I have reviewed the Nursing Notes. I have reviewed the NPO Status.   I have reviewed the Medications.     Review of Systems  Anesthesia Hx:  Denies Family Hx of Anesthesia complications.   Denies Personal Hx of Anesthesia complications.   Cardiovascular:   Exercise tolerance: good Hypertension Denies Dysrhythmias.   Denies Angina.  Denies [place].    Pulmonary:   Denies COPD.  Denies Asthma.    Renal/:   Denies Chronic Renal Disease.     Hepatic/GI:   GERD    Musculoskeletal:   Arthritis     Neurological:   Denies CVA. Denies Seizures.    Endocrine:   Diabetes, type 2        Physical Exam  General: Well  nourished, Cooperative, Alert and Oriented    Airway:  Mouth Opening: Normal  TM Distance: Normal  Tongue: Normal  Neck ROM: Normal ROM    Dental:  Intact    Chest/Lungs:  Clear to auscultation, Normal Respiratory Rate    Heart:  Rate: Normal  Rhythm: Regular Rhythm        Anesthesia Plan  Type of Anesthesia, risks & benefits discussed:    Anesthesia Type: Gen Natural Airway, CSE  Intra-op Monitoring Plan: Standard ASA Monitors  Post Op Pain Control Plan: multimodal analgesia and IV/PO Opioids PRN  Induction:  IV  Informed Consent: Informed consent signed with the Patient and all parties understand the risks and agree with anesthesia plan.  All questions answered. Patient consented to blood products? Yes  ASA Score: 3  Day of Surgery Review of History & Physical: H&P Update referred to the surgeon/provider.    Ready For Surgery From Anesthesia Perspective.     .

## 2022-12-19 NOTE — PT/OT/SLP EVAL
"Occupational Therapy   Evaluation    Name: Yayo Carver  MRN: [id number]  Admitting Diagnosis: <principal problem not specified>  Recent Surgery: Procedure(s) (LRB):  ARTHROPLASTY, KNEE, TOTAL: LEFT: DEPUY - ATTUNE (Left) Day of Surgery    Recommendations:     Discharge Recommendations: home  Discharge Equipment Recommendations:  bedside commode, bath bench, walker, rolling  Barriers to discharge:  None    Assessment:     Yayo Carver is a 57 y.o. female with a medical diagnosis of <principal problem not specified>.  She presents with s/p L TKA. Pt met seated EOB with RN present in anticipation to complete transfer EOB>BSC. Pt completed UBD with min (A), toileting with CGA, and toilet transfer with CGA. Pt was most limited by reports of ongoing pain however tolerated session well despite complaints. Performance deficits affecting function: weakness, impaired endurance, impaired self care skills, impaired functional mobility, gait instability, impaired balance, pain, decreased safety awareness, decreased lower extremity function, decreased ROM, orthopedic precautions.      Rehab Prognosis: Good; patient would benefit from acute skilled OT services to address these deficits and reach maximum level of function.       Plan:     Patient to be seen daily to address the above listed problems via self-care/home management, therapeutic activities, therapeutic exercises  Plan of Care Expires: 12/21/22  Plan of Care Reviewed with: patient    Subjective   "I need something for pain"  Chief Complaint: pain  Patient/Family Comments/goals: return to PLOF    Occupational Profile:  Living Environment: Pt lives alone in a single level home with 6-7 steps to enter and b/l hand rails (wide apart)  Previous level of function: I PTA  Roles and Routines: mother; pt (+) drives and works in security at the [employer]  Equipment Used at Home: cane, straight  Assistance upon Discharge: son will stay with pt during her " recovery    Pain/Comfort:  Pain Rating 1: 8/10  Location - Side 1: Left  Location - Orientation 1: generalized  Location 1: knee  Pain Addressed 1: Pre-medicate for activity, Reposition, Distraction  Pain Rating Post-Intervention 1: 7/10    Patients cultural, spiritual, Gnosticism conflicts given the current situation: no    Objective:     Communicated with: Zaina CHIN prior to session.  Patient found sitting edge of bed with cryotherapy upon OT entry to room.    General Precautions: Standard, fall  Orthopedic Precautions: LLE weight bearing as tolerated  Braces: N/A  Respiratory Status: Room air    Occupational Performance:    Bed Mobility:    Not assessed    Functional Mobility/Transfers:  Patient completed Sit <> Stand Transfer with minimum assistance  with  rolling walker   Min (A) from EOB and BSC  75% verbal cues required for hand/foot placement  Patient completed Toilet Transfer EOB>BSC to the right Step Transfer technique with contact guard assistance with  rolling walker  Functional Mobility: Pt completed functional ambulation ~5 ft BSC>bedside chair with CGA and RW  Cues for sequencing and physical (A) required for RW management  No LOB or SOB noted    Activities of Daily Living:  Upper Body Dressing: minimum assistance required to don gown  Toileting: contact guard assistance for balance as pt completed pericare in standing from BSC    Cognitive/Visual Perceptual:  Cognitive/Psychosocial Skills:     -       Oriented to: Person, Place, Time, and Situation   -       Follows Commands/attention:Follows multistep  commands  -       Safety awareness/insight to disability: impaired   -       Mood/Affect/Coping skills/emotional control: Cooperative and Pleasant    Physical Exam:  Upper Extremity Range of Motion:     -       Right Upper Extremity: WFL  -       Left Upper Extremity: WFL  Upper Extremity Strength:    -       Right Upper Extremity: WFL  -       Left Upper Extremity: WFL    [facility] 6 Click ADL:  [facility]  Total Score: 19    Treatment & Education:  -Pt educated on hand/foot placement for transfers  -Pt educated on RW placement for ADLs  -Pt educated on positioning of sx LE during performance of functional activities vs rest/sleep  -Pt educated on weightbearing and surgical precautions with pt verbalizing  correctly  -Pt educated to call for assistance and to transfer with hospital staff only  -Pt educated on role of OT and plan of care s/p surgery at [facility], white board updated     Patient left up in chair with all lines intact, call button in reach, and RN notified    GOALS:   Multidisciplinary Problems       Occupational Therapy Goals          Problem: Occupational Therapy    Goal Priority Disciplines Outcome Interventions   Occupational Therapy Goal     OT, PT/OT Ongoing, Progressing    Description: Goals to be met by: 22     Patient will increase functional independence with ADLs by performing:    UE Dressing with Modified [place].  LE Dressing with Supervision.  Grooming while standing with Modified [place].  Toileting from toilet/bedside commode with Modified [place] for hygiene and clothing management.   Toilet transfer to toilet/bedside commode with Supervision and LRAD.                         History:     Past Medical History:   Diagnosis Date    Arthritis     Diabetes mellitus     Diabetes mellitus, type 2     GERD (gastroesophageal reflux disease)     HLD (hyperlipidemia)     Hypertension          Past Surgical History:   Procedure Laterality Date    BARIATRIC SURGERY  2022    Gastric sleeve     SECTION  1989    COLONOSCOPY N/A 2017    Procedure: COLONOSCOPY;  Surgeon: Evelin Arceo MD;  Location: [address];  Service: Endoscopy;  Laterality: N/A;  2 nd floor d/t BMI > 50 kg/m3    KNEE ARTHROSCOPY Right     TONSILLECTOMY      TUBAL LIGATION         Time Tracking:     OT Date of Treatment: 22  OT Start Time:   OT Stop  Time: 1135  OT Total Time (min): 25 min    Billable Minutes:Evaluation 10  Self Care/Home Management 15    12/19/2022

## 2022-12-19 NOTE — PLAN OF CARE
Problem: Occupational Therapy  Goal: Occupational Therapy Goal  Description: Goals to be met by: 12/21/22     Patient will increase functional independence with ADLs by performing:    UE Dressing with Modified [place].  LE Dressing with Supervision.  Grooming while standing with Modified [place].  Toileting from toilet/bedside commode with Modified [place] for hygiene and clothing management.   Toilet transfer to toilet/bedside commode with Supervision and LRAD.    Outcome: Ongoing, Progressing   OT eval complete with goals established. Pt completed UBD with min (A), toileting with CGA, and toilet transfer with CGA.

## 2022-12-19 NOTE — OP NOTE
[place] Left TKA  OPERATIVE NOTE    Date of Operation:   12/19/2022    Providers Performing:   Surgeon(s):  Jc Padgett III, MD  Assistant: Jason Paul MD    Operative Procedure:   Left Total Knee Arthroplasty, CPT 82074    -22 modifier for CDC class 2 or higher obesity (BMI 39.7) and severe 12 degree pre-op valgus deformity requiring prolonged operative time and increased case complexity and difficulty      Preoperative Diagnosis:   Left Knee Osteoarthritis, ICD-10 M17.12    Postoperative Diagnosis:   SAME    Components Used:   Depuy  Femur: Attune PS Size 3  Tibia: Attune fixed bearing revision Size 4 + [id number] cemented stem  Patella: Attune Medialized Dome 35 mm  Tibial Insert: Attune fixed bearing PS inset size 6 mm  2 packs cement: Simplex P    Implant Name Type Inv. Item Serial No.  Lot No. LRB No. Used Action   CEMENT BONE SURG SMPLX P RADPQ - [id number]  CEMENT BONE SURG SMPLX P RADPQ  Reachable RY. [id number] Left 2 Implanted   ATTUNE FEMORAL POSTERIOR SIZE 3 LEFT    [employer] [id number] Left 1 Implanted   PATELLA ATTUNE MEDLZD DOME 35 - [id number]  PATELLA ATTUNE MEDLZD DOME 35  [employer] [id number] Left 1 Implanted   ATTUNE KNEE SYSTEM [id number]    [employer] [id number] Left 1 Implanted   ATTUNE KNEE SYSTEM TIBIAL BASE SIZE 4    [employer] [id number] Left 1 Implanted   ATTUNE TIBIAL INSERT FIXED BEARING POSTERIOR STABILIZED SIZE 3 6MM     [employer] [id number] Left 1 Implanted       Anesthesia:   Spinal    BERE cocktail: [id number]    Estimated Blood Loss:   350 cc    Specimens:   None    Complications:   None    Indications:   The patient has failed non-operative measures including medications, injections and activity modifications for their knee.  After an explanation of risks and benefits of knee arthroplasty surgery, the patient wishes to proceed with surgery.    Operative Notes:   The patient was greeted in the pre-op holding area.  The patients knee was marked with a surgical marker by the surgeon.   Spinal-Epidural anesthesia was administered by the anesthesia team.  The patient was placed in the supine position on the OR table with all bony prominences padded.  A tourniquet was not used.  IV antibiotics were administered.  The leg was prepped and draped in the usual sterile fashion.  A timeout was performed and the correct patient, site and procedure were identified.    A midline incision was made with a standard medical parapatellar arthrotomy.  The standard medial capsular release was performed along with the lateral gutter.  The patella was mobilized from the lateral parapatellar ligament and bony osteophytes were removed.  The intercondylar notch was cleared of the ACL/PCL.    The extramedullary tibial alignment guide was placed in the appropriate slope and varus/valgus orientation and the proximal cutting block secured by pins.  Measured resection with a  4 mm cut was accounted for from the high side of the plateau, perpendicular to the ankle.  The cut was made with an oscillating saw.  We then obtained access to the femoral canal with the stepped drill.  The intramedullary ruddy was placed in 7 degrees of valgus with a 9mm planned resection.  Our distal femoral cuts were made.  The knee was placed in extension and the medical and lateral meniscal remnants removed.  A spacer block was placed with the knee in extension checking for alignment and balance which we were happy with.    The knee was then brought into flexion and the distal femoral gap assessed for appropriate rotation.  Our distal femoral sizing guide was placed and sized appropriately.  Guide pins were placed in the appropriate external rotation.  This was assessed with the 4 in 1 cutting block and the flexion gap sizing block which was appropriate.  The distal femoral preparation was completed after the anterior, posterior and chamfer cuts were made.  The box cutting guide was placed and assessed.  The box cut was made.    At this time the  trial implants were placed and knee assessed for stability, and flexion arc. The patella was prepared using free-hand technique and the three-holed lug drill guide was sized and appropriately assessed. Patella tracking was found to be acceptable. The tibial component rotation was marked. The trials were removed. The tibial component was positioned and the keel was drilled and punched.    The wound was copiously irrigated with pulsitile lavage and the bony surfaces dried.  Cement was mixed on the back table and applied to all components.  Cementation of the femoral, tibial and patellar components was performed sequentially with removal of all excess cement. A trial insert was placed to provide compression while the cement dried and a 0.35% betadine solution was left to soak in the surgical field.     After the cement was dry, the trial poly insert was removed. Hemostasis was obtained with bovie electrocautery.  The knee was again copiously irrigated with pulsatile lavage. The final polyethylene insert was placed and the knee reduced.      1 gram of vancomycin powder was placed in the knee. The arthrotomy was closed with interrupted #1 vicryl suture and #2 quill, the subcuticular layer was closed with 2-0 vicryl suture and a running 4-0 monocryl was used to closed the skin surface.  Surgicel sealant was placed over the top of the incision and sterile dressing placed over the wound. Appropriately sized TEDs hose were placed for edema control.     Sponge and needle counts were correct.    The first-assistant was critical to all steps of the operation, including retraction and leg stabilization during exposure and bone preparation, as well as the deep and superficial wound closure.  Dr. Padgett performed and/or supervised the key portions of this surgical procedure, including evaluation of the bone cuts, reshaping of the bony elements, and insertion of the provisional and final components.    Postoperative Plan:  DVT  Prophylaxis: The patient will be anticoagulated with 81mg aspirin x1 dose @ 2100 on POD#0 then Eliquis 2.5mg BID x30 days starting 0900 POD#1    They will receive 24 hours of post-operative antibiotics.    Activity will be weight bearing as tolerated.    Cbrex ok  Nutrition support: MVI, vit C, boost/ensure (patient does not want osvaldo)    Due patient and or surgical factors the patient will receive an Rx for cefadroxil 500mg BID x7 days after discharge per [place] data:   John MM, Yu JE, Richard PEREZ, Cesar DEJESUS. The \[facility]ology [facility] [place] [facility]\"" Clinical Research Award: Extended Oral Antibiotics Prevent Periprosthetic Joint Infection in High-Risk Cases: 3855 Patients With 1-Year Follow-Up. J Arthroplasty. 2021 Jul;36(7S):S18-S25. doi: [email or web address]. Epub 2021 Jan 23. PMID: [id number].      Signed by: Jc Padgett III, MD

## 2022-12-19 NOTE — ANESTHESIA PROCEDURE NOTES
Spinal    Diagnosis: osteoarthritis  Patient location during procedure: OR  Start time: 12/19/2022 7:02 AM  Timeout: 12/19/2022 7:02 AM  End time: 12/19/2022 7:06 AM    Staffing  Authorizing Provider: Jayden Amaral MD  Performing Provider: Jayden Amaral MD    Preanesthetic Checklist  Completed: patient identified, IV checked, site marked, risks and benefits discussed, surgical consent, monitors and equipment checked, pre-op evaluation and timeout performed  Spinal Block  Patient position: sitting  Prep: ChloraPrep  Patient monitoring: heart rate, continuous pulse ox and frequent blood pressure checks  Approach: midline  Location: L4-5  Injection technique: single shot  CSF Fluid: clear free-flowing CSF  Needle  Needle type: pencil-tip   Needle gauge: 25 G  Needle length: 5 in  Additional Documentation: incremental injection, negative aspiration for heme and no paresthesia on injection  Needle localization: anatomical landmarks  Assessment  Ease of block: easy  Patient's tolerance of the procedure: comfortable throughout block and no complaints  Medications:    Medications: mepivacaine (CARBOCAINE) injection 15 mg/mL (1.5%) - Intrathecal   3 mL - 12/19/2022 7:06:00 AM

## 2022-12-19 NOTE — PLAN OF CARE
Problem: Adult Inpatient Plan of Care  Goal: Plan of Care Review  Outcome: Ongoing, Progressing  Flowsheets (Taken 12/19/2022 1413)  Plan of Care Reviewed With: patient     Problem: Adult Inpatient Plan of Care  Goal: Patient-Specific Goal (Individualized)  Outcome: Ongoing, Progressing  Flowsheets (Taken 12/19/2022 1413)  Anxieties, Fears or Concerns: general anxiety  Individualized Care Needs: keep family and pt updated on plan of care  Patient-Specific Goals (Include Timeframe): post op pain control     Problem: Diabetes Comorbidity  Goal: Blood Glucose Level Within Targeted Range  Intervention: Monitor and Manage Glycemia  Flowsheets (Taken 12/19/2022 1413)  Glycemic Management: blood glucose monitored     Problem: Pain Acute  Goal: Acceptable Pain Control and Functional Ability  Intervention: Develop Pain Management Plan  Flowsheets (Taken 12/19/2022 1413)  Pain Management Interventions:   care clustered   cold applied   pain management plan reviewed with patient/caregiver   quiet environment facilitated     Problem: Pain Acute  Goal: Acceptable Pain Control and Functional Ability  Intervention: Prevent or Manage Pain  Flowsheets (Taken 12/19/2022 1413)  Sensory Stimulation Regulation:   care clustered   lighting decreased   quiet environment promoted  Medication Review/Management: medications reviewed     Problem: Pain Acute  Goal: Acceptable Pain Control and Functional Ability  Intervention: Optimize Psychosocial Wellbeing  Flowsheets (Taken 12/19/2022 1413)  Supportive Measures:   active listening utilized   positive reinforcement provided     Problem: Fall Injury Risk  Goal: Absence of Fall and Fall-Related Injury  Intervention: Identify and Manage Contributors  Flowsheets (Taken 12/19/2022 1413)  Medication Review/Management: medications reviewed     Problem: Fall Injury Risk  Goal: Absence of Fall and Fall-Related Injury  Intervention: Promote Injury-Free Environment  Flowsheets (Taken 12/19/2022  1413)  Safety Promotion/Fall Prevention:   assistive device/personal item within reach   Fall Risk reviewed with patient/family   in recliner, wheels locked   lighting adjusted   medications reviewed   nonskid shoes/socks when out of bed   instructed to call staff for mobility     Problem: Adjustment to Surgery (Knee Arthroplasty)  Goal: Optimal Coping  Intervention: Support Psychosocial Response to Surgery and Mobility Changes  Flowsheets (Taken 12/19/2022 1413)  Supportive Measures:   active listening utilized   positive reinforcement provided     Problem: Bleeding (Knee Arthroplasty)  Goal: Absence of Bleeding  Intervention: Monitor and Manage Bleeding  Flowsheets (Taken 12/19/2022 1413)  Bleeding Management: dressing monitored     Problem: Infection (Knee Arthroplasty)  Goal: Absence of Infection Signs and Symptoms  Intervention: Prevent or Manage Infection  Flowsheets (Taken 12/19/2022 1413)  Infection Management: aseptic technique maintained     Problem: Pain (Knee Arthroplasty)  Goal: Acceptable Pain Control  Intervention: Prevent or Manage Pain  Flowsheets (Taken 12/19/2022 1413)  Pain Management Interventions:   care clustered   cold applied   pain management plan reviewed with patient/caregiver   quiet environment facilitated     Problem: Adjustment to Illness (Chronic Kidney Disease)  Goal: Optimal Coping with Chronic Illness  Intervention: Support Psychosocial Response  Flowsheets (Taken 12/19/2022 1413)  Supportive Measures:   active listening utilized   positive reinforcement provided     Problem: Pain (Chronic Kidney Disease)  Goal: Acceptable Pain Control  Intervention: Prevent or Manage Pain  Flowsheets (Taken 12/19/2022 1413)  Pain Management Interventions:   care clustered   cold applied   pain management plan reviewed with patient/caregiver   quiet environment facilitated

## 2022-12-19 NOTE — TRANSFER OF CARE
"Anesthesia Transfer of Care Note    Patient: Yayo Carver    Procedure(s) Performed: Procedure(s) (LRB):  ARTHROPLASTY, KNEE, TOTAL: LEFT: DEPUY - ATTUNE (Left)    Patient location: PACU    Anesthesia Type: general    Transport from OR: Transported from OR on 100% O2 by closed face mask with adequate spontaneous ventilation    Post pain: adequate analgesia    Post assessment: no apparent anesthetic complications and tolerated procedure well    Post vital signs: stable    Level of consciousness: sedated and responds to stimulation    Nausea/Vomiting: no nausea/vomiting    Complications: none    Transfer of care protocol was followed      Last vitals:   Visit Vitals  BP (!) 140/82   Pulse 80   Temp 36.4 °C (97.5 °F) (Temporal)   Resp 20   Ht 5' 6" (1.676 m)   Wt 111.6 kg (246 lb)   SpO2 100%   Breastfeeding No   BMI 39.71 kg/m²     "

## 2022-12-19 NOTE — NURSING
Pt arrived to unit via hospital bed from PACU.  Pt aaox4, vss, no s/s of distress noted.  Dressing to left knee cdi.  Ice Wrap in place.  IVF infusing.  Pt instructed to call for assistance when getting up and she verbalized understanding.  Call light placed within reach.

## 2022-12-20 VITALS
BODY MASS INDEX: 39.53 KG/M2 | SYSTOLIC BLOOD PRESSURE: 125 MMHG | RESPIRATION RATE: 16 BRPM | OXYGEN SATURATION: 100 % | TEMPERATURE: 98 F | HEIGHT: 66 IN | DIASTOLIC BLOOD PRESSURE: 75 MMHG | HEART RATE: 76 BPM | WEIGHT: 246 LBS

## 2022-12-20 PROCEDURE — 97530 THERAPEUTIC ACTIVITIES: CPT

## 2022-12-20 PROCEDURE — 97116 GAIT TRAINING THERAPY: CPT | Mod: CQ

## 2022-12-20 PROCEDURE — 99499 UNLISTED E&M SERVICE: CPT | Mod: ,,, | Performed by: ANESTHESIOLOGY

## 2022-12-20 PROCEDURE — 97110 THERAPEUTIC EXERCISES: CPT | Mod: CQ

## 2022-12-20 PROCEDURE — 99900035 HC TECH TIME PER 15 MIN (STAT)

## 2022-12-20 PROCEDURE — 99499 NO LOS: ICD-10-PCS | Mod: ,,, | Performed by: ANESTHESIOLOGY

## 2022-12-20 PROCEDURE — 25000003 PHARM REV CODE 250: Performed by: STUDENT IN AN ORGANIZED HEALTH CARE EDUCATION/TRAINING PROGRAM

## 2022-12-20 PROCEDURE — 25000003 PHARM REV CODE 250: Performed by: NURSE PRACTITIONER

## 2022-12-20 PROCEDURE — 97535 SELF CARE MNGMENT TRAINING: CPT

## 2022-12-20 RX ORDER — CELECOXIB 200 MG/1
200 CAPSULE ORAL DAILY
Status: DISCONTINUED | OUTPATIENT
Start: 2022-12-20 | End: 2022-12-20 | Stop reason: HOSPADM

## 2022-12-20 RX ADMIN — MUPIROCIN 1 G: 20 OINTMENT TOPICAL at 08:12

## 2022-12-20 RX ADMIN — CELECOXIB 200 MG: 200 CAPSULE ORAL at 08:12

## 2022-12-20 RX ADMIN — Medication 1000 MG: at 08:12

## 2022-12-20 RX ADMIN — SENNOSIDES AND DOCUSATE SODIUM 1 TABLET: 50; 8.6 TABLET ORAL at 08:12

## 2022-12-20 RX ADMIN — POLYETHYLENE GLYCOL 3350 17 G: 17 POWDER, FOR SOLUTION ORAL at 08:12

## 2022-12-20 RX ADMIN — FAMOTIDINE 20 MG: 20 TABLET ORAL at 08:12

## 2022-12-20 RX ADMIN — THERA TABS 1 TABLET: TAB at 08:12

## 2022-12-20 RX ADMIN — METHOCARBAMOL 750 MG: 750 TABLET ORAL at 08:12

## 2022-12-20 RX ADMIN — ACETAMINOPHEN 1000 MG: 500 TABLET ORAL at 12:12

## 2022-12-20 RX ADMIN — OXYCODONE HYDROCHLORIDE 10 MG: 10 TABLET ORAL at 08:12

## 2022-12-20 RX ADMIN — APIXABAN 2.5 MG: 2.5 TABLET, FILM COATED ORAL at 08:12

## 2022-12-20 RX ADMIN — SODIUM CHLORIDE: 9 INJECTION, SOLUTION INTRAVENOUS at 03:12

## 2022-12-20 RX ADMIN — AMLODIPINE BESYLATE 10 MG: 10 TABLET ORAL at 08:12

## 2022-12-20 RX ADMIN — OXYCODONE HYDROCHLORIDE 10 MG: 10 TABLET ORAL at 04:12

## 2022-12-20 RX ADMIN — ATORVASTATIN CALCIUM 40 MG: 20 TABLET, FILM COATED ORAL at 08:12

## 2022-12-20 RX ADMIN — ACETAMINOPHEN 1000 MG: 500 TABLET ORAL at 06:12

## 2022-12-20 NOTE — ASSESSMENT & PLAN NOTE
57 y.o. female POD1 s/p left TKA    Pain control: multimodal  PT/OT: WBAT LLE  DVT PPx: ASA 81 POD0 at 2100, eliquis 2.5mg bid x 30 days, FCDs at all times when not ambulating  Abx: postop Ancef  Labs: reviewed  Drain: none  Domínguez: none    Dispo: f/u PT recs, likely home today

## 2022-12-20 NOTE — PLAN OF CARE
Patient has achieved all established goals and is appropriate for discharge home from acute skilled OT services.    Problem: Occupational Therapy  Goal: Occupational Therapy Goal  Description: Goals to be met by: 12/21/22     Patient will increase functional independence with ADLs by performing:    UE Dressing with Modified [place].  LE Dressing with Supervision.  Grooming while standing with Modified [place].  Toileting from toilet/bedside commode with Modified [place] for hygiene and clothing management.   Toilet transfer to toilet/bedside commode with Supervision and LRAD.    Outcome: Met

## 2022-12-20 NOTE — PLAN OF CARE
Problem: Adult Inpatient Plan of Care  Goal: Plan of Care Review  Outcome: Ongoing, Progressing  Goal: Patient-Specific Goal (Individualized)  Outcome: Ongoing, Progressing  Goal: Absence of Hospital-Acquired Illness or Injury  Outcome: Ongoing, Progressing  Goal: Optimal Comfort and Wellbeing  Outcome: Ongoing, Progressing  Goal: Readiness for Transition of Care  Outcome: Ongoing, Progressing     Problem: Diabetes Comorbidity  Goal: Blood Glucose Level Within Targeted Range  Outcome: Ongoing, Progressing     Problem: Pain Acute  Goal: Acceptable Pain Control and Functional Ability  Outcome: Ongoing, Progressing     Problem: Fall Injury Risk  Goal: Absence of Fall and Fall-Related Injury  Outcome: Ongoing, Progressing     Problem: Infection (Knee Arthroplasty)  Goal: Absence of Infection Signs and Symptoms  Outcome: Ongoing, Progressing     Pt remained free from falls or injuries this shift. Pt independent in repositioning. No skin breakdown noticed. Pt rested well through the night.  Ambulating and voiding well. Medicated x3 prn pain. VSS. Epidural site w some ecchymosis and small amount of bleeding. Bandaid applied

## 2022-12-20 NOTE — SUBJECTIVE & OBJECTIVE
"Principal Problem: s/p left TKA    Principal Orthopedic Problem: same    Interval History: Pt seen and examined at bedside. ASHLEYFAISALWINSOME. She reports pain is controlled. Small amount of bleeding from spinal skin site, bandaid was applied overnight. Ambulated 60 ft with PT yesterday. Plans to work with PT again today.       Review of patient's allergies indicates:   Allergen Reactions    Ace inhibitors Edema and Swelling       Current Facility-Administered Medications   Medication    0.9%  NaCl infusion    acetaminophen tablet 1,000 mg    amLODIPine tablet 10 mg    apixaban tablet 2.5 mg    ascorbic acid (vitamin C) tablet 1,000 mg    atorvastatin tablet 40 mg    bisacodyL suppository 10 mg    celecoxib capsule 200 mg    dextrose 10% bolus 125 mL 125 mL    dextrose 10% bolus 250 mL 250 mL    famotidine tablet 20 mg    glucagon (human recombinant) injection 1 mg    glucose chewable tablet 16 g    glucose chewable tablet 24 g    insulin aspart U-100 pen 1-10 Units    methocarbamoL tablet 750 mg    morphine injection 2 mg    multivitamin tablet    mupirocin 2 % ointment 1 g    naloxone 0.4 mg/mL injection 0.02 mg    ondansetron injection 4 mg    oxyCODONE immediate release tablet 5 mg    oxyCODONE immediate release tablet Tab 10 mg    pantoprazole EC tablet 40 mg    polyethylene glycol packet 17 g    pregabalin capsule 75 mg    prochlorperazine injection Soln 5 mg    senna-docusate 8.6-50 mg per tablet 1 tablet    spironolactone tablet 25 mg     Objective:     Vital Signs (Most Recent):  Temp: 97.7 °F (36.5 °C) (12/20/22 0358)  Pulse: 86 (12/20/22 0358)  Resp: 14 (12/20/22 0358)  BP: (!) 146/76 (12/20/22 0358)  SpO2: 97 % (12/20/22 0358)   Vital Signs (24h Range):  Temp:  [97.5 °F (36.4 °C)-98.8 °F (37.1 °C)] 97.7 °F (36.5 °C)  Pulse:  [75-86] 86  Resp:  [13-22] 14  SpO2:  [95 %-100 %] 97 %  BP: (102-147)/(57-82) 146/76     Weight: 111.6 kg (246 lb)  Height: 5' 6" (167.6 cm)  Body mass index is 39.71 kg/m².      Intake/Output " Summary (Last 24 hours) at 12/20/2022 0621  Last data filed at 12/20/2022 0359  Gross per 24 hour   Intake 4630 ml   Output 2350 ml   Net 2280 ml       Ortho/SPM Exam    LLE  AAOx4  NAD  Reg rate  No increased WOB  Dressing c/d/I left knee  Ice pack in place  SILT T/SP/DP/Bran/Sa  Motor intact T/SP/DP  WWP extremities  FCDs in place and functioning    Bandaid in place to spinal site with small amount of dry blood on bandaid. No active bleeding.      Significant Labs: All pertinent labs within the past 24 hours have been reviewed.    Significant Imaging: I have reviewed and interpreted all pertinent imaging results/findings.

## 2022-12-20 NOTE — ANESTHESIA POST-OP PAIN MANAGEMENT
Perioperative Surgical Home Progress Note    HPI  Yayo Carver is a 57 y.o., female,     Interval history      Surgery:  Procedure(s) (LRB):  ARTHROPLASTY, KNEE, TOTAL: LEFT: DEPUY - ATTUNE (Left)    Post Op Day #: 1        Problem List:    Active Hospital Problems    Diagnosis  POA    *Osteoarthritis of both knees [M17.0]  Yes      Resolved Hospital Problems   No resolved problems to display.       Subjective:       General appearance of alert, oriented, no complaints   Pain with rest: 1    Numbers   Pain with movement: 2    Numbers   Side Effects    1. Pruritis No    2. Nausea No    3. Motor Blockade No, 0=Ability to raise lower extremities off bed    4. Sedation No, 1=awake and alert    Schedule Medications:    acetaminophen  1,000 mg Oral Q6H    amLODIPine  10 mg Oral Daily    apixaban  2.5 mg Oral BID    ascorbic acid (vitamin C)  1,000 mg Oral BID    atorvastatin  40 mg Oral Daily    celecoxib  200 mg Oral Daily    famotidine  20 mg Oral BID    methocarbamoL  750 mg Oral TID    multivitamin  1 tablet Oral Daily    mupirocin  1 g Nasal BID    polyethylene glycol  17 g Oral Daily    pregabalin  75 mg Oral QHS    senna-docusate 8.6-50 mg  1 tablet Oral BID    spironolactone  25 mg Oral Daily        Continuous Infusions:       PRN Medications:  bisacodyL, dextrose 10%, dextrose 10%, glucagon (human recombinant), glucose, glucose, insulin aspart U-100, morphine, naloxone, ondansetron, oxyCODONE, oxyCODONE, pantoprazole, prochlorperazine       Antibiotics:  Antibiotics (From admission, onward)    Start     Stop Route Frequency Ordered    12/19/22 1230  mupirocin 2 % ointment 1 g         12/24 0859 Nasl 2 times daily 12/19/22 1226             Objective:               Vital Signs (Most Recent):  Temp: 36.6 °C (97.9 °F) (12/20/22 0819)  Pulse: 90 (12/20/22 0819)  Resp: 18 (12/20/22 0830)  BP: 125/76 (12/20/22 0819)  SpO2: 99 % (12/20/22 0819) Vital Signs Range (Last 24H):  Temp:  [36.5 °C (97.7 °F)-37.1  °C (98.8 °F)]   Pulse:  [76-90]   Resp:  [14-18]   BP: (116-147)/(71-78)   SpO2:  [95 %-100 %]          I & O (Last 24H):    Intake/Output Summary (Last 24 hours) at 12/20/2022 1044  Last data filed at 12/20/2022 0359  Gross per 24 hour   Intake 2630 ml   Output 2150 ml   Net 480 ml       Physical Exam:    GA: Alert, comfortable, no acute distress.   Pulmonary: Clear to auscultation. Normal RR.    Cardiac: regular rate and rhythm.      Laboratory: reviewed in chart  CBC: No results for input(s): WBC, RBC, HGB, HCT, PLT, MCV, MCH, MCHC in the last 72 hours.    BMP: No results for input(s): NA, K, CO2, CL, BUN, CREATININE, GLU, MG, PHOS, CALCIUM in the last 72 hours.    No results for input(s): PT, INR, PROTIME, APTT in the last 72 hours.          Assessment:         Pain control adequate    Plan:     1) Pain:  Multimodal pain regimen ordered which includes acetaminophen, celecoxib, pregabalin, and prn oxycodone.  Will continue to monitor.   2)DM2:continue home meds   3)HTN:continue home meds   4) FEN/GI: Tolerating regular diet.     5) Dispo: Pt working well with PT/OT. Case management and SW following along for setting up home health and physical therapy. Plan to discharge home this am.          Evaluator David Terrell MD

## 2022-12-20 NOTE — PLAN OF CARE
Pt discharged from unit.  Pt aaox4, vss, no s/s of distress noted.  Dressing to left knee remains cdi.  IV removed from left hand w/ catheter intact.  Discharge instructions given to pt and she verbalized understanding.  Home meds and f/u appts reviewed as well.  Meds and Eqmt delivered to bedside prior to discharge.  Pt left unit via w/c and was accompanied to front of hospital to meet ride.  All personal belongings sent with pt.

## 2022-12-20 NOTE — PT/OT/SLP PROGRESS
"Occupational Therapy   Treatment and Discharge      Name: Yayo Carver  MRN: [id number]  Admitting Diagnosis:  Osteoarthritis of both knees  1 Day Post-Op    Recommendations:     Discharge Recommendations: home  Discharge Equipment Recommendations:  bath bench  Barriers to discharge:  None    Assessment:     Yayo Carver is a 57 y.o. female with a medical diagnosis of Osteoarthritis of both knees.  She presents with s/p Left TKA. Performance deficits affecting function are decreased lower extremity function, orthopedic precautions, pain.     Patient completed bed mobility, sit>stand transfers, toilet transfer, functional mobility of household distance, grooming/hygiene tasks and dressing tasks. Education provided on RW management and technique as well as LB dressing techniques for post surgery.    Rehab Prognosis:  Good; patient would benefit from acute skilled OT services to address these deficits and reach maximum level of function.       Plan:     Patient is appropriate for discharge home from acute skilled OT services.    Subjective   "I went to therapy before the surgery and it really helped."    Pain/Comfort:  Pain Rating 1: 0/10  Location - Side 1: Left  Location - Orientation 1: posterior  Location 1: calf ("muscle soreness")  Pain Addressed 1: Reposition, Distraction, Cessation of Activity  Pain Rating Post-Intervention 1: other (see comments) (patient did not rate)    Objective:     Communicated with: Nurse prior to session.  Patient found HOB elevated with cryotherapy, FCD, peripheral IV upon OT entry to room.    General Precautions: Standard, fall    Orthopedic Precautions:LLE weight bearing as tolerated  Braces: N/A  Respiratory Status: Room air     Occupational Performance:     Bed Mobility:    Patient completed Rolling/Turning to Right with modified independence; HOB elevated  Patient completed Scooting with modified independence  Patient completed Supine to Sit with modified independence "     Functional Mobility/Transfers:  Patient completed Sit <> Stand Transfer with modified independence  with  rolling walker ; x 3 trials (EOB, toilet and chair levels)  Patient completed Toilet Transfer Step Transfer technique with modified independence with  rolling walker and BSC over toilet  Functional Mobility: Patient completed functional mobility of household distance ~30ft with Mod-[place] using RW    Activities of Daily Living:  Grooming: independence to perform oral care and wash face in stance at sink  Upper Body Dressing: independence to doff gown; don bra, camisole and t-shirt  Lower Body Dressing: supervision to don underwear and shorts  Toileting: independence to perform pericare seated at toilet level      [facility] 6 Click ADL: 24    Treatment & Education:  -Pt education on OT role and POC.  -Importance of E/OOB activity with staff assistance, emphasis on daily participation  -Education provided on LB dressing techniques for post surgery  -Education provided on RW management and technique  -Safety during functional transfer and mobility ensured  -Patient provided with education on importance of Bilateral UB/LB integration during functional tasks for improvement in functional performance.   -Education provided/reviewed, questions answered within OT scope of practice.   -Patient demonstrates understanding and learning this date.         Patient left up in chair with all lines intact, call button in reach, and nurse notified    GOALS:   Multidisciplinary Problems       Occupational Therapy Goals       Not on file              Multidisciplinary Problems (Resolved)          Problem: Occupational Therapy    Goal Priority Disciplines Outcome Interventions   Occupational Therapy Goal   (Resolved)     OT, PT/OT Met    Description: Goals to be met by: 12/21/22     Patient will increase functional independence with ADLs by performing:    UE Dressing with Modified [place].  LE Dressing with  Supervision.  Grooming while standing with Modified [place].  Toileting from toilet/bedside commode with Modified [place] for hygiene and clothing management.   Toilet transfer to toilet/bedside commode with Supervision and LRAD.                         Time Tracking:     OT Date of Treatment: 12/20/22  OT Start Time: 0842  OT Stop Time: 0933  OT Total Time (min): 51 min    Billable Minutes:Self Care/Home Management 31  Therapeutic Activity 20    OT/NEDA: OT          12/20/2022

## 2022-12-20 NOTE — PROGRESS NOTES
[facility])  Orthopedics  Progress Note    Patient Name: Yayo Carver  MRN: [id number]  Admission Date: 12/19/2022  Hospital Length of Stay: 0 days  Attending Provider: Jc Padgett III, MD  Primary Care Provider: Berta Hastings MD  Follow-up For: Procedure(s) (LRB):  ARTHROPLASTY, KNEE, TOTAL: LEFT: DEPUY - ATTUNE (Left)    Post-Operative Day: 1 Day Post-Op  Subjective:     Principal Problem: s/p left TKA    Principal Orthopedic Problem: same    Interval History: Pt seen and examined at bedside. NAEO. She reports pain is controlled. Small amount of bleeding from spinal skin site, bandaid was applied overnight. Ambulated 60 ft with PT yesterday. Plans to work with PT again today.       Review of patient's allergies indicates:   Allergen Reactions    Ace inhibitors Edema and Swelling       Current Facility-Administered Medications   Medication    0.9%  NaCl infusion    acetaminophen tablet 1,000 mg    amLODIPine tablet 10 mg    apixaban tablet 2.5 mg    ascorbic acid (vitamin C) tablet 1,000 mg    atorvastatin tablet 40 mg    bisacodyL suppository 10 mg    celecoxib capsule 200 mg    dextrose 10% bolus 125 mL 125 mL    dextrose 10% bolus 250 mL 250 mL    famotidine tablet 20 mg    glucagon (human recombinant) injection 1 mg    glucose chewable tablet 16 g    glucose chewable tablet 24 g    insulin aspart U-100 pen 1-10 Units    methocarbamoL tablet 750 mg    morphine injection 2 mg    multivitamin tablet    mupirocin 2 % ointment 1 g    naloxone 0.4 mg/mL injection 0.02 mg    ondansetron injection 4 mg    oxyCODONE immediate release tablet 5 mg    oxyCODONE immediate release tablet Tab 10 mg    pantoprazole EC tablet 40 mg    polyethylene glycol packet 17 g    pregabalin capsule 75 mg    prochlorperazine injection Soln 5 mg    senna-docusate 8.6-50 mg per tablet 1 tablet    spironolactone tablet 25 mg     Objective:     Vital Signs (Most Recent):  Temp: 97.7 °F (36.5 °C) (12/20/22 0358)  Pulse: 86  "(12/20/22 0358)  Resp: 14 (12/20/22 0358)  BP: (!) 146/76 (12/20/22 0358)  SpO2: 97 % (12/20/22 0358)   Vital Signs (24h Range):  Temp:  [97.5 °F (36.4 °C)-98.8 °F (37.1 °C)] 97.7 °F (36.5 °C)  Pulse:  [75-86] 86  Resp:  [13-22] 14  SpO2:  [95 %-100 %] 97 %  BP: (102-147)/(57-82) 146/76     Weight: 111.6 kg (246 lb)  Height: 5' 6" (167.6 cm)  Body mass index is 39.71 kg/m².      Intake/Output Summary (Last 24 hours) at 12/20/2022 0621  Last data filed at 12/20/2022 0359  Gross per 24 hour   Intake 4630 ml   Output 2350 ml   Net 2280 ml       Ortho/SPM Exam    LLE  AAOx4  NAD  Reg rate  No increased WOB  Dressing c/d/I left knee  Ice pack in place  SILT T/SP/DP/Bran/Sa  Motor intact T/SP/DP  WWP extremities  FCDs in place and functioning    Bandaid in place to spinal site with small amount of dry blood on bandaid. No active bleeding.      Significant Labs: All pertinent labs within the past 24 hours have been reviewed.    Significant Imaging: I have reviewed and interpreted all pertinent imaging results/findings.    Assessment/Plan:     Osteoarthritis of both knees  57 y.o. female POD1 s/p left TKA    Pain control: multimodal  PT/OT: WBAT LLE  DVT PPx: ASA 81 POD0 at 2100, eliquis 2.5mg bid x 30 days, FCDs at all times when not ambulating  Abx: postop Ancef x 24 hours and then will begin cefadroxil 500mg bid  Labs: reviewed  Drain: none  Domínguez: none  Nutrition support      Dispo: f/u PT recs, likely home today            Jason Paul MD  Orthopedics  [facility] ([facility])  "

## 2022-12-20 NOTE — DISCHARGE SUMMARY
[facility])  Orthopedics  Discharge Summary      Patient Name: Yayo Carver  MRN: [id number]  Admission Date: 12/19/2022  Hospital Length of Stay: 0 days  Discharge Date and Time: 12/20/2022 11:20 AM  Attending Physician: Jessie att. providers found   Discharging Provider: Jason Paul MD  Primary Care Provider: Berta Hastings MD    HPI: CC:  Left knee pain     Yayo Carver is a 56 y.o. female with history of Left knee pain. Pain is worse with activity and weight bearing.  Patient has experienced interference of activities of daily living due to decreased range of motion and an increase in joint pain and swelling.  Patient has failed non-operative treatment including NSAIDs, corticosteroid injections, viscosupplement injections, and activity modification.  Yayo Carver currently ambulates independently.      Relevant medical conditions of significance in perioperative period:  DM- on medication managed by pcp  Obesity- s/p weight loss surgery  HTN- on medication managed by pcp  GERD- on medication managed by pcp  HLD- on medication managed by pcp    Procedure(s) (LRB):  ARTHROPLASTY, KNEE, TOTAL: LEFT: DEPUY - ATTUNE (Left)      Hospital Course: Patient presented for above procedure.  Tolerated it well and was discharged home POD1 after voiding, tolerating diet, ambulating, pain controlled.  Discharge instructions, follow-up appointment, and med rec are below.      Consults (From admission, onward)          Status Ordering Provider     Inpatient consult to Social Work  Once        Provider:  (Not yet assigned)    AJIT Peñaloza     Inpatient consult to Pain Management  Once        Provider:  (Not yet assigned)    AJIT Peñaloza     Inpatient consult to Respiratory Care  Once        Provider:  (Not yet assigned)    AJIT Peñaloza            Significant Diagnostic Studies: Labs: All labs within the past 24 hours have been reviewed    Pending Diagnostic  Studies:       None          Final Active Diagnoses:    Diagnosis Date Noted POA    PRINCIPAL PROBLEM:  Osteoarthritis of both knees [M17.0] 05/15/2014 Yes      Problems Resolved During this Admission:      Discharged Condition: good    Disposition: Home or Self Care    Follow Up:    Patient Instructions:      Activity as tolerated     Sponge bath only until clinic visit     Keep surgical extremity elevated     Lifting restrictions   Order Comments: No strenuous exercise or lifting of > 10 lbs     Weight bearing as tolerated     No driving, operating heavy equipment or signing legal documents while taking pain medication     Leave dressing on - Keep it clean, dry, and intact until clinic visit   Order Comments: Do not remove surgical dressing for 2 weeks post-op. This will be done only by MD at initial post-op visit. If dressing is completely saturated, replace with identical dressing - silver-impregnated hydrocolloid dressing.     Do not get dressings wet. Do not shower.     If dressing continues to be saturated or there are signs of infection, please call [facility] [phone] for further instructions and to make appt to be seen.     Call MD for:  temperature >100.4     Call MD for:  persistent nausea and vomiting     Call MD for:  severe uncontrolled pain     Call MD for:  difficulty breathing, headache or visual disturbances     Call MD for:  redness, tenderness, or signs of infection (pain, swelling, redness, odor or green/yellow discharge around incision site)     Call MD for:  hives     Call MD for:  persistent dizziness or light-headedness     Call MD for:  extreme fatigue     Medications:  Reconciled Home Medications:      Medication List        START taking these medications      acetaminophen 650 MG Tbsr  Commonly known as: TYLENOL  Take 1 tablet (650 mg total) by mouth every 6 to 8 hours as needed (pain).     ascorbic acid (vitamin C) 1000 MG tablet  Commonly known as: VITAMIN C  Take 1 tablet (1,000  mg total) by mouth 2 (two) times daily. for 14 days     Boost Glucose ControL 0.06-1.1 gram-kcal/mL Liqd  Generic drug: nut.tx.gluc intol,lf,soy-fiber  Take 8 ml by mouth with meals for 2 weeks     cefadroxil 500 MG Cap  Commonly known as: DURICEF  Take 1 capsule (500 mg total) by mouth every 12 (twelve) hours. for 7 days     celecoxib 200 MG capsule  Commonly known as: CeleBREX  Take 1 capsule (200 mg total) by mouth once daily.     docusate sodium 100 MG capsule  Commonly known as: COLACE  Take 1 capsule (100 mg total) by mouth 2 (two) times daily as needed for Constipation.     ELIQUIS 2.5 mg Tab  Generic drug: apixaban  Take 1 tablet (2.5 mg total) by mouth 2 (two) times daily.     methocarbamoL 750 MG Tab  Commonly known as: ROBAXIN  Take 1 tablet (750 mg total) by mouth 3 (three) times daily as needed (muscle spasms).     multivitamin capsule  Take 1 capsule by mouth once daily. for 14 days     oxyCODONE 5 MG immediate release tablet  Commonly known as: ROXICODONE  Take 1-2 tablets every 4-6 hours prn pain            CHANGE how you take these medications      aspirin 81 MG EC tablet  Commonly known as: ECOTRIN  Take 1 tablet (81 mg total) by mouth once daily.  Start taking on: January 18, 2023  What changed: These instructions start on January 18, 2023. If you are unsure what to do until then, ask your doctor or other care provider.            CONTINUE taking these medications      amLODIPine 10 MG tablet  Commonly known as: NORVASC  Take 1 tablet (10 mg total) by mouth once daily.     atorvastatin 40 MG tablet  Commonly known as: LIPITOR  Take 1 tablet (40 mg total) by mouth once daily.     b complex vitamins capsule  Take 1 capsule by mouth once daily.     diclofenac sodium 1 % Gel  Commonly known as: VOLTAREN  Apply 2 g topically 4 (four) times daily as needed (knee pain).     FREESTYLE HEENA 14 DAY READER Misc  Generic drug: flash glucose scanning reader  Check blood glucose before meals and nightly.      FREESTYLE HENEA 14 DAY SENSOR Kit  Generic drug: flash glucose sensor  Apply to skin for 14 days.  Check blood glucose before meals and nightly.     pantoprazole 40 MG tablet  Commonly known as: PROTONIX  Take 1 tablet (40 mg total) by mouth daily as needed (acid reflux).     sodium bicarbonate 650 MG tablet  Take 1 tablet (650 mg total) by mouth 2 (two) times daily.     spironolactone 25 MG tablet  Commonly known as: ALDACTONE  Take 1 tablet (25 mg total) by mouth once daily.     terbinafine  mg tablet  Commonly known as: LAMISIL  Take 1 tablet (250 mg total) by mouth once daily. Avoid alcohol while on medication     traMADoL 50 mg tablet  Commonly known as: ULTRAM  Take 1-2 tablets ( mg total) by mouth 3 (three) times daily as needed for Pain.     VITAMIN D2 50,000 unit Cap  Generic drug: ergocalciferol  Take 1 capsule (50,000 Units total) by mouth every 7 days.              Jason Paul MD  Orthopedics  [facility])

## 2022-12-21 ENCOUNTER — CLINICAL SUPPORT (OUTPATIENT)
Dept: REHABILITATION | Facility: HOSPITAL | Age: 57
End: 2022-12-21
Attending: ORTHOPAEDIC SURGERY
Payer: MEDICAID

## 2022-12-21 DIAGNOSIS — Z74.09 DECREASED MOBILITY AND ENDURANCE: ICD-10-CM

## 2022-12-21 DIAGNOSIS — M17.12 PRIMARY OSTEOARTHRITIS OF LEFT KNEE: ICD-10-CM

## 2022-12-21 DIAGNOSIS — R29.898 DECREASED STRENGTH OF LOWER EXTREMITY: Primary | ICD-10-CM

## 2022-12-21 PROCEDURE — 97110 THERAPEUTIC EXERCISES: CPT

## 2022-12-21 PROCEDURE — 97164 PT RE-EVAL EST PLAN CARE: CPT

## 2022-12-22 NOTE — ANESTHESIA POSTPROCEDURE EVALUATION
Anesthesia Post Evaluation    Patient: Yayo Staplesin    Procedure(s) Performed: Procedure(s) (LRB):  ARTHROPLASTY, KNEE, TOTAL: LEFT: DEPUY - ATTUNE (Left)    Final Anesthesia Type: spinal      Patient location during evaluation: PACU  Patient participation: Yes- Able to Participate  Level of consciousness: awake and alert and oriented  Post-procedure vital signs: reviewed and stable  Pain management: adequate  Airway patency: patent    PONV status at discharge: No PONV  Anesthetic complications: no      Cardiovascular status: blood pressure returned to baseline and hemodynamically stable  Respiratory status: unassisted, room air and spontaneous ventilation  Hydration status: euvolemic  Follow-up not needed.          Vitals Value Taken Time   /75 12/20/22 1054   Temp 36.7 °C (98.1 °F) 12/20/22 1054   Pulse 76 12/20/22 1054   Resp 16 12/20/22 1054   SpO2 100 % 12/20/22 1054         Event Time   Out of Recovery 10:41:00         Pain/Jamilah Score: No data recorded

## 2022-12-22 NOTE — PLAN OF CARE
[place] [facility]  Discharge Final Note    Primary Care Provider: Berta Hastings MD    Expected Discharge Date: 12/20/2022    Final Discharge Note (most recent)       Final Note - 12/20/22 1120          Final Note    Assessment Type Final Discharge Note     Anticipated Discharge Disposition Home or Self Care     Hospital Resources/Appts/Education Provided Provided patient/caregiver with written discharge plan information;Appointments scheduled by Navigator/Coordinator                   Future Appointments   Date Time Provider Department Center   12/23/2022 12:00 PM Deja Scorsone, PT [facility] [place]   12/27/2022  1:00 PM Farhana Olguin, PTA [facility] [place]   12/28/2022  1:00 PM Deja Scorsone, PT [facility] [place]   12/30/2022  1:00 PM Farhana Olguin, PTA [facility] [place]   1/3/2023  2:15 PM Meghan Simons, NP [facility] [address]   1/3/2023  3:00 PM Farhana Olguin, PTA [facility] [place]   1/4/2023  2:00 PM Carleen Bradford, PTA [facility] [place]   1/6/2023  2:00 PM Farhana Olguin, PTA [facility] [place]   1/9/2023  9:30 AM Khushbu Stapleton, DPM [facility] [address]   1/9/2023  2:00 PM Deja Scorsone, PT [facility] [place]   1/11/2023  2:00 PM Farhana Olguin, PTA [facility] [place]   1/13/2023  2:00 PM Farhana Olguin, PTA [facility] [place]   1/17/2023  2:00 PM Deja Scorsone, PT [facility] [place]   1/18/2023  2:00 PM Farhana Olguin, PTA [facility] [place]   1/20/2023  2:00 PM Deja Scorsone, PT [facility] [place]   1/24/2023  2:00 PM Farhana Olguin, PTA [facility] [place]   1/26/2023  2:00 PM Farhana Olguin, PTA [facility] [place]   1/31/2023 11:40 AM Jc Padgett III, MD [facility]   1/31/2023  2:00 PM Farhana Olguin, JEANNE [facility] [place]   2/2/2023  2:00 PM Deja Kennedy, PT [facility] [place]

## 2022-12-23 ENCOUNTER — CLINICAL SUPPORT (OUTPATIENT)
Dept: REHABILITATION | Facility: HOSPITAL | Age: 57
End: 2022-12-23
Payer: MEDICAID

## 2022-12-23 DIAGNOSIS — R29.898 DECREASED STRENGTH OF LOWER EXTREMITY: Primary | ICD-10-CM

## 2022-12-23 DIAGNOSIS — Z74.09 DECREASED MOBILITY AND ENDURANCE: ICD-10-CM

## 2022-12-23 PROCEDURE — 97110 THERAPEUTIC EXERCISES: CPT

## 2022-12-23 NOTE — PLAN OF CARE
[facility]  Physical Therapy Initial Updated Plan of Care    Name: Yayo Carver  Clinic Number: [id number]    Therapy Diagnosis:   Encounter Diagnoses   Name Primary?    Primary osteoarthritis of left knee     Decreased strength of lower extremity Yes    Decreased mobility and endurance        Physician: Jc Padgett III, *    Physician Orders: PT Eval and Treat   Medical Diagnosis from Referral: M17.12 (ICD-10-CM) - Primary osteoarthritis of left knee  Evaluation Date: 2022  Authorization Period Expiration: 2022  Plan of Care Expiration: 2/3/2023  Visit # / Visits authorized:   FOTO: 1/10    Time In: 11:25 am - patient late arrival  Time Out: 12:10 am  Total Billable Time: 35 minutes    Precautions: Standard     Subjective     Date of surgery: 2022    History of current condition - Elisa reports: she had a left total knee arthroplasty two days ago.     Past Medical History:   Diagnosis Date    Arthritis     Diabetes mellitus     Diabetes mellitus, type 2     GERD (gastroesophageal reflux disease)     HLD (hyperlipidemia)     Hypertension      Yayo Carver  has a past surgical history that includes Tubal ligation; Tonsillectomy; Colonoscopy (N/A, 2017);  section (1989); Bariatric Surgery (2022); Knee arthroscopy (Right, ); and Total knee arthroplasty (Left, 2022).    Yayo has a current medication list which includes the following prescription(s): acetaminophen, amlodipine, apixaban, ascorbic acid (vitamin c), [START ON 2023] aspirin, atorvastatin, b complex vitamins, cefadroxil, celecoxib, diclofenac sodium, docusate sodium, ergocalciferol, freestyle zachery 14 day reader, freestyle zachery 14 day sensor, methocarbamol, multivitamin, boost glucose control, oxycodone, pantoprazole, sodium bicarbonate, spironolactone, terbinafine hcl, and tramadol.    Review of patient's allergies indicates:   Allergen Reactions    Ace inhibitors  "Edema and Swelling        Imaging, please see imaging    Prior Therapy: yes, prehab  Current Level of Function: difficulty ambulating with RW, difficulty with transfers, pain with knee extension and flexion    Pain:  Current 0/10, worst 10/10, best 0/10   Location: left knee   Description: Aching, Dull, and Burning  Aggravating Factors: Standing, Walking, and Getting out of bed/chair  Easing Factors: rest    Patient's goals: return to gym     Objective     Posture: rounded shoulders and forward head  Palpation: tender to palpation and boggy end feel during patella mobilizations. Negative s/s of DVT per Well's Criteria    Active range of motion:  Left knee: 0-70    Passive range of motion:  Left knee: 0-70      Lower extremity manual muscle test Right Left Pain/dysfunction with movement   Hip flexion 4-/5 3-/5    Hip extension NT NT NT due to poor bed mobility   Hip abduction 3-/5 3-/5 Performed in sitting   Knee flexion 4/5 3-/5 Pain of left knee   Knee extension 4/5 3-/5 Pain of left knee     Timed Up and Go:  1 minutes 4 seconds with RW    30" Chair Stand: 2x with upper extremity support; 0 without upper extremity support    Gait Analysis: Modified independent with rolling walker: step to gait pattern, decreased WB through left LE    Impairment/Limitation/Restriction for KOOS JR. Score on FOTO Knee Survey    Therapist reviewed KOOS scores for Yayo Carver on 12/21/2022.   KOOS documents entered into [employer] - see Media section.    Limitation Score: at first follow up    Limitation for Total FOTO Knee Survey  Limitation Score: at first follow up       TREATMENT     Treatment Time In: 11:45 am  Treatment Time Out: 12:10 am  Total Treatment time separate from Evaluation: 25 minutes    Elisa received therapeutic exercises to develop strength, endurance, and ROM for 15 minutes including:    Quad sets: 5"x10  Short arc quads: unable to tolerate  Long arc quads:unable to tolerate  Seated heel prop: 3'   Seated hamstring " "stretch in heel prop: 3x30"     Elisa received cold pack to left knee for 10 minutes to decrease pain and swelling with lower extremities elevated    Home Exercises and Patient Education Provided:    Education provided:   - Findings; prognosis and plan of care  - Home exercise program  - Modality options  - Therapist contact information  - s/s DVT per Well's criteria    Written Home Exercises Provided: Yes.  Exercises were reviewed and Elisa was able to demonstrate them prior to the end of the session.  Elisa demonstrated good  understanding of the education provided.     See electronic medical record under Notes-Patient Instructions for exercises provided 12/21/2022.    Assessment     Yayo is a 57 y.o. female referred to outpatient Physical Therapy with a medical diagnosis of Primary osteoarthritis of left knee. Patient presents to initial evaluation post-op day 2 with left total knee arthroplasty. Patient demonstrates decreased lower extremity strength, poor left quadriceps motor control and strength, difficulty ambulating short and long distances, and reduced functional mobility. Patient would benefit from skilled outpatient physical therapy to address motor control, strength and range of motion deficits so that she may return to full independence with all household and personal ADL's, and improve overall functional mobility, reduce risk for falls and meet all social and recreational needs.    Patient prognosis is Good.   Patient will benefit from skilled outpatient physical therapy to address the deficits stated above and in the chart below, provide pt/family education, and to maximize pt's level of independence.     Plan of care discussed with patient: Yes  Pt's spiritual, cultural and educational needs considered and patient is agreeable to the plan of care and goals as stated below:     Anticipated barriers for therapy: compliance  Updated GOALS:  Short Term Goals (3 Weeks):   1. Patient will be independent " "with home exercise program to supplement physical therapy treatment in improving functional status.  2. Pt will improve impaired lower extremity manual muscle tests to >/= 4/5 to improve dynamic left knee support for closed chain tasks.  3. Patient will improve (left) knee active range of motion to 0-90 degrees to promote increased ease of sit<>stand transfers.  4. Patient will perform timed up and go with less restrictive assistive device in < 15 seconds to improve gait speed and safety with community ambulation.     Long Term Goals (6 Weeks):   1. Pt will improve impaired lower extremity manual muscle tests to >/= 4+/5 to improve dynamic left knee support for closed chain tasks.  2. Patient will improve the total FOTO Knee Survey Score to </= 40% limited to demonstrate increased perceived functional mobility.  3. Patient will improve score on KOOS Jr. section of FOTO Knee Survey to = </= 70% to demonstrate increased perceived functional mobility.  4. Patient will perform timed up and go with least restrictive assistive device in < 10 seconds to improve gait speed and safety with community ambulation.  5. Patient will perform at least 10-12 sit to stands in 30 seconds without UE support to demonstrate increased functional strength.   6. Patient will improve (left) knee active range of motion to 0-120 degrees to promote higher level transfers and transitions without limitation.     Timed Up and Go Cutoff Scores:        30" Chair Stand Cutoff Scores:          Plan     Plan of care certification: 12/21/2022 to 2/3/2023.    Outpatient Physical Therapy 4 times weekly for 6 weeks to include the following interventions: Gait Training, Manual Therapy, Moist Heat/ Ice, Neuromuscular Re-ed, Orthotic Management and Training, Patient Education, Therapeutic Activites and Therapeutic Exercise, Instrument Assisted Soft Tissue Mobilization (IASTM), Kinesiotape    Deja Kennedy, PT, DPT      "

## 2022-12-23 NOTE — PROGRESS NOTES
[facility]   Physical Therapy Treatment Note     Name: Yayo Carver  [facility] Number: [id number]    Therapy Diagnosis:   Encounter Diagnoses   Name Primary?    Decreased strength of lower extremity Yes    Decreased mobility and endurance      Physician: Jonathon Shrestha MD    Visit Date: 12/23/2022    Physician Orders: PT Eval and Treat   Medical Diagnosis from Referral: M17.12 (ICD-10-CM) - Primary osteoarthritis of left knee  Evaluation Date: 12/21/2022  Authorization Period Expiration: 12/31/2022  Plan of Care Expiration: 2/3/2023  Visit # / Visits authorized: 6/32   FOTO: 1/10    PTA Visit #: 0/5     Time In: 12:20 pm - patient late arrival  Time Out: 1:27 pm  Total Billable Time: 54 minutes    SUBJECTIVE     Pt reports: she is late because of transportation.  She was compliant with home exercise program.  Response to previous treatment: no adverse effects  Functional change: ambulates with RW    Pain: 5/10  Location: left knee      OBJECTIVE     Objective Measures updated at progress report unless specified.     12/23/2022: 0- 82 AAR    Treatment     Elisa received the treatments listed below:      therapeutic exercises to develop strength, endurance, ROM, and flexibility for 54 minutes including:  Quad sets with towel under knee 5 second, 20x  Quad sets with towel under heel 5 seconds, 20x  Short arc quads with medium bolster 5 seconds, 30 + green strap  Short arc qauds with small bolster 5 seconds, 30x + green strap  Long arc quads 5 seconds, 15x- unable to perform due to pain and decreased quad strength  Heel prop: 3 minutes  Heel slides: 10 seconds, 10x  Gait training with rolling walker: 200 feet    manual therapy techniques: Joint mobilizations were applied to the: left knee for 3 minutes, including:  Grade I-II patella mobilizations in all directions    cold pack for 10 minutes to left knee with lower extremities elevated.      Patient Education and Home Exercises     Home  Exercises Provided and Patient Education Provided     Education provided:   - HEP    Written Home Exercises Provided: Patient instructed to cont prior HEP. Exercises were reviewed and Elisa was able to demonstrate them prior to the end of the session.  Elisa demonstrated good  understanding of the education provided. See EMR under Patient Instructions for exercises provided during therapy sessions    ASSESSMENT     Patient is 4 days s/p left total knee arthroplasty. Patient demonstrates 0-82 degrees AAROM at this time. Patient with fair quadriceps control. Would benefit from NMES next session.     Elisa Is progressing well towards her goals.   Pt prognosis is Good.     Pt will continue to benefit from skilled outpatient physical therapy to address the deficits listed in the problem list box on initial evaluation, provide pt/family education and to maximize pt's level of independence in the home and community environment.     Pt's spiritual, cultural and educational needs considered and pt agreeable to plan of care and goals.     Anticipated barriers to physical therapy: compliance    Goals:   Short Term Goals (3 Weeks):   1. Patient will be independent with home exercise program to supplement physical therapy treatment in improving functional status. Progressing, not met  2. Pt will improve impaired lower extremity manual muscle tests to >/= 4/5 to improve dynamic left knee support for closed chain tasks. Progressing, not met  3. Patient will improve (left) knee active range of motion to 0-90 degrees to promote increased ease of sit<>stand transfers. Progressing, not met  4. Patient will perform timed up and go with less restrictive assistive device in < 15 seconds to improve gait speed and safety with community ambulation. Progressing, not met       Long Term Goals (6 Weeks):   1. Pt will improve impaired lower extremity manual muscle tests to >/= 4+/5 to improve dynamic left knee support for closed chain tasks.  Progressing, not met  2. Patient will improve the total FOTO Knee Survey Score to </= 40% limited to demonstrate increased perceived functional mobility. Progressing, not met  3. Patient will improve score on KOOS Jr. section of FOTO Knee Survey to = </= 70% to demonstrate increased perceived functional mobility. Progressing, not met  4. Patient will perform timed up and go with least restrictive assistive device in < 10 seconds to improve gait speed and safety with community ambulation. Progressing, not met  5. Patient will perform at least 10-12 sit to stands in 30 seconds without UE support to demonstrate increased functional strength. Progressing, not met  6. Patient will improve (left) knee active range of motion to 0-120 degrees to promote higher level transfers and transitions without limitation. Progressing, not met      PLAN     NMES next session to left quadriceps    Deja Kennedy, STEPHEN

## 2022-12-27 ENCOUNTER — CLINICAL SUPPORT (OUTPATIENT)
Dept: REHABILITATION | Facility: HOSPITAL | Age: 57
End: 2022-12-27
Payer: MEDICAID

## 2022-12-27 DIAGNOSIS — R29.898 DECREASED STRENGTH OF LOWER EXTREMITY: Primary | ICD-10-CM

## 2022-12-27 DIAGNOSIS — Z74.09 DECREASED MOBILITY AND ENDURANCE: ICD-10-CM

## 2022-12-27 PROCEDURE — 97110 THERAPEUTIC EXERCISES: CPT | Mod: CQ

## 2022-12-27 NOTE — PROGRESS NOTES
[facility]   Physical Therapy Treatment Note     Name: Yayo Carver  [facility] Number: [id number]    Therapy Diagnosis:   Encounter Diagnoses   Name Primary?    Decreased strength of lower extremity Yes    Decreased mobility and endurance      Physician: Jonathon Shrestha MD    Visit Date: 12/27/2022    Physician Orders: PT Eval and Treat   Medical Diagnosis from Referral: M17.12 (ICD-10-CM) - Primary osteoarthritis of left knee  Evaluation Date: 12/21/2022  Authorization Period Expiration: 12/31/2022  Plan of Care Expiration: 2/3/2023  Visit # / Visits authorized: 7 / 32 + eval  FOTO: 1/3    PTA Visit #: 1 / 5     Time In: 1320; patient presented 20 minutes late  Time Out: 1445  Total Billable Time: 55 minutes (4 TE - Medicaid)    SUBJECTIVE     Patient reports: that she feels stiff, achy, and is having sharp shooting pains on the inside of her knee.  She was compliant with home exercise program.  Response to previous treatment: good; appropriate muscle response  Functional change: ambulates with RW    Pain: 9/10, currently  Location: Left knee      OBJECTIVE     Objective Measures updated at progress report unless specified.   DOS 12/19/2022  2 week follow up 1/3/2023    12/23/2022: 0- 82 AAROM  12/27/2022; 83 degrees Flexion AAROM    Treatment     Elisa received the treatments listed below:      NEUROMUSCULAR RE-EDUCATION ACTIVITIES to improve Kinesthetic, Sense, Proprioception, and Motor Control for 20 minutes. The following were included: After being cleared for contradictions: [place] Electrical Stimulation to elicit muscle contraction of the quadriceps for 20 minutes. Pt received stimulation: Burst Frequency: 50 bps, Ramp: 2 sec, amplitude 27 Lizzy with 10 second on time and 10 second off time while performing the following exercises. Patient tolerated treatment well without any adverse effects.   - Quad sets with towel under knee; 10 minutes (10 second off time with RLE quad set)    -  Short arc quads with medium bolster + green strap; 5 minutes (10 second off time with RLE lift)   - Long arc quads at EOM + green strap; 5 minutes (10 second off time with RLE lift)    therapeutic exercises to develop strength, endurance, ROM, and flexibility for 53 minutes including:  Quad sets with towel under knee; 5 second, 20 reps  Quad sets with towel under heel; 5 seconds, 20 reps   Short arc quads with medium bolster; 5 seconds, 30 + green strap   Short arc quads with small bolster; 5 seconds, 30x + green strap  Long arc quads; 5 seconds, 15 reps  Heel prop: 3 minutes  Heel slides: 10 seconds, 10 reps  Gait training with rolling walker: ~530 feet with cueing for step through gait pattern, heel strike, and knee flexion during swing phase.    manual therapy techniques: Joint mobilizations were applied to the Left knee for 10 minutes, including:  - Grade I-II patella mobilizations in all directions  - Tibiofemoral distraction at EOM for pain reduction    cold pack for 2 minutes to Left knee with lower extremities elevated. - poor tolerance to positioning of LE, unable to complete longer than 2 minutes    Patient Education and Home Exercises     Home Exercises Provided and Patient Education Provided     Education provided:   - Continued compliance with HEP  - Discussed appropriate wear time of bandage and not removing bandage on her own. She was instructed to wait until MD follow up on January 3rd. Also discussed waiting to take a shower until cleared by her MD.  - Discussed elevating Left LE and use of ice for pain reduction.  - Importance of sitting with leg straight out in front of her; avoiding bending her knee with sleep or at rest.    Written Home Exercises Provided: Patient instructed to cont prior HEP. Exercises were reviewed and Elisa was able to demonstrate them prior to the end of the session.  Elisa demonstrated good  understanding of the education provided. See EMR under Patient Instructions for  "exercises provided during therapy sessions    ASSESSMENT     Added NMES to quad sets, short arc quads and long arc quads today. Green strap required during short arc and long arc quads for assistance with achieving terminal knee extension. Patient had poor tolerance to exercises today reporting increase in "sharp pain" along lateral and medial knee joint. Patient kept stating "my knee is coming alive." Frequent breaks were required as patient was unable to tolerate positioning on table. She had some reduction in pain with tibiofemoral distraction, however was unable to sit at the edge of the mat in a dependent position for a long period of time. Patella mobility is good. Quadriceps contraction is poor; cueing required for isolation of quadriceps to avoid compensation with glutes. Gait training performed during ambulation from waiting room to clinic with focus on step length, step thru gait, heel strike and knee flexion. Therapist assist required with heel slides due to poor tolerance to bending and positioning on mat. She achieved 83 degrees of active assistive knee flexion today.    We discussed appropriate wearing time of bandage as patient questioned if she was able to remove it herself; patient also questioned being able to take a shower. She was instructed to wait until MD follow up on January 3rd for further instruction.   Educated patient on importance of sitting with her leg straight in front of her and to avoid bending her knee during sleep or at rest as patient states she often sleeps on her side with her surgical knee slightly bent. We discussed importance of compliance with her HEP. She verbalized understanding of all education received.   Elisa Is progressing well towards her goals.   Pt prognosis is Good.     Pt will continue to benefit from skilled outpatient physical therapy to address the deficits listed in the problem list box on initial evaluation, provide pt/family education and to maximize pt's " level of independence in the home and community environment.     Pt's spiritual, cultural and educational needs considered and pt agreeable to plan of care and goals.     Anticipated barriers to physical therapy: compliance    Goals:   Short Term Goals (3 Weeks):   1. Patient will be independent with home exercise program to supplement physical therapy treatment in improving functional status. Progressing, not met  2. Pt will improve impaired lower extremity manual muscle tests to >/= 4/5 to improve dynamic left knee support for closed chain tasks. Progressing, not met  3. Patient will improve (left) knee active range of motion to 0-90 degrees to promote increased ease of sit<>stand transfers. Progressing, not met  4. Patient will perform timed up and go with less restrictive assistive device in < 15 seconds to improve gait speed and safety with community ambulation. Progressing, not met    Long Term Goals (6 Weeks):   1. Pt will improve impaired lower extremity manual muscle tests to >/= 4+/5 to improve dynamic left knee support for closed chain tasks. Progressing, not met  2. Patient will improve the total FOTO Knee Survey Score to </= 40% limited to demonstrate increased perceived functional mobility. Progressing, not met  3. Patient will improve score on KOOS Jr. section of FOTO Knee Survey to = </= 70% to demonstrate increased perceived functional mobility. Progressing, not met  4. Patient will perform timed up and go with least restrictive assistive device in < 10 seconds to improve gait speed and safety with community ambulation. Progressing, not met  5. Patient will perform at least 10-12 sit to stands in 30 seconds without UE support to demonstrate increased functional strength. Progressing, not met  6. Patient will improve (left) knee active range of motion to 0-120 degrees to promote higher level transfers and transitions without limitation. Progressing, not met    PLAN     Plan of Care Certification:  12/21/2022 to 2/3/2023.     Outpatient Physical Therapy 4 times weekly for 6 weeks to include the following interventions: Gait Training, Manual Therapy, Moist Heat/ Ice, Neuromuscular Re-ed, Orthotic Management and Training, Patient Education, Therapeutic Activites and Therapeutic Exercise, Instrument Assisted Soft Tissue Mobilization (IASTM), Kinesiotape.    Farhana Olguin, PTA

## 2022-12-28 DIAGNOSIS — Z96.652 STATUS POST LEFT KNEE REPLACEMENT: Primary | ICD-10-CM

## 2022-12-28 RX ORDER — OXYCODONE HYDROCHLORIDE 5 MG/1
TABLET ORAL
Qty: 50 TABLET | Refills: 0 | Status: SHIPPED | OUTPATIENT
Start: 2022-12-28 | End: 2023-01-09 | Stop reason: SDUPTHER

## 2022-12-29 ENCOUNTER — PATIENT MESSAGE (OUTPATIENT)
Dept: ADMINISTRATIVE | Facility: OTHER | Age: 57
End: 2022-12-29
Payer: MEDICAID

## 2022-12-30 ENCOUNTER — DOCUMENTATION ONLY (OUTPATIENT)
Dept: REHABILITATION | Facility: HOSPITAL | Age: 57
End: 2022-12-30
Payer: MEDICAID

## 2023-01-02 ENCOUNTER — PATIENT MESSAGE (OUTPATIENT)
Dept: ADMINISTRATIVE | Facility: OTHER | Age: 58
End: 2023-01-02
Payer: MEDICAID

## 2023-01-03 ENCOUNTER — OFFICE VISIT (OUTPATIENT)
Dept: ORTHOPEDICS | Facility: CLINIC | Age: 58
End: 2023-01-03
Payer: MEDICAID

## 2023-01-03 ENCOUNTER — CLINICAL SUPPORT (OUTPATIENT)
Dept: REHABILITATION | Facility: HOSPITAL | Age: 58
End: 2023-01-03
Payer: MEDICAID

## 2023-01-03 DIAGNOSIS — Z74.09 DECREASED MOBILITY AND ENDURANCE: ICD-10-CM

## 2023-01-03 DIAGNOSIS — R29.898 DECREASED STRENGTH OF LOWER EXTREMITY: Primary | ICD-10-CM

## 2023-01-03 DIAGNOSIS — Z96.652 S/P TOTAL KNEE REPLACEMENT USING CEMENT, LEFT: Primary | ICD-10-CM

## 2023-01-03 PROCEDURE — 1159F PR MEDICATION LIST DOCUMENTED IN MEDICAL RECORD: ICD-10-PCS | Mod: CPTII,,, | Performed by: NURSE PRACTITIONER

## 2023-01-03 PROCEDURE — 1160F PR REVIEW ALL MEDS BY PRESCRIBER/CLIN PHARMACIST DOCUMENTED: ICD-10-PCS | Mod: CPTII,,, | Performed by: NURSE PRACTITIONER

## 2023-01-03 PROCEDURE — 97110 THERAPEUTIC EXERCISES: CPT | Mod: CQ

## 2023-01-03 PROCEDURE — 99024 PR POST-OP FOLLOW-UP VISIT: ICD-10-PCS | Mod: ,,, | Performed by: NURSE PRACTITIONER

## 2023-01-03 PROCEDURE — 1160F RVW MEDS BY RX/DR IN RCRD: CPT | Mod: CPTII,,, | Performed by: NURSE PRACTITIONER

## 2023-01-03 PROCEDURE — 99213 OFFICE O/P EST LOW 20 MIN: CPT | Mod: PBBFAC | Performed by: NURSE PRACTITIONER

## 2023-01-03 PROCEDURE — 1159F MED LIST DOCD IN RCRD: CPT | Mod: CPTII,,, | Performed by: NURSE PRACTITIONER

## 2023-01-03 PROCEDURE — 99024 POSTOP FOLLOW-UP VISIT: CPT | Mod: ,,, | Performed by: NURSE PRACTITIONER

## 2023-01-03 PROCEDURE — 99999 PR PBB SHADOW E&M-EST. PATIENT-LVL III: CPT | Mod: PBBFAC,,, | Performed by: NURSE PRACTITIONER

## 2023-01-03 PROCEDURE — 99999 PR PBB SHADOW E&M-EST. PATIENT-LVL III: ICD-10-PCS | Mod: PBBFAC,,, | Performed by: NURSE PRACTITIONER

## 2023-01-03 NOTE — PROGRESS NOTES
[facility]   Physical Therapy Treatment Note     Name: Yayo Carver  Clinic Number: [id number]    Therapy Diagnosis:   Encounter Diagnoses   Name Primary?    Decreased strength of lower extremity Yes    Decreased mobility and endurance      Physician: Jc Padgett III, *    Visit Date: 1/3/2023    Physician Orders: PT Eval and Treat   Medical Diagnosis from Referral: M17.12 (ICD-10-CM) - Primary osteoarthritis of left knee  Evaluation Date: 12/21/2022  Authorization Period Expiration: 1/1/2024  Plan of Care Expiration: 2/3/2023  Visit # / Visits authorized: 1 / 20  Visit: 9  FOTO: 1/3    PTA Visit #: 3 / 5     Time In: 1530  Time Out: 1600  Total Billable Time: 30 minutes (2 TE - Medicaid)    SUBJECTIVE     Patient reports: that she feels better today than she has in previous visits. She saw the MD today for her 2 week follow up. States that her bandage fell off on its own over the weekend. Patient presented to clinic 30 minutes late for scheduled appointment. Therapist offered her a 30 minute session or to wait until 4pm and receive a one hour session. Patient chose the 30 minute session secondary to transportation. Patient states that she will be here tomorrow for her appointment 30 minutes prior to the set time.   She was compliant with home exercise program.  Response to previous treatment: good; appropriate muscle response  Functional change: ambulates with RW; she does not feel ready for a cane    Pain: 5/10, currently  Location: Left knee      OBJECTIVE     Objective Measures updated at progress report unless specified.   DOS 12/19/2022  2 week follow up 1/3/2023    12/23/2022: 0- 82 AAROM  12/27/2022; 83 degrees Flexion AAROM    Treatment     Elisa received the treatments listed below:      NEUROMUSCULAR RE-EDUCATION ACTIVITIES to improve Kinesthetic, Sense, Proprioception, and Motor Control for 20 minutes. The following were included: After being cleared for contradictions:  [place] Electrical Stimulation to elicit muscle contraction of the quadriceps for 20 minutes. Pt received stimulation: Burst Frequency: 50 bps, Ramp: 2 sec, amplitude 27 Lizzy with 10 second on time and 10 second off time while performing the following exercises. Patient tolerated treatment well without any adverse effects.   - Quad sets with towel under knee; 10 minutes (10 second off time with RLE quad set)    - Short arc quads with medium bolster + green strap; 5 minutes (10 second off time with RLE lift)   - Long arc quads at EOM + green strap; 5 minutes (10 second off time with RLE lift)    therapeutic exercises to develop strength, endurance, ROM, and flexibility for 00 minutes including:  Quad sets with towel under knee; 5 second, 20 reps  Quad sets with towel under heel; 5 seconds, 20 reps   Short arc quads with medium bolster; 5 seconds, 30 + green strap   Short arc quads with small bolster; 5 seconds, 30x + green strap  Long arc quads; 5 seconds, 15 reps  Heel prop: 3 minutes  Heel slides: 10 seconds, 10 reps  Gait training with rolling walker: ~530 feet with cueing for step through gait pattern, heel strike, and knee flexion during swing phase.    manual therapy techniques: Joint mobilizations were applied to the Left knee for 00 minutes, including:  - Grade I-II patella mobilizations in all directions  - Tibiofemoral distraction at EOM for pain reduction    cold pack for 00 minutes to Left knee with lower extremities elevated.     Patient Education and Home Exercises     Home Exercises Provided and Patient Education Provided     Education provided:   - Continued compliance with HEP  - Discussed elevating Left LE and use of ice for pain reduction.  - Importance of sitting with leg straight out in front of her; avoiding bending her knee with sleep or at rest.    Written Home Exercises Provided: Patient instructed to cont prior HEP. Exercises were reviewed and Elisa was able to demonstrate them prior to the  end of the session.  Elisa demonstrated good  understanding of the education provided. See EMR under Patient Instructions for exercises provided during therapy sessions    ASSESSMENT     Shortened treatment session performed today secondary to patient late presentation to clinic. Completed only NMES; patient tolerated this well. Visible improvement in quadriceps activation today.   Elisa Is progressing well towards her goals.   Pt prognosis is Good.     Pt will continue to benefit from skilled outpatient physical therapy to address the deficits listed in the problem list box on initial evaluation, provide pt/family education and to maximize pt's level of independence in the home and community environment.     Pt's spiritual, cultural and educational needs considered and pt agreeable to plan of care and goals.     Anticipated barriers to physical therapy: compliance    Goals:   Short Term Goals (3 Weeks):   1. Patient will be independent with home exercise program to supplement physical therapy treatment in improving functional status. Progressing, not met  2. Pt will improve impaired lower extremity manual muscle tests to >/= 4/5 to improve dynamic left knee support for closed chain tasks. Progressing, not met  3. Patient will improve (left) knee active range of motion to 0-90 degrees to promote increased ease of sit<>stand transfers. Progressing, not met  4. Patient will perform timed up and go with less restrictive assistive device in < 15 seconds to improve gait speed and safety with community ambulation. Progressing, not met    Long Term Goals (6 Weeks):   1. Pt will improve impaired lower extremity manual muscle tests to >/= 4+/5 to improve dynamic left knee support for closed chain tasks. Progressing, not met  2. Patient will improve the total FOTO Knee Survey Score to </= 40% limited to demonstrate increased perceived functional mobility. Progressing, not met  3. Patient will improve score on KOOS Jr. section  of FOTO Knee Survey to = </= 70% to demonstrate increased perceived functional mobility. Progressing, not met  4. Patient will perform timed up and go with least restrictive assistive device in < 10 seconds to improve gait speed and safety with community ambulation. Progressing, not met  5. Patient will perform at least 10-12 sit to stands in 30 seconds without UE support to demonstrate increased functional strength. Progressing, not met  6. Patient will improve (left) knee active range of motion to 0-120 degrees to promote higher level transfers and transitions without limitation. Progressing, not met    PLAN     Plan of Care Certification: 12/21/2022 to 2/3/2023.     Outpatient Physical Therapy 4 times weekly for 6 weeks to include the following interventions: Gait Training, Manual Therapy, Moist Heat/ Ice, Neuromuscular Re-ed, Orthotic Management and Training, Patient Education, Therapeutic Activites and Therapeutic Exercise, Instrument Assisted Soft Tissue Mobilization (IASTM), Kinesiotape.    Farhana Olguin, PTA

## 2023-01-04 ENCOUNTER — CLINICAL SUPPORT (OUTPATIENT)
Dept: REHABILITATION | Facility: HOSPITAL | Age: 58
End: 2023-01-04
Payer: MEDICAID

## 2023-01-04 DIAGNOSIS — R29.898 DECREASED STRENGTH OF LOWER EXTREMITY: Primary | ICD-10-CM

## 2023-01-04 DIAGNOSIS — Z74.09 DECREASED MOBILITY AND ENDURANCE: ICD-10-CM

## 2023-01-04 PROCEDURE — 97110 THERAPEUTIC EXERCISES: CPT | Mod: CQ

## 2023-01-04 NOTE — PROGRESS NOTES
Yayo Carver presents for initial post-operative visit following a left total knee arthroplasty performed by Dr. Padgett on 12/19/2022.      Exam:   Ambulating well with assistive device.  Incision is clean and dry without drainage or erythema.   ROM:0-90    Initial post-operative radiographs reviewed today revealing a well fixed and aligned prosthesis.    A/P:  2 weeks s/p left total knee replacement  - The patient was advised to keep the incision clean and dry for the next 24 hours after which she may wash the area with antibacterial soap in the shower. Will not submerge incision until one month post op.  - Outpatient PT: ongoing at [facility]  - Continue aspirin for 1 month post op.  - Pain medication refilled 12/28  - Follow up in 4 weeks with surgeon. Pt will call clinic with problems/concerns.

## 2023-01-04 NOTE — PROGRESS NOTES
[facility]   Physical Therapy Treatment Note     Name: Yayo Carver  [facility] Number: [id number]    Therapy Diagnosis:   Encounter Diagnoses   Name Primary?    Decreased strength of lower extremity Yes    Decreased mobility and endurance        Physician: Jc Padgett III, *    Visit Date: 1/4/2023    Physician Orders: PT Eval and Treat   Medical Diagnosis from Referral: M17.12 (ICD-10-CM) - Primary osteoarthritis of left knee  Evaluation Date: 12/21/2022  Authorization Period Expiration: 1/1/2024  Plan of Care Expiration: 2/3/2023  Visit # / Visits authorized: 2 / 20  Visit: 10  FOTO: 1/3    PTA Visit #: 3 / 5     Time In: 2:00 pm  Time Out: 2:55 pm  Total Billable Time: 30 minutes (2 TE - Medicaid)    SUBJECTIVE     Patient reports: sorenes in her calf , her knee is doing okay  She was compliant with home exercise program.  Response to previous treatment: good; appropriate muscle response  Functional change: ambulates with RW; she does not feel ready for a cane    Pain: 5/10, currently  Location: Left knee      OBJECTIVE     Objective Measures updated at progress report unless specified.   DOS 12/19/2022  2 week follow up 1/3/2023    12/23/2022: 0- 82 AAROM  12/27/2022; 83 degrees Flexion AAROM  1/4/2022: 95 degrees AAROM    Treatment     Elisa received the treatments listed below:      NEUROMUSCULAR RE-EDUCATION ACTIVITIES to improve Kinesthetic, Sense, Proprioception, and Motor Control for 20 minutes. The following were included: After being cleared for contradictions: Bulgarian Electrical Stimulation to elicit muscle contraction of the quadriceps for 20 minutes. Pt received stimulation: Burst Frequency: 50 bps, Ramp: 2 sec, amplitude 27 Lizzy with 10 second on time and 10 second off time while performing the following exercises. Patient tolerated treatment well without any adverse effects.   - Quad sets with towel under knee; 10 minutes (10 second off time with RLE quad set)    - Short  arc quads with medium bolster + green strap; 5 minutes (10 second off time with RLE lift)   - Long arc quads at EOM + green strap; 5 minutes (10 second off time with RLE lift)    therapeutic exercises to develop strength, endurance, ROM, and flexibility for 35 minutes including:  Quad sets with towel under knee; 5 second, 20 reps- above with E-stim  Quad sets with towel under heel; 5 seconds, 20 reps   Short arc quads with medium bolster; 5 seconds, 30 + green strap - above with e-stim  Short arc quads with small bolster; 5 seconds, 30x + green strap  Long arc quads; 5 seconds, 15 reps - above with e-stim   Heel prop: 3 minutes  Heel slides: 10 seconds, 10 reps  Gait training with rolling walker: ~530 feet with cueing for step through gait pattern, heel strike, and knee flexion during swing phase.    manual therapy techniques: Joint mobilizations were applied to the Left knee for 00 minutes, including:  - Grade I-II patella mobilizations in all directions  - Tibiofemoral distraction at EOM for pain reduction    cold pack for 00 minutes to Left knee with lower extremities elevated.     Patient Education and Home Exercises     Home Exercises Provided and Patient Education Provided     Education provided:   - Continued compliance with HEP  - Discussed elevating Left LE and use of ice for pain reduction.  - Importance of sitting with leg straight out in front of her; avoiding bending her knee with sleep or at rest.    Written Home Exercises Provided: Patient instructed to cont prior HEP. Exercises were reviewed and Elisa was able to demonstrate them prior to the end of the session.  Elisa demonstrated good  understanding of the education provided. See EMR under Patient Instructions for exercises provided during therapy sessions    ASSESSMENT     Pt presents with increased soreness and tightness in her calf . Able to perform NMES with good response and improved quad recruitment  . Upon initiating heel slides pt had severe  calf cramps and was unable to tolerate exercise until cramps passed for about 15-20 minutes . Pt was able to achieve improved knee flexion ROM at 95 degrees today . Good carryover with cues for proper gait sequencing and TKE with ambulation.   Elisa Is progressing well towards her goals.   Pt prognosis is Good.     Pt will continue to benefit from skilled outpatient physical therapy to address the deficits listed in the problem list box on initial evaluation, provide pt/family education and to maximize pt's level of independence in the home and community environment.     Pt's spiritual, cultural and educational needs considered and pt agreeable to plan of care and goals.     Anticipated barriers to physical therapy: compliance    Goals:   Short Term Goals (3 Weeks):   1. Patient will be independent with home exercise program to supplement physical therapy treatment in improving functional status. Progressing, not met  2. Pt will improve impaired lower extremity manual muscle tests to >/= 4/5 to improve dynamic left knee support for closed chain tasks. Progressing, not met  3. Patient will improve (left) knee active range of motion to 0-90 degrees to promote increased ease of sit<>stand transfers. Progressing, not met  4. Patient will perform timed up and go with less restrictive assistive device in < 15 seconds to improve gait speed and safety with community ambulation. Progressing, not met    Long Term Goals (6 Weeks):   1. Pt will improve impaired lower extremity manual muscle tests to >/= 4+/5 to improve dynamic left knee support for closed chain tasks. Progressing, not met  2. Patient will improve the total FOTO Knee Survey Score to </= 40% limited to demonstrate increased perceived functional mobility. Progressing, not met  3. Patient will improve score on KOOS Jr. section of FOTO Knee Survey to = </= 70% to demonstrate increased perceived functional mobility. Progressing, not met  4. Patient will perform timed  up and go with least restrictive assistive device in < 10 seconds to improve gait speed and safety with community ambulation. Progressing, not met  5. Patient will perform at least 10-12 sit to stands in 30 seconds without UE support to demonstrate increased functional strength. Progressing, not met  6. Patient will improve (left) knee active range of motion to 0-120 degrees to promote higher level transfers and transitions without limitation. Progressing, not met    PLAN     Plan of Care Certification: 12/21/2022 to 2/3/2023.     Outpatient Physical Therapy 4 times weekly for 6 weeks to include the following interventions: Gait Training, Manual Therapy, Moist Heat/ Ice, Neuromuscular Re-ed, Orthotic Management and Training, Patient Education, Therapeutic Activites and Therapeutic Exercise, Instrument Assisted Soft Tissue Mobilization (IASTM), Kinesiotape.    Carleen Bradford, PTA

## 2023-01-05 ENCOUNTER — PATIENT MESSAGE (OUTPATIENT)
Dept: ADMINISTRATIVE | Facility: OTHER | Age: 58
End: 2023-01-05
Payer: MEDICAID

## 2023-01-05 NOTE — PROGRESS NOTES
[facility]   Physical Therapy Treatment Note     Name: Yayo Carver  [facility] Number: [id number]    Therapy Diagnosis:   Encounter Diagnoses   Name Primary?    Decreased strength of lower extremity Yes    Decreased mobility and endurance      Physician: Jc Padgett III, *    Visit Date: 1/6/2023    Physician Orders: PT Eval and Treat   Medical Diagnosis from Referral: M17.12 (ICD-10-CM) - Primary osteoarthritis of left knee  Evaluation Date: 12/21/2022  Authorization Period Expiration: 1/1/2024  Plan of Care Expiration: 2/3/2023  Visit # / Visits authorized: 3 / 20  Visit: 6  FOTO: 1/3    PTA Visit #: 4 / 5     Time In: 1405  Time Out: 1510  Total Billable Time: 30 minutes (2 TE - Medicaid)    SUBJECTIVE     Patient reports: having discomfort along the outside of her lower leg.  She was compliant with home exercise program.  Response to previous treatment: good; appropriate muscle response  Functional change: ambulates with RW; she does not feel ready for a cane    Pain: 5/10, currently  Location: Left knee      OBJECTIVE     Objective Measures updated at progress report unless specified.   DOS 12/19/2022  6 week follow up 1/31/2023 12/23/2022: 0- 82 AAROM  12/27/2022; 83 degrees Flexion AAROM  1/4/2022: 95 degrees AAROM  1/6/2023; 103 degrees AROM Flexion   FOTO Limitation: 54%    Treatment     Elisa received the treatments listed below:      NEUROMUSCULAR RE-EDUCATION ACTIVITIES to improve Kinesthetic, Sense, Proprioception, and Motor Control for 25 minutes. The following were included: After being cleared for contradictions: Mexican Electrical Stimulation to elicit muscle contraction of the quadriceps for 25 minutes. Pt received stimulation: Burst Frequency: 50 bps, Ramp: 2 sec, amplitude 27 Lizzy with 10 second on time and 10 second off time while performing the following exercises. Patient tolerated treatment well without any adverse effects.   - Quad sets with towel under knee;  10 minutes (10 second off time with RLE quad set)    - Short arc quads with medium bolster + green strap; 5 minutes (10 second off time with RLE lift)   - Long arc quads at EOM + green strap; 5 minutes (10 second off time with RLE lift)    therapeutic exercises to develop strength, endurance, ROM, and flexibility for 30 minutes including:  +Aerobic Activity to help improve AAROM flexion and mobility; Recumbent Bike for 5 minutes at half revolutions, seat 16  Quad sets with towel under knee; 5 second, 20 reps  Quad sets with towel under heel; 5 seconds, 20 reps   Short arc quads with medium bolster; 5 seconds, 30 + green strap   Short arc quads with small bolster; 5 seconds, 30x + green strap  Long arc quads; 5 seconds, 15 reps  Heel prop: 3 minutes  Heel slides: 10 seconds, 10 reps  Gait training with rolling walker: ~530 feet with cueing for step through gait pattern, heel strike, and knee flexion during swing phase.    manual therapy techniques: Joint mobilizations were applied to the Left knee for 00 minutes, including:  - Grade I-II patella mobilizations in all directions  - Tibiofemoral distraction at EOM for pain reduction    cold pack for 10 minutes to Left knee with lower extremities elevated.     Patient Education and Home Exercises     Home Exercises Provided and Patient Education Provided     Education provided:   - Continued compliance with HEP  - Discussed elevating Left LE and use of ice for pain reduction.  - Importance of sitting with leg straight out in front of her; avoiding bending her knee with sleep or at rest.    Written Home Exercises Provided: Patient instructed to cont prior HEP. Exercises were reviewed and Elisa was able to demonstrate them prior to the end of the session.  Elisa demonstrated good  understanding of the education provided. See EMR under Patient Instructions for exercises provided during therapy sessions    ASSESSMENT     Added half revolutions on recumbent bike today. She  tolerated this good today. Demonstrating improvement in quadriceps activation with NMES. She continues to struggle with achieving terminal knee extension during short arc and long arc quads. Achieved 103 degrees of active knee flexion ROM.   Elisa Is progressing well towards her goals.   Pt prognosis is Good.     Pt will continue to benefit from skilled outpatient physical therapy to address the deficits listed in the problem list box on initial evaluation, provide pt/family education and to maximize pt's level of independence in the home and community environment.     Pt's spiritual, cultural and educational needs considered and pt agreeable to plan of care and goals.     Anticipated barriers to physical therapy: compliance    Goals:   Short Term Goals (3 Weeks):   1. Patient will be independent with home exercise program to supplement physical therapy treatment in improving functional status. Progressing, not met  2. Pt will improve impaired lower extremity manual muscle tests to >/= 4/5 to improve dynamic left knee support for closed chain tasks. Progressing, not met  3. Patient will improve (left) knee active range of motion to 0-90 degrees to promote increased ease of sit<>stand transfers. Progressing, not met  4. Patient will perform timed up and go with less restrictive assistive device in < 15 seconds to improve gait speed and safety with community ambulation. Progressing, not met    Long Term Goals (6 Weeks):   1. Pt will improve impaired lower extremity manual muscle tests to >/= 4+/5 to improve dynamic left knee support for closed chain tasks. Progressing, not met  2. Patient will improve the total FOTO Knee Survey Score to </= 40% limited to demonstrate increased perceived functional mobility. Progressing, not met  3. Patient will improve score on KOOS Jr. section of FOTO Knee Survey to = </= 70% to demonstrate increased perceived functional mobility. Progressing, not met  4. Patient will perform timed up  and go with least restrictive assistive device in < 10 seconds to improve gait speed and safety with community ambulation. Progressing, not met  5. Patient will perform at least 10-12 sit to stands in 30 seconds without UE support to demonstrate increased functional strength. Progressing, not met  6. Patient will improve (left) knee active range of motion to 0-120 degrees to promote higher level transfers and transitions without limitation. Progressing, not met    PLAN     Plan of Care Certification: 12/21/2022 to 2/3/2023.     Outpatient Physical Therapy 4 times weekly for 6 weeks to include the following interventions: Gait Training, Manual Therapy, Moist Heat/ Ice, Neuromuscular Re-ed, Orthotic Management and Training, Patient Education, Therapeutic Activites and Therapeutic Exercise, Instrument Assisted Soft Tissue Mobilization (IASTM), Kinesiotape.    Farhana Olguin, PTA

## 2023-01-06 ENCOUNTER — CLINICAL SUPPORT (OUTPATIENT)
Dept: REHABILITATION | Facility: HOSPITAL | Age: 58
End: 2023-01-06
Payer: MEDICAID

## 2023-01-06 DIAGNOSIS — Z74.09 DECREASED MOBILITY AND ENDURANCE: ICD-10-CM

## 2023-01-06 DIAGNOSIS — R29.898 DECREASED STRENGTH OF LOWER EXTREMITY: Primary | ICD-10-CM

## 2023-01-06 PROCEDURE — 97110 THERAPEUTIC EXERCISES: CPT | Mod: CQ

## 2023-01-09 ENCOUNTER — TELEPHONE (OUTPATIENT)
Dept: PODIATRY | Facility: CLINIC | Age: 58
End: 2023-01-09
Payer: MEDICAID

## 2023-01-09 ENCOUNTER — PATIENT MESSAGE (OUTPATIENT)
Dept: ADMINISTRATIVE | Facility: OTHER | Age: 58
End: 2023-01-09
Payer: MEDICAID

## 2023-01-09 ENCOUNTER — CLINICAL SUPPORT (OUTPATIENT)
Dept: REHABILITATION | Facility: HOSPITAL | Age: 58
End: 2023-01-09
Payer: MEDICAID

## 2023-01-09 DIAGNOSIS — Z96.652 STATUS POST LEFT KNEE REPLACEMENT: ICD-10-CM

## 2023-01-09 DIAGNOSIS — Z74.09 DECREASED MOBILITY AND ENDURANCE: ICD-10-CM

## 2023-01-09 DIAGNOSIS — R29.898 DECREASED STRENGTH OF LOWER EXTREMITY: Primary | ICD-10-CM

## 2023-01-09 PROCEDURE — 97110 THERAPEUTIC EXERCISES: CPT

## 2023-01-09 RX ORDER — OXYCODONE HYDROCHLORIDE 5 MG/1
TABLET ORAL
Qty: 40 TABLET | Refills: 0 | Status: SHIPPED | OUTPATIENT
Start: 2023-01-09 | End: 2023-01-18 | Stop reason: SDUPTHER

## 2023-01-09 NOTE — PROGRESS NOTES
[facility]   Physical Therapy Treatment Note     Name: Yayo Carver  [facility] Number: [id number]    Therapy Diagnosis:   Encounter Diagnoses   Name Primary?    Decreased strength of lower extremity Yes    Decreased mobility and endurance        Physician: Jc Padgett III, *    Visit Date: 1/9/2023    Physician Orders: PT Eval and Treat   Medical Diagnosis from Referral: M17.12 (ICD-10-CM) - Primary osteoarthritis of left knee  Evaluation Date: 12/21/2022  Authorization Period Expiration: 1/1/2024  Plan of Care Expiration: 2/3/2023  Visit # / Visits authorized: 4 / 20  Visit: 7  FOTO: 1/3    PTA Visit #: 0 / 5     Time In: 2:00 pm  Time Out: 3:10 pm  Total Billable Time: 30 minutes (2  - Medicaid)    SUBJECTIVE     Patient reports: doing better, but still having outside lower leg pain.  She was compliant with home exercise program.  Response to previous treatment: good; appropriate muscle response  Functional change: ambulates with RW; she does not feel ready for a cane    Pain: 5/10, currently  Location: Left knee      OBJECTIVE     Objective Measures updated at progress report unless specified.   DOS 12/19/2022  6 week follow up 1/31/2023 12/23/2022: 0- 82 AAROM  12/27/2022; 83 degrees Flexion AAROM  1/4/2022: 95 degrees AAROM  1/6/2023; 103 degrees AROM Flexion   FOTO Limitation: 54%    Treatment     Elisa received the treatments listed below:      NEUROMUSCULAR RE-EDUCATION ACTIVITIES to improve Kinesthetic, Sense, Proprioception, and Motor Control for 28 minutes. The following were included: After being cleared for contradictions: Egyptian Electrical Stimulation to elicit muscle contraction of the quadriceps for 25 minutes. Pt received stimulation: Burst Frequency: 50 bps, Ramp: 2 sec, amplitude 27 Lizzy with 10 second on time and 10 second off time while performing the following exercises. Patient tolerated treatment well without any adverse effects.   - Quad sets with towel  under knee; 7 minutes (10 second off time with RLE quad set)   -short arc quad with small bolster 7 minutes    - Short arc quads with medium bolster + green strap; 7 minutes (10 second off time with RLE lift)   - Long arc quads at EOM + green strap; 7 minutes (10 second off time with RLE lift)    therapeutic exercises to develop strength, endurance, ROM, and flexibility for 32 minutes including:  Aerobic Activity to help improve AAROM flexion and mobility; Recumbent Bike for 5 minutes at half revolutions, seat 16  Gait training with SPC ~200 feet with cueing for step through gait pattern, heel strike, and knee flexion during swing phase.  Sit to stands: 2x5 MODAx1 with gait belt    manual therapy techniques: Joint mobilizations were applied to the Left knee for 00 minutes, including:  - Grade I-II patella mobilizations in all directions  - Tibiofemoral distraction at EOM for pain reduction    cold pack for 10 minutes to Left knee with lower extremities elevated.     Patient Education and Home Exercises     Home Exercises Provided and Patient Education Provided     Education provided:   - Continued compliance with HEP  - Discussed elevating Left LE and use of ice for pain reduction.  - Importance of sitting with leg straight out in front of her; avoiding bending her knee with sleep or at rest.    Written Home Exercises Provided: Patient instructed to cont prior HEP. Exercises were reviewed and Elisa was able to demonstrate them prior to the end of the session.  Elisa demonstrated good  understanding of the education provided. See EMR under Patient Instructions for exercises provided during therapy sessions    ASSESSMENT     Patient continues to demonstrate difficulty with L quadriceps motor control at this time, however noticeable improvement and good carry over at end of session. Significant difficulty with sit to stands due to fear avoidance and hesitancy for equal WB. Improving left knee range of motion as  demonstrated by full revolutions on recumbent bike.    Elisa Is progressing well towards her goals.   Pt prognosis is Good.     Pt will continue to benefit from skilled outpatient physical therapy to address the deficits listed in the problem list box on initial evaluation, provide pt/family education and to maximize pt's level of independence in the home and community environment.     Pt's spiritual, cultural and educational needs considered and pt agreeable to plan of care and goals.     Anticipated barriers to physical therapy: compliance    Goals:   Short Term Goals (3 Weeks):   1. Patient will be independent with home exercise program to supplement physical therapy treatment in improving functional status. Progressing, not met  2. Pt will improve impaired lower extremity manual muscle tests to >/= 4/5 to improve dynamic left knee support for closed chain tasks. Progressing, not met  3. Patient will improve (left) knee active range of motion to 0-90 degrees to promote increased ease of sit<>stand transfers. Progressing, not met  4. Patient will perform timed up and go with less restrictive assistive device in < 15 seconds to improve gait speed and safety with community ambulation. Progressing, not met    Long Term Goals (6 Weeks):   1. Pt will improve impaired lower extremity manual muscle tests to >/= 4+/5 to improve dynamic left knee support for closed chain tasks. Progressing, not met  2. Patient will improve the total FOTO Knee Survey Score to </= 40% limited to demonstrate increased perceived functional mobility. Progressing, not met  3. Patient will improve score on KOOS Jr. section of FOTO Knee Survey to = </= 70% to demonstrate increased perceived functional mobility. Progressing, not met  4. Patient will perform timed up and go with least restrictive assistive device in < 10 seconds to improve gait speed and safety with community ambulation. Progressing, not met  5. Patient will perform at least 10-12  sit to stands in 30 seconds without UE support to demonstrate increased functional strength. Progressing, not met  6. Patient will improve (left) knee active range of motion to 0-120 degrees to promote higher level transfers and transitions without limitation. Progressing, not met    PLAN     Plan of Care Certification: 12/21/2022 to 2/3/2023.     Outpatient Physical Therapy 4 times weekly for 6 weeks to include the following interventions: Gait Training, Manual Therapy, Moist Heat/ Ice, Neuromuscular Re-ed, Orthotic Management and Training, Patient Education, Therapeutic Activites and Therapeutic Exercise, Instrument Assisted Soft Tissue Mobilization (IASTM), Kinesiotape.    Deja Kennedy, PT

## 2023-01-11 ENCOUNTER — PATIENT MESSAGE (OUTPATIENT)
Dept: ORTHOPEDICS | Facility: CLINIC | Age: 58
End: 2023-01-11
Payer: MEDICAID

## 2023-01-11 ENCOUNTER — DOCUMENTATION ONLY (OUTPATIENT)
Dept: REHABILITATION | Facility: HOSPITAL | Age: 58
End: 2023-01-11
Payer: MEDICAID

## 2023-01-11 DIAGNOSIS — Z96.652 S/P TOTAL KNEE REPLACEMENT USING CEMENT, LEFT: Primary | ICD-10-CM

## 2023-01-11 NOTE — PROGRESS NOTES
"Physical Therapy: No show/Cancellation of Visit  Date: 01/11/2023    Patient cancelled today's PT appointment. Reason for cancellation: "under the weather." Patient's next scheduled appointment is 1/13/2023.     Initial Evaluation: 12/21/2022  Plan of Care Explanation: 2/3/2023  Cancel: 3  No show: 0    Therapist: Farhana Olguin PTA        "

## 2023-01-13 ENCOUNTER — CLINICAL SUPPORT (OUTPATIENT)
Dept: REHABILITATION | Facility: HOSPITAL | Age: 58
End: 2023-01-13
Payer: MEDICAID

## 2023-01-13 DIAGNOSIS — R29.898 DECREASED STRENGTH OF LOWER EXTREMITY: Primary | ICD-10-CM

## 2023-01-13 DIAGNOSIS — Z74.09 DECREASED MOBILITY AND ENDURANCE: ICD-10-CM

## 2023-01-13 PROCEDURE — 97110 THERAPEUTIC EXERCISES: CPT | Mod: CQ

## 2023-01-13 NOTE — PROGRESS NOTES
[facility]   Physical Therapy Treatment Note     Name: Yayo Carver  [facility] Number: [id number]    Therapy Diagnosis:   Encounter Diagnoses   Name Primary?    Decreased strength of lower extremity Yes    Decreased mobility and endurance      Physician: Jc Padgett III, *    Visit Date: 1/13/2023    Physician Orders: PT Eval and Treat   Medical Diagnosis from Referral: M17.12 (ICD-10-CM) - Primary osteoarthritis of left knee  Evaluation Date: 12/21/2022  Authorization Period Expiration: 1/1/2024  Plan of Care Expiration: 2/3/2023  Visit # / Visits authorized: 5 / 20  Visit: 8  FOTO: 1/3    PTA Visit #: 1 / 5     Time In: 1425; patient presents 25 minutes late  Time Out: 1515  Total Billable Time: 40 minutes (3 TE - Medicaid)    SUBJECTIVE     Patient reports: that she went to see the MD regarding her medication. She was given a cane and told that the pain she is having on the outside of her lower leg is normal. She is experiencing a lot of pain today. Patient reports that she drove today.  She was compliant with home exercise program.  Response to previous treatment: good; appropriate muscle response  Functional change: ambulates with SPC    Pain: 7/10, currently  Location: Left knee      OBJECTIVE     Objective Measures updated at progress report unless specified.   DOS 12/19/2022  6 week follow up 1/31/2023 12/23/2022: 0- 82 AAROM  12/27/2022; 83 degrees Flexion AAROM  1/4/2022: 95 degrees AAROM  1/6/2023; 103 degrees AROM Flexion   FOTO Limitation: 54%  1/13/2023; 111 degrees AROM Flexion    Treatment     Elisa received the treatments listed below:      NEUROMUSCULAR RE-EDUCATION ACTIVITIES to improve Kinesthetic, Sense, Proprioception, and Motor Control for 20 minutes. The following were included: After being cleared for contradictions: [place]ian Electrical Stimulation to elicit muscle contraction of the quadriceps for 20 minutes. Pt received stimulation: Burst Frequency: 50 bps,  Ramp: 2 sec, amplitude 49 Lizzy with 10 second on time and 10 second off time while performing the following exercises. Patient tolerated treatment well without any adverse effects.   - Quad sets with towel under knee; 5 minutes   - Short arc quad with small bolster; 5 minutes (able to achieve TKE with encouragement)    - Short arc quads with medium bolster; 5 minutes (able to achieve TKE with encouragement)   - Long arc quads at EOM; 5 minutes (able to achieve TKE on her own)    therapeutic exercises to develop strength, endurance, ROM, and flexibility for 20 minutes including:  Aerobic Activity to help improve AAROM flexion and mobility; Recumbent Bike for 5 minutes at full revolutions, seat 14  Gait training with SPC ~200 feet with cueing for step through gait pattern, heel strike, and knee flexion during swing phase. Adjusted cane that she presented with today.   Sit to stands: 2 x 5 MODAx1 with gait belt - NT, resume next visit    manual therapy techniques: Joint mobilizations were applied to the Left knee for 00 minutes, including:  - Grade I-II patella mobilizations in all directions  - Tibiofemoral distraction at EOM for pain reduction    cold pack for 10 minutes to Left knee post session.    Patient Education and Home Exercises     Home Exercises Provided and Patient Education Provided     Education provided:   - Continued compliance with HEP  - Discussed elevating Left LE and use of ice for pain reduction.  - Importance of sitting with leg straight out in front of her; avoiding bending her knee with sleep or at rest.    Written Home Exercises Provided: Patient instructed to cont prior HEP. Exercises were reviewed and Elisa was able to demonstrate them prior to the end of the session.  Elisa demonstrated good  understanding of the education provided. See EMR under Patient Instructions for exercises provided during therapy sessions    ASSESSMENT     Shortened treatment session performed today secondary to  patient presentation to clinic 25 minutes late. She presents ambulating with SPC provided to her in the hospital. It was adjusted to her height and we discussed gait mechanics. Emphasized importance of heel strike, knee flexion in swing phase and stride length. She is demonstrating improvement in quadriceps contraction with NMES; able to achieve terminal knee extension on her own with encouragement. Achieved 111 degrees active ROM flexion.   Elisa Is progressing well towards her goals.   Pt prognosis is Good.     Pt will continue to benefit from skilled outpatient physical therapy to address the deficits listed in the problem list box on initial evaluation, provide pt/family education and to maximize pt's level of independence in the home and community environment.     Pt's spiritual, cultural and educational needs considered and pt agreeable to plan of care and goals.     Anticipated barriers to physical therapy: compliance    Goals:   Short Term Goals (3 Weeks):   1. Patient will be independent with home exercise program to supplement physical therapy treatment in improving functional status. Progressing, not met  2. Pt will improve impaired lower extremity manual muscle tests to >/= 4/5 to improve dynamic left knee support for closed chain tasks. Progressing, not met  3. Patient will improve (left) knee active range of motion to 0-90 degrees to promote increased ease of sit<>stand transfers. Progressing, not met  4. Patient will perform timed up and go with less restrictive assistive device in < 15 seconds to improve gait speed and safety with community ambulation. Progressing, not met    Long Term Goals (6 Weeks):   1. Pt will improve impaired lower extremity manual muscle tests to >/= 4+/5 to improve dynamic left knee support for closed chain tasks. Progressing, not met  2. Patient will improve the total FOTO Knee Survey Score to </= 40% limited to demonstrate increased perceived functional mobility.  Progressing, not met  3. Patient will improve score on KOOS Jr. section of FOTO Knee Survey to = </= 70% to demonstrate increased perceived functional mobility. Progressing, not met  4. Patient will perform timed up and go with least restrictive assistive device in < 10 seconds to improve gait speed and safety with community ambulation. Progressing, not met  5. Patient will perform at least 10-12 sit to stands in 30 seconds without UE support to demonstrate increased functional strength. Progressing, not met  6. Patient will improve (left) knee active range of motion to 0-120 degrees to promote higher level transfers and transitions without limitation. Progressing, not met    PLAN     Plan of Care Certification: 12/21/2022 to 2/3/2023.     Outpatient Physical Therapy 4 times weekly for 6 weeks to include the following interventions: Gait Training, Manual Therapy, Moist Heat/ Ice, Neuromuscular Re-ed, Orthotic Management and Training, Patient Education, Therapeutic Activites and Therapeutic Exercise, Instrument Assisted Soft Tissue Mobilization (IASTM), Kinesiotape.    Farhana Olguin, PTA

## 2023-01-16 ENCOUNTER — PATIENT MESSAGE (OUTPATIENT)
Dept: ADMINISTRATIVE | Facility: OTHER | Age: 58
End: 2023-01-16
Payer: MEDICAID

## 2023-01-17 ENCOUNTER — CLINICAL SUPPORT (OUTPATIENT)
Dept: REHABILITATION | Facility: HOSPITAL | Age: 58
End: 2023-01-17
Payer: MEDICAID

## 2023-01-17 DIAGNOSIS — Z74.09 DECREASED MOBILITY AND ENDURANCE: ICD-10-CM

## 2023-01-17 DIAGNOSIS — R29.898 DECREASED STRENGTH OF LOWER EXTREMITY: ICD-10-CM

## 2023-01-17 DIAGNOSIS — R26.9 GAIT ABNORMALITY: Primary | ICD-10-CM

## 2023-01-17 PROCEDURE — 97110 THERAPEUTIC EXERCISES: CPT

## 2023-01-17 NOTE — PROGRESS NOTES
[facility]   Physical Therapy Treatment Note     Name: Yayo Carver  [facility] Number: [id number]    Therapy Diagnosis:   Encounter Diagnoses   Name Primary?    Gait abnormality Yes    Decreased strength of lower extremity     Decreased mobility and endurance        Physician: Jc Padgett III, *    Visit Date: 1/17/2023    Physician Orders: PT Eval and Treat   Medical Diagnosis from Referral: M17.12 (ICD-10-CM) - Primary osteoarthritis of left knee  Evaluation Date: 12/21/2022  Authorization Period Expiration: 2/3/2023  Plan of Care Expiration: 2/3/2023  Visit # / Visits authorized: 6 / 20  Visit: 9  FOTO: 1/3    PTA Visit #: 0 / 5     Time In: 2:12pm   Time Out: 3:00pm   Total Billable Time: 48 minutes (3 TE - Medicaid)    SUBJECTIVE     Patient reports: She has been doing well and thinks therapy has gone well so far. She is still having knee and calf pain but spoke with Meghan Simons about it and was told that is to be expected at this stage  She was compliant with home exercise program.  Response to previous treatment: good; appropriate muscle response  Functional change: ambulates with SPC    Pain: 6/ 10, currently  Location: Left knee      OBJECTIVE     Objective Measures updated at progress report unless specified.   DOS 12/19/2022  6 week follow up 1/31/2023 12/23/2022: 0- 82 AAROM  12/27/2022; 83 degrees Flexion AAROM  1/4/2022: 95 degrees AAROM  1/6/2023; 103 degrees AROM Flexion   FOTO Limitation: 54%  1/13/2023; 111 degrees AROM Flexion  1/17/2023: 113 degrees AAROM flexion     Treatment     Elisa received the treatments listed below:      therapeutic exercises to develop strength, endurance, ROM, and flexibility for 48 minutes including:  Aerobic Activity to help improve AAROM flexion and mobility; Recumbent Bike for 5 minutes at full revolutions, seat 14  Gait training with SPC ~200 feet with cueing for step through gait pattern, heel strike, and knee flexion during  swing phase. Adjusted cane that she presented with today.   Sit to stands: 4x3 MODAx1 with gait belt starting at elevated table and lowering down gradually   - Quad sets with towel under knee; 5 minutes   - Short arc quad with small bolster; 5 minutes (able to achieve TKE with encouragement)    - Short arc quads with medium bolster; 5 minutes (able to achieve TKE with encouragement)   - Long arc quads at EOM; 5 minutes (able to achieve TKE on her own)    manual therapy techniques: Joint mobilizations were a  pplied to the Left knee for 00 minutes, including:  - Grade I-II patella mobilizations in all directions  - Tibiofemoral distraction at EOM for pain reduction    cold pack for 10 minutes to Left knee post session.    Patient Education and Home Exercises     Home Exercises Provided and Patient Education Provided     Education provided:   - Continued compliance with HEP  - Discussed elevating Left LE and use of ice for pain reduction.  - Importance of sitting with leg straight out in front of her; avoiding bending her knee with sleep or at rest.    Written Home Exercises Provided: Patient instructed to cont prior HEP. Exercises were reviewed and Elisa was able to demonstrate them prior to the end of the session.  Elisa demonstrated good  understanding of the education provided. See EMR under Patient Instructions for exercises provided during therapy sessions    ASSESSMENT     Pt is able to perform TKE with SAQs with cueing and was able to perform exercises without NMES. Pt ambulated into clinic with SPC today and was cued for proper heel strike. Pt's calf is not tender, red, or hot. Pt able to achieve 113 degrees of knee flexion today. Would benefit from continued work on sit to stands to overcome fear avoidance.     Elisa Is progressing well towards her goals.     Pt prognosis is Good.     Pt will continue to benefit from skilled outpatient physical therapy to address the deficits listed in the problem list box on  initial evaluation, provide pt/family education and to maximize pt's level of independence in the home and community environment.     Pt's spiritual, cultural and educational needs considered and pt agreeable to plan of care and goals.     Anticipated barriers to physical therapy: compliance    Goals:   Short Term Goals (3 Weeks):   1. Patient will be independent with home exercise program to supplement physical therapy treatment in improving functional status. Progressing, not met  2. Pt will improve impaired lower extremity manual muscle tests to >/= 4/5 to improve dynamic left knee support for closed chain tasks. Progressing, not met  3. Patient will improve (left) knee active range of motion to 0-90 degrees to promote increased ease of sit<>stand transfers. Progressing, not met  4. Patient will perform timed up and go with less restrictive assistive device in < 15 seconds to improve gait speed and safety with community ambulation. Progressing, not met    Long Term Goals (6 Weeks):   1. Pt will improve impaired lower extremity manual muscle tests to >/= 4+/5 to improve dynamic left knee support for closed chain tasks. Progressing, not met  2. Patient will improve the total FOTO Knee Survey Score to </= 40% limited to demonstrate increased perceived functional mobility. Progressing, not met  3. Patient will improve score on KOOS Jr. section of FOTO Knee Survey to = </= 70% to demonstrate increased perceived functional mobility. Progressing, not met  4. Patient will perform timed up and go with least restrictive assistive device in < 10 seconds to improve gait speed and safety with community ambulation. Progressing, not met  5. Patient will perform at least 10-12 sit to stands in 30 seconds without UE support to demonstrate increased functional strength. Progressing, not met  6. Patient will improve (left) knee active range of motion to 0-120 degrees to promote higher level transfers and transitions without  limitation. Progressing, not met    PLAN     Plan of Care Certification: 12/21/2022 to 2/3/2023.     Outpatient Physical Therapy 4 times weekly for 6 weeks to include the following interventions: Gait Training, Manual Therapy, Moist Heat/ Ice, Neuromuscular Re-ed, Orthotic Management and Training, Patient Education, Therapeutic Activites and Therapeutic Exercise, Instrument Assisted Soft Tissue Mobilization (IASTM), Kinesiotape.    Meghan Rivera, PT

## 2023-01-18 ENCOUNTER — PATIENT MESSAGE (OUTPATIENT)
Dept: ADMINISTRATIVE | Facility: HOSPITAL | Age: 58
End: 2023-01-18
Payer: MEDICAID

## 2023-01-18 DIAGNOSIS — Z96.652 STATUS POST LEFT KNEE REPLACEMENT: ICD-10-CM

## 2023-01-18 RX ORDER — OXYCODONE HYDROCHLORIDE 5 MG/1
TABLET ORAL
Qty: 40 TABLET | Refills: 0 | Status: SHIPPED | OUTPATIENT
Start: 2023-01-18 | End: 2023-01-27 | Stop reason: SDUPTHER

## 2023-01-18 RX ORDER — METHOCARBAMOL 750 MG/1
750 TABLET, FILM COATED ORAL 3 TIMES DAILY PRN
Qty: 30 TABLET | Refills: 0 | Status: ON HOLD | OUTPATIENT
Start: 2023-01-18 | End: 2023-06-26 | Stop reason: HOSPADM

## 2023-01-20 ENCOUNTER — PATIENT OUTREACH (OUTPATIENT)
Dept: ADMINISTRATIVE | Facility: HOSPITAL | Age: 58
End: 2023-01-20
Payer: MEDICAID

## 2023-01-20 ENCOUNTER — CLINICAL SUPPORT (OUTPATIENT)
Dept: REHABILITATION | Facility: HOSPITAL | Age: 58
End: 2023-01-20
Payer: MEDICAID

## 2023-01-20 ENCOUNTER — DOCUMENTATION ONLY (OUTPATIENT)
Dept: REHABILITATION | Facility: HOSPITAL | Age: 58
End: 2023-01-20
Payer: MEDICAID

## 2023-01-20 DIAGNOSIS — R29.898 DECREASED STRENGTH OF LOWER EXTREMITY: Primary | ICD-10-CM

## 2023-01-20 DIAGNOSIS — Z74.09 DECREASED MOBILITY AND ENDURANCE: ICD-10-CM

## 2023-01-20 PROCEDURE — 97110 THERAPEUTIC EXERCISES: CPT

## 2023-01-20 NOTE — PROGRESS NOTES
"Physical Therapy: No show/Cancellation of Visit  Date: 01/18/2023    Patient cancelled today's PT appointment. Reason for cancellation: " running late at doctor appointment." Patient's next scheduled appointment is 1/20/2023.     Initial Evaluation: 12/21/2022  Plan of Care Explanation: 2/3/2023  Cancel: 4  No show: 0    Therapist: Carleen Bradford PTA          "

## 2023-01-20 NOTE — PROGRESS NOTES
"[facility]   Physical Therapy Treatment Note     Name: Yayo Carver  [facility] Number: [id number]    Therapy Diagnosis:   Encounter Diagnoses   Name Primary?    Decreased strength of lower extremity Yes    Decreased mobility and endurance        Physician: Jc Padgett III, *    Visit Date: 1/20/2023    Physician Orders: PT Eval and Treat   Medical Diagnosis from Referral: M17.12 (ICD-10-CM) - Primary osteoarthritis of left knee  Evaluation Date: 12/21/2022  Authorization Period Expiration: 2/3/2023  Plan of Care Expiration: 2/3/2023  Visit # / Visits authorized: 7 / 20  Visit: 15  FOTO: 1/3    PTA Visit #: 0 / 5     Time In: 2:00 pm   Time Out: 3:10 pm   Total Billable Time: 60 minutes (4 TE - Medicaid)    SUBJECTIVE     Patient reports: doing okay, and reports "been workin' hard"  She was compliant with home exercise program.  Response to previous treatment: good; appropriate muscle response  Functional change: ambulates with SPC    Pain: NT/ 10, currently  Location: Left knee      OBJECTIVE     Objective Measures updated at progress report unless specified.   DOS 12/19/2022  6 week follow up 1/31/2023 12/23/2022: 0- 82 AAROM  12/27/2022; 83 degrees Flexion AAROM  1/4/2022: 95 degrees AAROM  1/6/2023; 103 degrees AROM Flexion   FOTO Limitation: 54%  1/13/2023; 111 degrees AROM Flexion  1/17/2023: 113 degrees AAROM flexion   1/20/2023: 0 degrees AROM; 113 degrees flexion PROM    Treatment     Elisa received the treatments listed below:      therapeutic exercises to develop strength, endurance, ROM, and flexibility for 60 minutes including:  Aerobic Activity to help improve AAROM flexion and mobility; Recumbent Bike for 5 minutes at full revolutions, seat 14  Sit to stands: 2x8 independent  Long arc quad: 5 seconds, 30x 4 lbs  Short arc quad: 5 seconds, 20x 4 lbs + strap for terminal knee extension  Step ups 6 inch 2x10  Lateral step ups 6 inch 2x10  Standing TKE: green theraband 5 " seconds, 2x10        cold pack for 10 minutes to Left knee post session.    Patient Education and Home Exercises     Home Exercises Provided and Patient Education Provided     Education provided:   - Continued compliance with HEP  - Discussed elevating Left LE and use of ice for pain reduction.  - Importance of sitting with leg straight out in front of her; avoiding bending her knee with sleep or at rest.    Written Home Exercises Provided: Patient instructed to cont prior HEP. Exercises were reviewed and Elisa was able to demonstrate them prior to the end of the session.  Elisa demonstrated good  understanding of the education provided. See EMR under Patient Instructions for exercises provided during therapy sessions    ASSESSMENT     Patient is 4 weeks and 4 days s/p left total knee arthroplasty. Patient continues to demonstrate difficulty achieving terminal knee extension during short arc quads. Patient demonstrates antalgic gait. Progressed to perform functional activities such as steps ups with good tolerance. Able to perform sit to stand independently.     Elisa Is progressing well towards her goals.     Pt prognosis is Good.     Pt will continue to benefit from skilled outpatient physical therapy to address the deficits listed in the problem list box on initial evaluation, provide pt/family education and to maximize pt's level of independence in the home and community environment.     Pt's spiritual, cultural and educational needs considered and pt agreeable to plan of care and goals.     Anticipated barriers to physical therapy: compliance    Goals:   Short Term Goals (3 Weeks):   1. Patient will be independent with home exercise program to supplement physical therapy treatment in improving functional status. Progressing, not met  2. Pt will improve impaired lower extremity manual muscle tests to >/= 4/5 to improve dynamic left knee support for closed chain tasks. Progressing, not met  3. Patient will improve  (left) knee active range of motion to 0-90 degrees to promote increased ease of sit<>stand transfers. Progressing, not met  4. Patient will perform timed up and go with less restrictive assistive device in < 15 seconds to improve gait speed and safety with community ambulation. Progressing, not met    Long Term Goals (6 Weeks):   1. Pt will improve impaired lower extremity manual muscle tests to >/= 4+/5 to improve dynamic left knee support for closed chain tasks. Progressing, not met  2. Patient will improve the total FOTO Knee Survey Score to </= 40% limited to demonstrate increased perceived functional mobility. Progressing, not met  3. Patient will improve score on KOOS Jr. section of FOTO Knee Survey to = </= 70% to demonstrate increased perceived functional mobility. Progressing, not met  4. Patient will perform timed up and go with least restrictive assistive device in < 10 seconds to improve gait speed and safety with community ambulation. Progressing, not met  5. Patient will perform at least 10-12 sit to stands in 30 seconds without UE support to demonstrate increased functional strength. Progressing, not met  6. Patient will improve (left) knee active range of motion to 0-120 degrees to promote higher level transfers and transitions without limitation. Progressing, not met    PLAN     Plan of Care Certification: 12/21/2022 to 2/3/2023.     Outpatient Physical Therapy 4 times weekly for 6 weeks to include the following interventions: Gait Training, Manual Therapy, Moist Heat/ Ice, Neuromuscular Re-ed, Orthotic Management and Training, Patient Education, Therapeutic Activites and Therapeutic Exercise, Instrument Assisted Soft Tissue Mobilization (IASTM), Kinesiotape.    Deja Kennedy, PT

## 2023-01-24 ENCOUNTER — CLINICAL SUPPORT (OUTPATIENT)
Dept: REHABILITATION | Facility: HOSPITAL | Age: 58
End: 2023-01-24
Payer: MEDICAID

## 2023-01-24 DIAGNOSIS — R29.898 DECREASED STRENGTH OF LOWER EXTREMITY: Primary | ICD-10-CM

## 2023-01-24 DIAGNOSIS — Z74.09 DECREASED MOBILITY AND ENDURANCE: ICD-10-CM

## 2023-01-24 PROCEDURE — 97110 THERAPEUTIC EXERCISES: CPT | Mod: CQ

## 2023-01-24 NOTE — PROGRESS NOTES
[facility]   Physical Therapy Treatment Note     Name: Yayo Carver  [facility] Number: [id number]    Therapy Diagnosis:   Encounter Diagnoses   Name Primary?    Decreased strength of lower extremity Yes    Decreased mobility and endurance      Physician: Jc Padgett III, *    Visit Date: 1/24/2023    Physician Orders: PT Eval and Treat   Medical Diagnosis from Referral: M17.12 (ICD-10-CM) - Primary osteoarthritis of left knee  Evaluation Date: 12/21/2022  Authorization Period Expiration: 2/3/2023  Plan of Care Expiration: 2/3/2023  Visit # / Visits authorized: 8 / 20  Visit: 16  FOTO: 1/3    PTA Visit #: 1 / 5     Time In: 1500  Time Out: 1610  Total Billable Time: 30 minutes (2 TE - Medicaid)    SUBJECTIVE     Patient reports: the pain in her lower leg is moving up to the outside of her knee.   She was compliant with home exercise program.  Response to previous treatment: good; appropriate muscle response  Functional change: ambulates with SPC    Pain: 0/10, currently  Location: Left knee      OBJECTIVE     Objective Measures updated at progress report unless specified.   DOS 12/19/2022  6 week follow up 1/31/2023 12/23/2022: 0- 82 AAROM  12/27/2022; 83 degrees Flexion AAROM  1/4/2022: 95 degrees AAROM  1/6/2023; 103 degrees AROM Flexion   FOTO Limitation: 54%  1/13/2023; 111 degrees AROM Flexion  1/17/2023: 113 degrees AAROM flexion   1/20/2023: 0 degrees AROM; 113 degrees flexion PROM  1/24/2023; 108 degrees AROM; 111 degrees PROM Flexion    Treatment     Elisa received the treatments listed below:      therapeutic exercises to develop strength, endurance, ROM, and flexibility for 60 minutes including:  Aerobic Activity to help improve AAROM flexion and mobility; Recumbent Bike for 5 minutes at full revolutions, seat 14  Sit to stands in std chair + airex; 3 x 8 with no UE assist (independent)  Long arc quads at EOM + 5lb AW: 5 seconds, 30 reps   Short arc quad with medium bolster  + 4lb AW: 5 second hold, 30 reps (mod cueing for terminal knee extension)  Step ups on 6-inch // bars; 3 x 10 (cueing for knee flexion with step up)  Lateral step ups on 6-inch in // bars; 3 x 10 reps  Standing TKE + Green TB; 5 second hold, 2 x 10 reps    cold pack for 10 minutes to Left knee post session.    Patient Education and Home Exercises     Home Exercises Provided and Patient Education Provided     Education provided:   - Continued compliance with HEP  - Discussed elevating Left LE and use of ice for pain reduction.  - Importance of sitting with leg straight out in front of her; avoiding bending her knee with sleep or at rest.    Written Home Exercises Provided: Patient instructed to cont prior HEP. Exercises were reviewed and Elisa was able to demonstrate them prior to the end of the session.  Elisa demonstrated good  understanding of the education provided. See EMR under Patient Instructions for exercises provided during therapy sessions.    ASSESSMENT     Tolerated exercises fairly well today. Increased difficulty secondary to reports of stiffness and lateral posterior Left knee discomfort. Mod cueing for terminal knee extension is required with short arc quads. She requires education during step ups to increase knee flexion prior to stepping onto stair for further quadriceps muscle recruitment. Achieved 108 degrees of active knee flexion ROM today. Continue per POC towards treatment goals.   Elisa Is progressing well towards her goals.     Pt prognosis is Good.     Pt will continue to benefit from skilled outpatient physical therapy to address the deficits listed in the problem list box on initial evaluation, provide pt/family education and to maximize pt's level of independence in the home and community environment.     Pt's spiritual, cultural and educational needs considered and pt agreeable to plan of care and goals.     Anticipated barriers to physical therapy: compliance    Goals:   Short Term Goals  (3 Weeks):   1. Patient will be independent with home exercise program to supplement physical therapy treatment in improving functional status. Progressing, not met  2. Pt will improve impaired lower extremity manual muscle tests to >/= 4/5 to improve dynamic left knee support for closed chain tasks. Progressing, not met  3. Patient will improve (left) knee active range of motion to 0-90 degrees to promote increased ease of sit<>stand transfers. Progressing, not met  4. Patient will perform timed up and go with less restrictive assistive device in < 15 seconds to improve gait speed and safety with community ambulation. Progressing, not met    Long Term Goals (6 Weeks):   1. Pt will improve impaired lower extremity manual muscle tests to >/= 4+/5 to improve dynamic left knee support for closed chain tasks. Progressing, not met  2. Patient will improve the total FOTO Knee Survey Score to </= 40% limited to demonstrate increased perceived functional mobility. Progressing, not met  3. Patient will improve score on KOOS Jr. section of FOTO Knee Survey to = </= 70% to demonstrate increased perceived functional mobility. Progressing, not met  4. Patient will perform timed up and go with least restrictive assistive device in < 10 seconds to improve gait speed and safety with community ambulation. Progressing, not met  5. Patient will perform at least 10-12 sit to stands in 30 seconds without UE support to demonstrate increased functional strength. Progressing, not met  6. Patient will improve (left) knee active range of motion to 0-120 degrees to promote higher level transfers and transitions without limitation. Progressing, not met    PLAN     Plan of Care Certification: 12/21/2022 to 2/3/2023.     Outpatient Physical Therapy 4 times weekly for 6 weeks to include the following interventions: Gait Training, Manual Therapy, Moist Heat/ Ice, Neuromuscular Re-ed, Orthotic Management and Training, Patient Education, Therapeutic  Activites and Therapeutic Exercise, Instrument Assisted Soft Tissue Mobilization (IASTM), Kinesiotape.    Farhana Olguin, PTA

## 2023-01-26 ENCOUNTER — CLINICAL SUPPORT (OUTPATIENT)
Dept: REHABILITATION | Facility: HOSPITAL | Age: 58
End: 2023-01-26
Payer: MEDICAID

## 2023-01-26 DIAGNOSIS — R29.898 DECREASED STRENGTH OF LOWER EXTREMITY: Primary | ICD-10-CM

## 2023-01-26 DIAGNOSIS — Z74.09 DECREASED MOBILITY AND ENDURANCE: ICD-10-CM

## 2023-01-26 PROCEDURE — 97110 THERAPEUTIC EXERCISES: CPT | Mod: CQ

## 2023-01-26 RX ORDER — SODIUM BICARBONATE 650 MG/1
650 TABLET ORAL 2 TIMES DAILY
Qty: 60 TABLET | Refills: 2 | Status: SHIPPED | OUTPATIENT
Start: 2023-01-26 | End: 2023-05-24 | Stop reason: SDUPTHER

## 2023-01-26 NOTE — PROGRESS NOTES
[facility]   Physical Therapy Treatment Note     Name: Yayo Carver  [facility] Number: [id number]    Therapy Diagnosis:   Encounter Diagnoses   Name Primary?    Decreased strength of lower extremity Yes    Decreased mobility and endurance      Physician: Jc Padgett III, *    Visit Date: 1/26/2023    Physician Orders: PT Eval and Treat   Medical Diagnosis from Referral: M17.12 (ICD-10-CM) - Primary osteoarthritis of left knee  Evaluation Date: 12/21/2022  Authorization Period Expiration: 2/3/2023  Plan of Care Expiration: 2/3/2023  Visit # / Visits authorized: 9 / 20  Visit: 17  FOTO: 1/3     PTA Visit #: 2 / 5     Time In: 1415 - patient presented last to session  Time Out: 1510  Total Billable Time: 45 minutes (3 TE - Medicaid)    SUBJECTIVE     Patient reports: that she has been working on walking without her cane. She reports the pain is still in her lower outside leg.  She was compliant with home exercise program.  Response to previous treatment: good; appropriate muscle response  Functional change: ambulates with SPC    Pain: 0/10, currently  Location: Left knee      OBJECTIVE     Objective Measures updated at progress report unless specified.   DOS 12/19/2022  6 week follow up 1/31/2023 12/23/2022: 0- 82 AAROM  12/27/2022; 83 degrees Flexion AAROM  1/4/2022: 95 degrees AAROM  1/6/2023; 103 degrees AROM Flexion   FOTO Limitation: 54%  1/13/2023; 111 degrees AROM Flexion  1/17/2023: 113 degrees AAROM flexion   1/20/2023: 0 degrees AROM; 113 degrees flexion PROM  1/24/2023; 108 degrees AROM; 111 degrees PROM Flexion  1/26/2023; 112 degrees AROM Flexion    Treatment     Elisa received the treatments listed below:      therapeutic exercises to develop strength, endurance, ROM, and flexibility for 45 minutes including:  Aerobic Activity to help improve AAROM flexion and mobility; Recumbent Bike for 5 minutes at full revolutions, seat 14  Sit to stands in std chair + airex; 3 x 8 with  no UE assist (independent)  Long arc quads at EOM + 7.5lb AW: 5 seconds, 30 reps   Short arc quad with medium bolster + 4lb AW: 5 second hold, 30 reps (mod cueing for terminal knee extension)  Step ups on 8-inch // bars; 3 x 10 (cueing for knee flexion with step up)  Lateral step ups on 8-inch in // bars; 3 x 10 reps  Standing TKE + Green TB; 5 second hold, 2 x 10 reps  +Matrix HS Curls; 25lb, 2 x 12 reps    cold pack for 10 minutes to Left knee post session.    Patient Education and Home Exercises     Home Exercises Provided and Patient Education Provided     Education provided:   - Continued compliance with HEP  - Discussed elevating Left LE and use of ice for pain reduction.  - Importance of sitting with leg straight out in front of her; avoiding bending her knee with sleep or at rest.    Written Home Exercises Provided: Patient instructed to cont prior HEP. Exercises were reviewed and Elisa was able to demonstrate them prior to the end of the session.  Elisa demonstrated good  understanding of the education provided. See EMR under Patient Instructions for exercises provided during therapy sessions.    ASSESSMENT     Tolerated exercises fairly well today reporting appropriate muscle response post session. She requires education during step ups to increase knee flexion prior to stepping onto stair for further quadriceps muscle recruitment. Patient able to achieve 112 degrees of active knee flexion ROM.  Elisa Is progressing well towards her goals.     Pt prognosis is Good.     Pt will continue to benefit from skilled outpatient physical therapy to address the deficits listed in the problem list box on initial evaluation, provide pt/family education and to maximize pt's level of independence in the home and community environment.     Pt's spiritual, cultural and educational needs considered and pt agreeable to plan of care and goals.     Anticipated barriers to physical therapy: compliance    Goals:   Short Term Goals  (3 Weeks):   1. Patient will be independent with home exercise program to supplement physical therapy treatment in improving functional status. Progressing, not met  2. Pt will improve impaired lower extremity manual muscle tests to >/= 4/5 to improve dynamic left knee support for closed chain tasks. Progressing, not met  3. Patient will improve (left) knee active range of motion to 0-90 degrees to promote increased ease of sit<>stand transfers. Progressing, not met  4. Patient will perform timed up and go with less restrictive assistive device in < 15 seconds to improve gait speed and safety with community ambulation. Progressing, not met    Long Term Goals (6 Weeks):   1. Pt will improve impaired lower extremity manual muscle tests to >/= 4+/5 to improve dynamic left knee support for closed chain tasks. Progressing, not met  2. Patient will improve the total FOTO Knee Survey Score to </= 40% limited to demonstrate increased perceived functional mobility. Progressing, not met  3. Patient will improve score on KOOS Jr. section of FOTO Knee Survey to = </= 70% to demonstrate increased perceived functional mobility. Progressing, not met  4. Patient will perform timed up and go with least restrictive assistive device in < 10 seconds to improve gait speed and safety with community ambulation. Progressing, not met  5. Patient will perform at least 10-12 sit to stands in 30 seconds without UE support to demonstrate increased functional strength. Progressing, not met  6. Patient will improve (left) knee active range of motion to 0-120 degrees to promote higher level transfers and transitions without limitation. Progressing, not met    PLAN     Plan of Care Certification: 12/21/2022 to 2/3/2023.     Outpatient Physical Therapy 4 times weekly for 6 weeks to include the following interventions: Gait Training, Manual Therapy, Moist Heat/ Ice, Neuromuscular Re-ed, Orthotic Management and Training, Patient Education, Therapeutic  Activites and Therapeutic Exercise, Instrument Assisted Soft Tissue Mobilization (IASTM), Kinesiotape.    Farhana Olguin, PTA

## 2023-01-27 DIAGNOSIS — Z96.652 STATUS POST LEFT KNEE REPLACEMENT: ICD-10-CM

## 2023-01-27 RX ORDER — OXYCODONE HYDROCHLORIDE 5 MG/1
TABLET ORAL
Qty: 40 TABLET | Refills: 0 | Status: SHIPPED | OUTPATIENT
Start: 2023-01-27 | End: 2023-02-06 | Stop reason: SDUPTHER
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
## 2023-01-30 ENCOUNTER — TELEPHONE (OUTPATIENT)
Dept: ORTHOPEDICS | Facility: CLINIC | Age: 58
End: 2023-01-30
Payer: MEDICAID

## 2023-01-30 ENCOUNTER — TELEPHONE (OUTPATIENT)
Dept: REHABILITATION | Facility: HOSPITAL | Age: 58
End: 2023-01-30
Payer: MEDICAID

## 2023-01-30 NOTE — TELEPHONE ENCOUNTER
Spoke with pt and informed her about health coaching offered by subscription service. She declined interest at this time.

## 2023-01-30 NOTE — TELEPHONE ENCOUNTER
I called and left the patient a voicemail regarding her xray appointment at  before seeing Dr. Padgett.

## 2023-01-31 ENCOUNTER — HOSPITAL ENCOUNTER (OUTPATIENT)
Dept: RADIOLOGY | Facility: HOSPITAL | Age: 58
Discharge: HOME | End: 2023-01-31
Attending: ORTHOPAEDIC SURGERY
Payer: MEDICAID

## 2023-01-31 ENCOUNTER — OFFICE VISIT (OUTPATIENT)
Dept: ORTHOPEDICS | Facility: CLINIC | Age: 58
End: 2023-01-31
Payer: MEDICAID

## 2023-01-31 ENCOUNTER — CLINICAL SUPPORT (OUTPATIENT)
Dept: REHABILITATION | Facility: HOSPITAL | Age: 58
End: 2023-01-31
Payer: MEDICAID

## 2023-01-31 VITALS — HEIGHT: 66 IN | BODY MASS INDEX: 39.54 KG/M2 | WEIGHT: 246.06 LBS

## 2023-01-31 DIAGNOSIS — Z96.652 S/P TOTAL KNEE REPLACEMENT USING CEMENT, LEFT: ICD-10-CM

## 2023-01-31 DIAGNOSIS — R29.898 DECREASED STRENGTH OF LOWER EXTREMITY: Primary | ICD-10-CM

## 2023-01-31 DIAGNOSIS — Z96.652 STATUS POST LEFT KNEE REPLACEMENT: Primary | ICD-10-CM

## 2023-01-31 DIAGNOSIS — Z74.09 DECREASED MOBILITY AND ENDURANCE: ICD-10-CM

## 2023-01-31 PROCEDURE — 1159F MED LIST DOCD IN RCRD: CPT | Mod: CPTII,,, | Performed by: ORTHOPAEDIC SURGERY

## 2023-01-31 PROCEDURE — 73562 X-RAY EXAM OF KNEE 3: CPT | Mod: TC,LT

## 2023-01-31 PROCEDURE — 3008F BODY MASS INDEX DOCD: CPT | Mod: CPTII,,, | Performed by: ORTHOPAEDIC SURGERY

## 2023-01-31 PROCEDURE — 97110 THERAPEUTIC EXERCISES: CPT | Mod: CQ

## 2023-01-31 PROCEDURE — 73562 X-RAY EXAM OF KNEE 3: CPT | Mod: 26,LT,, | Performed by: RADIOLOGY

## 2023-01-31 PROCEDURE — 3008F PR BODY MASS INDEX (BMI) DOCUMENTED: ICD-10-PCS | Mod: CPTII,,, | Performed by: ORTHOPAEDIC SURGERY

## 2023-01-31 PROCEDURE — 1159F PR MEDICATION LIST DOCUMENTED IN MEDICAL RECORD: ICD-10-PCS | Mod: CPTII,,, | Performed by: ORTHOPAEDIC SURGERY

## 2023-01-31 PROCEDURE — 99024 POSTOP FOLLOW-UP VISIT: CPT | Mod: ,,, | Performed by: ORTHOPAEDIC SURGERY

## 2023-01-31 PROCEDURE — 99024 PR POST-OP FOLLOW-UP VISIT: ICD-10-PCS | Mod: ,,, | Performed by: ORTHOPAEDIC SURGERY

## 2023-01-31 PROCEDURE — 73562 XR KNEE 3 VIEW LEFT: ICD-10-PCS | Mod: 26,LT,, | Performed by: RADIOLOGY

## 2023-01-31 PROCEDURE — 99999 PR PBB SHADOW E&M-EST. PATIENT-LVL III: ICD-10-PCS | Mod: PBBFAC,,, | Performed by: ORTHOPAEDIC SURGERY

## 2023-01-31 PROCEDURE — 99999 PR PBB SHADOW E&M-EST. PATIENT-LVL III: CPT | Mod: PBBFAC,,, | Performed by: ORTHOPAEDIC SURGERY

## 2023-01-31 PROCEDURE — 99213 OFFICE O/P EST LOW 20 MIN: CPT | Mod: PBBFAC | Performed by: ORTHOPAEDIC SURGERY

## 2023-01-31 RX ORDER — CELECOXIB 200 MG/1
200 CAPSULE ORAL DAILY
Qty: 30 CAPSULE | Refills: 0 | Status: SHIPPED | OUTPATIENT
Start: 2023-01-31 | End: 2023-04-12 | Stop reason: SDUPTHER

## 2023-01-31 RX ORDER — METHYLPREDNISOLONE 4 MG/1
TABLET ORAL
Qty: 21 EACH | Refills: 0 | Status: SHIPPED | OUTPATIENT
Start: 2023-01-31 | End: 2023-03-30

## 2023-01-31 NOTE — PROGRESS NOTES
Subjective:     HPI:   Yayo Carver is a 57 y.o. female who presents 6 weeks out from left TKA    Date of surgery: 12/19/22    Medications: tylenol TID PRN    Assistive Devices: cane out of the house    PT: ongoing, 2 more visits scheduled    Limitations: none    Doing well, making progress  Some int lateral knee shooting pain, worse at night       Objective:   Body mass index is 39.71 kg/m².  Exam:    Gait: limp/antalgic none, slow gait, R knee hurts more    Incision: healed    Stability:  Knee stable anterior-posterior varus and valgus stresses, no extensor lag    Extension: 0    Flexion: 115    Valgus angle: 7    Sore along lat IT band, not hypersensitive    Pre-op 0-115, 12deg valgus  PT 1/26/23:  0-112 AROM      Imaging:  Indication:  Exam status post left total knee arthroplasty  Exam Ordered: Radiographs of the left knee include a standing anteroposterior view, a lateral view in full flexion, and a sunrise view  Details of Examination: Todays exam show a well fixed, well positioned total knee arthroplasty with no evidence of wear, osteolysis, or loosening.  Impression:  Status post left total knee arthroplasty, implant in good position with no abnormality        Assessment:       ICD-10-CM ICD-9-CM   1. Status post left knee replacement  Z96.652 V43.65        Overall Doing well  Severe pre-op valgus + patella abbi  IT band irritation     Has lost a lot of weight after gastric sleeve, BMI 39.7 down from 47.8     Plan:       Patient is doing very well with their total knee arthroplasty.  They will continue with their routine care of the knee replacement and see me back for their follow-up at the routine interval.  If there are problems in the interim they will see me back sooner.    Continue tylenol   Rx compound cream  Rx celebrex #30 (did not irritate stomach) - may not   Rx medrol dose pack    PT 2 more visits then HEP    R knee OA: ?timing, discuss at 3 month visit    6 week follow up      No  orders of the defined types were placed in this encounter.            Past Medical History:   Diagnosis Date    Arthritis     Diabetes mellitus     Diabetes mellitus, type 2     GERD (gastroesophageal reflux disease)     HLD (hyperlipidemia)     Hypertension        Past Surgical History:   Procedure Laterality Date    BARIATRIC SURGERY  2022    Gastric sleeve     SECTION  1989    COLONOSCOPY N/A 2017    Procedure: COLONOSCOPY;  Surgeon: Evelin Arceo MD;  Location: [facility] ([address] [place] [address]);  Service: Endoscopy;  Laterality: N/A;  2 nd floor d/t BMI > 50 kg/m3    KNEE ARTHROSCOPY Right     TONSILLECTOMY      TOTAL KNEE ARTHROPLASTY Left 2022    Procedure: ARTHROPLASTY, KNEE, TOTAL: LEFT: DEPUY - ATTUNE;  Surgeon: Jc Padgett III, MD;  Location: [facility];  Service: Orthopedics;  Laterality: Left;    TUBAL LIGATION         Family History   Problem Relation Age of Onset    Cancer Mother     Breast cancer Mother 65    Diabetes Mother     Colon polyps Mother     Hypertension Mother     Diabetes Father     Breast cancer Sister 56    Cancer Sister     No Known Problems Brother     Arthritis Brother     No Known Problems Daughter     Kidney disease Son     Diabetes Son     Breast cancer Maternal Aunt     Diabetes Paternal Uncle     Breast cancer Maternal Grandmother     Colon cancer Maternal Grandfather     Diabetes Paternal Grandmother     Heart disease Paternal Grandmother     No Known Problems Paternal Grandfather     Esophageal cancer Neg Hx     Irritable bowel syndrome Neg Hx     Rectal cancer Neg Hx     Stomach cancer Neg Hx     Ulcerative colitis Neg Hx     Celiac disease Neg Hx     Crohn's disease Neg Hx     Amblyopia Neg Hx     Blindness Neg Hx     Cataracts Neg Hx     Glaucoma Neg Hx     Macular degeneration Neg Hx     Retinal detachment Neg Hx     Strabismus Neg Hx     Stroke Neg Hx     Thyroid disease Neg Hx        Social History     Socioeconomic History    Marital status:     Occupational History    Occupation: Supervisor for cyril at [employer]     Employer: [employer]   Tobacco Use    Smoking status: Never    Smokeless tobacco: Never   Substance and Sexual Activity    Alcohol use: Not Currently     Comment: occasional    Drug use: No    Sexual activity: Yes     Partners: Male     Birth control/protection: None     Comment: occasionally   Social History Narrative    ** Merged History Encounter **         Pt lives alone.  Has a college-age son who comes and goes.  Has 3 other adult children.       Social Determinants of Health     Financial Resource Strain: Low Risk     Difficulty of Paying Living Expenses: Not very hard   Food Insecurity: No Food Insecurity    Worried About Running Out of Food in the Last Year: Never true    Ran Out of Food in the Last Year: Never true   Transportation Needs: Unmet Transportation Needs    Lack of Transportation (Medical): Yes    Lack of Transportation (Non-Medical): No   Physical Activity: Insufficiently Active    Days of Exercise per Week: 3 days    Minutes of Exercise per Session: 30 min   Stress: No Stress Concern Present    Feeling of Stress : Not at all   Social Connections: Unknown    Frequency of Communication with Friends and Family: More than three times a week    Frequency of Social Gatherings with Friends and Family: More than three times a week    Active Member of Clubs or Organizations: No    Attends Club or Organization Meetings: 1 to 4 times per year    Marital Status: Never    Housing Stability: Low Risk     Unable to Pay for Housing in the Last Year: No    Number of Places Lived in the Last Year: 1    Unstable Housing in the Last Year: No

## 2023-01-31 NOTE — PROGRESS NOTES
[facility]   Physical Therapy Treatment Note     Name: Yayo Carver  [facility] Number: [id number]    Therapy Diagnosis:   Encounter Diagnoses   Name Primary?    Decreased strength of lower extremity Yes    Decreased mobility and endurance      Physician: Jc Padgett III, *    Visit Date: 1/31/2023    Physician Orders: PT Eval and Treat   Medical Diagnosis from Referral: M17.12 (ICD-10-CM) - Primary osteoarthritis of left knee  Evaluation Date: 12/21/2022  Authorization Period Expiration: 2/3/2023  Plan of Care Expiration: 2/3/2023  Visit # / Visits authorized: 10 / 20  Visit: 18  FOTO: 3/3     PTA Visit #: 3 / 5     Time In: 1400  Time Out: 1500  Total Billable Time: 50 minutes (3 TE - Medicaid)    SUBJECTIVE     Patient reports: seeing Dr. Padgett today. Her opposite leg is swollen and painful.  She was compliant with home exercise program.  Response to previous treatment: good; appropriate muscle response  Functional change: ambulates with SPC    Pain: 0/10, currently  Location: Left knee      OBJECTIVE     Objective Measures updated at progress report unless specified.   DOS 12/19/2022  6 week follow up 1/31/2023 12/23/2022: 0- 82 AAROM  12/27/2022; 83 degrees Flexion AAROM  1/4/2022: 95 degrees AAROM  1/6/2023; 103 degrees AROM Flexion   FOTO Limitation: 54%  1/13/2023; 111 degrees AROM Flexion  1/17/2023: 113 degrees AAROM flexion   1/20/2023: 0 degrees AROM; 113 degrees flexion PROM  1/24/2023; 108 degrees AROM; 111 degrees PROM Flexion  1/26/2023; 112 degrees AROM Flexion  1/31/2023; FOTO 71% Limitation    Treatment     Elisa received the treatments listed below:      therapeutic exercises to develop strength, endurance, ROM, and flexibility for 50 minutes including:  Aerobic Activity to help improve AAROM flexion and mobility; Recumbent Bike for 5 minutes at full revolutions, seat 14  Sit to stands in std chair + 2 airex; 3 x 8 with no UE assist (significant difficulty today  with 1 airex)  Long arc quads at EOM + 7.5lb AW: 5 seconds, 30 reps   Short arc quad with medium bolster + 4lb AW: 5 second hold, 30 reps (mod cueing for terminal knee extension)  Step ups on 8-inch // bars; 3 x 10 (cueing for knee flexion with step up)  Lateral step ups on 8-inch in // bars; 3 x 10 reps - deferred per patient request 2/2 Right knee pain  Standing TKE + Green TB; 5 second hold, 2 x 10 reps  Matrix HS Curls; 25lb, 2 x 12 reps    cold pack for 10 minutes to Left knee post session.    Patient Education and Home Exercises     Home Exercises Provided and Patient Education Provided     Education provided:   - Continued compliance with HEP  - Discussed elevating Left LE and use of ice for pain reduction.  - Importance of sitting with leg straight out in front of her; avoiding bending her knee with sleep or at rest.    Written Home Exercises Provided: Patient instructed to cont prior HEP. Exercises were reviewed and Elisa was able to demonstrate them prior to the end of the session.  Elisa demonstrated good  understanding of the education provided. See EMR under Patient Instructions for exercises provided during therapy sessions.    ASSESSMENT     Poor tolerance to exercise today as patient reported increased pain in Right knee with exercise. No exacerbation of pain in her surgical knee. 71% FOTO Limitation reported today. Patient to be discharged next visit by supervising PT.  Elisa Is progressing well towards her goals.     Pt prognosis is Good.     Pt will continue to benefit from skilled outpatient physical therapy to address the deficits listed in the problem list box on initial evaluation, provide pt/family education and to maximize pt's level of independence in the home and community environment.     Pt's spiritual, cultural and educational needs considered and pt agreeable to plan of care and goals.     Anticipated barriers to physical therapy: compliance    Goals:   Short Term Goals (3 Weeks):   1.  Patient will be independent with home exercise program to supplement physical therapy treatment in improving functional status. Progressing, not met  2. Pt will improve impaired lower extremity manual muscle tests to >/= 4/5 to improve dynamic left knee support for closed chain tasks. Progressing, not met  3. Patient will improve (left) knee active range of motion to 0-90 degrees to promote increased ease of sit<>stand transfers. Progressing, not met  4. Patient will perform timed up and go with less restrictive assistive device in < 15 seconds to improve gait speed and safety with community ambulation. Progressing, not met    Long Term Goals (6 Weeks):   1. Pt will improve impaired lower extremity manual muscle tests to >/= 4+/5 to improve dynamic left knee support for closed chain tasks. Progressing, not met  2. Patient will improve the total FOTO Knee Survey Score to </= 40% limited to demonstrate increased perceived functional mobility. Progressing, not met  3. Patient will improve score on KOOS Jr. section of FOTO Knee Survey to = </= 70% to demonstrate increased perceived functional mobility. Progressing, not met  4. Patient will perform timed up and go with least restrictive assistive device in < 10 seconds to improve gait speed and safety with community ambulation. Progressing, not met  5. Patient will perform at least 10-12 sit to stands in 30 seconds without UE support to demonstrate increased functional strength. Progressing, not met  6. Patient will improve (left) knee active range of motion to 0-120 degrees to promote higher level transfers and transitions without limitation. Progressing, not met    PLAN     Plan of Care Certification: 12/21/2022 to 2/3/2023.     Outpatient Physical Therapy 4 times weekly for 6 weeks to include the following interventions: Gait Training, Manual Therapy, Moist Heat/ Ice, Neuromuscular Re-ed, Orthotic Management and Training, Patient Education, Therapeutic Activites and  Therapeutic Exercise, Instrument Assisted Soft Tissue Mobilization (IASTM), Kinesiotape.    Farhana Olguin, PTA

## 2023-02-02 ENCOUNTER — CLINICAL SUPPORT (OUTPATIENT)
Dept: REHABILITATION | Facility: HOSPITAL | Age: 58
End: 2023-02-02
Payer: MEDICAID

## 2023-02-02 DIAGNOSIS — R26.9 GAIT ABNORMALITY: Primary | ICD-10-CM

## 2023-02-02 PROCEDURE — 97110 THERAPEUTIC EXERCISES: CPT

## 2023-02-02 NOTE — PROGRESS NOTES
[facility]   Physical Therapy Treatment Note/ Discharge Summary     Name: Yayo Carver  Clinic Number: [id number]    Therapy Diagnosis:   Encounter Diagnosis   Name Primary?    Gait abnormality Yes         Physician: Jc Padgett III, *    Visit Date: 2023    Physician Orders: PT Eval and Treat   Medical Diagnosis from Referral: M17.12 (ICD-10-CM) - Primary osteoarthritis of left knee  Evaluation Date: 2022  Authorization Period Expiration: 2/3/2023  Plan of Care Expiration: 2/3/2023  Visit # / Visits authorized:   Visit: 18  FOTO: 3/3     PTA Visit #: 0 / 5     Date of Last visit: 2023  Total Visits Received: 11    Time In: 2:00 pm  Time Out: 2:35 pm  Total Billable Time: 35 minutes (2 TE - Medicaid)    SUBJECTIVE     Patient reports: she is to be discharged per MD.  She was compliant with home exercise program.  Response to previous treatment: good; appropriate muscle response  Functional change: ambulates with SPC    Pain: 0/10, currently  Location: Left knee      OBJECTIVE     Objective Measures updated at progress report unless specified.   DOS 2022  6 week follow up 2022: 0- 82 AAROM  2022; 83 degrees Flexion AAROM  2022: 95 degrees AAROM  2023; 103 degrees AROM Flexion   FOTO Limitation: 54%  2023; 111 degrees AROM Flexion  2023: 113 degrees AAROM flexion   2023: 0 degrees AROM; 113 degrees flexion PROM  2023; 108 degrees AROM; 111 degrees PROM Flexion  2023; 112 degrees AROM Flexion  2023; FOTO 71% Limitation    2023  TU seconds  30 STS: not performed due to R knee pain    LE MMT right left   Hip flexion 4/5 4/5   Hip extension 4-/5 4-/5   Hip abduction 4/5 4/5   Knee flexion 4+/5 4+/5   Knee extension pain 4/5       Treatment     Elisa received the treatments listed below:      therapeutic exercises to develop strength, endurance, ROM, and flexibility for 35 minutes including:  reassessment and patient education  Aerobic Activity to help improve AAROM flexion and mobility; Recumbent Bike for 5 minutes at full revolutions, seat 14  Long arc quads with 7.5 lbs on L: 5 seconds, 30x  Short arc quads 5 lbs 5 seconds, 30x    cold pack for 10 minutes to Left knee post session.    Patient Education and Home Exercises     Home Exercises Provided and Patient Education Provided     Education provided:   - Continued compliance with HEP  - resume previous quad based exercise to decrease WB of R knee due to increased pain  - provided phase 3 exercises for future use  - therapist contact information    Written Home Exercises Provided: Patient instructed to cont prior HEP. Exercises were reviewed and Elisa was able to demonstrate them prior to the end of the session.  Elisa demonstrated good  understanding of the education provided. See EMR under Patient Instructions for exercises provided during therapy sessions.    ASSESSMENT   Patient demonstrates decreased lower extremity strength, poor R and L quadriceps motor control at terminal knee extension. Held exercises due to increased R knee pain. Attempted sidelying hip abduction (left lateral knee pain) then tried in standing (right knee pain). Patient is 6 weeks status post L total knee replacement. Patient has several unmet short term and long term physical therapy goals at this time. Progress limited due to R knee pain. Patient will be discharged with an updated HEP.     Discharge reason: Patient has completed the physician's prescription    Elisa Is progressing well towards her goals.     Pt prognosis is Good.     Pt will continue to benefit from skilled outpatient physical therapy to address the deficits listed in the problem list box on initial evaluation, provide pt/family education and to maximize pt's level of independence in the home and community environment.     Pt's spiritual, cultural and educational needs considered and pt agreeable to plan of  care and goals.     Anticipated barriers to physical therapy: compliance    Goals:   Short Term Goals (3 Weeks):   1. Patient will be independent with home exercise program to supplement physical therapy treatment in improving functional status.  met  2. Pt will improve impaired lower extremity manual muscle tests to >/= 4/5 to improve dynamic left knee support for closed chain tasks. Not met  3. Patient will improve (left) knee active range of motion to 0-90 degrees to promote increased ease of sit<>stand transfers. MET  4. Patient will perform timed up and go with less restrictive assistive device in < 15 seconds to improve gait speed and safety with community ambulation MET    Long Term Goals (6 Weeks):   1. Pt will improve impaired lower extremity manual muscle tests to >/= 4+/5 to improve dynamic left knee support for closed chain tasks.not met  2. Patient will improve the total FOTO Knee Survey Score to </= 40% limited to demonstrate increased perceived functional mobility. not met  3. Patient will improve score on KOOS Jr. section of FOTO Knee Survey to = </= 70% to demonstrate increased perceived functional mobility. not met  4. Patient will perform timed up and go with least restrictive assistive device in < 10 seconds to improve gait speed and safety with community ambulation. not met  5. Patient will perform at least 10-12 sit to stands in 30 seconds without UE support to demonstrate increased functional strength. not met  6. Patient will improve (left) knee active range of motion to 0-120 degrees to promote higher level transfers and transitions without limitation. not met    PLAN     This patient is discharged from Physical Therapy    Deja Kennedy, PT

## 2023-02-06 DIAGNOSIS — Z96.652 STATUS POST LEFT KNEE REPLACEMENT: ICD-10-CM

## 2023-02-06 RX ORDER — OXYCODONE HYDROCHLORIDE 5 MG/1
TABLET ORAL
Qty: 40 TABLET | Refills: 0 | Status: SHIPPED | OUTPATIENT
Start: 2023-02-06 | End: 2023-02-22 | Stop reason: SDUPTHER

## 2023-02-07 ENCOUNTER — TELEPHONE (OUTPATIENT)
Dept: ORTHOPEDICS | Facility: CLINIC | Age: 58
End: 2023-02-07
Payer: MEDICAID

## 2023-02-07 NOTE — TELEPHONE ENCOUNTER
I called the patient today regarding  the message below. The patient received the compound cream from [employer] on 2/3/2023 . The patient verbalized understanding and has no further questions.         ----- Message from Onur Aguayo sent at 1/31/2023 12:41 PM CST -----    ----- Message -----  From: Onur Aguaoy  Sent: 1/31/2023  12:41 PM CST  To: Onur Aguayo    Check on compound cream

## 2023-02-08 ENCOUNTER — OFFICE VISIT (OUTPATIENT)
Dept: ORTHOPEDICS | Facility: CLINIC | Age: 58
End: 2023-02-08
Payer: MEDICAID

## 2023-02-08 VITALS — WEIGHT: 227.75 LBS | BODY MASS INDEX: 36.76 KG/M2

## 2023-02-08 DIAGNOSIS — M17.11 PRIMARY OSTEOARTHRITIS OF RIGHT KNEE: Primary | ICD-10-CM

## 2023-02-08 PROBLEM — R26.9 GAIT ABNORMALITY: Status: RESOLVED | Noted: 2022-03-23 | Resolved: 2023-02-08

## 2023-02-08 PROBLEM — Z74.09 DECREASED MOBILITY AND ENDURANCE: Status: RESOLVED | Noted: 2022-09-28 | Resolved: 2023-02-08

## 2023-02-08 PROBLEM — R29.898 DECREASED STRENGTH OF LOWER EXTREMITY: Status: RESOLVED | Noted: 2022-03-23 | Resolved: 2023-02-08

## 2023-02-08 PROCEDURE — 99024 PR POST-OP FOLLOW-UP VISIT: ICD-10-PCS | Mod: S$PBB,,, | Performed by: NURSE PRACTITIONER

## 2023-02-08 PROCEDURE — 1159F MED LIST DOCD IN RCRD: CPT | Mod: CPTII,,, | Performed by: NURSE PRACTITIONER

## 2023-02-08 PROCEDURE — 99999 PR PBB SHADOW E&M-EST. PATIENT-LVL III: ICD-10-PCS | Mod: PBBFAC,,, | Performed by: NURSE PRACTITIONER

## 2023-02-08 PROCEDURE — 99024 POSTOP FOLLOW-UP VISIT: CPT | Mod: S$PBB,,, | Performed by: NURSE PRACTITIONER

## 2023-02-08 PROCEDURE — 3008F BODY MASS INDEX DOCD: CPT | Mod: CPTII,,, | Performed by: NURSE PRACTITIONER

## 2023-02-08 PROCEDURE — 20610 DRAIN/INJ JOINT/BURSA W/O US: CPT | Mod: PBBFAC,79,RT | Performed by: NURSE PRACTITIONER

## 2023-02-08 PROCEDURE — 1159F PR MEDICATION LIST DOCUMENTED IN MEDICAL RECORD: ICD-10-PCS | Mod: CPTII,,, | Performed by: NURSE PRACTITIONER

## 2023-02-08 PROCEDURE — 1160F RVW MEDS BY RX/DR IN RCRD: CPT | Mod: CPTII,,, | Performed by: NURSE PRACTITIONER

## 2023-02-08 PROCEDURE — 99213 OFFICE O/P EST LOW 20 MIN: CPT | Mod: PBBFAC,25 | Performed by: NURSE PRACTITIONER

## 2023-02-08 PROCEDURE — 3008F PR BODY MASS INDEX (BMI) DOCUMENTED: ICD-10-PCS | Mod: CPTII,,, | Performed by: NURSE PRACTITIONER

## 2023-02-08 PROCEDURE — 99999 PR PBB SHADOW E&M-EST. PATIENT-LVL III: CPT | Mod: PBBFAC,,, | Performed by: NURSE PRACTITIONER

## 2023-02-08 PROCEDURE — 1160F PR REVIEW ALL MEDS BY PRESCRIBER/CLIN PHARMACIST DOCUMENTED: ICD-10-PCS | Mod: CPTII,,, | Performed by: NURSE PRACTITIONER

## 2023-02-08 RX ADMIN — TRIAMCINOLONE ACETONIDE 40 MG: 40 INJECTION, SUSPENSION INTRA-ARTICULAR; INTRAMUSCULAR at 09:02

## 2023-02-09 NOTE — TELEPHONE ENCOUNTER
Talked to patient twice about scheduling the home sleep study,no response.  Sent out a message through [employer] to schedule.   supple, normal appearance without deformities

## 2023-02-14 ENCOUNTER — TELEPHONE (OUTPATIENT)
Dept: INTERNAL MEDICINE | Facility: CLINIC | Age: 58
End: 2023-02-14
Payer: MEDICAID

## 2023-02-14 NOTE — TELEPHONE ENCOUNTER
----- Message from Sylwia Leonard sent at 2/13/2023  2:45 PM CST -----  Regarding: Self/  [phone]  Type: Patient Call Back    Who called:  Patient    What is the request in detail:  Patient is requesting a call from staff for her annual appt.  Thank you    Would the patient rather a call back or a response via [facility]?  Call back    Best call back number:  [phone]          Thank you

## 2023-02-21 RX ORDER — TRIAMCINOLONE ACETONIDE 40 MG/ML
40 INJECTION, SUSPENSION INTRA-ARTICULAR; INTRAMUSCULAR
Status: COMPLETED | OUTPATIENT
Start: 2023-02-08 | End: 2023-02-08

## 2023-02-21 NOTE — PROGRESS NOTES
Pt had a left knee replacement 12/19/2023. Since then she has had increased pain and swelling in the right knee. She returns today for an aspiration and cortisone injection in the right knee.        Knee Arthrocentesis with Injection Procedure Note    Pre-operative Diagnosis: right knee degenerative arthritis    Post-operative Diagnosis: same    Indications: right knee pain    Anesthesia: none    Procedure Details     Verbal consent was obtained for the procedure.An 18 gauge needle was inserted into the superior aspect of the joint from a lateral approach. 50 ml of clear yellow fluid was removed from the joint and discarded. 1% lidocaine 4 ml and 40mg of kenalog was then injected into the joint through the same needle. The needle was removed and the area cleansed and dressed.    Complications:  None; patient tolerated the procedure well.

## 2023-02-22 ENCOUNTER — PATIENT MESSAGE (OUTPATIENT)
Dept: BARIATRICS | Facility: CLINIC | Age: 58
End: 2023-02-22
Payer: MEDICAID

## 2023-02-22 DIAGNOSIS — Z96.652 STATUS POST LEFT KNEE REPLACEMENT: ICD-10-CM

## 2023-02-22 RX ORDER — OXYCODONE HYDROCHLORIDE 5 MG/1
TABLET ORAL
Qty: 40 TABLET | Refills: 0 | Status: SHIPPED | OUTPATIENT
Start: 2023-02-22 | End: 2023-03-06 | Stop reason: SDUPTHER

## 2023-02-26 DIAGNOSIS — E55.9 VITAMIN D DEFICIENCY: ICD-10-CM

## 2023-02-27 ENCOUNTER — PATIENT MESSAGE (OUTPATIENT)
Dept: INTERNAL MEDICINE | Facility: CLINIC | Age: 58
End: 2023-02-27
Payer: MEDICAID

## 2023-02-27 ENCOUNTER — TELEPHONE (OUTPATIENT)
Dept: INTERNAL MEDICINE | Facility: CLINIC | Age: 58
End: 2023-02-27
Payer: MEDICAID

## 2023-02-27 RX ORDER — ERGOCALCIFEROL 1.25 MG/1
50000 CAPSULE ORAL
Qty: 12 CAPSULE | Refills: 3 | Status: SHIPPED | OUTPATIENT
Start: 2023-02-27 | End: 2024-03-04 | Stop reason: DRUGHIGH

## 2023-03-04 ENCOUNTER — PATIENT MESSAGE (OUTPATIENT)
Dept: ADMINISTRATIVE | Facility: OTHER | Age: 58
End: 2023-03-04
Payer: MEDICAID

## 2023-03-06 ENCOUNTER — PATIENT MESSAGE (OUTPATIENT)
Dept: PHYSICAL MEDICINE AND REHAB | Facility: CLINIC | Age: 58
End: 2023-03-06
Payer: MEDICAID

## 2023-03-06 DIAGNOSIS — M17.0 PRIMARY OSTEOARTHRITIS OF BOTH KNEES: ICD-10-CM

## 2023-03-06 DIAGNOSIS — M25.562 CHRONIC PAIN OF BOTH KNEES: ICD-10-CM

## 2023-03-06 DIAGNOSIS — G89.29 CHRONIC PAIN OF BOTH KNEES: ICD-10-CM

## 2023-03-06 DIAGNOSIS — M25.561 CHRONIC PAIN OF BOTH KNEES: ICD-10-CM

## 2023-03-06 DIAGNOSIS — Z96.652 STATUS POST LEFT KNEE REPLACEMENT: ICD-10-CM

## 2023-03-06 RX ORDER — TRAMADOL HYDROCHLORIDE 50 MG/1
50-100 TABLET ORAL 3 TIMES DAILY PRN
Qty: 180 TABLET | Refills: 3 | Status: SHIPPED | OUTPATIENT
Start: 2023-03-10 | End: 2023-04-05 | Stop reason: SDUPTHER

## 2023-03-06 RX ORDER — OXYCODONE HYDROCHLORIDE 5 MG/1
TABLET ORAL
Qty: 28 TABLET | Refills: 0 | Status: SHIPPED | OUTPATIENT
Start: 2023-03-06 | End: 2023-03-24 | Stop reason: SDUPTHER

## 2023-03-08 NOTE — TELEPHONE ENCOUNTER
Chief Complaint   Patient presents with    Hospital Follow-up     Doing much better.        HPI:  Patient is a 78 y.o. female established patient who presents today, with her daughter Celeste present, for a hospital follow up visit (delayed due to poor driving conditions over the past couple of weeks). She has had a very complicated medical course since late January. She was seen in my office 1/23/23 for evaluation of GI illness symptoms (body aches, abdominal cramping, diarrhea, bloating, nausea without emesis) that started approximately five days prior. Abdominal xrays subsequently showed evidence of small bowel obstruction, and I contacted patient to proceed to ER for further evaluation. Patient was admitted to [facility] 1/24-2/9/23 for evaluation and treatment for SBO. Conservative management efforts with NG tube decompression failed, and she underwent ex lap with lysis of adhesions 1/28/23 by Dr. Valles. She also was treated for ileus and aspiration PNA and required short term TPN. She was tolerating oral diet/having bowel movements prior to discharge to skilled nursing facility. She then returned to [facility] ER on 2/15/23 for evaluation of abnormal outpatient KUB. She was noted to have acute diverticulitis of sigmoid colon and was admitted for appropriate treatment. She was discharged home on 2/18/23 with home health services, and patient has been doing well overall to date. She reports having a voracious appetite, is regaining her strength, is using a front wheel walker for safe ambulation, and reports stools are firming up this week.     Patient Active Problem List    Diagnosis Date Noted    Diverticulitis 02/15/2023    Kidney stone 02/09/2023    Arthritis 01/24/2023    Poor balance 04/26/2021    Thyroid nodule 03/16/2021    Obesity (BMI 30-39.9) 04/06/2018    Heart murmur 09/13/2017    Chronic insomnia 08/09/2017    Anxiety 08/09/2017    HTN (hypertension) 07/01/2014    Hyperlipidemia 07/01/2014    AZUL on CPAP  Inform pt that PA was done and she has to wait 24 hr till reponse.   "07/01/2014       Past medical, surgical, family, and social history was reviewed and updated in Epic chart by me today.     Medications and allergies reviewed and updated in Epic chart by me today.     ROS:  Pertinent positives listed above in HPI. All other systems have been reviewed and are negative.    PE:   /60 (BP Location: Left arm, Patient Position: Sitting, BP Cuff Size: Adult)   Pulse 68   Temp 36.9 °C (98.5 °F) (Temporal)   Resp 17   Ht 1.702 m (5' 7\")   Wt 88.7 kg (195 lb 9.6 oz)   SpO2 94%   BMI 30.64 kg/m²   Vital signs reviewed with patient.     Gen: Well developed; well nourished; no acute distress; non toxic appearance   CV: Regular rate and rhythm; S1/ S2 present; no murmur, gallop or rub noted  Pulm: No respiratory distress; clear to ascultation b/l; no wheezing or stridor noted b/l  Abd: Adequate bowel sounds noted; soft and nontender; no rebound, rigidity, nor distention; vertical surgical incision is healing well   Extremities: mild peripheral edema b/l LE extremities/ no clubbing nor cyanosis noted  Skin: Warm and dry; no rashes noted   Neuro: No focal deficits noted   Psych: AAOx4; mood and affect are appropriate    A/P:  1. Hospital discharge follow-up  Refer to HPI for details. I have reviewed all pertinent records prior to our visit today, and both patient and her daughter are well informed about all events of both hospitalizations.   - Referral to Gastroenterology    2. SBO (small bowel obstruction) (HCC)  Patient failed conservation management for SBO and underwent ex lap with SHANE by Dr. Valles on 1/28/23. She is doing well overall and stools are firming up this week.   - Referral to Gastroenterology    3. Diverticulitis  Patient has completed treatment for diverticulitis of sigmoid colon (remains asymptomatic) and needs outpatient follow up appointment with Dr. Vila. New referral made today, and patient provided with office contact information.   - Referral to " Gastroenterology    4. Electrolyte imbalance  Recommend drawing CMP to ensure there are no current electrolyte abnormalities.   - Comp Metabolic Panel; Future    5. Normocytic anemia  Recommend drawing CBC today for ongoing surveillance of normocytic anemia noted during recent prolonged illness period. I will contact patient with lab results when available for further management.   - CBC WITH DIFFERENTIAL; Future     I encouraged both patient and her daughter to contact my office if they have further questions or new changes in health status arise moving forward.

## 2023-03-10 NOTE — TELEPHONE ENCOUNTER
----- Message from Deion Can sent at 1/14/2020 10:02 AM CST -----  Contact: Nora (JUANA)  Name of Who is Calling: Nora NORRIS)      What is the request in detail: Would like to speak with staff in regards to completion of certificate of medical necceisty request. Please fax back to [phone]      Can the clinic reply by [facility]: no      What Number to Call Back if not in [facility]: [phone] ext [phone]                                     Subjective   Patient ID: Jermaine Mayer is a 10 m.o. male who presents for Conjunctivitis (Pink eye).  HPI  Cold for 4 days  Bilat eyes goopy this am, left eye goopier  No fever   +   + cough    Review of Systems    Objective   Physical Exam  NAD  Left eye pink sclera and goopy crusty dc  Rt eye nl  Copious rhinorrhea  Lungs cta bilat  Cv rrr    Assessment/Plan     Pinkeye  Use cipro eye drops 3-4 times a day

## 2023-03-16 ENCOUNTER — PATIENT OUTREACH (OUTPATIENT)
Dept: ADMINISTRATIVE | Facility: HOSPITAL | Age: 58
End: 2023-03-16
Payer: MEDICAID

## 2023-03-17 ENCOUNTER — TELEPHONE (OUTPATIENT)
Dept: ORTHOPEDICS | Facility: CLINIC | Age: 58
End: 2023-03-17
Payer: MEDICAID
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
## 2023-03-23 ENCOUNTER — OFFICE VISIT (OUTPATIENT)
Dept: ORTHOPEDICS | Facility: CLINIC | Age: 58
End: 2023-03-23
Payer: MEDICAID

## 2023-03-23 ENCOUNTER — HOSPITAL ENCOUNTER (OUTPATIENT)
Dept: RADIOLOGY | Facility: HOSPITAL | Age: 58
Discharge: HOME | End: 2023-03-23
Attending: ORTHOPAEDIC SURGERY
Payer: MEDICAID

## 2023-03-23 VITALS — WEIGHT: 234.44 LBS | HEIGHT: 66 IN | BODY MASS INDEX: 37.68 KG/M2

## 2023-03-23 DIAGNOSIS — Z96.652 STATUS POST LEFT KNEE REPLACEMENT: Primary | ICD-10-CM

## 2023-03-23 DIAGNOSIS — M17.11 PRIMARY OSTEOARTHRITIS OF RIGHT KNEE: ICD-10-CM

## 2023-03-23 DIAGNOSIS — M17.11 PRIMARY OSTEOARTHRITIS OF RIGHT KNEE: Primary | ICD-10-CM

## 2023-03-23 PROCEDURE — 73564 XR KNEE ORTHO RIGHT WITH FLEXION: ICD-10-PCS | Mod: 26,RT,, | Performed by: RADIOLOGY

## 2023-03-23 PROCEDURE — 3008F PR BODY MASS INDEX (BMI) DOCUMENTED: ICD-10-PCS | Mod: CPTII,,, | Performed by: ORTHOPAEDIC SURGERY

## 2023-03-23 PROCEDURE — 99213 PR OFFICE/OUTPT VISIT, EST, LEVL III, 20-29 MIN: ICD-10-PCS | Mod: 25,S$PBB,, | Performed by: ORTHOPAEDIC SURGERY

## 2023-03-23 PROCEDURE — 99213 OFFICE O/P EST LOW 20 MIN: CPT | Mod: PBBFAC,25 | Performed by: ORTHOPAEDIC SURGERY

## 2023-03-23 PROCEDURE — 73562 XR KNEE ORTHO RIGHT WITH FLEXION: ICD-10-PCS | Mod: 26,LT,, | Performed by: RADIOLOGY

## 2023-03-23 PROCEDURE — 3008F BODY MASS INDEX DOCD: CPT | Mod: CPTII,,, | Performed by: ORTHOPAEDIC SURGERY

## 2023-03-23 PROCEDURE — 73562 X-RAY EXAM OF KNEE 3: CPT | Mod: 26,LT,, | Performed by: RADIOLOGY

## 2023-03-23 PROCEDURE — 99999 PR PBB SHADOW E&M-EST. PATIENT-LVL III: ICD-10-PCS | Mod: PBBFAC,,, | Performed by: ORTHOPAEDIC SURGERY

## 2023-03-23 PROCEDURE — 1159F MED LIST DOCD IN RCRD: CPT | Mod: CPTII,,, | Performed by: ORTHOPAEDIC SURGERY

## 2023-03-23 PROCEDURE — 99999 PR PBB SHADOW E&M-EST. PATIENT-LVL III: CPT | Mod: PBBFAC,,, | Performed by: ORTHOPAEDIC SURGERY

## 2023-03-23 PROCEDURE — 99213 OFFICE O/P EST LOW 20 MIN: CPT | Mod: 25,S$PBB,, | Performed by: ORTHOPAEDIC SURGERY

## 2023-03-23 PROCEDURE — 1159F PR MEDICATION LIST DOCUMENTED IN MEDICAL RECORD: ICD-10-PCS | Mod: CPTII,,, | Performed by: ORTHOPAEDIC SURGERY

## 2023-03-23 PROCEDURE — 73564 X-RAY EXAM KNEE 4 OR MORE: CPT | Mod: TC,RT

## 2023-03-23 PROCEDURE — 20610 LARGE JOINT ASPIRATION/INJECTION: R KNEE: ICD-10-PCS | Mod: S$PBB,RT,, | Performed by: ORTHOPAEDIC SURGERY

## 2023-03-23 PROCEDURE — 20610 DRAIN/INJ JOINT/BURSA W/O US: CPT | Mod: PBBFAC,RT | Performed by: ORTHOPAEDIC SURGERY

## 2023-03-23 PROCEDURE — 73564 X-RAY EXAM KNEE 4 OR MORE: CPT | Mod: 26,RT,, | Performed by: RADIOLOGY

## 2023-03-23 RX ORDER — TERBINAFINE HYDROCHLORIDE 250 MG/1
250 TABLET ORAL DAILY
Qty: 50 TABLET | Refills: 0 | OUTPATIENT
Start: 2023-03-23

## 2023-03-23 RX ORDER — TRIAMCINOLONE ACETONIDE 40 MG/ML
40 INJECTION, SUSPENSION INTRA-ARTICULAR; INTRAMUSCULAR
Status: DISCONTINUED | OUTPATIENT
Start: 2023-03-23 | End: 2023-03-23 | Stop reason: HOSPADM

## 2023-03-23 RX ADMIN — TRIAMCINOLONE ACETONIDE 40 MG: 40 INJECTION, SUSPENSION INTRA-ARTICULAR; INTRAMUSCULAR at 09:03

## 2023-03-23 NOTE — PROGRESS NOTES
"Subjective:     HPI:   Yayo Carver is a 57 y.o. female who presents 12 weeks out from left TKA    Date of surgery: 12/19/22    Medications: tylenol PRN    Assistive Devices: none    PT: finished    L knee "never felt better"   Some lateral soreness, int symptoms, not all the time    R knee increasing OA pain, tripped and fell on it 2/7/23  Increasing pain and swelling         Objective:   Body mass index is 37.84 kg/m².  Exam:    Gait: limp/antalgic none for left    Incision: healed    Stability:  Knee stable anterior-posterior varus and valgus stresses, no extensor lag    Extension: 0    Flexion: 120  Mild soreness over distal IT band (significant valgus correction)     Significant limp and antalgic gait on the right side, walks with a stiff knee in a compressive sleeve.  She has no groin pain with active straight leg raise no pain passive range of motion of her hip joint.  No extensor lag.  7-100 degree knee range of motion 10° valgus alignment she has pain and slight opening to valgus stress with an endpoint but with a clicking sound she has crepitus with range of motion in her patellofemoral joint and severe pain with moderate effusion.  Knee is stable to varus anterior and posterior stresses she is neurovascularly intact distally  Ttp ant knee consistent with resolving contusion, no ext lag, 5/5 knee ext strength      Imaging:  None today L knee    R knee: no change from prior, severe KL g4 OA, no obvious acute change        Assessment:       ICD-10-CM ICD-9-CM   1. Status post left knee replacement  Z96.652 V43.65   2. Primary osteoarthritis of right knee  M17.11 715.16        L TKA Doing well, mild IT band irritation from severe valgus correction   R knee: contusion, pre-existing OA     Plan:       Patient is doing very well with their total knee arthroplasty.  They will continue with their routine care of the knee replacement and see me back for their follow-up at the routine interval.  If there are " problems in the interim they will see me back sooner. Prophylactic antibiotic protocol given and explained to patient.     R knee:   Ice, taking tylenol, rec adding aleve, rest/act mod, cont comp sleeve  CSI today  Plan for TKA in 3 months: 23  F/u 6 weeks = pre-op visit    L knee:   Return to work 3/27, no restrictions for left knee     9 month follow up for annual xrays      Orders Placed This Encounter   Procedures    Large Joint Aspiration/Injection: R knee     This order was created via procedure documentation             Past Medical History:   Diagnosis Date    Arthritis     Diabetes mellitus     Diabetes mellitus, type 2     GERD (gastroesophageal reflux disease)     HLD (hyperlipidemia)     Hypertension        Past Surgical History:   Procedure Laterality Date    BARIATRIC SURGERY  2022    Gastric sleeve     SECTION  1989    COLONOSCOPY N/A 2017    Procedure: COLONOSCOPY;  Surgeon: Evelin Arceo MD;  Location: [address]);  Service: Endoscopy;  Laterality: N/A;  2 nd floor d/t BMI > 50 kg/m3    KNEE ARTHROSCOPY Right     TONSILLECTOMY      TOTAL KNEE ARTHROPLASTY Left 2022    Procedure: ARTHROPLASTY, KNEE, TOTAL: LEFT: DEPUY - ATTUNE;  Surgeon: Jc Padgett III, MD;  Location: [facility];  Service: Orthopedics;  Laterality: Left;    TUBAL LIGATION         Family History   Problem Relation Age of Onset    Cancer Mother     Breast cancer Mother 65    Diabetes Mother     Colon polyps Mother     Hypertension Mother     Diabetes Father     Breast cancer Sister 56    Cancer Sister     No Known Problems Brother     Arthritis Brother     No Known Problems Daughter     Kidney disease Son     Diabetes Son     Breast cancer Maternal Aunt     Diabetes Paternal Uncle     Breast cancer Maternal Grandmother     Colon cancer Maternal Grandfather     Diabetes Paternal Grandmother     Heart disease Paternal Grandmother     No Known Problems Paternal Grandfather     Esophageal  cancer Neg Hx     Irritable bowel syndrome Neg Hx     Rectal cancer Neg Hx     Stomach cancer Neg Hx     Ulcerative colitis Neg Hx     Celiac disease Neg Hx     Crohn's disease Neg Hx     Amblyopia Neg Hx     Blindness Neg Hx     Cataracts Neg Hx     Glaucoma Neg Hx     Macular degeneration Neg Hx     Retinal detachment Neg Hx     Strabismus Neg Hx     Stroke Neg Hx     Thyroid disease Neg Hx        Social History     Socioeconomic History    Marital status:    Occupational History    Occupation: Supervisor for Quality Practices at [employer]     Employer: [employer]   Tobacco Use    Smoking status: Never    Smokeless tobacco: Never   Substance and Sexual Activity    Alcohol use: Not Currently     Comment: occasional    Drug use: No    Sexual activity: Yes     Partners: Male     Birth control/protection: None     Comment: occasionally   Social History Narrative    ** Merged History Encounter **         Pt lives alone.  Has a college-age son who comes and goes.  Has 3 other adult children.       Social Determinants of Health     Financial Resource Strain: Low Risk     Difficulty of Paying Living Expenses: Not very hard   Food Insecurity: No Food Insecurity    Worried About Running Out of Food in the Last Year: Never true    Ran Out of Food in the Last Year: Never true   Transportation Needs: Unmet Transportation Needs    Lack of Transportation (Medical): Yes    Lack of Transportation (Non-Medical): No   Physical Activity: Insufficiently Active    Days of Exercise per Week: 3 days    Minutes of Exercise per Session: 30 min   Stress: No Stress Concern Present    Feeling of Stress : Not at all   Social Connections: Unknown    Frequency of Communication with Friends and Family: More than three times a week    Frequency of Social Gatherings with Friends and Family: More than three times a week    Active Member of Clubs or Organizations: No    Attends Club or Organization Meetings: 1 to 4 times per year    Marital  Status: Never    Housing Stability: Low Risk     Unable to Pay for Housing in the Last Year: No    Number of Places Lived in the Last Year: 1    Unstable Housing in the Last Year: No

## 2023-03-23 NOTE — PROGRESS NOTES
Injection Information    Triamcinolone Acetonide Injectable Suspension (40mg/mL)  Lot Number: [id number]   Expiration Date: 9/30/2024

## 2023-03-23 NOTE — PROCEDURES
Large Joint Aspiration/Injection: R knee    Date/Time: 3/23/2023 9:00 AM  Performed by: Jc Padgett III, MD  Authorized by: Jc Padgett III, MD     Consent Done?:  Yes (Verbal)  Indications:  Pain  Timeout: prior to procedure the correct patient, procedure, and site was verified    Prep: patient was prepped and draped in usual sterile fashion      Local anesthesia used?: Yes    Local anesthetic:  Lidocaine 1% without epinephrine  Anesthetic total (ml):  5      Details:  Needle Size:  21 G  Location:  Knee  Site:  R knee  Medications:  40 mg triamcinolone acetonide 40 mg/mL  Patient tolerance:  Patient tolerated the procedure well with no immediate complications

## 2023-03-27 ENCOUNTER — PATIENT MESSAGE (OUTPATIENT)
Dept: ADMINISTRATIVE | Facility: OTHER | Age: 58
End: 2023-03-27
Payer: MEDICAID

## 2023-03-30 ENCOUNTER — PATIENT MESSAGE (OUTPATIENT)
Dept: ADMINISTRATIVE | Facility: OTHER | Age: 58
End: 2023-03-30
Payer: MEDICAID

## 2023-03-30 ENCOUNTER — LAB VISIT (OUTPATIENT)
Dept: LAB | Facility: OTHER | Age: 58
End: 2023-03-30
Attending: INTERNAL MEDICINE
Payer: MEDICAID

## 2023-03-30 ENCOUNTER — PATIENT MESSAGE (OUTPATIENT)
Dept: INTERNAL MEDICINE | Facility: CLINIC | Age: 58
End: 2023-03-30

## 2023-03-30 ENCOUNTER — OFFICE VISIT (OUTPATIENT)
Dept: INTERNAL MEDICINE | Facility: CLINIC | Age: 58
End: 2023-03-30
Payer: MEDICAID

## 2023-03-30 VITALS
DIASTOLIC BLOOD PRESSURE: 86 MMHG | HEART RATE: 79 BPM | SYSTOLIC BLOOD PRESSURE: 140 MMHG | WEIGHT: 234.38 LBS | BODY MASS INDEX: 37.67 KG/M2 | HEIGHT: 66 IN | OXYGEN SATURATION: 98 %

## 2023-03-30 DIAGNOSIS — E66.01 MORBID OBESITY WITH BMI OF 50.0-59.9, ADULT: ICD-10-CM

## 2023-03-30 DIAGNOSIS — I10 ESSENTIAL HYPERTENSION: Primary | ICD-10-CM

## 2023-03-30 DIAGNOSIS — E11.9 TYPE 2 DIABETES MELLITUS WITHOUT COMPLICATION, WITHOUT LONG-TERM CURRENT USE OF INSULIN: ICD-10-CM

## 2023-03-30 DIAGNOSIS — E78.2 MIXED HYPERLIPIDEMIA: ICD-10-CM

## 2023-03-30 DIAGNOSIS — B35.1 ONYCHOMYCOSIS: ICD-10-CM

## 2023-03-30 LAB
ALBUMIN SERPL BCP-MCNC: 3.3 G/DL (ref 3.5–5.2)
ALP SERPL-CCNC: 82 U/L (ref 55–135)
ALT SERPL W/O P-5'-P-CCNC: 15 U/L (ref 10–44)
ANION GAP SERPL CALC-SCNC: 5 MMOL/L (ref 8–16)
AST SERPL-CCNC: 14 U/L (ref 10–40)
BASOPHILS # BLD AUTO: 0.06 K/UL (ref 0–0.2)
BASOPHILS NFR BLD: 0.7 % (ref 0–1.9)
BILIRUB SERPL-MCNC: 0.5 MG/DL (ref 0.1–1)
BUN SERPL-MCNC: 14 MG/DL (ref 6–20)
CALCIUM SERPL-MCNC: 10 MG/DL (ref 8.7–10.5)
CHLORIDE SERPL-SCNC: 111 MMOL/L (ref 95–110)
CO2 SERPL-SCNC: 26 MMOL/L (ref 23–29)
CREAT SERPL-MCNC: 0.9 MG/DL (ref 0.5–1.4)
DIFFERENTIAL METHOD: ABNORMAL
EOSINOPHIL # BLD AUTO: 0.2 K/UL (ref 0–0.5)
EOSINOPHIL NFR BLD: 2 % (ref 0–8)
ERYTHROCYTE [DISTWIDTH] IN BLOOD BY AUTOMATED COUNT: 17.9 % (ref 11.5–14.5)
EST. GFR  (NO RACE VARIABLE): >60 ML/MIN/1.73 M^2
GLUCOSE SERPL-MCNC: 87 MG/DL (ref 70–110)
HCT VFR BLD AUTO: 39.5 % (ref 37–48.5)
HGB BLD-MCNC: 11.7 G/DL (ref 12–16)
IMM GRANULOCYTES # BLD AUTO: 0.01 K/UL (ref 0–0.04)
IMM GRANULOCYTES NFR BLD AUTO: 0.1 % (ref 0–0.5)
LYMPHOCYTES # BLD AUTO: 2.3 K/UL (ref 1–4.8)
LYMPHOCYTES NFR BLD: 28.3 % (ref 18–48)
MCH RBC QN AUTO: 21.4 PG (ref 27–31)
MCHC RBC AUTO-ENTMCNC: 29.6 G/DL (ref 32–36)
MCV RBC AUTO: 72 FL (ref 82–98)
MONOCYTES # BLD AUTO: 0.6 K/UL (ref 0.3–1)
MONOCYTES NFR BLD: 7.4 % (ref 4–15)
NEUTROPHILS # BLD AUTO: 5 K/UL (ref 1.8–7.7)
NEUTROPHILS NFR BLD: 61.5 % (ref 38–73)
NRBC BLD-RTO: 0 /100 WBC
PLATELET # BLD AUTO: 201 K/UL (ref 150–450)
PMV BLD AUTO: ABNORMAL FL (ref 9.2–12.9)
POTASSIUM SERPL-SCNC: 4.4 MMOL/L (ref 3.5–5.1)
PROT SERPL-MCNC: 6.6 G/DL (ref 6–8.4)
RBC # BLD AUTO: 5.46 M/UL (ref 4–5.4)
SODIUM SERPL-SCNC: 142 MMOL/L (ref 136–145)
WBC # BLD AUTO: 8.09 K/UL (ref 3.9–12.7)

## 2023-03-30 PROCEDURE — 99999 PR PBB SHADOW E&M-EST. PATIENT-LVL V: ICD-10-PCS | Mod: PBBFAC,,, | Performed by: INTERNAL MEDICINE

## 2023-03-30 PROCEDURE — 36415 COLL VENOUS BLD VENIPUNCTURE: CPT | Performed by: INTERNAL MEDICINE

## 2023-03-30 PROCEDURE — 80053 COMPREHEN METABOLIC PANEL: CPT | Performed by: INTERNAL MEDICINE

## 2023-03-30 PROCEDURE — 1160F RVW MEDS BY RX/DR IN RCRD: CPT | Mod: CPTII,,, | Performed by: INTERNAL MEDICINE

## 2023-03-30 PROCEDURE — 99215 OFFICE O/P EST HI 40 MIN: CPT | Mod: PBBFAC | Performed by: INTERNAL MEDICINE

## 2023-03-30 PROCEDURE — 3077F PR MOST RECENT SYSTOLIC BLOOD PRESSURE >= 140 MM HG: ICD-10-PCS | Mod: CPTII,,, | Performed by: INTERNAL MEDICINE

## 2023-03-30 PROCEDURE — 3079F DIAST BP 80-89 MM HG: CPT | Mod: CPTII,,, | Performed by: INTERNAL MEDICINE

## 2023-03-30 PROCEDURE — 85025 COMPLETE CBC W/AUTO DIFF WBC: CPT | Performed by: INTERNAL MEDICINE

## 2023-03-30 PROCEDURE — 3079F PR MOST RECENT DIASTOLIC BLOOD PRESSURE 80-89 MM HG: ICD-10-PCS | Mod: CPTII,,, | Performed by: INTERNAL MEDICINE

## 2023-03-30 PROCEDURE — 1160F PR REVIEW ALL MEDS BY PRESCRIBER/CLIN PHARMACIST DOCUMENTED: ICD-10-PCS | Mod: CPTII,,, | Performed by: INTERNAL MEDICINE

## 2023-03-30 PROCEDURE — 3077F SYST BP >= 140 MM HG: CPT | Mod: CPTII,,, | Performed by: INTERNAL MEDICINE

## 2023-03-30 PROCEDURE — 3008F BODY MASS INDEX DOCD: CPT | Mod: CPTII,,, | Performed by: INTERNAL MEDICINE

## 2023-03-30 PROCEDURE — 99999 PR PBB SHADOW E&M-EST. PATIENT-LVL V: CPT | Mod: PBBFAC,,, | Performed by: INTERNAL MEDICINE

## 2023-03-30 PROCEDURE — 99215 OFFICE O/P EST HI 40 MIN: CPT | Mod: S$PBB,,, | Performed by: INTERNAL MEDICINE

## 2023-03-30 PROCEDURE — 99215 PR OFFICE/OUTPT VISIT, EST, LEVL V, 40-54 MIN: ICD-10-PCS | Mod: S$PBB,,, | Performed by: INTERNAL MEDICINE

## 2023-03-30 PROCEDURE — 1159F MED LIST DOCD IN RCRD: CPT | Mod: CPTII,,, | Performed by: INTERNAL MEDICINE

## 2023-03-30 PROCEDURE — 3008F PR BODY MASS INDEX (BMI) DOCUMENTED: ICD-10-PCS | Mod: CPTII,,, | Performed by: INTERNAL MEDICINE

## 2023-03-30 PROCEDURE — 1159F PR MEDICATION LIST DOCUMENTED IN MEDICAL RECORD: ICD-10-PCS | Mod: CPTII,,, | Performed by: INTERNAL MEDICINE

## 2023-03-30 RX ORDER — TERBINAFINE HYDROCHLORIDE 250 MG/1
250 TABLET ORAL DAILY
Qty: 30 TABLET | Refills: 2 | Status: SHIPPED | OUTPATIENT
Start: 2023-03-30 | End: 2023-05-25 | Stop reason: SDUPTHER

## 2023-03-30 RX ORDER — SPIRONOLACTONE 25 MG/1
25 TABLET ORAL DAILY
Qty: 30 TABLET | Refills: 11 | Status: CANCELLED | OUTPATIENT
Start: 2023-03-30 | End: 2024-03-29

## 2023-03-30 RX ORDER — ATORVASTATIN CALCIUM 40 MG/1
40 TABLET, FILM COATED ORAL DAILY
Qty: 90 TABLET | Refills: 3 | Status: SHIPPED | OUTPATIENT
Start: 2023-03-30 | End: 2023-11-15 | Stop reason: SDUPTHER

## 2023-03-30 RX ORDER — AMLODIPINE BESYLATE 10 MG/1
10 TABLET ORAL DAILY
Qty: 90 TABLET | Refills: 3 | Status: SHIPPED | OUTPATIENT
Start: 2023-03-30 | End: 2023-11-15 | Stop reason: SDUPTHER

## 2023-03-30 NOTE — PROGRESS NOTES
Subjective:       Patient ID: Yayo Carver is a 57 y.o. female who  has a past medical history of Arthritis, Diabetes mellitus, Diabetes mellitus, type 2, GERD (gastroesophageal reflux disease), HLD (hyperlipidemia), and Hypertension.    Chief Complaint: Obesity and Hypertension     History was obtained from the patient and supplemented through chart review.  -following with Ortho for OA of the knee.     Works nights for [employer].  Her son works as a , rodriguez; he played college football.    HPI    HTN:    H/o DM, but not on ACE d/t h/o angioedema.    Has issues with muscle cramps.   Stopped chlorthalidone 25, Aldactone 25 (d/t hirsutism, mild edema) after bariatric sx.  No issues with edema.  SHORTY eval as below.      BP is elevated. Is on Norvasc 10 daily.   Home /72. Added Aldactone 25 d/t hirsutism, but never started this. Home /60.  States that she was having knee pain.  She is resistant to taking BP meds d/t knee pain. Doesn't want to be on additional meds since her gastric sx.    DM2 is controlled. H/o DKA when she had insurance issues obtaining Trulicity/Ozempic and ran out of insulin and Metformin.  Was admitted 10/2021 for HHS.      Stopped metformin due to non gap metabolic acidosis.  No h/o GI SE to Metformin.    Was on Pramlintide/Symlin 120 TID for weight loss, NovoLog 12 TID, Levemir 30 qHS. No longer on any meds s/p gastric sleeve .    Has Tati. BG      Diet: cut out bread, rice, potatoes. Eats protein, veggies.  Is following bariatric low carb lean protein diet.  Drinks protein shakes.  Decreased appetite since bariatric sx.    Exercise: Started PT. Will have her second knee sx.     Normal microalbumin creatinine ratio.  No ACE/ARB d/t angioedema.   Retinal exams:    Foot exams:  9/2022  DM edu:  Seeing nutrition through bariatrics clinic at [facility]. Reviews BG with them.  Lab Results   Component Value Date/Time    HGBA1C 6.1 (H) 02/23/2023 10:13 AM    HGBA1C 5.8 (H)  2022 02:10 PM    HGBA1C 7.5 (H) 2022 03:10 PM    HGBA1C 7.7 (H) 2022 04:20 PM    HGBA1C 10.1 (H) 2021 11:18 AM     Obesity:    Has had difficulty getting Ozempic approved. Was on Symlin.  Following with OSH Bariatrics, nutrition at [facility]. S/p gastric sleeve  and has lost a significant amount of weight.  Diet, exercise as above.  BMI Readings from Last 3 Encounters:   23 37.82 kg/m²   23 37.84 kg/m²   23 36.76 kg/m²     HLD:  Is currently taking Lipitor 40, ASA 81 daily.  Lab Results   Component Value Date    LDLCALC 95 2023     The 10-year ASCVD risk score (Brittni LAND, et al., 2019) is: 14.2%    Values used to calculate the score:      Age: 57 years      Sex: Female      Is Non- : Yes      Diabetic: Yes      Tobacco smoker: No      Systolic Blood Pressure: 140 mmHg      Is BP treated: Yes      HDL Cholesterol: 59 mg/dL      Total Cholesterol: 173 mg/dL    Onychomycosis:  Persistent toe fungus. Podiatry rx;d Terbinafine 2022.  She is requesting refill.             Not addressed today.  Snoring:    STOP BAN.  +HTN.  +Obesity.  She saw sleep clinic, but declined PSG.  +HTN. She declined sleep study.      Non gap metabolic acidosis, CKD2:  BG was up to 400 with JIMMY to 1.6 during admission that resolved with IVF.  No hypokalemia. Urine pH 6. No ketones. No diarrhea.  Stopped metformin due to non gap metabolic acidosis.  Bicarb 15. Started NaHCO3 pills for possible RTA during admission  for hyperglycemia.  Following c Nephrology.  Risk factor reduction, working on DM control.    Vitamin D deficiency:  On ergocalciferol weekly. Has labs at [facility] c bariatric clinic.  Just borderline low. Cont ergocalciferol weekly.  Lab Results   Component Value Date    [id number] 25 (L) 10/13/2021    JBPAKJPE89FV 24 (L) 01/15/2021    [id number] 12 (L) 2019      B12 deficiency, DOM:    Borderline microcytic anemia stable.  On B12.  H/o  menorrhagia.  Now menopausal.  Stopped daily iron. s/p bariatric surgery as above.  Stable. Continue B12.  Monitor s/p bariatric sx.              Ferritin controlled.  Stopped daily iron; ok to d/c.    Lab Results   Component Value Date    IRON 53 10/13/2021    TIBC 259 10/13/2021    FERRITIN 59 10/13/2021     Lab Results   Component Value Date    QHYHLCEF89 1670 (H) 10/13/2021     Lab Results   Component Value Date    FOLATE 11.7 10/13/2021     GERD:  Mid/upper chest burning.  Sometimes a little sour regurg.  No sour taste in her throat.  Noticed it after heavy food, coffee, ice cream.  No ETOH.  Taking peppermint, GasX.  Peppermint helped.  Latest meal at 7 PM.  Sometimes lies down after eating. Tries to take Mobic sparingly.  Increased GERD after bariatric sx. Has PPI.  Discussed dietary changes, avoid recumbency after meals.   If occurring more frequently, will need to eval for H pylori.    Chronic back, b/l knee OA:    Fell off porch in 7/2019, resulting in chronic back pain.  Following with Ortho, PM&R.  No relief with Tylenol.  Has a knee sleeve.  On gabapentin, tramadol prescribed by PM&R.  She tries to avoid meds/Mobic.  Following with Ortho for bilateral knee injections with relief.   Follow-up with Ortho, PM&R for injections, Bariatrics for weight loss.  Avoid NSAIDs due to mild CKD.  Planning on 2nd arthroplasty.     Review of Systems   Constitutional:  Negative for activity change.   Respiratory:  Negative for wheezing.    Cardiovascular:  Negative for leg swelling.   Musculoskeletal:  Positive for arthralgias. Negative for gait problem.   Hematological:  Negative for adenopathy.   Psychiatric/Behavioral:  Negative for confusion.        I personally reviewed Past Medical History, Past Surgical History, Social History, and Family History.    Objective:      Vitals:    03/30/23 1030 03/30/23 1048   BP: (!) 136/90 (!) 140/86   BP Location: Right arm    Patient Position: Sitting    BP Method: Large (Manual)  "   Pulse: 79    SpO2: 98%    Weight: 106.3 kg (234 lb 5.6 oz)    Height: 5' 6" (1.676 m)         Physical Exam  Constitutional:       General: She is not in acute distress.     Appearance: She is well-developed. She is not diaphoretic.   HENT:      Head: Normocephalic and atraumatic.      Comments: hirsutism     Nose: Nose normal.      Mouth/Throat:      Pharynx: No oropharyngeal exudate.      Comments: Mallampati III  Eyes:      General: No scleral icterus.        Right eye: No discharge.         Left eye: No discharge.   Neck:      Thyroid: No thyromegaly.      Trachea: No tracheal deviation.   Cardiovascular:      Rate and Rhythm: Normal rate and regular rhythm.      Heart sounds: Normal heart sounds. No murmur heard.  Pulmonary:      Effort: Pulmonary effort is normal. No respiratory distress.      Breath sounds: Normal breath sounds. No wheezing.   Abdominal:      General: Bowel sounds are normal. There is no distension.      Palpations: Abdomen is soft.      Tenderness: There is no abdominal tenderness.   Musculoskeletal:         General: No deformity.      Cervical back: Neck supple.      Right lower leg: Edema present.      Left lower leg: Edema present.      Comments: Nonpitting BLE edema   Lymphadenopathy:      Cervical: No cervical adenopathy.   Skin:     General: Skin is warm and dry.      Findings: No erythema.   Neurological:      Mental Status: She is alert.      Gait: Gait normal.   Psychiatric:         Behavior: Behavior normal.         Lab Results   Component Value Date    WBC 8.09 03/30/2023    HGB 11.7 (L) 03/30/2023    HCT 39.5 03/30/2023     03/30/2023    CHOL 173 02/23/2023    TRIG 96 02/23/2023    HDL 59 02/23/2023    ALT 15 03/30/2023    AST 14 03/30/2023     03/30/2023    K 4.4 03/30/2023     (H) 03/30/2023    CREATININE 0.9 03/30/2023    BUN 14 03/30/2023    CO2 26 03/30/2023    TSH 1.832 10/13/2021    INR 0.9 11/29/2022    HGBA1C 6.1 (H) 02/23/2023       The 10-year " ASCVD risk score (Brittni LAND, et al., 2019) is: 14.2%    Values used to calculate the score:      Age: 57 years      Sex: Female      Is Non- : Yes      Diabetic: Yes      Tobacco smoker: No      Systolic Blood Pressure: 140 mmHg      Is BP treated: Yes      HDL Cholesterol: 59 mg/dL      Total Cholesterol: 173 mg/dL    (Imaging have been independently reviewed)  Knee x-ray with progression of DJD.    Assessment:       1. Essential hypertension    2. Type 2 diabetes mellitus without complication, without long-term current use of insulin    3. Morbid obesity with BMI of 50.0-59.9, adult    4. Mixed hyperlipidemia    5. Onychomycosis          Plan:       Yayo was seen today for obesity and hypertension.    Diagnoses and all orders for this visit:    Essential hypertension  Comments:  Elevated. Pain contributing. Cont Norvasc 10. She is resistant to adjust BP meds. Discussed Aldactone 25 d/t hirsutism. Combo pill? Msg c home BP in 1 wk.   Orders:  -     amLODIPine (NORVASC) 10 MG tablet; Take 1 tablet (10 mg total) by mouth once daily.    Type 2 diabetes mellitus without complication, without long-term current use of insulin  Comments:  A1C controlled. Off all DM meds s/p bariatric sx. A1C q6mo. Follows c OSH Nutrition. Sched eye exam.    Morbid obesity with BMI of 50.0-59.9, adult  Comments:  Stable. Following c Bariatrics, nutrition at [facility]. S/p bariatric sx. Cont dietary changes.       Mixed hyperlipidemia  Comments:  FLP controlled. Elevated ASCVD. Cont Lipitor 40,  ASA 81. Monitor FLP annually.  Orders:  -     atorvastatin (LIPITOR) 40 MG tablet; Take 1 tablet (40 mg total) by mouth once daily.    Onychomycosis  Comments:  Persistent.  Refilled terbinafine for 3 months c standing labs monthly to monitor liver function.  Avoid ETOH. Sched f/u c Podiatry.  Orders:  -     CBC Auto Differential; Standing  -     Comprehensive Metabolic Panel; Standing  -     terbinafine HCL (LAMISIL) 250 mg  tablet; Take 1 tablet (250 mg total) by mouth once daily. Avoid alcohol while on medication    Other orders  The following orders have not been finalized:  -     Cancel: spironolactone (ALDACTONE) 25 MG tablet           Side effects of medication(s) were discussed in detail and patient voiced understanding.  Patient will call back for any issues or complications.     I have spent a total of 50 minutes with the patient as well as reviewing the chart/medical record and placing orders on the day of the visit. Discussed HTN, meds, onychomycosis.      RTC in 3 month(s) or sooner PRN for DM, establish care c a new PCP.  She prefers [facility] location.

## 2023-03-30 NOTE — PATIENT INSTRUCTIONS
Tests to Keep You Healthy    Mammogram: SCHEDULED  Eye Exam: Met on 5/23/2022  Colon Cancer Screening: Met on 6/27/2017  Cervical Cancer Screening: Met on 2/25/2019  Last Blood Pressure <= 139/89 (3/30/2023): NO  Last HbA1c < 8 (02/23/2023): Yes      Ibrahima Zee,     If you are due for any health screening(s) below please notify me so we can arrange them to be ordered and scheduled to maintain your health. Most healthy patients complete it. Don't lose out on improving your health.     Tests to Keep You Healthy    Mammogram: SCHEDULED  Eye Exam: Met on 5/23/2022  Colon Cancer Screening: Met on 6/27/2017  Cervical Cancer Screening: Met on 2/25/2019  Last Blood Pressure <= 139/89 (3/30/2023): NO  Last HbA1c < 8 (02/23/2023): Yes      Breast Cancer Screening    Breast cancer is the second most common cancer in women after skin cancer, and the second leading cause of death from cancer after lung cancer. Mammograms can detect breast cancer early, which significantly increases the chances of curing the cancer.      A screening mammogram is an x-ray image of the breasts used for early breast cancer detection. It can help reduce the number of deaths from breast cancer among women. To get a clear image, the breast is placed between two plastic plates to make it flat. How often a mammogram is needed depends on your age and your breast cancer risk.    Although you are still overdue for this important screening, due to the COVID-19 pandemic, we recommend scheduling it in the near future.                  March 30, 2023       Yayo Carver  [address] [address]         Dear Yayo:    Your [facility] is dedicated to helping you stay healthy with regularly scheduled recommended screenings.  Scheduling routine screenings is important to maintaining good health. Our records indicate that you may be overdue for your screening pap smear. A pap smear screening can help identify patients at risk for developing  "cervical cancer at an early stage, when it is most likely to be successfully treated.    We encourage you to schedule your appointment with your [facility] provider or some primary care providers also perform this screening.    If you have completed or scheduled your pap smear screening outside of [facility], please notify your primary care team so we can update your health record.      If you have questions or would like to schedule your screening, please contact your primary care clinic.    Sincerely,    Berta Hastings MD and your [facility]           Patient Education       Checking Your Blood Pressure at Home   The Basics   Written by the doctors and editors at [facility]   How is blood pressure measured? -- Blood pressure is usually measured with a device that goes around your upper arm. This is often done in a doctor's office. But some people also check their blood pressure themselves, at home or at work.  Blood pressure is explained with 2 numbers. For instance, your blood pressure might be "140 over 90." The first (top) number is the pressure inside your arteries when your heart is sandra. The second (bottom) number is the pressure inside your arteries when your heart is relaxed. The table shows how doctors and nurses define high and normal blood pressure (table 1).  If your blood pressure gets too high, it puts you at risk for heart attack, stroke, and kidney disease. High blood pressure does not usually cause symptoms. But it can be serious.  What is a home blood pressure meter? -- A home blood pressure meter (or "monitor") is a device you can use to check your blood pressure yourself. It has a cuff that goes around your upper arm (figure 1). Some devices have a cuff that goes around your wrist instead. But doctors aren't sure if these work as well. The meter also has a small screen, or dial, that shows your blood pressure numbers.  There are also special meters you can wear " for a day or 2. These are different because they automatically check your blood pressure throughout the day and night, even while you are sleeping. If your doctor thinks you should use one of these devices, they will talk to you about how to wear it.  Why do I need to check my blood pressure at home? -- If your doctor knows or suspects that you have high blood pressure, they might want you to check it at home. There are a few reasons for this. Your doctor might want to look at:  Whether your blood pressure measures the same at home as it did in the doctor's office  How well your blood pressure medicines are working  Changes in your blood pressure, for example, if it goes up and down  People who check their own blood pressure at home usually do better at keeping it low.  How do I choose a home blood pressure meter? -- When choosing a home blood pressure meter, you will probably want to think about:  Cost - Some devices cost more than others. You should also check to see if your insurance will help pay for your device.  Size - It's important to make sure the cuff fits your arm comfortably. Your doctor or nurse can help you with this.  How easy it is to use - You should make sure you understand how to use the device. You also need to be able to read the numbers on the screen.  You do not need a prescription to buy a home blood pressure meter. You can buy them at most pharmacies or over the internet. Your doctor or nurse can help you choose the right device for you.  How do I check my blood pressure at home? -- Once you have a home blood pressure meter, your doctor or nurse should check it to make sure it fits you and works correctly.  When it's time to check your blood pressure:  Go to the bathroom and empty your bladder first. Having a full bladder can temporarily increase your blood pressure, making the results inaccurate.  Sit in a chair with your feet flat on the ground.  Try to breathe normally and stay  calm.  Attach the cuff to your arm. Place the cuff directly on your skin, not over your clothing. The cuff should be tight enough to not slip down, but not uncomfortably tight.  Sit and relax for about 3 to 5 minutes with the cuff on.  Follow the directions that came with your device to start measuring your blood pressure. This might involve squeezing the bulb at the end of the tube to inflate the cuff (fill it with air). With some monitors, you just need to press a button to inflate the cuff. When the cuff fills with air, it feels like someone is squeezing your arm, but it should not hurt. Then you will slowly deflate the cuff (let the air out of it), or it will deflate by itself. The screen or dial will show your blood pressure numbers.  Stay seated and relax for 1 minute, then measure your blood pressure again.  How often should I check my blood pressure? -- It depends. Different people need to follow different schedules. Your doctor or nurse will tell you how often to check your blood pressure, and when. Some people need to check their blood pressure twice a day, in the morning and evening.  Your doctor or nurse will probably tell you to keep track of your blood pressure for at least a few days (table 2). Then they will look at the numbers. The reason for this is that it's normal for your blood pressure to change a bit from day to day. For example, the numbers might change depending on whether you recently had caffeine, just exercised, or feel stressed. Checking your blood pressure over several days - or longer - will give your doctor or nurse a better idea of what is average for you.  How should I keep track of my blood pressure? -- Some blood pressure meters will record your numbers for you, or send them to your computer or smartphone. If yours does not do this, you will need to write them down. Your doctor or nurse can help you figure out the best way to keep track of the numbers.  What if my blood pressure  "is high? -- Your doctor or nurse will tell you what to do if your blood pressure is high when you check it at home. If you get a number that is higher than normal, measure it again to see if it is still high. If it is very high (above a certain number, which your doctor or nurse will tell you to watch out for), you should call your doctor right away.  If your blood pressure is only a little high, your doctor or nurse might tell you to keep checking it for a few more days or weeks, and then call if it does not go back down. Then they can help you decide what to do next.  All topics are updated as new evidence becomes available and our peer review process is complete.  This topic retrieved from [employer] on: Sep 21, 2021.  Topic 576598 Version 4.0  Release: 29.4.2 - C29.263  © 2021 UpToDate, Inc. and/or its affiliates. All rights reserved.  table 1: Definition of normal and high blood pressure  Level  Top number  Bottom number    High 130 or above 80 or above   Elevated 120 to 129 79 or below   Normal 119 or below 79 or below   These definitions are from the American College of Cardiology/American Heart Association. Other expert groups might use slightly different definitions.  "Elevated blood pressure" is a term doctor or nurses use as a warning. It means you do not yet have high blood pressure, but your blood pressure is not as low as it should be for good health.  Graphic 90787 Version 6.0  figure 1: Using a home blood pressure meter     This is an example of a person using a home blood pressure meter.  Graphic 515907 Version 1.0    table 2: 7-day diary for checking blood pressure at home  Day 1  Day 2  Day 3  Day 4  Day 5  Day 6  Day 7    Morning  1st read Morning  1st read Morning  1st read Morning  1st read Morning  1st read Morning  1st read Morning  1st read   Systolic: __________ Systolic: __________ Systolic: __________ Systolic: __________ Systolic: __________ Systolic: __________ Systolic: __________ "   Diastolic: __________ Diastolic: __________ Diastolic: __________ Diastolic: __________ Diastolic: __________ Diastolic: __________ Diastolic: __________   Pulse: __________ Pulse: __________ Pulse: __________ Pulse: __________ Pulse: __________ Pulse: __________ Pulse: __________   Morning  2nd read Morning  2nd read Morning  2nd read Morning  2nd read Morning  2nd read Morning  2nd read Morning  2nd read   Systolic: __________ Systolic: __________ Systolic: __________ Systolic: __________ Systolic: __________ Systolic: __________ Systolic: __________   Diastolic: __________ Diastolic: __________ Diastolic: __________ Diastolic: __________ Diastolic: __________ Diastolic: __________ Diastolic: __________   Pulse: __________ Pulse: __________ Pulse: __________ Pulse: __________ Pulse: __________ Pulse: __________ Pulse: __________   Evening  1st read Evening  1st read Evening  1st read Evening  1st read Evening  1st read Evening  1st read Evening  1st read   Systolic: __________ Systolic: __________ Systolic: __________ Systolic: __________ Systolic: __________ Systolic: __________ Systolic: __________   Diastolic: __________ Diastolic: __________ Diastolic: __________ Diastolic: __________ Diastolic: __________ Diastolic: __________ Diastolic: __________   Pulse: __________ Pulse: __________ Pulse: __________ Pulse: __________ Pulse: __________ Pulse: __________ Pulse: __________   Evening  2nd read Evening  2nd read Evening  2nd read Evening  2nd read Evening  2nd read Evening  2nd read Evening  2nd read   Systolic: __________ Systolic: __________ Systolic: __________ Systolic: __________ Systolic: __________ Systolic: __________ Systolic: __________   Diastolic: __________ Diastolic: __________ Diastolic: __________ Diastolic: __________ Diastolic: __________ Diastolic: __________ Diastolic: __________   Pulse: __________ Pulse: __________ Pulse: __________ Pulse: __________ Pulse: __________ Pulse: __________  Pulse: __________   Notes    Notes    Notes    Notes    Notes    Notes    Notes      ____________________ ____________________ ____________________ ____________________ ____________________ ____________________ ____________________   ____________________ ____________________ ____________________ ____________________ ____________________ ____________________ ____________________   ____________________ ____________________ ____________________ ____________________ ____________________ ____________________ ____________________   Patient name: ______________________________     Patient ID: ________________________________    Primary care provider: _______________________    Average BP: _______________________________    Graphic 330273 Version 1.0  Consumer Information Use and Disclaimer   This information is not specific medical advice and does not replace information you receive from your health care provider. This is only a brief summary of general information. It does NOT include all information about conditions, illnesses, injuries, tests, procedures, treatments, therapies, discharge instructions or life-style choices that may apply to you. You must talk with your health care provider for complete information about your health and treatment options. This information should not be used to decide whether or not to accept your health care provider's advice, instructions or recommendations. Only your health care provider has the knowledge and training to provide advice that is right for you. The use of this information is governed by the Mobile Messenger End User License Agreement, available at [email or web address]://[email or web address]/solutions/Collision Hub/about/margarita.The use of [employer] content is governed by the [employer] Terms of Use. ©2021 UpToDate, Inc. All rights reserved.  Copyright   © 2021 UpToDate, Inc. and/or its affiliates. All rights reserved.

## 2023-04-03 ENCOUNTER — PATIENT MESSAGE (OUTPATIENT)
Dept: ADMINISTRATIVE | Facility: OTHER | Age: 58
End: 2023-04-03
Payer: MEDICAID

## 2023-04-05 ENCOUNTER — TELEPHONE (OUTPATIENT)
Dept: ORTHOPEDICS | Facility: CLINIC | Age: 58
End: 2023-04-05
Payer: MEDICAID

## 2023-04-05 ENCOUNTER — OFFICE VISIT (OUTPATIENT)
Dept: PODIATRY | Facility: CLINIC | Age: 58
End: 2023-04-05
Payer: MEDICAID

## 2023-04-05 VITALS
HEART RATE: 71 BPM | SYSTOLIC BLOOD PRESSURE: 145 MMHG | HEIGHT: 66 IN | BODY MASS INDEX: 38.79 KG/M2 | DIASTOLIC BLOOD PRESSURE: 81 MMHG | WEIGHT: 241.38 LBS

## 2023-04-05 DIAGNOSIS — G89.29 CHRONIC PAIN OF BOTH KNEES: ICD-10-CM

## 2023-04-05 DIAGNOSIS — M21.611 BUNION, RIGHT: Primary | ICD-10-CM

## 2023-04-05 DIAGNOSIS — M17.0 PRIMARY OSTEOARTHRITIS OF BOTH KNEES: ICD-10-CM

## 2023-04-05 DIAGNOSIS — M25.561 CHRONIC PAIN OF BOTH KNEES: ICD-10-CM

## 2023-04-05 DIAGNOSIS — E11.9 DIABETES MELLITUS WITHOUT COMPLICATION: ICD-10-CM

## 2023-04-05 DIAGNOSIS — M79.671 FOOT PAIN, RIGHT: ICD-10-CM

## 2023-04-05 DIAGNOSIS — Z96.652 STATUS POST LEFT KNEE REPLACEMENT: ICD-10-CM

## 2023-04-05 DIAGNOSIS — M25.562 CHRONIC PAIN OF BOTH KNEES: ICD-10-CM

## 2023-04-05 PROCEDURE — 1160F PR REVIEW ALL MEDS BY PRESCRIBER/CLIN PHARMACIST DOCUMENTED: ICD-10-PCS | Mod: CPTII,,, | Performed by: PODIATRIST

## 2023-04-05 PROCEDURE — 3077F PR MOST RECENT SYSTOLIC BLOOD PRESSURE >= 140 MM HG: ICD-10-PCS | Mod: CPTII,,, | Performed by: PODIATRIST

## 2023-04-05 PROCEDURE — 1160F RVW MEDS BY RX/DR IN RCRD: CPT | Mod: CPTII,,, | Performed by: PODIATRIST

## 2023-04-05 PROCEDURE — 3008F BODY MASS INDEX DOCD: CPT | Mod: CPTII,,, | Performed by: PODIATRIST

## 2023-04-05 PROCEDURE — 99999 PR PBB SHADOW E&M-EST. PATIENT-LVL V: CPT | Mod: PBBFAC,,, | Performed by: PODIATRIST

## 2023-04-05 PROCEDURE — 99213 PR OFFICE/OUTPT VISIT, EST, LEVL III, 20-29 MIN: ICD-10-PCS | Mod: S$PBB,,, | Performed by: PODIATRIST

## 2023-04-05 PROCEDURE — 99999 PR PBB SHADOW E&M-EST. PATIENT-LVL V: ICD-10-PCS | Mod: PBBFAC,,, | Performed by: PODIATRIST

## 2023-04-05 PROCEDURE — 99215 OFFICE O/P EST HI 40 MIN: CPT | Mod: PBBFAC | Performed by: PODIATRIST

## 2023-04-05 PROCEDURE — 1159F PR MEDICATION LIST DOCUMENTED IN MEDICAL RECORD: ICD-10-PCS | Mod: CPTII,,, | Performed by: PODIATRIST

## 2023-04-05 PROCEDURE — 3079F DIAST BP 80-89 MM HG: CPT | Mod: CPTII,,, | Performed by: PODIATRIST

## 2023-04-05 PROCEDURE — 3008F PR BODY MASS INDEX (BMI) DOCUMENTED: ICD-10-PCS | Mod: CPTII,,, | Performed by: PODIATRIST

## 2023-04-05 PROCEDURE — 1159F MED LIST DOCD IN RCRD: CPT | Mod: CPTII,,, | Performed by: PODIATRIST

## 2023-04-05 PROCEDURE — 99213 OFFICE O/P EST LOW 20 MIN: CPT | Mod: S$PBB,,, | Performed by: PODIATRIST

## 2023-04-05 PROCEDURE — 3079F PR MOST RECENT DIASTOLIC BLOOD PRESSURE 80-89 MM HG: ICD-10-PCS | Mod: CPTII,,, | Performed by: PODIATRIST

## 2023-04-05 PROCEDURE — 3077F SYST BP >= 140 MM HG: CPT | Mod: CPTII,,, | Performed by: PODIATRIST

## 2023-04-05 RX ORDER — OXYCODONE HYDROCHLORIDE 5 MG/1
TABLET ORAL
Qty: 28 TABLET | Refills: 0 | Status: SHIPPED | OUTPATIENT
Start: 2023-04-05 | End: 2023-04-20 | Stop reason: SDUPTHER

## 2023-04-05 RX ORDER — OMEPRAZOLE 40 MG/1
40 CAPSULE, DELAYED RELEASE ORAL
COMMUNITY
Start: 2023-03-09 | End: 2023-04-18

## 2023-04-05 NOTE — TELEPHONE ENCOUNTER
----- Message from More Hughes sent at 4/5/2023  1:35 PM CDT -----  Regarding: Medication Similarity  Contact: @[phone]  John from [facility] is calling about pain medications oxyCODONE (ROXICODONE) 5 MG immediate release tablet prescribed by Dr. Simons and the traMADoL (ULTRAM) 50 mg tablet prescribed by Dr. Lawson    Question of whether patient should be taking both or have one removed.      [facility][id number] [address] [facility]  [address] [address]  Phone: [phone] Fax: [phone]    Please call and advise, thanks.

## 2023-04-05 NOTE — PROGRESS NOTES
"Subjective:     Patient ID: Yayo Carver is a 57 y.o. female.    Chief Complaint: Foot Problem (Right foot bunion)    Yayo is a 57 y.o. female who presents to the podiatry clinic  with complaint of  right foot pain. Onset of the symptoms was several years ago. Precipitating event: none known. Current symptoms include: ability to bear weight, but with some pain, stiffness, worsening symptoms after a period of activity, and painful bump on the big toe joint . Aggravating factors: standing, walking, and shoes . Symptoms have progressed to a point and plateaued. Patient has had no prior foot problems. Evaluation to date: none. Treatment to date: rest and shoe changes. Avoids tight shoes and only wears wide soft tennis shoe type shoes. Had Left knee replacemetn 4 months ago and states the R foot bunion pain has worsened since then. L foot has small bunion that does not bother her at all. Patients rates pain 2/10 on pain scale.    Vitals:    04/05/23 0951   BP: (!) 145/81   Pulse: 71   Weight: 109.5 kg (241 lb 6.5 oz)   Height: 5' 6" (1.676 m)   PainSc:   2   PainLoc: Foot       Review of Systems   Constitutional: Negative for chills and fever.   Cardiovascular:  Negative for chest pain, claudication and leg swelling.   Respiratory:  Negative for cough and shortness of breath.    Skin:  Negative for dry skin and nail changes.   Musculoskeletal:         R foot bunion, R foot pain   Gastrointestinal:  Negative for nausea and vomiting.   Neurological:  Negative for numbness and paresthesias.   Psychiatric/Behavioral:  Negative for altered mental status.       Objective:     Physical Exam  Vitals reviewed.   Constitutional:       Appearance: She is well-developed.   HENT:      Head: Normocephalic.   Cardiovascular:      Pulses:           Dorsalis pedis pulses are 2+ on the right side and 2+ on the left side.        Posterior tibial pulses are 2+ on the right side and 2+ on the left side.      Comments: DP and PT pulses are " 2/4 bilaterally.        Pulmonary:      Effort: No respiratory distress.   Musculoskeletal:      Right lower leg: No edema.      Left lower leg: No edema.      Comments: Semi-reducible hammertoe contractures noted to toes 2-4 b/l-asymptomatic. HAV, mild L, moderate R, non trackbound noted b/l with mild L and moderate R dorsal and dorsal medial bony prominence at 1st met head. L foot asymptomatic. R foot with pain on palpation of dorsal medial 1st met head and with ROM/reduction of bunion deformity.     Hypermobility noted to 1st ray b/l with near complete collapse of medial longitudinal arch b/l with loading.     Equinus contracture noted to b/l ankles with knee straight and slightly improved with knee bent.      Skin:     General: Skin is warm and dry.      Findings: No erythema.      Comments: No open lesions, lacerations or wounds noted. Nails are normal to R 1-5 and L 1-5. Interdigital spaces clean, dry and intact b/l. No erythema noted to b/l foot. Skin texture normal. Pedal hair normal     Neurological:      Mental Status: She is alert and oriented to person, place, and time.      Sensory: No sensory deficit.      Comments: Light touch, proprioception, and sharp/dull sensation are all intact bilaterally.    Psychiatric:         Behavior: Behavior normal.         Thought Content: Thought content normal.         Judgment: Judgment normal.       Assessment:      Encounter Diagnoses   Name Primary?    Diabetes mellitus without complication     Bunion, right Yes    Foot pain, right      Plan:     Yayo was seen today for foot problem.    Diagnoses and all orders for this visit:    Bunion, right  -     X-Ray Foot Complete Right; Future    Diabetes mellitus without complication  -     Ambulatory referral/consult to Podiatry  -     X-Ray Foot Complete Right; Future    Foot pain, right  -     X-Ray Foot Complete Right; Future      I counseled the patient on her conditions, their implications and medical  management.    Long discussion with patient regarding appropriate, supportive and comfortable shoes. Recommended supportive style shoe brands with adequate arch supports to alleviate abnormal pressure and improve stability of foot while walking. Avoid flat shoes and barefoot walking as these will exacerbate or worsen symptoms. Wide soft toe box recommended.     Recommended Spenco Total Arch Supports from [employer] to be worn in supportive shoes daily. Advised to remove original insole from shoe and replace with these.    Rx Voltaren gel to be applied to affected area up to 3-4 x daily as needed for pain.     Xray ordered R foot to assess for any obvious bony deformity, injury and to assess bunion deformity.     Briefly discussed surgical intervention for MIS bunionectomy vs Lapiplasty R foot. Discussed perioperative expectations and post op protocol. Recommended to exhaust all conservative care options prior to surgery. Patient will try above treatment plan and follow up as scheduled for further discussion.

## 2023-04-05 NOTE — TELEPHONE ENCOUNTER
----- Message from Hoa Stapleton MA sent at 4/5/2023  2:03 PM CDT -----  Regarding: FW: Medication Similarity  Contact: @[phone]    ----- Message -----  From: More Hughes  Sent: 4/5/2023   1:40 PM CDT  To: Ronak Christianson Staff, #  Subject: Medication Similarity                            John from [facility] is calling about pain medications oxyCODONE (ROXICODONE) 5 MG immediate release tablet prescribed by Dr. Simons and the traMADoL (ULTRAM) 50 mg tablet prescribed by Dr. Lawson    Question of whether patient should be taking both or have one removed.      [facility] #[id number] [address] [facility]  [address] [place] [id number]  Phone: [phone] Fax: [phone]    Please call and advise, thanks.

## 2023-04-05 NOTE — TELEPHONE ENCOUNTER
Returned call to Suzette and left a message letting them know that this was the final oxy refill following knee replacement surgery and that she will continue the tramadol from chronic pain management until her other knee replacement in June.

## 2023-04-06 RX ORDER — TRAMADOL HYDROCHLORIDE 50 MG/1
50-100 TABLET ORAL 3 TIMES DAILY PRN
Qty: 180 TABLET | Refills: 3 | Status: SHIPPED | OUTPATIENT
Start: 2023-04-16 | End: 2023-04-12

## 2023-04-11 ENCOUNTER — TELEPHONE (OUTPATIENT)
Dept: PODIATRY | Facility: CLINIC | Age: 58
End: 2023-04-11
Payer: MEDICAID

## 2023-04-11 NOTE — TELEPHONE ENCOUNTER
Spoke with patient requesting to reschedule her MRI, stated she missed her original appt. On 04/05/2023, an appt. has been rescheduled to 04/12/2023 at [facility]

## 2023-04-12 ENCOUNTER — OFFICE VISIT (OUTPATIENT)
Dept: PHYSICAL MEDICINE AND REHAB | Facility: CLINIC | Age: 58
End: 2023-04-12
Payer: MEDICAID

## 2023-04-12 VITALS
HEART RATE: 99 BPM | DIASTOLIC BLOOD PRESSURE: 100 MMHG | HEIGHT: 66 IN | SYSTOLIC BLOOD PRESSURE: 170 MMHG | WEIGHT: 236.88 LBS | BODY MASS INDEX: 38.07 KG/M2

## 2023-04-12 DIAGNOSIS — M47.816 SPONDYLOSIS OF LUMBAR REGION WITHOUT MYELOPATHY OR RADICULOPATHY: ICD-10-CM

## 2023-04-12 DIAGNOSIS — M43.16 SPONDYLOLISTHESIS OF LUMBAR REGION: ICD-10-CM

## 2023-04-12 DIAGNOSIS — M17.11 PRIMARY OSTEOARTHRITIS OF RIGHT KNEE: Primary | ICD-10-CM

## 2023-04-12 DIAGNOSIS — M54.50 CHRONIC BILATERAL LOW BACK PAIN WITHOUT SCIATICA: ICD-10-CM

## 2023-04-12 DIAGNOSIS — E66.9 OBESITY (BMI 30-39.9): ICD-10-CM

## 2023-04-12 DIAGNOSIS — G89.29 CHRONIC BILATERAL LOW BACK PAIN WITHOUT SCIATICA: ICD-10-CM

## 2023-04-12 DIAGNOSIS — Z96.652 STATUS POST TOTAL LEFT KNEE REPLACEMENT: ICD-10-CM

## 2023-04-12 DIAGNOSIS — M47.816 LUMBAR FACET ARTHROPATHY: ICD-10-CM

## 2023-04-12 PROCEDURE — 1159F MED LIST DOCD IN RCRD: CPT | Mod: CPTII,,, | Performed by: PHYSICAL MEDICINE & REHABILITATION

## 2023-04-12 PROCEDURE — 3077F SYST BP >= 140 MM HG: CPT | Mod: CPTII,,, | Performed by: PHYSICAL MEDICINE & REHABILITATION

## 2023-04-12 PROCEDURE — 99213 OFFICE O/P EST LOW 20 MIN: CPT | Mod: PBBFAC | Performed by: PHYSICAL MEDICINE & REHABILITATION

## 2023-04-12 PROCEDURE — 3008F PR BODY MASS INDEX (BMI) DOCUMENTED: ICD-10-PCS | Mod: CPTII,,, | Performed by: PHYSICAL MEDICINE & REHABILITATION

## 2023-04-12 PROCEDURE — 99214 PR OFFICE/OUTPT VISIT, EST, LEVL IV, 30-39 MIN: ICD-10-PCS | Mod: S$PBB,,, | Performed by: PHYSICAL MEDICINE & REHABILITATION

## 2023-04-12 PROCEDURE — 3080F PR MOST RECENT DIASTOLIC BLOOD PRESSURE >= 90 MM HG: ICD-10-PCS | Mod: CPTII,,, | Performed by: PHYSICAL MEDICINE & REHABILITATION

## 2023-04-12 PROCEDURE — 1159F PR MEDICATION LIST DOCUMENTED IN MEDICAL RECORD: ICD-10-PCS | Mod: CPTII,,, | Performed by: PHYSICAL MEDICINE & REHABILITATION

## 2023-04-12 PROCEDURE — 3077F PR MOST RECENT SYSTOLIC BLOOD PRESSURE >= 140 MM HG: ICD-10-PCS | Mod: CPTII,,, | Performed by: PHYSICAL MEDICINE & REHABILITATION

## 2023-04-12 PROCEDURE — 3080F DIAST BP >= 90 MM HG: CPT | Mod: CPTII,,, | Performed by: PHYSICAL MEDICINE & REHABILITATION

## 2023-04-12 PROCEDURE — 3008F BODY MASS INDEX DOCD: CPT | Mod: CPTII,,, | Performed by: PHYSICAL MEDICINE & REHABILITATION

## 2023-04-12 PROCEDURE — 99999 PR PBB SHADOW E&M-EST. PATIENT-LVL III: CPT | Mod: PBBFAC,,, | Performed by: PHYSICAL MEDICINE & REHABILITATION

## 2023-04-12 PROCEDURE — 99214 OFFICE O/P EST MOD 30 MIN: CPT | Mod: S$PBB,,, | Performed by: PHYSICAL MEDICINE & REHABILITATION

## 2023-04-12 PROCEDURE — 99999 PR PBB SHADOW E&M-EST. PATIENT-LVL III: ICD-10-PCS | Mod: PBBFAC,,, | Performed by: PHYSICAL MEDICINE & REHABILITATION

## 2023-04-12 RX ORDER — TRAMADOL HYDROCHLORIDE 50 MG/1
50 TABLET ORAL 3 TIMES DAILY PRN
Qty: 180 TABLET | Refills: 3 | Status: ON HOLD
Start: 2023-04-16 | End: 2023-06-26 | Stop reason: HOSPADM

## 2023-04-12 RX ORDER — CELECOXIB 200 MG/1
200 CAPSULE ORAL DAILY
Qty: 60 CAPSULE | Refills: 1 | Status: SHIPPED | OUTPATIENT
Start: 2023-04-12 | End: 2023-06-11

## 2023-04-12 RX ORDER — DICLOFENAC SODIUM 10 MG/G
2 GEL TOPICAL 4 TIMES DAILY PRN
Qty: 300 G | Refills: 3 | Status: SHIPPED | OUTPATIENT
Start: 2023-04-12 | End: 2023-08-28 | Stop reason: SDUPTHER
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
## 2023-04-20 DIAGNOSIS — Z96.652 STATUS POST LEFT KNEE REPLACEMENT: ICD-10-CM

## 2023-04-20 RX ORDER — OXYCODONE HYDROCHLORIDE 5 MG/1
TABLET ORAL
Qty: 28 TABLET | Refills: 0 | Status: SHIPPED | OUTPATIENT
Start: 2023-04-20 | End: 2023-06-06

## 2023-04-26 ENCOUNTER — TELEPHONE (OUTPATIENT)
Dept: ORTHOPEDICS | Facility: CLINIC | Age: 58
End: 2023-04-26
Payer: MEDICAID

## 2023-04-26 NOTE — TELEPHONE ENCOUNTER
I called and spoke to the patient regarding her appointment on 5/5. Per Dr. Padgett being in the OR on this day, the patient is now scheduled on 5/16/23.    The patient verbalized understanding and has no further questions.

## 2023-04-27 ENCOUNTER — TELEPHONE (OUTPATIENT)
Dept: PODIATRY | Facility: CLINIC | Age: 58
End: 2023-04-27
Payer: MEDICAID

## 2023-04-27 NOTE — TELEPHONE ENCOUNTER
Left vm message informing patient an x-ray order has been put in per: Dr. Muniz(Podiatry) and can go over to our imaging center at anytime before scheduled appointment on 05/09/2023 at 8:45am

## 2023-05-08 ENCOUNTER — HOSPITAL ENCOUNTER (OUTPATIENT)
Dept: RADIOLOGY | Facility: OTHER | Age: 58
Discharge: HOME | End: 2023-05-08
Attending: PODIATRIST
Payer: MEDICAID

## 2023-05-08 DIAGNOSIS — M79.671 FOOT PAIN, RIGHT: ICD-10-CM

## 2023-05-08 DIAGNOSIS — M21.611 BUNION, RIGHT: ICD-10-CM

## 2023-05-08 DIAGNOSIS — E11.9 DIABETES MELLITUS WITHOUT COMPLICATION: ICD-10-CM

## 2023-05-08 PROCEDURE — 73630 X-RAY EXAM OF FOOT: CPT | Mod: TC,FY,RT

## 2023-05-08 PROCEDURE — 73630 XR FOOT COMPLETE 3 VIEW RIGHT: ICD-10-PCS | Mod: 26,RT,, | Performed by: RADIOLOGY

## 2023-05-08 PROCEDURE — 73630 X-RAY EXAM OF FOOT: CPT | Mod: 26,RT,, | Performed by: RADIOLOGY

## 2023-05-09 ENCOUNTER — OFFICE VISIT (OUTPATIENT)
Dept: PODIATRY | Facility: CLINIC | Age: 58
End: 2023-05-09
Payer: MEDICAID

## 2023-05-09 VITALS
DIASTOLIC BLOOD PRESSURE: 88 MMHG | BODY MASS INDEX: 39.12 KG/M2 | HEIGHT: 66 IN | WEIGHT: 243.38 LBS | SYSTOLIC BLOOD PRESSURE: 142 MMHG | HEART RATE: 89 BPM

## 2023-05-09 DIAGNOSIS — M20.41 HAMMERTOE OF RIGHT FOOT: ICD-10-CM

## 2023-05-09 DIAGNOSIS — M21.611 BUNION, RIGHT: Primary | ICD-10-CM

## 2023-05-09 DIAGNOSIS — M79.671 FOOT PAIN, RIGHT: ICD-10-CM

## 2023-05-09 PROCEDURE — 1159F MED LIST DOCD IN RCRD: CPT | Mod: CPTII,,, | Performed by: PODIATRIST

## 2023-05-09 PROCEDURE — 3008F PR BODY MASS INDEX (BMI) DOCUMENTED: ICD-10-PCS | Mod: CPTII,,, | Performed by: PODIATRIST

## 2023-05-09 PROCEDURE — 3077F SYST BP >= 140 MM HG: CPT | Mod: CPTII,,, | Performed by: PODIATRIST

## 2023-05-09 PROCEDURE — 1160F PR REVIEW ALL MEDS BY PRESCRIBER/CLIN PHARMACIST DOCUMENTED: ICD-10-PCS | Mod: CPTII,,, | Performed by: PODIATRIST

## 2023-05-09 PROCEDURE — 99213 OFFICE O/P EST LOW 20 MIN: CPT | Mod: S$PBB,,, | Performed by: PODIATRIST

## 2023-05-09 PROCEDURE — 3079F DIAST BP 80-89 MM HG: CPT | Mod: CPTII,,, | Performed by: PODIATRIST

## 2023-05-09 PROCEDURE — 99213 PR OFFICE/OUTPT VISIT, EST, LEVL III, 20-29 MIN: ICD-10-PCS | Mod: S$PBB,,, | Performed by: PODIATRIST

## 2023-05-09 PROCEDURE — 99999 PR PBB SHADOW E&M-EST. PATIENT-LVL III: ICD-10-PCS | Mod: PBBFAC,,, | Performed by: PODIATRIST

## 2023-05-09 PROCEDURE — 1160F RVW MEDS BY RX/DR IN RCRD: CPT | Mod: CPTII,,, | Performed by: PODIATRIST

## 2023-05-09 PROCEDURE — 99999 PR PBB SHADOW E&M-EST. PATIENT-LVL III: CPT | Mod: PBBFAC,,, | Performed by: PODIATRIST

## 2023-05-09 PROCEDURE — 3079F PR MOST RECENT DIASTOLIC BLOOD PRESSURE 80-89 MM HG: ICD-10-PCS | Mod: CPTII,,, | Performed by: PODIATRIST

## 2023-05-09 PROCEDURE — 99213 OFFICE O/P EST LOW 20 MIN: CPT | Mod: PBBFAC | Performed by: PODIATRIST

## 2023-05-09 PROCEDURE — 3008F BODY MASS INDEX DOCD: CPT | Mod: CPTII,,, | Performed by: PODIATRIST

## 2023-05-09 PROCEDURE — 1159F PR MEDICATION LIST DOCUMENTED IN MEDICAL RECORD: ICD-10-PCS | Mod: CPTII,,, | Performed by: PODIATRIST

## 2023-05-09 PROCEDURE — 3077F PR MOST RECENT SYSTOLIC BLOOD PRESSURE >= 140 MM HG: ICD-10-PCS | Mod: CPTII,,, | Performed by: PODIATRIST

## 2023-05-09 RX ORDER — OMEPRAZOLE 40 MG/1
40 CAPSULE, DELAYED RELEASE ORAL
COMMUNITY
Start: 2023-02-23 | End: 2023-11-20

## 2023-05-09 RX ORDER — LIDOCAINE AND PRILOCAINE 25; 25 MG/G; MG/G
CREAM TOPICAL
COMMUNITY
Start: 2023-01-31 | End: 2023-09-08 | Stop reason: SDUPTHER

## 2023-05-09 NOTE — PROGRESS NOTES
"Subjective:     Patient ID: Yayo Carver is a 57 y.o. female.    Chief Complaint: Bunions (Right)    Yayo is a 57 y.o. female who presents to the podiatry clinic  with complaint of  right foot pain. Onset of the symptoms was several years ago. Precipitating event: none known. Current symptoms include: ability to bear weight, but with some pain, stiffness, worsening symptoms after a period of activity, and painful bump on the big toe joint . Aggravating factors: standing, walking, and shoes . Symptoms have progressed to a point and plateaued. Patient has had no prior foot problems. Evaluation to date: none. Treatment to date: rest and shoe changes. Avoids tight shoes and only wears wide soft tennis shoe type shoes. Had Left knee replacemetn 4 months ago and states the R foot bunion pain has worsened since then. L foot has small bunion that does not bother her at all. Patients rates pain 2/10 on pain scale.    05/09/2023: follow up for right foot bunion. Still bothering her. Wider, softer shoes have helped. Having R knee replacement on June 25th and would like to have bunion surgery after she recovers from this, likely around September. No other complaints. Here for further recommendations.        Vitals:    05/09/23 0905   BP: (!) 142/88   Pulse: 89   Weight: 110.4 kg (243 lb 6.2 oz)   Height: 5' 6" (1.676 m)   PainSc:   5   PainLoc: Foot       Review of Systems   Constitutional: Negative for chills and fever.   Cardiovascular:  Negative for chest pain, claudication and leg swelling.   Respiratory:  Negative for cough and shortness of breath.    Skin:  Negative for dry skin and nail changes.   Musculoskeletal:         R foot bunion, R foot pain   Gastrointestinal:  Negative for nausea and vomiting.   Neurological:  Negative for numbness and paresthesias.   Psychiatric/Behavioral:  Negative for altered mental status.       Objective:     Physical Exam  Vitals reviewed.   Constitutional:       Appearance: She is " well-developed.   HENT:      Head: Normocephalic.   Cardiovascular:      Pulses:           Dorsalis pedis pulses are 2+ on the right side and 2+ on the left side.        Posterior tibial pulses are 2+ on the right side and 2+ on the left side.      Comments: DP and PT pulses are 2/4 bilaterally.        Pulmonary:      Effort: No respiratory distress.   Musculoskeletal:      Right lower leg: No edema.      Left lower leg: No edema.      Comments: Semi-reducible hammertoe contractures noted to toes 2-4 b/l-asymptomatic. HAV, mild L, moderate R, non trackbound noted b/l with mild L and moderate R dorsal and dorsal medial bony prominence at 1st met head. L foot asymptomatic. R foot with pain on palpation of dorsal medial 1st met head and with ROM/reduction of bunion deformity.     Hypermobility noted to 1st ray b/l with near complete collapse of medial longitudinal arch b/l with loading.     Equinus contracture noted to b/l ankles with knee straight and slightly improved with knee bent.      Skin:     General: Skin is warm and dry.      Findings: No erythema.      Comments: No open lesions, lacerations or wounds noted. Nails are normal to R 1-5 and L 1-5. Interdigital spaces clean, dry and intact b/l. No erythema noted to b/l foot. Skin texture normal. Pedal hair normal     Neurological:      Mental Status: She is alert and oriented to person, place, and time.      Sensory: No sensory deficit.      Comments: Light touch, proprioception, and sharp/dull sensation are all intact bilaterally.    Psychiatric:         Behavior: Behavior normal.         Thought Content: Thought content normal.         Judgment: Judgment normal.       Assessment:      Encounter Diagnoses   Name Primary?    Bunion, right Yes    Foot pain, right     Hammertoe of right foot        Plan:     Yayo was seen today for bunions.    Diagnoses and all orders for this visit:    Bunion, right    Foot pain, right    Hammertoe of right foot        I  counseled the patient on her conditions, their implications and medical management.    Long discussion with patient regarding appropriate, supportive and comfortable shoes. Recommended supportive style shoe brands with adequate arch supports to alleviate abnormal pressure and improve stability of foot while walking. Avoid flat shoes and barefoot walking as these will exacerbate or worsen symptoms. Wide soft toe box recommended.     Recommended Spenco Total Arch Supports from [employer] to be worn in supportive shoes daily. Advised to remove original insole from shoe and replace with these. Continue.     Rx Voltaren gel to be applied to affected area up to 3-4 x daily as needed for pain. Continue.     Xray reviewed noting moderate to severe bunion and hammertoe deformities.     Once again briefly discussed surgical intervention for Lapiplasty and hammertoe repair R foot. Discussed perioperative expectations and post op protocol. Recommended to exhaust all conservative care options prior to surgery. Patient will try above treatment plan and follow up as scheduled for further discussion.      Silicone bunion protector applied to R foot. Advised on its use and to purchase more on amaz[employer].     RTC 3 months, sooner PRN

## 2023-05-12 ENCOUNTER — CLINICAL SUPPORT (OUTPATIENT)
Dept: REHABILITATION | Facility: HOSPITAL | Age: 58
End: 2023-05-12
Attending: ORTHOPAEDIC SURGERY
Payer: MEDICAID

## 2023-05-12 DIAGNOSIS — R26.89 IMPAIRED GAIT AND MOBILITY: ICD-10-CM

## 2023-05-12 DIAGNOSIS — R29.898 DECREASED STRENGTH OF LOWER EXTREMITY: Primary | ICD-10-CM

## 2023-05-12 DIAGNOSIS — M25.661 DECREASED RANGE OF MOTION (ROM) OF RIGHT KNEE: ICD-10-CM

## 2023-05-12 PROCEDURE — 97535 SELF CARE MNGMENT TRAINING: CPT

## 2023-05-12 PROCEDURE — 97161 PT EVAL LOW COMPLEX 20 MIN: CPT

## 2023-05-12 NOTE — PLAN OF CARE
[facility]  Physical Therapy Initial Evaluation    Name: Yayo Carver  Clinic Number: [id number]    Therapy Diagnosis:   Encounter Diagnoses   Name Primary?    Decreased range of motion (ROM) of right knee     Decreased strength of lower extremity Yes    Impaired gait and mobility        Physician: Jc Padgett III, *    Physician Orders: PT Eval and Treat   Medical Diagnosis from Referral: M17.11 (ICD-10-CM) - Primary osteoarthritis of right knee  Evaluation Date: 2023  Authorization Period Expiration: 2023  Plan of Care Expiration: 2023  Visit # / Visits authorized:   FOTO: 1/10    Time In: 10:00 am  Time Out: 10:30 am  Total Billable Time: 30 minutes    Precautions: Standard, Standard    Subjective     Date of surgery: 2023    History of current condition - Elisa reports: she will have a right total knee arthroplasty on 2023. Patient reports she fell in February after tripping over something and fell directly on her right knee. Patient reports her left knee is doing great.      Past Medical History:   Diagnosis Date    Arthritis     Diabetes mellitus     Diabetes mellitus, type 2     GERD (gastroesophageal reflux disease)     HLD (hyperlipidemia)     Hypertension      Yayo Carver  has a past surgical history that includes Tubal ligation; Tonsillectomy; Colonoscopy (N/A, 2017);  section (1989); Bariatric Surgery (2022); Knee arthroscopy (Right, ); and Total knee arthroplasty (Left, 2022).    Yayo has a current medication list which includes the following prescription(s): acetaminophen, amlodipine, aspirin, atorvastatin, b complex vitamins, celecoxib, diclofenac sodium, docusate sodium, ergocalciferol, freestyle zachery 14 day reader, freestyle zachery 14 day sensor, lidocaine-prilocaine, methocarbamol, boost glucose control, omeprazole, oxycodone, pantoprazole, sodium bicarbonate, terbinafine hcl, and tramadol.    Review of  "patient's allergies indicates:   Allergen Reactions    Ace inhibitors Edema and Swelling        Imaging, please see imaging    Prior Therapy: yes  Social History:  lives alone  Occupation:    Prior Level of Function: modifies activities   Current Level of Function: pain with walking, modifies ADL    Pain:  Current 5/10, worst 10/10, best 0/10   Location: right knee   Description: Aching and stiff  Aggravating Factors: Sitting, Standing, Walking, and Getting out of bed/chair  Easing Factors: rest    Patient's goals: prepare for TKA    Objective     Posture: rounded shoulders and forward head  Palpation: tender to palpation of medial joint line of right tibiofemoral joint; palpable crepitus of right patella with AROM    Active range of motion:  Right knee: 2-105  Left knee: 5-0-120    Passive range of motion:  Right knee: 0-107  Left knee: 5-0-120      Lower extremity manual muscle test Right Left Pain/dysfunction with movement   Hip flexion 3/5 3/5    Hip extension 3+/5 3+/5    Hip abduction 3/5 3/5    Knee flexion 3-/5 4/5 Right knee pain   Knee extension 3+/5 4/5      Timed Up and Go:  12 seconds without AD    30" Chair Stand: 5x without upper extremity support    Gait Analysis: Modified independent with rolling walker: waddle gait with decreased WB through RLE; slight genu recurvatum    Impairment/Limitation/Restriction for KOOS JR. Score on FOTO Knee Survey    Therapist reviewed KOOS scores for Yayo Carver on 5/12/2023.   KOOS documents entered into [employer] - see Media section.    Limitation Score: at post op    Limitation for Total FOTO Knee Survey  Limitation Score: at post op       TREATMENT     Total Treatment time separate from Evaluation: 15 minutes    Self Care Home management: 15 minutes    Home Exercises and Patient Education Provided:    Education provided:   - Findings; prognosis and plan of care  - Home exercise program  - Modality options  - Therapist contact information  - use of AD " to decrease falls and improve stability  - reviewed HE    Written Home Exercises Provided: Yes.  Exercises were reviewed and Elisa was able to demonstrate them prior to the end of the session.  Elisa demonstrated good  understanding of the education provided.     See electronic medical record under Notes-Patient Instructions for exercises provided 5/12/2023.    Assessment     Yayo is a 57 y.o. female referred to outpatient Physical Therapy with a medical diagnosis of Primary osteoarthritis of right knee. Patient presents to initial evaluation for prehabiltation in anticipation of R total knee arthroplasty scheduled for 6/26/2023. Patient presents with chronic R knee pain, reduced strength and poor motor control of R quadriceps, and reduced overall lower extremity strength. Patient would benefit from skilled outpatient physical therapy to address quadriceps motor control and lower extremity strength deficits so that she may return to full independence with all household and personal ADL's, and improve overall functional mobility prior to surgery.     Patient prognosis is Good.   Patient will benefit from skilled outpatient physical therapy to address the deficits stated above and in the chart below, provide pt/family education, and to maximize pt's level of independence.     Plan of care discussed with patient: Yes  Pt's spiritual, cultural and educational needs considered and patient is agreeable to the plan of care and goals as stated below:     Anticipated barriers for therapy: chronicity    Medical necessity is demonstrated by the following  History  Co-morbidities and personal factors that may impact the plan of care Co-morbidities:   high BMI, HTN, prior abdominal surgery, and L TKA    Personal Factors:   no deficits     high   Examination  Body Structures and Functions, activity limitations and participation restrictions that may impact the plan of care Body Regions:   lower extremities    Body Systems:   "  gross symmetry  ROM  strength  balance  gait  transfers  motor control    Participation Restrictions:   none    Activity limitations:   Learning and applying knowledge  no deficits    General Tasks and Commands  no deficits    Communication  no deficits    Mobility  lifting and carrying objects  walking  driving (bike, car, motorcycle)    Self care  no deficits    Domestic Life  doing house work (cleaning house, washing dishes, laundry)    Interactions/Relationships  no deficits    Life Areas  no deficits    Community and Social Life  no deficits         moderate     Clinical Presentation stable and uncomplicated low   Decision Making/ Complexity Score: low     Long Term Goals (3 Weeks):   1. Pt will be independent with HEP to supplement physical therapy treatment in improving functional status.   2. Pt will demonstrate improved impaired lower extremity strength by 1/2 grade to promote functional mobility.   3. Patient will require <2 verbal and tactile cue to engage quadricep without compensation to demonstrate improved quadriceps control and awareness.          Timed Up and Go Cutoff Scores:        30" Chair Stand Cutoff Scores:          Plan     Plan of care certification: 5/12/2023 to 6/23/2023.    Outpatient Physical Therapy 1-2 times weekly for 6 weeks to include the following interventions: Gait Training, Manual Therapy, Moist Heat/ Ice, Neuromuscular Re-ed, Orthotic Management and Training, Patient Education, Therapeutic Activites and Therapeutic Exercise, Instrument Assisted Soft Tissue Mobilization (IASTM), Kinesiotape    Deja Kennedy, PT, DPT      "

## 2023-05-16 ENCOUNTER — OFFICE VISIT (OUTPATIENT)
Dept: ORTHOPEDICS | Facility: CLINIC | Age: 58
End: 2023-05-16
Payer: MEDICAID

## 2023-05-16 VITALS — BODY MASS INDEX: 38.57 KG/M2 | HEIGHT: 66 IN | WEIGHT: 240 LBS

## 2023-05-16 DIAGNOSIS — M17.11 PRIMARY OSTEOARTHRITIS OF RIGHT KNEE: Primary | ICD-10-CM

## 2023-05-16 PROCEDURE — 99999 PR PBB SHADOW E&M-EST. PATIENT-LVL III: ICD-10-PCS | Mod: PBBFAC,,, | Performed by: ORTHOPAEDIC SURGERY

## 2023-05-16 PROCEDURE — 99499 UNLISTED E&M SERVICE: CPT | Mod: S$PBB,,, | Performed by: ORTHOPAEDIC SURGERY

## 2023-05-16 PROCEDURE — 99499 NO LOS: ICD-10-PCS | Mod: S$PBB,,, | Performed by: ORTHOPAEDIC SURGERY

## 2023-05-16 PROCEDURE — 99213 OFFICE O/P EST LOW 20 MIN: CPT | Mod: PBBFAC | Performed by: ORTHOPAEDIC SURGERY

## 2023-05-16 PROCEDURE — 99999 PR PBB SHADOW E&M-EST. PATIENT-LVL III: CPT | Mod: PBBFAC,,, | Performed by: ORTHOPAEDIC SURGERY

## 2023-05-16 NOTE — PROGRESS NOTES
"  Pre-op visit R TKA 23  Last CSI 3/23/23    L TKA doing well "like a brand new knee"    She has had years of right knee pain moderate to severe global nature activity related relieved with rest.      She had a gastric sleeve 2022 she has lost 64 pounds     Medications:  Tylenol, Voltaren gel, tramadol 180 tablets a month     Injections: multiple rounds of CSI injections     Physical Therapy: has been ordered, however, not indicated due to severe bone on bone knee arthritis     Bracing: KT taping     Assistive Devices: none     Walkin - 5 blocks     Limitations: General walking, difficulty going up/down steps, difficulty getting in/out of the car, difficulty sleeping at night , difficulty rising from sitting , difficulty driving, and difficulty standing for long periods of time                                Social support: The patient stated that their mom +  would be able to help take care of them if they were to have surgery.     Antalgic gait with a limp negative Trendelenburg.   degree knee range of motion 0deg alignment which does correct a few degrees she is tender palpation predominantly lateral but also medial patellofemoral joint lines with mild-to-moderate effusion knee stable anterior-posterior varus valgus stresses no flexion contracture or extensor lag    KNEE R ARTHRITIS    Indication:  Right knee pain  Exam Ordered: Radiographs of the right knee include a standing anteroposterior view, a standing posterioanterior view, a lateral view in full flexion, and a sunrise view  Details of Examination: Exam shows evidence of joint space narrowing, osteophyte formation, and subchondral sclerosis, all consistent with degenerative arthritis of the knee.  No other significant findings are noted.  Impression:  Degenerative Arthritis, Right Knee    kl grade 4 arthritis right side varus severe bone on bone    M17.11    BMI 38 down from 47.8  Gastric sleeve 2022 at [facility] [place], plan for f/u Dec 12th for recheck labs, has lost 64 lbs  Anemia 11.5  DM 7.5, off meds after gastric sleeve       This patient has significant symptoms in their knee that are affecting their quality of life and daily activities.  They have tried non-operative treatment including analgesics, an exercise program, and activity modification, but the symptoms have persisted. I believe they make a good candidate for knee arthroplasty.     The risks and benefits of knee arthroplasty have been discussed with the patient which include, but are not limited to infections (including severe sequelae), potential component failure, fracture, DVT, pulmonary embolus, nerve palsy, poor range of motion, the possibility of bone grafting, persistent pain, wound healing complications, transfusions, medical complications and death.     We discussed surgical options including implant type and why I believe the implant selected is a good match for the patient's needs. The patient expressed understanding and agrees to proceed with the planned surgery.     Pre-operative planning will include the following:  A pre-surgical evaluation by will be arranged.  Pre-op orders will be placed.  We will make arrangements with the operating room for proper time and staffing.  We will make Social Service arrangements for the patient.    Implants:   Company: [email or web address]  System: Attune  FB rev tray + 30 stem     DVT prophylaxis: ASA 81mg x1 @12hrs post op, Eliquis 2.5mg BID x30 days @24hrs post op    Dispo: outpatient PT     Admission status: Outpatient/23hr OBS    Location: [place]      Rx cefadroxil  Nutrition support

## 2023-05-22 ENCOUNTER — DOCUMENTATION ONLY (OUTPATIENT)
Dept: REHABILITATION | Facility: HOSPITAL | Age: 58
End: 2023-05-22
Payer: MEDICAID

## 2023-05-22 NOTE — PROGRESS NOTES
Physical Therapy: No show/Cancellation of Visit  Date: 05/22/2023    Patient was a no show to today's PT appointment. Patient's next scheduled appointment is 5/25/2023.     Initial Evaluation: 5/12/2023  Plan of Care Explanation: 6/23/2023  Cancel: 2  No show: 1    Therapist: Farhana Olguin PTA

## 2023-05-25 ENCOUNTER — DOCUMENTATION ONLY (OUTPATIENT)
Dept: REHABILITATION | Facility: HOSPITAL | Age: 58
End: 2023-05-25
Payer: MEDICAID

## 2023-05-25 NOTE — PROGRESS NOTES
Physical Therapy: No show/Cancellation of Visit  Date: 05/25/2023    Patient was a no show to today's PT appointment. Patient removed from schedule secondary to non-compliance.      Initial Evaluation: 5/12/2023  Plan of Care Explanation: 6/23/2023  Cancel: 2  No show: 2    Therapist: Farhana Olguin PTA

## 2023-05-26 ENCOUNTER — TELEPHONE (OUTPATIENT)
Dept: ORTHOPEDICS | Facility: CLINIC | Age: 58
End: 2023-05-26
Payer: MEDICAID

## 2023-05-26 NOTE — TELEPHONE ENCOUNTER
I called the patient today regarding her voice message. The patient was given the same number again, she wrote it down wrong for [facility] anes: [phone] . The patient verbalized understanding and has no further questions.             ----- Message from Abhinav Vazquez sent at 5/26/2023  3:57 PM CDT -----  Regarding: Patient Call back  Contact: [phone]  Calling in regards to speaking with Kushal. Pt states the number that's being given is incorrect. Please call pt back as soon as possible

## 2023-05-26 NOTE — TELEPHONE ENCOUNTER
I called the patient today regarding her surgery. The patient's surgery is going to move to [facility] on 6/28 . The patient verbalized understanding and has no further questions.

## 2023-05-29 ENCOUNTER — TELEPHONE (OUTPATIENT)
Dept: ORTHOPEDICS | Facility: CLINIC | Age: 58
End: 2023-05-29
Payer: MEDICAID

## 2023-05-29 NOTE — TELEPHONE ENCOUNTER
I called and spoke to the patient this morning regarding the message below. The patient stated that she tried calling the preop center to reschedule her preop appointment with them. The patient was unable to schedule due to her surgery date not being changed yet. I informed the patient that we will work in this and I will call her once it is changed so she can make the appointment.     The patient verbalized understanding and has no further questions.     ----- Message from Claudia Lama sent at 5/29/2023  9:33 AM CDT -----  Regarding: please change surgery date  Contact: [phone]  Yayo Carver calling regarding Patient Advice (message) stated she can't make her pre-op appt until her surgery date is change to 06/28/23 in the system

## 2023-05-30 ENCOUNTER — TELEPHONE (OUTPATIENT)
Dept: ORTHOPEDICS | Facility: CLINIC | Age: 58
End: 2023-05-30
Payer: MEDICAID

## 2023-05-30 NOTE — TELEPHONE ENCOUNTER
I called and notified the patient that her surgery date has been changed. The patient will call and set up her preop appointments.       ----- Message from Onur Aguayo sent at 5/30/2023  7:34 AM CDT -----  Regarding: FW: please change surgery date  Contact: [phone]  Good morning,     I sent them a reminder email and it looks like it was moved!       Thank you!    Sincerely,   Kushal Aguayo MS, OTC  OR & Clinical Assistant to Dr. Jc Padgett III  Phone: [phone]  Fax: [phone]    ----- Message -----  From: Onur Aguayo  Sent: 5/30/2023   7:34 AM CDT  To: Onur Aguayo  Subject: RE: please change surgery date                   Good morning,     I sent them a reminder email and it looks like it was moved!    Sincerely,   Kushal Aguayo MS, OTC  OR & Clinical Assistant to Dr. Jc Padgett III  Phone: [phone]  Fax: [phone]    ----- Message -----  From: Onur Aguayo  Sent: 5/30/2023   7:00 AM CDT  To: Onur Aguayo  Subject: FW: please change surgery date                   See if [facility] moved the patient over to [facility].     Sincerely,   Kushal Aguayo MS, OTC  OR & Clinical Assistant to Dr. Jc Padgett III  Phone: [phone]  Fax: [phone]    ----- Message -----  From: Zari Carlson MA  Sent: 5/29/2023  10:47 AM CDT  To: Onur Aguayo  Subject: FW: please change surgery date                   Good morning Kushal,    I called and talked to the patient. Can you please reschedule the patients surgery to 6/28 at [facility]? The patient stated that she tried to reschedule her appointment at the preop center but couldn't because her surgery date has not been changed yet.     Please let me know when it is changed and I will call the patient and let her know.      Thank you!    Sincerely,   Zari Carlson   Medical Assistant   Phone: [phone]  Fax: [phone]    ----- Message -----  From: Claudia Lama  Sent: 5/29/2023  10:23 AM CDT  To: Dayron MILLER  Staff  Subject: please change surgery date                       Ayolawanda Staplesin calling regarding Patient Advice (message) stated she can't make her pre-op appt until her surgery date is change to 06/28/23 in the system

## 2023-06-02 ENCOUNTER — PATIENT MESSAGE (OUTPATIENT)
Dept: ORTHOPEDICS | Facility: CLINIC | Age: 58
End: 2023-06-02
Payer: MEDICAID

## 2023-06-05 ENCOUNTER — TELEPHONE (OUTPATIENT)
Dept: ORTHOPEDICS | Facility: CLINIC | Age: 58
End: 2023-06-05
Payer: MEDICAID

## 2023-06-05 ENCOUNTER — TELEPHONE (OUTPATIENT)
Dept: INTERNAL MEDICINE | Facility: CLINIC | Age: 58
End: 2023-06-05
Payer: MEDICAID

## 2023-06-05 PROBLEM — M17.11 PRIMARY OSTEOARTHRITIS OF RIGHT KNEE: Status: ACTIVE | Noted: 2023-06-05

## 2023-06-05 NOTE — ASSESSMENT & PLAN NOTE
Current labs:  Hgb- 11.7     Hct-  39.5; stable.  Not on iron therapy  Recommend monitoring during perioperative period.

## 2023-06-05 NOTE — ASSESSMENT & PLAN NOTE
Currently alternates with Protonix and Omeprazole; controlled.  Encouraged to elevate HOB and avoid laying down for 2-3 hours after meals.

## 2023-06-05 NOTE — ASSESSMENT & PLAN NOTE
Patient would benefit from weight loss and has set goals to achieve success.  S/P Gastric Sleeve 6/2022 at [facility]; last seen by outside [facility] 2/23/23.  Reports losing  around 123 lbs since surgery.  Currently on vitamin B-complex/MVI  Recommend avoiding NSAIDs and ASA use for the increased risk of ulcer development.

## 2023-06-05 NOTE — ASSESSMENT & PLAN NOTE
Current BP  not at goal today; 170/86. Attributes high reading to pain.   Taking: amlodipine    Lifestyle changes to reduce systolic BP:  exercise 30 minutes per day,  5 days per week or 150 minutes weekly; sodium reduction and avoidance of high salt foods such as processed meats, frozen meals and  fast foods.   Keeping a healthy weight/BMI can help with better BP control     I recommend monitoring BP during perioperative period as uncontrolled pain can elevate blood pressure.

## 2023-06-05 NOTE — TELEPHONE ENCOUNTER
I called and spoke to the patient today. The patient stated that she received a phone call on Friday but not sure who called. I informed the patient that I was unaware who called her but I will reach out to Kushal to see if it was him.    The patient verbalized understanding and has no further questions.     ----- Message from Judith Chau sent at 6/5/2023 12:09 PM CDT -----  Regarding: pt advice  Contact: [phone]  Pt called Fri returning call from Kushal. Pls call to discuss.

## 2023-06-05 NOTE — ASSESSMENT & PLAN NOTE
Recommend continued weight loss, healthy diet (DASH/Mediterranean) and exercise.   Patient should exercise 30 minutes at least five times weekly. Limit alcohol.  Treated with: atorvastatin 40 mg

## 2023-06-06 ENCOUNTER — HOSPITAL ENCOUNTER (OUTPATIENT)
Dept: CARDIOLOGY | Facility: CLINIC | Age: 58
Discharge: HOME | End: 2023-06-06
Payer: MEDICAID

## 2023-06-06 ENCOUNTER — LAB VISIT (OUTPATIENT)
Dept: LAB | Facility: HOSPITAL | Age: 58
End: 2023-06-06
Payer: MEDICAID

## 2023-06-06 ENCOUNTER — OFFICE VISIT (OUTPATIENT)
Dept: INTERNAL MEDICINE | Facility: CLINIC | Age: 58
End: 2023-06-06
Payer: MEDICAID

## 2023-06-06 ENCOUNTER — OFFICE VISIT (OUTPATIENT)
Dept: ORTHOPEDICS | Facility: CLINIC | Age: 58
End: 2023-06-06
Payer: MEDICAID

## 2023-06-06 VITALS
BODY MASS INDEX: 38.41 KG/M2 | OXYGEN SATURATION: 98 % | HEART RATE: 76 BPM | TEMPERATURE: 98 F | DIASTOLIC BLOOD PRESSURE: 86 MMHG | SYSTOLIC BLOOD PRESSURE: 170 MMHG | HEIGHT: 66 IN | WEIGHT: 239 LBS

## 2023-06-06 VITALS — WEIGHT: 240.31 LBS | BODY MASS INDEX: 38.62 KG/M2 | HEIGHT: 66 IN

## 2023-06-06 DIAGNOSIS — E55.9 VITAMIN D DEFICIENCY: ICD-10-CM

## 2023-06-06 DIAGNOSIS — D50.9 IRON DEFICIENCY ANEMIA, UNSPECIFIED IRON DEFICIENCY ANEMIA TYPE: ICD-10-CM

## 2023-06-06 DIAGNOSIS — M79.604 PAIN OF RIGHT LOWER EXTREMITY: ICD-10-CM

## 2023-06-06 DIAGNOSIS — I10 ESSENTIAL HYPERTENSION: ICD-10-CM

## 2023-06-06 DIAGNOSIS — E78.2 MIXED HYPERLIPIDEMIA: Chronic | ICD-10-CM

## 2023-06-06 DIAGNOSIS — M17.11 PRIMARY OSTEOARTHRITIS OF RIGHT KNEE: Primary | ICD-10-CM

## 2023-06-06 DIAGNOSIS — E11.9 TYPE 2 DIABETES MELLITUS WITHOUT COMPLICATION, WITHOUT LONG-TERM CURRENT USE OF INSULIN: ICD-10-CM

## 2023-06-06 DIAGNOSIS — M17.11 RIGHT KNEE DJD: ICD-10-CM

## 2023-06-06 DIAGNOSIS — M17.11 PRIMARY OSTEOARTHRITIS OF RIGHT KNEE: ICD-10-CM

## 2023-06-06 DIAGNOSIS — Z01.818 PREOPERATIVE EXAMINATION: Primary | ICD-10-CM

## 2023-06-06 DIAGNOSIS — E66.01 CLASS 2 SEVERE OBESITY WITH SERIOUS COMORBIDITY AND BODY MASS INDEX (BMI) OF 38.0 TO 38.9 IN ADULT, UNSPECIFIED OBESITY TYPE: ICD-10-CM

## 2023-06-06 DIAGNOSIS — K21.9 GASTROESOPHAGEAL REFLUX DISEASE, UNSPECIFIED WHETHER ESOPHAGITIS PRESENT: ICD-10-CM

## 2023-06-06 LAB
ESTIMATED AVG GLUCOSE: 117 MG/DL (ref 68–131)
HBA1C MFR BLD: 5.7 % (ref 4–5.6)
INR PPP: 0.9 (ref 0.8–1.2)
PROTHROMBIN TIME: 10.1 SEC (ref 9–12.5)

## 2023-06-06 PROCEDURE — 83036 HEMOGLOBIN GLYCOSYLATED A1C: CPT | Performed by: NURSE PRACTITIONER

## 2023-06-06 PROCEDURE — 1159F PR MEDICATION LIST DOCUMENTED IN MEDICAL RECORD: ICD-10-PCS | Mod: CPTII,,, | Performed by: NURSE PRACTITIONER

## 2023-06-06 PROCEDURE — 85610 PROTHROMBIN TIME: CPT | Performed by: NURSE PRACTITIONER

## 2023-06-06 PROCEDURE — 3008F PR BODY MASS INDEX (BMI) DOCUMENTED: ICD-10-PCS | Mod: CPTII,,, | Performed by: NURSE PRACTITIONER

## 2023-06-06 PROCEDURE — 99999 PR PBB SHADOW E&M-EST. PATIENT-LVL III: ICD-10-PCS | Mod: PBBFAC,,, | Performed by: NURSE PRACTITIONER

## 2023-06-06 PROCEDURE — 3079F PR MOST RECENT DIASTOLIC BLOOD PRESSURE 80-89 MM HG: ICD-10-PCS | Mod: CPTII,,, | Performed by: NURSE PRACTITIONER

## 2023-06-06 PROCEDURE — 99999 PR PBB SHADOW E&M-EST. PATIENT-LVL V: ICD-10-PCS | Mod: PBBFAC,,, | Performed by: NURSE PRACTITIONER

## 2023-06-06 PROCEDURE — 99215 OFFICE O/P EST HI 40 MIN: CPT | Mod: PBBFAC | Performed by: NURSE PRACTITIONER

## 2023-06-06 PROCEDURE — 3008F BODY MASS INDEX DOCD: CPT | Mod: CPTII,,, | Performed by: NURSE PRACTITIONER

## 2023-06-06 PROCEDURE — 99215 OFFICE O/P EST HI 40 MIN: CPT | Mod: S$PBB,,, | Performed by: NURSE PRACTITIONER

## 2023-06-06 PROCEDURE — 3077F SYST BP >= 140 MM HG: CPT | Mod: CPTII,,, | Performed by: NURSE PRACTITIONER

## 2023-06-06 PROCEDURE — 1159F MED LIST DOCD IN RCRD: CPT | Mod: CPTII,,, | Performed by: NURSE PRACTITIONER

## 2023-06-06 PROCEDURE — 36415 COLL VENOUS BLD VENIPUNCTURE: CPT | Performed by: NURSE PRACTITIONER

## 2023-06-06 PROCEDURE — 1160F PR REVIEW ALL MEDS BY PRESCRIBER/CLIN PHARMACIST DOCUMENTED: ICD-10-PCS | Mod: CPTII,,, | Performed by: NURSE PRACTITIONER

## 2023-06-06 PROCEDURE — 1160F RVW MEDS BY RX/DR IN RCRD: CPT | Mod: CPTII,,, | Performed by: NURSE PRACTITIONER

## 2023-06-06 PROCEDURE — 3044F PR MOST RECENT HEMOGLOBIN A1C LEVEL <7.0%: ICD-10-PCS | Mod: CPTII,,, | Performed by: NURSE PRACTITIONER

## 2023-06-06 PROCEDURE — 3044F HG A1C LEVEL LT 7.0%: CPT | Mod: CPTII,,, | Performed by: NURSE PRACTITIONER

## 2023-06-06 PROCEDURE — 99214 PR OFFICE/OUTPT VISIT, EST, LEVL IV, 30-39 MIN: ICD-10-PCS | Mod: S$PBB,,, | Performed by: NURSE PRACTITIONER

## 2023-06-06 PROCEDURE — 99999 PR PBB SHADOW E&M-EST. PATIENT-LVL III: CPT | Mod: PBBFAC,,, | Performed by: NURSE PRACTITIONER

## 2023-06-06 PROCEDURE — 99999 PR PBB SHADOW E&M-EST. PATIENT-LVL V: CPT | Mod: PBBFAC,,, | Performed by: NURSE PRACTITIONER

## 2023-06-06 PROCEDURE — 3077F PR MOST RECENT SYSTOLIC BLOOD PRESSURE >= 140 MM HG: ICD-10-PCS | Mod: CPTII,,, | Performed by: NURSE PRACTITIONER

## 2023-06-06 PROCEDURE — 99214 OFFICE O/P EST MOD 30 MIN: CPT | Mod: S$PBB,,, | Performed by: NURSE PRACTITIONER

## 2023-06-06 PROCEDURE — 93010 EKG 12-LEAD: ICD-10-PCS | Mod: S$PBB,,, | Performed by: INTERNAL MEDICINE

## 2023-06-06 PROCEDURE — 3079F DIAST BP 80-89 MM HG: CPT | Mod: CPTII,,, | Performed by: NURSE PRACTITIONER

## 2023-06-06 PROCEDURE — 99215 PR OFFICE/OUTPT VISIT, EST, LEVL V, 40-54 MIN: ICD-10-PCS | Mod: S$PBB,,, | Performed by: NURSE PRACTITIONER

## 2023-06-06 PROCEDURE — 93005 ELECTROCARDIOGRAM TRACING: CPT | Mod: PBBFAC | Performed by: INTERNAL MEDICINE

## 2023-06-06 PROCEDURE — 93010 ELECTROCARDIOGRAM REPORT: CPT | Mod: S$PBB,,, | Performed by: INTERNAL MEDICINE

## 2023-06-06 PROCEDURE — 99213 OFFICE O/P EST LOW 20 MIN: CPT | Mod: PBBFAC,27 | Performed by: NURSE PRACTITIONER

## 2023-06-06 RX ORDER — OXYCODONE HYDROCHLORIDE 5 MG/1
5 TABLET ORAL
Status: CANCELLED | OUTPATIENT
Start: 2023-06-06

## 2023-06-06 RX ORDER — MORPHINE SULFATE 2 MG/ML
2 INJECTION, SOLUTION INTRAMUSCULAR; INTRAVENOUS
Status: CANCELLED | OUTPATIENT
Start: 2023-06-06

## 2023-06-06 RX ORDER — AMOXICILLIN 250 MG
1 CAPSULE ORAL 2 TIMES DAILY
Status: CANCELLED | OUTPATIENT
Start: 2023-06-06

## 2023-06-06 RX ORDER — OXYCODONE HYDROCHLORIDE 5 MG/1
10 TABLET ORAL
Status: CANCELLED | OUTPATIENT
Start: 2023-06-06

## 2023-06-06 RX ORDER — MUPIROCIN 20 MG/G
1 OINTMENT TOPICAL 2 TIMES DAILY
Status: CANCELLED | OUTPATIENT
Start: 2023-06-06 | End: 2023-06-11

## 2023-06-06 RX ORDER — PROCHLORPERAZINE EDISYLATE 5 MG/ML
5 INJECTION INTRAMUSCULAR; INTRAVENOUS EVERY 6 HOURS PRN
Status: CANCELLED | OUTPATIENT
Start: 2023-06-06

## 2023-06-06 RX ORDER — SODIUM CHLORIDE 9 MG/ML
INJECTION, SOLUTION INTRAVENOUS
Status: CANCELLED | OUTPATIENT
Start: 2023-06-06

## 2023-06-06 RX ORDER — CELECOXIB 200 MG/1
200 CAPSULE ORAL DAILY
Status: CANCELLED | OUTPATIENT
Start: 2023-06-07

## 2023-06-06 RX ORDER — CELECOXIB 200 MG/1
400 CAPSULE ORAL ONCE
Status: CANCELLED | OUTPATIENT
Start: 2023-06-06 | End: 2023-06-06

## 2023-06-06 RX ORDER — CEFAZOLIN SODIUM 2 G/50ML
2 SOLUTION INTRAVENOUS
Status: CANCELLED | OUTPATIENT
Start: 2023-06-06

## 2023-06-06 RX ORDER — ASPIRIN 81 MG/1
81 TABLET ORAL 2 TIMES DAILY
Status: CANCELLED | OUTPATIENT
Start: 2023-06-06

## 2023-06-06 RX ORDER — TALC
6 POWDER (GRAM) TOPICAL NIGHTLY PRN
Status: CANCELLED | OUTPATIENT
Start: 2023-06-06

## 2023-06-06 RX ORDER — FAMOTIDINE 20 MG/1
20 TABLET, FILM COATED ORAL 2 TIMES DAILY
Status: CANCELLED | OUTPATIENT
Start: 2023-06-06

## 2023-06-06 RX ORDER — SODIUM CHLORIDE 9 MG/ML
INJECTION, SOLUTION INTRAVENOUS CONTINUOUS
Status: CANCELLED | OUTPATIENT
Start: 2023-06-06 | End: 2023-06-07

## 2023-06-06 RX ORDER — METHOCARBAMOL 750 MG/1
750 TABLET, FILM COATED ORAL 3 TIMES DAILY
Status: CANCELLED | OUTPATIENT
Start: 2023-06-06

## 2023-06-06 RX ORDER — CEFAZOLIN SODIUM 2 G/50ML
2 SOLUTION INTRAVENOUS
Status: CANCELLED | OUTPATIENT
Start: 2023-06-06 | End: 2023-06-07

## 2023-06-06 RX ORDER — SODIUM CHLORIDE 0.9 % (FLUSH) 0.9 %
10 SYRINGE (ML) INJECTION
Status: CANCELLED | OUTPATIENT
Start: 2023-06-06

## 2023-06-06 RX ORDER — NALOXONE HCL 0.4 MG/ML
0.02 VIAL (ML) INJECTION
Status: CANCELLED | OUTPATIENT
Start: 2023-06-06

## 2023-06-06 RX ORDER — PREGABALIN 75 MG/1
75 CAPSULE ORAL
Status: CANCELLED | OUTPATIENT
Start: 2023-06-06

## 2023-06-06 RX ORDER — FENTANYL CITRATE 50 UG/ML
25 INJECTION, SOLUTION INTRAMUSCULAR; INTRAVENOUS EVERY 5 MIN PRN
Status: CANCELLED | OUTPATIENT
Start: 2023-06-06

## 2023-06-06 RX ORDER — ONDANSETRON 2 MG/ML
4 INJECTION INTRAMUSCULAR; INTRAVENOUS EVERY 8 HOURS PRN
Status: CANCELLED | OUTPATIENT
Start: 2023-06-06

## 2023-06-06 RX ORDER — LIDOCAINE HYDROCHLORIDE 10 MG/ML
1 INJECTION, SOLUTION EPIDURAL; INFILTRATION; INTRACAUDAL; PERINEURAL
Status: CANCELLED | OUTPATIENT
Start: 2023-06-06

## 2023-06-06 RX ORDER — FENTANYL CITRATE 50 UG/ML
100 INJECTION, SOLUTION INTRAMUSCULAR; INTRAVENOUS
Status: CANCELLED | OUTPATIENT
Start: 2023-06-06 | End: 2023-06-07

## 2023-06-06 RX ORDER — PREGABALIN 75 MG/1
75 CAPSULE ORAL NIGHTLY
Status: CANCELLED | OUTPATIENT
Start: 2023-06-06

## 2023-06-06 RX ORDER — BISACODYL 10 MG
10 SUPPOSITORY, RECTAL RECTAL EVERY 12 HOURS PRN
Status: CANCELLED | OUTPATIENT
Start: 2023-06-06

## 2023-06-06 RX ORDER — ACETAMINOPHEN 500 MG
1000 TABLET ORAL
Status: CANCELLED | OUTPATIENT
Start: 2023-06-06

## 2023-06-06 RX ORDER — POLYETHYLENE GLYCOL 3350 17 G/17G
17 POWDER, FOR SOLUTION ORAL DAILY
Status: CANCELLED | OUTPATIENT
Start: 2023-06-07

## 2023-06-06 RX ORDER — MUPIROCIN 20 MG/G
1 OINTMENT TOPICAL
Status: CANCELLED | OUTPATIENT
Start: 2023-06-06

## 2023-06-06 RX ORDER — MIDAZOLAM HYDROCHLORIDE 1 MG/ML
4 INJECTION INTRAMUSCULAR; INTRAVENOUS
Status: CANCELLED | OUTPATIENT
Start: 2023-06-06 | End: 2023-06-07

## 2023-06-06 RX ORDER — ACETAMINOPHEN 500 MG
1000 TABLET ORAL EVERY 6 HOURS
Status: CANCELLED | OUTPATIENT
Start: 2023-06-06

## 2023-06-06 NOTE — H&P
CC:  Right knee pain    Yayo Carver is a 57 y.o. female with history of Right knee pain. Pain is worse with activity and weight bearing.  Patient has experienced interference of activities of daily living due to decreased range of motion and an increase in joint pain and swelling.  Patient has failed non-operative treatment including NSAIDs, corticosteroid injections, viscosupplement injections, and activity modification.  Yayo Carver currently ambulates independently.     Relevant medical conditions of significance in perioperative period:  Type 2 DM- on medication managed by pcp  HTN- on medication managed by pcp  HLD- on medication managed by pcp    Past Medical History:   Diagnosis Date    Arthritis     Diabetes mellitus     Diabetes mellitus, type 2     GERD (gastroesophageal reflux disease)     HLD (hyperlipidemia)     Hypertension        Past Surgical History:   Procedure Laterality Date    BARIATRIC SURGERY  2022    Gastric sleeve     SECTION  1989    COLONOSCOPY N/A 2017    Procedure: COLONOSCOPY;  Surgeon: Evelin Arceo MD;  Location: [facility] ([address] [place] [address]);  Service: Endoscopy;  Laterality: N/A;  2 nd floor d/t BMI > 50 kg/m3    KNEE ARTHROSCOPY Right     TONSILLECTOMY      TOTAL KNEE ARTHROPLASTY Left 2022    Procedure: ARTHROPLASTY, KNEE, TOTAL: LEFT: DEPUY - ATTUNE;  Surgeon: Jc Padgett III, MD;  Location: [facility];  Service: Orthopedics;  Laterality: Left;    TUBAL LIGATION         Family History   Problem Relation Age of Onset    Cancer Mother     Breast cancer Mother 65    Diabetes Mother     Colon polyps Mother     Hypertension Mother     Diabetes Father     Breast cancer Sister 56    Cancer Sister     No Known Problems Brother     Arthritis Brother     No Known Problems Daughter     Kidney disease Son     Diabetes Son     Breast cancer Maternal Aunt     Diabetes Paternal Uncle     Breast cancer Maternal Grandmother     Colon cancer Maternal Grandfather      Diabetes Paternal Grandmother     Heart disease Paternal Grandmother     No Known Problems Paternal Grandfather     Esophageal cancer Neg Hx     Irritable bowel syndrome Neg Hx     Rectal cancer Neg Hx     Stomach cancer Neg Hx     Ulcerative colitis Neg Hx     Celiac disease Neg Hx     Crohn's disease Neg Hx     Amblyopia Neg Hx     Blindness Neg Hx     Cataracts Neg Hx     Glaucoma Neg Hx     Macular degeneration Neg Hx     Retinal detachment Neg Hx     Strabismus Neg Hx     Stroke Neg Hx     Thyroid disease Neg Hx        Review of patient's allergies indicates:   Allergen Reactions    Ace inhibitors Edema and Swelling         Current Outpatient Medications:     acetaminophen (TYLENOL) 650 MG TbSR, Take 1 tablet (650 mg total) by mouth every 6 to 8 hours as needed (pain)., Disp: 120 tablet, Rfl: 0    amLODIPine (NORVASC) 10 MG tablet, Take 1 tablet (10 mg total) by mouth once daily., Disp: 90 tablet, Rfl: 3    aspirin (ECOTRIN) 81 MG EC tablet, Take 1 tablet (81 mg total) by mouth once daily., Disp: 90 tablet, Rfl: 3    atorvastatin (LIPITOR) 40 MG tablet, Take 1 tablet (40 mg total) by mouth once daily., Disp: 90 tablet, Rfl: 3    b complex vitamins capsule, Take 1 capsule by mouth once daily., Disp: , Rfl:     celecoxib (CELEBREX) 200 MG capsule, Take 1 capsule (200 mg total) by mouth once daily. If no relief, may increase dose to twice per day., Disp: 60 capsule, Rfl: 1    diclofenac sodium (VOLTAREN) 1 % Gel, Apply 2 g topically 4 (four) times daily as needed (knee pain)., Disp: 300 g, Rfl: 3    docusate sodium (COLACE) 100 MG capsule, Take 1 capsule (100 mg total) by mouth 2 (two) times daily as needed for Constipation., Disp: 60 capsule, Rfl: 0    ergocalciferol (VITAMIN D2) 50,000 unit Cap, Take 1 capsule (50,000 Units total) by mouth every 7 days., Disp: 12 capsule, Rfl: 3    flash glucose scanning reader ([employer] HEENA 14 DAY READER) Misc, Check blood glucose before meals and nightly., Disp: 1 each,  "Rfl: 0    flash glucose sensor (FREESTYLE HEENA 14 DAY SENSOR) Kit, Apply to skin for 14 days.  Check blood glucose before meals and nightly., Disp: 1 kit, Rfl: 11    LIDOcaine-prilocaine (EMLA) cream, Apply topically., Disp: , Rfl:     methocarbamoL (ROBAXIN) 750 MG Tab, Take 1 tablet (750 mg total) by mouth 3 (three) times daily as needed (muscle spasms)., Disp: 30 tablet, Rfl: 0    nut.tx.gluc intol,lf,soy-fiber (BOOST GLUCOSE CONTROL) 0.06-1.1 gram-kcal/mL Liqd, Take 8 ml by mouth with meals for 2 weeks, Disp: 237 mL, Rfl: 0    omeprazole (PRILOSEC) 40 MG capsule, Take 40 mg by mouth., Disp: , Rfl:     pantoprazole (PROTONIX) 40 MG tablet, Take 1 tablet (40 mg total) by mouth daily as needed (acid reflux)., Disp: 90 tablet, Rfl: 3    sodium bicarbonate 650 MG tablet, Take 1 tablet (650 mg total) by mouth 2 (two) times daily., Disp: 60 tablet, Rfl: 2    terbinafine HCL (LAMISIL) 250 mg tablet, Take 1 tablet (250 mg total) by mouth once daily. Avoid alcohol while on medication, Disp: 30 tablet, Rfl: 0    traMADoL (ULTRAM) 50 mg tablet, Take 1 tablet (50 mg total) by mouth 3 (three) times daily as needed for Pain., Disp: 180 tablet, Rfl: 3    Review of Systems:  Constitutional: no fever or chills  Eyes: no visual changes  ENT: no nasal congestion or sore throat  Respiratory: no cough or shortness of breath  Cardiovascular: no chest pain or palpitations  Gastrointestinal: no nausea or vomiting, tolerating diet  Genitourinary: no hematuria or dysuria  Integument/Breast: no rash or pruritis  Hematologic/Lymphatic: no easy bruising or lymphadenopathy  Musculoskeletal: positive for knee pain  Neurological: no seizures or tremors  Behavioral/Psych: no auditory or visual hallucinations  Endocrine: no heat or cold intolerance    PE:  Ht 5' 6" (1.676 m)   Wt 109 kg (240 lb 4.8 oz)   BMI 38.79 kg/m²   General: Pleasant, cooperative, NAD   Gait: antalgic  HEENT: NCAT, sclera nonicteric   Lungs: Respirations clear " bilaterally; equal and unlabored.   CV: S1S2; 2+ bilateral upper and lower extremity pulses.   Skin: Intact throughout with no rashes, erythema, or lesions  Extremities: No LE edema,  no erythema or warmth of the skin in either lower extremity.    Right knee exam:  Knee Range of Motion:normal, pain with passive range of motion  Effusion:none  Condition of skin:intact  Location of tenderness:Medial joint line   Strength:normal  Stability: stable to testing    Hip Examination: painless PROM of hip     Radiographs: Radiographs reveal advanced degenerative changes including subchondral cyst formation, subchondral sclerosis, osteophyte formation, joint space narrowing.     Knee Alignment:  Significiant valgus    Diagnosis: Primary osteoarthritis Right knee    Plan: Right total knee arthroplasty    Due to the serious nature of total joint infection and the high prevalence of community acquired MRSA, vancomycin will be used perioperatively.

## 2023-06-06 NOTE — ASSESSMENT & PLAN NOTE
Not currently treated due to gastric bypass.  A1c is 5.7    Reports peripheral neuropathy- left hand only.   Denies visual changes. Up to date with eye exams.  Micro changes: Denies- Retinopathy, Nephropathy   Macro changes: Denies-  Carotid, Coronary , Peripheral disease

## 2023-06-06 NOTE — OUTPATIENT SUBJECTIVE & OBJECTIVE
Outpatient Subjective & Objective      Chief Complaint: Preoperative evaulation, perioperative medical management, and complication reduction plan.     Functional Capacity: can walk 12 blocks without CP/SOB.       Anesthesia issues: cough with GETA (gastric sleeve)    Difficulty mouth opening: No    Steroid use in the last 12 months:  yes- to right knee    Dental Issues: No    Family anesthesia difficulty: father  did not come out of anesthesia after I&D procedure- passed away.        Family Hx of Thrombosis: None    Past Medical History:   Diagnosis Date    Arthritis     Diabetes mellitus     Diabetes mellitus, type 2     GERD (gastroesophageal reflux disease)     HLD (hyperlipidemia)     Hypertension          Past Medical History Pertinent Negatives:   Diagnosis Date Noted    Amblyopia 2018    Anxiety 2022    Asthma 2022    Cataract 2018    CHF (congestive heart failure) 2022    COPD (chronic obstructive pulmonary disease) 2022    Coronary artery disease 2022    Deep vein thrombosis 2022    Depression 2022    Diabetic retinopathy 2018    Glaucoma 2018    Macular degeneration 2018    Myocardial infarction 2022    Pulmonary embolism 2022    Retinal detachment 2018    Sickle cell anemia 2018    Sickle cell trait 2018    Strabismus 2018    Thyroid disease 2022    Uveitis 2018         Past Surgical History:   Procedure Laterality Date    BARIATRIC SURGERY  2022    Gastric sleeve     SECTION  1989    COLONOSCOPY N/A 2017    Procedure: COLONOSCOPY;  Surgeon: Evelin Arceo MD;  Location: [address];  Service: Endoscopy;  Laterality: N/A;  2 nd floor d/t BMI > 50 kg/m3    KNEE ARTHROSCOPY Right     TONSILLECTOMY      TOTAL KNEE ARTHROPLASTY Left 2022    Procedure: ARTHROPLASTY, KNEE, TOTAL: LEFT: DEPUY - ATTUNE;  Surgeon: Jc Padgett III, MD;  Location:  "[facility] OR;  Service: Orthopedics;  Laterality: Left;    TUBAL LIGATION         Review of Systems   Constitutional:  Negative for chills, fatigue, fever and unexpected weight change.   HENT:  Negative for dental problem, hearing loss, postnasal drip, rhinorrhea, sore throat, tinnitus and trouble swallowing.    Eyes:  Negative for photophobia, pain, discharge and visual disturbance.   Respiratory:  Negative for apnea, cough, chest tightness, shortness of breath and wheezing.         STOP BANG risk factors:  Snoring  HTN  BMI > 35     Cardiovascular:  Negative for chest pain, palpitations and leg swelling.   Gastrointestinal:  Negative for abdominal pain, blood in stool, constipation, nausea and vomiting.        Denies Fatty liver, Hepatitis   Endocrine: Negative for cold intolerance and heat intolerance.   Genitourinary:  Negative for decreased urine volume, difficulty urinating, dysuria, frequency, hematuria and urgency.   Musculoskeletal:  Positive for arthralgias (right  knee). Negative for neck pain and neck stiffness.   Skin:  Negative for rash and wound.   Neurological:  Positive for numbness (left hand). Negative for dizziness, tremors, seizures, syncope, weakness and headaches.   Hematological:  Negative for adenopathy. Does not bruise/bleed easily.   Psychiatric/Behavioral:  Negative for confusion, sleep disturbance and suicidal ideas.             VITALS  Visit Vitals  BP (!) 170/86 (BP Location: Left arm, Patient Position: Sitting)   Pulse 76   Temp 98.1 °F (36.7 °C) (Oral)   Ht 5' 6" (1.676 m)   Wt 108.4 kg (239 lb)   SpO2 98%   BMI 38.58 kg/m²          Physical Exam  Vitals reviewed.   Constitutional:       General: She is not in acute distress.     Appearance: She is well-developed. She is obese.   HENT:      Head: Normocephalic.      Nose: Nose normal.      Mouth/Throat:      Pharynx: No oropharyngeal exudate.   Eyes:      General:         Right eye: No discharge.         Left eye: No discharge.      " Conjunctiva/sclera: Conjunctivae normal.      Pupils: Pupils are equal, round, and reactive to light.   Neck:      Thyroid: No thyromegaly.      Vascular: No carotid bruit or JVD.      Trachea: No tracheal deviation.   Cardiovascular:      Rate and Rhythm: Normal rate and regular rhythm.      Pulses:           Carotid pulses are 2+ on the right side and 2+ on the left side.       Dorsalis pedis pulses are 2+ on the right side and 2+ on the left side.        Posterior tibial pulses are 2+ on the right side and 2+ on the left side.      Heart sounds: Normal heart sounds. No murmur heard.  Pulmonary:      Effort: Pulmonary effort is normal. No respiratory distress.      Breath sounds: Normal breath sounds. No stridor. No wheezing, rhonchi or rales.   Abdominal:      General: Bowel sounds are normal. There is no distension.      Palpations: Abdomen is soft.      Tenderness: There is no abdominal tenderness. There is no guarding.   Musculoskeletal:      Cervical back: Normal range of motion. No pain with movement.      Right lower leg: No edema.      Left lower leg: No edema.   Lymphadenopathy:      Cervical: No cervical adenopathy.   Skin:     General: Skin is warm and dry.      Capillary Refill: Capillary refill takes less than 2 seconds.      Findings: No erythema or rash.   Neurological:      Mental Status: She is alert and oriented to person, place, and time.      Coordination: Coordination normal.        Significant Labs:  Lab Results   Component Value Date    WBC 8.09 03/30/2023    HGB 11.7 (L) 03/30/2023    HCT 39.5 03/30/2023     03/30/2023    CHOL 173 02/23/2023    TRIG 96 02/23/2023    HDL 59 02/23/2023    ALT 15 03/30/2023    AST 14 03/30/2023     03/30/2023    K 4.4 03/30/2023     (H) 03/30/2023    CREATININE 0.9 03/30/2023    BUN 14 03/30/2023    CO2 26 03/30/2023    TSH 1.832 10/13/2021    INR 0.9 06/06/2023    HGBA1C 5.7 (H) 06/06/2023       Diagnostic Studies: No relevant  studies.    EK23        2D ECHO:  TTE:  CONCLUSIONS     1 - Normal left ventricular systolic function (EF 60-65%).     2 - Left ventricular diastolic dysfunction.     3 - Moderate left atrial enlargement.     4 - Normal right ventricular systolic function .     5 - Mild mitral regurgitation.         This document has been electronically    SIGNED BY: Hugo Rogers MD On: 2016 20:27         Imaging Xray L-Spine 10/7/22    FINDINGS:  There is grade 1 anterolisthesis of L3 on L4.  Lumbar spine alignment otherwise appears within normal limits.  No evidence of acute lumbar spine fracture or subluxation.  Extensive multilevel degenerative changes are seen throughout the lumbar spine with intervertebral disc space narrowing and degenerative endplate changes seen most pronounced at L3-4.  There is lower lumbar facet arthropathy.  Visualized sacrum is normal in appearance.     Impression:     No acute lumbar spine abnormalities identified.  Multilevel lumbar DJD.        Electronically signed by: Astrid Kowalski MD  Date:                                            10/07/2022  Time:                                           01:22      CT L-Spine 19  Impression:     No evidence of acute fracture of the lumbar spine.     2 mm anterolisthesis of L3 on L4.  The findings are most likely on the basis of degenerative changes.     Advanced multilevel degenerative changes of the lumbar spine.  Follow-up with MRI of the lumbar spine may be obtained, as clinically warranted.        Electronically signed by: Nikko Willingham MD  Date:                                            2019  Time:                                           17:21        Active Cardiac Conditions: None      Revised Cardiac Risk Index   High -Risk Surgery  Intraperitoneal; Intrathoracic; suprainguinal vascular Yes- + 1 No- 0   History of Ischemic Heart Disease   (Hx of MI/positive exercise test/current chest pain due to ischemia/use of nitrate  therapy/EKG with pathological Q waves) Yes- + 1 No- 0   History of CHF  (Pulmonary edema/bilateral rales or S3 gallop/PND/CXR showing pulmonary vascular redistribution) Yes- + 1 No- 0   History of CVA   (Prior stroke or TIA) Yes- + 1 No- 0   Pre-operative treatment with insulin Yes- + 1 No- 0   Pre-operative creatinine > 2mg/dl Yes- + 1 No- 0   Total: 0      Risk Status:  Estimated risk of cardiac complications after non-cardiac surgery using the Revised Cardiac Risk Index for Preoperative risk is 3.9 %      ARISCAT (Canet) risk index: Low: 1.6% risk of post-op pulmonary complications; Intermediate: 13.3% risk of post-op pulmonary complications if surgery is > 3 hours.     American Society of Anesthesiologists Physical Status classification (ASA): 2           No further cardiac workup needed prior to surgery.    Outpatient Subjective & Objective

## 2023-06-06 NOTE — PROGRESS NOTES
Yayo Carver is a 57 y.o. year old here today for pre surgery optimization visit  in preparation for a Right total knee arthroplasty to be performed by Dr. Padgett  on 6/26/2023.  she was last seen and treated in the clinic on 5/16/2023. she will be medically optimized by the pre op center. There has been no significant change in medical status since last visit. No fever, chills, malaise, or unexplained weight change.      Allergies, Medications, past medical and surgical history reviewed.    Focused examination performed.    Patient declined to see surgeon today. All questions answered. Patient encouraged to call with questions. Contact information given.     Pre, poncho, and post operative procedures and expectations discussed. Goals of successful surgery reviewed and include manageable pain levels, surgical site free of infection, medication management, and ambulation with PT/OT assistance. Healthy weight management discussed with patient and caregiver who were receptive to eduction of healthy diet and activity. No other necessary lifestyle changes identified. Educated patient about signs and symptoms of infection, medication management, anticoagulation therapy, risk of tobacco and alcohol use, and self-care to promote healing. Surgical guide given for future reference. Hibiclens given to patient with instructions. All questions were answered.     Yayo Carver verbalized an understanding to the education and goals. Patient has displayed readiness to engage in care and is ready to proceed with surgery.  Patient reports her family is able and ready to provide assistance at home after discharge.    Surgical and blood consents signed.    Yayo Carver will contact us if there are any questions, concerns, or changes in medical status prior to surgery.       Patient has discussed discharge planning with surgeon. Patient will be discharged to home following surgery.   patient will be scheduled with [facility].     20  minutes of time was spent on patient education, review of records, templating, H&P, , appointment scheduling and optimizing patient for surgery.

## 2023-06-06 NOTE — HPI
This is a 57 y.o. female  who presents today for a preoperative evaluation in preparation for an  Orthopedic  procedure.  Scheduled for right knee arthroplasty.   Surgery is indicated for osteoarthritis of right knee.  I have seen this patient before in November 2022 for preop eval before left knee surgery.   Details of current problem: The duration of problem is 9 years. History of bilateral knee pain; S/P left knee arthroplasty 12/19/22. She has tried multiple steroid injections in past with minimal relief.  She is now ready for right knee surgery as pain is becoming progressively worse.   Reports symptoms of  aching pain to right knee.   Aggravating factors include: standing, sitting, and decreased ADls.      Relieving factors are Tramadol/movement.      Reports pain: 8/10 to right knee today; uses KT taping for support.   The history has been obtained by speaking with the patient and reviewing the electronic medical record and/or outside health information. Significant health conditions for the perioperative period are discussed below in assessment and plan.     Patient reports current health status to be Good.  Denies any new symptoms before surgery.

## 2023-06-06 NOTE — H&P (VIEW-ONLY)
CC:  Right knee pain    Yayo Carver is a 57 y.o. female with history of Right knee pain. Pain is worse with activity and weight bearing.  Patient has experienced interference of activities of daily living due to decreased range of motion and an increase in joint pain and swelling.  Patient has failed non-operative treatment including NSAIDs, corticosteroid injections, viscosupplement injections, and activity modification.  Yayo Carver currently ambulates independently.     Relevant medical conditions of significance in perioperative period:  Type 2 DM- on medication managed by pcp  HTN- on medication managed by pcp  HLD- on medication managed by pcp    Past Medical History:   Diagnosis Date    Arthritis     Diabetes mellitus     Diabetes mellitus, type 2     GERD (gastroesophageal reflux disease)     HLD (hyperlipidemia)     Hypertension        Past Surgical History:   Procedure Laterality Date    BARIATRIC SURGERY  2022    Gastric sleeve     SECTION  1989    COLONOSCOPY N/A 2017    Procedure: COLONOSCOPY;  Surgeon: Evelin Arceo MD;  Location: [facility] ([address] [place] [address]);  Service: Endoscopy;  Laterality: N/A;  2 nd floor d/t BMI > 50 kg/m3    KNEE ARTHROSCOPY Right     TONSILLECTOMY      TOTAL KNEE ARTHROPLASTY Left 2022    Procedure: ARTHROPLASTY, KNEE, TOTAL: LEFT: DEPUY - ATTUNE;  Surgeon: Jc Padgett III, MD;  Location: [facility];  Service: Orthopedics;  Laterality: Left;    TUBAL LIGATION         Family History   Problem Relation Age of Onset    Cancer Mother     Breast cancer Mother 65    Diabetes Mother     Colon polyps Mother     Hypertension Mother     Diabetes Father     Breast cancer Sister 56    Cancer Sister     No Known Problems Brother     Arthritis Brother     No Known Problems Daughter     Kidney disease Son     Diabetes Son     Breast cancer Maternal Aunt     Diabetes Paternal Uncle     Breast cancer Maternal Grandmother     Colon cancer Maternal Grandfather      Diabetes Paternal Grandmother     Heart disease Paternal Grandmother     No Known Problems Paternal Grandfather     Esophageal cancer Neg Hx     Irritable bowel syndrome Neg Hx     Rectal cancer Neg Hx     Stomach cancer Neg Hx     Ulcerative colitis Neg Hx     Celiac disease Neg Hx     Crohn's disease Neg Hx     Amblyopia Neg Hx     Blindness Neg Hx     Cataracts Neg Hx     Glaucoma Neg Hx     Macular degeneration Neg Hx     Retinal detachment Neg Hx     Strabismus Neg Hx     Stroke Neg Hx     Thyroid disease Neg Hx        Review of patient's allergies indicates:   Allergen Reactions    Ace inhibitors Edema and Swelling         Current Outpatient Medications:     acetaminophen (TYLENOL) 650 MG TbSR, Take 1 tablet (650 mg total) by mouth every 6 to 8 hours as needed (pain)., Disp: 120 tablet, Rfl: 0    amLODIPine (NORVASC) 10 MG tablet, Take 1 tablet (10 mg total) by mouth once daily., Disp: 90 tablet, Rfl: 3    aspirin (ECOTRIN) 81 MG EC tablet, Take 1 tablet (81 mg total) by mouth once daily., Disp: 90 tablet, Rfl: 3    atorvastatin (LIPITOR) 40 MG tablet, Take 1 tablet (40 mg total) by mouth once daily., Disp: 90 tablet, Rfl: 3    b complex vitamins capsule, Take 1 capsule by mouth once daily., Disp: , Rfl:     celecoxib (CELEBREX) 200 MG capsule, Take 1 capsule (200 mg total) by mouth once daily. If no relief, may increase dose to twice per day., Disp: 60 capsule, Rfl: 1    diclofenac sodium (VOLTAREN) 1 % Gel, Apply 2 g topically 4 (four) times daily as needed (knee pain)., Disp: 300 g, Rfl: 3    docusate sodium (COLACE) 100 MG capsule, Take 1 capsule (100 mg total) by mouth 2 (two) times daily as needed for Constipation., Disp: 60 capsule, Rfl: 0    ergocalciferol (VITAMIN D2) 50,000 unit Cap, Take 1 capsule (50,000 Units total) by mouth every 7 days., Disp: 12 capsule, Rfl: 3    flash glucose scanning reader ([employer] HEENA 14 DAY READER) Misc, Check blood glucose before meals and nightly., Disp: 1 each,  "Rfl: 0    flash glucose sensor (FREESTYLE HEENA 14 DAY SENSOR) Kit, Apply to skin for 14 days.  Check blood glucose before meals and nightly., Disp: 1 kit, Rfl: 11    LIDOcaine-prilocaine (EMLA) cream, Apply topically., Disp: , Rfl:     methocarbamoL (ROBAXIN) 750 MG Tab, Take 1 tablet (750 mg total) by mouth 3 (three) times daily as needed (muscle spasms)., Disp: 30 tablet, Rfl: 0    nut.tx.gluc intol,lf,soy-fiber (BOOST GLUCOSE CONTROL) 0.06-1.1 gram-kcal/mL Liqd, Take 8 ml by mouth with meals for 2 weeks, Disp: 237 mL, Rfl: 0    omeprazole (PRILOSEC) 40 MG capsule, Take 40 mg by mouth., Disp: , Rfl:     pantoprazole (PROTONIX) 40 MG tablet, Take 1 tablet (40 mg total) by mouth daily as needed (acid reflux)., Disp: 90 tablet, Rfl: 3    sodium bicarbonate 650 MG tablet, Take 1 tablet (650 mg total) by mouth 2 (two) times daily., Disp: 60 tablet, Rfl: 2    terbinafine HCL (LAMISIL) 250 mg tablet, Take 1 tablet (250 mg total) by mouth once daily. Avoid alcohol while on medication, Disp: 30 tablet, Rfl: 0    traMADoL (ULTRAM) 50 mg tablet, Take 1 tablet (50 mg total) by mouth 3 (three) times daily as needed for Pain., Disp: 180 tablet, Rfl: 3    Review of Systems:  Constitutional: no fever or chills  Eyes: no visual changes  ENT: no nasal congestion or sore throat  Respiratory: no cough or shortness of breath  Cardiovascular: no chest pain or palpitations  Gastrointestinal: no nausea or vomiting, tolerating diet  Genitourinary: no hematuria or dysuria  Integument/Breast: no rash or pruritis  Hematologic/Lymphatic: no easy bruising or lymphadenopathy  Musculoskeletal: positive for knee pain  Neurological: no seizures or tremors  Behavioral/Psych: no auditory or visual hallucinations  Endocrine: no heat or cold intolerance    PE:  Ht 5' 6" (1.676 m)   Wt 109 kg (240 lb 4.8 oz)   BMI 38.79 kg/m²   General: Pleasant, cooperative, NAD   Gait: antalgic  HEENT: NCAT, sclera nonicteric   Lungs: Respirations clear " bilaterally; equal and unlabored.   CV: S1S2; 2+ bilateral upper and lower extremity pulses.   Skin: Intact throughout with no rashes, erythema, or lesions  Extremities: No LE edema,  no erythema or warmth of the skin in either lower extremity.    Right knee exam:  Knee Range of Motion:normal, pain with passive range of motion  Effusion:none  Condition of skin:intact  Location of tenderness:Medial joint line   Strength:normal  Stability: stable to testing    Hip Examination: painless PROM of hip     Radiographs: Radiographs reveal advanced degenerative changes including subchondral cyst formation, subchondral sclerosis, osteophyte formation, joint space narrowing.     Knee Alignment:  Significiant valgus    Diagnosis: Primary osteoarthritis Right knee    Plan: Right total knee arthroplasty    Due to the serious nature of total joint infection and the high prevalence of community acquired MRSA, vancomycin will be used perioperatively.

## 2023-06-06 NOTE — DISCHARGE INSTRUCTIONS
Your surgery has been scheduled for:_______6/26/23___________________________________    You should report to:  ____[facility], located on the [place] side of the first floor of the           [facility] ([phone])  ____[facility], located on the [facility] side of the            Second floor of the [facility] ([phone])  ____3rd Floor [facility] located on the [facility] side of the [facility] [phone]  __X__[facility]: located at [address] [facility] [place] [address]. [address].     Please Note   Tell your doctor if you take Aspirin, products containing Aspirin, herbal medications  or blood thinners, such as Coumadin, Ticlid, or Plavix.  (Consult your provider regarding holding or stopping before surgery).  Arrange for someone to drive you home following surgery.  You will not be allowed to leave the surgical facility alone or drive yourself home following sedation and anesthesia.    Before Surgery  Stop taking all herbal medications, vitamins, and supplements 7 days prior to surgery  No Motrin/Advil (Ibuprofen) 7 days before surgery  No Aleve (Naproxen) 7 days before surgery  Stop Taking Asprin, products containing Aspirin _____days before surgery   No Goody's/BC Powder 7 days before surgery  Refrain from drinking alcoholic beverages for 24 hours before and after surgery  Stop or limit smoking at least 24 hours prior to surgery  You may take Tylenol for pain    Night before Surgery  Do not eat or drink after midnight  Take a shower or bath (shower is recommended).  Bathe with Hibiclens soap or an antibacterial soap from the neck down.  If not supplied by your surgeon, hibiclens soap will need to be purchased over the counter in pharmacy.  Rinse soap off thoroughly.  Shampoo your hair with your regular shampoo    The Day of Surgery  Take another bath or shower with hibiclens or  any antibacterial soap, to reduce the chance of infection.  Take heart and blood pressure medications with a small sip of water, as advised by the perioperative team.  Do not take fluid pills  You may brush your teeth and rinse your mouth, but do not swallow any additional water.   Do not apply perfumes, powder, body lotions or deodorant on the day of surgery.  Nail polish should be removed.  Do not wear makeup or moisturizer  Wear comfortable clothes, such as a button front shirt and loose fitting pants.  Leave all jewelry, including body piercings, and valuables at home.    Bring any devices you will neeed after surgery such as crutches or canes.  If you have sleep apnea, please bring your CPAP machine  In the event that your physical condition changes including the onset of a cold or respiratory illness, or if you have to delay or cancel your surgery, please notify your surgeon.

## 2023-06-06 NOTE — PROGRESS NOTES
[facility]  Progress Note    Patient Name: Yayo Carver  MRN: [id number]  Date of Evaluation- 06/09/2023  PCP- Berta Hastings MD    Future cases for Elisa Carver [[id number]]       Case ID Status Date Time Naveen Procedure Provider Location    [id number] [facility] 6/26/2023 10:05  ARTHROPLASTY, KNEE, TOTAL: RIGHT: DEPUY - ATTUNE Jc Padgett III, MD [[id number]] [facility]            HPI:  This is a 57 y.o. female  who presents today for a preoperative evaluation in preparation for an  Orthopedic  procedure.  Scheduled for right knee arthroplasty.   Surgery is indicated for osteoarthritis of right knee.  I have seen this patient before in November 2022 for preop eval before left knee surgery.   Details of current problem: The duration of problem is 9 years. History of bilateral knee pain; S/P left knee arthroplasty 12/19/22. She has tried multiple steroid injections in past with minimal relief.  She is now ready for right knee surgery as pain is becoming progressively worse.   Reports symptoms of  aching pain to right knee.   Aggravating factors include: standing, sitting, and decreased ADls.      Relieving factors are Tramadol/movement.      Reports pain: 8/10 to right knee today; uses KT taping for support.   The history has been obtained by speaking with the patient and reviewing the electronic medical record and/or outside health information. Significant health conditions for the perioperative period are discussed below in assessment and plan.     Patient reports current health status to be Good.  Denies any new symptoms before surgery.        Subjective/ Objective:     Chief Complaint: Preoperative evaulation, perioperative medical management, and complication reduction plan.     Functional Capacity: can walk 12 blocks without CP/SOB.       Anesthesia issues: cough with GETA (gastric sleeve)    Difficulty mouth opening: No    Steroid use in the last 12 months:  yes- to right knee    Dental Issues:  No    Family anesthesia difficulty: father  did not come out of anesthesia after I&D procedure- passed away.        Family Hx of Thrombosis: None    Past Medical History:   Diagnosis Date    Arthritis     Diabetes mellitus     Diabetes mellitus, type 2     GERD (gastroesophageal reflux disease)     HLD (hyperlipidemia)     Hypertension          Past Medical History Pertinent Negatives:   Diagnosis Date Noted    Amblyopia 2018    Anxiety 2022    Asthma 2022    Cataract 2018    CHF (congestive heart failure) 2022    COPD (chronic obstructive pulmonary disease) 2022    Coronary artery disease 2022    Deep vein thrombosis 2022    Depression 2022    Diabetic retinopathy 2018    Glaucoma 2018    Macular degeneration 2018    Myocardial infarction 2022    Pulmonary embolism 2022    Retinal detachment 2018    Sickle cell anemia 2018    Sickle cell trait 2018    Strabismus 2018    Thyroid disease 2022    Uveitis 2018         Past Surgical History:   Procedure Laterality Date    BARIATRIC SURGERY  2022    Gastric sleeve     SECTION  1989    COLONOSCOPY N/A 2017    Procedure: COLONOSCOPY;  Surgeon: Evelin Arceo MD;  Location: [address]);  Service: Endoscopy;  Laterality: N/A;  2 nd floor d/t BMI > 50 kg/m3    KNEE ARTHROSCOPY Right     TONSILLECTOMY      TOTAL KNEE ARTHROPLASTY Left 2022    Procedure: ARTHROPLASTY, KNEE, TOTAL: LEFT: DEPUY - ATTUNE;  Surgeon: Jc Padgett III, MD;  Location: [facility];  Service: Orthopedics;  Laterality: Left;    TUBAL LIGATION         Review of Systems   Constitutional:  Negative for chills, fatigue, fever and unexpected weight change.   HENT:  Negative for dental problem, hearing loss, postnasal drip, rhinorrhea, sore throat, tinnitus and trouble swallowing.    Eyes:  Negative for photophobia, pain, discharge and visual  "disturbance.   Respiratory:  Negative for apnea, cough, chest tightness, shortness of breath and wheezing.         STOP BANG risk factors:  Snoring  HTN  BMI > 35     Cardiovascular:  Negative for chest pain, palpitations and leg swelling.   Gastrointestinal:  Negative for abdominal pain, blood in stool, constipation, nausea and vomiting.        Denies Fatty liver, Hepatitis   Endocrine: Negative for cold intolerance and heat intolerance.   Genitourinary:  Negative for decreased urine volume, difficulty urinating, dysuria, frequency, hematuria and urgency.   Musculoskeletal:  Positive for arthralgias (right  knee). Negative for neck pain and neck stiffness.   Skin:  Negative for rash and wound.   Neurological:  Positive for numbness (left hand). Negative for dizziness, tremors, seizures, syncope, weakness and headaches.   Hematological:  Negative for adenopathy. Does not bruise/bleed easily.   Psychiatric/Behavioral:  Negative for confusion, sleep disturbance and suicidal ideas.             VITALS  Visit Vitals  BP (!) 170/86 (BP Location: Left arm, Patient Position: Sitting)   Pulse 76   Temp 98.1 °F (36.7 °C) (Oral)   Ht 5' 6" (1.676 m)   Wt 108.4 kg (239 lb)   SpO2 98%   BMI 38.58 kg/m²          Physical Exam  Vitals reviewed.   Constitutional:       General: She is not in acute distress.     Appearance: She is well-developed. She is obese.   HENT:      Head: Normocephalic.      Nose: Nose normal.      Mouth/Throat:      Pharynx: No oropharyngeal exudate.   Eyes:      General:         Right eye: No discharge.         Left eye: No discharge.      Conjunctiva/sclera: Conjunctivae normal.      Pupils: Pupils are equal, round, and reactive to light.   Neck:      Thyroid: No thyromegaly.      Vascular: No carotid bruit or JVD.      Trachea: No tracheal deviation.   Cardiovascular:      Rate and Rhythm: Normal rate and regular rhythm.      Pulses:           Carotid pulses are 2+ on the right side and 2+ on the left " side.       Dorsalis pedis pulses are 2+ on the right side and 2+ on the left side.        Posterior tibial pulses are 2+ on the right side and 2+ on the left side.      Heart sounds: Normal heart sounds. No murmur heard.  Pulmonary:      Effort: Pulmonary effort is normal. No respiratory distress.      Breath sounds: Normal breath sounds. No stridor. No wheezing, rhonchi or rales.   Abdominal:      General: Bowel sounds are normal. There is no distension.      Palpations: Abdomen is soft.      Tenderness: There is no abdominal tenderness. There is no guarding.   Musculoskeletal:      Cervical back: Normal range of motion. No pain with movement.      Right lower leg: No edema.      Left lower leg: No edema.   Lymphadenopathy:      Cervical: No cervical adenopathy.   Skin:     General: Skin is warm and dry.      Capillary Refill: Capillary refill takes less than 2 seconds.      Findings: No erythema or rash.   Neurological:      Mental Status: She is alert and oriented to person, place, and time.      Coordination: Coordination normal.        Significant Labs:  Lab Results   Component Value Date    WBC 8.09 2023    HGB 11.7 (L) 2023    HCT 39.5 2023     2023    CHOL 173 2023    TRIG 96 2023    HDL 59 2023    ALT 15 2023    AST 14 2023     2023    K 4.4 2023     (H) 2023    CREATININE 0.9 2023    BUN 14 2023    CO2 26 2023    TSH 1.832 10/13/2021    INR 0.9 2023    HGBA1C 5.7 (H) 2023       Diagnostic Studies: No relevant studies.    EK23        2D ECHO:  TTE:  CONCLUSIONS     1 - Normal left ventricular systolic function (EF 60-65%).     2 - Left ventricular diastolic dysfunction.     3 - Moderate left atrial enlargement.     4 - Normal right ventricular systolic function .     5 - Mild mitral regurgitation.         This document has been electronically    SIGNED BY: Hugo Rogers MD On:  02/02/2016 20:27         Imaging Xray L-Spine 10/7/22    FINDINGS:  There is grade 1 anterolisthesis of L3 on L4.  Lumbar spine alignment otherwise appears within normal limits.  No evidence of acute lumbar spine fracture or subluxation.  Extensive multilevel degenerative changes are seen throughout the lumbar spine with intervertebral disc space narrowing and degenerative endplate changes seen most pronounced at L3-4.  There is lower lumbar facet arthropathy.  Visualized sacrum is normal in appearance.     Impression:     No acute lumbar spine abnormalities identified.  Multilevel lumbar DJD.        Electronically signed by: Astrid Kowalski MD  Date:                                            10/07/2022  Time:                                           01:22      CT L-Spine 7/8/19  Impression:     No evidence of acute fracture of the lumbar spine.     2 mm anterolisthesis of L3 on L4.  The findings are most likely on the basis of degenerative changes.     Advanced multilevel degenerative changes of the lumbar spine.  Follow-up with MRI of the lumbar spine may be obtained, as clinically warranted.        Electronically signed by: Nikko Willingham MD  Date:                                            07/08/2019  Time:                                           17:21        Active Cardiac Conditions: None      Revised Cardiac Risk Index   High -Risk Surgery  Intraperitoneal; Intrathoracic; suprainguinal vascular Yes- + 1 No- 0   History of Ischemic Heart Disease   (Hx of MI/positive exercise test/current chest pain due to ischemia/use of nitrate therapy/EKG with pathological Q waves) Yes- + 1 No- 0   History of CHF  (Pulmonary edema/bilateral rales or S3 gallop/PND/CXR showing pulmonary vascular redistribution) Yes- + 1 No- 0   History of CVA   (Prior stroke or TIA) Yes- + 1 No- 0   Pre-operative treatment with insulin Yes- + 1 No- 0   Pre-operative creatinine > 2mg/dl Yes- + 1 No- 0   Total: 0      Risk Status:  Estimated  risk of cardiac complications after non-cardiac surgery using the Revised Cardiac Risk Index for Preoperative risk is 3.9 %      ARISCAT (Canet) risk index: Low: 1.6% risk of post-op pulmonary complications; Intermediate: 13.3% risk of post-op pulmonary complications if surgery is > 3 hours.     American Society of Anesthesiologists Physical Status classification (ASA): 2           No further cardiac workup needed prior to surgery.          Orders Placed This Encounter    Protime-INR    Hemoglobin A1C    EKG 12-lead           Assessment/Plan:     Mixed hyperlipidemia  Recommend continued weight loss, healthy diet (DASH/Mediterranean) and exercise.   Patient should exercise 30 minutes at least five times weekly. Limit alcohol.  Treated with: atorvastatin 40 mg     Essential hypertension  Current BP  not at goal today; 170/86. Attributes high reading to pain.   Taking: amlodipine    Lifestyle changes to reduce systolic BP:  exercise 30 minutes per day,  5 days per week or 150 minutes weekly; sodium reduction and avoidance of high salt foods such as processed meats, frozen meals and  fast foods.   Keeping a healthy weight/BMI can help with better BP control     I recommend monitoring BP during perioperative period as uncontrolled pain can elevate blood pressure.           DOM (iron deficiency anemia)  Current labs:  Hgb- 11.7     Hct-  39.5; stable.  Not on iron therapy  Recommend monitoring during perioperative period.     Class 2 severe obesity with serious comorbidity and body mass index (BMI) of 38.0 to 38.9 in adult  Patient would benefit from weight loss and has set goals to achieve success.  S/P Gastric Sleeve 6/2022 at [facility]; last seen by outside Bariatric Clinic 2/23/23.  Reports losing  around 123 lbs since surgery.  Currently on vitamin B-complex/MVI  Recommend avoiding NSAIDs and ASA use for the increased risk of ulcer development.        GERD (gastroesophageal reflux disease)  Currently  alternates with Protonix and Omeprazole; controlled.  Encouraged to elevate HOB and avoid laying down for 2-3 hours after meals.        Primary osteoarthritis of right knee  Scheduled with Dr. Padgett on 6/26/23 for right knee arthroplasty.     Vitamin D deficiency  Taking 50,000 units weekly    Type 2 diabetes mellitus without complication, without long-term current use of insulin  Not currently treated due to gastric bypass.  A1c is 5.7    Reports peripheral neuropathy- left hand only.   Denies visual changes. Up to date with eye exams.  Micro changes: Denies- Retinopathy, Nephropathy   Macro changes: Denies-  Carotid, Coronary , Peripheral disease       Discussion/Management of Perioperative Care    Thromboembolic prophylaxis (VTE) Care: Risk factors for thrombosis include: obesity and surgical procedure.  I recommend prophylaxis of thromboembolism with the use of compression stockings/pneumatic devices, and/or pharmacologic agents. The benefits should outweigh the risks for pharmacologic prophylaxis in the perioperative period. I also encourage early ambulation if not contraindicated during the post-operative period.    Risk factors for post-operative pulmonary complications include:diabetes mellitus and HTN. To reduce the risk of pulmonary complications, prophylactic recommendations include: incentive spirometry use/deep breathing, end tidal carbon dioxide monitoring, and pain control.    Risk factors for renal complications include: diabetes mellitus and HTN. To reduce the risk of postoperative renal complications, I recommend the patient maintain adequate fluid volume status by drinking 2 liters of water daily.  Avoid/reduce NSAIDS and DODD-2 inhibitors use as well as IV contrast for renal protection.    I recommend the use of appropriate prophylactic antibiotics to reduce the risk of surgical site infections.    The patient is at an increased risk for urinary retention due to : possible regional anesthesia. I  recommend to avoid/decrease the use of benzodiazepines, anticholinergics, and Benadryl in the perioperative period. I also recommend using opioids for the shortest period of time if possible.          This visit was focused on Preoperative evaluation, Perioperative Medical management, complication reduction plans. I suggest that the patient follows up with primary care or relevant sub specialists for ongoing health care.    I appreciate the opportunity to be involved in this patients care. Please feel free to contact me if there were any questions about this consultation.        I spent a total of 68 minutes on the day of the visit.This includes face to face time and non-face to face time preparing to see the patient (e.g., review of tests), obtaining and/or reviewing separately obtained history, documenting clinical information in the electronic or other health record, independently interpreting results and communicating results to the patient/family/caregiver, or care coordinator.       Patient is optimized for surgery.            Ivan Cifuentes NP  Perioperative Medicine  [facility]

## 2023-06-08 DIAGNOSIS — M17.11 PRIMARY OSTEOARTHRITIS OF RIGHT KNEE: Primary | ICD-10-CM

## 2023-06-12 ENCOUNTER — HOSPITAL ENCOUNTER (OUTPATIENT)
Dept: RADIOLOGY | Facility: HOSPITAL | Age: 58
Discharge: HOME | End: 2023-06-12
Attending: ORTHOPAEDIC SURGERY
Payer: MEDICAID

## 2023-06-12 DIAGNOSIS — M17.11 PRIMARY OSTEOARTHRITIS OF RIGHT KNEE: ICD-10-CM

## 2023-06-12 DIAGNOSIS — M17.0 PRIMARY OSTEOARTHRITIS OF BOTH KNEES: ICD-10-CM

## 2023-06-12 PROCEDURE — 73560 XR KNEE 1 OR 2 VIEW RIGHT: ICD-10-PCS | Mod: 26,RT,, | Performed by: RADIOLOGY

## 2023-06-12 PROCEDURE — 73560 X-RAY EXAM OF KNEE 1 OR 2: CPT | Mod: 26,RT,, | Performed by: RADIOLOGY

## 2023-06-12 PROCEDURE — 73560 X-RAY EXAM OF KNEE 1 OR 2: CPT | Mod: TC,RT

## 2023-06-13 RX ORDER — MELOXICAM 15 MG/1
TABLET ORAL
Qty: 30 TABLET | Refills: 1 | Status: ON HOLD | OUTPATIENT
Start: 2023-06-13 | End: 2023-06-26 | Stop reason: HOSPADM

## 2023-06-23 ENCOUNTER — PATIENT MESSAGE (OUTPATIENT)
Dept: ADMINISTRATIVE | Facility: OTHER | Age: 58
End: 2023-06-23
Payer: MEDICAID

## 2023-06-23 ENCOUNTER — TELEPHONE (OUTPATIENT)
Dept: ORTHOPEDICS | Facility: CLINIC | Age: 58
End: 2023-06-23
Payer: MEDICAID

## 2023-06-23 NOTE — TELEPHONE ENCOUNTER
I called the patient today regarding surgery on 6/26/2023 with Dr. Jc Padgett. I informed the patient that her surgery will be at  [facility] ([facility] [place] [address]). I informed the patient they must arrive at 9:00am and their surgery will start at 11:00am.     Per the [facility] COVID-19 Pre-Procedural Testing Policy (administered 10/26/2022), patients do not need tested for COVID-19 regardless of vaccination status.    I reminded the patient that they cannot eat or drink after midnight, the night before surgery.     I reminded the patient to be careful of their skin over the next few days to make sure they do not get any cuts, scratches or scrapes.    The patient verbalized that they have received their walker, bedside commode and shower chair from [facility].    The patient verbalized understanding and has no further questions.

## 2023-06-25 RX ORDER — CEFADROXIL 500 MG/1
500 CAPSULE ORAL EVERY 12 HOURS
Qty: 14 CAPSULE | Refills: 0 | Status: SHIPPED | OUTPATIENT
Start: 2023-06-25 | End: 2023-12-21 | Stop reason: ALTCHOICE

## 2023-06-25 RX ORDER — DOCUSATE SODIUM 100 MG/1
100 CAPSULE, LIQUID FILLED ORAL 2 TIMES DAILY
Qty: 60 CAPSULE | Refills: 0 | Status: SHIPPED | OUTPATIENT
Start: 2023-06-25 | End: 2023-12-21 | Stop reason: ALTCHOICE

## 2023-06-25 RX ORDER — CELECOXIB 200 MG/1
200 CAPSULE ORAL DAILY
Qty: 30 CAPSULE | Refills: 0 | Status: SHIPPED | OUTPATIENT
Start: 2023-06-25 | End: 2023-12-06 | Stop reason: SDUPTHER

## 2023-06-25 RX ORDER — OXYCODONE HYDROCHLORIDE 5 MG/1
TABLET ORAL
Qty: 50 TABLET | Refills: 0 | Status: SHIPPED | OUTPATIENT
Start: 2023-06-25 | End: 2023-07-01 | Stop reason: SDUPTHER

## 2023-06-25 RX ORDER — PANTOPRAZOLE SODIUM 40 MG/1
40 TABLET, DELAYED RELEASE ORAL DAILY
Qty: 30 TABLET | Refills: 0 | Status: SHIPPED | OUTPATIENT
Start: 2023-06-25 | End: 2023-09-21 | Stop reason: SDUPTHER

## 2023-06-25 RX ORDER — METHOCARBAMOL 750 MG/1
750 TABLET, FILM COATED ORAL 4 TIMES DAILY PRN
Qty: 40 TABLET | Refills: 0 | Status: SHIPPED | OUTPATIENT
Start: 2023-06-25 | End: 2023-07-06 | Stop reason: SDUPTHER

## 2023-06-25 RX ORDER — DEXTROMETHORPHAN HYDROBROMIDE, GUAIFENESIN 5; 100 MG/5ML; MG/5ML
650 LIQUID ORAL EVERY 8 HOURS
Qty: 120 TABLET | Refills: 0 | Status: SHIPPED | OUTPATIENT
Start: 2023-06-25

## 2023-06-26 ENCOUNTER — TELEPHONE (OUTPATIENT)
Dept: PHARMACY | Facility: CLINIC | Age: 58
End: 2023-06-26
Payer: MEDICAID

## 2023-06-26 ENCOUNTER — ANESTHESIA (OUTPATIENT)
Dept: SURGERY | Facility: HOSPITAL | Age: 58
End: 2023-06-26
Payer: MEDICAID

## 2023-06-26 ENCOUNTER — ANESTHESIA EVENT (OUTPATIENT)
Dept: SURGERY | Facility: HOSPITAL | Age: 58
End: 2023-06-26
Payer: MEDICAID

## 2023-06-26 ENCOUNTER — HOSPITAL ENCOUNTER (OUTPATIENT)
Facility: HOSPITAL | Age: 58
Discharge: HOME | End: 2023-06-27
Attending: ORTHOPAEDIC SURGERY | Admitting: ORTHOPAEDIC SURGERY
Payer: MEDICAID

## 2023-06-26 DIAGNOSIS — M17.11 PRIMARY OSTEOARTHRITIS OF RIGHT KNEE: ICD-10-CM

## 2023-06-26 DIAGNOSIS — M17.11 RIGHT KNEE DJD: ICD-10-CM

## 2023-06-26 PROCEDURE — 99900035 HC TECH TIME PER 15 MIN (STAT)

## 2023-06-26 PROCEDURE — D9220A PRA ANESTHESIA: ICD-10-PCS | Mod: ANES,,, | Performed by: ANESTHESIOLOGY

## 2023-06-26 PROCEDURE — 01402 ANES OPN/ARTH TOT KNE ARTHRP: CPT | Performed by: ORTHOPAEDIC SURGERY

## 2023-06-26 PROCEDURE — 63600175 PHARM REV CODE 636 W HCPCS: Performed by: ORTHOPAEDIC SURGERY

## 2023-06-26 PROCEDURE — 63600175 PHARM REV CODE 636 W HCPCS: Performed by: NURSE ANESTHETIST, CERTIFIED REGISTERED

## 2023-06-26 PROCEDURE — 71000033 HC RECOVERY, INTIAL HOUR: Performed by: ORTHOPAEDIC SURGERY

## 2023-06-26 PROCEDURE — 63600175 PHARM REV CODE 636 W HCPCS: Performed by: ANESTHESIOLOGY

## 2023-06-26 PROCEDURE — 71000039 HC RECOVERY, EACH ADD'L HOUR: Performed by: ORTHOPAEDIC SURGERY

## 2023-06-26 PROCEDURE — C1713 ANCHOR/SCREW BN/BN,TIS/BN: HCPCS | Performed by: ORTHOPAEDIC SURGERY

## 2023-06-26 PROCEDURE — C1776 JOINT DEVICE (IMPLANTABLE): HCPCS | Performed by: ORTHOPAEDIC SURGERY

## 2023-06-26 PROCEDURE — 25000003 PHARM REV CODE 250: Performed by: NURSE PRACTITIONER

## 2023-06-26 PROCEDURE — 37000009 HC ANESTHESIA EA ADD 15 MINS: Performed by: ORTHOPAEDIC SURGERY

## 2023-06-26 PROCEDURE — 27201423 OPTIME MED/SURG SUP & DEVICES STERILE SUPPLY: Performed by: ORTHOPAEDIC SURGERY

## 2023-06-26 PROCEDURE — 97165 OT EVAL LOW COMPLEX 30 MIN: CPT

## 2023-06-26 PROCEDURE — 36000711: Performed by: ORTHOPAEDIC SURGERY

## 2023-06-26 PROCEDURE — D9220A PRA ANESTHESIA: Mod: CRNA,,, | Performed by: NURSE ANESTHETIST, CERTIFIED REGISTERED

## 2023-06-26 PROCEDURE — 27447 TOTAL KNEE ARTHROPLASTY: CPT | Mod: 22,RT,, | Performed by: ORTHOPAEDIC SURGERY

## 2023-06-26 PROCEDURE — 94761 N-INVAS EAR/PLS OXIMETRY MLT: CPT

## 2023-06-26 PROCEDURE — D9220A PRA ANESTHESIA: ICD-10-PCS | Mod: CRNA,,, | Performed by: NURSE ANESTHETIST, CERTIFIED REGISTERED

## 2023-06-26 PROCEDURE — 25000003 PHARM REV CODE 250: Performed by: ORTHOPAEDIC SURGERY

## 2023-06-26 PROCEDURE — 37000008 HC ANESTHESIA 1ST 15 MINUTES: Performed by: ORTHOPAEDIC SURGERY

## 2023-06-26 PROCEDURE — 63600175 PHARM REV CODE 636 W HCPCS: Performed by: NURSE PRACTITIONER

## 2023-06-26 PROCEDURE — 97161 PT EVAL LOW COMPLEX 20 MIN: CPT

## 2023-06-26 PROCEDURE — 36000710: Performed by: ORTHOPAEDIC SURGERY

## 2023-06-26 PROCEDURE — 27447 PR TOTAL KNEE ARTHROPLASTY: ICD-10-PCS | Mod: 22,RT,, | Performed by: ORTHOPAEDIC SURGERY

## 2023-06-26 PROCEDURE — D9220A PRA ANESTHESIA: Mod: ANES,,, | Performed by: ANESTHESIOLOGY

## 2023-06-26 PROCEDURE — 97535 SELF CARE MNGMENT TRAINING: CPT

## 2023-06-26 PROCEDURE — 25000003 PHARM REV CODE 250: Performed by: ANESTHESIOLOGY

## 2023-06-26 PROCEDURE — 94799 UNLISTED PULMONARY SVC/PX: CPT

## 2023-06-26 PROCEDURE — 25000003 PHARM REV CODE 250: Performed by: NURSE ANESTHETIST, CERTIFIED REGISTERED

## 2023-06-26 PROCEDURE — 97116 GAIT TRAINING THERAPY: CPT

## 2023-06-26 DEVICE — PATELLA ATTUNE MEDLZD DOME 35: Type: IMPLANTABLE DEVICE | Site: KNEE | Status: FUNCTIONAL

## 2023-06-26 DEVICE — IMPLANTABLE DEVICE: Type: IMPLANTABLE DEVICE | Site: KNEE | Status: FUNCTIONAL

## 2023-06-26 DEVICE — CEMENT BONE SURG SMPLX P RADPQ: Type: IMPLANTABLE DEVICE | Site: KNEE | Status: FUNCTIONAL

## 2023-06-26 RX ORDER — SODIUM CHLORIDE 0.9 % (FLUSH) 0.9 %
10 SYRINGE (ML) INJECTION
Status: DISCONTINUED | OUTPATIENT
Start: 2023-06-26 | End: 2023-06-26 | Stop reason: HOSPADM

## 2023-06-26 RX ORDER — TALC
6 POWDER (GRAM) TOPICAL NIGHTLY PRN
Status: DISCONTINUED | OUTPATIENT
Start: 2023-06-26 | End: 2023-06-26 | Stop reason: HOSPADM

## 2023-06-26 RX ORDER — ONDANSETRON 2 MG/ML
INJECTION INTRAMUSCULAR; INTRAVENOUS
Status: DISCONTINUED | OUTPATIENT
Start: 2023-06-26 | End: 2023-06-26

## 2023-06-26 RX ORDER — TRANEXAMIC ACID 100 MG/ML
INJECTION, SOLUTION INTRAVENOUS
Status: DISCONTINUED | OUTPATIENT
Start: 2023-06-26 | End: 2023-06-26 | Stop reason: HOSPADM

## 2023-06-26 RX ORDER — ACETAMINOPHEN 500 MG
1000 TABLET ORAL
Status: COMPLETED | OUTPATIENT
Start: 2023-06-26 | End: 2023-06-26

## 2023-06-26 RX ORDER — CELECOXIB 200 MG/1
400 CAPSULE ORAL ONCE
Status: COMPLETED | OUTPATIENT
Start: 2023-06-26 | End: 2023-06-26

## 2023-06-26 RX ORDER — MORPHINE SULFATE 2 MG/ML
2 INJECTION, SOLUTION INTRAMUSCULAR; INTRAVENOUS
Status: DISCONTINUED | OUTPATIENT
Start: 2023-06-26 | End: 2023-06-27 | Stop reason: HOSPADM

## 2023-06-26 RX ORDER — VANCOMYCIN HYDROCHLORIDE 1 G/20ML
INJECTION, POWDER, LYOPHILIZED, FOR SOLUTION INTRAVENOUS
Status: DISCONTINUED | OUTPATIENT
Start: 2023-06-26 | End: 2023-06-26 | Stop reason: HOSPADM

## 2023-06-26 RX ORDER — OXYCODONE HYDROCHLORIDE 5 MG/1
5 TABLET ORAL
Status: DISCONTINUED | OUTPATIENT
Start: 2023-06-26 | End: 2023-06-26 | Stop reason: HOSPADM

## 2023-06-26 RX ORDER — ONDANSETRON 2 MG/ML
4 INJECTION INTRAMUSCULAR; INTRAVENOUS DAILY PRN
Status: DISCONTINUED | OUTPATIENT
Start: 2023-06-26 | End: 2023-06-26 | Stop reason: HOSPADM

## 2023-06-26 RX ORDER — AMLODIPINE BESYLATE 10 MG/1
10 TABLET ORAL DAILY
Status: DISCONTINUED | OUTPATIENT
Start: 2023-06-27 | End: 2023-06-27 | Stop reason: HOSPADM

## 2023-06-26 RX ORDER — CEFAZOLIN SODIUM 1 G/3ML
INJECTION, POWDER, FOR SOLUTION INTRAMUSCULAR; INTRAVENOUS
Status: DISCONTINUED | OUTPATIENT
Start: 2023-06-26 | End: 2023-06-26

## 2023-06-26 RX ORDER — NICARDIPINE HYDROCHLORIDE 2.5 MG/ML
INJECTION INTRAVENOUS
Status: DISCONTINUED | OUTPATIENT
Start: 2023-06-26 | End: 2023-06-26

## 2023-06-26 RX ORDER — PREGABALIN 75 MG/1
75 CAPSULE ORAL NIGHTLY
Status: DISCONTINUED | OUTPATIENT
Start: 2023-06-26 | End: 2023-06-27 | Stop reason: HOSPADM

## 2023-06-26 RX ORDER — NALOXONE HCL 0.4 MG/ML
0.02 VIAL (ML) INJECTION
Status: DISCONTINUED | OUTPATIENT
Start: 2023-06-26 | End: 2023-06-27 | Stop reason: HOSPADM

## 2023-06-26 RX ORDER — ASPIRIN 81 MG/1
81 TABLET ORAL ONCE
Status: COMPLETED | OUTPATIENT
Start: 2023-06-26 | End: 2023-06-27

## 2023-06-26 RX ORDER — FAMOTIDINE 10 MG/ML
INJECTION INTRAVENOUS
Status: DISCONTINUED | OUTPATIENT
Start: 2023-06-26 | End: 2023-06-26

## 2023-06-26 RX ORDER — MIDAZOLAM HYDROCHLORIDE 1 MG/ML
INJECTION INTRAMUSCULAR; INTRAVENOUS
Status: DISCONTINUED | OUTPATIENT
Start: 2023-06-26 | End: 2023-06-26

## 2023-06-26 RX ORDER — PROPOFOL 10 MG/ML
VIAL (ML) INTRAVENOUS
Status: DISCONTINUED | OUTPATIENT
Start: 2023-06-26 | End: 2023-06-26

## 2023-06-26 RX ORDER — ROPIVACAINE/EPI/CLONIDINE/KET 2.46-0.005
SYRINGE (ML) INJECTION
Status: DISCONTINUED | OUTPATIENT
Start: 2023-06-26 | End: 2023-06-26 | Stop reason: HOSPADM

## 2023-06-26 RX ORDER — KETAMINE HCL IN 0.9 % NACL 50 MG/5 ML
SYRINGE (ML) INTRAVENOUS
Status: DISCONTINUED | OUTPATIENT
Start: 2023-06-26 | End: 2023-06-26

## 2023-06-26 RX ORDER — SODIUM CHLORIDE 0.9 % (FLUSH) 0.9 %
SYRINGE (ML) INJECTION
Status: CANCELLED | OUTPATIENT
Start: 2023-06-26

## 2023-06-26 RX ORDER — MUPIROCIN 20 MG/G
1 OINTMENT TOPICAL
Status: COMPLETED | OUTPATIENT
Start: 2023-06-26 | End: 2023-06-26

## 2023-06-26 RX ORDER — SODIUM CHLORIDE 9 MG/ML
INJECTION, SOLUTION INTRAVENOUS
Status: COMPLETED | OUTPATIENT
Start: 2023-06-26 | End: 2023-06-26

## 2023-06-26 RX ORDER — PREGABALIN 75 MG/1
75 CAPSULE ORAL
Status: COMPLETED | OUTPATIENT
Start: 2023-06-26 | End: 2023-06-26

## 2023-06-26 RX ORDER — POLYETHYLENE GLYCOL 3350 17 G/17G
17 POWDER, FOR SOLUTION ORAL DAILY
Status: DISCONTINUED | OUTPATIENT
Start: 2023-06-27 | End: 2023-06-27 | Stop reason: HOSPADM

## 2023-06-26 RX ORDER — OXYCODONE HYDROCHLORIDE 10 MG/1
10 TABLET ORAL
Status: DISCONTINUED | OUTPATIENT
Start: 2023-06-26 | End: 2023-06-27 | Stop reason: HOSPADM

## 2023-06-26 RX ORDER — PROCHLORPERAZINE EDISYLATE 5 MG/ML
5 INJECTION INTRAMUSCULAR; INTRAVENOUS EVERY 6 HOURS PRN
Status: DISCONTINUED | OUTPATIENT
Start: 2023-06-26 | End: 2023-06-27 | Stop reason: HOSPADM

## 2023-06-26 RX ORDER — ATORVASTATIN CALCIUM 20 MG/1
40 TABLET, FILM COATED ORAL DAILY
Status: DISCONTINUED | OUTPATIENT
Start: 2023-06-27 | End: 2023-06-27 | Stop reason: HOSPADM

## 2023-06-26 RX ORDER — LABETALOL HYDROCHLORIDE 5 MG/ML
INJECTION, SOLUTION INTRAVENOUS
Status: DISCONTINUED | OUTPATIENT
Start: 2023-06-26 | End: 2023-06-26

## 2023-06-26 RX ORDER — OXYCODONE HYDROCHLORIDE 5 MG/1
5 TABLET ORAL
Status: DISCONTINUED | OUTPATIENT
Start: 2023-06-26 | End: 2023-06-26

## 2023-06-26 RX ORDER — METHOCARBAMOL 750 MG/1
750 TABLET, FILM COATED ORAL 3 TIMES DAILY
Status: DISCONTINUED | OUTPATIENT
Start: 2023-06-26 | End: 2023-06-27 | Stop reason: HOSPADM

## 2023-06-26 RX ORDER — MIDAZOLAM HYDROCHLORIDE 1 MG/ML
4 INJECTION INTRAMUSCULAR; INTRAVENOUS
Status: DISCONTINUED | OUTPATIENT
Start: 2023-06-26 | End: 2023-06-26 | Stop reason: HOSPADM

## 2023-06-26 RX ORDER — FENTANYL CITRATE 50 UG/ML
25 INJECTION, SOLUTION INTRAMUSCULAR; INTRAVENOUS EVERY 5 MIN PRN
Status: COMPLETED | OUTPATIENT
Start: 2023-06-26 | End: 2023-06-26

## 2023-06-26 RX ORDER — PROPOFOL 10 MG/ML
VIAL (ML) INTRAVENOUS CONTINUOUS PRN
Status: DISCONTINUED | OUTPATIENT
Start: 2023-06-26 | End: 2023-06-26

## 2023-06-26 RX ORDER — ACETAMINOPHEN 500 MG
1000 TABLET ORAL EVERY 6 HOURS
Status: DISCONTINUED | OUTPATIENT
Start: 2023-06-26 | End: 2023-06-27 | Stop reason: HOSPADM

## 2023-06-26 RX ORDER — MUPIROCIN 20 MG/G
1 OINTMENT TOPICAL 2 TIMES DAILY
Status: DISCONTINUED | OUTPATIENT
Start: 2023-06-26 | End: 2023-06-27 | Stop reason: HOSPADM

## 2023-06-26 RX ORDER — FENTANYL CITRATE 50 UG/ML
100 INJECTION, SOLUTION INTRAMUSCULAR; INTRAVENOUS
Status: DISCONTINUED | OUTPATIENT
Start: 2023-06-26 | End: 2023-06-26 | Stop reason: HOSPADM

## 2023-06-26 RX ORDER — OXYCODONE HYDROCHLORIDE 5 MG/1
5 TABLET ORAL
Status: DISCONTINUED | OUTPATIENT
Start: 2023-06-26 | End: 2023-06-27 | Stop reason: HOSPADM

## 2023-06-26 RX ORDER — AMOXICILLIN 250 MG
1 CAPSULE ORAL 2 TIMES DAILY
Status: DISCONTINUED | OUTPATIENT
Start: 2023-06-26 | End: 2023-06-27 | Stop reason: HOSPADM

## 2023-06-26 RX ORDER — ONDANSETRON 2 MG/ML
4 INJECTION INTRAMUSCULAR; INTRAVENOUS EVERY 8 HOURS PRN
Status: DISCONTINUED | OUTPATIENT
Start: 2023-06-26 | End: 2023-06-27 | Stop reason: HOSPADM

## 2023-06-26 RX ORDER — FENTANYL CITRATE 50 UG/ML
INJECTION, SOLUTION INTRAMUSCULAR; INTRAVENOUS
Status: DISCONTINUED | OUTPATIENT
Start: 2023-06-26 | End: 2023-06-26

## 2023-06-26 RX ORDER — FAMOTIDINE 20 MG/1
20 TABLET, FILM COATED ORAL 2 TIMES DAILY
Status: DISCONTINUED | OUTPATIENT
Start: 2023-06-26 | End: 2023-06-27 | Stop reason: HOSPADM

## 2023-06-26 RX ORDER — SODIUM CHLORIDE 9 MG/ML
INJECTION, SOLUTION INTRAVENOUS CONTINUOUS
Status: DISCONTINUED | OUTPATIENT
Start: 2023-06-26 | End: 2023-06-27 | Stop reason: HOSPADM

## 2023-06-26 RX ORDER — HALOPERIDOL 5 MG/ML
0.5 INJECTION INTRAMUSCULAR EVERY 10 MIN PRN
Status: DISCONTINUED | OUTPATIENT
Start: 2023-06-26 | End: 2023-06-26 | Stop reason: HOSPADM

## 2023-06-26 RX ORDER — LIDOCAINE HYDROCHLORIDE 10 MG/ML
1 INJECTION, SOLUTION EPIDURAL; INFILTRATION; INTRACAUDAL; PERINEURAL
Status: DISCONTINUED | OUTPATIENT
Start: 2023-06-26 | End: 2023-06-26 | Stop reason: HOSPADM

## 2023-06-26 RX ORDER — TRANEXAMIC ACID 100 MG/ML
INJECTION, SOLUTION INTRAVENOUS
Status: DISCONTINUED | OUTPATIENT
Start: 2023-06-26 | End: 2023-06-26

## 2023-06-26 RX ORDER — BISACODYL 10 MG
10 SUPPOSITORY, RECTAL RECTAL EVERY 12 HOURS PRN
Status: DISCONTINUED | OUTPATIENT
Start: 2023-06-26 | End: 2023-06-27 | Stop reason: HOSPADM

## 2023-06-26 RX ORDER — DEXAMETHASONE SODIUM PHOSPHATE 4 MG/ML
INJECTION, SOLUTION INTRA-ARTICULAR; INTRALESIONAL; INTRAMUSCULAR; INTRAVENOUS; SOFT TISSUE
Status: DISCONTINUED | OUTPATIENT
Start: 2023-06-26 | End: 2023-06-26

## 2023-06-26 RX ORDER — FENTANYL CITRATE 50 UG/ML
25 INJECTION, SOLUTION INTRAMUSCULAR; INTRAVENOUS EVERY 5 MIN PRN
Status: DISCONTINUED | OUTPATIENT
Start: 2023-06-26 | End: 2023-06-26

## 2023-06-26 RX ADMIN — OXYCODONE HYDROCHLORIDE 10 MG: 10 TABLET ORAL at 08:06

## 2023-06-26 RX ADMIN — ACETAMINOPHEN 1000 MG: 500 TABLET ORAL at 09:06

## 2023-06-26 RX ADMIN — FENTANYL CITRATE 25 MCG: 50 INJECTION, SOLUTION INTRAMUSCULAR; INTRAVENOUS at 03:06

## 2023-06-26 RX ADMIN — FAMOTIDINE 20 MG: 10 INJECTION, SOLUTION INTRAVENOUS at 12:06

## 2023-06-26 RX ADMIN — CEFAZOLIN 2 G: 2 INJECTION, POWDER, FOR SOLUTION INTRAMUSCULAR; INTRAVENOUS at 08:06

## 2023-06-26 RX ADMIN — PROPOFOL 20 MG: 10 INJECTION, EMULSION INTRAVENOUS at 02:06

## 2023-06-26 RX ADMIN — TRANEXAMIC ACID 1000 MG: 100 INJECTION, SOLUTION INTRAVENOUS at 01:06

## 2023-06-26 RX ADMIN — PREGABALIN 75 MG: 75 CAPSULE ORAL at 08:06

## 2023-06-26 RX ADMIN — SODIUM CHLORIDE: 0.9 INJECTION, SOLUTION INTRAVENOUS at 03:06

## 2023-06-26 RX ADMIN — SODIUM CHLORIDE: 9 INJECTION, SOLUTION INTRAVENOUS at 09:06

## 2023-06-26 RX ADMIN — MUPIROCIN 1 G: 20 OINTMENT TOPICAL at 09:06

## 2023-06-26 RX ADMIN — SODIUM CHLORIDE, SODIUM GLUCONATE, SODIUM ACETATE, POTASSIUM CHLORIDE, MAGNESIUM CHLORIDE, SODIUM PHOSPHATE, DIBASIC, AND POTASSIUM PHOSPHATE: .53; .5; .37; .037; .03; .012; .00082 INJECTION, SOLUTION INTRAVENOUS at 12:06

## 2023-06-26 RX ADMIN — TRANEXAMIC ACID 1000 MG: 100 INJECTION, SOLUTION INTRAVENOUS at 12:06

## 2023-06-26 RX ADMIN — PREGABALIN 75 MG: 75 CAPSULE ORAL at 09:06

## 2023-06-26 RX ADMIN — FENTANYL CITRATE 50 MCG: 50 INJECTION, SOLUTION INTRAMUSCULAR; INTRAVENOUS at 12:06

## 2023-06-26 RX ADMIN — VANCOMYCIN HYDROCHLORIDE 1500 MG: 1.5 INJECTION, POWDER, LYOPHILIZED, FOR SOLUTION INTRAVENOUS at 09:06

## 2023-06-26 RX ADMIN — ACETAMINOPHEN 1000 MG: 500 TABLET ORAL at 05:06

## 2023-06-26 RX ADMIN — SENNOSIDES AND DOCUSATE SODIUM 1 TABLET: 50; 8.6 TABLET ORAL at 08:06

## 2023-06-26 RX ADMIN — Medication 30 MG: at 12:06

## 2023-06-26 RX ADMIN — LABETALOL HYDROCHLORIDE 10 MG: 5 INJECTION, SOLUTION INTRAVENOUS at 12:06

## 2023-06-26 RX ADMIN — MIDAZOLAM HYDROCHLORIDE 2 MG: 1 INJECTION, SOLUTION INTRAMUSCULAR; INTRAVENOUS at 11:06

## 2023-06-26 RX ADMIN — FENTANYL CITRATE 25 MCG: 50 INJECTION, SOLUTION INTRAMUSCULAR; INTRAVENOUS at 02:06

## 2023-06-26 RX ADMIN — SODIUM CHLORIDE: 0.9 INJECTION, SOLUTION INTRAVENOUS at 12:06

## 2023-06-26 RX ADMIN — MEPIVACAINE HYDROCHLORIDE 3 ML: 20 INJECTION, SOLUTION EPIDURAL; INFILTRATION at 12:06

## 2023-06-26 RX ADMIN — CEFAZOLIN 2 G: 330 INJECTION, POWDER, FOR SOLUTION INTRAMUSCULAR; INTRAVENOUS at 12:06

## 2023-06-26 RX ADMIN — SODIUM CHLORIDE, SODIUM GLUCONATE, SODIUM ACETATE, POTASSIUM CHLORIDE, MAGNESIUM CHLORIDE, SODIUM PHOSPHATE, DIBASIC, AND POTASSIUM PHOSPHATE: .53; .5; .37; .037; .03; .012; .00082 INJECTION, SOLUTION INTRAVENOUS at 02:06

## 2023-06-26 RX ADMIN — NICARDIPINE HYDROCHLORIDE 0.4 MCG: 25 INJECTION INTRAVENOUS at 12:06

## 2023-06-26 RX ADMIN — FAMOTIDINE 20 MG: 20 TABLET, FILM COATED ORAL at 08:06

## 2023-06-26 RX ADMIN — MORPHINE SULFATE 2 MG: 2 INJECTION, SOLUTION INTRAMUSCULAR; INTRAVENOUS at 05:06

## 2023-06-26 RX ADMIN — ONDANSETRON 4 MG: 2 INJECTION, SOLUTION INTRAMUSCULAR; INTRAVENOUS at 12:06

## 2023-06-26 RX ADMIN — METHOCARBAMOL 750 MG: 750 TABLET ORAL at 08:06

## 2023-06-26 RX ADMIN — MUPIROCIN 1 G: 20 OINTMENT TOPICAL at 08:06

## 2023-06-26 RX ADMIN — PROPOFOL 50 MCG/KG/MIN: 10 INJECTION, EMULSION INTRAVENOUS at 12:06

## 2023-06-26 RX ADMIN — OXYCODONE HYDROCHLORIDE 5 MG: 5 TABLET ORAL at 02:06

## 2023-06-26 RX ADMIN — DEXAMETHASONE SODIUM PHOSPHATE 8 MG: 4 INJECTION, SOLUTION INTRAMUSCULAR; INTRAVENOUS at 12:06

## 2023-06-26 RX ADMIN — CELECOXIB 400 MG: 200 CAPSULE ORAL at 09:06

## 2023-06-26 NOTE — NURSING
Patient admitted to unit as ordered. Alert, verbal and oriented x 4. No s/s of acute distress. Respirations even and unlabored. Condition stable. Dressing to right knee clean, dry and intact. Patient was brought up in hospital bed x staff assist. NS infusing at 150 mL/hr. Safety measures maintained, call light within easy reach.

## 2023-06-26 NOTE — PLAN OF CARE
Patient tolerated PT session well. Patient ambulated 60ft with RW and stand by assistance. No LOB or SOB noted. Patient has an OPPT appointment on 2023.     Problem: Physical Therapy  Goal: Physical Therapy Goal  Description: Goals to be met by: 2023    Patient will increase functional independence with mobility by performin. Supine to sit with supervision  2. Sit to stand transfer with Supervision  3. Gait x200 feet with Supervision using Rolling Walker  4. Ascend/Descend 4 steps with bilateral handrails and stand by assistance  5. Lower extremity exercise program x30 reps per handout, with supervision       Outcome: Ongoing, Progressing

## 2023-06-26 NOTE — TELEPHONE ENCOUNTER
DOCUMENTATION ONLY  oxyCODONE HCl 5mg tablets  Approval date: 6/26/2023 to 12/24/2023   Case ID# [id number]

## 2023-06-26 NOTE — PLAN OF CARE
VSS. Pt able to tolerate oral liquids. Pt reports tolerable pain level for transfer. Ice pack and dressing intact. Thigh TEDs and FCDs intact. IVF infusing. Walker at bedside for home use. Pt to be transferred via bed to recovery suites. Pt stable for transfer. No distress noted.

## 2023-06-26 NOTE — ANESTHESIA PREPROCEDURE EVALUATION
06/26/2023    Yayo Carver is a 57 y.o. female     Procedure Summary    Case: [id number] Date/Time: 06/26/23 1110   Procedure: ARTHROPLASTY, KNEE, TOTAL: RIGHT: DEPUY - ATTUNE (Right: Knee) - 90 minutes   Anesthesia type: Regional   Diagnosis: Primary osteoarthritis of right knee          Patient Active Problem List   Diagnosis    Morbid obesity with BMI of 50.0-59.9, adult    DOM (iron deficiency anemia)    GERD (gastroesophageal reflux disease)    Essential hypertension    Mixed hyperlipidemia    Vitamin D deficiency    Type 2 diabetes mellitus without complication, without long-term current use of insulin    Cyanocobalamin deficiency    Class 2 severe obesity with serious comorbidity and body mass index (BMI) of 38.0 to 38.9 in adult    Muscle cramps    Metabolic acidosis    Hyperglycemia    Impaired gait and mobility    Decreased strength of lower extremity    Primary osteoarthritis of left knee    Decreased range of motion (ROM) of right knee    Primary osteoarthritis of right knee       Review of patient's allergies indicates:   Allergen Reactions    Ace inhibitors Edema and Swelling       Current Facility-Administered Medications on File Prior to Visit   Medication Dose Route Frequency Provider Last Rate Last Admin    0.9%  NaCl infusion   Intravenous On Call Procedure Meghan Simons NP        acetaminophen tablet 1,000 mg  1,000 mg Oral On Call Procedure Meghan Simons NP        ceFAZolin 2 g in dextrose 5 % in water (D5W) 5 % 50 mL IVPB (MB+)  2 g Intravenous On Call Procedure Meghan Simons NP        celecoxib capsule 400 mg  400 mg Oral Once Meghan Simons NP        fentaNYL 50 mcg/mL injection 100 mcg  100 mcg Intravenous PRN Meghan Simons NP        LIDOcaine (PF) 10 mg/ml (1%) injection 10 mg  1 mL Intradermal On Call Procedure Meghan Simons NP         midazolam (VERSED) 1 mg/mL injection 4 mg  4 mg Intravenous PRN Meghan Simons NP        mupirocin 2 % ointment 1 g  1 g Nasal On Call Procedure Meghan Simons NP        pregabalin capsule 75 mg  75 mg Oral On Call Procedure Meghan Simons NP        tranexamic acid (CYKLOKAPRON) 1,000 mg in sodium chloride 0.9 % 100 mL IVPB (MB+)  1,000 mg Intravenous On Call Procedure Meghan Simons NP        tranexamic acid (CYKLOKAPRON) 1,000 mg in sodium chloride 0.9 % 100 mL IVPB (MB+)  1,000 mg Intravenous On Call Procedure Meghan Simons NP        tranexamic acid (CYKLOKAPRON) 3,000 mg in sodium chloride 0.9% SolP 100 mL  3,000 mg Irrigation On Call Procedure Meghan Simons NP        vancomycin 1,500 mg in dextrose 5 % (D5W) 250 mL IVPB (Vial-Mate)  1,500 mg Intravenous On Call Procedure Meghan Simons NP         Current Outpatient Medications on File Prior to Visit   Medication Sig Dispense Refill    acetaminophen (TYLENOL) 650 MG TbSR Take 1 tablet (650 mg total) by mouth every 6 to 8 hours as needed (pain). 120 tablet 0    acetaminophen (TYLENOL) 650 MG TbSR Take 1 tablet (650 mg total) by mouth every 8 (eight) hours. 120 tablet 0    amLODIPine (NORVASC) 10 MG tablet Take 1 tablet (10 mg total) by mouth once daily. 90 tablet 3    apixaban (ELIQUIS) 2.5 mg Tab Take 1 tablet (2.5 mg total) by mouth 2 (two) times daily. 60 tablet 0    aspirin (ECOTRIN) 81 MG EC tablet Take 1 tablet (81 mg total) by mouth once daily. 90 tablet 3    atorvastatin (LIPITOR) 40 MG tablet Take 1 tablet (40 mg total) by mouth once daily. 90 tablet 3    b complex vitamins capsule Take 1 capsule by mouth once daily.      cefadroxil (DURICEF) 500 MG Cap Take 1 capsule (500 mg total) by mouth every 12 (twelve) hours. 14 capsule 0    celecoxib (CELEBREX) 200 MG capsule Take 1 capsule (200 mg total) by mouth once daily. 30 capsule 0    diclofenac sodium (VOLTAREN) 1 % Gel Apply 2 g topically 4 (four) times daily as  needed (knee pain). 300 g 3    docusate sodium (COLACE) 100 MG capsule Take 1 capsule (100 mg total) by mouth 2 (two) times daily as needed for Constipation. 60 capsule 0    docusate sodium (COLACE) 100 MG capsule Take 1 capsule (100 mg total) by mouth 2 (two) times daily. 60 capsule 0    ergocalciferol (VITAMIN D2) 50,000 unit Cap Take 1 capsule (50,000 Units total) by mouth every 7 days. 12 capsule 3    flash glucose scanning reader (FREESTYLE HEENA 14 DAY READER) Misc Check blood glucose before meals and nightly. 1 each 0    flash glucose sensor (FREESTYLE HEENA 14 DAY SENSOR) Kit Apply to skin for 14 days.  Check blood glucose before meals and nightly. 1 kit 11    LIDOcaine-prilocaine (EMLA) cream Apply topically.      meloxicam (MOBIC) 15 MG tablet TAKE 1 TABLET(15 MG) BY MOUTH EVERY DAY 30 tablet 1    methocarbamoL (ROBAXIN) 750 MG Tab Take 1 tablet (750 mg total) by mouth 3 (three) times daily as needed (muscle spasms). 30 tablet 0    methocarbamoL (ROBAXIN) 750 MG Tab Take 1 tablet (750 mg total) by mouth 4 (four) times daily as needed (muscle spasms). 40 tablet 0    nut.tx.gluc intol,lf,soy-fiber (BOOST GLUCOSE CONTROL) 0.06-1.1 gram-kcal/mL Liqd Take 8 ml by mouth with meals for 2 weeks 237 mL 0    omeprazole (PRILOSEC) 40 MG capsule Take 40 mg by mouth.      oxyCODONE (ROXICODONE) 5 MG immediate release tablet Take 1-2 tabs every 4-6 hours as needed for pain 50 tablet 0    pantoprazole (PROTONIX) 40 MG tablet Take 1 tablet (40 mg total) by mouth daily as needed (acid reflux). 90 tablet 3    pantoprazole (PROTONIX) 40 MG tablet Take 1 tablet (40 mg total) by mouth once daily. 30 tablet 0    sodium bicarbonate 650 MG tablet Take 1 tablet (650 mg total) by mouth 2 (two) times daily. 60 tablet 2    terbinafine HCL (LAMISIL) 250 mg tablet Take 1 tablet (250 mg total) by mouth once daily. Avoid alcohol while on medication 30 tablet 0    traMADoL (ULTRAM) 50 mg tablet Take 1 tablet (50 mg total)  by mouth 3 (three) times daily as needed for Pain. 180 tablet 3       Past Surgical History:   Procedure Laterality Date    BARIATRIC SURGERY  2022    Gastric sleeve     SECTION  1989    COLONOSCOPY N/A 2017    Procedure: COLONOSCOPY;  Surgeon: Evelin Arceo MD;  Location: [address]);  Service: Endoscopy;  Laterality: N/A;  2 nd floor d/t BMI > 50 kg/m3    KNEE ARTHROSCOPY Right     TONSILLECTOMY      TOTAL KNEE ARTHROPLASTY Left 2022    Procedure: ARTHROPLASTY, KNEE, TOTAL: LEFT: DEPUY - ATTUNE;  Surgeon: Jc Padgett III, MD;  Location: [facility];  Service: Orthopedics;  Laterality: Left;    TUBAL LIGATION         Social History     Socioeconomic History    Marital status:    Occupational History    Occupation: Supervisor for Lateral SV at [employer]     Employer: [employer]   Tobacco Use    Smoking status: Never    Smokeless tobacco: Never   Substance and Sexual Activity    Alcohol use: Not Currently     Comment: occasional    Drug use: No    Sexual activity: Yes     Partners: Male     Birth control/protection: None     Comment: occasionally   Social History Narrative    ** Merged History Encounter **         Pt lives alone.  Has a college-age son who comes and goes.  Has 3 other adult children.       Social Determinants of Health     Financial Resource Strain: Low Risk     Difficulty of Paying Living Expenses: Not very hard   Food Insecurity: No Food Insecurity    Worried About Running Out of Food in the Last Year: Never true    Ran Out of Food in the Last Year: Never true   Transportation Needs: Unmet Transportation Needs    Lack of Transportation (Medical): Yes    Lack of Transportation (Non-Medical): No   Physical Activity: Sufficiently Active    Days of Exercise per Week: 5 days    Minutes of Exercise per Session: 40 min   Stress: No Stress Concern Present    Feeling of Stress : Only a little   Social Connections: Unknown     Frequency of Communication with Friends and Family: More than three times a week    Frequency of Social Gatherings with Friends and Family: More than three times a week    Active Member of Clubs or Organizations: Yes    Attends Club or Organization Meetings: More than 4 times per year    Marital Status: Never    Housing Stability: Low Risk     Unable to Pay for Housing in the Last Year: No    Number of Places Lived in the Last Year: 1    Unstable Housing in the Last Year: No         CBC: No results for input(s): WBC, RBC, HGB, HCT, PLT, MCV, MCH, MCHC in the last 72 hours.    CMP: No results for input(s): NA, K, CL, CO2, BUN, CREATININE, GLU, MG, PHOS, CALCIUM, ALBUMIN, PROT, ALKPHOS, ALT, AST, BILITOT in the last 72 hours.    INR  No results for input(s): PT, INR, PROTIME, APTT in the last 72 hours.        Diagnostic Studies:      EKG:  Normal sinus rhythm   Normal ECG   When compared with ECG of 12-OCT-2021 00:05,   No significant change was found   Confirmed by Danita Altamirano MD ([id number]) on 11/30/2022 9:06:10 AM     2D Echo:  CONCLUSIONS     1 - Normal left ventricular systolic function (EF 60-65%).     2 - Left ventricular diastolic dysfunction.     3 - Moderate left atrial enlargement.     4 - Normal right ventricular systolic function .     5 - Mild mitral regurgitation.       Pre-op Assessment    I have reviewed the Patient Summary Reports.     I have reviewed the Nursing Notes. I have reviewed the NPO Status.   I have reviewed the Medications.     Review of Systems  Anesthesia Hx:  Denies Family Hx of Anesthesia complications.   Denies Personal Hx of Anesthesia complications.   Cardiovascular:   Exercise tolerance: good Hypertension Denies Dysrhythmias.   Denies Angina.  Denies [place].    Pulmonary:   Denies COPD.  Denies Asthma.    Renal/:   Denies Chronic Renal Disease.     Hepatic/GI:   GERD    Musculoskeletal:   Arthritis     Neurological:   Denies CVA. Denies Seizures.    Endocrine:   Diabetes,  type 2        Physical Exam  General: Well nourished, Cooperative, Alert and Oriented    Airway:  Mouth Opening: Normal  TM Distance: Normal  Tongue: Normal  Neck ROM: Normal ROM    Dental:  Intact    Chest/Lungs:  Clear to auscultation, Normal Respiratory Rate    Heart:  Rate: Normal  Rhythm: Regular Rhythm        Anesthesia Plan  Type of Anesthesia, risks & benefits discussed:    Anesthesia Type: Gen Natural Airway, Spinal  Intra-op Monitoring Plan: Standard ASA Monitors  Post Op Pain Control Plan: multimodal analgesia and IV/PO Opioids PRN  Induction:  IV  Informed Consent: Informed consent signed with the Patient and all parties understand the risks and agree with anesthesia plan.  All questions answered. Patient consented to blood products? Yes  ASA Score: 3  Day of Surgery Review of History & Physical: H&P Update referred to the surgeon/provider.    Ready For Surgery From Anesthesia Perspective.     .

## 2023-06-26 NOTE — OP NOTE
[facility] Right TKA  OPERATIVE NOTE    Date of Operation:   6/26/2023    Providers Performing:   Surgeon(s):  Jc Padgett III, MD  Assistant: Johnnie Rush MD    Operative Procedure:   Right Total Knee Arthroplasty, CPT 83072    -22 modifier for CDC class 2 or higher obesity (BMI 38.7) requiring prolonged operative time and increased case complexity and difficulty      Preoperative Diagnosis:   Right Knee Osteoarthritis, ICD-10 M17.11    Postoperative Diagnosis:   SAME    Components Used:   Depuy  Femur: Attune PS Size 3  Tibia: Attune fixed bearing revision Size 4, 14x30 cemented stem  Patella: Attune Medialized Dome 35 mm  Tibial Insert: Attune fixed bearing PS inset size 7 mm  2 packs cement: Simplex P    Implant Name Type Inv. Item Serial No.  Lot No. LRB No. Used Action   CEMENT BONE SURG SMPLX P RADPQ - [id number]  CEMENT BONE SURG SMPLX P RADPQ  [employer] RY. [id number] Right 2 Implanted   PATELLA ATTUNE MEDLZD DOME 35 - [id number]  PATELLA ATTUNE MEDLZD DOME 35  [employer] [id number] Right 1 Implanted   ATTUNE KNEE SYSTEM REVISION CEMENTED STEM 14MM X 30MM    [employer] [id number] Right 1 Implanted   ATTUNE FEMORAL POSTERIOR STABILIZED SIZE 3 RIGHT CEMENTED     [employer] [id number] Right 1 Implanted   ATTUNE KNEE SYSTEM REVISION TIBIAL BASE FIXED BEARING SIZE 4 CEMENTED     [employer] [id number] Right 1 Implanted   ATTUNE TIBIAL INSERT FIXED BEARING POSTERIOR STABILIZED SIZE 3  7MM AOX     [employer] [id number] Right 1 Implanted       Anesthesia:   Spinal    BERE cocktail: JIMMY    Estimated Blood Loss:   350 cc    Specimens:   None    Complications:   None    Indications:   The patient has failed non-operative measures including medications, injections and activity modifications for their knee.  After an explanation of risks and benefits of knee arthroplasty surgery, the patient wishes to proceed with surgery.    Operative Notes:   The patient was greeted in the pre-op holding area.  The patients knee  was marked with a surgical marker by the surgeon.  Spinal-Epidural anesthesia was administered by the anesthesia team.  The patient was placed in the supine position on the OR table with all bony prominences padded.  A tourniquet was not used.  IV antibiotics were administered.  The leg was prepped and draped in the usual sterile fashion.  A timeout was performed and the correct patient, site and procedure were identified.    A midline incision was made with a standard medical parapatellar arthrotomy.  The standard medial capsular release was performed along with the lateral gutter.  The patella was mobilized from the lateral parapatellar ligament and bony osteophytes were removed.  The intercondylar notch was cleared of the ACL/PCL.    The extramedullary tibial alignment guide was placed in the appropriate slope and varus/valgus orientation and the proximal cutting block secured by pins.  Measured resection with a  4 mm cut was accounted for from the high side of the plateau, perpendicular to the ankle.  The cut was made with an oscillating saw.  We then obtained access to the femoral canal with the stepped drill.  The intramedullary ruddy was placed in 7 degrees of valgus with a 9mm planned resection.  Our distal femoral cuts were made.  The knee was placed in extension and the medical and lateral meniscal remnants removed.  A spacer block was placed with the knee in extension checking for alignment and balance which we were happy with.    The knee was then brought into flexion and the distal femoral gap assessed for appropriate rotation.  Our distal femoral sizing guide was placed and sized appropriately.  Guide pins were placed in the appropriate external rotation.  This was assessed with the 4 in 1 cutting block and the flexion gap sizing block which was appropriate.  The distal femoral preparation was completed after the anterior, posterior and chamfer cuts were made.  The box cutting guide was placed and  assessed.  The box cut was made.    At this time the trial implants were placed and knee assessed for stability, and flexion arc. The patella was prepared using free-hand technique and the three-holed lug drill guide was sized and appropriately assessed. Patella tracking was found to be acceptable. The tibial component rotation was marked. The trials were removed. The tibial component was positioned and the keel was drilled and punched.    The wound was copiously irrigated with pulsitile lavage and the bony surfaces dried.  Cement was mixed on the back table and applied to all components.  Cementation of the femoral, tibial and patellar components was performed sequentially with removal of all excess cement. A trial insert was placed to provide compression while the cement dried and a 0.35% betadine solution was left to soak in the surgical field.     After the cement was dry, the trial poly insert was removed. Hemostasis was obtained with bovie electrocautery.  The knee was again copiously irrigated with pulsatile lavage. The final polyethylene insert was placed and the knee reduced.      1 gram of vancomycin powder was placed in the knee. The arthrotomy was closed with interrupted #1 vicryl suture and #2 quill, the subcuticular layer was closed with 2-0 vicryl suture and a running 4-0 monocryl was used to closed the skin surface.  Surgicel sealant was placed over the top of the incision and sterile dressing placed over the wound. Appropriately sized TEDs hose were placed for edema control.     Sponge and needle counts were correct.    The first-assistant was critical to all steps of the operation, including retraction and leg stabilization during exposure and bone preparation, as well as the deep and superficial wound closure.  Dr. Padgett performed and/or supervised the key portions of this surgical procedure, including evaluation of the bone cuts, reshaping of the bony elements, and insertion of the provisional  and final components.    Postoperative Plan:  DVT Prophylaxis: The patient will be anticoagulated with 81mg aspirin x1 dose @ 2100 on POD#0 then Eliquis 2.5mg BID x30 days starting 0900 POD#1    They will receive 24 hours of post-operative antibiotics.    Activity will be weight bearing as tolerated.    Nutrition support: MVI, vit C, boost/ensure    Due patient and or surgical factors the patient will receive an Rx for cefadroxil 500mg BID x7 days after discharge per [place] data:   John MM, Yu JE, Richard PEREZ, Cesar DEJESUS. [facility] Clinical Research Award: Extended Oral Antibiotics Prevent Periprosthetic Joint Infection in High-Risk Cases: 3855 Patients With 1-Year Follow-Up. J Arthroplasty. 2021 Jul;36(7S):S18-S25. doi: 10.1016/j.arth.2021.01.051. Epub 2021 Jan 23. PMID: [id number].      Signed by: Jc Padgett III, MD

## 2023-06-26 NOTE — PT/OT/SLP EVAL
Occupational Therapy Evaluation and Treatment    Name: Yayo Carver  MRN: [id number]  Admitting Diagnosis: Primary osteoarthritis of right knee Day of Surgery  Recent Surgery: Procedure(s) (LRB):  ARTHROPLASTY, KNEE, TOTAL: RIGHT: DEPUY - ATTUNE (Right) Day of Surgery    Recommendations:     Discharge Recommendations: home  Level of Assistance Recommended: 24 hours supervision  Discharge Equipment Recommendations: none  Barriers to discharge: None    Assessment:     Yayo Carver is a 57 y.o. female with a medical diagnosis of Primary osteoarthritis of right knee. She presents with performance deficits affecting function including impaired self care skills, impaired functional mobility, orthopedic precautions. She t/f from supine to sit with S. She t/f from sit to stand and stand to sit on chair and BSC over toilet with CGA. She completed toileting and grooming.    Rehab Prognosis: Good; patient would benefit from acute OT services to address these deficits and reach maximum level of function.    Plan:     Patient to be seen daily to address the above listed problems via self-care/home management, therapeutic activities, therapeutic exercises  Plan of Care Expires: 06/27/23  Plan of Care Reviewed with: patient    Subjective     Chief Complaint: R TKA  Patient Comments/Goals: I am in a lot of pain  Pain/Comfort:  Pain Rating 1: 10/10    Patients cultural, spiritual, Pentecostalism conflicts given the current situation: no    Social History:  Living Environment: Patient lives alone in a single story home with number of outside stair(s): 6  Prior Level of Function: Prior to admission, patient was independent.  Roles and Routines: Patient was driving prior to admission.  Equipment Used at Home: bedside commode, walker, rolling, bath bench  DME owned (not currently used): rolling walker, bedside commode, and bath bench  Assistance Upon Discharge: family    Objective:     Communicated with RN prior to session. Patient found  supine with cryotherapy, peripheral IV upon OT entry to room.    General Precautions: Standard, fall   Orthopedic Precautions: RLE weight bearing as tolerated   Braces: N/A    Respiratory Status: Room air    Occupational Performance    Gait belt applied - Yes    Bed Mobility:   Supine to sit from left side of bed with contact guard assistance    Functional Mobility/Transfers:  Sit <> Stand Transfer with contact guard assistance with rolling walker  Toilet Transfer Stand Pivot technique with contact guard assistance with rolling walker  Functional Mobility: Pt walked to bathroom with RW    Activities of Daily Living:  Grooming: contact guard assistance - Pt brushed hair while sitting in chair    Toileting: CGA - to t/f onto BSC over toilet with RW    Physical Exam:  Upper Extremity Range of Motion:     -       Right Upper Extremity: WFL  -       Left Upper Extremity: WFL  Upper Extremity Strength:    -       Right Upper Extremity: WFL  -       Left Upper Extremity: WFL    AMPAC 6 Click ADL:  AMPAC Total Score: 21    Treatment & Education:  Educated on the importance of mobility to maximize recovery  Educated on safety with functional mobility; hand placement to ensure safe transfers to various surfaces in prep for ADLs  Educated on performing functional mobility and ADLs in adherence to orthopedic precautions    Patient clear to ambulate to/from bathroom with RN/PCT, assist x1 .    Patient left up in chair with all lines intact and call button in reach.    GOALS:   Multidisciplinary Problems       Occupational Therapy Goals          Problem: Occupational Therapy    Goal Priority Disciplines Outcome Interventions   Occupational Therapy Goal     OT, PT/OT Ongoing, Progressing    Description: Goals to be met by: 6/27/23     Patient will increase functional independence with ADLs by performing:    UE Dressing with Supervision.  LE Dressing with Supervision.  Grooming while standing at sink with Contact Guard  Assistance.  Toileting from bedside commode with Contact Guard Assistance for hygiene and clothing management.   Bathing from  shower chair/bench with Supervision.  Toilet transfer to bedside commode with Contact Guard Assistance.                         History:     Past Medical History:   Diagnosis Date    Arthritis     Diabetes mellitus     Diabetes mellitus, type 2     GERD (gastroesophageal reflux disease)     HLD (hyperlipidemia)     Hypertension          Past Surgical History:   Procedure Laterality Date    BARIATRIC SURGERY  2022    Gastric sleeve     SECTION  1989    COLONOSCOPY N/A 2017    Procedure: COLONOSCOPY;  Surgeon: Evelin Arceo MD;  Location: [address];  Service: Endoscopy;  Laterality: N/A;  2 nd floor d/t BMI > 50 kg/m3    KNEE ARTHROSCOPY Right     TONSILLECTOMY      TOTAL KNEE ARTHROPLASTY Left 2022    Procedure: ARTHROPLASTY, KNEE, TOTAL: LEFT: DEPUY - ATTUNE;  Surgeon: Jc Padgett III, MD;  Location: [facility];  Service: Orthopedics;  Laterality: Left;    TUBAL LIGATION         Time Tracking:     OT Date of Treatment: 23  OT Start Time: 1635  OT Stop Time: 1652  OT Total Time (min): 17 min    Billable Minutes: Evaluation 8 and Self Care/Home Management 9    2023

## 2023-06-26 NOTE — PT/OT/SLP EVAL
Physical Therapy Evaluation and Treatment    Patient Name: Yayo Carver   MRN: [id number]  Recent Surgery: Procedure(s) (LRB):  ARTHROPLASTY, KNEE, TOTAL: RIGHT: DEPUY - ATTUNE (Right) Day of Surgery    Recommendations:     Discharge Recommendations: outpatient PT   Discharge Equipment Recommendations: none   Barriers to discharge: Inaccessible home    Assessment:     Yayo Carver is a 57 y.o. female admitted with a medical diagnosis of Primary osteoarthritis of right knee. She presents with the following impairments/functional limitations: weakness, gait instability, impaired functional mobility, impaired balance, decreased lower extremity function, pain, decreased ROM, orthopedic precautions. Patient had a L TKA on 12/19/22 and had no difficulties during her recovery per patient report. Patient tolerated PT session well. Patient ambulated 60ft with RW and stand by assistance. No LOB or SOB noted. Patient has an OPPT appointment on 6/28/2023.     Rehab Prognosis: Good; patient would benefit from acute PT services to address these deficits and reach maximum level of function.    Plan:     During this hospitalization, patient to be seen daily to address the above listed problems via gait training, therapeutic activities, therapeutic exercises    Plan of Care Expires: 06/30/23    Subjective     Chief Complaint: Right knee pain.   Patient Comments/Goals: To walk without pain.   Pain/Comfort:  Pain Rating 1: 10/10  Location - Side 1: Right  Location 1: knee  Pain Addressed 1: Pre-medicate for activity, Reposition, Distraction, Cessation of Activity, Nurse notified    Social History:  Living Environment: Patient in a H with 5 steps and bilateral handrails (too wide) to enter.   Prior Level of Function: Prior to admission, patient was independent  Equipment at Home from previous surgery: bedside commode, walker, rolling, bath bench  Assistance Upon Discharge:  alejandra    Objective:     Communicated with RN and OT prior to  session. Patient found up in chair with cryotherapy, peripheral IV, FCD upon PT entry to room.    General Precautions: Standard, fall   Orthopedic Precautions: RLE weight bearing as tolerated   Braces: N/A    Respiratory Status: Room air    Exams:  RLE ROM: WFL except limited at knee due to recent surgery  RLE Strength:  appears WFL but did not formally assess due to pain and recent surgery   LLE ROM: WFL  LLE Strength: WFL  Cognitive: Patient is oriented to Person, Place, Time, Situation    Functional Mobility:  Gait belt applied - Yes  Bed Mobility  Seated in chair at start of session and returned to chair  Transfers  Sit to Stand: stand by assistance with rolling walker x1 from bedside chair   Gait  Patient ambulated 60ft with rolling walker and stand by assistance. Patient required cues for sequencing and weight bearing status to increase independence and safety.       Therapeutic Activities and Exercises:  Patient educated in:  -PT role and POC  -safety with transfers including hand placement  -gait sequencing and RW management  -OOB activity to maximize recovery including ambulating with nursing staff assistance and RW while in the hospital       AM-PAC 6 CLICK MOBILITY  Total Score:18    Patient left up in chair with all lines intact, call button in reach, and RN notified.    GOALS:   Multidisciplinary Problems       Physical Therapy Goals          Problem: Physical Therapy    Goal Priority Disciplines Outcome Goal Variances Interventions   Physical Therapy Goal     PT, PT/OT Ongoing, Progressing     Description: Goals to be met by: 2023    Patient will increase functional independence with mobility by performin. Supine to sit with supervision  2. Sit to stand transfer with Supervision  3. Gait x200 feet with Supervision using Rolling Walker  4. Ascend/Descend 4 steps with bilateral handrails and stand by assistance  5. Lower extremity exercise program x30 reps per handout, with supervision                             History:     Past Medical History:   Diagnosis Date    Arthritis     Diabetes mellitus     Diabetes mellitus, type 2     GERD (gastroesophageal reflux disease)     HLD (hyperlipidemia)     Hypertension        Past Surgical History:   Procedure Laterality Date    BARIATRIC SURGERY  2022    Gastric sleeve     SECTION  1989    COLONOSCOPY N/A 2017    Procedure: COLONOSCOPY;  Surgeon: Evelin Arceo MD;  Location: [address]);  Service: Endoscopy;  Laterality: N/A;  2 nd floor d/t BMI > 50 kg/m3    KNEE ARTHROSCOPY Right     TONSILLECTOMY      TOTAL KNEE ARTHROPLASTY Left 2022    Procedure: ARTHROPLASTY, KNEE, TOTAL: LEFT: DEPUY - ATTUNE;  Surgeon: Jc Padgett III, MD;  Location: [facility];  Service: Orthopedics;  Laterality: Left;    TUBAL LIGATION         Time Tracking:     PT Received On: 23  PT Start Time: 1652  PT Stop Time: 1706  PT Total Time (min): 14 min     Billable Minutes: Evaluation 6 and Gait Training 8    2023

## 2023-06-26 NOTE — NURSING
Talked to Dr. ABELINO Rodríguez in reference to the patient stating that she is not a diabetic anymore after having a gastric bypass last year, and that she does not take any diabetic home meds nor does she perform finger stick glucoses at home. Since the patient had a diabetic 2000 calorie diet ordered, he adjusted the orders to a regular diet.

## 2023-06-26 NOTE — TRANSFER OF CARE
"Anesthesia Transfer of Care Note    Patient: Yayo Carver    Procedure(s) Performed: Procedure(s) (LRB):  ARTHROPLASTY, KNEE, TOTAL: RIGHT: DEPUY - ATTUNE (Right)    Patient location: PACU    Anesthesia Type: general    Transport from OR: Transported from OR on 100% O2 by closed face mask with adequate spontaneous ventilation    Post pain: adequate analgesia    Post assessment: no apparent anesthetic complications and tolerated procedure well    Post vital signs: stable    Level of consciousness: awake, alert and oriented    Nausea/Vomiting: no nausea/vomiting    Complications: none    Transfer of care protocol was followed      Last vitals:   Visit Vitals  /84 (BP Location: Right arm, Patient Position: Lying)   Pulse 81   Temp 36.6 °C (97.8 °F) (Temporal)   Resp 16   Ht 5' 6" (1.676 m)   Wt 108.9 kg (240 lb)   SpO2 98%   Breastfeeding No   BMI 38.74 kg/m²     "

## 2023-06-26 NOTE — ANESTHESIA PROCEDURE NOTES
Spinal    Diagnosis: Right knee osteoarthritis  Patient location during procedure: OR  Start time: 6/26/2023 12:15 PM  Timeout: 6/26/2023 12:14 PM  End time: 6/26/2023 12:30 PM    Staffing  Authorizing Provider: Nevin Garcia MD  Performing Provider: Nevin Garcia MD    Spinal Block  Patient position: sitting  Prep: ChloraPrep  Patient monitoring: heart rate, continuous pulse ox, continuous capnometry and frequent blood pressure checks  Approach: midline  Location: L3-4  Injection technique: single shot  CSF Fluid: clear free-flowing CSF  Needle  Needle length: 5 in  Additional Documentation: incremental injection  Needle localization: anatomical landmarks  Assessment  Sensory level: T8  Ease of block: moderate  Patient's tolerance of the procedure: comfortable throughout block  Medications:    Medications: mepivacaine (CARBOCAINE) injection 20 mg/mL (2%) - Intrathecal   3 mL - 6/26/2023 12:28:00 PM

## 2023-06-26 NOTE — PLAN OF CARE
Problem: Occupational Therapy  Goal: Occupational Therapy Goal  Description: Goals to be met by: 6/27/23     Patient will increase functional independence with ADLs by performing:    UE Dressing with Supervision.  LE Dressing with Supervision.  Grooming while standing at sink with Contact Guard Assistance.  Toileting from bedside commode with Contact Guard Assistance for hygiene and clothing management.   Bathing from  shower chair/bench with Supervision.  Toilet transfer to bedside commode with Contact Guard Assistance.    Outcome: Ongoing, Progressing

## 2023-06-27 VITALS
BODY MASS INDEX: 38.57 KG/M2 | WEIGHT: 240 LBS | HEIGHT: 66 IN | OXYGEN SATURATION: 100 % | RESPIRATION RATE: 16 BRPM | TEMPERATURE: 98 F | SYSTOLIC BLOOD PRESSURE: 136 MMHG | DIASTOLIC BLOOD PRESSURE: 75 MMHG | HEART RATE: 72 BPM

## 2023-06-27 PROCEDURE — 97535 SELF CARE MNGMENT TRAINING: CPT

## 2023-06-27 PROCEDURE — 63600175 PHARM REV CODE 636 W HCPCS: Performed by: NURSE PRACTITIONER

## 2023-06-27 PROCEDURE — 99232 PR SUBSEQUENT HOSPITAL CARE,LEVL II: ICD-10-PCS | Mod: FS,,, | Performed by: ANESTHESIOLOGY

## 2023-06-27 PROCEDURE — 25000003 PHARM REV CODE 250

## 2023-06-27 PROCEDURE — 94761 N-INVAS EAR/PLS OXIMETRY MLT: CPT

## 2023-06-27 PROCEDURE — 97530 THERAPEUTIC ACTIVITIES: CPT

## 2023-06-27 PROCEDURE — 99900035 HC TECH TIME PER 15 MIN (STAT)

## 2023-06-27 PROCEDURE — 25000003 PHARM REV CODE 250: Performed by: NURSE PRACTITIONER

## 2023-06-27 PROCEDURE — 99232 SBSQ HOSP IP/OBS MODERATE 35: CPT | Mod: FS,,, | Performed by: ANESTHESIOLOGY

## 2023-06-27 PROCEDURE — 97116 GAIT TRAINING THERAPY: CPT

## 2023-06-27 PROCEDURE — 97110 THERAPEUTIC EXERCISES: CPT

## 2023-06-27 RX ORDER — CELECOXIB 200 MG/1
200 CAPSULE ORAL DAILY
Status: DISCONTINUED | OUTPATIENT
Start: 2023-06-27 | End: 2023-06-27 | Stop reason: HOSPADM

## 2023-06-27 RX ADMIN — AMLODIPINE BESYLATE 10 MG: 10 TABLET ORAL at 08:06

## 2023-06-27 RX ADMIN — CELECOXIB 200 MG: 200 CAPSULE ORAL at 08:06

## 2023-06-27 RX ADMIN — OXYCODONE HYDROCHLORIDE 10 MG: 10 TABLET ORAL at 08:06

## 2023-06-27 RX ADMIN — OXYCODONE HYDROCHLORIDE 10 MG: 10 TABLET ORAL at 04:06

## 2023-06-27 RX ADMIN — SENNOSIDES AND DOCUSATE SODIUM 1 TABLET: 50; 8.6 TABLET ORAL at 08:06

## 2023-06-27 RX ADMIN — ATORVASTATIN CALCIUM 40 MG: 20 TABLET, FILM COATED ORAL at 08:06

## 2023-06-27 RX ADMIN — ACETAMINOPHEN 1000 MG: 500 TABLET ORAL at 12:06

## 2023-06-27 RX ADMIN — POLYETHYLENE GLYCOL 3350 17 G: 17 POWDER, FOR SOLUTION ORAL at 08:06

## 2023-06-27 RX ADMIN — METHOCARBAMOL 750 MG: 750 TABLET ORAL at 08:06

## 2023-06-27 RX ADMIN — CEFAZOLIN 2 G: 2 INJECTION, POWDER, FOR SOLUTION INTRAMUSCULAR; INTRAVENOUS at 04:06

## 2023-06-27 RX ADMIN — ACETAMINOPHEN 1000 MG: 500 TABLET ORAL at 06:06

## 2023-06-27 RX ADMIN — ASPIRIN 81 MG: 81 TABLET, COATED ORAL at 12:06

## 2023-06-27 RX ADMIN — MUPIROCIN 1 G: 20 OINTMENT TOPICAL at 09:06

## 2023-06-27 RX ADMIN — APIXABAN 2.5 MG: 2.5 TABLET, FILM COATED ORAL at 08:06

## 2023-06-27 RX ADMIN — FAMOTIDINE 20 MG: 20 TABLET, FILM COATED ORAL at 08:06

## 2023-06-27 NOTE — ASSESSMENT & PLAN NOTE
Yayo Carver is a 57 y.o. female s/p R TKA 6/26. Doing well this morning.    Plan:  Pain control: multimodal  PT/OT: WBAT RLE  DVT PPx: Eliquis 2.5 mg BID, FCDs at all times when not ambulating  Abx: postop Ancef. Home with cefadroxil    Dispo: home with outpatient PT

## 2023-06-27 NOTE — PROGRESS NOTES
Acute Pain Service and Perioperative Surgical Home Progress Note    Interval history  Yayo Carver is a 57 y.o., female,     Surgery:  Procedure(s) (LRB):  ARTHROPLASTY, KNEE, TOTAL: RIGHT: DEPUY - ATTUNE (Right)    Post Op Day #: 1    Problem List:    Active Hospital Problems    Diagnosis  POA    *Primary osteoarthritis of right knee [M17.11]  Yes      Resolved Hospital Problems   No resolved problems to display.       Subjective:       General appearance of alert, oriented, no complaints   Pain with rest: 7    Numbers   Pain with movement: 7    Numbers   Side Effects    1. Pruritis No    2. Nausea No    3. Motor Blockade No, 0=Ability to raise lower extremities off bed    4. Sedation Yes, 1=awake and alert    Schedule Medications:    acetaminophen  1,000 mg Oral Q6H    amLODIPine  10 mg Oral Daily    apixaban  2.5 mg Oral BID    atorvastatin  40 mg Oral Daily    celecoxib  200 mg Oral Daily    famotidine  20 mg Oral BID    methocarbamoL  750 mg Oral TID    mupirocin  1 g Nasal BID    polyethylene glycol  17 g Oral Daily    pregabalin  75 mg Oral QHS    senna-docusate 8.6-50 mg  1 tablet Oral BID        Continuous Infusions:   sodium chloride 0.9% 150 mL/hr at 06/26/23 1542        PRN Medications:  bisacodyL, morphine, naloxone, ondansetron, oxyCODONE, oxyCODONE, prochlorperazine       Antibiotics:  Antibiotics (From admission, onward)      Start     Stop Route Frequency Ordered    06/26/23 2100  mupirocin 2 % ointment 1 g         07/01 2059 Nasl 2 times daily 06/26/23 1703               Objective:         Vital Signs (Most Recent):  Temp: 98.2 °F (36.8 °C) (06/27/23 0439)  Pulse: 71 (06/27/23 0439)  Resp: 16 (06/27/23 0441)  BP: (!) 156/71 (06/27/23 0439)  SpO2: 97 % (06/27/23 0439) Vital Signs Range (Last 24H):  Temp:  [97 °F (36.1 °C)-98.3 °F (36.8 °C)]   Pulse:  [67-81]   Resp:  [16-20]   BP: (102-156)/(63-84)   SpO2:  [95 %-100 %]          I & O (Last 24H):  Intake/Output Summary (Last 24 hours) at 6/27/2023  0538  Last data filed at 6/27/2023 0438  Gross per 24 hour   Intake 2860 ml   Output 2200 ml   Net 660 ml       Physical Exam:    GA: Alert, comfortable, no acute distress.   Pulmonary: Clear to auscultation . Normal respiratory ratei.  Cardiac: regular rate and rhythm.          Laboratory: reviewed in chart  CBC: No results for input(s): WBC, RBC, HGB, HCT, PLT, MCV, MCH, MCHC in the last 72 hours.    BMP: No results for input(s): NA, K, CO2, CL, BUN, CREATININE, GLU, MG, PHOS, CALCIUM in the last 72 hours.    No results for input(s): PT, INR, PROTIME, APTT in the last 72 hours.          Assessment:         Pain control adequate    Plan:  1) Pain: Multimodal pain regimen ordered which includes acetaminophen, celecoxib, pregabalin, and prn oxycodone.  Well controlled on current regimen.  Will continue to monitor.    2) HTN, DM, GERD - continue home regimen   3) FEN/GI: Tolerating regular diet.     4) Dispo: Pt working well with PT/OT. Case management and SW following along for setting up home health and physical therapy. Plan to discharge home this am.              Evaluator Pilar Rios PA-C

## 2023-06-27 NOTE — PLAN OF CARE
Pt has met all goals for safe d/c home.    Problem: Physical Therapy  Goal: Physical Therapy Goal  Description: Goals to be met by: 2023    Patient will increase functional independence with mobility by performin. Supine to sit with supervision  2. Sit to stand transfer with Supervision  3. Gait x200 feet with Supervision using Rolling Walker  4. Ascend/Descend 4 steps with bilateral handrails and stand by assistance  5. Lower extremity exercise program x30 reps per handout, with supervision       Outcome: Met     2023

## 2023-06-27 NOTE — PT/OT/SLP PROGRESS
"Physical Therapy   Progress Note    Patient Name:  Yayo Carver  MRN: [id number]    Admit Date: 6/26/2023  Admitting Diagnosis:  Primary osteoarthritis of right knee  Length of Stay: 0 days  Recent Surgery: Procedure(s) (LRB):  ARTHROPLASTY, KNEE, TOTAL: RIGHT: DEPUY - ATTUNE (Right) 1 Day Post-Op    Recommendations:     Discharge Recommendations: Outpatient PT   Equipment recommendations: none  Barriers to discharge: None Identified     Assessment:     Yayo Carver is a 57 y.o. female admitted to [facility] on 6/26/2023 with medical diagnosis of Primary osteoarthritis of right knee. Pt presents with impaired endurance, impaired functional mobility, decreased coordination, decreased lower extremity function, orthopedic precautions, pain. Pt is progressing towards goals, but has not yet reached prior level of function.     Pt in recliner when therapist entered room. Requests to use restroom prior to ambulating. Able to perform transfer with supervision using RW. Pt ambulates with step-to pattern to and from gym with RW. Able to perform therex with handout provided. Navigated 4 steps with kaykay HR SBA.     Yayo Carver does not require acute PT services at this time due to being at (I) PLOF and demonstrating safety with functional mobility, including transfers and ambulation.  Once medically stable, recommending pt discharge to Outpatient PT .      Safe to d/c home    Plan:     Plan of Care Expires:  06/30/23  Plan of Care reviewed with: patient    This plan of care has been discussed with the patient/caregiver, who was included in its development and is in agreement with the identified goals and treatment plan.     Subjective     Communicated with RN prior to session.  Patient found up in chair upon PT entry to room, agreeable to therapy session. Pt alone during session.    Patient/Family Comments/goals: "I am ready to go"    Pain/Comfort:  Pain Rating 1: 8/10  Location - Side 1: Right  Location 1: knee  Pain Addressed 1: " Pre-medicate for activity, Reposition, Distraction, Cessation of Activity    Patients cultural, spiritual, Spiritism conflicts given the current situation: None identified     Objective:     Patient found with: cryotherapy, peripheral IV, FCD    General Precautions: Standard, fall   Orthopedic Precautions:RLE weight bearing as tolerated   Braces: N/A   Oxygen Device: room air    Cognition:  Pt is Alert during session.    Therapist provided skilled verbal and tactile cueing to facilitate the following functional mobility tasks. Listed tasks are focused on recovery of impairments and improving pt's independence and efficiency with bed mobility, transfers and ambulation as able.     Bed Mobility: not assessed    Transfers:   Sit <> Stand Transfer: Supervision  from recliner/toilet/mat with RW AD                  Gait:  Distance: >200 ft  Assistance level: Supervision   Assistive Device: rolling walker  Gait Assessment: decreased step length , decreased bere, decreased gait speed, and antalgic gait pattern    Balance:  Dynamic Sitting: GOOD+: Maintains balance through MAXIMAL excursions of active trunk motion  Standing:  Static: GOOD: Takes MODERATE challenges from all directions   Dynamic: GOOD: Needs SUPERVISION only during gait and able to self right with moderate LOB    Outcome Measure: AM-PAC 6 CLICK MOBILITY  Total Score:19     Patient/Caregiver Education and Additional Therapeutic Activities/Exercises   Pt able to navigate 4 steps with kaykay HR using step-to gait pattern with SBA. Cueing for sequencing.  2. Performed following therex with handout provided at end with primarily focus on RLE:  X10 LAQ  X30 kaykay ankle pumps  X10 heel slides  X10 with 3 sec hold quad sets  X10 with 3 sec hold glut squeezes  X10 SAQ  X10 SLR      Provided pt/caregiver education regarding:   PT POC and goals for therapy   Safety with mobility and fall risk   Safe management of AD as needed   Energy conservation techniques    Instruction on use of call button and importance of calling nursing staff for assistance with mobility     Patient/caregiver able to verbalize understanding; will follow-up with pt/caregiver during current admit for additional questions/concerns within scope of practice.     White board updated.     Patient left up in chair with all lines intact, call button in reach, and PCT notified.    Goals:     Multidisciplinary Problems       Physical Therapy Goals       Not on file              Multidisciplinary Problems (Resolved)          Problem: Physical Therapy    Goal Priority Disciplines Outcome Goal Variances Interventions   Physical Therapy Goal   (Resolved)     PT, PT/OT Met     Description: Goals to be met by: 2023    Patient will increase functional independence with mobility by performin. Supine to sit with supervision  2. Sit to stand transfer with Supervision  3. Gait x200 feet with Supervision using Rolling Walker  4. Ascend/Descend 4 steps with bilateral handrails and stand by assistance  5. Lower extremity exercise program x30 reps per handout, with supervision                            Time Tracking:       PT Received On: 23  PT Start Time: 08     PT Stop Time: 09  PT Total Time (min): 44 min     Billable Minutes: Gait Training 30 and Therapeutic Exercise 14    2023

## 2023-06-27 NOTE — NURSING
Has met unit/department guidelines for discharge from each phase of the post procedure continuum. Patient discharged.  Instructions and Prescriptions given.  IV removed, tolerated well.  Patient verbalized understanding instructions.  AAOx3, VSS, NADN, no complaints noted at this time.  Wheelchair to private vehicle in care of family.

## 2023-06-27 NOTE — ADDENDUM NOTE
Addendum  created 06/27/23 1143 by Shelbie Ortiz MD    Charge Capture section accepted, Cosign clinical note with attestation, Edit attestation on clinical note

## 2023-06-27 NOTE — NURSING
Care assumed. Patient sitting up in bedside chair. AAOx4, in no apparent distress, voices no complaints.

## 2023-06-27 NOTE — PROGRESS NOTES
"[facility])  Orthopedics  Progress Note    Patient Name: Yayo Carver  MRN: [id number]  Admission Date: 6/26/2023  Hospital Length of Stay: 0 days  Attending Provider: Jc Padgett III, MD  Primary Care Provider: Berta Hastings MD  Follow-up For: Procedure(s) (LRB):  ARTHROPLASTY, KNEE, TOTAL: RIGHT: DEPUY - ATTUNE (Right)    Post-Operative Day: 1 Day Post-Op  Subjective:     Principal Problem:Primary osteoarthritis of right knee    Principal Orthopedic Problem: same, s/p R TKA 6/26    Interval History: No acute events overnight.  Vitals within normal limits.  Pain well controlled. 1100 mL urine output recorded overnight. Ambulated 60 feet with PT.    Review of patient's allergies indicates:   Allergen Reactions    Ace inhibitors Edema and Swelling       Current Facility-Administered Medications   Medication    0.9%  NaCl infusion    acetaminophen tablet 1,000 mg    amLODIPine tablet 10 mg    apixaban tablet 2.5 mg    atorvastatin tablet 40 mg    bisacodyL suppository 10 mg    celecoxib capsule 200 mg    famotidine tablet 20 mg    methocarbamoL tablet 750 mg    morphine injection 2 mg    mupirocin 2 % ointment 1 g    naloxone 0.4 mg/mL injection 0.02 mg    ondansetron injection 4 mg    oxyCODONE immediate release tablet 5 mg    oxyCODONE immediate release tablet Tab 10 mg    polyethylene glycol packet 17 g    pregabalin capsule 75 mg    prochlorperazine injection Soln 5 mg    senna-docusate 8.6-50 mg per tablet 1 tablet     Objective:     Vital Signs (Most Recent):  Temp: 98.2 °F (36.8 °C) (06/27/23 0439)  Pulse: 71 (06/27/23 0439)  Resp: 16 (06/27/23 0441)  BP: (!) 156/71 (06/27/23 0439)  SpO2: 97 % (06/27/23 0439) Vital Signs (24h Range):  Temp:  [97 °F (36.1 °C)-98.3 °F (36.8 °C)] 98.2 °F (36.8 °C)  Pulse:  [67-81] 71  Resp:  [16-20] 16  SpO2:  [95 %-100 %] 97 %  BP: (102-156)/(63-84) 156/71     Weight: 108.9 kg (240 lb)  Height: 5' 6" (167.6 cm)  Body mass index is 38.74 " kg/m².      Intake/Output Summary (Last 24 hours) at 6/27/2023 0548  Last data filed at 6/27/2023 0438  Gross per 24 hour   Intake 2860 ml   Output 2200 ml   Net 660 ml        Ortho/SPM Exam  Gen: NAD, sitting comfortably in bed  CV: regular rate  Resp: non-labored respirations    RLE:  Dressing clean, dry, and intact  No significant hematoma over operative site  Appropriate postoperative TTP  SILT Sa/Bran/DP/SP/T  Motor intact EHL/FHL/TA/Gastroc  2+ DP, 2+ PT       Significant Labs: All pertinent labs within the past 24 hours have been reviewed.    Significant Imaging: I have reviewed and interpreted all pertinent imaging results/findings.    Assessment/Plan:     * Primary osteoarthritis of right knee  Yayo Carver is a 57 y.o. female s/p R TKA 6/26. Doing well this morning.    Plan:  Pain control: multimodal  PT/OT: WBAT RLE  DVT PPx: Eliquis 2.5 mg BID, FCDs at all times when not ambulating  Abx: postop Ancef. Home with cefadroxil    Dispo: home with outpatient PT            Johnnie Rush MD  Orthopedics  [facility] ([facility])

## 2023-06-27 NOTE — PLAN OF CARE
Problem: Adult Inpatient Plan of Care  Goal: Absence of Hospital-Acquired Illness or Injury  Intervention: Identify and Manage Fall Risk  Flowsheets (Taken 6/27/2023 0028)  Safety Promotion/Fall Prevention:   assistive device/personal item within reach   side rails raised x 2   instructed to call staff for mobility   medications reviewed   Fall Risk reviewed with patient/family   nonskid shoes/socks when out of bed     Problem: Adult Inpatient Plan of Care  Goal: Absence of Hospital-Acquired Illness or Injury  Intervention: Prevent and Manage VTE (Venous Thromboembolism) Risk  Flowsheets (Taken 6/27/2023 0028)  Activity Management: Ambulated to bathroom - L4  VTE Prevention/Management:   dorsiflexion/plantar flexion performed   intravenous hydration   remove, assess skin, and reapply foot pump   fluids promoted  Range of Motion: active ROM (range of motion) encouraged     Problem: Adult Inpatient Plan of Care  Goal: Absence of Hospital-Acquired Illness or Injury  Intervention: Prevent Infection  Flowsheets (Taken 6/27/2023 0028)  Infection Prevention:   rest/sleep promoted   single patient room provided   hand hygiene promoted

## 2023-06-27 NOTE — DISCHARGE SUMMARY
[facility])  Orthopedics  Discharge Summary      Patient Name: Yayo Carver  MRN: [id number]  Admission Date: 6/26/2023  Hospital Length of Stay: 0 days  Discharge Date and Time: No discharge date for patient encounter.  Attending Physician: Jc Padgett III, MD   Discharging Provider: Johnnie Rush MD  Primary Care Provider: Berta Hastings MD    HPI: Yayo Carver is a 57 y.o. female with history of Right knee pain. Pain is worse with activity and weight bearing.  Patient has experienced interference of activities of daily living due to decreased range of motion and an increase in joint pain and swelling.  Patient has failed non-operative treatment including NSAIDs, corticosteroid injections, viscosupplement injections, and activity modification.  Yayo Carver currently ambulates independently.      Relevant medical conditions of significance in perioperative period:  Type 2 DM- on medication managed by pcp  HTN- on medication managed by pcp  HLD- on medication managed by pcp    Procedure(s) (LRB):  ARTHROPLASTY, KNEE, TOTAL: RIGHT: DEPUY - ATTUNE (Right)      Hospital Course: On 6/26/23, the patient arrived to the [facility] for pre-operative management.  Upon completion of the pre-operative preparation, the patient was taken back to the operative theatre. The above procedure was performed without complication and the patient was transported to the post anesthesia care unit in stable condition.  After appropriate recovery from the anaesthetic agents used during the surgery, the patient was then transported to the inpatient floor.  The interim of the hospital stay from arrival on the floor up to discharge has been uncomplicated. The patient has tolerated regular diet.  The patient's pain has been controlled using a multimodal approach. Currently, the patient's pain is well controlled on an oral regimen.  The patient has been voiding without difficulty.  The patient began  participation in physical therapy after surgery and has progressed throughout the entire hospital stay.  Currently, the patient's progress is sufficient to allow the them to be discharged to home safely.  The patient agrees with this assessment and desires a discharge today.      Consults (From admission, onward)          Status Ordering Provider     Inpatient consult to Social Work  Once        Provider:  (Not yet assigned)    Acknowledged AJIT ATKINS     Inpatient consult to Pain Management  Once        Provider:  (Not yet assigned)    Acknowledged AJIT ATKINS     Inpatient consult to Respiratory Care  Once        Provider:  (Not yet assigned)    Acknowledged AJIT ATKINS            Significant Diagnostic Studies: No pertinent studies.    Pending Diagnostic Studies:       None          Final Active Diagnoses:    Diagnosis Date Noted POA    PRINCIPAL PROBLEM:  Primary osteoarthritis of right knee [M17.11] 06/05/2023 Yes      Problems Resolved During this Admission:      Discharged Condition: good    Disposition: Home or Self Care    Follow Up: in clinic in 2 weeks    Patient Instructions:      Activity as tolerated     Sponge bath only until clinic visit     Keep surgical extremity elevated     Lifting restrictions   Order Comments: No strenuous exercise or lifting of > 10 lbs     Weight bearing as tolerated     No driving, operating heavy equipment or signing legal documents while taking pain medication     Leave dressing on - Keep it clean, dry, and intact until clinic visit   Order Comments: Do not remove surgical dressing for 2 weeks post-op. This will be done only by MD at initial post-op visit. If dressing is completely saturated, replace with identical dressing - silver-impregnated hydrocolloid dressing.     Do not get dressings wet. Do not shower.     If dressing continues to be saturated or there are signs of infection, please call Ortho Clinic [phone] for further instructions and to  make appt to be seen.     Call MD for:  temperature >100.4     Call MD for:  persistent nausea and vomiting     Call MD for:  severe uncontrolled pain     Call MD for:  difficulty breathing, headache or visual disturbances     Call MD for:  redness, tenderness, or signs of infection (pain, swelling, redness, odor or green/yellow discharge around incision site)     Call MD for:  hives     Call MD for:  persistent dizziness or light-headedness     Call MD for:  extreme fatigue     Medications:  Reconciled Home Medications:      Medication List        CHANGE how you take these medications      acetaminophen 650 MG Tbsr  Commonly known as: TYLENOL  Take 1 tablet (650 mg total) by mouth every 8 (eight) hours.  What changed: Another medication with the same name was removed. Continue taking this medication, and follow the directions you see here.     methocarbamoL 750 MG Tab  Commonly known as: ROBAXIN  Take 1 tablet (750 mg total) by mouth 4 (four) times daily as needed (muscle spasms).  What changed: Another medication with the same name was removed. Continue taking this medication, and follow the directions you see here.            CONTINUE taking these medications      amLODIPine 10 MG tablet  Commonly known as: NORVASC  Take 1 tablet (10 mg total) by mouth once daily.     aspirin 81 MG EC tablet  Commonly known as: ECOTRIN  Take 1 tablet (81 mg total) by mouth once daily.     atorvastatin 40 MG tablet  Commonly known as: LIPITOR  Take 1 tablet (40 mg total) by mouth once daily.     b complex vitamins capsule  Take 1 capsule by mouth once daily.     Boost Glucose ControL 0.06-1.1 gram-kcal/mL Liqd  Generic drug: nut.tx.gluc intol,lf,soy-fiber  Take 8 ml by mouth with meals for 2 weeks     cefadroxil 500 MG Cap  Commonly known as: DURICEF  Take 1 capsule (500 mg total) by mouth every 12 (twelve) hours.     celecoxib 200 MG capsule  Commonly known as: CeleBREX  Take 1 capsule (200 mg total) by mouth once daily.      diclofenac sodium 1 % Gel  Commonly known as: VOLTAREN  Apply 2 g topically 4 (four) times daily as needed (knee pain).     * docusate sodium 100 MG capsule  Commonly known as: COLACE  Take 1 capsule (100 mg total) by mouth 2 (two) times daily as needed for Constipation.     * docusate sodium 100 MG capsule  Commonly known as: COLACE  Take 1 capsule (100 mg total) by mouth 2 (two) times daily.     ELIQUIS 2.5 mg Tab  Generic drug: apixaban  Take 1 tablet (2.5 mg total) by mouth 2 (two) times daily.     FREESTYLE HEENA 14 DAY READER Misc  Generic drug: flash glucose scanning reader  Check blood glucose before meals and nightly.     FREESTYLE HEENA 14 DAY SENSOR Kit  Generic drug: flash glucose sensor  Apply to skin for 14 days.  Check blood glucose before meals and nightly.     LIDOcaine-prilocaine cream  Commonly known as: EMLA  Apply topically.     omeprazole 40 MG capsule  Commonly known as: PRILOSEC  Take 40 mg by mouth.     oxyCODONE 5 MG immediate release tablet  Commonly known as: ROXICODONE  Take 1-2 tabs every 4-6 hours as needed for pain     * pantoprazole 40 MG tablet  Commonly known as: PROTONIX  Take 1 tablet (40 mg total) by mouth daily as needed (acid reflux).     * pantoprazole 40 MG tablet  Commonly known as: PROTONIX  Take 1 tablet (40 mg total) by mouth once daily.     sodium bicarbonate 650 MG tablet  Take 1 tablet (650 mg total) by mouth 2 (two) times daily.     terbinafine  mg tablet  Commonly known as: LAMISIL  Take 1 tablet (250 mg total) by mouth once daily. Avoid alcohol while on medication     VITAMIN D2 50,000 unit Cap  Generic drug: ergocalciferol  Take 1 capsule (50,000 Units total) by mouth every 7 days.           * This list has 4 medication(s) that are the same as other medications prescribed for you. Read the directions carefully, and ask your doctor or other care provider to review them with you.                STOP taking these medications      meloxicam 15 MG  tablet  Commonly known as: MOBIC     traMADoL 50 mg tablet  Commonly known as: JENNIFER Rush MD  Orthopedics  [facility])

## 2023-06-27 NOTE — SUBJECTIVE & OBJECTIVE
"Principal Problem:Primary osteoarthritis of right knee    Principal Orthopedic Problem: same, s/p R TKA 6/26    Interval History: No acute events overnight.  Vitals within normal limits.  Pain well controlled. 1100 mL urine output recorded overnight. Ambulated 60 feet with PT.    Review of patient's allergies indicates:   Allergen Reactions    Ace inhibitors Edema and Swelling       Current Facility-Administered Medications   Medication    0.9%  NaCl infusion    acetaminophen tablet 1,000 mg    amLODIPine tablet 10 mg    apixaban tablet 2.5 mg    atorvastatin tablet 40 mg    bisacodyL suppository 10 mg    celecoxib capsule 200 mg    famotidine tablet 20 mg    methocarbamoL tablet 750 mg    morphine injection 2 mg    mupirocin 2 % ointment 1 g    naloxone 0.4 mg/mL injection 0.02 mg    ondansetron injection 4 mg    oxyCODONE immediate release tablet 5 mg    oxyCODONE immediate release tablet Tab 10 mg    polyethylene glycol packet 17 g    pregabalin capsule 75 mg    prochlorperazine injection Soln 5 mg    senna-docusate 8.6-50 mg per tablet 1 tablet     Objective:     Vital Signs (Most Recent):  Temp: 98.2 °F (36.8 °C) (06/27/23 0439)  Pulse: 71 (06/27/23 0439)  Resp: 16 (06/27/23 0441)  BP: (!) 156/71 (06/27/23 0439)  SpO2: 97 % (06/27/23 0439) Vital Signs (24h Range):  Temp:  [97 °F (36.1 °C)-98.3 °F (36.8 °C)] 98.2 °F (36.8 °C)  Pulse:  [67-81] 71  Resp:  [16-20] 16  SpO2:  [95 %-100 %] 97 %  BP: (102-156)/(63-84) 156/71     Weight: 108.9 kg (240 lb)  Height: 5' 6" (167.6 cm)  Body mass index is 38.74 kg/m².      Intake/Output Summary (Last 24 hours) at 6/27/2023 0553  Last data filed at 6/27/2023 0438  Gross per 24 hour   Intake 2860 ml   Output 2200 ml   Net 660 ml        Ortho/SPM Exam  Gen: NAD, sitting comfortably in bed  CV: regular rate  Resp: non-labored respirations    RLE:  Dressing clean, dry, and intact  No significant hematoma over operative site  Appropriate postoperative TTP  SILT " Sa/Bran/DP/SP/T  Motor intact EHL/FHL/TA/Gastroc  2+ DP, 2+ PT       Significant Labs: All pertinent labs within the past 24 hours have been reviewed.    Significant Imaging: I have reviewed and interpreted all pertinent imaging results/findings.

## 2023-06-27 NOTE — ANESTHESIA POSTPROCEDURE EVALUATION
Anesthesia Post Evaluation    Patient: Yyao Carver    Procedure(s) Performed: Procedure(s) (LRB):  ARTHROPLASTY, KNEE, TOTAL: RIGHT: DEPUY - ATTUNE (Right)    Final Anesthesia Type: spinal      Patient location during evaluation: PACU  Patient participation: Yes- Able to Participate  Level of consciousness: awake and alert and oriented  Post-procedure vital signs: reviewed and stable  Pain management: adequate  Airway patency: patent    PONV status at discharge: No PONV  Anesthetic complications: no      Cardiovascular status: hemodynamically stable  Respiratory status: nasal cannula  Hydration status: euvolemic  Follow-up not needed.          Vitals Value Taken Time   /74 06/27/23 0813   Temp 36.8 °C (98.2 °F) 06/27/23 0813   Pulse 70 06/27/23 0813   Resp 16 06/27/23 0831   SpO2 100 % 06/27/23 0813         Event Time   Out of Recovery 16:14:15         Pain/Jamilah Score: Pain Rating Prior to Med Admin: 8 (6/27/2023  8:31 AM)  Pain Rating Post Med Admin: 5 (6/27/2023  5:41 AM)  Jamilah Score: 10 (6/26/2023  2:48 PM)

## 2023-06-27 NOTE — PLAN OF CARE
Problem: Adult Inpatient Plan of Care  Goal: Plan of Care Review  Outcome: Adequate for Care Transition  Flowsheets (Taken 6/27/2023 0934)  Plan of Care Reviewed With: patient     Problem: Adult Inpatient Plan of Care  Goal: Patient-Specific Goal (Individualized)  Outcome: Adequate for Care Transition  Flowsheets (Taken 6/27/2023 0934)  Individualized Care Needs: pain management     Problem: Pain Acute  Goal: Acceptable Pain Control and Functional Ability  Intervention: Develop Pain Management Plan  Flowsheets (Taken 6/27/2023 0934)  Pain Management Interventions:   pain management plan reviewed with patient/caregiver   pillow support provided   position adjusted     Problem: Bleeding (Knee Arthroplasty)  Goal: Absence of Bleeding  Intervention: Monitor and Manage Bleeding  Flowsheets (Taken 6/27/2023 0934)  Bleeding Management: dressing monitored     Problem: Fluid and Electrolyte Imbalance (Knee Arthroplasty)  Goal: Fluid and Electrolyte Balance  Intervention: Monitor and Manage Fluid and Electrolyte Balance  Flowsheets (Taken 6/27/2023 0934)  Fluid/Electrolyte Management:   fluids provided   oral rehydration therapy initiated     Problem: Fall Injury Risk  Goal: Absence of Fall and Fall-Related Injury  Intervention: Identify and Manage Contributors  Flowsheets (Taken 6/27/2023 0934)  Self-Care Promotion:   independence encouraged   BADL personal objects within reach   BADL personal routines maintained   meal set-up provided  Medication Review/Management: medications reviewed     Problem: Fall Injury Risk  Goal: Absence of Fall and Fall-Related Injury  Intervention: Identify and Manage Contributors  Flowsheets (Taken 6/27/2023 0934)  Self-Care Promotion:   independence encouraged   BADL personal objects within reach   BADL personal routines maintained   meal set-up provided  Medication Review/Management: medications reviewed     Problem: Hypertension Comorbidity  Goal: Blood Pressure in Desired  Range  Intervention: Maintain Blood Pressure Management  Flowsheets (Taken 6/27/2023 0105)  Medication Review/Management: medications reviewed   Plan of care reviewed with patient; verbalized understanding.   Medications reviewed and administered as ordered.  Rounding for safety and patient care per policy.   Safety precautions maintained. Patient working appropriately with PT/OT.  DME at bedside.  Call light within reach, bed wheels locked, bed in lowest position, side rails ^x2, safety maintained. NADN, adequate for discharge.

## 2023-06-28 ENCOUNTER — CLINICAL SUPPORT (OUTPATIENT)
Dept: REHABILITATION | Facility: HOSPITAL | Age: 58
End: 2023-06-28
Attending: ORTHOPAEDIC SURGERY
Payer: MEDICAID

## 2023-06-28 ENCOUNTER — CLINICAL SUPPORT (OUTPATIENT)
Dept: ORTHOPEDICS | Facility: CLINIC | Age: 58
End: 2023-06-28
Payer: MEDICAID

## 2023-06-28 DIAGNOSIS — Z96.659 STATUS POST KNEE REPLACEMENT, UNSPECIFIED LATERALITY: Primary | ICD-10-CM

## 2023-06-28 DIAGNOSIS — M25.661 DECREASED RANGE OF MOTION (ROM) OF RIGHT KNEE: ICD-10-CM

## 2023-06-28 DIAGNOSIS — M17.11 PRIMARY OSTEOARTHRITIS OF RIGHT KNEE: ICD-10-CM

## 2023-06-28 DIAGNOSIS — R29.898 DECREASED STRENGTH OF LOWER EXTREMITY: Primary | ICD-10-CM

## 2023-06-28 PROCEDURE — 99499 NO LOS: ICD-10-PCS | Mod: 95,,, | Performed by: ORTHOPAEDIC SURGERY

## 2023-06-28 PROCEDURE — 97110 THERAPEUTIC EXERCISES: CPT

## 2023-06-28 PROCEDURE — 97161 PT EVAL LOW COMPLEX 20 MIN: CPT

## 2023-06-28 PROCEDURE — 99499 UNLISTED E&M SERVICE: CPT | Mod: 95,,, | Performed by: ORTHOPAEDIC SURGERY

## 2023-06-28 NOTE — PROGRESS NOTES
I called the patient today regarding her surgery with Dr. Jc Padgett III. The patient had a right TKA on 6/26.     Pain Scale:  20  / 10 pt taking all multimodals, but last dose of OXY was at 1030 am over 5 hours ago, she has been taking one OXY, advised pt to take two OXY now and she may take two OXY every 4 hours to catch up.    Any issues with Fever: No.    Any issues with medications (specifically DVT prophylaxis): No. Eliquis 2.5 mg BID    Any issues with bowel movements:  Passing ifrah: No.                                                                 Urination: No.                                                                 Constipation: No. Last BM 6/28    Completing at home exercises: Yes:     Any concerns regarding their dressing/bandage:  No.    Patient confirmed first OP-PT appointment:  [facility] on  6/28 at 1100.     Any other concerns: No.        The patient was informed that if they have any urgent issues with their bandage, medications or any other health concerns regarding their surgery to call the 24/7 Orthopedic Post-op Hot Line at [phone]. The patient was reminded that if they have any chest pain or shortness of breath to call 911 or go to the ER.    The patient verbalized understanding and does not have any other questions

## 2023-06-29 NOTE — PLAN OF CARE
[facility]   Physical Therapy Initial Evaluation      Name: Yayo Carver  Clinic Number: [id number]    Therapy Diagnosis:   Encounter Diagnoses   Name Primary?    Primary osteoarthritis of right knee     Decreased strength of lower extremity Yes    Decreased range of motion (ROM) of right knee        Physician: Jc Padgett III, *    Physician Orders: PT Eval and Treat   Medical Diagnosis from Referral:    M17.11 (ICD-10-CM) - Primary osteoarthritis of right knee        Evaluation Date: 6/28/2023  Authorization Period Expiration: 8/1/2023  Plan of Care Expiration: 8/11/2023  Progress Note Due: 10th visit  Visit # / Visits authorized: 1/ 1   FOTO: 1/3    Precautions: Standard     Time In: 11:00 am  Time Out: 11:45 am  Total Appointment Time (timed & untimed codes): 45 minutes    Subjective     Date of surgery: 6/26/2023    History of current condition - Elisa reports: she had surgery 2 days ago. Patient she has been in significant pain since surgery, but feels confident overall.     Imaging: please see imaging    Prior Therapy: 1 visit for prehab for R TKA, previous L TKA  Social History:  lives alone, son is staying with her for post op care  Prior Level of Function: independent, but difficulty and pain with ADL's  Current Level of Function: ambulates with rolling walker, difficulty and pain with seated to standing transfer    Pain:  Current 10/10, worst 10/10, best 5/10   Location: right knee    Description: Aching  Aggravating Factors: Sitting, Standing, and Getting out of bed/chair  Easing Factors: ice, rest, and elevation    Patients goals: return to gym, walk more than a mile     Medical History:   Past Medical History:   Diagnosis Date    Arthritis     Diabetes mellitus     Diabetes mellitus, type 2     GERD (gastroesophageal reflux disease)     HLD (hyperlipidemia)     Hypertension        Surgical History:   Yayo Carver  has a past surgical history that includes Tubal  "ligation; Tonsillectomy; Colonoscopy (N/A, 2017);  section (1989); Bariatric Surgery (2022); Knee arthroscopy (Right, ); Total knee arthroplasty (Left, 2022); and Total knee arthroplasty (Right, 2023).    Medications:   Yayo has a current medication list which includes the following prescription(s): acetaminophen, amlodipine, apixaban, aspirin, atorvastatin, b complex vitamins, cefadroxil, celecoxib, diclofenac sodium, docusate sodium, docusate sodium, ergocalciferol, freestyle zahcery 14 day reader, freestyle zachery 14 day sensor, lidocaine-prilocaine, methocarbamol, boost glucose control, omeprazole, oxycodone, pantoprazole, pantoprazole, sodium bicarbonate, and terbinafine hcl.    Allergies:   Review of patient's allergies indicates:   Allergen Reactions    Ace inhibitors Edema and Swelling        Objective      Palpation: boggy end feel to patella mobilization. Negative s/s of DVT for Well's Criteria     Active range of motion:  Right knee: 0-75  Left knee: 5-0-120    Passive range of motion:  Right knee: held-held  Left knee: 5-0-120      Lower extremity manual muscle test Right Left   Hip flexion 3-/5 3-/5   Hip extension NT NT   Hip abduction 3+/5 3+/5   Knee flexion 3+/5 3-/5   Knee extension 3-/5 4-/5     Timed Up and Go:  at first follow up; held per patient request    30" Chair Stand: at first follow up; held per patient request    Gait Analysis: Modified independent with rolling walker: decreased WB through RLE    Impairment/Limitation/Restriction for KOOS JR. Score on FOTO Knee Survey    Therapist reviewed KOOS scores for Yayo Carver on 2023.   KOOS documents entered into EP[employer] - see Media section.    Limitation Score: at first follow up    Limitation for Total FOTO Knee Survey  Limitation Score: at first follow up       Treatment   *Per Medicaid guidelines all therapy billed as therex*  *PT one-on-one with patient for entire session with PT assistant utilized " "for remainder of therapy session*    Total Treatment time (time-based codes) separate from Evaluation: 20 minutes     Elisa received the treatments listed below:      therapeutic exercises to develop ROM and flexibility for 00 minutes including:    Seated heel prop: 5' - good tolerance  Seated hamstring stretch in heel prop: 3x30" - np    neuromuscular re-education activities to improve: Proprioception and motor control for 10 minutes. The following activities were included:  Quad sets: 5"x20  Short arc quads: 5" x20 with green strap    10 minutes of cold pack with lower extremities elevated.     Patient Education and Home Exercises     Education provided:   - Findings; prognosis and plan of care  - Home exercise program  - Modality options  - Therapist contact information    Written Home Exercises Provided: yes. Exercises were reviewed and Elisa was able to demonstrate them prior to the end of the session.  Elisa demonstrated good  understanding of the education provided. See EMR under Patient Instructions for exercises provided during therapy sessions.    Assessment     Yayo is a 57 y.o. female referred to outpatient Physical Therapy with a medical diagnosis of Primary osteoarthritis of right knee. Patient presents s/p day 2 following right total knee arthroplasty. Patient demonstrates decreased lower extremity strength, right quadriceps motor control and strength, difficulty ambulating short and long distances, and reduced functional mobility. Patient would benefit from skilled outpatient physical therapy to address motor control, strength and range of motion deficits so that she may return to full independence with all household and personal ADL's, and improve overall functional mobility, reduce risk for falls and meet all social and recreational needs.    Patient prognosis is Good.   Patient will benefit from skilled outpatient Physical Therapy to address the deficits stated above and in the chart below, provide " patient /family education, and to maximize patientt's level of independence.     Plan of care discussed with patient: Yes  Patient's spiritual, cultural and educational needs considered and patient is agreeable to the plan of care and goals as stated below:     Anticipated Barriers for therapy: compliance    Medical Necessity is demonstrated by the following  History  Co-morbidities and personal factors that may impact the plan of care [] LOW: no personal factors / co-morbidities  [] MODERATE: 1-2 personal factors / co-morbidities  [x] HIGH: 3+ personal factors / co-morbidities    Moderate / High Support Documentation:   Co-morbidities affecting plan of care: high BMI, HTN, prior abdominal surgery, and L TKA    Personal Factors:   no deficits     Examination  Body Structures and Functions, activity limitations and participation restrictions that may impact the plan of care [x] LOW: addressing 1-2 elements  [] MODERATE: 3+ elements  [] HIGH: 4+ elements (please support below)    Moderate / High Support Documentation: NA     Clinical Presentation [x] LOW: stable  [] MODERATE: Evolving  [] HIGH: Unstable     Decision Making/ Complexity Score: low       Short Term Goals (3 Weeks):   1. Patient will be independent with home exercise program to supplement physical therapy treatment in improving functional status.  2. Pt will improve impaired lower extremity manual muscle tests to >/= 4/5 to improve dynamic right knee support for closed chain tasks.  3. Patient will improve (right) knee active range of motion to 0-90 degrees to promote increased ease of sit<>stand transfers.  4. Patient will perform timed up and go with less restrictive assistive device in < 15 seconds to improve gait speed and safety with community ambulation.     Long Term Goals (6 Weeks):   1. Pt will improve impaired lower extremity manual muscle tests to >/= 4+/5 to improve dynamic right knee support for closed chain tasks.  2. Patient will improve  "the total FOTO Knee Survey Score to </= 40% limited to demonstrate increased perceived functional mobility.  3. Patient will improve score on KOOS Jr. section of FOTO Knee Survey to = </= 70% to demonstrate increased perceived functional mobility.  4. Patient will perform timed up and go with least restrictive assistive device in < 10 seconds to improve gait speed and safety with community ambulation.  5. Patient will perform at least 10 sit to stands in 30 seconds without UE support to demonstrate increased functional strength.   6. Patient will improve (right ) knee active range of motion to 0-120 degrees to promote higher level transfers and transitions without limitation.       Timed Up and Go Cutoff Scores:        30" Chair Stand Cutoff Scores:            Plan     Plan of care Certification: 6/28/2023 to 8/11/2023.    Outpatient Physical Therapy 3-4 times weekly for 6 weeks to include the following interventions: Electrical Stimulation TENS, Gait Training, Manual Therapy, Moist Heat/ Ice, Neuromuscular Re-ed, Orthotic Management and Training, Patient Education, Self Care, Therapeutic Activities, Therapeutic Exercise, and functional dry needling.     Deja Kennedy, PT          "

## 2023-06-30 ENCOUNTER — CLINICAL SUPPORT (OUTPATIENT)
Dept: REHABILITATION | Facility: HOSPITAL | Age: 58
End: 2023-06-30
Payer: MEDICAID

## 2023-06-30 DIAGNOSIS — R29.898 DECREASED STRENGTH OF LOWER EXTREMITY: ICD-10-CM

## 2023-06-30 DIAGNOSIS — M25.661 DECREASED RANGE OF MOTION (ROM) OF RIGHT KNEE: Primary | ICD-10-CM

## 2023-06-30 PROCEDURE — 97110 THERAPEUTIC EXERCISES: CPT

## 2023-06-30 NOTE — PROGRESS NOTES
"[facility]   Physical Therapy Treatment Note      Name: Yayo Carver  [facility] Number: [id number]    Therapy Diagnosis:   Encounter Diagnoses   Name Primary?    Decreased range of motion (ROM) of right knee Yes    Decreased strength of lower extremity      Physician: Jc Padgett III, *    Visit Date: 6/30/2023    Physician Orders: PT Eval and Treat   Medical Diagnosis from Referral:     M17.11 (ICD-10-CM) - Primary osteoarthritis of right knee         Evaluation Date: 6/28/2023  Authorization Period Expiration: 8/1/2023  Plan of Care Expiration: 8/11/2023  Progress Note Due: 10th visit  Visit # / Visits authorized: 1/ 1   FOTO: 1/3     Precautions: Standard      Time In: 12:10 pm  Time Out: 1:10 pm  Total Appointment Time (timed & untimed codes): 60 minutes  Billable time: 50 minutes (3 TE)    PTA Visit #: 0/5       Subjective     Pt reports: she is hurting today.  She was compliant with home exercise program.  Response to previous treatment: no adverse effects  Functional change: nothing new to note    Pain: 8/10  Location: right knee      Objective      Objective Measures updated at progress report unless specified.     6/30/2023: 0- 72 AAROM    Treatment     Elisa received the treatments listed below:      *Per Medicaid guidelines all therapy billed as therex*  *PT one-on-one with patient for entire session with PT assistant utilized for remainder of therapy session*    therapeutic exercises to develop ROM and flexibility for 9 minutes including:  Heel slides: 10 seconds, 5x - poor tolerance     neuromuscular re-education activities to improve: Proprioception and motor control for 41 minutes. The following activities were included:  Ambulated 300 feet with RW emphasizing quad contraction through stance phase  Quad sets: 5"x30     After being cleared for contradictions: symmetrical biphasic Electrical Stimulation: Elisa received symmetrical biphasic Electrical Stimulation supervised to " elicit muscle contraction of the quadriceps for 25 minutes. Pt received stimulation: Freuqeuncy: 35 Hz, Phase duration: 300 msec, amplitude 29 Lizzy with 10 second on time and 10 second off time while performing quad set. Patient tolerated treatment well without any adverse effects.  Short arc quads: with green strap medium bolster  Short arc quads: with green strap small bolster     10 minutes of cold pack with lower extremities elevated.     Patient Education and Home Exercises       Education provided:   - HEP    Written Home Exercises Provided: Patient instructed to cont prior HEP. Exercises were reviewed and Elisa was able to demonstrate them prior to the end of the session.  Elisa demonstrated good  understanding of the education provided. See EMR under Patient Instructions for exercises provided during therapy sessions    Assessment   Patient presents to first follow up since IE. Patient with fair tolerance to today's activities secondary to pain. Patient unable to initiate short arc quad without NMES at this time.     Elisa Is progressing well towards her goals.   Pt prognosis is Good.     Pt will continue to benefit from skilled outpatient physical therapy to address the deficits listed in the problem list box on initial evaluation, provide pt/family education and to maximize pt's level of independence in the home and community environment.     Pt's spiritual, cultural and educational needs considered and pt agreeable to plan of care and goals.     Anticipated barriers to physical therapy: chronicity    Goals:   Short Term Goals (3 Weeks):   1. Patient will be independent with home exercise program to supplement physical therapy treatment in improving functional status.  2. Pt will improve impaired lower extremity manual muscle tests to >/= 4/5 to improve dynamic right knee support for closed chain tasks.  3. Patient will improve (right) knee active range of motion to 0-90 degrees to promote increased ease of  sit<>stand transfers.  4. Patient will perform timed up and go with less restrictive assistive device in < 15 seconds to improve gait speed and safety with community ambulation.     Long Term Goals (6 Weeks):   1. Pt will improve impaired lower extremity manual muscle tests to >/= 4+/5 to improve dynamic right knee support for closed chain tasks.  2. Patient will improve the total FOTO Knee Survey Score to </= 40% limited to demonstrate increased perceived functional mobility.  3. Patient will improve score on KOOS Jr. section of FOTO Knee Survey to = </= 70% to demonstrate increased perceived functional mobility.  4. Patient will perform timed up and go with least restrictive assistive device in < 10 seconds to improve gait speed and safety with community ambulation.  5. Patient will perform at least 10 sit to stands in 30 seconds without UE support to demonstrate increased functional strength.   6. Patient will improve (right ) knee active range of motion to 0-120 degrees to promote higher level transfers and transitions without limitation.     Plan     Plan of care Certification: 6/28/2023 to 8/11/2023.     Outpatient Physical Therapy 3-4 times weekly for 6 weeks to include the following interventions: Electrical Stimulation TENS, Gait Training, Manual Therapy, Moist Heat/ Ice, Neuromuscular Re-ed, Orthotic Management and Training, Patient Education, Self Care, Therapeutic Activities, Therapeutic Exercise, and functional dry needling.     Deja Kennedy, PT

## 2023-07-01 DIAGNOSIS — Z96.651 STATUS POST RIGHT KNEE REPLACEMENT: Primary | ICD-10-CM

## 2023-07-01 RX ORDER — OXYCODONE HYDROCHLORIDE 5 MG/1
TABLET ORAL
Qty: 40 TABLET | Refills: 0 | Status: SHIPPED | OUTPATIENT
Start: 2023-07-01 | End: 2023-07-06 | Stop reason: SDUPTHER

## 2023-07-05 ENCOUNTER — TELEPHONE (OUTPATIENT)
Dept: PRIMARY CARE CLINIC | Facility: CLINIC | Age: 58
End: 2023-07-05
Payer: MEDICAID

## 2023-07-05 ENCOUNTER — CLINICAL SUPPORT (OUTPATIENT)
Dept: REHABILITATION | Facility: HOSPITAL | Age: 58
End: 2023-07-05
Payer: MEDICAID

## 2023-07-05 ENCOUNTER — TELEPHONE (OUTPATIENT)
Dept: ORTHOPEDICS | Facility: HOSPITAL | Age: 58
End: 2023-07-05
Payer: MEDICAID

## 2023-07-05 DIAGNOSIS — F43.21 COMPLICATED GRIEVING: Primary | ICD-10-CM

## 2023-07-05 DIAGNOSIS — R29.898 DECREASED STRENGTH OF LOWER EXTREMITY: ICD-10-CM

## 2023-07-05 DIAGNOSIS — M25.661 DECREASED RANGE OF MOTION (ROM) OF RIGHT KNEE: Primary | ICD-10-CM

## 2023-07-05 PROCEDURE — 97110 THERAPEUTIC EXERCISES: CPT | Mod: CQ
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
## 2023-07-06 DIAGNOSIS — Z96.651 STATUS POST RIGHT KNEE REPLACEMENT: ICD-10-CM

## 2023-07-06 RX ORDER — METHOCARBAMOL 750 MG/1
750 TABLET, FILM COATED ORAL 4 TIMES DAILY PRN
Qty: 40 TABLET | Refills: 0 | Status: SHIPPED | OUTPATIENT
Start: 2023-07-06 | End: 2023-07-16

## 2023-07-06 RX ORDER — OXYCODONE HYDROCHLORIDE 5 MG/1
TABLET ORAL
Qty: 40 TABLET | Refills: 0 | Status: SHIPPED | OUTPATIENT
Start: 2023-07-06 | End: 2023-07-07 | Stop reason: SDUPTHER

## 2023-07-07 DIAGNOSIS — Z96.651 STATUS POST RIGHT KNEE REPLACEMENT: ICD-10-CM

## 2023-07-07 RX ORDER — OXYCODONE HYDROCHLORIDE 5 MG/1
TABLET ORAL
Qty: 40 TABLET | Refills: 0 | Status: SHIPPED | OUTPATIENT
Start: 2023-07-07 | End: 2023-07-15 | Stop reason: SDUPTHER

## 2023-07-10 ENCOUNTER — DOCUMENTATION ONLY (OUTPATIENT)
Dept: REHABILITATION | Facility: HOSPITAL | Age: 58
End: 2023-07-10
Payer: MEDICAID

## 2023-07-10 NOTE — PROGRESS NOTES
"Physical Therapy: No show/Cancellation of Visit  Date: 07/10/2023    Patient cancelled today's PT appointment. Reason for cancellation: "in court." Patient's next scheduled appointment is 7/12/2023.     Initial Evaluation: 6/28/2023  Plan of Care Explanation: 8/11/2023  Cancel: 2  No show: 0    Therapist: Deja Kennedy, PT          "

## 2023-07-11 ENCOUNTER — OFFICE VISIT (OUTPATIENT)
Dept: ORTHOPEDICS | Facility: CLINIC | Age: 58
End: 2023-07-11
Payer: MEDICAID

## 2023-07-11 VITALS — HEIGHT: 66 IN | BODY MASS INDEX: 37.4 KG/M2 | WEIGHT: 232.69 LBS

## 2023-07-11 DIAGNOSIS — Z96.651 S/P TOTAL KNEE REPLACEMENT USING CEMENT, RIGHT: Primary | ICD-10-CM

## 2023-07-11 PROCEDURE — 3044F PR MOST RECENT HEMOGLOBIN A1C LEVEL <7.0%: ICD-10-PCS | Mod: CPTII,,, | Performed by: NURSE PRACTITIONER

## 2023-07-11 PROCEDURE — 3008F BODY MASS INDEX DOCD: CPT | Mod: CPTII,,, | Performed by: NURSE PRACTITIONER

## 2023-07-11 PROCEDURE — 1159F PR MEDICATION LIST DOCUMENTED IN MEDICAL RECORD: ICD-10-PCS | Mod: CPTII,,, | Performed by: NURSE PRACTITIONER

## 2023-07-11 PROCEDURE — 99024 PR POST-OP FOLLOW-UP VISIT: ICD-10-PCS | Mod: ,,, | Performed by: NURSE PRACTITIONER

## 2023-07-11 PROCEDURE — 1159F MED LIST DOCD IN RCRD: CPT | Mod: CPTII,,, | Performed by: NURSE PRACTITIONER

## 2023-07-11 PROCEDURE — 99024 POSTOP FOLLOW-UP VISIT: CPT | Mod: ,,, | Performed by: NURSE PRACTITIONER

## 2023-07-11 PROCEDURE — 1160F RVW MEDS BY RX/DR IN RCRD: CPT | Mod: CPTII,,, | Performed by: NURSE PRACTITIONER

## 2023-07-11 PROCEDURE — 1160F PR REVIEW ALL MEDS BY PRESCRIBER/CLIN PHARMACIST DOCUMENTED: ICD-10-PCS | Mod: CPTII,,, | Performed by: NURSE PRACTITIONER

## 2023-07-11 PROCEDURE — 99214 OFFICE O/P EST MOD 30 MIN: CPT | Mod: PBBFAC | Performed by: NURSE PRACTITIONER

## 2023-07-11 PROCEDURE — 99999 PR PBB SHADOW E&M-EST. PATIENT-LVL IV: CPT | Mod: PBBFAC,,, | Performed by: NURSE PRACTITIONER

## 2023-07-11 PROCEDURE — 99999 PR PBB SHADOW E&M-EST. PATIENT-LVL IV: ICD-10-PCS | Mod: PBBFAC,,, | Performed by: NURSE PRACTITIONER

## 2023-07-11 PROCEDURE — 3044F HG A1C LEVEL LT 7.0%: CPT | Mod: CPTII,,, | Performed by: NURSE PRACTITIONER

## 2023-07-11 PROCEDURE — 3008F PR BODY MASS INDEX (BMI) DOCUMENTED: ICD-10-PCS | Mod: CPTII,,, | Performed by: NURSE PRACTITIONER

## 2023-07-11 NOTE — PROGRESS NOTES
"Yayo Carver presents for initial post-operative visit following a right total knee arthroplasty performed by Dr. Padgett on 6/26/2023    Exam:   Height 5' 6" (1.676 m), weight 105.6 kg (232 lb 11.1 oz).   Ambulating well with assistive device.  Incision is clean and dry without drainage or erythema.   ROM:0-90    Initial post-operative radiographs reviewed today revealing a well fixed and aligned prosthesis.    A/P:  2 weeks s/p right total knee replacement  - The patient was advised to keep the incision clean and dry for the next 24 hours after which she may wash the area with antibacterial soap in the shower. Will not submerge incision until one month post op.  - Outpatient PT: ongoing at [facility]  - Continue eliquis for 1 month post op.  - Pain medication refilled earlier this week  - Follow up in 4 weeks with surgeon. Pt will call clinic with problems/concerns.        "

## 2023-07-12 ENCOUNTER — CLINICAL SUPPORT (OUTPATIENT)
Dept: REHABILITATION | Facility: HOSPITAL | Age: 58
End: 2023-07-12
Payer: MEDICAID

## 2023-07-12 DIAGNOSIS — M25.661 DECREASED RANGE OF MOTION (ROM) OF RIGHT KNEE: Primary | ICD-10-CM

## 2023-07-12 DIAGNOSIS — R29.898 DECREASED STRENGTH OF LOWER EXTREMITY: ICD-10-CM

## 2023-07-12 PROCEDURE — 97110 THERAPEUTIC EXERCISES: CPT
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
## 2023-07-14 ENCOUNTER — DOCUMENTATION ONLY (OUTPATIENT)
Dept: REHABILITATION | Facility: HOSPITAL | Age: 58
End: 2023-07-14
Payer: MEDICAID

## 2023-07-14 NOTE — PROGRESS NOTES
Physical Therapy: No show/Cancellation of Visit  Date: 07/14/2023    Patient was a no show to today's PT appointment. Patient's next scheduled appointment is 7/17/2023. LVM regarding next visit; clinic number provided.     Initial Evaluation: 6/28/2023  Plan of Care Explanation: 8/11/2023  Cancel: 2  No show: 1    Therapist: Farhana Olguin PTA

## 2023-07-15 DIAGNOSIS — Z96.651 STATUS POST RIGHT KNEE REPLACEMENT: ICD-10-CM

## 2023-07-15 RX ORDER — OXYCODONE HYDROCHLORIDE 5 MG/1
TABLET ORAL
Qty: 40 TABLET | Refills: 0 | Status: SHIPPED | OUTPATIENT
Start: 2023-07-15 | End: 2023-07-20 | Stop reason: SDUPTHER

## 2023-07-17 ENCOUNTER — CLINICAL SUPPORT (OUTPATIENT)
Dept: REHABILITATION | Facility: HOSPITAL | Age: 58
End: 2023-07-17
Payer: MEDICAID

## 2023-07-17 DIAGNOSIS — M25.661 DECREASED RANGE OF MOTION (ROM) OF RIGHT KNEE: ICD-10-CM

## 2023-07-17 DIAGNOSIS — M17.12 PRIMARY OSTEOARTHRITIS OF LEFT KNEE: Primary | ICD-10-CM

## 2023-07-17 PROCEDURE — 97110 THERAPEUTIC EXERCISES: CPT

## 2023-07-17 NOTE — PROGRESS NOTES
[facility]   Physical Therapy Treatment Note      Name: Yayo Carver  [facility] Number: [id number]    Therapy Diagnosis:   Encounter Diagnoses   Name Primary?    Primary osteoarthritis of left knee Yes    Decreased range of motion (ROM) of right knee        Physician: Jc Padgett III, *    Visit Date: 7/17/2023    Physician Orders: PT Eval and Treat   Medical Diagnosis from Referral:     M17.11 (ICD-10-CM) - Primary osteoarthritis of right knee         Evaluation Date: 6/28/2023  Authorization Period Expiration: 8/1/2023  Plan of Care Expiration: 8/11/2023  Progress Note Due: 10th visit  Visit # / Visits authorized: 5 / 36 + eval   FOTO: 1/3     Precautions: Standard      Time In: 12:00 pm  Time Out: 12:59 pm  Total Treatment Time: 59 minutes  Total Billable Time: 59 minutes (4  - Medicaid)    PTA Visit #: 0 / 5     Subjective     Patient reports: doing okay today; continues to have lateral knee pain.  She was compliant with home exercise program.  Response to previous treatment: appropriate muscle response  Functional change: ongoing    Pain: NT/10, currently  Location: right knee      Objective      Objective Measures updated at progress report unless specified.   Date of surgery: 6/26/2023  *Patient is 1 week, 2 days s/p R TKA as of 7/5/2023.*    6/30/2023: 0-72 AAROM  6/12/2023: 0 degrees extension    Treatment     Elisa received the treatments listed below:    *Per Medicaid guidelines all therapy billed as therex*  *PT one-on-one with patient for entire session with PT extender utilized for remainder of therapy session*     therapeutic exercises to develop ROM and flexibility for 5 minutes including:  Heel slides: 10 seconds, 10x - improved tolerance     neuromuscular re-education activities to improve: Proprioception and motor control for 39 minutes.   Short arc quads with medium bolster 5 seconds, 30x + 3 lbs  Short arc qauds with small bolster 10 seconds, 20x   Long arc quads 5  seconds, 30x + 3 lbs  Hamstring stretch with heel prop 30 seconds, 3x  SLR: 2x10 - cue for terminal knee extension     Cold pack for 00 minutes to Right knee with lower extremities elevated on wedge. - not today    THERAPEUTIC ACTIVITIES to improve dynamic and functional performance for 15 minutes including activities below.  Step ups 6 inch 2x10   Lateral step ups 2x10  Sit to stands 2x15     Cold pack for 10 minutes to Right knee with lower extremities elevated on wedge.    Patient Education and Home Exercises       Education provided:   - HEP    Written Home Exercises Provided: Patient instructed to cont prior HEP. Exercises were reviewed and Elisa was able to demonstrate them prior to the end of the session.  Elisa demonstrated good  understanding of the education provided. See EMR under Patient Instructions for exercises provided during therapy sessions    Assessment     Patient demonstrates improving quadriceps strength and motor control. Progressed to perform functional activities with good tolerance. Progressing well per phase of rehab.       Elisa Is progressing well towards her goals.   Pt prognosis is Good.     Pt will continue to benefit from skilled outpatient physical therapy to address the deficits listed in the problem list box on initial evaluation, provide pt/family education and to maximize pt's level of independence in the home and community environment.     Pt's spiritual, cultural and educational needs considered and pt agreeable to plan of care and goals.     Anticipated barriers to physical therapy: chronicity    Goals:   Short Term Goals (3 Weeks):   1. Patient will be independent with home exercise program to supplement physical therapy treatment in improving functional status.  2. Pt will improve impaired lower extremity manual muscle tests to >/= 4/5 to improve dynamic right knee support for closed chain tasks.  3. Patient will improve (right) knee active range of motion to 0-90 degrees to  promote increased ease of sit<>stand transfers.  4. Patient will perform timed up and go with less restrictive assistive device in < 15 seconds to improve gait speed and safety with community ambulation.     Long Term Goals (6 Weeks):   1. Pt will improve impaired lower extremity manual muscle tests to >/= 4+/5 to improve dynamic right knee support for closed chain tasks.  2. Patient will improve the total FOTO Knee Survey Score to </= 40% limited to demonstrate increased perceived functional mobility.  3. Patient will improve score on KOOS Jr. section of FOTO Knee Survey to = </= 70% to demonstrate increased perceived functional mobility.  4. Patient will perform timed up and go with least restrictive assistive device in < 10 seconds to improve gait speed and safety with community ambulation.  5. Patient will perform at least 10 sit to stands in 30 seconds without UE support to demonstrate increased functional strength.   6. Patient will improve (right ) knee active range of motion to 0-120 degrees to promote higher level transfers and transitions without limitation.     Plan     Plan of care Certification: 6/28/2023 to 8/11/2023.     Outpatient Physical Therapy 3-4 times weekly for 6 weeks to include the following interventions: Electrical Stimulation TENS, Gait Training, Manual Therapy, Moist Heat/ Ice, Neuromuscular Re-ed, Orthotic Management and Training, Patient Education, Self Care, Therapeutic Activities, Therapeutic Exercise, and functional dry needling.     Deja Kennedy, PT

## 2023-07-19 ENCOUNTER — CLINICAL SUPPORT (OUTPATIENT)
Dept: REHABILITATION | Facility: HOSPITAL | Age: 58
End: 2023-07-19
Payer: MEDICAID

## 2023-07-19 DIAGNOSIS — M25.661 DECREASED RANGE OF MOTION (ROM) OF RIGHT KNEE: Primary | ICD-10-CM

## 2023-07-19 DIAGNOSIS — R29.898 DECREASED STRENGTH OF LOWER EXTREMITY: ICD-10-CM

## 2023-07-19 PROCEDURE — 97110 THERAPEUTIC EXERCISES: CPT | Mod: CQ

## 2023-07-19 NOTE — PROGRESS NOTES
[facility]   Physical Therapy Treatment Note      Name: Yayo Carver  [facility] Number: [id number]    Therapy Diagnosis:   Encounter Diagnoses   Name Primary?    Decreased range of motion (ROM) of right knee Yes    Decreased strength of lower extremity      Physician: Jc Padgett III, *    Visit Date: 7/19/2023    Physician Orders: PT Eval and Treat   Medical Diagnosis from Referral:     M17.11 (ICD-10-CM) - Primary osteoarthritis of right knee         Evaluation Date: 6/28/2023  Authorization Period Expiration: 8/10/2023  Plan of Care Expiration: 8/11/2023  Progress Note Due: 10th visit  Visit # / Visits authorized: 5 / 36 + eval   FOTO: 1/3     Precautions: Standard      Time In: 1405  Time Out: 1515  Total Treatment Time: 70 minutes  Total Billable Time: 60 minutes (4  - Medicaid)    PTA Visit #: 1 / 5     Subjective     Patient reports: her pain is more at night. She reports noticing more swelling in both of her legs. She presents without assistive device.   She was compliant with home exercise program.  Response to previous treatment: appropriate muscle response  Functional change: ongoing    Pain: 7/10, currently  Location: right knee      Objective      Objective Measures updated at progress report unless specified.   Date of surgery: 6/26/2023  *Patient is 3 week, 2 days s/p R TKA as of 7/19/2023.*    6/30/2023: 0-72 AAROM  7/19/2023; 97 degrees AROM; 102 degrees PROM Flexion    Treatment     Elisa received the treatments listed below:      *Per Medicaid guidelines all therapy billed as therex*  *PT one-on-one with patient for entire session with PT extender utilized for remainder of therapy session*    therapeutic exercises to develop ROM and flexibility for 00 minutes including:  Heel slides: 10 seconds, 5x - poor tolerance     neuromuscular re-education activities to improve: Proprioception and motor control for 30 minutes.   Quad sets with towel under knee: 5 second hold,  30 reps  Short arc quads with 1/2 foam; 5 second hold, 3 x 10 reps  Short arc quads with medium bolster + 4lb AW; 5 second hold, 3 x 10 reps  Long arc quads at EOM + 4lb AW; 5 second hold, 3 x 10 reps    therapeutic activities to improve dynamic and functional performance for 30 minutes including:  Recumbent Bike for endogenous release of opioids to improve tolerance to therapeutic interventions for 7 minutes, half to full circles at seat 15  Step ups on 6-inch step; 3 x 10 reps leading with RLE  Sit to stands in std chair; 2 x 15 reps     Cold pack for 10 minutes to Right knee with lower extremities elevated on wedge.    Patient Education and Home Exercises       Education provided:   - HEP    Written Home Exercises Provided: Patient instructed to cont prior HEP. Exercises were reviewed and Elisa was able to demonstrate them prior to the end of the session.  Elisa demonstrated good  understanding of the education provided. See EMR under Patient Instructions for exercises provided during therapy sessions    Assessment     Elisa is 3 weeks, 2 days s/p R TKA. She presents today ambulating with no assistive device; there are gait deviations present with no knee flexion during swing phase with visible lateral sway. She fatigues easily with sit to stands and step ups. There is difficulty with maintaining quad contraction during short arc quads with visible extensor lag unless cued. She achieved 97 degrees active knee flexion ROM today.   Elisa Is progressing well towards her goals.   Pt prognosis is Good.     Pt will continue to benefit from skilled outpatient physical therapy to address the deficits listed in the problem list box on initial evaluation, provide pt/family education and to maximize pt's level of independence in the home and community environment.     Pt's spiritual, cultural and educational needs considered and pt agreeable to plan of care and goals.     Anticipated barriers to physical therapy:  chronicity    Goals:   Short Term Goals (3 Weeks):   1. Patient will be independent with home exercise program to supplement physical therapy treatment in improving functional status.  2. Pt will improve impaired lower extremity manual muscle tests to >/= 4/5 to improve dynamic right knee support for closed chain tasks.  3. Patient will improve (right) knee active range of motion to 0-90 degrees to promote increased ease of sit<>stand transfers.  4. Patient will perform timed up and go with less restrictive assistive device in < 15 seconds to improve gait speed and safety with community ambulation.     Long Term Goals (6 Weeks):   1. Pt will improve impaired lower extremity manual muscle tests to >/= 4+/5 to improve dynamic right knee support for closed chain tasks.  2. Patient will improve the total FOTO Knee Survey Score to </= 40% limited to demonstrate increased perceived functional mobility.  3. Patient will improve score on KOOS Jr. section of FOTO Knee Survey to = </= 70% to demonstrate increased perceived functional mobility.  4. Patient will perform timed up and go with least restrictive assistive device in < 10 seconds to improve gait speed and safety with community ambulation.  5. Patient will perform at least 10 sit to stands in 30 seconds without UE support to demonstrate increased functional strength.   6. Patient will improve (right ) knee active range of motion to 0-120 degrees to promote higher level transfers and transitions without limitation.     Plan     Plan of care Certification: 6/28/2023 to 8/11/2023.     Outpatient Physical Therapy 3-4 times weekly for 6 weeks to include the following interventions: Electrical Stimulation TENS, Gait Training, Manual Therapy, Moist Heat/ Ice, Neuromuscular Re-ed, Orthotic Management and Training, Patient Education, Self Care, Therapeutic Activities, Therapeutic Exercise, and functional dry needling.     Farhana Olguin, PTA

## 2023-07-20 ENCOUNTER — TELEPHONE (OUTPATIENT)
Dept: PODIATRY | Facility: CLINIC | Age: 58
End: 2023-07-20
Payer: MEDICAID

## 2023-07-20 DIAGNOSIS — Z96.651 STATUS POST RIGHT KNEE REPLACEMENT: ICD-10-CM

## 2023-07-20 RX ORDER — OXYCODONE HYDROCHLORIDE 5 MG/1
TABLET ORAL
Qty: 30 TABLET | Refills: 0 | Status: SHIPPED | OUTPATIENT
Start: 2023-07-20 | End: 2023-07-20 | Stop reason: SDUPTHER

## 2023-07-20 RX ORDER — METHOCARBAMOL 750 MG/1
750 TABLET, FILM COATED ORAL 4 TIMES DAILY PRN
Qty: 40 TABLET | Refills: 0 | Status: SHIPPED | OUTPATIENT
Start: 2023-07-20 | End: 2023-08-04

## 2023-07-20 RX ORDER — OXYCODONE HYDROCHLORIDE 5 MG/1
TABLET ORAL
Qty: 30 TABLET | Refills: 0 | Status: SHIPPED | OUTPATIENT
Start: 2023-07-20 | End: 2023-07-30 | Stop reason: SDUPTHER

## 2023-07-20 RX ORDER — METHOCARBAMOL 750 MG/1
750 TABLET, FILM COATED ORAL 4 TIMES DAILY PRN
Qty: 40 TABLET | Refills: 0 | Status: SHIPPED | OUTPATIENT
Start: 2023-07-20 | End: 2023-07-20 | Stop reason: SDUPTHER

## 2023-07-20 NOTE — TELEPHONE ENCOUNTER
Please help with rescheduling medicaid patient. Dr. Muniz is off on 9/1 reason for visit right foot bunion thanks.

## 2023-07-21 ENCOUNTER — CLINICAL SUPPORT (OUTPATIENT)
Dept: REHABILITATION | Facility: HOSPITAL | Age: 58
End: 2023-07-21
Payer: MEDICAID

## 2023-07-21 DIAGNOSIS — R29.898 DECREASED STRENGTH OF LOWER EXTREMITY: Primary | ICD-10-CM

## 2023-07-21 DIAGNOSIS — M25.661 DECREASED RANGE OF MOTION (ROM) OF RIGHT KNEE: ICD-10-CM

## 2023-07-21 PROCEDURE — 97110 THERAPEUTIC EXERCISES: CPT

## 2023-07-21 NOTE — PROGRESS NOTES
[facility]   Physical Therapy Treatment Note      Name: Yayo Carver  [facility] Number: [id number]    Therapy Diagnosis:   Encounter Diagnoses   Name Primary?    Decreased strength of lower extremity Yes    Decreased range of motion (ROM) of right knee        Physician: Jc Padgett III, *    Visit Date: 7/21/2023    Physician Orders: PT Eval and Treat   Medical Diagnosis from Referral:     M17.11 (ICD-10-CM) - Primary osteoarthritis of right knee         Evaluation Date: 6/28/2023  Authorization Period Expiration: 8/10/2023  Plan of Care Expiration: 8/11/2023  Progress Note Due: 10th visit  Visit # / Visits authorized: 6 / 36 + eval   FOTO: 1/3     Precautions: Standard      Time In: 12:00 pm  Time Out: 1:10 pm  Total Treatment Time: 70 minutes  Total Billable Time: 60 minutes (4  - Medicaid)    PTA Visit #: 0 / 5     Subjective     Patient reports: pain is limiting her from doing more; has been working on bending her knee  She was compliant with home exercise program.  Response to previous treatment: appropriate muscle response  Functional change: reports she can ambulates up and down stairs using a reciprocal gait pattern    Pain: NT/10, currently  Location: right knee      Objective      Objective Measures updated at progress report unless specified.   Date of surgery: 6/26/2023  *Patient is 3 week, 2 days s/p R TKA as of 7/19/2023.*    6/30/2023: 0-72 AAROM  7/19/2023; 97 degrees AROM; 102 degrees PROM Flexion    Treatment     Elisa received the treatments listed below:      *Per Medicaid guidelines all therapy billed as therex*  *PT one-on-one with patient for entire session with PT extender utilized for remainder of therapy session*    therapeutic exercises to develop ROM and flexibility for 00 minutes including:  Heel slides: 10 seconds, 5x - poor tolerance     neuromuscular re-education activities to improve: Proprioception and motor control for 45 minutes.   Quad sets with  towel under knee: 5 second hold, 30 reps  Short arc quads with 1/2 foam; 5 second hold, 3 x 10 reps  Short arc quads with medium bolster + 5lb AW; 5 second hold, 3 x 10 reps   After being cleared for contradictions: symmetrical biphasic Electrical Stimulation: Elisa received symmetrical biphasic Electrical Stimulation supervised to elicit muscle contraction of the quadriceps for 10  minutes. Pt received stimulation: Freuqeuncy: 35 Hz, Phase duration: 300 msec, amplitude 29 Lizzy with 10 second on time and 10 second off time while performing quad set. Patient tolerated treatment well without any adverse effects.  Long arc quads at EOM + 4lb AW 10 minutes    therapeutic activities to improve dynamic and functional performance for 15 minutes including:  Recumbent Bike for endogenous release of opioids to improve tolerance to therapeutic interventions for 5 minutes, full circles at seat 15  Step ups on 6-inch step; 3 x 10 reps leading with RLE  Sit to stands in std chair; 2 x 15 reps  Shuttle - patient declined     Cold pack for 10 minutes to Right knee with lower extremities elevated on wedge.    Patient Education and Home Exercises       Education provided:   - HEP    Written Home Exercises Provided: Patient instructed to cont prior HEP. Exercises were reviewed and Elisa was able to demonstrate them prior to the end of the session.  Elisa demonstrated good  understanding of the education provided. See EMR under Patient Instructions for exercises provided during therapy sessions    Assessment     Difficulty achieving terminal knee extension with quad focused exercises; addition of NMES to address this. Patient demonstrated 100 degrees of flexion range of motion; limited secondary to pain.       Elisa Is progressing well towards her goals.   Pt prognosis is Good.     Pt will continue to benefit from skilled outpatient physical therapy to address the deficits listed in the problem list box on initial evaluation, provide  pt/family education and to maximize pt's level of independence in the home and community environment.     Pt's spiritual, cultural and educational needs considered and pt agreeable to plan of care and goals.     Anticipated barriers to physical therapy: chronicity    Goals:   Short Term Goals (3 Weeks):   1. Patient will be independent with home exercise program to supplement physical therapy treatment in improving functional status.  2. Pt will improve impaired lower extremity manual muscle tests to >/= 4/5 to improve dynamic right knee support for closed chain tasks.  3. Patient will improve (right) knee active range of motion to 0-90 degrees to promote increased ease of sit<>stand transfers.  4. Patient will perform timed up and go with less restrictive assistive device in < 15 seconds to improve gait speed and safety with community ambulation.     Long Term Goals (6 Weeks):   1. Pt will improve impaired lower extremity manual muscle tests to >/= 4+/5 to improve dynamic right knee support for closed chain tasks.  2. Patient will improve the total FOTO Knee Survey Score to </= 40% limited to demonstrate increased perceived functional mobility.  3. Patient will improve score on KOOS Jr. section of FOTO Knee Survey to = </= 70% to demonstrate increased perceived functional mobility.  4. Patient will perform timed up and go with least restrictive assistive device in < 10 seconds to improve gait speed and safety with community ambulation.  5. Patient will perform at least 10 sit to stands in 30 seconds without UE support to demonstrate increased functional strength.   6. Patient will improve (right ) knee active range of motion to 0-120 degrees to promote higher level transfers and transitions without limitation.     Plan     Plan of care Certification: 6/28/2023 to 8/11/2023.     Outpatient Physical Therapy 3-4 times weekly for 6 weeks to include the following interventions: Electrical Stimulation TENS, Gait  Training, Manual Therapy, Moist Heat/ Ice, Neuromuscular Re-ed, Orthotic Management and Training, Patient Education, Self Care, Therapeutic Activities, Therapeutic Exercise, and functional dry needling.     Deja Kennedy, PT

## 2023-07-27 ENCOUNTER — DOCUMENTATION ONLY (OUTPATIENT)
Dept: REHABILITATION | Facility: HOSPITAL | Age: 58
End: 2023-07-27
Payer: MEDICAID

## 2023-07-27 NOTE — PROGRESS NOTES
Physical Therapy: No show/Cancellation of Visit  Date: 07/26/2023    Patient cancelled this weeks PT appointments. Reason for cancellation: family emergency. Patient's next scheduled appointment is 8/1/2023.     Initial Evaluation: 6/28/2023  Plan of Care Explanation: 8/11/2023  Cancel: 5  No show: 2    Therapist: Farhana Olguin PTA

## 2023-07-30 DIAGNOSIS — Z96.651 STATUS POST RIGHT KNEE REPLACEMENT: ICD-10-CM

## 2023-07-30 RX ORDER — OXYCODONE HYDROCHLORIDE 5 MG/1
TABLET ORAL
Qty: 30 TABLET | Refills: 0 | Status: SHIPPED | OUTPATIENT
Start: 2023-07-30 | End: 2023-08-10

## 2023-07-30 RX ORDER — PREGABALIN 75 MG/1
75 CAPSULE ORAL 2 TIMES DAILY
Qty: 60 CAPSULE | Refills: 1 | Status: SHIPPED | OUTPATIENT
Start: 2023-07-30 | End: 2023-12-06

## 2023-07-31 NOTE — PROGRESS NOTES
Pt c/o a lot more pain than with her other knee replacement. Will add lyrica to her regimen for better pain control

## 2023-08-01 ENCOUNTER — CLINICAL SUPPORT (OUTPATIENT)
Dept: REHABILITATION | Facility: HOSPITAL | Age: 58
End: 2023-08-01
Payer: MEDICAID

## 2023-08-01 ENCOUNTER — PATIENT MESSAGE (OUTPATIENT)
Dept: ORTHOPEDICS | Facility: CLINIC | Age: 58
End: 2023-08-01
Payer: MEDICAID

## 2023-08-01 DIAGNOSIS — Z96.651 STATUS POST RIGHT KNEE REPLACEMENT: Primary | ICD-10-CM

## 2023-08-01 DIAGNOSIS — M25.661 DECREASED RANGE OF MOTION (ROM) OF RIGHT KNEE: Primary | ICD-10-CM

## 2023-08-01 PROCEDURE — 97110 THERAPEUTIC EXERCISES: CPT

## 2023-08-01 NOTE — PROGRESS NOTES
[facility]   Physical Therapy Treatment Note      Name: Yayo Carver  [facility] Number: [id number]    Therapy Diagnosis:   Encounter Diagnosis   Name Primary?    Decreased range of motion (ROM) of right knee Yes         Physician: Jc Padgett III, *    Visit Date: 2023    Physician Orders: PT Eval and Treat   Medical Diagnosis from Referral:     M17.11 (ICD-10-CM) - Primary osteoarthritis of right knee         Evaluation Date: 2023  Authorization Period Expiration: 8/10/2023  Plan of Care Expiration: 2023  Progress Note Due: 10th visit  Visit # / Visits authorized:  + eval   FOTO: 1/3     Precautions: Standard      Time In: 1:00 pm  Time Out: 1:59  pm  Total Treatment Time: 59 minutes  Total Billable Time: 59 minutes (4  - Medicaid)    PTA Visit #: 0 / 5     Subjective     Patient reports: daughter and grandchildren were in a bad car accident, but everyone is okay.   She was compliant with home exercise program.  Response to previous treatment: appropriate muscle response  Functional change: reports she can ambulates up and down stairs using a reciprocal gait pattern    Pain: NT/10, currently  Location: right knee      Objective      Objective Measures updated at progress report unless specified.   Date of surgery: 2023  *Patient is 3 week, 2 days s/p R TKA as of 2023.*    2023: 0-72 AAROM  2023; 97 degrees AROM; 102 degrees PROM Flexion    2023:   TU seconds  30 STS: 7x     104 AROM    Treatment     Elisa received the treatments listed below:      *Per Medicaid guidelines all therapy billed as therex*  *PT one-on-one with patient for entire session with PT extender utilized for remainder of therapy session*    therapeutic exercises to develop ROM and flexibility for 00 minutes including:  Heel slides: 10 seconds, 5x - poor tolerance     neuromuscular re-education activities to improve: Proprioception and motor control for 34 minutes.    Quad sets with towel under knee: 5 second hold, 30 reps  Short arc quads with 1/2 foam; 5 second hold, 3 x 10 reps  Short arc quads with medium bolster + 5lb AW; 5 second hold, 3 x 10 reps  Long arc quads at EOM + 4lb AW 5 seconds, 3x10    therapeutic activities to improve dynamic and functional performance for 25 minutes including:  Recumbent Bike for endogenous release of opioids to improve tolerance to therapeutic interventions for 5 minutes, full circles at seat 15  Step ups on 6-inch step; 3 x 10 reps leading with RLE  Forward step downs 6 inch 3x10  Sit to stands in std chair; 2 x 15 reps  Shuttle - 75 double; 37 on L, 25 on R     Cold pack for 10 minutes to Right knee with lower extremities elevated on wedge.    Patient Education and Home Exercises       Education provided:   - HEP    Written Home Exercises Provided: Patient instructed to cont prior HEP. Exercises were reviewed and Elisa was able to demonstrate them prior to the end of the session.  Elisa demonstrated good  understanding of the education provided. See EMR under Patient Instructions for exercises provided during therapy sessions    Assessment     Patient is 5 weeks status post R total knee replacement. Updated goals below. Patient is progressing well over all and demonstrates 104 degrees of range of motion; limited secondary to pain. Patient continues to progress towards short and longer term lower extremity strength and endurance goals at this time. Base line function is good at this time, however motor control, strength and endurance deficits remain limiting functional mobility when challenged. Poor compliance with attendance to physical therapy. Elisa would continue to benefit from skilled outpatient physical therapy to address remaining short and long term physical therapy goals.       Elisa Is progressing well towards her goals.   Pt prognosis is Good.     Pt will continue to benefit from skilled outpatient physical therapy to address the  deficits listed in the problem list box on initial evaluation, provide pt/family education and to maximize pt's level of independence in the home and community environment.     Pt's spiritual, cultural and educational needs considered and pt agreeable to plan of care and goals.     Anticipated barriers to physical therapy: chronicity    Goals:   Short Term Goals (3 Weeks):   1. Patient will be independent with home exercise program to supplement physical therapy treatment in improving functional status. MET  2. Pt will improve impaired lower extremity manual muscle tests to >/= 4/5 to improve dynamic right knee support for closed chain tasks. Progressing, not met  3. Patient will improve (right) knee active range of motion to 0-90 degrees to promote increased ease of sit<>stand transfers. met  4. Patient will perform timed up and go with less restrictive assistive device in < 15 seconds to improve gait speed and safety with community ambulation.MET     Long Term Goals (6 Weeks):   1. Pt will improve impaired lower extremity manual muscle tests to >/= 4+/5 to improve dynamic right knee support for closed chain tasks. Progressing, not met  2. Patient will improve the total FOTO Knee Survey Score to </= 40% limited to demonstrate increased perceived functional mobility. Progressing, not met  3. Patient will improve score on KOOS Jr. section of FOTO Knee Survey to = </= 70% to demonstrate increased perceived functional mobility.Progressing, not met  4. Patient will perform timed up and go with least restrictive assistive device in < 10 seconds to improve gait speed and safety with community ambulation.Progressing, not met  5. Patient will perform at least 10 sit to stands in 30 seconds without UE support to demonstrate increased functional strength. Progressing, not met  6. Patient will improve (right ) knee active range of motion to 0-120 degrees to promote higher level transfers and transitions without limitation.  Progressing, not met    Plan     Plan of care Certification: 6/28/2023 to 8/11/2023.     Outpatient Physical Therapy 3-4 times weekly for 6 weeks to include the following interventions: Electrical Stimulation TENS, Gait Training, Manual Therapy, Moist Heat/ Ice, Neuromuscular Re-ed, Orthotic Management and Training, Patient Education, Self Care, Therapeutic Activities, Therapeutic Exercise, and functional dry needling.     Deja Kennedy, PT

## 2023-08-03 ENCOUNTER — DOCUMENTATION ONLY (OUTPATIENT)
Dept: REHABILITATION | Facility: HOSPITAL | Age: 58
End: 2023-08-03
Payer: MEDICAID

## 2023-08-03 NOTE — PROGRESS NOTES
Physical Therapy: No show/Cancellation of Visit  Date: 08/03/2023    Patient cancelled today's PT appointment. Reason for cancellation: transportation. Patient's next scheduled appointment is Thursday, 8/10/2023.     Initial Evaluation: 6/28/2023  Plan of Care Explanation: 8/11/2023  Cancel: 7  No show: 2    Therapist: Farhana Olguin PTA

## 2023-08-07 ENCOUNTER — HOSPITAL ENCOUNTER (OUTPATIENT)
Dept: RADIOLOGY | Facility: HOSPITAL | Age: 58
Discharge: HOME | End: 2023-08-07
Attending: ORTHOPAEDIC SURGERY
Payer: MEDICAID

## 2023-08-07 DIAGNOSIS — Z96.651 STATUS POST RIGHT KNEE REPLACEMENT: ICD-10-CM

## 2023-08-07 PROCEDURE — 73562 X-RAY EXAM OF KNEE 3: CPT | Mod: TC,RT

## 2023-08-07 PROCEDURE — 73562 XR KNEE 3 VIEW RIGHT: ICD-10-PCS | Mod: 26,RT,, | Performed by: RADIOLOGY

## 2023-08-07 PROCEDURE — 73562 X-RAY EXAM OF KNEE 3: CPT | Mod: 26,RT,, | Performed by: RADIOLOGY

## 2023-08-08 ENCOUNTER — CLINICAL SUPPORT (OUTPATIENT)
Dept: REHABILITATION | Facility: HOSPITAL | Age: 58
End: 2023-08-08
Payer: MEDICAID

## 2023-08-08 DIAGNOSIS — M25.661 DECREASED RANGE OF MOTION (ROM) OF RIGHT KNEE: Primary | ICD-10-CM

## 2023-08-08 PROCEDURE — 97110 THERAPEUTIC EXERCISES: CPT

## 2023-08-08 NOTE — PROGRESS NOTES
[facility]   Physical Therapy Treatment Note      Name: Yayo Carver  [facility] Number: [id number]    Therapy Diagnosis:   Encounter Diagnosis   Name Primary?    Decreased range of motion (ROM) of right knee Yes     Physician: Jc Padgett III, *    Visit Date: 2023    Physician Orders: PT Eval and Treat   Medical Diagnosis from Referral:     M17.11 (ICD-10-CM) - Primary osteoarthritis of right knee         Evaluation Date: 2023  Authorization Period Expiration: 8/10/2023  Plan of Care Expiration: 2023  Progress Note Due: 10th visit  Visit # / Visits authorized:  + eval   FOTO: 1/3     Precautions: Standard      Time In: 1:00 pm  Time Out: 2:09  pm  Total Treatment Time: 69 minutes  Total Billable Time: 59 minutes (4  - Medicaid)    PTA Visit #: 0      Subjective     Patient reports: her son is in the hospital.  She was compliant with home exercise program.  Response to previous treatment: appropriate muscle response  Functional change: reports she can ambulates up and down stairs using a reciprocal gait pattern    Pain: NT/10, currently  Location: right knee      Objective      Objective Measures updated at progress report unless specified.   Date of surgery: 2023  *Patient is 3 week, 2 days s/p R TKA as of 2023.*    2023: 0-72 AAROM  2023; 97 degrees AROM; 102 degrees PROM Flexion    2023:   TU seconds  30 STS: 7x     104 AROM    2023:  TUG: 10 seconds  30 STS: 7x   110 AAROM      Treatment     Elisa received the treatments listed below:      *Per Medicaid guidelines all therapy billed as therex*  *PT one-on-one with patient for entire session with PT extender utilized for remainder of therapy session*    therapeutic exercises to develop ROM and flexibility for 10 minutes including:  Heel slides: 10 seconds, 10x      neuromuscular re-education activities to improve: Proprioception and motor control for 34 minutes.   Patient  education: consistency with HEP and PT attendance, use of ankle weights for strengthening quadriceps  SLR: 2x10  Long arc quads at EOM + 5lb AW 5 seconds, 3x10    Not today:  Quad sets with towel under knee: 5 second hold, 30 reps  Short arc quads with 1/2 foam; 5 second hold, 3 x 10 reps  Short arc quads with medium bolster + 5lb AW; 5 second hold, 3 x 10 reps    therapeutic activities to improve dynamic and functional performance for 15 minutes including:  Recumbent Bike for endogenous release of opioids to improve tolerance to therapeutic interventions for 5 minutes, full circles at seat 15  Step ups on 8-inch step; 2x15 with hip flexion    Sit to stands in std chair; 2 x 15 reps  Shuttle - 75 double; 37 on L, 25 on R     Cold pack for 10 minutes to Right knee with lower extremities elevated on wedge.    Patient Education and Home Exercises       Education provided:   - HEP    Written Home Exercises Provided: Patient instructed to cont prior HEP. Exercises were reviewed and Elisa was able to demonstrate them prior to the end of the session.  Elisa demonstrated good  understanding of the education provided. See EMR under Patient Instructions for exercises provided during therapy sessions    Assessment     Patient demonstrated 110 degrees of AAROM flexion at this time. Patient demonstrates 3+/5 for right quad strength. Educated patient on necessity of consistent PT and use of ankle weights to improve strength during HEP.      Elisa Is progressing well towards her goals.   Pt prognosis is Good.     Pt will continue to benefit from skilled outpatient physical therapy to address the deficits listed in the problem list box on initial evaluation, provide pt/family education and to maximize pt's level of independence in the home and community environment.     Pt's spiritual, cultural and educational needs considered and pt agreeable to plan of care and goals.     Anticipated barriers to physical therapy:  chronicity    Goals:   Short Term Goals (3 Weeks):   1. Patient will be independent with home exercise program to supplement physical therapy treatment in improving functional status. MET  2. Pt will improve impaired lower extremity manual muscle tests to >/= 4/5 to improve dynamic right knee support for closed chain tasks. Progressing, not met  3. Patient will improve (right) knee active range of motion to 0-90 degrees to promote increased ease of sit<>stand transfers. met  4. Patient will perform timed up and go with less restrictive assistive device in < 15 seconds to improve gait speed and safety with community ambulation.MET     Long Term Goals (6 Weeks):   1. Pt will improve impaired lower extremity manual muscle tests to >/= 4+/5 to improve dynamic right knee support for closed chain tasks. Progressing, not met  2. Patient will improve the total FOTO Knee Survey Score to </= 40% limited to demonstrate increased perceived functional mobility. Progressing, not met  3. Patient will improve score on KOOS Jr. section of FOTO Knee Survey to = </= 70% to demonstrate increased perceived functional mobility.Progressing, not met  4. Patient will perform timed up and go with least restrictive assistive device in < 10 seconds to improve gait speed and safety with community ambulation.Progressing, not met  5. Patient will perform at least 10 sit to stands in 30 seconds without UE support to demonstrate increased functional strength. Progressing, not met  6. Patient will improve (right ) knee active range of motion to 0-120 degrees to promote higher level transfers and transitions without limitation. Progressing, not met    Plan     Plan of care Certification: 6/28/2023 to 8/11/2023.     Outpatient Physical Therapy 3-4 times weekly for 6 weeks to include the following interventions: Electrical Stimulation TENS, Gait Training, Manual Therapy, Moist Heat/ Ice, Neuromuscular Re-ed, Orthotic Management and Training, Patient  Education, Self Care, Therapeutic Activities, Therapeutic Exercise, and functional dry needling.     Deja Kennedy, PT

## 2023-08-10 ENCOUNTER — TELEPHONE (OUTPATIENT)
Dept: ORTHOPEDICS | Facility: CLINIC | Age: 58
End: 2023-08-10
Payer: MEDICAID

## 2023-08-10 ENCOUNTER — OFFICE VISIT (OUTPATIENT)
Dept: ORTHOPEDICS | Facility: CLINIC | Age: 58
End: 2023-08-10
Payer: MEDICAID

## 2023-08-10 ENCOUNTER — CLINICAL SUPPORT (OUTPATIENT)
Dept: REHABILITATION | Facility: HOSPITAL | Age: 58
End: 2023-08-10
Payer: MEDICAID

## 2023-08-10 VITALS — BODY MASS INDEX: 38.51 KG/M2 | HEIGHT: 66 IN | WEIGHT: 239.63 LBS

## 2023-08-10 DIAGNOSIS — F32.A DEPRESSION, UNSPECIFIED DEPRESSION TYPE: ICD-10-CM

## 2023-08-10 DIAGNOSIS — M25.661 DECREASED RANGE OF MOTION (ROM) OF RIGHT KNEE: Primary | ICD-10-CM

## 2023-08-10 DIAGNOSIS — R29.898 DECREASED STRENGTH OF LOWER EXTREMITY: ICD-10-CM

## 2023-08-10 DIAGNOSIS — Z96.651 STATUS POST RIGHT KNEE REPLACEMENT: Primary | ICD-10-CM

## 2023-08-10 PROCEDURE — 99024 PR POST-OP FOLLOW-UP VISIT: ICD-10-PCS | Mod: ,,, | Performed by: ORTHOPAEDIC SURGERY

## 2023-08-10 PROCEDURE — 3044F PR MOST RECENT HEMOGLOBIN A1C LEVEL <7.0%: ICD-10-PCS | Mod: CPTII,,, | Performed by: ORTHOPAEDIC SURGERY

## 2023-08-10 PROCEDURE — 3008F PR BODY MASS INDEX (BMI) DOCUMENTED: ICD-10-PCS | Mod: CPTII,,, | Performed by: ORTHOPAEDIC SURGERY

## 2023-08-10 PROCEDURE — 99024 POSTOP FOLLOW-UP VISIT: CPT | Mod: ,,, | Performed by: ORTHOPAEDIC SURGERY

## 2023-08-10 PROCEDURE — 1159F PR MEDICATION LIST DOCUMENTED IN MEDICAL RECORD: ICD-10-PCS | Mod: CPTII,,, | Performed by: ORTHOPAEDIC SURGERY

## 2023-08-10 PROCEDURE — 97110 THERAPEUTIC EXERCISES: CPT

## 2023-08-10 PROCEDURE — 1159F MED LIST DOCD IN RCRD: CPT | Mod: CPTII,,, | Performed by: ORTHOPAEDIC SURGERY

## 2023-08-10 PROCEDURE — 3044F HG A1C LEVEL LT 7.0%: CPT | Mod: CPTII,,, | Performed by: ORTHOPAEDIC SURGERY

## 2023-08-10 PROCEDURE — 99999 PR PBB SHADOW E&M-EST. PATIENT-LVL IV: CPT | Mod: PBBFAC,,, | Performed by: ORTHOPAEDIC SURGERY

## 2023-08-10 PROCEDURE — 99214 OFFICE O/P EST MOD 30 MIN: CPT | Mod: PBBFAC | Performed by: ORTHOPAEDIC SURGERY

## 2023-08-10 PROCEDURE — 99999 PR PBB SHADOW E&M-EST. PATIENT-LVL IV: ICD-10-PCS | Mod: PBBFAC,,, | Performed by: ORTHOPAEDIC SURGERY

## 2023-08-10 PROCEDURE — 3008F BODY MASS INDEX DOCD: CPT | Mod: CPTII,,, | Performed by: ORTHOPAEDIC SURGERY

## 2023-08-10 RX ORDER — METHOCARBAMOL 750 MG/1
750 TABLET, FILM COATED ORAL 3 TIMES DAILY PRN
Qty: 30 TABLET | Refills: 0 | Status: SHIPPED | OUTPATIENT
Start: 2023-08-10 | End: 2023-08-28 | Stop reason: SDUPTHER

## 2023-08-10 RX ORDER — OXYCODONE HYDROCHLORIDE 5 MG/1
5 TABLET ORAL EVERY 8 HOURS PRN
Qty: 20 TABLET | Refills: 0 | Status: SHIPPED | OUTPATIENT
Start: 2023-08-10 | End: 2023-08-15 | Stop reason: SDUPTHER

## 2023-08-10 NOTE — PROGRESS NOTES
[facility]   Physical Therapy Treatment Note      Name: Yayo Carver  [facility] Number: [id number]    Therapy Diagnosis:   Encounter Diagnoses   Name Primary?    Decreased range of motion (ROM) of right knee Yes    Decreased strength of lower extremity        Physician: Jc Padgett III, *    Visit Date: 8/10/2023    Physician Orders: PT Eval and Treat   Medical Diagnosis from Referral:     M17.11 (ICD-10-CM) - Primary osteoarthritis of right knee         Evaluation Date: 2023  Authorization Period Expiration: 8/10/2023  Plan of Care Expiration: 2023  Progress Note Due: 10th visit  Visit # / Visits authorized:  + eval   FOTO: 1/3     Precautions: Standard      Time In: 1:25 pm (patient late arrival)  Time Out: 2:00  pm  Total Treatment Time: 35 minutes  Total Billable Time: 35 minutes (2  - Medicaid)    PTA Visit #: 0 / 5     Subjective     Patient reports: late due to to the .  She was compliant with home exercise program.  Response to previous treatment: appropriate muscle response  Functional change: reports she can ambulates up and down stairs using a reciprocal gait pattern    Pain: NT/10, currently  Location: right knee      Objective      Objective Measures updated at progress report unless specified.   Date of surgery: 2023  *Patient is 3 week, 2 days s/p R TKA as of 2023.*    2023: 0-72 AAROM  2023; 97 degrees AROM; 102 degrees PROM Flexion    2023:   TU seconds  30 STS: 7x     104 AROM    2023:  TUG: 10 seconds  30 STS: 7x   110 AAROM      Treatment     Elisa received the treatments listed below:      *Per Medicaid guidelines all therapy billed as therex*  *PT one-on-one with patient for entire session with PT extender utilized for remainder of therapy session*    therapeutic exercises to develop ROM and flexibility for 10 minutes including:  Heel slides: 10 seconds, 10x      neuromuscular re-education activities to  improve: Proprioception and motor control for 00 minutes.   Patient education: consistency with HEP and PT attendance, use of ankle weights for strengthening quadriceps  SLR: 2x10  Long arc quads at EOM + 5lb AW 10 seconds, 25x    Not today:  Quad sets with towel under knee: 5 second hold, 30 reps  Short arc quads with 1/2 foam; 5 second hold, 3 x 10 reps  Short arc quads with medium bolster + 5lb AW; 5 second hold, 3 x 10 reps    therapeutic activities to improve dynamic and functional performance for 35 minutes including:  Recumbent Bike for endogenous release of opioids to improve tolerance to therapeutic interventions for 5 minutes, full circles at seat 15  Step ups on 8-inch step; 2x15 with hip flexion  Sit to stands in std chair; 2 x 15 reps  Shuttle double leg press: 75 lbs 3x12  Shuttle single leg press: ; 37 lbs on L, 25 lbs on R       Patient Education and Home Exercises       Education provided:   - HEP    Written Home Exercises Provided: Patient instructed to cont prior HEP. Exercises were reviewed and Elisa was able to demonstrate them prior to the end of the session.  Elisa demonstrated good  understanding of the education provided. See EMR under Patient Instructions for exercises provided during therapy sessions    Assessment     Shortened session secondary to patient late arrival. Patient challenged with shuttle leg press. Educated on consistency with HEP and PT attendance. Patient to see MD 8/10/2023 for 6 week follow up.      Elisa Is progressing well towards her goals.   Pt prognosis is Good.     Pt will continue to benefit from skilled outpatient physical therapy to address the deficits listed in the problem list box on initial evaluation, provide pt/family education and to maximize pt's level of independence in the home and community environment.     Pt's spiritual, cultural and educational needs considered and pt agreeable to plan of care and goals.     Anticipated barriers to physical therapy:  chronicity    Goals:   Short Term Goals (3 Weeks):   1. Patient will be independent with home exercise program to supplement physical therapy treatment in improving functional status. MET  2. Pt will improve impaired lower extremity manual muscle tests to >/= 4/5 to improve dynamic right knee support for closed chain tasks. Progressing, not met  3. Patient will improve (right) knee active range of motion to 0-90 degrees to promote increased ease of sit<>stand transfers. met  4. Patient will perform timed up and go with less restrictive assistive device in < 15 seconds to improve gait speed and safety with community ambulation.MET     Long Term Goals (6 Weeks):   1. Pt will improve impaired lower extremity manual muscle tests to >/= 4+/5 to improve dynamic right knee support for closed chain tasks. Progressing, not met  2. Patient will improve the total FOTO Knee Survey Score to </= 40% limited to demonstrate increased perceived functional mobility. Progressing, not met  3. Patient will improve score on KOOS Jr. section of FOTO Knee Survey to = </= 70% to demonstrate increased perceived functional mobility.Progressing, not met  4. Patient will perform timed up and go with least restrictive assistive device in < 10 seconds to improve gait speed and safety with community ambulation.Progressing, not met  5. Patient will perform at least 10 sit to stands in 30 seconds without UE support to demonstrate increased functional strength. Progressing, not met  6. Patient will improve (right ) knee active range of motion to 0-120 degrees to promote higher level transfers and transitions without limitation. Progressing, not met    Plan     Plan of care Certification: 6/28/2023 to 8/11/2023.     Outpatient Physical Therapy 3-4 times weekly for 6 weeks to include the following interventions: Electrical Stimulation TENS, Gait Training, Manual Therapy, Moist Heat/ Ice, Neuromuscular Re-ed, Orthotic Management and Training, Patient  Education, Self Care, Therapeutic Activities, Therapeutic Exercise, and functional dry needling.     Deja Kennedy, PT

## 2023-08-10 NOTE — PROGRESS NOTES
Subjective:     HPI:   Yayo Carver is a 57 y.o. female who presents 6 weeks out from right TKA    Date of surgery: 6/26/23    Medications: out of robaxin, celebrex and just finished tylenol this morning  Has a 2 oxy left    Assistive Devices: none (was using cane at 6 weeks)    PT: would like 2 more visits, missed several    Limitations: none    Doing ok, making progress  Shooting pain at night like L knee    Continued mental health challenges, now son on dialysis living with her with health challenges, dealing with property with family    Had sent message to PCP but she has left so needs new PCP  Covering MD said referral was placed for mental health resources but pt hasnt heard anything about it           Objective:   Body mass index is 38.68 kg/m².  Exam:    Gait: limp/antalgic none, walking well    Incision: healed    Stability:  Knee stable anterior-posterior varus and valgus stresses, no extensor lag    Extension: 0    Flexion: 110    Valgus angle: 5    Pre-op           Imaging:  Indication:  Exam status post right total knee arthroplasty  Exam Ordered: Radiographs of the right knee include a standing anteroposterior view, a lateral view in full flexion, and a sunrise view  Details of Examination: Todays exam show a well fixed, well positioned total knee arthroplasty with no evidence of wear, osteolysis, or loosening.  Impression:  Status post right total knee arthroplasty, implant in good position with no abnormality      Assessment:       ICD-10-CM ICD-9-CM   1. Status post right knee replacement  Z96.651 V43.65   2. Depression, unspecified depression type  F32.A 311        Doing well     Plan:       Patient is doing very well with their total knee arthroplasty.  They will continue with their routine care of the knee replacement and see me back for their follow-up at the routine interval.  If there are problems in the interim they will see me back sooner.    Placed referral for behavioral  health-depression therapy    Continue tylenol arthritis OTC 2-3x/day  Rx robaxin  Rx oxycodone - need to taper down    2 more PT visits then HEP    6 week follow up       Orders Placed This Encounter   Procedures    Ambulatory referral/consult to Behavioral Health     Standing Status:   Future     Standing Expiration Date:   9/10/2024     Referral Priority:   Routine     Referral Type:   Psychiatric     Referral Reason:   Specialty Services Required     Requested Specialty:   Behavioral Health     Number of Visits Requested:   1             Past Medical History:   Diagnosis Date    Arthritis     Diabetes mellitus     Diabetes mellitus, type 2     GERD (gastroesophageal reflux disease)     HLD (hyperlipidemia)     Hypertension        Past Surgical History:   Procedure Laterality Date    BARIATRIC SURGERY  2022    Gastric sleeve     SECTION  1989    COLONOSCOPY N/A 2017    Procedure: COLONOSCOPY;  Surgeon: Evelin Arceo MD;  Location: [address]);  Service: Endoscopy;  Laterality: N/A;  2 nd floor d/t BMI > 50 kg/m3    KNEE ARTHROSCOPY Right     TONSILLECTOMY      TOTAL KNEE ARTHROPLASTY Left 2022    Procedure: ARTHROPLASTY, KNEE, TOTAL: LEFT: DEPUY - ATTUNE;  Surgeon: Jc Padgett III, MD;  Location: [facility];  Service: Orthopedics;  Laterality: Left;    TOTAL KNEE ARTHROPLASTY Right 2023    Procedure: ARTHROPLASTY, KNEE, TOTAL: RIGHT: DEPUY - ATTUNE;  Surgeon: Jc Padgett III, MD;  Location: [facility];  Service: Orthopedics;  Laterality: Right;  90 minutes    TUBAL LIGATION         Family History   Problem Relation Age of Onset    Cancer Mother     Breast cancer Mother 65    Diabetes Mother     Colon polyps Mother     Hypertension Mother     Diabetes Father     Breast cancer Sister 56    Cancer Sister     No Known Problems Brother     Arthritis Brother     No Known Problems Daughter     Kidney disease Son     Diabetes Son     Breast cancer Maternal Aunt      Diabetes Paternal Uncle     Breast cancer Maternal Grandmother     Colon cancer Maternal Grandfather     Diabetes Paternal Grandmother     Heart disease Paternal Grandmother     No Known Problems Paternal Grandfather     Esophageal cancer Neg Hx     Irritable bowel syndrome Neg Hx     Rectal cancer Neg Hx     Stomach cancer Neg Hx     Ulcerative colitis Neg Hx     Celiac disease Neg Hx     Crohn's disease Neg Hx     Amblyopia Neg Hx     Blindness Neg Hx     Cataracts Neg Hx     Glaucoma Neg Hx     Macular degeneration Neg Hx     Retinal detachment Neg Hx     Strabismus Neg Hx     Stroke Neg Hx     Thyroid disease Neg Hx        Social History     Socioeconomic History    Marital status:    Occupational History    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  Has a college-age son who comes and goes.  Has 3 other adult children.       Social Determinants of Health     Financial Resource Strain: Low Risk  (3/27/2023)    Overall Financial Resource Strain (CARDIA)     Difficulty of Paying Living Expenses: Not very hard   Food Insecurity: No Food Insecurity (3/27/2023)    Hunger Vital Sign     Worried About Running Out of Food in the Last Year: Never true     Ran Out of Food in the Last Year: Never true   Transportation Needs: Unmet Transportation Needs (3/27/2023)    PRAPARE - Transportation     Lack of Transportation (Medical): Yes     Lack of Transportation (Non-Medical): No   Physical Activity: Sufficiently Active (3/27/2023)    Exercise Vital Sign     Days of Exercise per Week: 5 days     Minutes of Exercise per  Session: 40 min   Stress: No Stress Concern Present (3/27/2023)    [employer] [employer] - Occupational Stress Questionnaire     Feeling of Stress : Only a little   Social Connections: Unknown (3/27/2023)    Social Connection and Isolation Panel [NHANES]     Frequency of Communication with Friends and Family: More than three times a week     Frequency of Social Gatherings with Friends and Family: More than three times a week     Active Member of Clubs or Organizations: Yes     Attends Club or Organization Meetings: More than 4 times per year     Marital Status: Never    Housing Stability: Low Risk  (3/27/2023)    Housing Stability Vital Sign     Unable to Pay for Housing in the Last Year: No     Number of Places Lived in the Last Year: 1     Unstable Housing in the Last Year: No

## 2023-08-10 NOTE — TELEPHONE ENCOUNTER
I called and spoke to the patient regarding her xray. The patient stated that she had her xray done earlier in the week.

## 2023-08-15 DIAGNOSIS — Z96.651 STATUS POST RIGHT KNEE REPLACEMENT: ICD-10-CM

## 2023-08-15 PROBLEM — M25.661 DECREASED RANGE OF MOTION (ROM) OF RIGHT KNEE: Status: RESOLVED | Noted: 2023-05-12 | Resolved: 2023-08-15

## 2023-08-15 RX ORDER — OXYCODONE HYDROCHLORIDE 5 MG/1
5 TABLET ORAL EVERY 8 HOURS PRN
Qty: 21 TABLET | Refills: 0 | Status: SHIPPED | OUTPATIENT
Start: 2023-08-15 | End: 2023-08-24 | Stop reason: SDUPTHER

## 2023-08-22 ENCOUNTER — DOCUMENTATION ONLY (OUTPATIENT)
Dept: REHABILITATION | Facility: HOSPITAL | Age: 58
End: 2023-08-22
Payer: MEDICAID

## 2023-08-22 NOTE — PROGRESS NOTES
Physical Therapy: No show/Cancellation of Visit  Date: 08/15/2023    Patient was a no show to today's PT appointment. Patient will be removed from the schedule for non-compliance.    Initial Evaluation: 6/28/2023  Plan of Care Explanation: 8/11/2023  Cancel: 7  No show: 3     Therapist: Farhana Olguin PTA

## 2023-08-24 DIAGNOSIS — Z96.651 STATUS POST RIGHT KNEE REPLACEMENT: ICD-10-CM

## 2023-08-24 RX ORDER — OXYCODONE HYDROCHLORIDE 5 MG/1
5 TABLET ORAL EVERY 8 HOURS PRN
Qty: 21 TABLET | Refills: 0 | Status: SHIPPED | OUTPATIENT
Start: 2023-08-24 | End: 2023-09-05 | Stop reason: SDUPTHER

## 2023-08-28 DIAGNOSIS — M17.11 PRIMARY OSTEOARTHRITIS OF RIGHT KNEE: ICD-10-CM

## 2023-08-28 DIAGNOSIS — Z96.651 STATUS POST RIGHT KNEE REPLACEMENT: ICD-10-CM

## 2023-08-28 RX ORDER — DICLOFENAC SODIUM 10 MG/G
2 GEL TOPICAL 4 TIMES DAILY PRN
Qty: 300 G | Refills: 3 | Status: SHIPPED | OUTPATIENT
Start: 2023-08-28 | End: 2023-11-16 | Stop reason: SDUPTHER

## 2023-08-28 RX ORDER — METHOCARBAMOL 750 MG/1
750 TABLET, FILM COATED ORAL 3 TIMES DAILY PRN
Qty: 30 TABLET | Refills: 0 | Status: SHIPPED | OUTPATIENT
Start: 2023-08-28 | End: 2023-09-08

## 2023-09-05 DIAGNOSIS — Z96.651 STATUS POST RIGHT KNEE REPLACEMENT: ICD-10-CM

## 2023-09-05 RX ORDER — OXYCODONE HYDROCHLORIDE 5 MG/1
5 TABLET ORAL EVERY 8 HOURS PRN
Qty: 21 TABLET | Refills: 0 | Status: SHIPPED | OUTPATIENT
Start: 2023-09-06 | End: 2023-09-07 | Stop reason: SDUPTHER

## 2023-09-07 DIAGNOSIS — Z96.651 STATUS POST RIGHT KNEE REPLACEMENT: ICD-10-CM

## 2023-09-07 RX ORDER — OXYCODONE HYDROCHLORIDE 5 MG/1
5 TABLET ORAL EVERY 8 HOURS PRN
Qty: 21 TABLET | Refills: 0 | Status: SHIPPED | OUTPATIENT
Start: 2023-09-07 | End: 2023-09-23 | Stop reason: SDUPTHER

## 2023-09-08 ENCOUNTER — OFFICE VISIT (OUTPATIENT)
Dept: PODIATRY | Facility: CLINIC | Age: 58
End: 2023-09-08
Payer: MEDICAID

## 2023-09-08 VITALS
HEART RATE: 87 BPM | DIASTOLIC BLOOD PRESSURE: 92 MMHG | BODY MASS INDEX: 39.65 KG/M2 | SYSTOLIC BLOOD PRESSURE: 156 MMHG | WEIGHT: 246.69 LBS | HEIGHT: 66 IN

## 2023-09-08 DIAGNOSIS — M21.611 BUNION, RIGHT: Primary | ICD-10-CM

## 2023-09-08 DIAGNOSIS — M20.41 HAMMERTOE OF RIGHT FOOT: ICD-10-CM

## 2023-09-08 PROCEDURE — 1160F RVW MEDS BY RX/DR IN RCRD: CPT | Mod: CPTII,,, | Performed by: PODIATRIST

## 2023-09-08 PROCEDURE — 99999 PR PBB SHADOW E&M-EST. PATIENT-LVL V: CPT | Mod: PBBFAC,,, | Performed by: PODIATRIST

## 2023-09-08 PROCEDURE — 1159F MED LIST DOCD IN RCRD: CPT | Mod: CPTII,,, | Performed by: PODIATRIST

## 2023-09-08 PROCEDURE — 3080F DIAST BP >= 90 MM HG: CPT | Mod: CPTII,,, | Performed by: PODIATRIST

## 2023-09-08 PROCEDURE — 99999 PR PBB SHADOW E&M-EST. PATIENT-LVL V: ICD-10-PCS | Mod: PBBFAC,,, | Performed by: PODIATRIST

## 2023-09-08 PROCEDURE — 3044F PR MOST RECENT HEMOGLOBIN A1C LEVEL <7.0%: ICD-10-PCS | Mod: CPTII,,, | Performed by: PODIATRIST

## 2023-09-08 PROCEDURE — 1160F PR REVIEW ALL MEDS BY PRESCRIBER/CLIN PHARMACIST DOCUMENTED: ICD-10-PCS | Mod: CPTII,,, | Performed by: PODIATRIST

## 2023-09-08 PROCEDURE — 99215 OFFICE O/P EST HI 40 MIN: CPT | Mod: PBBFAC | Performed by: PODIATRIST

## 2023-09-08 PROCEDURE — 99213 PR OFFICE/OUTPT VISIT, EST, LEVL III, 20-29 MIN: ICD-10-PCS | Mod: 57,S$PBB,, | Performed by: PODIATRIST

## 2023-09-08 PROCEDURE — 3080F PR MOST RECENT DIASTOLIC BLOOD PRESSURE >= 90 MM HG: ICD-10-PCS | Mod: CPTII,,, | Performed by: PODIATRIST

## 2023-09-08 PROCEDURE — 1159F PR MEDICATION LIST DOCUMENTED IN MEDICAL RECORD: ICD-10-PCS | Mod: CPTII,,, | Performed by: PODIATRIST

## 2023-09-08 PROCEDURE — 3008F PR BODY MASS INDEX (BMI) DOCUMENTED: ICD-10-PCS | Mod: CPTII,,, | Performed by: PODIATRIST

## 2023-09-08 PROCEDURE — 3077F PR MOST RECENT SYSTOLIC BLOOD PRESSURE >= 140 MM HG: ICD-10-PCS | Mod: CPTII,,, | Performed by: PODIATRIST

## 2023-09-08 PROCEDURE — 3008F BODY MASS INDEX DOCD: CPT | Mod: CPTII,,, | Performed by: PODIATRIST

## 2023-09-08 PROCEDURE — 3044F HG A1C LEVEL LT 7.0%: CPT | Mod: CPTII,,, | Performed by: PODIATRIST

## 2023-09-08 PROCEDURE — 3077F SYST BP >= 140 MM HG: CPT | Mod: CPTII,,, | Performed by: PODIATRIST

## 2023-09-08 PROCEDURE — 99213 OFFICE O/P EST LOW 20 MIN: CPT | Mod: 57,S$PBB,, | Performed by: PODIATRIST

## 2023-09-08 RX ORDER — PHENTERMINE HYDROCHLORIDE 37.5 MG/1
37.5 TABLET ORAL
COMMUNITY
Start: 2023-09-05 | End: 2023-12-21 | Stop reason: ALTCHOICE

## 2023-09-08 RX ORDER — LIDOCAINE AND PRILOCAINE 25; 25 MG/G; MG/G
CREAM TOPICAL
Qty: 30 G | Refills: 10 | Status: SHIPPED | OUTPATIENT
Start: 2023-09-08 | End: 2023-11-16 | Stop reason: SDUPTHER

## 2023-09-08 RX ORDER — TRAMADOL HYDROCHLORIDE 50 MG/1
50 TABLET ORAL EVERY 4 HOURS PRN
COMMUNITY
Start: 2023-08-18 | End: 2023-09-15

## 2023-09-08 NOTE — PROGRESS NOTES
"Subjective:     Patient ID: Yayo Carver is a 57 y.o. female.    Chief Complaint: Follow-up (Right foot bunion)    Yayo is a 57 y.o. female who presents to the podiatry clinic  with complaint of  right foot pain. Onset of the symptoms was several years ago. Precipitating event: none known. Current symptoms include: ability to bear weight, but with some pain, stiffness, worsening symptoms after a period of activity, and painful bump on the big toe joint . Aggravating factors: standing, walking, and shoes . Symptoms have progressed to a point and plateaued. Patient has had no prior foot problems. Evaluation to date: none. Treatment to date: rest and shoe changes. Avoids tight shoes and only wears wide soft tennis shoe type shoes. Had Left knee replacemetn 4 months ago and states the R foot bunion pain has worsened since then. L foot has small bunion that does not bother her at all. Patients rates pain 2/10 on pain scale.    05/09/2023: follow up for right foot bunion. Still bothering her. Wider, softer shoes have helped. Having R knee replacement on June 25th and would like to have bunion surgery after she recovers from this, likely around September. No other complaints. Here for further recommendations.     09/08/2023: follow up for painful R foot bunion. Recovered from k nee surgery and now ready to proceed with surgery for bunion and hammertoe on R foot that has been bothering her for many months. Has tried shoe changes, toe spacers, topical medication (lidocaine works well and needs refill) and also arch supports. Pain persistent and she would like to proceed with process of surgery.      Vitals:    09/08/23 0922   BP: (!) 156/92   Pulse: 87   Weight: 111.9 kg (246 lb 11.1 oz)   Height: 5' 6" (1.676 m)   PainSc: 10-Worst pain ever   PainLoc: Foot       Review of Systems   Constitutional: Negative for chills and fever.   Cardiovascular:  Negative for chest pain, claudication and leg swelling.   Respiratory:  " Negative for cough and shortness of breath.    Skin:  Negative for dry skin and nail changes.   Musculoskeletal:         R foot bunion, R foot pain   Gastrointestinal:  Negative for nausea and vomiting.   Neurological:  Negative for numbness and paresthesias.   Psychiatric/Behavioral:  Negative for altered mental status.         Objective:     Physical Exam  Vitals reviewed.   Constitutional:       Appearance: She is well-developed.   HENT:      Head: Normocephalic.   Cardiovascular:      Pulses:           Dorsalis pedis pulses are 2+ on the right side and 2+ on the left side.        Posterior tibial pulses are 2+ on the right side and 2+ on the left side.      Comments: DP and PT pulses are 2/4 bilaterally.        Pulmonary:      Effort: No respiratory distress.   Musculoskeletal:      Right lower leg: No edema.      Left lower leg: No edema.      Comments: Semi-reducible hammertoe contractures noted to toes 2-4 b/l-asymptomatic. HAV, mild L, moderate R, non trackbound noted b/l with mild L and moderate R dorsal and dorsal medial bony prominence at 1st met head. L foot asymptomatic. R foot with pain on palpation of dorsal medial 1st met head and with ROM/reduction of bunion deformity.     Hypermobility noted to 1st ray b/l with near complete collapse of medial longitudinal arch b/l with loading.     Equinus contracture noted to b/l ankles with knee straight and slightly improved with knee bent.      Skin:     General: Skin is warm and dry.      Findings: No erythema.      Comments: No open lesions, lacerations or wounds noted. Nails are normal to R 1-5 and L 1-5. Interdigital spaces clean, dry and intact b/l. No erythema noted to b/l foot. Skin texture normal. Pedal hair normal     Neurological:      Mental Status: She is alert and oriented to person, place, and time.      Sensory: No sensory deficit.      Comments: Light touch, proprioception, and sharp/dull sensation are all intact bilaterally.    Psychiatric:          Behavior: Behavior normal.         Thought Content: Thought content normal.         Judgment: Judgment normal.         Assessment:      Encounter Diagnoses   Name Primary?    Juniion, right Yes    Hammertoe of right foot          Plan:     Yayo was seen today for follow-up.    Diagnoses and all orders for this visit:    Bunion, right  -     Case Request Operating Room: BUNIONECTOMY, LAPIDUS, CORRECTION, HAMMER TOE    Hammertoe of right foot  -     Case Request Operating Room: BUNIONECTOMY, LAPIDUS, CORRECTION, HAMMER TOE    Other orders  -     LIDOcaine-prilocaine (EMLA) cream; Apply topically every 6 to 8 hours as needed. Apply to area of foot pain as needed every 6-8 hrs          I counseled the patient on her conditions, their implications and medical management.    Long discussion with patient regarding the procedure in detail (Bunionectomy MIS vs Lapiplasty). Patient understands all risks, potential complications, and alternatives, including, but not limited to those listed on the consent form. Risks include but are not limited to recurrence, infection, delayed wound healing, wound dehiscence, scar, poor cosmesis, neuropathic changes/nerve pain/pain, syndromes (RSD), numbness, infection, bleeding, hematoma, gangrenous changes, further surgery, loss of function, healing in a poor position, new or different ulceration or pre ulcerative callus, difficulty walking, gait changes, limb loss, blood clots of leg, lung, heart, brain, and death. All questions were answered. No guarantees given or implied as to outcome. Informed verbal and written consent to be signed before surgery.     Order placed for surgical intervention. Patient to get cleared by PCP prior to surgery date.     Anticipate surgery for 1 month.     RTC 1 week before surgery to sign consnet forms and advised to call or come in sooner (prior to surgery date) for any further questions or concerns.

## 2023-09-11 ENCOUNTER — PATIENT MESSAGE (OUTPATIENT)
Dept: PODIATRY | Facility: CLINIC | Age: 58
End: 2023-09-11
Payer: MEDICAID

## 2023-09-11 ENCOUNTER — TELEPHONE (OUTPATIENT)
Dept: PODIATRY | Facility: CLINIC | Age: 58
End: 2023-09-11
Payer: MEDICAID

## 2023-09-11 NOTE — TELEPHONE ENCOUNTER
Patient was provided medicaid hotline number  [phone] to schedule with PCP. I can not schedule for medicaid.

## 2023-09-11 NOTE — TELEPHONE ENCOUNTER
Spoke to pt and discussed 11/17 surgery date. Also advised pt to contact their PCP to schedule an appointment to be cleared for surgery. Then advised pt that someone will call them the day before surgery between 3p-5p with the surgery arrival time and instructions. Pt verbalized understanding and call ended.

## 2023-09-13 ENCOUNTER — TELEPHONE (OUTPATIENT)
Dept: INTERNAL MEDICINE | Facility: CLINIC | Age: 58
End: 2023-09-13
Payer: MEDICAID

## 2023-09-13 ENCOUNTER — HOSPITAL ENCOUNTER (EMERGENCY)
Facility: OTHER | Age: 58
Discharge: HOME | End: 2023-09-13
Attending: INTERNAL MEDICINE
Payer: MEDICAID

## 2023-09-13 VITALS
OXYGEN SATURATION: 100 % | TEMPERATURE: 99 F | WEIGHT: 240 LBS | HEART RATE: 80 BPM | RESPIRATION RATE: 18 BRPM | DIASTOLIC BLOOD PRESSURE: 99 MMHG | BODY MASS INDEX: 38.57 KG/M2 | HEIGHT: 66 IN | SYSTOLIC BLOOD PRESSURE: 173 MMHG

## 2023-09-13 DIAGNOSIS — Z12.31 ENCOUNTER FOR SCREENING MAMMOGRAM FOR MALIGNANT NEOPLASM OF BREAST: Primary | ICD-10-CM

## 2023-09-13 DIAGNOSIS — M25.531 RIGHT WRIST PAIN: Primary | ICD-10-CM

## 2023-09-13 DIAGNOSIS — R52 PAIN: ICD-10-CM

## 2023-09-13 PROCEDURE — 25000003 PHARM REV CODE 250: Performed by: INTERNAL MEDICINE

## 2023-09-13 PROCEDURE — 99283 EMERGENCY DEPT VISIT LOW MDM: CPT

## 2023-09-13 RX ORDER — ACETAMINOPHEN 500 MG
1000 TABLET ORAL
Status: COMPLETED | OUTPATIENT
Start: 2023-09-13 | End: 2023-09-13

## 2023-09-13 RX ADMIN — ACETAMINOPHEN 1000 MG: 500 TABLET ORAL at 06:09

## 2023-09-13 NOTE — ED TRIAGE NOTES
"Pt c/o right wrist pain that started 3 days ago and worsened over time. States she can feel "two knots" in her wrist. Denies similar pain in the past. Denies taking OTC or Rx medications for relief.   "

## 2023-09-13 NOTE — ED PROVIDER NOTES
"SCRIBE #1 NOTE: IHeather, am scribing for, and in the presence of, . I have scribed the following portions of the note - Other sections scribed: HPI, PE.   SCRIBE #2 NOTE: IEsthela, am scribing for, and in the presence of,  Parveen Youssef MD. I have scribed the remaining portions of the note not scribed by Scribe #1.     Encounter date: 6:22 PM 09/13/2023    Source of History   Patient    Chief Complaint   Pt presents with:   Wrist Pain (Non-traumatic Right wrist pain over the past 3 days)      History Of Present Illness   Yayo Carver is a 57 y.o. female with hypertension, hyperlipidemia, obesity, diabetes who presents to the ED with right wrist pain present for 2.5 weeks with worsening over the last 3 days. She denies trauma. Patient reports noticing a "pop" followed by swelling last night with no station trauma. The patient also noted color changes to palmar aspect of her thumb last night that have returned to normal today. She has been applying lidocaine gel to the area with no improvement to pain. She states her pain is exacerbated with downward/outward flexion. This pain is also noted to be worsened at bedtime as the patient reports "sleeping on it" throughout the night. No recent falls or trauma.  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    Denies: chest pains, N/V/D, SOB, abdominal pain, dysuria and weakness, neck or back pain.     This is the extent to the patients complaints today here in the emergency department.  Past History     Review of patient's allergies indicates:   Allergen Reactions    Ace inhibitors Edema and Swelling       No current facility-administered medications on file prior to encounter.     Current Outpatient Medications on File Prior to Encounter   Medication Sig Dispense Refill    acetaminophen (TYLENOL) 650 MG TbSR Take 1 tablet (650 mg total) by mouth every 8 (eight) hours. 120 tablet 0    amLODIPine (NORVASC) 10 MG tablet Take 1 tablet (10 mg " total) by mouth once daily. 90 tablet 3    apixaban (ELIQUIS) 2.5 mg Tab Take 1 tablet (2.5 mg total) by mouth 2 (two) times daily. 60 tablet 0    aspirin (ECOTRIN) 81 MG EC tablet Take 1 tablet (81 mg total) by mouth once daily. 90 tablet 3    atorvastatin (LIPITOR) 40 MG tablet Take 1 tablet (40 mg total) by mouth once daily. 90 tablet 3    b complex vitamins capsule Take 1 capsule by mouth once daily.      cefadroxil (DURICEF) 500 MG Cap Take 1 capsule (500 mg total) by mouth every 12 (twelve) hours. 14 capsule 0    celecoxib (CELEBREX) 200 MG capsule Take 1 capsule (200 mg total) by mouth once daily. 30 capsule 0    diclofenac sodium (VOLTAREN) 1 % Gel Apply 2 g topically 4 (four) times daily as needed (knee pain). 300 g 3    docusate sodium (COLACE) 100 MG capsule Take 1 capsule (100 mg total) by mouth 2 (two) times daily as needed for Constipation. 60 capsule 0    docusate sodium (COLACE) 100 MG capsule Take 1 capsule (100 mg total) by mouth 2 (two) times daily. 60 capsule 0    ergocalciferol (VITAMIN D2) 50,000 unit Cap Take 1 capsule (50,000 Units total) by mouth every 7 days. 12 capsule 3    flash glucose scanning reader (FREESTYLE HEENA 14 DAY READER) Misc Check blood glucose before meals and nightly. 1 each 0    flash glucose sensor (FREESTYLE HEENA 14 DAY SENSOR) Kit Apply to skin for 14 days.  Check blood glucose before meals and nightly. 1 kit 11    LIDOcaine-prilocaine (EMLA) cream Apply topically every 6 to 8 hours as needed. Apply to area of foot pain as needed every 6-8 hrs 30 g 10    nut.tx.gluc intol,lf,soy-fiber (BOOST GLUCOSE CONTROL) 0.06-1.1 gram-kcal/mL Liqd Take 8 ml by mouth with meals for 2 weeks 237 mL 0    omeprazole (PRILOSEC) 40 MG capsule Take 40 mg by mouth.      oxyCODONE (ROXICODONE) 5 MG immediate release tablet Take 1 tablet (5 mg total) by mouth every 8 (eight) hours as needed for Pain. 21 tablet 0    pantoprazole (PROTONIX) 40 MG tablet Take 1 tablet (40 mg total) by mouth  daily as needed (acid reflux). 90 tablet 3    pantoprazole (PROTONIX) 40 MG tablet Take 1 tablet (40 mg total) by mouth once daily. 30 tablet 0    phentermine (ADIPEX-P) 37.5 mg tablet Take 37.5 mg by mouth.      pregabalin (LYRICA) 75 MG capsule Take 1 capsule (75 mg total) by mouth 2 (two) times daily. 60 capsule 1    sodium bicarbonate 650 MG tablet Take 1 tablet (650 mg total) by mouth 2 (two) times daily. 60 tablet 2    terbinafine HCL (LAMISIL) 250 mg tablet Take 1 tablet (250 mg total) by mouth once daily. Avoid alcohol while on medication 30 tablet 0    traMADoL (ULTRAM) 50 mg tablet Take 50 mg by mouth every 4 (four) hours as needed.         As per HPI and below:  Past Medical History:   Diagnosis Date    Arthritis     Diabetes mellitus     Diabetes mellitus, type 2     GERD (gastroesophageal reflux disease)     HLD (hyperlipidemia)     Hypertension      Past Surgical History:   Procedure Laterality Date    BARIATRIC SURGERY  2022    Gastric sleeve     SECTION  1989    COLONOSCOPY N/A 2017    Procedure: COLONOSCOPY;  Surgeon: Evelin Arceo MD;  Location: [address]);  Service: Endoscopy;  Laterality: N/A;  2 nd floor d/t BMI > 50 kg/m3    KNEE ARTHROSCOPY Right     TONSILLECTOMY      TOTAL KNEE ARTHROPLASTY Left 2022    Procedure: ARTHROPLASTY, KNEE, TOTAL: LEFT: DEPUY - ATTUNE;  Surgeon: Jc Padgett III, MD;  Location: [facility];  Service: Orthopedics;  Laterality: Left;    TOTAL KNEE ARTHROPLASTY Right 2023    Procedure: ARTHROPLASTY, KNEE, TOTAL: RIGHT: DEPUY - ATTUNE;  Surgeon: Jc Padgett III, MD;  Location: [facility];  Service: Orthopedics;  Laterality: Right;  90 minutes    TUBAL LIGATION         Social History     Socioeconomic History    Marital status:    Occupational History    Occupation: Supervisor for [employer]     Employer: [employer]   Tobacco Use    Smoking status: Never    Smokeless tobacco: Never    Substance and Sexual Activity    Alcohol use: Not Currently     Comment: occasional    Drug use: No    Sexual activity: Yes     Partners: Male     Birth control/protection: None     Comment: occasionally   Social History Narrative    ** Merged History Encounter **         Pt lives alone.  Has a college-age son who comes and goes.  Has 3 other adult children.       Social Determinants of Health     Financial Resource Strain: Low Risk  (9/1/2023)    Overall Financial Resource Strain (CARDIA)     Difficulty of Paying Living Expenses: Not very hard   Food Insecurity: Food Insecurity Present (9/1/2023)    Hunger Vital Sign     Worried About Running Out of Food in the Last Year: Sometimes true     Ran Out of Food in the Last Year: Never true   Transportation Needs: No Transportation Needs (9/1/2023)    PRAPARE - Transportation     Lack of Transportation (Medical): No     Lack of Transportation (Non-Medical): No   Physical Activity: Insufficiently Active (9/1/2023)    Exercise Vital Sign     Days of Exercise per Week: 3 days     Minutes of Exercise per Session: 40 min   Stress: No Stress Concern Present (9/1/2023)    [place] of [employer]ational [employer] - Occupational Stress Questionnaire     Feeling of Stress : Only a little   Social Connections: Unknown (9/1/2023)    Social Connection and Isolation Panel [NHANES]     Frequency of Communication with Friends and Family: More than three times a week     Frequency of Social Gatherings with Friends and Family: More than three times a week     Active Member of Clubs or Organizations: No     Attends Club or Organization Meetings: 1 to 4 times per year     Marital Status: Never    Housing Stability: Low Risk  (9/1/2023)    Housing Stability Vital Sign     Unable to Pay for Housing in the Last Year: No     Number of Places Lived in the Last Year: 1     Unstable Housing in the Last Year: No       Family History   Problem Relation Age of Onset    Cancer Mother      "Breast cancer Mother 65    Diabetes Mother     Colon polyps Mother     Hypertension Mother     Diabetes Father     Breast cancer Sister 56    Cancer Sister     No Known Problems Brother     Arthritis Brother     No Known Problems Daughter     Kidney disease Son     Diabetes Son     Breast cancer Maternal Aunt     Diabetes Paternal Uncle     Breast cancer Maternal Grandmother     Colon cancer Maternal Grandfather     Diabetes Paternal Grandmother     Heart disease Paternal Grandmother     No Known Problems Paternal Grandfather     Esophageal cancer Neg Hx     Irritable bowel syndrome Neg Hx     Rectal cancer Neg Hx     Stomach cancer Neg Hx     Ulcerative colitis Neg Hx     Celiac disease Neg Hx     Crohn's disease Neg Hx     Amblyopia Neg Hx     Blindness Neg Hx     Cataracts Neg Hx     Glaucoma Neg Hx     Macular degeneration Neg Hx     Retinal detachment Neg Hx     Strabismus Neg Hx     Stroke Neg Hx     Thyroid disease Neg Hx        Physical Exam     Vitals:    09/13/23 1754 09/13/23 1833 09/13/23 2044   BP: (!) 177/101 (!) 153/96 (!) 173/99  Comment: Pt takes Bp medication and will take medication when she gets home.   BP Location:   Left arm   Patient Position:   Sitting   Pulse: 88  80   Resp: 17  18   Temp: 98.7 °F (37.1 °C)  98.5 °F (36.9 °C)   TempSrc: Oral  Oral   SpO2: 100%  100%   Weight: 108.9 kg (240 lb)     Height: 5' 6" (1.676 m)       Physical Exam:   Nursing note and vitals reviewed. Tenderness in RUE.  Appearance: Well appearing, non-toxic female in no acute respiratory distress.  Making purposeful movements.  Speaking in full sentences.  Skin: No obvious rashes seen.  Good turgor.  No abrasions.  No ecchymoses.  Eyes: No conjunctival injection. EOMI, PERRL.  Head: NC/AT  ENT: Oropharynx clear.    Chest: CTAB. No increased work of breathing, bilateral chest rise.  Cardiovascular: Regular rate and rhythm.  Normal equal bilateral radial pulses.  Abdomen: Soft.  Not distended.  Nontender.  No " guarding.  No rebound. No Masses  Musculoskeletal: Good range of motion all joints.  No deformities.  Neck supple, full range of motion, no obvious deformity. No C/L/T tenderness. No snuff box tenderness on right hand.  Full range of motion of the bilateral upper extremities including shoulders, elbows wrist and digits.  Neurologic: Moves all extremities.  Normal sensation.  No facial droop.  Normal speech. No focal neurological deficits.   Mental Status:  Alert and oriented x 3.  Appropriate, conversant.      Results and Medications    Procedures    Labs Reviewed - No data to display    Imaging Results              X-Ray Forearm Right (Final result)  Result time 09/13/23 20:10:52      Final result by Austin Kat MD (09/13/23 20:10:52)                   Impression:      1. No acute displaced fracture or dislocation of the forearm.      Electronically signed by: Austin Kat MD  Date:    09/13/2023  Time:    20:10               Narrative:    EXAMINATION:  XR FOREARM RIGHT    CLINICAL HISTORY:  Pain, unspecified    TECHNIQUE:  AP and lateral views of the right forearm were performed.    COMPARISON:  None    FINDINGS:  Three views right forearm.    No acute displaced fracture or dislocation of the forearm.  No radiopaque foreign body.  The elbow appears intact.  Please see separately dictated report for details of the wrist.                                       X-Ray Hand 3 View Right (Final result)  Result time 09/13/23 20:09:55      Final result by Austin Kat MD (09/13/23 20:09:55)                   Impression:      1. Irregularity involving the pisiform/triquetrum.  This may reflect sequela of previous injury however correlation with any focal tenderness recommended.  No dislocation.      Electronically signed by: Austin Kat MD  Date:    09/13/2023  Time:    20:09               Narrative:    EXAMINATION:  XR HAND COMPLETE 3 VIEW RIGHT    CLINICAL HISTORY:  pain;    TECHNIQUE:  PA, lateral,  and oblique views of the right hand were performed.    COMPARISON:  None    FINDINGS:  Three views right hand.    No acute dislocation of the hand.  There are degenerative changes of the 1st carpal metacarpal joint.  There is irregularity involving the pisiform/triquetrum, may reflect sequela of prior injury however correlation with any focal tenderness is recommended.  No radiopaque foreign body.                                          Medications   acetaminophen tablet 1,000 mg (1,000 mg Oral Given 9/13/23 1848)       MDM, Impression and Plan   Clinical Tests:   Radiological Study: Ordered and Reviewed      Differential diagnosis:  -unlikely fracture versus dislocation based off physical exam and history   -tendinopathy   -carpal tunnel  -unlikely vascular pathology    Initial Assessment & ED Management:    Urgent evaluation of 57 y.o. female with History of hypertension and diabetes who presents the ED with chief complaint of wrist pain x2 0.5 weeks.  On arrival to the ED she is noted to be afebrile, hemodynamically stable though hypertensive in no acute respiratory distress.  Pain controlled with Tylenol.  X-ray of right hand wrist and forearm shows no acute fracture dislocation there was noted irregularity over the pisiform triquetrum yet patient has no focal tenderness over these bones.  She has no tenderness over the anatomical snuffbox.  She has full range of motion with no neurovascular abnormalities.  I instructed her to continue taking Motrin Tylenol for her pain.  She states that her glucoses have been normal at home.  She states that she will take her blood pressure medicine when she gets home.  She states that she has a follow-up with her doctor 2-3 days.  Strict return precautions discussed.  She voiced verbal understanding agreement the plan.  Patient deemed stable for discharge.    Medical Decision Making  Amount and/or Complexity of Data Reviewed  Radiology: ordered.    Risk  OTC drugs.                  Please see ED course for discussion of workup and dispo if not listed above.          Final diagnoses:  [R52] Pain  [M25.531] Right wrist pain (Primary)        ED Disposition Condition    Discharge Stable          ED Prescriptions    None       Follow-up Information       Follow up With Specialties Details Why Contact Info    Daisy Castro  Schedule an appointment as soon as possible for a visit in 2 days or the primary care doctor of your choice [address] [facility] [address]  [phone]      [facility] - Emergency Dept Emergency Medicine  If symptoms worsen [address] [facility] [id number] [phone]                   Scribe Attestation:   Scribe #1: I performed the above scribed service and the documentation accurately describes the services I performed. I attest to the accuracy of the note.            MD Mikael Egan Heyward B, MD  09/13/23 3483

## 2023-09-14 NOTE — DISCHARGE INSTRUCTIONS
Diagnosis:   1. Right wrist pain    2. Pain        Tests you had showed:   Labs Reviewed - No data to display   X-Ray Forearm Right   Final Result      1. No acute displaced fracture or dislocation of the forearm.         Electronically signed by: Austin Kat MD   Date:    09/13/2023   Time:    20:10      X-Ray Hand 3 View Right   Final Result      1. Irregularity involving the pisiform/triquetrum.  This may reflect sequela of previous injury however correlation with any focal tenderness recommended.  No dislocation.         Electronically signed by: Austin Kat MD   Date:    09/13/2023   Time:    20:09          Treatments you received were:   Medications   acetaminophen tablet 1,000 mg (1,000 mg Oral Given 9/13/23 1848)       Home Care Instructions:  - Medications: Continue taking your home medications as prescribed    Follow-Up Plan:  - Follow-up with: Primary care doctor within 1-2  days  - Additional testing and/or evaluation will be directed by your primary doctor    Return to the Emergency Department for symptoms including but not limited to: worsening symptoms, severe back pain, shortness of breath or chest pain, vomiting with inability to hold down fluids, blood in vomit or poop, fevers greater than 100.4°F, passing out/fainting/unconsciousness, or other concerning symptoms.     Future Appointments   Date Time Provider Department Center   9/18/2023  8:20 AM Arline Bullock, [facility] OPTOMTY [place]   9/19/2023  2:00 PM [facility] MAMMO2 BX [facility] MAMMO [facility]   9/21/2023  3:00 PM Jc Padgett III, MD [facility] ORTHO [facility]   10/5/2023  8:00 AM Henrry Muñoz DPM [facility] POD [facility]   10/10/2023  3:20 PM Jonathon Chahal MD [facility] IM [facility]   11/22/2023  9:30 AM Henrry Muñoz DPM [facility] POD [facility]   11/29/2023 10:15 AM Henrry Muñoz DPM [facility] POD [facility]

## 2023-09-15 ENCOUNTER — TELEPHONE (OUTPATIENT)
Dept: INTERNAL MEDICINE | Facility: CLINIC | Age: 58
End: 2023-09-15
Payer: MEDICAID

## 2023-09-15 RX ORDER — TRAMADOL HYDROCHLORIDE 50 MG/1
TABLET ORAL
Qty: 180 TABLET | Refills: 0 | Status: SHIPPED | OUTPATIENT
Start: 2023-09-22 | End: 2023-11-09 | Stop reason: SDUPTHER

## 2023-09-15 NOTE — TELEPHONE ENCOUNTER
----- Message from Deja Shah sent at 9/15/2023 10:23 AM CDT -----  Regarding: Patient call back  .Type: Patient Call Back    Who called:self     What is the request in detail:A previous patient of Berta Hastings at the [facility] location; trying to get a new primary care doctor in the office but nothing is available in Epic;has medicaid insurance.    Can the clinic reply by [facility]?no     Would the patient rather a call back or a response via [facility]? Call     Best call back number: .[phone]      Additional Information:

## 2023-09-15 NOTE — TELEPHONE ENCOUNTER
Returned call to Ms. Mehul. Informed that unfortunately there are no slots here, but at [facility] on 01/05/2023. States she is going to change her insurance.

## 2023-09-20 ENCOUNTER — TELEPHONE (OUTPATIENT)
Dept: PODIATRY | Facility: CLINIC | Age: 58
End: 2023-09-20
Payer: MEDICAID

## 2023-09-21 ENCOUNTER — OFFICE VISIT (OUTPATIENT)
Dept: ORTHOPEDICS | Facility: CLINIC | Age: 58
End: 2023-09-21
Payer: MEDICAID

## 2023-09-21 VITALS — HEIGHT: 66 IN | BODY MASS INDEX: 38.58 KG/M2 | WEIGHT: 240.06 LBS

## 2023-09-21 DIAGNOSIS — Z96.651 STATUS POST RIGHT KNEE REPLACEMENT: Primary | ICD-10-CM

## 2023-09-21 PROCEDURE — 99999 PR PBB SHADOW E&M-EST. PATIENT-LVL II: CPT | Mod: PBBFAC,,, | Performed by: ORTHOPAEDIC SURGERY

## 2023-09-21 PROCEDURE — 99024 PR POST-OP FOLLOW-UP VISIT: ICD-10-PCS | Mod: ,,, | Performed by: ORTHOPAEDIC SURGERY

## 2023-09-21 PROCEDURE — 99212 OFFICE O/P EST SF 10 MIN: CPT | Mod: PBBFAC | Performed by: ORTHOPAEDIC SURGERY

## 2023-09-21 PROCEDURE — 3044F HG A1C LEVEL LT 7.0%: CPT | Mod: CPTII,,, | Performed by: ORTHOPAEDIC SURGERY

## 2023-09-21 PROCEDURE — 3008F BODY MASS INDEX DOCD: CPT | Mod: CPTII,,, | Performed by: ORTHOPAEDIC SURGERY

## 2023-09-21 PROCEDURE — 99999 PR PBB SHADOW E&M-EST. PATIENT-LVL II: ICD-10-PCS | Mod: PBBFAC,,, | Performed by: ORTHOPAEDIC SURGERY

## 2023-09-21 PROCEDURE — 99024 POSTOP FOLLOW-UP VISIT: CPT | Mod: ,,, | Performed by: ORTHOPAEDIC SURGERY

## 2023-09-21 PROCEDURE — 3044F PR MOST RECENT HEMOGLOBIN A1C LEVEL <7.0%: ICD-10-PCS | Mod: CPTII,,, | Performed by: ORTHOPAEDIC SURGERY

## 2023-09-21 PROCEDURE — 3008F PR BODY MASS INDEX (BMI) DOCUMENTED: ICD-10-PCS | Mod: CPTII,,, | Performed by: ORTHOPAEDIC SURGERY

## 2023-09-21 RX ORDER — PANTOPRAZOLE SODIUM 40 MG/1
40 TABLET, DELAYED RELEASE ORAL DAILY
Qty: 30 TABLET | Refills: 0 | Status: SHIPPED | OUTPATIENT
Start: 2023-09-21 | End: 2023-12-21 | Stop reason: SDUPTHER

## 2023-09-21 NOTE — PROGRESS NOTES
Subjective:     HPI:   Yayo Carver is a 57 y.o. female who presents 12 weeks out from right TKA    Date of surgery:   L TKA 12/19/22  R TKA 6/26/23    Medications: tylenol PRN    Assistive Devices: none    PT: finished    R TKA doing well, happy with results, walking in park  Some soreness at ant [facility] knee  hasnt gotten compound cream refill    ER for wrist pain 9/13/23, c/o R thumb pain       Objective:   Body mass index is 38.75 kg/m².  Exam:    Gait: limp/antalgic none    Incision: healed    Stability:  Knee stable anterior-posterior varus and valgus stresses, no extensor lag    Extension: 0    Flexion: 110    Pre-op       Imaging:  None today        Assessment:       ICD-10-CM ICD-9-CM   1. Status post right knee replacement  Z96.651 V43.65        Doing well     Plan:       Patient is doing very well with their total knee arthroplasty.  They will continue with their routine care of the knee replacement and see me back for their follow-up at the routine interval.  If there are problems in the interim they will see me back sooner. Prophylactic antibiotic protocol given and explained to patient.     R [facility] arthritis +/- de quervains - thumb spica brace  F/u R hand eval Dr Roldan    Ref placed for new PCP - Dr Berta Soler left, needs new PCP    9 month follow up for annual xrays B knees      Orders Placed This Encounter   Procedures    Ambulatory referral/consult to Internal Medicine     Standing Status:   Future     Standing Expiration Date:   10/21/2024     Referral Priority:   Routine     Referral Type:   Consultation     Referral Reason:   Specialty Services Required     Requested Specialty:   Internal Medicine     Number of Visits Requested:   1             Past Medical History:   Diagnosis Date    Arthritis     Diabetes mellitus     Diabetes mellitus, type 2     GERD (gastroesophageal reflux disease)     HLD (hyperlipidemia)     Hypertension        Past Surgical History:   Procedure Laterality Date     BARIATRIC SURGERY  2022    Gastric sleeve     SECTION  1989    COLONOSCOPY N/A 2017    Procedure: COLONOSCOPY;  Surgeon: Evelin Arceo MD;  Location: [facility] ([address] [place] [address]);  Service: Endoscopy;  Laterality: N/A;  2 nd floor d/t BMI > 50 kg/m3    KNEE ARTHROSCOPY Right     TONSILLECTOMY      TOTAL KNEE ARTHROPLASTY Left 2022    Procedure: ARTHROPLASTY, KNEE, TOTAL: LEFT: DEPUY - ATTUNE;  Surgeon: Jc Padgett III, MD;  Location: [facility];  Service: Orthopedics;  Laterality: Left;    TOTAL KNEE ARTHROPLASTY Right 2023    Procedure: ARTHROPLASTY, KNEE, TOTAL: RIGHT: DEPUY - ATTUNE;  Surgeon: Jc Padgett III, MD;  Location: [facility];  Service: Orthopedics;  Laterality: Right;  90 minutes    TUBAL LIGATION         Family History   Problem Relation Age of Onset    Cancer Mother     Breast cancer Mother 65    Diabetes Mother     Colon polyps Mother     Hypertension Mother     Diabetes Father     Breast cancer Sister 56    Cancer Sister     No Known Problems Brother     Arthritis Brother     No Known Problems Daughter     Kidney disease Son     Diabetes Son     Breast cancer Maternal Aunt     Diabetes Paternal Uncle     Breast cancer Maternal Grandmother     Colon cancer Maternal Grandfather     Diabetes Paternal Grandmother     Heart disease Paternal Grandmother     No Known Problems Paternal Grandfather     Esophageal cancer Neg Hx     Irritable bowel syndrome Neg Hx     Rectal cancer Neg Hx     Stomach cancer Neg Hx     Ulcerative colitis Neg Hx     Celiac disease Neg Hx     Crohn's disease Neg Hx     Amblyopia Neg Hx     Blindness Neg Hx     Cataracts Neg Hx     Glaucoma Neg Hx     Macular degeneration Neg Hx     Retinal detachment Neg Hx     Strabismus Neg Hx     Stroke Neg Hx     Thyroid disease Neg Hx        Social History     Socioeconomic History    Marital status:    Occupational History    Occupation: Supervisor for ushers at [employer]     Employer: [employer]   Tobacco Use    Smoking status: Never    Smokeless tobacco: Never   Substance and Sexual Activity    Alcohol use: Not Currently     Comment: occasional    Drug use: No    Sexual activity: Yes     Partners: Male     Birth control/protection: None     Comment: occasionally   Social History Narrative    ** Merged History Encounter **         Pt lives alone.  Has a college-age son who comes and goes.  Has 3 other adult children.       Social Determinants of Health     Financial Resource Strain: Low Risk  (9/1/2023)    Overall Financial Resource Strain (CARDIA)     Difficulty of Paying Living Expenses: Not very hard   Food Insecurity: Food Insecurity Present (9/1/2023)    Hunger Vital Sign     Worried About Running Out of Food in the Last Year: Sometimes true     Ran Out of Food in the Last Year: Never true   Transportation Needs: No Transportation Needs (9/1/2023)    PRAPARE - Transportation     Lack of Transportation (Medical): No     Lack of Transportation (Non-Medical): No   Physical Activity: Insufficiently Active (9/1/2023)    Exercise Vital Sign     Days of Exercise per Week: 3 days     Minutes of Exercise per Session: 40 min   Stress: No Stress Concern Present (9/1/2023)    [place] of Occupational Health - Occupational Stress Questionnaire     Feeling of Stress : Only a little   Social Connections: Unknown (9/1/2023)    Social Connection and Isolation Panel [NHANES]     Frequency of Communication with Friends and Family: More than three times a week     Frequency of Social Gatherings with Friends and Family: More than three times a week     Active Member of Clubs or Organizations: No     Attends Club or Organization Meetings: 1 to 4 times per year     Marital Status: Never    Housing Stability: Low Risk  (9/1/2023)    Housing Stability Vital Sign     Unable to Pay for Housing in the Last Year: No     Number of Places Lived in the Last Year: 1     Unstable Housing in the Last Year:  No

## 2023-09-23 DIAGNOSIS — Z96.651 STATUS POST RIGHT KNEE REPLACEMENT: ICD-10-CM

## 2023-09-24 RX ORDER — OXYCODONE HYDROCHLORIDE 5 MG/1
5 TABLET ORAL EVERY 12 HOURS PRN
Qty: 14 TABLET | Refills: 0 | Status: SHIPPED | OUTPATIENT
Start: 2023-09-24 | End: 2023-09-26 | Stop reason: SDUPTHER

## 2023-09-26 ENCOUNTER — TELEPHONE (OUTPATIENT)
Dept: ORTHOPEDICS | Facility: CLINIC | Age: 58
End: 2023-09-26
Payer: MEDICAID

## 2023-09-26 ENCOUNTER — PATIENT MESSAGE (OUTPATIENT)
Dept: ORTHOPEDICS | Facility: CLINIC | Age: 58
End: 2023-09-26
Payer: MEDICAID

## 2023-09-26 DIAGNOSIS — Z96.651 STATUS POST RIGHT KNEE REPLACEMENT: ICD-10-CM

## 2023-09-26 RX ORDER — OXYCODONE HYDROCHLORIDE 5 MG/1
5 TABLET ORAL EVERY 12 HOURS PRN
Qty: 14 TABLET | Refills: 0 | Status: SHIPPED | OUTPATIENT
Start: 2023-09-26 | End: 2023-10-18 | Stop reason: SDUPTHER

## 2023-09-26 NOTE — TELEPHONE ENCOUNTER
Patient communication     Attempt to reach patient , left a voicemail stating I was trying to set an appointment left phone number to call back .

## 2023-10-04 ENCOUNTER — TELEPHONE (OUTPATIENT)
Dept: ORTHOPEDICS | Facility: CLINIC | Age: 58
End: 2023-10-04
Payer: MEDICAID

## 2023-10-16 DIAGNOSIS — E87.20 METABOLIC ACIDOSIS: ICD-10-CM

## 2023-10-16 RX ORDER — SODIUM BICARBONATE 650 MG/1
650 TABLET ORAL 2 TIMES DAILY
Qty: 60 TABLET | Refills: 2 | Status: SHIPPED | OUTPATIENT
Start: 2023-10-16

## 2023-10-16 NOTE — TELEPHONE ENCOUNTER
Refilled.  Please schedule an in person appointment for DM, establish care with a new PCP.  Thank you!

## 2023-10-18 ENCOUNTER — TELEPHONE (OUTPATIENT)
Dept: NEUROLOGY | Facility: CLINIC | Age: 58
End: 2023-10-18
Payer: MEDICAID

## 2023-10-18 DIAGNOSIS — Z96.651 STATUS POST RIGHT KNEE REPLACEMENT: ICD-10-CM

## 2023-10-18 NOTE — TELEPHONE ENCOUNTER
----- Message from Melissa Stoll sent at 10/18/2023 10:36 AM CDT -----  Regarding: Refill  Contact: [phone]  Type:  RX Refill Request        Who Called:  DEMARCO DELACRUZ [[id number]]        Refill or New Rx:  Refill        RX Name and Strength:  traMADoL (ULTRAM) 50 mg tablet        Preferred Pharmacy with phone number:      [facility][id number] - [address]  [address] [place] [id number]  Phone: [phone] Fax: [phone]          Local or Mail Order:  Local        Would the patient rather a call back or a response via [facility]?        Best Call Back Number:  [phone]        Additional Information:

## 2023-10-19 ENCOUNTER — TELEPHONE (OUTPATIENT)
Dept: NEUROLOGY | Facility: CLINIC | Age: 58
End: 2023-10-19
Payer: MEDICAID

## 2023-10-19 RX ORDER — OXYCODONE HYDROCHLORIDE 5 MG/1
5 TABLET ORAL EVERY 12 HOURS PRN
Qty: 14 TABLET | Refills: 0 | Status: SHIPPED | OUTPATIENT
Start: 2023-10-19 | End: 2023-12-06

## 2023-10-19 NOTE — TELEPHONE ENCOUNTER
----- Message from Nicolette Crum sent at 10/19/2023  1:26 PM CDT -----  Regarding: Refill  Contact: Pt [phone]  Pt is calling to refill rx: traMADoL (ULTRAM) 50 mg tablet 180 tablet please call     [facility] [address]  [facility] [address]  Phone: [phone] Fax: [phone]

## 2023-10-20 DIAGNOSIS — Z96.651 STATUS POST RIGHT KNEE REPLACEMENT: ICD-10-CM

## 2023-10-20 RX ORDER — OXYCODONE HYDROCHLORIDE 5 MG/1
5 TABLET ORAL EVERY 12 HOURS PRN
Qty: 14 TABLET | Refills: 0 | OUTPATIENT
Start: 2023-10-20

## 2023-10-23 ENCOUNTER — PATIENT MESSAGE (OUTPATIENT)
Dept: ADMINISTRATIVE | Facility: OTHER | Age: 58
End: 2023-10-23
Payer: MEDICAID

## 2023-11-01 ENCOUNTER — OFFICE VISIT (OUTPATIENT)
Dept: ORTHOPEDICS | Facility: CLINIC | Age: 58
End: 2023-11-01
Payer: MEDICAID

## 2023-11-01 DIAGNOSIS — M18.11 PRIMARY OSTEOARTHRITIS OF FIRST CARPOMETACARPAL JOINT OF RIGHT HAND: ICD-10-CM

## 2023-11-01 DIAGNOSIS — M25.531 RIGHT WRIST PAIN: ICD-10-CM

## 2023-11-01 DIAGNOSIS — M65.4 TENOSYNOVITIS, DE QUERVAIN: Primary | ICD-10-CM

## 2023-11-01 PROCEDURE — 99999PBSHW PR PBB SHADOW TECHNICAL ONLY FILED TO HB: ICD-10-PCS | Mod: PBBFAC,,,

## 2023-11-01 PROCEDURE — 20550 NJX 1 TENDON SHEATH/LIGAMENT: CPT | Mod: PBBFAC,RT | Performed by: ORTHOPAEDIC SURGERY

## 2023-11-01 PROCEDURE — 1159F PR MEDICATION LIST DOCUMENTED IN MEDICAL RECORD: ICD-10-PCS | Mod: CPTII,,, | Performed by: ORTHOPAEDIC SURGERY

## 2023-11-01 PROCEDURE — 3044F PR MOST RECENT HEMOGLOBIN A1C LEVEL <7.0%: ICD-10-PCS | Mod: CPTII,,, | Performed by: ORTHOPAEDIC SURGERY

## 2023-11-01 PROCEDURE — 99214 PR OFFICE/OUTPT VISIT, EST, LEVL IV, 30-39 MIN: ICD-10-PCS | Mod: S$PBB,25,, | Performed by: ORTHOPAEDIC SURGERY

## 2023-11-01 PROCEDURE — 99999PBSHW PR PBB SHADOW TECHNICAL ONLY FILED TO HB: Mod: PBBFAC,,,

## 2023-11-01 PROCEDURE — 3044F HG A1C LEVEL LT 7.0%: CPT | Mod: CPTII,,, | Performed by: ORTHOPAEDIC SURGERY

## 2023-11-01 PROCEDURE — 99999 PR PBB SHADOW E&M-EST. PATIENT-LVL III: CPT | Mod: PBBFAC,,, | Performed by: ORTHOPAEDIC SURGERY

## 2023-11-01 PROCEDURE — 99999 PR PBB SHADOW E&M-EST. PATIENT-LVL III: ICD-10-PCS | Mod: PBBFAC,,, | Performed by: ORTHOPAEDIC SURGERY

## 2023-11-01 PROCEDURE — 99214 OFFICE O/P EST MOD 30 MIN: CPT | Mod: S$PBB,25,, | Performed by: ORTHOPAEDIC SURGERY

## 2023-11-01 PROCEDURE — 1160F RVW MEDS BY RX/DR IN RCRD: CPT | Mod: CPTII,,, | Performed by: ORTHOPAEDIC SURGERY

## 2023-11-01 PROCEDURE — 1160F PR REVIEW ALL MEDS BY PRESCRIBER/CLIN PHARMACIST DOCUMENTED: ICD-10-PCS | Mod: CPTII,,, | Performed by: ORTHOPAEDIC SURGERY

## 2023-11-01 PROCEDURE — 99213 OFFICE O/P EST LOW 20 MIN: CPT | Mod: PBBFAC,25 | Performed by: ORTHOPAEDIC SURGERY

## 2023-11-01 PROCEDURE — 20550 TENDON SHEATH: ICD-10-PCS | Mod: S$PBB,RT,, | Performed by: ORTHOPAEDIC SURGERY

## 2023-11-01 PROCEDURE — 1159F MED LIST DOCD IN RCRD: CPT | Mod: CPTII,,, | Performed by: ORTHOPAEDIC SURGERY

## 2023-11-01 RX ADMIN — METHYLPREDNISOLONE ACETATE 40 MG: 40 INJECTION, SUSPENSION INTRALESIONAL; INTRAMUSCULAR; INTRASYNOVIAL; SOFT TISSUE at 10:11

## 2023-11-01 NOTE — PROGRESS NOTES
[facility]  History & Physical  Orthopedics    SUBJECTIVE:        Chief Complaint:   Chief Complaint   Patient presents with    Right Hand - Pain, Swelling       History of Present Illness:  Patient is a 57 y.o. right hand dominant female who presents today with complaints of right wrist pain.  Patient states that she bumped her wrist approximately 6 weeks ago and started to experience some pain in the radial aspect of her right wrist.  Patient states that the pain gets worse with certain movements of her thumb and wrist.  Is not worse at any particular time of the day it is aggravated by motions.  Patient states that  Tylenol ibuprofen tends to make it better.  At rest it does not tend to make it worse.       The patient denies any fevers, chills, N/V, D/C and presents for evaluation.       Past Medical History:   Diagnosis Date    Arthritis     Diabetes mellitus     Diabetes mellitus, type 2     GERD (gastroesophageal reflux disease)     HLD (hyperlipidemia)     Hypertension      Past Surgical History:   Procedure Laterality Date    BARIATRIC SURGERY  2022    Gastric sleeve     SECTION  1989    COLONOSCOPY N/A 2017    Procedure: COLONOSCOPY;  Surgeon: Evelin Arceo MD;  Location: [address]);  Service: Endoscopy;  Laterality: N/A;  2 nd floor d/t BMI > 50 kg/m3    KNEE ARTHROSCOPY Right     TONSILLECTOMY      TOTAL KNEE ARTHROPLASTY Left 2022    Procedure: ARTHROPLASTY, KNEE, TOTAL: LEFT: DEPUY - ATTUNE;  Surgeon: Jc Padgett III, MD;  Location: [facility];  Service: Orthopedics;  Laterality: Left;    TOTAL KNEE ARTHROPLASTY Right 2023    Procedure: ARTHROPLASTY, KNEE, TOTAL: RIGHT: DEPUY - ATTUNE;  Surgeon: Jc Padgett III, MD;  Location: [facility];  Service: Orthopedics;  Laterality: Right;  90 minutes    TUBAL LIGATION       Review of patient's allergies indicates:   Allergen Reactions    Ace inhibitors Edema and Swelling     Social  History     Social History Narrative    ** Merged History Encounter **         Pt lives alone.  Has a college-age son who comes and goes.  Has 3 other adult children.       Family History   Problem Relation Age of Onset    Cancer Mother     Breast cancer Mother 65    Diabetes Mother     Colon polyps Mother     Hypertension Mother     Diabetes Father     Breast cancer Sister 56    Cancer Sister     No Known Problems Brother     Arthritis Brother     No Known Problems Daughter     Kidney disease Son     Diabetes Son     Breast cancer Maternal Aunt     Diabetes Paternal Uncle     Breast cancer Maternal Grandmother     Colon cancer Maternal Grandfather     Diabetes Paternal Grandmother     Heart disease Paternal Grandmother     No Known Problems Paternal Grandfather     Esophageal cancer Neg Hx     Irritable bowel syndrome Neg Hx     Rectal cancer Neg Hx     Stomach cancer Neg Hx     Ulcerative colitis Neg Hx     Celiac disease Neg Hx     Crohn's disease Neg Hx     Amblyopia Neg Hx     Blindness Neg Hx     Cataracts Neg Hx     Glaucoma Neg Hx     Macular degeneration Neg Hx     Retinal detachment Neg Hx     Strabismus Neg Hx     Stroke Neg Hx     Thyroid disease Neg Hx          Current Outpatient Medications:     acetaminophen (TYLENOL) 650 MG TbSR, Take 1 tablet (650 mg total) by mouth every 8 (eight) hours., Disp: 120 tablet, Rfl: 0    amLODIPine (NORVASC) 10 MG tablet, Take 1 tablet (10 mg total) by mouth once daily., Disp: 90 tablet, Rfl: 3    apixaban (ELIQUIS) 2.5 mg Tab, Take 1 tablet (2.5 mg total) by mouth 2 (two) times daily., Disp: 60 tablet, Rfl: 0    aspirin (ECOTRIN) 81 MG EC tablet, Take 1 tablet (81 mg total) by mouth once daily., Disp: 90 tablet, Rfl: 3    atorvastatin (LIPITOR) 40 MG tablet, Take 1 tablet (40 mg total) by mouth once daily., Disp: 90 tablet, Rfl: 3    b complex vitamins capsule, Take 1 capsule by mouth once daily., Disp: , Rfl:     cefadroxil (DURICEF) 500 MG Cap, Take 1 capsule (500 mg  total) by mouth every 12 (twelve) hours., Disp: 14 capsule, Rfl: 0    celecoxib (CELEBREX) 200 MG capsule, Take 1 capsule (200 mg total) by mouth once daily., Disp: 30 capsule, Rfl: 0    diclofenac sodium (VOLTAREN) 1 % Gel, Apply 2 g topically 4 (four) times daily as needed (knee pain)., Disp: 300 g, Rfl: 3    docusate sodium (COLACE) 100 MG capsule, Take 1 capsule (100 mg total) by mouth 2 (two) times daily as needed for Constipation., Disp: 60 capsule, Rfl: 0    docusate sodium (COLACE) 100 MG capsule, Take 1 capsule (100 mg total) by mouth 2 (two) times daily., Disp: 60 capsule, Rfl: 0    ergocalciferol (VITAMIN D2) 50,000 unit Cap, Take 1 capsule (50,000 Units total) by mouth every 7 days., Disp: 12 capsule, Rfl: 3    flash glucose scanning reader (FREESTYLE HEENA 14 DAY READER) Misc, Check blood glucose before meals and nightly., Disp: 1 each, Rfl: 0    flash glucose sensor (FREESTYLE HEENA 14 DAY SENSOR) Kit, Apply to skin for 14 days.  Check blood glucose before meals and nightly., Disp: 1 kit, Rfl: 11    LIDOcaine-prilocaine (EMLA) cream, Apply topically every 6 to 8 hours as needed. Apply to area of foot pain as needed every 6-8 hrs, Disp: 30 g, Rfl: 10    nut.tx.gluc intol,lf,soy-fiber (BOOST GLUCOSE CONTROL) 0.06-1.1 gram-kcal/mL Liqd, Take 8 ml by mouth with meals for 2 weeks, Disp: 237 mL, Rfl: 0    omeprazole (PRILOSEC) 40 MG capsule, Take 40 mg by mouth., Disp: , Rfl:     oxyCODONE (ROXICODONE) 5 MG immediate release tablet, Take 1 tablet (5 mg total) by mouth every 12 (twelve) hours as needed for Pain., Disp: 14 tablet, Rfl: 0    pantoprazole (PROTONIX) 40 MG tablet, Take 1 tablet (40 mg total) by mouth daily as needed (acid reflux)., Disp: 90 tablet, Rfl: 3    pantoprazole (PROTONIX) 40 MG tablet, Take 1 tablet (40 mg total) by mouth once daily., Disp: 30 tablet, Rfl: 0    phentermine (ADIPEX-P) 37.5 mg tablet, Take 37.5 mg by mouth., Disp: , Rfl:     pregabalin (LYRICA) 75 MG capsule, Take 1  capsule (75 mg total) by mouth 2 (two) times daily., Disp: 60 capsule, Rfl: 1    semaglutide (OZEMPIC) 0.25 mg or 0.5 mg (2 mg/3 mL) pen injector, Inject 0.5 mg into the skin every 7 (seven) days, Disp: 4 each, Rfl: 5    sodium bicarbonate 650 MG tablet, Take 1 tablet (650 mg total) by mouth 2 (two) times daily., Disp: 60 tablet, Rfl: 2    terbinafine HCL (LAMISIL) 250 mg tablet, Take 1 tablet (250 mg total) by mouth once daily. Avoid alcohol while on medication, Disp: 30 tablet, Rfl: 0    traMADoL (ULTRAM) 50 mg tablet, TAKE 1 TO 2 TABLETS(50  MG) BY MOUTH THREE TIMES DAILY AS NEEDED FOR PAIN, Disp: 180 tablet, Rfl: 0    ROS    Review of Systems:  Constitutional: no fever or chills  Eyes: no visual changes  ENT: no nasal congestion or sore throat  Respiratory: no cough or shortness of breath  Cardiovascular: no chest pain  Gastrointestinal: no nausea or vomiting, tolerating diet  Musculoskeletal: pain and soreness    OBJECTIVE:      Vital Signs (Most Recent):  There were no vitals filed for this visit.  There is no height or weight on file to calculate BMI.    Physical Exam    Physical Exam:  Constitutional: The patient appears well-developed and well-nourished. No distress.   Head: Normocephalic and atraumatic.   Nose: Nose normal.   Eyes: Conjunctivae and EOM are normal.   Neck: No tracheal deviation present.   Cardiovascular: Normal rate and intact distal pulses.    Pulmonary/Chest: Effort normal. No respiratory distress.   Abdominal: There is no guarding.   Lymphatic: Negative for adenopathy   Neurological: The patient is alert.   Psychiatric: The patient has a normal mood and affect.     Bilateral Hand/Wrist Examination:    Observation/Inspection:  Swelling  swelling over the radial aspect of the right wrist on   Deformity  none  Discoloration  none     Scars   none    Atrophy  none    HAND/WRIST EXAMINATION:  Finkelstein's Test   + the right  WHAT Test    Neg  Snuff box tenderness   Neg  Esquivel's  Test    Neg  Hook of Hamate Tenderness  Neg  CMC grind    Positive right  Circumduction test   Neg  TFCC Compression Test  Neg    ROM hand/wrist/elbow full    Neurovascular Exam:  Digits WWP, brisk CR < 3s throughout  NVI motor/LTS to M/R/U nerves, radial pulse 2+  2+ biceps and brachioradialis reflexes  Tinel's Test - Carpal Tunnel  Neg  Tinel's Test - Cubital Tunnel  Neg  Phalen's Test    Neg  Median Nerve Compression Test Neg    Diagnostic Results:    X-rays AP, lateral and oblique right hand taken today are independently reviewed by me and shows Eaton stage II basilar thumb and IP joint arthritis.       ASSESSMENT/PLAN:      57 y.o. yo female with de Quervain tenosynovitis and thumb arthritis  Plan:    - discussed with the patient extensively her diagnosis which is de Quervain tenosynovitis that the secondary to overuse injury.  Discussed with her the natural progression of the disease and the treatment options available to her  - performed a corticosteroid injection for pain relief, we will provide the patient with a thumb spica splint as well  - Patient to perform virtual visit in 4-6 weeks to follow her progression and determine how her pain is doing    -I have offered her a selective injection. I have explained the risks, benefits, and alternatives of the procedure in detail.  The patient voices understanding and all questions have been answered. The patient agrees to proceed as planned. So after a sterile prep of the skin in the normal fashion the right 1st extensor compartment was injected using a 25 gauge needle with a combination of 1cc 1% plain lidocaine and 40 mg of methylprednisolone.  The patient is cautioned and immediate relief of pain is secondary to the local anesthetic and will be temporary.  After the anesthetic wears off there may be a increase in pain that may last for a few hours or a few days and they should use ice to help alleviate this flair up of pain. Patient tolerated the procedure  well.      The patient's pathophysiology was explained in detail with reference to x-rays, models, other visual aids as appropriate.  Treatment options were discussed in detail.  Questions were invited and answered to the patient's satisfaction. I reviewed Primary care , and other specialty's notes to better coordinate patient's care.          Shanice Roldan MD    Please be aware that this note has been generated with the assistance of [employer] voice-to-text.  Please excuse any spelling or grammatical errors.

## 2023-11-09 RX ORDER — TRAMADOL HYDROCHLORIDE 50 MG/1
50-100 TABLET ORAL 3 TIMES DAILY PRN
Qty: 180 TABLET | Refills: 0 | Status: SHIPPED | OUTPATIENT
Start: 2023-11-10 | End: 2023-12-06 | Stop reason: SDUPTHER

## 2023-11-15 DIAGNOSIS — K21.00 GASTROESOPHAGEAL REFLUX DISEASE WITH ESOPHAGITIS: ICD-10-CM

## 2023-11-15 DIAGNOSIS — E78.2 MIXED HYPERLIPIDEMIA: ICD-10-CM

## 2023-11-15 DIAGNOSIS — I10 ESSENTIAL HYPERTENSION: ICD-10-CM

## 2023-11-16 DIAGNOSIS — M17.11 PRIMARY OSTEOARTHRITIS OF RIGHT KNEE: ICD-10-CM

## 2023-11-16 RX ORDER — AMLODIPINE BESYLATE 10 MG/1
10 TABLET ORAL DAILY
Qty: 90 TABLET | Refills: 0 | Status: SHIPPED | OUTPATIENT
Start: 2023-11-16 | End: 2023-12-21 | Stop reason: SDUPTHER

## 2023-11-16 RX ORDER — ATORVASTATIN CALCIUM 40 MG/1
40 TABLET, FILM COATED ORAL DAILY
Qty: 90 TABLET | Refills: 0 | Status: SHIPPED | OUTPATIENT
Start: 2023-11-16 | End: 2023-11-30 | Stop reason: SDUPTHER

## 2023-11-17 RX ORDER — LIDOCAINE AND PRILOCAINE 25; 25 MG/G; MG/G
CREAM TOPICAL
Qty: 30 G | Refills: 10 | Status: SHIPPED | OUTPATIENT
Start: 2023-11-17 | End: 2023-11-30 | Stop reason: SDUPTHER

## 2023-11-17 RX ORDER — DICLOFENAC SODIUM 10 MG/G
2 GEL TOPICAL 4 TIMES DAILY PRN
Qty: 300 G | Refills: 3 | Status: SHIPPED | OUTPATIENT
Start: 2023-11-17 | End: 2023-11-30 | Stop reason: SDUPTHER

## 2023-11-20 ENCOUNTER — OFFICE VISIT (OUTPATIENT)
Dept: ORTHOPEDICS | Facility: CLINIC | Age: 58
End: 2023-11-20
Payer: MEDICAID

## 2023-11-20 DIAGNOSIS — M18.11 PRIMARY OSTEOARTHRITIS OF FIRST CARPOMETACARPAL JOINT OF RIGHT HAND: Primary | ICD-10-CM

## 2023-11-20 PROCEDURE — 99499 UNLISTED E&M SERVICE: CPT | Mod: 95,,, | Performed by: ORTHOPAEDIC SURGERY

## 2023-11-20 PROCEDURE — 99499 NO LOS: ICD-10-PCS | Mod: 95,,, | Performed by: ORTHOPAEDIC SURGERY

## 2023-11-20 RX ORDER — PANTOPRAZOLE SODIUM 40 MG/1
40 TABLET, DELAYED RELEASE ORAL DAILY PRN
Qty: 90 TABLET | Refills: 3 | Status: SHIPPED | OUTPATIENT
Start: 2023-11-20 | End: 2023-12-21 | Stop reason: SDUPTHER

## 2023-11-22 RX ORDER — METHYLPREDNISOLONE ACETATE 40 MG/ML
40 INJECTION, SUSPENSION INTRA-ARTICULAR; INTRALESIONAL; INTRAMUSCULAR; SOFT TISSUE
Status: DISCONTINUED | OUTPATIENT
Start: 2023-11-01 | End: 2023-11-22 | Stop reason: HOSPADM

## 2023-11-22 NOTE — PROCEDURES
Tendon She[facility]    Date/Time: 11/1/2023 10:30 AM    Performed by: Shanice Roldan MD  Authorized by: Shanice Roldan MD    Consent Done?:  Yes (Verbal)  Indications:  Pain  Prep: patient was prepped and draped in usual sterile fashion      Local anesthesia used?: Yes    Anesthesia:  Local infiltration  Local anesthetic:  Lidocaine 1% without epinephrine  Anesthetic total (ml):  1    Location:  Wrist  Site:  R first doral compartment  Needle size:  25 G  Approach:  Radial  Medications:  40 mg methylPREDNISolone acetate 40 mg/mL  Patient tolerance:  Patient tolerated the procedure well with no immediate complications

## 2023-11-30 DIAGNOSIS — E78.2 MIXED HYPERLIPIDEMIA: ICD-10-CM

## 2023-11-30 DIAGNOSIS — K21.00 GASTROESOPHAGEAL REFLUX DISEASE WITH ESOPHAGITIS: ICD-10-CM

## 2023-11-30 DIAGNOSIS — M17.11 PRIMARY OSTEOARTHRITIS OF RIGHT KNEE: ICD-10-CM

## 2023-11-30 RX ORDER — ATORVASTATIN CALCIUM 40 MG/1
40 TABLET, FILM COATED ORAL DAILY
Qty: 90 TABLET | Refills: 0 | Status: SHIPPED | OUTPATIENT
Start: 2023-11-30 | End: 2023-12-21 | Stop reason: SDUPTHER

## 2023-11-30 RX ORDER — DICLOFENAC SODIUM 10 MG/G
2 GEL TOPICAL 4 TIMES DAILY PRN
Qty: 300 G | Refills: 3 | Status: SHIPPED | OUTPATIENT
Start: 2023-11-30 | End: 2023-12-06

## 2023-11-30 RX ORDER — PANTOPRAZOLE SODIUM 40 MG/1
40 TABLET, DELAYED RELEASE ORAL DAILY PRN
Qty: 90 TABLET | Refills: 3 | Status: CANCELLED | OUTPATIENT
Start: 2023-11-30

## 2023-12-01 RX ORDER — LIDOCAINE AND PRILOCAINE 25; 25 MG/G; MG/G
CREAM TOPICAL
Qty: 30 G | Refills: 10 | Status: SHIPPED | OUTPATIENT
Start: 2023-12-01

## 2023-12-06 ENCOUNTER — OFFICE VISIT (OUTPATIENT)
Dept: PHYSICAL MEDICINE AND REHAB | Facility: CLINIC | Age: 58
End: 2023-12-06
Payer: MEDICAID

## 2023-12-06 VITALS — WEIGHT: 239.5 LBS | HEIGHT: 66 IN | BODY MASS INDEX: 38.49 KG/M2 | OXYGEN SATURATION: 99 %

## 2023-12-06 DIAGNOSIS — E66.9 OBESITY (BMI 30-39.9): ICD-10-CM

## 2023-12-06 DIAGNOSIS — Z96.651 STATUS POST TOTAL RIGHT KNEE REPLACEMENT: ICD-10-CM

## 2023-12-06 DIAGNOSIS — G89.29 CHRONIC BILATERAL LOW BACK PAIN WITHOUT SCIATICA: Primary | ICD-10-CM

## 2023-12-06 DIAGNOSIS — M43.16 SPONDYLOLISTHESIS OF LUMBAR REGION: ICD-10-CM

## 2023-12-06 DIAGNOSIS — Z96.652 STATUS POST TOTAL LEFT KNEE REPLACEMENT: ICD-10-CM

## 2023-12-06 DIAGNOSIS — M47.816 LUMBAR FACET ARTHROPATHY: ICD-10-CM

## 2023-12-06 DIAGNOSIS — M54.50 CHRONIC BILATERAL LOW BACK PAIN WITHOUT SCIATICA: Primary | ICD-10-CM

## 2023-12-06 PROCEDURE — 1159F PR MEDICATION LIST DOCUMENTED IN MEDICAL RECORD: ICD-10-PCS | Mod: CPTII,,, | Performed by: PHYSICAL MEDICINE & REHABILITATION

## 2023-12-06 PROCEDURE — 1159F MED LIST DOCD IN RCRD: CPT | Mod: CPTII,,, | Performed by: PHYSICAL MEDICINE & REHABILITATION

## 2023-12-06 PROCEDURE — 99214 PR OFFICE/OUTPT VISIT, EST, LEVL IV, 30-39 MIN: ICD-10-PCS | Mod: S$PBB,,, | Performed by: PHYSICAL MEDICINE & REHABILITATION

## 2023-12-06 PROCEDURE — 3044F PR MOST RECENT HEMOGLOBIN A1C LEVEL <7.0%: ICD-10-PCS | Mod: CPTII,,, | Performed by: PHYSICAL MEDICINE & REHABILITATION

## 2023-12-06 PROCEDURE — 3008F PR BODY MASS INDEX (BMI) DOCUMENTED: ICD-10-PCS | Mod: CPTII,,, | Performed by: PHYSICAL MEDICINE & REHABILITATION

## 2023-12-06 PROCEDURE — 99213 OFFICE O/P EST LOW 20 MIN: CPT | Mod: PBBFAC | Performed by: PHYSICAL MEDICINE & REHABILITATION

## 2023-12-06 PROCEDURE — 99999 PR PBB SHADOW E&M-EST. PATIENT-LVL III: ICD-10-PCS | Mod: PBBFAC,,, | Performed by: PHYSICAL MEDICINE & REHABILITATION

## 2023-12-06 PROCEDURE — 99214 OFFICE O/P EST MOD 30 MIN: CPT | Mod: S$PBB,,, | Performed by: PHYSICAL MEDICINE & REHABILITATION

## 2023-12-06 PROCEDURE — 99999 PR PBB SHADOW E&M-EST. PATIENT-LVL III: CPT | Mod: PBBFAC,,, | Performed by: PHYSICAL MEDICINE & REHABILITATION

## 2023-12-06 PROCEDURE — 3044F HG A1C LEVEL LT 7.0%: CPT | Mod: CPTII,,, | Performed by: PHYSICAL MEDICINE & REHABILITATION

## 2023-12-06 PROCEDURE — 3008F BODY MASS INDEX DOCD: CPT | Mod: CPTII,,, | Performed by: PHYSICAL MEDICINE & REHABILITATION

## 2023-12-06 RX ORDER — DULOXETIN HYDROCHLORIDE 30 MG/1
30 CAPSULE, DELAYED RELEASE ORAL DAILY
Qty: 30 CAPSULE | Refills: 3 | Status: SHIPPED | OUTPATIENT
Start: 2023-12-06 | End: 2024-04-04

## 2023-12-06 RX ORDER — TRAMADOL HYDROCHLORIDE 50 MG/1
50-100 TABLET ORAL 3 TIMES DAILY PRN
Qty: 180 TABLET | Refills: 0 | Status: SHIPPED | OUTPATIENT
Start: 2023-12-10 | End: 2024-01-09 | Stop reason: SDUPTHER

## 2023-12-06 RX ORDER — CELECOXIB 200 MG/1
200 CAPSULE ORAL DAILY
Qty: 30 CAPSULE | Refills: 3 | Status: SHIPPED | OUTPATIENT
Start: 2023-12-06 | End: 2024-03-27 | Stop reason: ALTCHOICE

## 2023-12-06 RX ORDER — TRAMADOL HYDROCHLORIDE 50 MG/1
50-100 TABLET ORAL 3 TIMES DAILY PRN
Qty: 180 TABLET | Refills: 0 | Status: SHIPPED | OUTPATIENT
Start: 2023-12-06 | End: 2023-12-06 | Stop reason: SDUPTHER
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
## 2023-12-07 ENCOUNTER — PATIENT OUTREACH (OUTPATIENT)
Dept: ADMINISTRATIVE | Facility: HOSPITAL | Age: 58
End: 2023-12-07
Payer: MEDICAID

## 2023-12-07 DIAGNOSIS — Z12.4 PAP SMEAR FOR CERVICAL CANCER SCREENING: ICD-10-CM

## 2023-12-07 DIAGNOSIS — E11.9 TYPE 2 DIABETES MELLITUS WITHOUT COMPLICATION, WITHOUT LONG-TERM CURRENT USE OF INSULIN: Primary | ICD-10-CM

## 2023-12-18 ENCOUNTER — TELEPHONE (OUTPATIENT)
Dept: PODIATRY | Facility: CLINIC | Age: 58
End: 2023-12-18
Payer: MEDICAID

## 2023-12-18 NOTE — TELEPHONE ENCOUNTER
Spoke to pt and discussed 1/19 surgery date. Also advised pt to contact their PCP to schedule an appointment to be cleared for surgery. Then advised pt that someone will call them the day before surgery between 3p-5p with the surgery arrival time and instructions. Pt verbalized understanding and call ended.

## 2023-12-21 ENCOUNTER — OFFICE VISIT (OUTPATIENT)
Dept: INTERNAL MEDICINE | Facility: CLINIC | Age: 58
End: 2023-12-21
Payer: MEDICAID

## 2023-12-21 ENCOUNTER — HOSPITAL ENCOUNTER (OUTPATIENT)
Dept: RADIOLOGY | Facility: OTHER | Age: 58
Discharge: HOME | End: 2023-12-21
Attending: STUDENT IN AN ORGANIZED HEALTH CARE EDUCATION/TRAINING PROGRAM
Payer: MEDICAID

## 2023-12-21 VITALS
WEIGHT: 231.25 LBS | HEIGHT: 66 IN | BODY MASS INDEX: 37.16 KG/M2 | HEART RATE: 95 BPM | DIASTOLIC BLOOD PRESSURE: 80 MMHG | OXYGEN SATURATION: 99 % | SYSTOLIC BLOOD PRESSURE: 142 MMHG

## 2023-12-21 DIAGNOSIS — D50.9 MICROCYTIC ANEMIA: Primary | ICD-10-CM

## 2023-12-21 DIAGNOSIS — Z01.818 PREOP EXAMINATION: ICD-10-CM

## 2023-12-21 DIAGNOSIS — I10 ESSENTIAL HYPERTENSION: ICD-10-CM

## 2023-12-21 DIAGNOSIS — J06.9 VIRAL URI: ICD-10-CM

## 2023-12-21 DIAGNOSIS — Z96.651 STATUS POST RIGHT KNEE REPLACEMENT: ICD-10-CM

## 2023-12-21 DIAGNOSIS — Z12.31 ENCOUNTER FOR SCREENING MAMMOGRAM FOR MALIGNANT NEOPLASM OF BREAST: ICD-10-CM

## 2023-12-21 DIAGNOSIS — E78.2 MIXED HYPERLIPIDEMIA: ICD-10-CM

## 2023-12-21 DIAGNOSIS — K21.00 GASTROESOPHAGEAL REFLUX DISEASE WITH ESOPHAGITIS: ICD-10-CM

## 2023-12-21 PROCEDURE — 3079F PR MOST RECENT DIASTOLIC BLOOD PRESSURE 80-89 MM HG: ICD-10-PCS | Mod: CPTII,,, | Performed by: STUDENT IN AN ORGANIZED HEALTH CARE EDUCATION/TRAINING PROGRAM

## 2023-12-21 PROCEDURE — 3008F BODY MASS INDEX DOCD: CPT | Mod: CPTII,,, | Performed by: STUDENT IN AN ORGANIZED HEALTH CARE EDUCATION/TRAINING PROGRAM

## 2023-12-21 PROCEDURE — 77067 MAMMO DIGITAL SCREENING BILAT WITH TOMO: ICD-10-PCS | Mod: 26,,, | Performed by: RADIOLOGY

## 2023-12-21 PROCEDURE — 3077F SYST BP >= 140 MM HG: CPT | Mod: CPTII,,, | Performed by: STUDENT IN AN ORGANIZED HEALTH CARE EDUCATION/TRAINING PROGRAM

## 2023-12-21 PROCEDURE — 3079F DIAST BP 80-89 MM HG: CPT | Mod: CPTII,,, | Performed by: STUDENT IN AN ORGANIZED HEALTH CARE EDUCATION/TRAINING PROGRAM

## 2023-12-21 PROCEDURE — 99999 PR PBB SHADOW E&M-EST. PATIENT-LVL IV: CPT | Mod: PBBFAC,,, | Performed by: STUDENT IN AN ORGANIZED HEALTH CARE EDUCATION/TRAINING PROGRAM

## 2023-12-21 PROCEDURE — 77067 SCR MAMMO BI INCL CAD: CPT | Mod: TC

## 2023-12-21 PROCEDURE — 3008F PR BODY MASS INDEX (BMI) DOCUMENTED: ICD-10-PCS | Mod: CPTII,,, | Performed by: STUDENT IN AN ORGANIZED HEALTH CARE EDUCATION/TRAINING PROGRAM

## 2023-12-21 PROCEDURE — 77067 SCR MAMMO BI INCL CAD: CPT | Mod: 26,,, | Performed by: RADIOLOGY

## 2023-12-21 PROCEDURE — 3077F PR MOST RECENT SYSTOLIC BLOOD PRESSURE >= 140 MM HG: ICD-10-PCS | Mod: CPTII,,, | Performed by: STUDENT IN AN ORGANIZED HEALTH CARE EDUCATION/TRAINING PROGRAM

## 2023-12-21 PROCEDURE — 3044F HG A1C LEVEL LT 7.0%: CPT | Mod: CPTII,,, | Performed by: STUDENT IN AN ORGANIZED HEALTH CARE EDUCATION/TRAINING PROGRAM

## 2023-12-21 PROCEDURE — 99999 PR PBB SHADOW E&M-EST. PATIENT-LVL IV: ICD-10-PCS | Mod: PBBFAC,,, | Performed by: STUDENT IN AN ORGANIZED HEALTH CARE EDUCATION/TRAINING PROGRAM

## 2023-12-21 PROCEDURE — 99214 OFFICE O/P EST MOD 30 MIN: CPT | Mod: PBBFAC | Performed by: STUDENT IN AN ORGANIZED HEALTH CARE EDUCATION/TRAINING PROGRAM

## 2023-12-21 PROCEDURE — 1159F PR MEDICATION LIST DOCUMENTED IN MEDICAL RECORD: ICD-10-PCS | Mod: CPTII,,, | Performed by: STUDENT IN AN ORGANIZED HEALTH CARE EDUCATION/TRAINING PROGRAM

## 2023-12-21 PROCEDURE — 3044F PR MOST RECENT HEMOGLOBIN A1C LEVEL <7.0%: ICD-10-PCS | Mod: CPTII,,, | Performed by: STUDENT IN AN ORGANIZED HEALTH CARE EDUCATION/TRAINING PROGRAM

## 2023-12-21 PROCEDURE — 77063 MAMMO DIGITAL SCREENING BILAT WITH TOMO: ICD-10-PCS | Mod: 26,,, | Performed by: RADIOLOGY

## 2023-12-21 PROCEDURE — 1159F MED LIST DOCD IN RCRD: CPT | Mod: CPTII,,, | Performed by: STUDENT IN AN ORGANIZED HEALTH CARE EDUCATION/TRAINING PROGRAM

## 2023-12-21 PROCEDURE — 99396 PR PREVENTIVE VISIT,EST,40-64: ICD-10-PCS | Mod: S$PBB,,, | Performed by: STUDENT IN AN ORGANIZED HEALTH CARE EDUCATION/TRAINING PROGRAM

## 2023-12-21 PROCEDURE — 77063 BREAST TOMOSYNTHESIS BI: CPT | Mod: 26,,, | Performed by: RADIOLOGY

## 2023-12-21 PROCEDURE — 99396 PREV VISIT EST AGE 40-64: CPT | Mod: S$PBB,,, | Performed by: STUDENT IN AN ORGANIZED HEALTH CARE EDUCATION/TRAINING PROGRAM

## 2023-12-21 RX ORDER — FLUTICASONE PROPIONATE 50 MCG
2 SPRAY, SUSPENSION (ML) NASAL DAILY
Qty: 16 G | Refills: 0 | Status: SHIPPED | OUTPATIENT
Start: 2023-12-21

## 2023-12-21 RX ORDER — ATORVASTATIN CALCIUM 40 MG/1
40 TABLET, FILM COATED ORAL DAILY
Qty: 90 TABLET | Refills: 3 | Status: SHIPPED | OUTPATIENT
Start: 2023-12-21 | End: 2024-12-20

## 2023-12-21 RX ORDER — PANTOPRAZOLE SODIUM 40 MG/1
40 TABLET, DELAYED RELEASE ORAL DAILY
Qty: 90 TABLET | Refills: 3 | Status: SHIPPED | OUTPATIENT
Start: 2023-12-21 | End: 2024-12-15

## 2023-12-21 RX ORDER — AMLODIPINE BESYLATE 10 MG/1
10 TABLET ORAL DAILY
Qty: 90 TABLET | Refills: 3 | Status: SHIPPED | OUTPATIENT
Start: 2023-12-21

## 2023-12-21 NOTE — PROGRESS NOTES
[facility]    Subjective:     Patient ID: Yayo Carver is a 58 y.o. female.  Chief Complaint:  [facility] care  HPI:  Patient is a 58 y.o. female who presents to [facility].  She has type 2 diabetes which is well controlled following sleeve gastrectomy.  She was hypertension which is well controlled on amlodipine.  She has knee pain for which he takes Celebrex and tramadol as needed.  She is aware of the risk of dependence with tramadol and uses this sparingly.  She has acid reflux for which she takes Protonix.  He takes aspirin and Lipitor for primary prevention of ASCVD in the setting of type 2 diabetes.  She was experiencing symptoms of a head cold today.  She reports headache, sore throat, and sinus congestion.  She has been taking DayQuil and NyQuil and states he is have not provided much relief.  She denies fever    Current Outpatient Medications   Medication Instructions    acetaminophen (TYLENOL) 650 mg, Oral, Every 8 hours    amLODIPine (NORVASC) 10 mg, Oral, Daily    aspirin (ECOTRIN) 81 mg, Oral, Daily    atorvastatin (LIPITOR) 40 mg, Oral, Daily    b complex vitamins capsule 1 capsule, Oral, Daily    celecoxib (CELEBREX) 200 mg, Oral, Daily    DULoxetine (CYMBALTA) 30 mg, Oral, Daily    flash glucose sensor (FREESTYLE HEENA 14 DAY SENSOR) Kit Apply to skin for 14 days.  Check blood glucose before meals and nightly.    fluticasone propionate (FLONASE) 50 mcg/actuation nasal spray Instill 2 sprays (100 mcg total) by Each Nostril route once daily.    LIDOcaine-prilocaine (EMLA) cream Topical (Top), Every 6-8 hours PRN, Apply to area of foot pain as needed every 6-8 hrs    nut.tx.gluc intol,lf,soy-fiber (BOOST GLUCOSE CONTROL) 0.06-1.1 gram-kcal/mL Liqd Take 8 ml by mouth with meals for 2 weeks    pantoprazole (PROTONIX) 40 mg, Oral, Daily    semaglutide (OZEMPIC) 0.25 mg or 0.5 mg (2 mg/3 mL) pen injector Inject 0.5 mg into the skin every 7 (seven) days    sodium bicarbonate 650  "mg, Oral, 2 times daily    terbinafine HCL (LAMISIL) 250 mg, Oral, Daily, Avoid alcohol while on medication    traMADoL (ULTRAM)  mg, Oral, 3 times daily PRN    VITAMIN D2 50,000 Units, Oral, Every 7 days       Objective:        Body mass index is 37.33 kg/m².  Vitals:    12/21/23 1458   BP: (!) 142/80   Pulse: 95   SpO2: 99%   Weight: 104.9 kg (231 lb 4.2 oz)   Height: 5' 6" (1.676 m)   PainSc: 0-No pain     Physical Exam  Constitutional:       Appearance: Normal appearance. She is not ill-appearing.   Cardiovascular:      Rate and Rhythm: Normal rate.      Heart sounds: No murmur heard.  Pulmonary:      Effort: Pulmonary effort is normal.   Abdominal:      General: Abdomen is flat.      Palpations: Abdomen is soft.   Musculoskeletal:      Right lower leg: No edema.      Left lower leg: No edema.   Lymphadenopathy:      Cervical: Cervical adenopathy present.   Neurological:      Mental Status: She is alert.           Assessment:         1. Microcytic anemia    2. Status post right knee replacement    3. Essential hypertension    4. Mixed hyperlipidemia    5. Gastroesophageal reflux disease with esophagitis          Plan:   Establishing care stay.  Was previously under the care of Dr. Akins ([facility]).     1. Status post right knee replacement  Continue follow up with Orthopedic surgery.  Continue pain medicines p.r.n.    2. Essential hypertension  Close to goal today.  Patient is suffering from acute URI.  We will continue to monitor.  No medication changes needed at this time  - amLODIPine (NORVASC) 10 MG tablet; Take 1 tablet (10 mg total) by mouth once daily.  Dispense: 90 tablet; Refill: 3  - Comprehensive Metabolic Panel; Future    3. Mixed hyperlipidemia  Refills statin For primary prevention of ASCVD and type 2 diabetes  - atorvastatin (LIPITOR) 40 MG tablet; Take 1 tablet (40 mg total) by mouth once daily.  Dispense: 90 tablet; Refill: 3    4. Gastroesophageal reflux disease with esophagitis  - " pantoprazole (PROTONIX) 40 MG tablet; Take 1 tablet (40 mg total) by mouth once daily.  Dispense: 90 tablet; Refill: 3    5. Microcytic anemia  History of a stable microcytosis with borderline anemia and increased RBC count.  Possibly thalassemia trait.  Reports she is up-to-date on colonoscopy.  Denies any other bleeding.  - CBC W/ AUTO DIFFERENTIAL; Future  - FERRITIN; Future  - IRON AND TIBC; Future    6. Preop examination  Repeat CBC, CMP.  Patient has very recent EKG which showed normal sinus rhythm.  She does not have heart failure, ischemic heart disease, or cerebrovascular disease.  She has not on insulin and does not have significant CKD therefore assuming no significant derangements on labs based on the revised cardiac risk index calculator she is low risk for surgery and can proceed to surgery without further testing.  She is able to achieve 4 Mets.  States she is able to walk 18 blocks before having to stop.    7. Viral URI  Continue OTC therapies.  No indication for antibiotics as this is likely viral and self-limited.    History of metabolic acidosis  It does not appear the patient will need bicarb moving forward.  Advised her to trial off of this.  will obtain blood work in 2 weeks to assess.    Tests to Keep You Healthy    Mammogram: SCHEDULED  Eye Exam: DUE  Colon Cancer Screening: Met on 6/27/2017  Cervical Cancer Screening: Met on 2/25/2019  Last Blood Pressure <= 139/89 (12/21/2023): NO  Last HbA1c < 8 (06/06/2023): Yes    Follow up in about 3 months (around 3/21/2024). or sooner prn (as needed)        Jonathon Chahal  [facility]  [facility] [address] [address]  Phone [phone]  Fax [phone]      This note is dictated using the [employer] word recognition program. It may contain word recognition mistakes or wrong word substitutions that were missed on review.

## 2023-12-31 NOTE — PT/OT/SLP PROGRESS
Problem: Pain  Goal: #Acceptable pain level achieved/maintained at rest using NRS/Faces  Description: This goal is used for patients who can self-report.  Acceptable means the level is at or below the identified comfort/function goal.  Outcome: Outcome Met, Continue evaluating goal progress toward completion  Goal: # Acceptable pain level achieved/maintained at rest using NRS/Faces without oversedation (opioid naive or PCA/Epidural infusion)  Description: This goal is used if Opioid-naïve or on PCA/Epidural Infusion.  Outcome: Outcome Met, Continue evaluating goal progress toward completion  Goal: # Acceptable pain level achieved/maintained with activity using NRS/Faces  Description: This goal is used for patients who can self-report and are not achieving acceptable pain control during activity.  Outcome: Outcome Met, Continue evaluating goal progress toward completion  Goal: Acceptable pain/comfort level is achieved/maintained at rest (based on Pain Behaviors Scale)  Description: This goal is used for patients who are not able to self-report pain and are assessed for pain using the Pain Behaviors Scale  Outcome: Outcome Met, Continue evaluating goal progress toward completion  Goal: # Verbalizes understanding of pain management  Description: Documented in Patient Education Activity  Outcome: Outcome Met, Continue evaluating goal progress toward completion     Problem: Activity Intolerance  Goal: # Functional status is maintained or returned to baseline  Outcome: Outcome Met, Continue evaluating goal progress toward completion  Goal: # Tolerates activity for d/c setting with no clinical problems  Outcome: Outcome Met, Continue evaluating goal progress toward completion      Occupational Therapy   Treatment and Discharge Note    Name: Yayo Carver  MRN: [id number]  Admitting Diagnosis:  Primary osteoarthritis of right knee  1 Day Post-Op    Recommendations:     Discharge Recommendations: home  Discharge Equipment Recommendations:  none  Barriers to discharge:  None    Assessment:     Yayo Carver is a 57 y.o. female with a medical diagnosis of Primary osteoarthritis of right knee.  She presents with R TKA. Performance deficits affecting function are impaired functional mobility, impaired self care skills, orthopedic precautions. Pt was able to perform Sit <> Stand Transfer with supervision with rolling walker Bed <> Chair Transfer using Stand Pivot technique with supervision with rolling walker  Toilet Transfer Stand Pivot technique with supervision with rolling walker.  Able to perform UB/LB dressing c modified independence  Educated pt on bathing, car transfers, and cryotherapy.       Rehab Prognosis:  Good; patient would benefit from acute skilled OT services to address these deficits and reach maximum level of function.       Plan:     Patient to be seen daily to address the above listed problems via self-care/home management, therapeutic activities, therapeutic exercises  Plan of Care Expires: 06/27/23  Plan of Care Reviewed with: patient    Subjective     Chief Complaint: R TKA  Patient/Family Comments/goals: I'm ready  Pain/Comfort:  Pain Rating 1: 8/10    Objective:     Communicated with: RN prior to session.  Patient found up in chair with cryotherapy, FCD upon OT entry to room.    General Precautions: Standard, fall    Orthopedic Precautions:RLE weight bearing as tolerated  Braces: N/A  Respiratory Status: Room air     Occupational Performance:     Functional Mobility/Transfers:  Patient completed Sit <> Stand Transfer with supervision  with  rolling walker   Patient completed Toilet Transfer Stand Pivot technique with supervision with  rolling walker and bedside  commode  Functional Mobility: Pt was able to walk to bathroom c S and RW.    Activities of Daily Living:  Grooming: modified independence to wash hands and brush teeth while standing at sink c RW.  Upper Body Dressing: modified independence to don shirt.  Lower Body Dressing: modified independence to don underwear, shorts, socks, and shoes.  Toileting: modified independence for toilet hygiene using BSC over toilet.      [facility] 6 Click ADL: 24    Patient left up in chair with all lines intact, call button in reach, and RN notified    GOALS:   Multidisciplinary Problems       Occupational Therapy Goals          Problem: Occupational Therapy    Goal Priority Disciplines Outcome Interventions   Occupational Therapy Goal     OT, PT/OT Ongoing, Progressing    Description: Goals to be met by: 6/27/23     Patient will increase functional independence with ADLs by performing:    UE Dressing with Supervision.  LE Dressing with Supervision.  Grooming while standing at sink with Contact Guard Assistance.  Toileting from bedside commode with Contact Guard Assistance for hygiene and clothing management.   Bathing from  shower chair/bench with Supervision.  Toilet transfer to bedside commode with Contact Guard Assistance.                         Time Tracking:     OT Date of Treatment: 06/27/23  OT Start Time: 0951  OT Stop Time: 1033  OT Total Time (min): 42 min    Billable Minutes:Self Care/Home Management 30  Therapeutic Activity 12               6/27/2023

## 2024-01-02 ENCOUNTER — TELEPHONE (OUTPATIENT)
Dept: ORTHOPEDICS | Facility: CLINIC | Age: 59
End: 2024-01-02
Payer: MEDICAID

## 2024-01-02 NOTE — TELEPHONE ENCOUNTER
I attempted to return the phone call to Vega. I was unable to talk with someone - unable to leave a message.    ----- Message from Delmar Payne sent at 1/2/2024 12:01 PM CST -----  Regarding: VEGA CALLING REGARDING PT'S DISABILITY PAPERWORK AND RETURNING TO WORK DATE  Contact: VEGA [place]    [phone]    Claim number    [id number]

## 2024-01-04 ENCOUNTER — OFFICE VISIT (OUTPATIENT)
Dept: PODIATRY | Facility: CLINIC | Age: 59
End: 2024-01-04
Payer: MEDICAID

## 2024-01-04 VITALS
BODY MASS INDEX: 37.16 KG/M2 | WEIGHT: 231.25 LBS | SYSTOLIC BLOOD PRESSURE: 149 MMHG | HEART RATE: 89 BPM | HEIGHT: 66 IN | DIASTOLIC BLOOD PRESSURE: 100 MMHG

## 2024-01-04 DIAGNOSIS — M21.611 BUNION, RIGHT: Primary | ICD-10-CM

## 2024-01-04 DIAGNOSIS — M79.671 FOOT PAIN, RIGHT: ICD-10-CM

## 2024-01-04 PROCEDURE — 99213 OFFICE O/P EST LOW 20 MIN: CPT | Mod: S$PBB,,, | Performed by: PODIATRIST

## 2024-01-04 PROCEDURE — 3080F DIAST BP >= 90 MM HG: CPT | Mod: CPTII,,, | Performed by: PODIATRIST

## 2024-01-04 PROCEDURE — 3077F SYST BP >= 140 MM HG: CPT | Mod: CPTII,,, | Performed by: PODIATRIST

## 2024-01-04 PROCEDURE — 1160F RVW MEDS BY RX/DR IN RCRD: CPT | Mod: CPTII,,, | Performed by: PODIATRIST

## 2024-01-04 PROCEDURE — 99999 PR PBB SHADOW E&M-EST. PATIENT-LVL IV: CPT | Mod: PBBFAC,,, | Performed by: PODIATRIST

## 2024-01-04 PROCEDURE — 1159F MED LIST DOCD IN RCRD: CPT | Mod: CPTII,,, | Performed by: PODIATRIST

## 2024-01-04 PROCEDURE — 3008F BODY MASS INDEX DOCD: CPT | Mod: CPTII,,, | Performed by: PODIATRIST

## 2024-01-04 PROCEDURE — 99214 OFFICE O/P EST MOD 30 MIN: CPT | Mod: PBBFAC | Performed by: PODIATRIST

## 2024-01-04 RX ORDER — MULTIVITAMIN
1 TABLET ORAL DAILY
COMMUNITY

## 2024-01-04 RX ORDER — PHENTERMINE HYDROCHLORIDE 37.5 MG/1
1 TABLET ORAL DAILY
COMMUNITY
Start: 2023-06-22

## 2024-01-04 RX ORDER — ASPIRIN 81 MG/1
1 TABLET ORAL DAILY
COMMUNITY

## 2024-01-04 RX ORDER — ERGOCALCIFEROL 1.25 MG/1
1 CAPSULE ORAL
COMMUNITY

## 2024-01-04 RX ORDER — ATORVASTATIN CALCIUM 40 MG/1
1 TABLET, FILM COATED ORAL DAILY
COMMUNITY

## 2024-01-04 RX ORDER — OMEPRAZOLE 40 MG/1
40 CAPSULE, DELAYED RELEASE ORAL
COMMUNITY

## 2024-01-04 RX ORDER — SEMAGLUTIDE 0.68 MG/ML
0.5 INJECTION, SOLUTION SUBCUTANEOUS
COMMUNITY
Start: 2023-09-21

## 2024-01-04 NOTE — PROGRESS NOTES
Subjective:     Patient ID: Yayo Carver is a 58 y.o. female.    Chief Complaint: consents (Sx- 01/19/2024/Right foot(bunion))    Yayo is a 58 y.o. female who presents to the podiatry clinic  with complaint of  right foot pain. Onset of the symptoms was several years ago. Precipitating event: none known. Current symptoms include: ability to bear weight, but with some pain, stiffness, worsening symptoms after a period of activity, and painful bump on the big toe joint . Aggravating factors: standing, walking, and shoes . Symptoms have progressed to a point and plateaued. Patient has had no prior foot problems. Evaluation to date: none. Treatment to date: rest and shoe changes. Avoids tight shoes and only wears wide soft tennis shoe type shoes. Had Left knee replacemetn 4 months ago and states the R foot bunion pain has worsened since then. L foot has small bunion that does not bother her at all. Patients rates pain 2/10 on pain scale.    05/09/2023: follow up for right foot bunion. Still bothering her. Wider, softer shoes have helped. Having R knee replacement on June 25th and would like to have bunion surgery after she recovers from this, likely around September. No other complaints. Here for further recommendations.     09/08/2023: follow up for painful R foot bunion. Recovered from k nee surgery and now ready to proceed with surgery for bunion and hammertoe on R foot that has been bothering her for many months. Has tried shoe changes, toe spacers, topical medication (lidocaine works well and needs refill) and also arch supports. Pain persistent and she would like to proceed with process of surgery.     01/04/2024: follow up for R foot painful bunion. Continues to bother her with pain daily with walking and certain shoes. Scheduled for bunionectomy on 1/19/24 and ready to proceed. Here to sign consent forms and get Rx for rolling knee scooter.        Vitals:    01/04/24 0856   BP: (!) 149/100   Pulse: 89  "  Weight: 104.9 kg (231 lb 4.2 oz)   Height: 5' 6" (1.676 m)   PainSc: 0-No pain         Review of Systems   Constitutional: Negative for chills and fever.   Cardiovascular:  Negative for chest pain, claudication and leg swelling.   Respiratory:  Negative for cough and shortness of breath.    Skin:  Negative for dry skin and nail changes.   Musculoskeletal:         R foot bunion, R foot pain   Gastrointestinal:  Negative for nausea and vomiting.   Neurological:  Negative for numbness and paresthesias.   Psychiatric/Behavioral:  Negative for altered mental status.         Objective:     Physical Exam  Vitals reviewed.   Constitutional:       Appearance: She is well-developed.   HENT:      Head: Normocephalic.   Cardiovascular:      Pulses:           Dorsalis pedis pulses are 2+ on the right side and 2+ on the left side.        Posterior tibial pulses are 2+ on the right side and 2+ on the left side.      Comments: DP and PT pulses are 2/4 bilaterally.        Pulmonary:      Effort: No respiratory distress.   Musculoskeletal:      Right lower leg: No edema.      Left lower leg: No edema.      Comments: Semi-reducible hammertoe contractures noted to toes 2-4 b/l-asymptomatic. HAV, mild L, moderate R, non trackbound noted b/l with mild L and moderate R dorsal and dorsal medial bony prominence at 1st met head. L foot asymptomatic. R foot with pain on palpation of dorsal medial 1st met head and with ROM/reduction of bunion deformity.     Hypermobility noted to 1st ray b/l with near complete collapse of medial longitudinal arch b/l with loading.     Equinus contracture noted to b/l ankles with knee straight and slightly improved with knee bent.      Skin:     General: Skin is warm and dry.      Findings: No erythema.      Comments: No open lesions, lacerations or wounds noted. Nails are normal to R 1-5 and L 1-5. Interdigital spaces clean, dry and intact b/l. No erythema noted to b/l foot. Skin texture normal. Pedal hair " normal     Neurological:      Mental Status: She is alert and oriented to person, place, and time.      Sensory: No sensory deficit.      Comments: Light touch, proprioception, and sharp/dull sensation are all intact bilaterally.    Psychiatric:         Behavior: Behavior normal.         Thought Content: Thought content normal.         Judgment: Judgment normal.         Assessment:      Encounter Diagnoses   Name Primary?    Bunion, right Yes    Foot pain, right            Plan:     Yayo was seen today for consents.    Diagnoses and all orders for this visit:    Bunion, right  -     WALKER FOR HOME USE  -     X-Ray Foot Complete Bilateral; Future    Foot pain, right  -     WALKER FOR HOME USE  -     X-Ray Foot Complete Bilateral; Future            I counseled the patient on her conditions, their implications and medical management.    Long discussion with patient regarding the procedure in detail (Bunionectomy MIS vs Lapiplasty). Patient understands all risks, potential complications, and alternatives, including, but not limited to those listed on the consent form. Risks include but are not limited to recurrence, infection, delayed wound healing, wound dehiscence, scar, poor cosmesis, neuropathic changes/nerve pain/pain, syndromes (RSD), numbness, infection, bleeding, hematoma, gangrenous changes, further surgery, loss of function, healing in a poor position, new or different ulceration or pre ulcerative callus, difficulty walking, gait changes, limb loss, blood clots of leg, lung, heart, brain, and death. All questions were answered. No guarantees given or implied as to outcome. Informed verbal and written consent signed/witnessed appropriately.      RTC 1 week after surgery  and advised to call or come in sooner (prior to surgery date) for any further questions or concerns.

## 2024-01-05 ENCOUNTER — PATIENT MESSAGE (OUTPATIENT)
Dept: PHYSICAL MEDICINE AND REHAB | Facility: CLINIC | Age: 59
End: 2024-01-05
Payer: MEDICAID

## 2024-01-09 RX ORDER — TRAMADOL HYDROCHLORIDE 50 MG/1
50-100 TABLET ORAL 3 TIMES DAILY PRN
Qty: 180 TABLET | Refills: 1 | Status: SHIPPED | OUTPATIENT
Start: 2024-01-09

## 2024-01-17 ENCOUNTER — PATIENT MESSAGE (OUTPATIENT)
Dept: PODIATRY | Facility: CLINIC | Age: 59
End: 2024-01-17
Payer: MEDICAID

## 2024-01-17 ENCOUNTER — TELEPHONE (OUTPATIENT)
Dept: PODIATRY | Facility: CLINIC | Age: 59
End: 2024-01-17
Payer: MEDICAID

## 2024-01-17 NOTE — TELEPHONE ENCOUNTER
Called patient to let her know that her message was sent to the surgery scheduler Mrs Hawkins and she will be calling to reschedule it, at her earliest. Patient respond back ok.

## 2024-01-17 NOTE — TELEPHONE ENCOUNTER
Caller: @ [phone] (Today, 11:59 AM)  Pt is calling to reschedule surgery on 01/19 to a later time in the month ...no available dates Pleaes call and adv @ [phone]

## 2024-01-17 NOTE — TELEPHONE ENCOUNTER
----- Message from Areli Kendrick sent at 1/17/2024 11:59 AM CST -----  Regarding: art surgery  Contact: @ [phone]  Pt is calling to reschedule surgery on 01/19 to a later time in the month ...no available dates Pleaes call and adv @ [phone]

## 2024-02-02 ENCOUNTER — PATIENT MESSAGE (OUTPATIENT)
Dept: ADMINISTRATIVE | Facility: HOSPITAL | Age: 59
End: 2024-02-02
Payer: MEDICAID

## 2024-02-02 ENCOUNTER — PATIENT OUTREACH (OUTPATIENT)
Dept: ADMINISTRATIVE | Facility: HOSPITAL | Age: 59
End: 2024-02-02
Payer: MEDICAID

## 2024-02-02 DIAGNOSIS — E78.5 HYPERLIPIDEMIA, UNSPECIFIED HYPERLIPIDEMIA TYPE: Primary | ICD-10-CM

## 2024-02-02 NOTE — PROGRESS NOTES
Health Maintenance Due   Topic Date Due    Sign Pain Contract  Never done    Shingles Vaccine (1 of 2) Never done    Eye Exam  05/23/2023    Influenza Vaccine (1) 09/01/2023    COVID-19 Vaccine (3 - 2023-24 season) 09/01/2023    Foot Exam  09/08/2023    Lipid Panel  02/23/2024    Cervical Cancer Screening  02/25/2024   Foot exam and lipid order in the system Portal message sent for scheduling. Health Maintenance  Reviewed and updated. Up coming office visit 3/27/24 with Dr. Chahal.   Cervical screening and Eye exam due message sent for updating

## 2024-02-14 DIAGNOSIS — M18.11 PRIMARY OSTEOARTHRITIS OF FIRST CARPOMETACARPAL JOINT OF RIGHT HAND: Primary | ICD-10-CM

## 2024-02-14 DIAGNOSIS — Z96.651 STATUS POST RIGHT KNEE REPLACEMENT: ICD-10-CM

## 2024-02-15 ENCOUNTER — PATIENT MESSAGE (OUTPATIENT)
Dept: PODIATRY | Facility: CLINIC | Age: 59
End: 2024-02-15
Payer: MEDICAID

## 2024-02-16 ENCOUNTER — HOSPITAL ENCOUNTER (OUTPATIENT)
Dept: RADIOLOGY | Facility: HOSPITAL | Age: 59
Discharge: HOME | End: 2024-02-16
Attending: NURSE PRACTITIONER
Payer: MEDICAID

## 2024-02-16 ENCOUNTER — OFFICE VISIT (OUTPATIENT)
Dept: ORTHOPEDICS | Facility: CLINIC | Age: 59
End: 2024-02-16
Payer: MEDICAID

## 2024-02-16 VITALS — HEIGHT: 66 IN | BODY MASS INDEX: 38.62 KG/M2 | WEIGHT: 240.31 LBS

## 2024-02-16 DIAGNOSIS — M70.50 PES ANSERINE BURSITIS: ICD-10-CM

## 2024-02-16 DIAGNOSIS — M25.561 ACUTE PAIN OF RIGHT KNEE: Primary | ICD-10-CM

## 2024-02-16 DIAGNOSIS — Z96.651 STATUS POST RIGHT KNEE REPLACEMENT: ICD-10-CM

## 2024-02-16 PROCEDURE — 99999 PR PBB SHADOW E&M-EST. PATIENT-LVL IV: CPT | Mod: PBBFAC,,, | Performed by: NURSE PRACTITIONER

## 2024-02-16 PROCEDURE — 20610 DRAIN/INJ JOINT/BURSA W/O US: CPT | Mod: PBBFAC,RT | Performed by: NURSE PRACTITIONER

## 2024-02-16 PROCEDURE — 20600 DRAIN/INJ JOINT/BURSA W/O US: CPT | Mod: PBBFAC,RT | Performed by: NURSE PRACTITIONER

## 2024-02-16 PROCEDURE — 73562 X-RAY EXAM OF KNEE 3: CPT | Mod: TC,RT

## 2024-02-16 PROCEDURE — 1160F RVW MEDS BY RX/DR IN RCRD: CPT | Mod: CPTII,,, | Performed by: NURSE PRACTITIONER

## 2024-02-16 PROCEDURE — 99214 OFFICE O/P EST MOD 30 MIN: CPT | Mod: PBBFAC,25 | Performed by: NURSE PRACTITIONER

## 2024-02-16 PROCEDURE — 1159F MED LIST DOCD IN RCRD: CPT | Mod: CPTII,,, | Performed by: NURSE PRACTITIONER

## 2024-02-16 PROCEDURE — 73560 X-RAY EXAM OF KNEE 1 OR 2: CPT | Mod: TC,LT

## 2024-02-16 PROCEDURE — 99213 OFFICE O/P EST LOW 20 MIN: CPT | Mod: 25,S$PBB,, | Performed by: NURSE PRACTITIONER

## 2024-02-16 PROCEDURE — 3008F BODY MASS INDEX DOCD: CPT | Mod: CPTII,,, | Performed by: NURSE PRACTITIONER

## 2024-02-16 PROCEDURE — 73560 X-RAY EXAM OF KNEE 1 OR 2: CPT | Mod: 26,59,LT, | Performed by: RADIOLOGY

## 2024-02-16 PROCEDURE — 20610 DRAIN/INJ JOINT/BURSA W/O US: CPT | Mod: S$PBB,RT,, | Performed by: NURSE PRACTITIONER

## 2024-02-16 PROCEDURE — 99999PBSHW PR PBB SHADOW TECHNICAL ONLY FILED TO HB: Mod: PBBFAC,,,

## 2024-02-16 PROCEDURE — 73562 X-RAY EXAM OF KNEE 3: CPT | Mod: 26,RT,, | Performed by: RADIOLOGY

## 2024-02-16 RX ORDER — PREGABALIN 75 MG/1
75 CAPSULE ORAL 2 TIMES DAILY
Qty: 60 CAPSULE | Refills: 6 | Status: SHIPPED | OUTPATIENT
Start: 2024-02-16 | End: 2024-08-16

## 2024-02-16 RX ADMIN — TRIAMCINOLONE ACETONIDE 40 MG: 40 INJECTION, SUSPENSION INTRA-ARTICULAR; INTRAMUSCULAR at 05:02

## 2024-02-21 RX ORDER — TRIAMCINOLONE ACETONIDE 40 MG/ML
40 INJECTION, SUSPENSION INTRA-ARTICULAR; INTRAMUSCULAR
Status: COMPLETED | OUTPATIENT
Start: 2024-02-16 | End: 2024-02-16

## 2024-02-21 NOTE — PROGRESS NOTES
CC: Pain of the Right Knee      HPI: Pt with c/o right knee pain for the past few months intermittently. The pain is located over the lower knee. She can pinpoint exactly where it hurts. The pain is sharp and intermittent. It's worse with increased activity. She has had both knees replaced in the past without complications. She is ambulating without assistive device. There is not a limp.    ROS  General: denies fever and chills  Resp: no c/o sob  CVS: no c/o cp  MSK: c/o right knee pain    PE  General: AAOx3, pleasant and cooperative  Resp: respirations even and unlabored  MSK: right knee exam  0 degrees extension  120 degrees flexion  No warmth or erythema   - effusion  + pain over the pes anserine bursa    Xray:  Reviewed by me with the patient: prosthesis in proper position and alignment    Assessment:  Right knee pes anserine bursitis    Plan:  Cortisone injection in the pes bursa today  F/u if no improvement and will get a MARS MRI    Knee Injection Procedure Note  Diagnosis: right knee pes anserine bursitis  Indications: right knee pain  Procedure Details: Verbal consent was obtained for the procedure. The injection site was identified and the skin was prepared with alcohol. The right pes bursa was injected from an anterolateral approach with 1 ml of Kenalog and 1 ml Lidocaine under sterile technique using a 25 gauge needle. The needle was removed and the area cleansed and dressed.  Complications:  Patient tolerated the procedure well.    she was advised to rest the knee today, using ice and elevation as needed for comfort and swelling.Immediate relief of the knee pain may be short lived and secondary to the lidocaine. she may have an increase in discomfort tonight followed by steady improvement over the next several days. It may take 1-2 weeks following the injection to get the full benefit of the medication.

## 2024-02-26 DIAGNOSIS — M62.838 MUSCLE SPASMS OF BOTH LOWER EXTREMITIES: Primary | ICD-10-CM

## 2024-02-26 RX ORDER — METHOCARBAMOL 750 MG/1
750 TABLET, FILM COATED ORAL 4 TIMES DAILY PRN
Qty: 40 TABLET | Refills: 2 | Status: SHIPPED | OUTPATIENT
Start: 2024-02-26 | End: 2024-05-12

## 2024-02-27 RX ORDER — TRAMADOL HYDROCHLORIDE 50 MG/1
50-100 TABLET ORAL 3 TIMES DAILY PRN
Qty: 180 TABLET | Refills: 1 | Status: SHIPPED | OUTPATIENT
Start: 2024-03-10 | End: 2024-05-21 | Stop reason: SDUPTHER

## 2024-02-27 NOTE — TELEPHONE ENCOUNTER
----- Message from Noelle Hurd sent at 2/27/2024 10:58 AM CST -----  Regarding: refill request  Rx REFILL REQUEST    Refill or New Rx: refill    RX Name and Strength:traMADoL (ULTRAM) 50 mg tablet    Preferred Pharmacy with phone number:  [id number] - [facility] [address]  [address]  [address] [facility] [address]  Phone: [phone] Fax: [phone]    Additional Information: pt experiencing back pain and was told that she has refills left but the MD needs to approve before they dispense

## 2024-02-28 DIAGNOSIS — E11.9 TYPE 2 DIABETES MELLITUS WITHOUT COMPLICATION, UNSPECIFIED WHETHER LONG TERM INSULIN USE: ICD-10-CM

## 2024-03-03 DIAGNOSIS — E55.9 VITAMIN D DEFICIENCY: ICD-10-CM

## 2024-03-03 RX ORDER — ERGOCALCIFEROL 1.25 MG/1
50000 CAPSULE ORAL
Qty: 12 CAPSULE | Refills: 3 | Status: CANCELLED | OUTPATIENT
Start: 2024-03-03 | End: 2025-03-03

## 2024-03-04 RX ORDER — CHOLECALCIFEROL (VITAMIN D3) 50 MCG
1 TABLET ORAL DAILY
Qty: 30 TABLET | Refills: 11 | Status: SHIPPED | OUTPATIENT
Start: 2024-03-04 | End: 2024-04-16 | Stop reason: SDUPTHER

## 2024-03-14 ENCOUNTER — PATIENT OUTREACH (OUTPATIENT)
Dept: ADMINISTRATIVE | Facility: HOSPITAL | Age: 59
End: 2024-03-14
Payer: MEDICAID

## 2024-03-14 ENCOUNTER — PATIENT MESSAGE (OUTPATIENT)
Dept: ADMINISTRATIVE | Facility: HOSPITAL | Age: 59
End: 2024-03-14
Payer: MEDICAID

## 2024-03-15 ENCOUNTER — PATIENT MESSAGE (OUTPATIENT)
Dept: ADMINISTRATIVE | Facility: OTHER | Age: 59
End: 2024-03-15
Payer: MEDICAID

## 2024-03-21 ENCOUNTER — PATIENT MESSAGE (OUTPATIENT)
Dept: ADMINISTRATIVE | Facility: OTHER | Age: 59
End: 2024-03-21
Payer: MEDICAID

## 2024-03-27 ENCOUNTER — OFFICE VISIT (OUTPATIENT)
Dept: INTERNAL MEDICINE | Facility: CLINIC | Age: 59
End: 2024-03-27
Payer: MEDICAID

## 2024-03-27 ENCOUNTER — PATIENT MESSAGE (OUTPATIENT)
Dept: INTERNAL MEDICINE | Facility: CLINIC | Age: 59
End: 2024-03-27

## 2024-03-27 ENCOUNTER — LAB VISIT (OUTPATIENT)
Dept: LAB | Facility: OTHER | Age: 59
End: 2024-03-27
Attending: STUDENT IN AN ORGANIZED HEALTH CARE EDUCATION/TRAINING PROGRAM
Payer: MEDICAID

## 2024-03-27 VITALS
HEIGHT: 66 IN | WEIGHT: 244.69 LBS | HEART RATE: 73 BPM | OXYGEN SATURATION: 100 % | SYSTOLIC BLOOD PRESSURE: 166 MMHG | DIASTOLIC BLOOD PRESSURE: 98 MMHG | BODY MASS INDEX: 39.32 KG/M2

## 2024-03-27 DIAGNOSIS — E11.9 TYPE 2 DIABETES MELLITUS WITHOUT COMPLICATION, WITHOUT LONG-TERM CURRENT USE OF INSULIN: ICD-10-CM

## 2024-03-27 DIAGNOSIS — E11.9 TYPE 2 DIABETES MELLITUS WITHOUT COMPLICATION, WITHOUT LONG-TERM CURRENT USE OF INSULIN: Primary | ICD-10-CM

## 2024-03-27 DIAGNOSIS — Z12.4 CERVICAL CANCER SCREENING: ICD-10-CM

## 2024-03-27 DIAGNOSIS — E78.5 HYPERLIPIDEMIA, UNSPECIFIED HYPERLIPIDEMIA TYPE: ICD-10-CM

## 2024-03-27 DIAGNOSIS — I10 ESSENTIAL HYPERTENSION: ICD-10-CM

## 2024-03-27 DIAGNOSIS — D50.9 MICROCYTIC ANEMIA: ICD-10-CM

## 2024-03-27 DIAGNOSIS — E11.9 DIABETIC EYE EXAM: ICD-10-CM

## 2024-03-27 DIAGNOSIS — Z01.00 DIABETIC EYE EXAM: ICD-10-CM

## 2024-03-27 LAB
ALBUMIN SERPL BCP-MCNC: 3.5 G/DL (ref 3.5–5.2)
ALP SERPL-CCNC: 91 U/L (ref 55–135)
ALT SERPL W/O P-5'-P-CCNC: 28 U/L (ref 10–44)
ANION GAP SERPL CALC-SCNC: 8 MMOL/L (ref 8–16)
AST SERPL-CCNC: 26 U/L (ref 10–40)
BASOPHILS # BLD AUTO: 0.06 K/UL (ref 0–0.2)
BASOPHILS NFR BLD: 0.8 % (ref 0–1.9)
BILIRUB SERPL-MCNC: 0.4 MG/DL (ref 0.1–1)
BUN SERPL-MCNC: 16 MG/DL (ref 6–20)
CALCIUM SERPL-MCNC: 9.7 MG/DL (ref 8.7–10.5)
CHLORIDE SERPL-SCNC: 110 MMOL/L (ref 95–110)
CHOLEST SERPL-MCNC: 172 MG/DL (ref 120–199)
CHOLEST/HDLC SERPL: 2.5 {RATIO} (ref 2–5)
CO2 SERPL-SCNC: 24 MMOL/L (ref 23–29)
CREAT SERPL-MCNC: 1.1 MG/DL (ref 0.5–1.4)
DIFFERENTIAL METHOD BLD: ABNORMAL
EOSINOPHIL # BLD AUTO: 0.2 K/UL (ref 0–0.5)
EOSINOPHIL NFR BLD: 2.4 % (ref 0–8)
ERYTHROCYTE [DISTWIDTH] IN BLOOD BY AUTOMATED COUNT: 17.6 % (ref 11.5–14.5)
EST. GFR  (NO RACE VARIABLE): 58 ML/MIN/1.73 M^2
ESTIMATED AVG GLUCOSE: 117 MG/DL (ref 68–131)
FERRITIN SERPL-MCNC: 25 NG/ML (ref 20–300)
GLUCOSE SERPL-MCNC: 79 MG/DL (ref 70–110)
HBA1C MFR BLD: 5.7 % (ref 4–5.6)
HCT VFR BLD AUTO: 38.1 % (ref 37–48.5)
HDLC SERPL-MCNC: 69 MG/DL (ref 40–75)
HDLC SERPL: 40.1 % (ref 20–50)
HGB BLD-MCNC: 11.5 G/DL (ref 12–16)
IMM GRANULOCYTES # BLD AUTO: 0.02 K/UL (ref 0–0.04)
IMM GRANULOCYTES NFR BLD AUTO: 0.3 % (ref 0–0.5)
IRON SERPL-MCNC: 61 UG/DL (ref 30–160)
LDLC SERPL CALC-MCNC: 86.2 MG/DL (ref 63–159)
LYMPHOCYTES # BLD AUTO: 2 K/UL (ref 1–4.8)
LYMPHOCYTES NFR BLD: 25.2 % (ref 18–48)
MCH RBC QN AUTO: 21.1 PG (ref 27–31)
MCHC RBC AUTO-ENTMCNC: 30.2 G/DL (ref 32–36)
MCV RBC AUTO: 70 FL (ref 82–98)
MONOCYTES # BLD AUTO: 0.6 K/UL (ref 0.3–1)
MONOCYTES NFR BLD: 7.4 % (ref 4–15)
NEUTROPHILS # BLD AUTO: 5.1 K/UL (ref 1.8–7.7)
NEUTROPHILS NFR BLD: 63.9 % (ref 38–73)
NONHDLC SERPL-MCNC: 103 MG/DL
NRBC BLD-RTO: 0 /100 WBC
PLATELET # BLD AUTO: 191 K/UL (ref 150–450)
PMV BLD AUTO: ABNORMAL FL (ref 9.2–12.9)
POTASSIUM SERPL-SCNC: 4.6 MMOL/L (ref 3.5–5.1)
PROT SERPL-MCNC: 6.9 G/DL (ref 6–8.4)
RBC # BLD AUTO: 5.44 M/UL (ref 4–5.4)
SATURATED IRON: 15 % (ref 20–50)
SODIUM SERPL-SCNC: 142 MMOL/L (ref 136–145)
TOTAL IRON BINDING CAPACITY: 398 UG/DL (ref 250–450)
TRANSFERRIN SERPL-MCNC: 269 MG/DL (ref 200–375)
TRIGL SERPL-MCNC: 84 MG/DL (ref 30–150)
WBC # BLD AUTO: 7.99 K/UL (ref 3.9–12.7)

## 2024-03-27 PROCEDURE — 99214 OFFICE O/P EST MOD 30 MIN: CPT | Mod: S$PBB,,, | Performed by: STUDENT IN AN ORGANIZED HEALTH CARE EDUCATION/TRAINING PROGRAM

## 2024-03-27 PROCEDURE — 99999 PR PBB SHADOW E&M-EST. PATIENT-LVL V: CPT | Mod: PBBFAC,,, | Performed by: STUDENT IN AN ORGANIZED HEALTH CARE EDUCATION/TRAINING PROGRAM

## 2024-03-27 PROCEDURE — 83036 HEMOGLOBIN GLYCOSYLATED A1C: CPT | Performed by: STUDENT IN AN ORGANIZED HEALTH CARE EDUCATION/TRAINING PROGRAM

## 2024-03-27 PROCEDURE — 3008F BODY MASS INDEX DOCD: CPT | Mod: CPTII,,, | Performed by: STUDENT IN AN ORGANIZED HEALTH CARE EDUCATION/TRAINING PROGRAM

## 2024-03-27 PROCEDURE — 99215 OFFICE O/P EST HI 40 MIN: CPT | Mod: PBBFAC | Performed by: STUDENT IN AN ORGANIZED HEALTH CARE EDUCATION/TRAINING PROGRAM

## 2024-03-27 PROCEDURE — 82728 ASSAY OF FERRITIN: CPT | Performed by: STUDENT IN AN ORGANIZED HEALTH CARE EDUCATION/TRAINING PROGRAM

## 2024-03-27 PROCEDURE — 36415 COLL VENOUS BLD VENIPUNCTURE: CPT | Performed by: STUDENT IN AN ORGANIZED HEALTH CARE EDUCATION/TRAINING PROGRAM

## 2024-03-27 PROCEDURE — 1159F MED LIST DOCD IN RCRD: CPT | Mod: CPTII,,, | Performed by: STUDENT IN AN ORGANIZED HEALTH CARE EDUCATION/TRAINING PROGRAM

## 2024-03-27 PROCEDURE — 85025 COMPLETE CBC W/AUTO DIFF WBC: CPT | Performed by: STUDENT IN AN ORGANIZED HEALTH CARE EDUCATION/TRAINING PROGRAM

## 2024-03-27 PROCEDURE — 3078F DIAST BP <80 MM HG: CPT | Mod: CPTII,,, | Performed by: STUDENT IN AN ORGANIZED HEALTH CARE EDUCATION/TRAINING PROGRAM

## 2024-03-27 PROCEDURE — 80061 LIPID PANEL: CPT | Performed by: STUDENT IN AN ORGANIZED HEALTH CARE EDUCATION/TRAINING PROGRAM

## 2024-03-27 PROCEDURE — 83540 ASSAY OF IRON: CPT | Performed by: STUDENT IN AN ORGANIZED HEALTH CARE EDUCATION/TRAINING PROGRAM

## 2024-03-27 PROCEDURE — 80053 COMPREHEN METABOLIC PANEL: CPT | Performed by: STUDENT IN AN ORGANIZED HEALTH CARE EDUCATION/TRAINING PROGRAM

## 2024-03-27 PROCEDURE — 3077F SYST BP >= 140 MM HG: CPT | Mod: CPTII,,, | Performed by: STUDENT IN AN ORGANIZED HEALTH CARE EDUCATION/TRAINING PROGRAM

## 2024-03-27 RX ORDER — VALSARTAN 80 MG/1
80 TABLET ORAL DAILY
Qty: 90 TABLET | Refills: 3 | Status: SHIPPED | OUTPATIENT
Start: 2024-03-27 | End: 2025-03-27

## 2024-03-27 NOTE — PROGRESS NOTES
[facility]    Subjective:     Patient ID: Yayo Carver is a 58 y.o. female.  Chief Complaint:  Follow up of diabetes and hypertension.   HPI:  Patient is a 58 y.o. female who   has a past medical history of Arthritis, Diabetes mellitus, Diabetes mellitus, type 2, GERD (gastroesophageal reflux disease), HLD (hyperlipidemia), and Hypertension.  who presents for routine follow up with diabetes and hypertension.  She was no complaints today.  She was recently started on Lyrica for pain.  She was not taking Celebrex.  At last visit we discussed trialing off sodium bicarbonate.  Blood pressure today is elevated on amlodipine monotherapy.  She was amenable to starting valsartan.  She was on Ozempic through a bariatric clinic.  She takes Cymbalta for pain as well.  She takes tramadol p.r.n. breakthrough pain, not daily.    She would like to see an optometrist because she was having difficulty with her vision.  She would like help getting set up with gynecology at [facility] for well-woman exam/Pap smear.    Current Outpatient Medications   Medication Instructions    acetaminophen (TYLENOL) 650 mg, Oral, Every 8 hours    amLODIPine (NORVASC) 10 mg, Oral, Daily    aspirin (ECOTRIN) 81 MG EC tablet 1 tablet, Oral, Daily    atorvastatin (LIPITOR) 40 mg, Oral, Daily    b complex vitamins capsule 1 capsule, Oral, Daily    cholecalciferol (vitamin D3) (VITAMIN D3) 2,000 Units, Oral, Daily    DULoxetine (CYMBALTA) 30 mg, Oral, Daily    flash glucose sensor (FREESTYLE HEENA 14 DAY SENSOR) Kit Apply to skin for 14 days.  Check blood glucose before meals and nightly.    fluticasone propionate (FLONASE) 50 mcg/actuation nasal spray Instill 2 sprays (100 mcg total) by Each Nostril route once daily.    LIDOcaine-prilocaine (EMLA) cream Topical (Top), Every 6-8 hours PRN, Apply to area of foot pain as needed every 6-8 hrs    methocarbamoL (ROBAXIN) 750 mg, Oral, 4 times daily PRN    multivitamin (THERAGRAN) per  "tablet 1 tablet, Oral, Daily    OZEMPIC 0.5 mg, Subcutaneous    pantoprazole (PROTONIX) 40 mg, Oral, Daily    pregabalin (LYRICA) 75 mg, Oral, 2 times daily    semaglutide (OZEMPIC) 0.25 mg or 0.5 mg (2 mg/3 mL) pen injector Inject 0.5 mg into the skin every 7 (seven) days    sodium bicarbonate 650 mg, Oral, 2 times daily    terbinafine HCL (LAMISIL) 250 mg, Oral, Daily, Avoid alcohol while on medication    traMADoL (ULTRAM)  mg, Oral, 3 times daily PRN    valsartan (DIOVAN) 80 mg, Oral, Daily     Objective:        Body mass index is 39.5 kg/m².  Vitals:    03/27/24 1450 03/27/24 1515   BP: (!) 169/77 (!) 166/98   Pulse: 73    SpO2: 100%    Weight: 111 kg (244 lb 11.4 oz)    Height: 5' 6" (1.676 m)    PainSc:   3      Physical Exam  Constitutional:       Appearance: She is obese. She is not ill-appearing.   Cardiovascular:      Rate and Rhythm: Normal rate.      Heart sounds: No murmur heard.  Pulmonary:      Effort: Pulmonary effort is normal. No respiratory distress.   Abdominal:      General: Abdomen is flat.   Musculoskeletal:      Left lower leg: No edema.   Neurological:      Mental Status: She is alert.   Psychiatric:         Mood and Affect: Mood normal.           Assessment:         1. Type 2 diabetes mellitus without complication, without long-term current use of insulin    2. Essential hypertension    3. Cervical cancer screening    4. Diabetic eye exam          Plan:     Add valsartan 80 mg daily.  Advised her to monitor blood pressure at home.  Can start with half a tablet for the 1st 2 or 3 days just to ensure she tolerates this well and then increase to 80 mg following.  I will reach out via [employer] to see how she has responded.    Due for repeat labs today.  She will have these done.  They were ordered previously    Return in about 3 months    Type 2 diabetes mellitus without complication, without long-term current use of insulin  -     Hemoglobin A1C; Future; Expected date: 03/27/2024  -     " Ambulatory referral/consult to Gynecology; Future; Expected date: 04/03/2024    Essential hypertension  -     valsartan (DIOVAN) 80 MG tablet; Take 1 tablet (80 mg total) by mouth once daily.  Dispense: 90 tablet; Refill: 3    Cervical cancer screening  -     Ambulatory referral/consult to Gynecology; Future; Expected date: 04/03/2024    Diabetic eye exam  -     Ambulatory referral/consult to Optometry; Future; Expected date: 04/03/2024      Tests to Keep You Healthy    Mammogram: Met on 12/21/2023  Eye Exam: ORDERED BUT NOT SCHEDULED  Colon Cancer Screening: Met on 6/27/2017  Cervical Cancer Screening: ORDERED  Last Blood Pressure <= 139/89 (3/27/2024): NO  Last HbA1c < 8 (12/21/2023): Yes    Follow up in about 3 months (around 6/27/2024). or sooner prn (as needed)        Jonathon Chahal  [facility]  [address] [address]  Phone [phone]  Fax [phone]      This note is dictated using the M*Modal Fluency Direct word recognition program. It may contain word recognition mistakes or wrong word substitutions that were missed on review.

## 2024-04-10 ENCOUNTER — PATIENT MESSAGE (OUTPATIENT)
Dept: ADMINISTRATIVE | Facility: HOSPITAL | Age: 59
End: 2024-04-10
Payer: MEDICAID

## 2024-04-10 ENCOUNTER — PATIENT OUTREACH (OUTPATIENT)
Dept: ADMINISTRATIVE | Facility: HOSPITAL | Age: 59
End: 2024-04-10
Payer: MEDICAID

## 2024-04-10 NOTE — PROGRESS NOTES
Health Maintenance Due   Topic Date Due    Shingles Vaccine (1 of 2) Never done    Eye Exam  05/23/2023    Influenza Vaccine (1) 09/01/2023    COVID-19 Vaccine (3 - 2023-24 season) 09/01/2023    Cervical Cancer Screening  02/25/2024    Health maintenance reviewed, updated and links triggered. A portal message sent for scheduling  of Eye and Cervical Exam. ([facility]) 4/10/24

## 2024-04-12 RX ORDER — CELECOXIB 200 MG/1
200 CAPSULE ORAL
Qty: 30 CAPSULE | Refills: 3 | Status: SHIPPED | OUTPATIENT
Start: 2024-04-12 | End: 2024-04-16 | Stop reason: SDUPTHER

## 2024-04-16 DIAGNOSIS — E87.20 METABOLIC ACIDOSIS: ICD-10-CM

## 2024-04-16 DIAGNOSIS — E55.9 VITAMIN D DEFICIENCY: Primary | ICD-10-CM

## 2024-04-16 RX ORDER — SODIUM BICARBONATE 650 MG/1
650 TABLET ORAL 2 TIMES DAILY
Qty: 60 TABLET | Refills: 2 | OUTPATIENT
Start: 2024-04-16

## 2024-04-16 RX ORDER — CHOLECALCIFEROL (VITAMIN D3) 50 MCG
1 TABLET ORAL DAILY
Qty: 30 TABLET | Refills: 11 | Status: SHIPPED | OUTPATIENT
Start: 2024-04-16 | End: 2025-04-16

## 2024-04-16 RX ORDER — CELECOXIB 200 MG/1
200 CAPSULE ORAL DAILY
Qty: 30 CAPSULE | Refills: 3 | Status: SHIPPED | OUTPATIENT
Start: 2024-04-16

## 2024-04-16 NOTE — TELEPHONE ENCOUNTER
Care Due:                  Date            Visit Type   Department     Provider  --------------------------------------------------------------------------------                                EP -                              PRIMARY      [facility] INTERNAL  Last Visit: 03-      CARE ([facility])   MEDICINE       Jonathon Chahal  Next Visit: None Scheduled  None         None Found                                                            Last  Test          Frequency    Reason                     Performed    Due Date  --------------------------------------------------------------------------------    Vitamin D...  12 months..  cholecalciferol,.........  Not Found    Overdue    [employer] Embedded Care Due Messages. Reference number: [id number].   4/16/2024 11:12:17 AM CDT

## 2024-04-27 DIAGNOSIS — M25.562 ACUTE PAIN OF BOTH KNEES: Primary | ICD-10-CM

## 2024-04-27 DIAGNOSIS — M25.561 ACUTE PAIN OF BOTH KNEES: Primary | ICD-10-CM

## 2024-04-27 RX ORDER — HYDROCODONE BITARTRATE AND ACETAMINOPHEN 10; 325 MG/1; MG/1
1 TABLET ORAL EVERY 6 HOURS PRN
Qty: 28 TABLET | Refills: 0 | Status: SHIPPED | OUTPATIENT
Start: 2024-04-27 | End: 2024-05-07

## 2024-04-27 NOTE — PROGRESS NOTES
Pt is currently working the [employer] at [employer] for 8 hours each day. She is having knee pain unrelieved with tramadol. Will send in a one time additional pain medication prescription. She is a pain management patient of Dr. Talbot and understands that this is a one-time fill. She will f/u in clinic if no improvement in her knee pain after [employer].

## 2024-04-29 ENCOUNTER — PATIENT MESSAGE (OUTPATIENT)
Dept: ORTHOPEDICS | Facility: CLINIC | Age: 59
End: 2024-04-29
Payer: MEDICAID

## 2024-05-13 ENCOUNTER — OFFICE VISIT (OUTPATIENT)
Dept: ORTHOPEDICS | Facility: CLINIC | Age: 59
End: 2024-05-13
Payer: MEDICAID

## 2024-05-13 ENCOUNTER — HOSPITAL ENCOUNTER (OUTPATIENT)
Dept: RADIOLOGY | Facility: HOSPITAL | Age: 59
Discharge: HOME | End: 2024-05-13
Attending: NURSE PRACTITIONER
Payer: MEDICAID

## 2024-05-13 VITALS — BODY MASS INDEX: 24.55 KG/M2 | WEIGHT: 152.13 LBS

## 2024-05-13 DIAGNOSIS — M25.561 ACUTE PAIN OF RIGHT KNEE: ICD-10-CM

## 2024-05-13 DIAGNOSIS — M25.561 ACUTE PAIN OF RIGHT KNEE: Primary | ICD-10-CM

## 2024-05-13 PROCEDURE — 99213 OFFICE O/P EST LOW 20 MIN: CPT | Mod: S$PBB,,, | Performed by: NURSE PRACTITIONER

## 2024-05-13 PROCEDURE — 99213 OFFICE O/P EST LOW 20 MIN: CPT | Mod: PBBFAC,25 | Performed by: NURSE PRACTITIONER

## 2024-05-13 PROCEDURE — 1159F MED LIST DOCD IN RCRD: CPT | Mod: CPTII,,, | Performed by: NURSE PRACTITIONER

## 2024-05-13 PROCEDURE — 3008F BODY MASS INDEX DOCD: CPT | Mod: CPTII,,, | Performed by: NURSE PRACTITIONER

## 2024-05-13 PROCEDURE — 1160F RVW MEDS BY RX/DR IN RCRD: CPT | Mod: CPTII,,, | Performed by: NURSE PRACTITIONER

## 2024-05-13 PROCEDURE — 73560 X-RAY EXAM OF KNEE 1 OR 2: CPT | Mod: TC,59,LT

## 2024-05-13 PROCEDURE — 99999 PR PBB SHADOW E&M-EST. PATIENT-LVL III: CPT | Mod: PBBFAC,,, | Performed by: NURSE PRACTITIONER

## 2024-05-13 PROCEDURE — 3044F HG A1C LEVEL LT 7.0%: CPT | Mod: CPTII,,, | Performed by: NURSE PRACTITIONER

## 2024-05-13 PROCEDURE — 4010F ACE/ARB THERAPY RXD/TAKEN: CPT | Mod: CPTII,,, | Performed by: NURSE PRACTITIONER

## 2024-05-13 PROCEDURE — 73560 X-RAY EXAM OF KNEE 1 OR 2: CPT | Mod: 26,59,LT, | Performed by: INTERNAL MEDICINE

## 2024-05-13 PROCEDURE — 73562 X-RAY EXAM OF KNEE 3: CPT | Mod: 26,RT,, | Performed by: INTERNAL MEDICINE

## 2024-05-13 NOTE — PROGRESS NOTES
CC: right knee pain    HPI: Pt with c/o right knee pain for the past few weeks. The pain is aching and burning. She has had both knees replaced in the past. She recently worked the [employer] everyday of [employer] which aggravated the pain. She denies left knee pain. She denies recent infection, fever and chills. She was previously under the care of pain management, but she hasn't seen him in a while and she doesn't have anymore pain medication. She has tried nsaids without much relief. She is ambulating without assistive device. There is not a limp.    ROS  General: denies fever and chills  Resp: no c/o sob  CVS: no c/o cp  MSK: c/o right knee pain    PE  General: AAOx3, pleasant and cooperative  Resp: respirations even and unlabored  MSK: right knee exam  0 degrees extension  120 degrees flexion  No warmth or erythema   - effusion    Xray:  Prosthesis in proper position and alignment    Assessment:  Right knee pain most likely related to post operative nerve pain    Plan:  CRP/ESR today- if elevated will aspirate the knee  Message sent to Dr. Lawson as requested to reestablish care for pain medication  Lyrica rx sent to pharmacy  [facility]  F/u with Dr. Padgett as scheduled or sooner as needed

## 2024-05-14 ENCOUNTER — OFFICE VISIT (OUTPATIENT)
Dept: PODIATRY | Facility: CLINIC | Age: 59
End: 2024-05-14
Payer: MEDICAID

## 2024-05-14 ENCOUNTER — PATIENT OUTREACH (OUTPATIENT)
Dept: ADMINISTRATIVE | Facility: HOSPITAL | Age: 59
End: 2024-05-14
Payer: MEDICAID

## 2024-05-14 VITALS
BODY MASS INDEX: 40.71 KG/M2 | HEIGHT: 66 IN | WEIGHT: 253.31 LBS | SYSTOLIC BLOOD PRESSURE: 149 MMHG | HEART RATE: 76 BPM | DIASTOLIC BLOOD PRESSURE: 94 MMHG

## 2024-05-14 DIAGNOSIS — M21.611 BUNION, RIGHT: Primary | ICD-10-CM

## 2024-05-14 DIAGNOSIS — M20.41 HAMMERTOE OF RIGHT FOOT: ICD-10-CM

## 2024-05-14 DIAGNOSIS — M79.671 FOOT PAIN, RIGHT: ICD-10-CM

## 2024-05-14 PROCEDURE — 3044F HG A1C LEVEL LT 7.0%: CPT | Mod: CPTII,,, | Performed by: PODIATRIST

## 2024-05-14 PROCEDURE — 3077F SYST BP >= 140 MM HG: CPT | Mod: CPTII,,, | Performed by: PODIATRIST

## 2024-05-14 PROCEDURE — 3080F DIAST BP >= 90 MM HG: CPT | Mod: CPTII,,, | Performed by: PODIATRIST

## 2024-05-14 PROCEDURE — 99214 OFFICE O/P EST MOD 30 MIN: CPT | Mod: PBBFAC | Performed by: PODIATRIST

## 2024-05-14 PROCEDURE — 99999 PR PBB SHADOW E&M-EST. PATIENT-LVL IV: CPT | Mod: PBBFAC,,, | Performed by: PODIATRIST

## 2024-05-14 PROCEDURE — 1159F MED LIST DOCD IN RCRD: CPT | Mod: CPTII,,, | Performed by: PODIATRIST

## 2024-05-14 PROCEDURE — 4010F ACE/ARB THERAPY RXD/TAKEN: CPT | Mod: CPTII,,, | Performed by: PODIATRIST

## 2024-05-14 PROCEDURE — 3008F BODY MASS INDEX DOCD: CPT | Mod: CPTII,,, | Performed by: PODIATRIST

## 2024-05-14 PROCEDURE — 99214 OFFICE O/P EST MOD 30 MIN: CPT | Mod: S$PBB,,, | Performed by: PODIATRIST

## 2024-05-14 RX ORDER — METHOCARBAMOL 750 MG/1
TABLET, FILM COATED ORAL
COMMUNITY
Start: 2024-05-01

## 2024-05-14 NOTE — PROGRESS NOTES
Health Maintenance Due   Topic Date Due    Shingles Vaccine (1 of 2) Never done    Eye Exam  05/23/2023    COVID-19 Vaccine (3 - 2023-24 season) 09/01/2023    Cervical Cancer Screening  02/25/2024   Health maintenance reviewed, updated and links triggered. A portal message sent for scheduling  of Eye and Cervical Exam. ([facility]) 5/14/24 Remote B/P reading needed

## 2024-05-14 NOTE — PROGRESS NOTES
"Subjective:     Patient ID: Yayo Carver is a 58 y.o. female.    Chief Complaint: Surgical Consult and Bunions (Right )    Yayo is a 58 y.o. female who presents to the podiatry clinic  with complaint of  right foot pain. Onset of the symptoms was several years ago. Precipitating event: none known. Current symptoms include: ability to bear weight, but with some pain, stiffness, worsening symptoms after a period of activity, and painful bump on the big toe joint . Aggravating factors: standing, walking, and shoes . Symptoms have progressed to a point and plateaued. Patient has had no prior foot problems. Evaluation to date: none. Treatment to date: rest and shoe changes. Avoids tight shoes and only wears wide soft tennis shoe type shoes. Had Left knee replacemetn 4 months ago and states the R foot bunion pain has worsened since then. L foot has small bunion that does not bother her at all. Patients rates pain 2/10 on pain scale.  Patient discussed surgical intervention today.     Vitals:    05/14/24 0949   BP: (!) 149/94   Pulse: 76   Weight: 114.9 kg (253 lb 4.9 oz)   Height: 5' 6" (1.676 m)   PainSc:   8   PainLoc: Foot         Review of Systems   Constitutional: Negative for chills and fever.   Cardiovascular:  Negative for chest pain, claudication and leg swelling.   Respiratory:  Negative for cough and shortness of breath.    Skin:  Negative for dry skin and nail changes.   Musculoskeletal:         R foot bunion, R foot pain   Gastrointestinal:  Negative for nausea and vomiting.   Neurological:  Negative for numbness and paresthesias.   Psychiatric/Behavioral:  Negative for altered mental status.         Objective:     Physical Exam  Vitals reviewed.   Constitutional:       Appearance: She is well-developed.   HENT:      Head: Normocephalic.   Cardiovascular:      Pulses:           Dorsalis pedis pulses are 2+ on the right side and 2+ on the left side.        Posterior tibial pulses are 2+ on the right side " "and 2+ on the left side.      Comments: DP and PT pulses are 2/4 bilaterally.        Pulmonary:      Effort: No respiratory distress.   Musculoskeletal:      Right lower leg: No edema.      Left lower leg: No edema.      Comments: Semi-reducible hammertoe contractures noted to toes 2-4 b/l-asymptomatic. HAV, mild L, moderate R, non trackbound noted b/l with mild L and moderate R dorsal and dorsal medial bony prominence at 1st met head. L foot asymptomatic. R foot with pain on palpation of dorsal medial 1st met head and with ROM/reduction of bunion deformity.     Hypermobility noted to 1st ray b/l with near complete collapse of medial longitudinal arch b/l with loading.     Equinus contracture noted to b/l ankles with knee straight and slightly improved with knee bent.      Skin:     General: Skin is warm and dry.      Findings: No erythema.      Comments: No open lesions, lacerations or wounds noted. Nails are normal to R 1-5 and L 1-5. Interdigital spaces clean, dry and intact b/l. No erythema noted to b/l foot. Skin texture normal. Pedal hair normal     Neurological:      Mental Status: She is alert and oriented to person, place, and time.      Sensory: No sensory deficit.      Comments: Light touch, proprioception, and sharp/dull sensation are all intact bilaterally.    Psychiatric:         Behavior: Behavior normal.         Thought Content: Thought content normal.         Judgment: Judgment normal.         Assessment:      Encounter Diagnoses   Name Primary?    Bunion, right Yes    Foot pain, right     Hammertoe of right foot            Plan:     Yayo Ramsay" was seen today for surgical consult and bunions.    Diagnoses and all orders for this visit:    Bunion, right    Foot pain, right    Hammertoe of right foot      The nature of the condition, options for management, as well as potential risks and complications were discussed in detail with patient. Patient was amenable to my recommendations and left my " office fully informed and will follow up as instructed or sooner if necessary.      Conservative surgical options discussed in detail.    Independent visualization of imaging was performed.  Results were reviewed in detail with patient.    The patient has decided to forego any further conservative treatment and opted for surgical intervention.  Alternative treatments, and benefits of surgery were discussed with the patient.  Risks and complications, including but not limited to: pain, swelling, numbness, infection, failure to achieve the stated goals, recurrence of problem, nerve and blood vessel damage, scar tissue formation, further need of surgery, loss of limb or life, was discussed in detail with the patient.  All questions asked were answered, and written informed consent was sign and received. The patient will undergo Lapidus bunionectomy right foot with 2nd and 3rd hammertoe correction/arthroplasty with probable Akin osteotomy.

## 2024-05-21 ENCOUNTER — PATIENT MESSAGE (OUTPATIENT)
Dept: PODIATRY | Facility: CLINIC | Age: 59
End: 2024-05-21
Payer: MEDICAID

## 2024-05-21 NOTE — TELEPHONE ENCOUNTER
----- Message from Pamela Beltrán sent at 5/21/2024 10:56 AM CDT -----  .Type:  RX Refill Request    Who Called? PT     Refill or New Rx? REFILL     RX Name and Strength? traMADoL (ULTRAM) 50 mg tablet    Preferred Pharmacy with phone number? [facility]  [phone]    Pt Call Back Number?  [phone]    Additional Information: PT IS ASKING TO BE SCHEDULED AND REFILL PT STATED SHE IN A LOT OF PAIN PLEASE ADVISE       Thank You

## 2024-05-22 DIAGNOSIS — E11.9 TYPE 2 DIABETES MELLITUS WITHOUT COMPLICATION, WITHOUT LONG-TERM CURRENT USE OF INSULIN: ICD-10-CM

## 2024-05-22 DIAGNOSIS — Z01.818 PREOP TESTING: Primary | ICD-10-CM

## 2024-05-22 RX ORDER — TRAMADOL HYDROCHLORIDE 50 MG/1
50-100 TABLET ORAL 3 TIMES DAILY PRN
Qty: 180 TABLET | Refills: 1 | Status: SHIPPED | OUTPATIENT
Start: 2024-05-23

## 2024-05-23 ENCOUNTER — TELEPHONE (OUTPATIENT)
Dept: PHYSICAL MEDICINE AND REHAB | Facility: CLINIC | Age: 59
End: 2024-05-23
Payer: MEDICAID

## 2024-05-23 ENCOUNTER — TELEPHONE (OUTPATIENT)
Dept: INTERNAL MEDICINE | Facility: CLINIC | Age: 59
End: 2024-05-23
Payer: MEDICAID

## 2024-05-23 NOTE — TELEPHONE ENCOUNTER
----- Message from Jonathon Chahal MD sent at 5/22/2024  3:44 PM CDT -----  Regarding: RE: surgery scheduler  Can schedule with me for preop visit.  I have ordered the labs which you can have done prior.  I have also ordered the EKG which she can have done prior  ----- Message -----  From: Zee Ovalles LPN  Sent: 5/22/2024   3:33 PM CDT  To: Jonathon Chahal MD; Lo Ozuna MA  Subject: FW: surgery scheduler                            Lo, with  Would you schedule this patient?    Thank you,  GAlla Ovalles LPN  ----- Message -----  From: Frannie Hawkins MA  Sent: 5/22/2024   2:18 PM CDT  To: [facility] Staff  Subject: surgery scheduler                                Yayo Carver is scheduled to have a BUNIONECTOMY, LAPIDUS Right Local MAC with Dr. Henrry Muñoz  on 7/12.  We request surgical clearance.  The patient has been instructed to contact your office; please assist in scheduling. We request a  EKG, CBC, BMP, and any other testing deemed necessary by your office. When completed please note chart  stating whether the patient is cleared., along with any results to zia Hawkins.  Please contact us if you have any questions.  Our office number is [phone].    Sincerely,      Frannie Hawkins MA  Foot and Ankle Surgery  [facility], Department of Podiatry

## 2024-05-23 NOTE — TELEPHONE ENCOUNTER
Spoke to Pt about her medication. I let her know that Dr. Lawson has sent the medication to the pharmacy. Pt was advised to call the pharmacy before going to make sure it was filled. While on the phone Pt was scheduled for a follow up on July 8.       ----- Message from Shauna Santillan sent at 5/23/2024  8:26 AM CDT -----  Regarding: refill  Contact: [phone]  Pt calling in refill for medication Tramadol,50 mg  pls call         [id number] [address]   Phone: [phone]  Fax: [phone]

## 2024-05-27 DIAGNOSIS — Z96.652 PRESENCE OF LEFT ARTIFICIAL KNEE JOINT: Primary | ICD-10-CM

## 2024-06-11 ENCOUNTER — OFFICE VISIT (OUTPATIENT)
Dept: ORTHOPEDICS | Facility: CLINIC | Age: 59
End: 2024-06-11
Payer: MEDICAID

## 2024-06-11 ENCOUNTER — HOSPITAL ENCOUNTER (OUTPATIENT)
Dept: RADIOLOGY | Facility: HOSPITAL | Age: 59
Discharge: HOME | End: 2024-06-11
Attending: ORTHOPAEDIC SURGERY
Payer: MEDICAID

## 2024-06-11 VITALS — BODY MASS INDEX: 39.86 KG/M2 | HEIGHT: 66 IN | WEIGHT: 248 LBS

## 2024-06-11 DIAGNOSIS — Z96.652 PRESENCE OF LEFT ARTIFICIAL KNEE JOINT: ICD-10-CM

## 2024-06-11 DIAGNOSIS — M70.51 PES ANSERINUS BURSITIS OF RIGHT KNEE: ICD-10-CM

## 2024-06-11 DIAGNOSIS — M76.51 PATELLAR TENDONITIS OF RIGHT KNEE: Primary | ICD-10-CM

## 2024-06-11 DIAGNOSIS — M70.61 GREATER TROCHANTERIC BURSITIS OF RIGHT HIP: ICD-10-CM

## 2024-06-11 PROCEDURE — 1159F MED LIST DOCD IN RCRD: CPT | Mod: CPTII,,, | Performed by: ORTHOPAEDIC SURGERY

## 2024-06-11 PROCEDURE — 99213 OFFICE O/P EST LOW 20 MIN: CPT | Mod: S$PBB,,, | Performed by: ORTHOPAEDIC SURGERY

## 2024-06-11 PROCEDURE — 3008F BODY MASS INDEX DOCD: CPT | Mod: CPTII,,, | Performed by: ORTHOPAEDIC SURGERY

## 2024-06-11 PROCEDURE — 99214 OFFICE O/P EST MOD 30 MIN: CPT | Mod: PBBFAC,25 | Performed by: ORTHOPAEDIC SURGERY

## 2024-06-11 PROCEDURE — 4010F ACE/ARB THERAPY RXD/TAKEN: CPT | Mod: CPTII,,, | Performed by: ORTHOPAEDIC SURGERY

## 2024-06-11 PROCEDURE — 99999 PR PBB SHADOW E&M-EST. PATIENT-LVL IV: CPT | Mod: PBBFAC,,, | Performed by: ORTHOPAEDIC SURGERY

## 2024-06-11 PROCEDURE — 73562 X-RAY EXAM OF KNEE 3: CPT | Mod: TC,LT

## 2024-06-11 PROCEDURE — 73562 X-RAY EXAM OF KNEE 3: CPT | Mod: 26,LT,, | Performed by: RADIOLOGY

## 2024-06-11 PROCEDURE — 3044F HG A1C LEVEL LT 7.0%: CPT | Mod: CPTII,,, | Performed by: ORTHOPAEDIC SURGERY

## 2024-06-11 RX ORDER — METHYLPREDNISOLONE 4 MG/1
TABLET ORAL
Qty: 1 EACH | Refills: 0 | Status: SHIPPED | OUTPATIENT
Start: 2024-06-11 | End: 2024-06-11

## 2024-06-11 RX ORDER — METHYLPREDNISOLONE 4 MG/1
TABLET ORAL
Qty: 21 EACH | Refills: 0 | Status: SHIPPED | OUTPATIENT
Start: 2024-06-11

## 2024-06-11 NOTE — PROGRESS NOTES
"  Subjective:     HPI:   Yayo Carver is a 58 y.o. female who presents for eval R knee    L TKA 12/19/22  R TKA 6/26/23    Last seen 9/21/23 12 week R TKA  Doing well    2/16/24: BRYON  Dx pes bursitis - did pes CSI - helped a good bit for unknown period of time "didn't bother me for a while"    Note 4/27/24: SB  Pt is currently working the [employer] at [employer] for 8 hours each day. She is having knee pain unrelieved with tramadol. Will send in a one time additional pain medication prescription. She is a pain management patient of Dr. Lawson's and understands that this is a one-time fill. She will f/u in clinic if no improvement in her knee pain after [employer].     5/13/24: BRYON  R knee pain - nerve pain  CRP/ESR - CRP 0.8, ESR 43  Rx lyrica - taking it, helps  Ref to re-[facility] care with michael for pain medications - #180 tramadol 5/22/24    Today:   R ant knee pain - vague anterior  Hypersensitive   No lbp/buttock  +groin pain, AT pain - x2 months  Feels like a knot ant lat hip  No radicular     History of Present Illness  The patient presents for evaluation of multiple medical concerns.    The patient was last evaluated in 09/2023 for a 12-week follow-up for her right knee. Her right knee was functioning optimally at that time. She sought consultation with Dr. Mcclelland for her hand and is considering scheduling a follow-up appointment. On 02/16/2024, she consulted with Meghan, who suspected pes bursitis and administered a steroid and lidocaine injection, which provided some relief. However, she is uncertain of the duration of the injection. A note from 04/27/2023 indicated increased pain, for which she was prescribed tramadol. Today, she reports pain in the anterior knee, but denies any numbness. She experiences hypersensitivity when wearing brushes, which occasionally impedes her ability to put her hands on. She denies experiencing low back or buttock pain, but does report groin pain and anterior thigh pain, which " have been present for approximately 2 months. She denies any radiating pain down her legs to her toes. Her right knee remains significantly larger than her left knee.    The patient reports experiencing pain in her hip, particularly when lying on her right side. She describes a sensation of a small knot in her groin. Meghan in 05/2023 suspected nerve pain. Inflammatory markers were checked, and her CRP was normal. She was prescribed Lyrica, but it was discontinued due to a gastro sleeve procedure. She is currently taking Lyrica twice daily, which she reports as beneficial. Dr. Lawson prescribed tramadol last month.    Supplemental Information  She had a gastro sleeve. She is on Ozempic.       Objective:   Body mass index is 40.03 kg/m².  Exam:    Physical Exam  No limp, nonantalgic gait, negative Trendelenburg. Tender to palpation at the greater trochanter. No groin pain with a straight leg raise. Hip flexion 30, abduction 20, abduction 30, external 20, internal rotation. No pain with active or passive range of motion of her hip joint. Knee range of motion is 0 to 100 degrees, 6 degrees valgus alignment. Knee is stable to anterior and posterior varus and valgus stresses. No flexion contracture or extensor lag. Tender to palpation at the pes anserinus, patellar tendon, and distal quad tendon. No significant effusion. Nontender medial and lateral patellofemoral joint line. No significant hypersensitivity across the front of the knee to light touch. Distally neurovascularly intact with good strength, sensation, and pulses.        Imaging:    Results  Laboratory Studies  CRP was normal.    Imaging  X-rays of the knee show no shifting, moving, changing, or coming out of place.    R TKA no change, well fixed/oriented no wear      Assessment/Plan:       ICD-10-CM ICD-9-CM   1. Greater trochanteric bursitis of right hip  M70.61 726.5   2. Patellar tendonitis of right knee  M76.51 726.64   3. Pes anserinus bursitis of  right knee  M70.51 726.61        R TKA mechanically well functioning  ?nerve pain lower on diff today: No hypersensitivity to light touch  Multiple tendon issues: GTB, patellar tendon, distal quad tendon, pes   -likely overload, BMI, jazz fest       Assessment/Plan:     Assessment & Plan  1. Patellar tendinitis.  The patient's knee appears to be functioning optimally, moving well, and stability is satisfactory. There is no evidence of loosening, wearing, or displacement. Nerves could be contributing to her symptoms, but these are currently low. She has multiple tendon and soft tissue issues.    2. Hip bursitis.  The patient's hip joint appears normal, and there is no evidence of hip arthritis.    Rx medrol dose pack   Rx PT: Eval and treat with modalities, R hip and knee pain, s/p TKA: Multiple tendon issues: GTB, patellar tendon, distal quad tendon, pes, 2x/week x6 weeks    Wt loss: now on ozempic    Call/message with f/u in 3-4 weeks with update   If hip - sports med for US guided GTB injection  If knee - f/u with me    No orders of the defined types were placed in this encounter.      This note was generated with the assistance of ambient listening technology. Verbal consent was obtained by the patient and accompanying visitor(s) for the recording of patient appointment to facilitate this note. I attest to having reviewed and edited the generated note for accuracy, though some syntax or spelling errors may persist. Please contact the author of this note for any clarification.            Past Medical History:   Diagnosis Date    Arthritis     Diabetes mellitus     Diabetes mellitus, type 2     GERD (gastroesophageal reflux disease)     HLD (hyperlipidemia)     Hypertension        Past Surgical History:   Procedure Laterality Date    BARIATRIC SURGERY  2022    Gastric sleeve     SECTION  1989    COLONOSCOPY N/A 2017    Procedure: COLONOSCOPY;  Surgeon: Evelin Arceo MD;  Location: [facility]  ENDO (2ND FLR);  Service: Endoscopy;  Laterality: N/A;  2 nd floor d/t BMI > 50 kg/m3    KNEE ARTHROSCOPY Right 1999    TONSILLECTOMY      TOTAL KNEE ARTHROPLASTY Left 12/19/2022    Procedure: ARTHROPLASTY, KNEE, TOTAL: LEFT: DEPUY - ATTUNE;  Surgeon: Jc Padgett III, MD;  Location: [facility] OR;  Service: Orthopedics;  Laterality: Left;    TOTAL KNEE ARTHROPLASTY Right 6/26/2023    Procedure: ARTHROPLASTY, KNEE, TOTAL: RIGHT: DEPUY - ATTUNE;  Surgeon: Jc Padgett III, MD;  Location: [facility] OR;  Service: Orthopedics;  Laterality: Right;  90 minutes    TUBAL LIGATION         Family History   Problem Relation Name Age of Onset    Cancer Mother Barb     Breast cancer Mother Barb 65    Diabetes Mother Barb     Colon polyps Mother Barb     Hypertension Mother Barb     Diabetes Father Fuentes Cardenas     Breast cancer Sister Salvatore 56    Cancer Sister Salvatore     No Known Problems Brother Fuentes     Arthritis Brother Armando     No Known Problems Daughter      Kidney disease Son      Diabetes Son      Breast cancer Maternal Aunt      Diabetes Paternal Uncle      Breast cancer Maternal Grandmother      Colon cancer Maternal Grandfather      Diabetes Paternal Grandmother Classic jacksom     Heart disease Paternal Grandmother Classic jacksom     No Known Problems Paternal Grandfather      Esophageal cancer Neg Hx      Irritable bowel syndrome Neg Hx      Rectal cancer Neg Hx      Stomach cancer Neg Hx      Ulcerative colitis Neg Hx      Celiac disease Neg Hx      Crohn's disease Neg Hx      Amblyopia Neg Hx      Blindness Neg Hx      Cataracts Neg Hx      Glaucoma Neg Hx      Macular degeneration Neg Hx      Retinal detachment Neg Hx      Strabismus Neg Hx      Stroke Neg Hx      Thyroid disease Neg Hx         Social History     Socioeconomic History    Marital status:    Occupational History    Occupation: Supervisor for ushers at [employer]     Employer: [employer]   Tobacco Use    Smoking status: Never     Smokeless tobacco: Never   Substance and Sexual Activity    Alcohol use: Not Currently     Comment: occasional    Drug use: No    Sexual activity: Yes     Partners: Male     Birth control/protection: None     Comment: occasionally   Social History Narrative    ** Merged History Encounter **         Pt lives alone.  Has a college-age son who comes and goes.  Has 3 other adult children.       Social Determinants of Health     Financial Resource Strain: Low Risk  (12/21/2023)    Overall Financial Resource Strain (CARDIA)     Difficulty of Paying Living Expenses: Not very hard   Food Insecurity: No Food Insecurity (12/21/2023)    Hunger Vital Sign     Worried About Running Out of Food in the Last Year: Never true     Ran Out of Food in the Last Year: Never true   Transportation Needs: No Transportation Needs (12/21/2023)    PRAPARE - Transportation     Lack of Transportation (Medical): No     Lack of Transportation (Non-Medical): No   Physical Activity: Sufficiently Active (12/21/2023)    Exercise Vital Sign     Days of Exercise per Week: 7 days     Minutes of Exercise per Session: 40 min   Stress: Stress Concern Present (12/21/2023)    [place] [employer] - Occupational Stress Questionnaire     Feeling of Stress : To some extent   Housing Stability: Low Risk  (12/21/2023)    Housing Stability Vital Sign     Unable to Pay for Housing in the Last Year: No     Number of Places Lived in the Last Year: 1     Unstable Housing in the Last Year: No

## 2024-06-24 ENCOUNTER — OFFICE VISIT (OUTPATIENT)
Dept: INTERNAL MEDICINE | Facility: CLINIC | Age: 59
End: 2024-06-24
Payer: MEDICAID

## 2024-06-24 ENCOUNTER — HOSPITAL ENCOUNTER (OUTPATIENT)
Dept: CARDIOLOGY | Facility: OTHER | Age: 59
Discharge: HOME | End: 2024-06-24
Attending: STUDENT IN AN ORGANIZED HEALTH CARE EDUCATION/TRAINING PROGRAM
Payer: MEDICAID

## 2024-06-24 VITALS
WEIGHT: 253.75 LBS | DIASTOLIC BLOOD PRESSURE: 60 MMHG | OXYGEN SATURATION: 97 % | BODY MASS INDEX: 40.78 KG/M2 | HEIGHT: 66 IN | SYSTOLIC BLOOD PRESSURE: 111 MMHG | HEART RATE: 76 BPM

## 2024-06-24 DIAGNOSIS — Z01.818 PREOP TESTING: ICD-10-CM

## 2024-06-24 DIAGNOSIS — E66.01 MORBID OBESITY WITH BMI OF 50.0-59.9, ADULT: ICD-10-CM

## 2024-06-24 DIAGNOSIS — D50.9 MICROCYTIC ANEMIA: ICD-10-CM

## 2024-06-24 DIAGNOSIS — I10 ESSENTIAL HYPERTENSION: ICD-10-CM

## 2024-06-24 DIAGNOSIS — E11.9 TYPE 2 DIABETES MELLITUS WITHOUT COMPLICATION, WITHOUT LONG-TERM CURRENT USE OF INSULIN: Primary | ICD-10-CM

## 2024-06-24 PROCEDURE — 3074F SYST BP LT 130 MM HG: CPT | Mod: CPTII,,, | Performed by: STUDENT IN AN ORGANIZED HEALTH CARE EDUCATION/TRAINING PROGRAM

## 2024-06-24 PROCEDURE — 3044F HG A1C LEVEL LT 7.0%: CPT | Mod: CPTII,,, | Performed by: STUDENT IN AN ORGANIZED HEALTH CARE EDUCATION/TRAINING PROGRAM

## 2024-06-24 PROCEDURE — 99999 PR PBB SHADOW E&M-EST. PATIENT-LVL III: CPT | Mod: PBBFAC,,, | Performed by: STUDENT IN AN ORGANIZED HEALTH CARE EDUCATION/TRAINING PROGRAM

## 2024-06-24 PROCEDURE — 3078F DIAST BP <80 MM HG: CPT | Mod: CPTII,,, | Performed by: STUDENT IN AN ORGANIZED HEALTH CARE EDUCATION/TRAINING PROGRAM

## 2024-06-24 PROCEDURE — 3008F BODY MASS INDEX DOCD: CPT | Mod: CPTII,,, | Performed by: STUDENT IN AN ORGANIZED HEALTH CARE EDUCATION/TRAINING PROGRAM

## 2024-06-24 PROCEDURE — 99213 OFFICE O/P EST LOW 20 MIN: CPT | Mod: PBBFAC | Performed by: STUDENT IN AN ORGANIZED HEALTH CARE EDUCATION/TRAINING PROGRAM

## 2024-06-24 PROCEDURE — 93010 ELECTROCARDIOGRAM REPORT: CPT | Mod: ,,, | Performed by: INTERNAL MEDICINE

## 2024-06-24 PROCEDURE — 93005 ELECTROCARDIOGRAM TRACING: CPT

## 2024-06-24 PROCEDURE — 1159F MED LIST DOCD IN RCRD: CPT | Mod: CPTII,,, | Performed by: STUDENT IN AN ORGANIZED HEALTH CARE EDUCATION/TRAINING PROGRAM

## 2024-06-24 PROCEDURE — 4010F ACE/ARB THERAPY RXD/TAKEN: CPT | Mod: CPTII,,, | Performed by: STUDENT IN AN ORGANIZED HEALTH CARE EDUCATION/TRAINING PROGRAM

## 2024-06-24 PROCEDURE — 99214 OFFICE O/P EST MOD 30 MIN: CPT | Mod: S$PBB,,, | Performed by: STUDENT IN AN ORGANIZED HEALTH CARE EDUCATION/TRAINING PROGRAM

## 2024-06-24 RX ORDER — SEMAGLUTIDE 1.34 MG/ML
1 INJECTION, SOLUTION SUBCUTANEOUS
Qty: 3 ML | Refills: 2 | Status: SHIPPED | OUTPATIENT
Start: 2024-06-24 | End: 2024-09-22

## 2024-06-24 NOTE — PROGRESS NOTES
[facility]  Subjective:     Patient ID: Yayo Carver is a 58 y.o. female.  Chief Complaint:  Follow up of type 2 diabetes, hypertension,  HPI:  Patient is a 58 y.o. female who presents for the above chief complaint.     Presents for routine follow up DM, HTN which are both well-controlled on current regimen    Fuentes Estevez is her [facility] physician.  He previously increased her dose of Ozempic from 0.5-1 mg.  She was taking 3 doses.  Tolerating this well.  Has a bowel movement every morning.  Occasionally uses a laxative to reduce bloating.     Weight is up slightly.   She doesn't have much of an apetitie throiught the day but becomes hungry at night. She eats some fruits and drinks water to satisfy her cravings.     Breakfast is a protein shake or greek yogurt with granola. For lunch has two small chicken wings and a small salad with water. For snacks has fruit. Does not each chips, soft drinks of bread. Dinner is a vegetable such as spinach or broccoli with a protein.     Walks for exercise. Stays hydrated. Drinks low calorie sports drinks to maintain hydration when walking outdoors.  She pays attention to how much added sugar they have and tries to limit this.       Current Outpatient Medications   Medication Instructions    acetaminophen (TYLENOL) 650 mg, Oral, Every 8 hours    amLODIPine (NORVASC) 10 mg, Oral, Daily    aspirin (ECOTRIN) 81 MG EC tablet 1 tablet, Daily    atorvastatin (LIPITOR) 40 mg, Oral, Daily    b complex vitamins capsule 1 capsule, Oral, Daily    cholecalciferol (vitamin D3) (VITAMIN D3) 2,000 Units, Oral, Daily    flash glucose sensor (FREESTYLE HEENA 14 DAY SENSOR) Kit Apply to skin for 14 days.  Check blood glucose before meals and nightly.    flu vacc [id number] 6mos up,PF, (FLUARIX QUAD [id number], PF,) 60 mcg (15 mcg x 4)/0.5 mL Syrg 0.5 mLs, Intramuscular, Once    fluticasone propionate (FLONASE) 50 mcg/actuation nasal spray Instill 2 sprays (100 mcg total)  "by Each Nostril route once daily.    LIDOcaine-prilocaine (EMLA) cream Topical (Top), Every 6-8 hours PRN, Apply to area of foot pain as needed every 6-8 hrs    methylPREDNISolone (MEDROL DOSEPACK) 4 mg tablet Take as directed on package    multivitamin (THERAGRAN) per tablet 1 tablet, Oral, Daily    OZEMPIC 1 mg, Subcutaneous, Every 7 days    pantoprazole (PROTONIX) 40 mg, Oral, Daily    terbinafine HCL (LAMISIL) 250 mg, Oral, Daily, Avoid alcohol while on medication    traMADoL (ULTRAM)  mg, Oral, 3 times daily PRN    valsartan (DIOVAN) 80 mg, Oral, Daily    varicella-zoster [id number], PF, (SHINGRIX, PF,) 50 mcg/0.5 mL injection 0.5 mLs, Intramuscular, Once     Objective:      Body mass index is 40.96 kg/m².  Vitals:    06/24/24 1510   BP: 111/60   Pulse: 76   SpO2: 97%   Weight: 115.1 kg (253 lb 12 oz)   Height: 5' 6" (1.676 m)   PainSc: 0-No pain     Physical Exam  Constitutional:       Appearance: Normal appearance.   Cardiovascular:      Rate and Rhythm: Normal rate.      Heart sounds: No murmur heard.  Pulmonary:      Effort: Pulmonary effort is normal. No respiratory distress.   Abdominal:      General: Abdomen is flat.   Musculoskeletal:      Right lower leg: No edema.      Left lower leg: No edema.   Neurological:      General: No focal deficit present.      Mental Status: She is alert.   Psychiatric:         Mood and Affect: Mood normal.           Assessment:       1. Type 2 diabetes mellitus without complication, without long-term current use of insulin    2. Essential hypertension    3. Morbid obesity with BMI of 50.0-59.9, adult    4. Microcytic anemia        Plan:   Hypertension and diabetes are both very well-controlled on current regimen.  She has a very stable mild microcytic anemia.  Likely a thalassemia trait.  Is up-to-date on colonoscopy with recent colonoscopy in 2017 showing no polyps.   Continue current dose of Ozempic for now.  If she does not achieve significant weight loss in the 1 " mg can increase to the 2 mg.    She does not have diabetes on insulin.  Has no history of ischemic heart disease or heart failure.  Serum creatinine is less than 2.0.  She was medically optimized for surgery.  EKG today shows normal sinus rhythm.  Can proceed without further testing.       Type 2 diabetes mellitus without complication, without long-term current use of insulin  -     semaglutide (OZEMPIC) 1 mg/dose (4 mg/3 mL); Inject 1 mg into the skin every 7 days.  Dispense: 3 mL; Refill: 2    Essential hypertension    Morbid obesity with BMI of 50.0-59.9, adult    Microcytic anemia        Tests to Keep You Healthy    Mammogram: Met on 12/21/2023  Eye Exam: ORDERED BUT NOT SCHEDULED  Colon Cancer Screening: Met on 6/27/2017  Cervical Cancer Screening: ORDERED  Last Blood Pressure <= 139/89 (6/24/2024): NO  Last HbA1c < 8 (03/27/2024): Yes    Follow up in about 4 months (around 10/24/2024) for for follow up HTN. or sooner prn (as needed)          Jonathon Chahal  [facility]  [address] [address]  Phone [phone]  Fax [phone]    This note is dictated using the M*Modal Fluency Direct word recognition program. It may contain word recognition mistakes or wrong word substitutions (commonly he/she and is/was substitutions) that were missed on review.

## 2024-06-25 LAB
OHS QRS DURATION: 92 MS
OHS QTC CALCULATION: 413 MS

## 2024-06-27 ENCOUNTER — TELEPHONE (OUTPATIENT)
Dept: PHYSICAL MEDICINE AND REHAB | Facility: CLINIC | Age: 59
End: 2024-06-27
Payer: MEDICAID

## 2024-07-08 DIAGNOSIS — M21.611 BUNION, RIGHT: Primary | ICD-10-CM

## 2024-07-09 ENCOUNTER — DOCUMENTATION ONLY (OUTPATIENT)
Dept: REHABILITATION | Facility: HOSPITAL | Age: 59
End: 2024-07-09

## 2024-07-09 ENCOUNTER — PATIENT MESSAGE (OUTPATIENT)
Dept: PODIATRY | Facility: CLINIC | Age: 59
End: 2024-07-09
Payer: MEDICAID

## 2024-07-09 DIAGNOSIS — M21.611 BUNION OF GREAT TOE OF RIGHT FOOT: Primary | ICD-10-CM

## 2024-07-09 NOTE — PROGRESS NOTES
Physical Therapy: No show/Cancellation of Visit  Date: 07/09/2024    Patient was a no show to today's PT appointment 7/9/2024 after confirming appointment. Patient cancelled previous PT evaluation on 6/20/2024 after appointment confirmation.     Cancel: 1  No show: 1    Therapist: Deja Kennedy, PT, DPT, OCS

## 2024-07-10 ENCOUNTER — TELEPHONE (OUTPATIENT)
Dept: PODIATRY | Facility: CLINIC | Age: 59
End: 2024-07-10

## 2024-07-11 ENCOUNTER — TELEPHONE (OUTPATIENT)
Dept: PODIATRY | Facility: CLINIC | Age: 59
End: 2024-07-11

## 2024-07-11 NOTE — PRE-PROCEDURE INSTRUCTIONS
Patient was informed of pre-procedure instructions and arrival time. Pt verbalized understanding and is to be accompanied by granddaughter.    Dear Elisa ,     You are scheduled for a procedure with Dr. Muñoz on 7/12/2024. Your scheduled arrival time is 9:15 am.  This arrival time is roughly 2 hours before your anticipated procedure time to allow sufficient time for pre-op.  Please wear comfortable clothing  This procedure will take place at the [facility] at the corner of [facility].  It is in the [facility] [place] next to [facility]. The address is:     [address] [place] [address].  [place]aur [id number]     After entering the building, proceed to the second floor and check in with registration.      Your fasting instructions are as follows:     Nothing to eat after midnight the evening before your surgery.   You may drink clear liquids up until 2 hours prior to your arrival time.      You MUST have a responsible adult to bring you home.     The evening before and morning of your procedure, please hold the following medications:  Aspirin and Aspirin-containing products (Goody's powder, Excedrin)  NSAIDs (Advil, Ibuprofen, Aleve, Diclofenac)  Vitamins/Supplements  Herbal remedies/Teas  Stimulants (Adderall, Vyvanse, Adipex)  Diabetic medication (Please bring with you day of procedure)  Prescription creams/gels/lotions        *May take Tylenol if needed         The evening before and morning of your procedure, take a shower using antibacterial soap (ex: Hibiclens or Dial antibacterial soap). DO NOT apply deodorant, lotion, cologne, or anything else to the skin. Do not wear jewelry or bring any valuables with you.  .     If you wear dentures please bring your case with you or leave them at home.  If you wear  contact lenses, please do not wear them on the day of your procedure.   Bring any inhalers that you may need     If you have any procedure-specific questions, please call  your surgeon's office. Any other questions, don't hesitate to call at [phone]     Thanks,  Pre-Admit Testing  Anesthesia Dept OC

## 2024-07-11 NOTE — TELEPHONE ENCOUNTER
Spoke to pt and relayed their 9am  arrival time for surgery. Also informed pt to not eat or drink after midnight including gum, hard candy or mints. Also that the pt must have a ride home from surgery, they will not be allowed to drive after anesthesia. Pt verbalized understanding and call ended. Post/op visit with Dr. Muñoz 7/18 at 9:30

## 2024-07-12 ENCOUNTER — HOSPITAL ENCOUNTER (OUTPATIENT)
Dept: RADIOLOGY | Facility: HOSPITAL | Age: 59
Discharge: HOME | End: 2024-07-12
Attending: PODIATRIST | Admitting: PODIATRIST

## 2024-07-12 ENCOUNTER — HOSPITAL ENCOUNTER (OUTPATIENT)
Facility: HOSPITAL | Age: 59
Discharge: HOME | End: 2024-07-12
Attending: PODIATRIST | Admitting: PODIATRIST
Payer: MEDICAID

## 2024-07-12 ENCOUNTER — ANESTHESIA (OUTPATIENT)
Dept: SURGERY | Facility: HOSPITAL | Age: 59
End: 2024-07-12

## 2024-07-12 ENCOUNTER — ANESTHESIA EVENT (OUTPATIENT)
Dept: SURGERY | Facility: HOSPITAL | Age: 59
End: 2024-07-12

## 2024-07-12 VITALS
WEIGHT: 240 LBS | RESPIRATION RATE: 16 BRPM | SYSTOLIC BLOOD PRESSURE: 142 MMHG | OXYGEN SATURATION: 100 % | HEIGHT: 66 IN | BODY MASS INDEX: 38.57 KG/M2 | HEART RATE: 76 BPM | DIASTOLIC BLOOD PRESSURE: 92 MMHG | TEMPERATURE: 98 F

## 2024-07-12 DIAGNOSIS — M21.619 BUNION: ICD-10-CM

## 2024-07-12 DIAGNOSIS — M21.611 BUNION OF GREAT TOE OF RIGHT FOOT: ICD-10-CM

## 2024-07-12 LAB — POCT GLUCOSE: 102 MG/DL (ref 70–110)

## 2024-07-12 PROCEDURE — C1713 ANCHOR/SCREW BN/BN,TIS/BN: HCPCS | Performed by: PODIATRIST

## 2024-07-12 PROCEDURE — 94761 N-INVAS EAR/PLS OXIMETRY MLT: CPT

## 2024-07-12 PROCEDURE — 25000003 PHARM REV CODE 250: Performed by: PODIATRIST

## 2024-07-12 PROCEDURE — 25000003 PHARM REV CODE 250: Performed by: UROLOGY

## 2024-07-12 PROCEDURE — 63600175 PHARM REV CODE 636 W HCPCS: Performed by: NURSE ANESTHETIST, CERTIFIED REGISTERED

## 2024-07-12 PROCEDURE — 63600175 PHARM REV CODE 636 W HCPCS: Mod: JG | Performed by: PODIATRIST

## 2024-07-12 PROCEDURE — 37000009 HC ANESTHESIA EA ADD 15 MINS: Performed by: PODIATRIST

## 2024-07-12 PROCEDURE — 99900035 HC TECH TIME PER 15 MIN (STAT)

## 2024-07-12 PROCEDURE — 36000709 HC OR TIME LEV III EA ADD 15 MIN: Performed by: PODIATRIST

## 2024-07-12 PROCEDURE — 28310 REVISION OF BIG TOE: CPT | Mod: 59,51,T5, | Performed by: PODIATRIST

## 2024-07-12 PROCEDURE — 36000707: Performed by: PODIATRIST

## 2024-07-12 PROCEDURE — 27201423 OPTIME MED/SURG SUP & DEVICES STERILE SUPPLY: Performed by: PODIATRIST

## 2024-07-12 PROCEDURE — 36000708 HC OR TIME LEV III 1ST 15 MIN: Performed by: PODIATRIST

## 2024-07-12 PROCEDURE — 71000015 HC POSTOP RECOV 1ST HR: Performed by: PODIATRIST

## 2024-07-12 PROCEDURE — 28285 REPAIR OF HAMMERTOE: CPT | Mod: 51,T6,T7, | Performed by: PODIATRIST

## 2024-07-12 PROCEDURE — 82962 GLUCOSE BLOOD TEST: CPT | Performed by: PODIATRIST

## 2024-07-12 PROCEDURE — 63600175 PHARM REV CODE 636 W HCPCS: Performed by: UROLOGY

## 2024-07-12 PROCEDURE — 27685 REVISION OF LOWER LEG TENDON: CPT | Mod: 59,51,RT, | Performed by: PODIATRIST

## 2024-07-12 PROCEDURE — 25000003 PHARM REV CODE 250: Performed by: NURSE ANESTHETIST, CERTIFIED REGISTERED

## 2024-07-12 PROCEDURE — 71000033 HC RECOVERY, INTIAL HOUR: Performed by: PODIATRIST

## 2024-07-12 PROCEDURE — 37000008 HC ANESTHESIA 1ST 15 MINUTES: Performed by: PODIATRIST

## 2024-07-12 PROCEDURE — 28297 COR HLX VLGS JT ARTHRD: CPT | Mod: RT,,, | Performed by: PODIATRIST

## 2024-07-12 PROCEDURE — 76000 FLUOROSCOPY <1 HR PHYS/QHP: CPT | Mod: TC

## 2024-07-12 PROCEDURE — 36000706: Performed by: PODIATRIST

## 2024-07-12 DEVICE — IMPLANTABLE DEVICE: Type: IMPLANTABLE DEVICE | Site: FOOT | Status: FUNCTIONAL

## 2024-07-12 RX ORDER — SODIUM CITRATE AND CITRIC ACID MONOHYDRATE 334; 500 MG/5ML; MG/5ML
30 SOLUTION ORAL ONCE
Status: COMPLETED | OUTPATIENT
Start: 2024-07-12 | End: 2024-07-12

## 2024-07-12 RX ORDER — LIDOCAINE HYDROCHLORIDE 20 MG/ML
INJECTION INTRAVENOUS
Status: DISCONTINUED | OUTPATIENT
Start: 2024-07-12 | End: 2024-07-12

## 2024-07-12 RX ORDER — SODIUM CHLORIDE 9 MG/ML
INJECTION, SOLUTION INTRAVENOUS CONTINUOUS
Status: DISCONTINUED | OUTPATIENT
Start: 2024-07-12 | End: 2024-07-12 | Stop reason: HOSPADM

## 2024-07-12 RX ORDER — MIDAZOLAM HYDROCHLORIDE 1 MG/ML
INJECTION INTRAMUSCULAR; INTRAVENOUS
Status: DISCONTINUED | OUTPATIENT
Start: 2024-07-12 | End: 2024-07-12

## 2024-07-12 RX ORDER — HYDROCODONE BITARTRATE AND ACETAMINOPHEN 7.5; 325 MG/1; MG/1
1 TABLET ORAL EVERY 6 HOURS PRN
Qty: 28 TABLET | Refills: 0 | Status: SHIPPED | OUTPATIENT
Start: 2024-07-12 | End: 2024-07-18

## 2024-07-12 RX ORDER — SULFAMETHOXAZOLE AND TRIMETHOPRIM 400; 80 MG/1; MG/1
1 TABLET ORAL 2 TIMES DAILY
Qty: 14 TABLET | Refills: 0 | Status: SHIPPED | OUTPATIENT
Start: 2024-07-12

## 2024-07-12 RX ORDER — PROPOFOL 10 MG/ML
VIAL (ML) INTRAVENOUS
Status: DISCONTINUED | OUTPATIENT
Start: 2024-07-12 | End: 2024-07-12

## 2024-07-12 RX ORDER — SODIUM CHLORIDE 0.9 % (FLUSH) 0.9 %
10 SYRINGE (ML) INJECTION
Status: DISCONTINUED | OUTPATIENT
Start: 2024-07-12 | End: 2024-07-12 | Stop reason: HOSPADM

## 2024-07-12 RX ORDER — CEFAZOLIN SODIUM 1 G/3ML
INJECTION, POWDER, FOR SOLUTION INTRAMUSCULAR; INTRAVENOUS
Status: DISCONTINUED | OUTPATIENT
Start: 2024-07-12 | End: 2024-07-12

## 2024-07-12 RX ORDER — BUPIVACAINE HYDROCHLORIDE 5 MG/ML
INJECTION, SOLUTION PERINEURAL
Status: DISCONTINUED | OUTPATIENT
Start: 2024-07-12 | End: 2024-07-12 | Stop reason: HOSPADM

## 2024-07-12 RX ORDER — KETOROLAC TROMETHAMINE 30 MG/ML
INJECTION, SOLUTION INTRAMUSCULAR; INTRAVENOUS
Status: DISCONTINUED | OUTPATIENT
Start: 2024-07-12 | End: 2024-07-12

## 2024-07-12 RX ORDER — PROPOFOL 10 MG/ML
VIAL (ML) INTRAVENOUS CONTINUOUS PRN
Status: DISCONTINUED | OUTPATIENT
Start: 2024-07-12 | End: 2024-07-12

## 2024-07-12 RX ORDER — FAMOTIDINE 10 MG/ML
INJECTION INTRAVENOUS
Status: DISCONTINUED | OUTPATIENT
Start: 2024-07-12 | End: 2024-07-12

## 2024-07-12 RX ORDER — ONDANSETRON HYDROCHLORIDE 2 MG/ML
INJECTION, SOLUTION INTRAVENOUS
Status: DISCONTINUED | OUTPATIENT
Start: 2024-07-12 | End: 2024-07-12

## 2024-07-12 RX ORDER — LIDOCAINE HYDROCHLORIDE 10 MG/ML
INJECTION INFILTRATION; PERINEURAL
Status: DISCONTINUED | OUTPATIENT
Start: 2024-07-12 | End: 2024-07-12 | Stop reason: HOSPADM

## 2024-07-12 RX ORDER — FENTANYL CITRATE 50 UG/ML
INJECTION, SOLUTION INTRAMUSCULAR; INTRAVENOUS
Status: DISCONTINUED | OUTPATIENT
Start: 2024-07-12 | End: 2024-07-12

## 2024-07-12 RX ORDER — DEXAMETHASONE SODIUM PHOSPHATE 4 MG/ML
INJECTION, SOLUTION INTRA-ARTICULAR; INTRALESIONAL; INTRAMUSCULAR; INTRAVENOUS; SOFT TISSUE
Status: DISCONTINUED | OUTPATIENT
Start: 2024-07-12 | End: 2024-07-12

## 2024-07-12 RX ORDER — METOCLOPRAMIDE HYDROCHLORIDE 5 MG/ML
INJECTION INTRAMUSCULAR; INTRAVENOUS
Status: DISCONTINUED | OUTPATIENT
Start: 2024-07-12 | End: 2024-07-12

## 2024-07-12 RX ADMIN — DEXAMETHASONE SODIUM PHOSPHATE 8 MG: 4 INJECTION INTRA-ARTICULAR; INTRALESIONAL; INTRAMUSCULAR; INTRAVENOUS; SOFT TISSUE at 12:07

## 2024-07-12 RX ADMIN — SODIUM CHLORIDE: 9 INJECTION, SOLUTION INTRAVENOUS at 09:07

## 2024-07-12 RX ADMIN — ONDANSETRON 4 MG: 2 INJECTION INTRAMUSCULAR; INTRAVENOUS at 01:07

## 2024-07-12 RX ADMIN — METOCLOPRAMIDE 10 MG: 5 INJECTION, SOLUTION INTRAMUSCULAR; INTRAVENOUS at 11:07

## 2024-07-12 RX ADMIN — FENTANYL CITRATE 50 MCG: 50 INJECTION, SOLUTION INTRAMUSCULAR; INTRAVENOUS at 12:07

## 2024-07-12 RX ADMIN — SODIUM CITRATE AND CITRIC ACID MONOHYDRATE 30 ML: 500; 334 SOLUTION ORAL at 11:07

## 2024-07-12 RX ADMIN — PROPOFOL 100 MCG/KG/MIN: 10 INJECTION, EMULSION INTRAVENOUS at 12:07

## 2024-07-12 RX ADMIN — CEFAZOLIN 2 G: 330 INJECTION, POWDER, FOR SOLUTION INTRAMUSCULAR; INTRAVENOUS at 12:07

## 2024-07-12 RX ADMIN — LIDOCAINE HYDROCHLORIDE 100 MG: 20 INJECTION INTRAVENOUS at 12:07

## 2024-07-12 RX ADMIN — MIDAZOLAM HYDROCHLORIDE 2 MG: 1 INJECTION, SOLUTION INTRAMUSCULAR; INTRAVENOUS at 12:07

## 2024-07-12 RX ADMIN — SODIUM CHLORIDE: 0.9 INJECTION, SOLUTION INTRAVENOUS at 11:07

## 2024-07-12 RX ADMIN — PROPOFOL 40 MG: 10 INJECTION, EMULSION INTRAVENOUS at 12:07

## 2024-07-12 RX ADMIN — FAMOTIDINE 20 MG: 10 INJECTION, SOLUTION INTRAVENOUS at 11:07

## 2024-07-12 RX ADMIN — KETOROLAC TROMETHAMINE 30 MG: 30 INJECTION, SOLUTION INTRAMUSCULAR; INTRAVENOUS at 01:07

## 2024-07-12 NOTE — DISCHARGE INSTRUCTIONS
[facility]       POST-OPERATIVE INSTRUCTIONS FOR PODIATRY PATIENTS      Keep your dressing dry. Do not remove or change the bandage.      Keep your foot elevated to the level of your heart. You should recline as far as possible (When your toes are at eye level you're in the right position).      You may apply an ice pack to the top of your foot or back of your knee. Replace as needed. Make sure this does not get the dressing wet.      Take your pain medication as instructed for the first 24 hours after surgery, then progress to using them only when you need it.      Stay off your foot as much as possible. You may get up to eat, use the bathroom, etc.     No long standing or walking on your  foot. Do not sit with your feet dangling.      When you do walk make sure you wear your surgical shoe/boot as provided      Eat your regular diet and drink plenty of fluids.      If any of the following conditions are present, call the Podiatry Clinic immediately, report to ED, or call 911 if emergent  - fever 101° or higher   - pain that is not controlled by pain medication   - red, warm, swollen feet with red streaks going up your legs   - cold, blue, numb toes, especially if you are wearing a cast

## 2024-07-12 NOTE — DISCHARGE SUMMARY
[facility]  Discharge Note  Short Stay    Procedure(s) (LRB):  BUNIONECTOMY, LAPIDUS (Right)  CORRECTION, HAMMER TOE (Right)      OUTCOME: Patient tolerated treatment/procedure well without complication and is now ready for discharge.    DISPOSITION: Home or Self Care    FINAL DIAGNOSIS:  <principal problem not specified>    FOLLOWUP: In clinic    DISCHARGE INSTRUCTIONS:  No discharge procedures on file.     TIME SPENT ON DISCHARGE: 60 minutes        POST-OPERATIVE INSTRUCTIONS FOR PODIATRY PATIENTS      Keep your dressing dry. Do not remove or change the bandage.      Keep your foot elevated to the level of your heart. You should recline as far as possible (When your toes are at eye level you're in the right position).      You may apply an ice pack to the top of your foot or back of your knee. Replace as needed. Make sure this does not get the dressing wet.      Take your pain medication as instructed for the first 24 hours after surgery, then progress to using them only when you need it.      Stay off your foot as much as possible. You may get up to eat, use the bathroom, etc.    No long standing or walking on your  foot. Do not sit with your feet dangling.      When you do walk make sure you wear your surgical shoe/boot as provided     Eat your regular diet and drink plenty of fluids.      If any of the following conditions are present, call the Podiatry Clinic immediately, report to ED, or call 911 if emergent  - fever 101° or higher   - pain that is not controlled by pain medication   - red, warm, swollen feet with red streaks going up your legs   - cold, blue, numb toes, especially if you are wearing a cast

## 2024-07-12 NOTE — TRANSFER OF CARE
"Anesthesia Transfer of Care Note    Patient: Yayo Carver    Procedure(s) Performed: Procedure(s) (LRB):  BUNIONECTOMY, LAPIDUS (Right)  CORRECTION, HAMMER TOE (Right)    Patient location: PACU    Anesthesia Type: general    Transport from OR: Transported from OR on 6-10 L/min O2 by face mask with adequate spontaneous ventilation    Post pain: adequate analgesia    Post assessment: no apparent anesthetic complications    Post vital signs: stable    Level of consciousness: awake    Nausea/Vomiting: no nausea/vomiting    Complications: none    Transfer of care protocol was followed    Last vitals: Visit Vitals  BP (!) 165/89 (BP Location: Left arm, Patient Position: Sitting)   Pulse 71   Temp 36.6 °C (97.9 °F) (Skin)   Resp 17   Ht 5' 6" (1.676 m)   Wt 108.9 kg (240 lb)   SpO2 97%   Breastfeeding No   BMI 38.74 kg/m²     "

## 2024-07-12 NOTE — PLAN OF CARE
Pt in preop bay 41, VSS and IV inserted. Pt denies any open wounds on body. The last use of any weight loss injections was on July 2, 2024. Pt needs an  updated H&P, procedural consents, an admit order and anesthesia consents, otherwise ready to roll.

## 2024-07-12 NOTE — ANESTHESIA POSTPROCEDURE EVALUATION
Anesthesia Post Evaluation    Patient: Yayo Staplesin    Procedure(s) Performed: Procedure(s) (LRB):  BUNIONECTOMY, LAPIDUS (Right)  CORRECTION, HAMMER TOE (Right)    Final Anesthesia Type: general      Patient location during evaluation: PACU  Patient participation: Yes- Able to Participate  Level of consciousness: awake and alert and oriented  Post-procedure vital signs: reviewed and stable  Pain management: adequate  Airway patency: patent    PONV status at discharge: No PONV  Anesthetic complications: no      Cardiovascular status: hemodynamically stable  Respiratory status: unassisted  Hydration status: euvolemic  Follow-up not needed.              Vitals Value Taken Time   /88 07/12/24 1447   Temp 36.7 °C (98 °F) 07/12/24 1413   Pulse 77 07/12/24 1459   Resp 16 07/12/24 1459   SpO2 100 % 07/12/24 1445   Vitals shown include unfiled device data.      No case tracking events are documented in the log.      Pain/Jamilah Score: Jamilah Score: 10 (7/12/2024  2:45 PM)

## 2024-07-12 NOTE — BRIEF OP NOTE
[facility] ([facility])  Brief Operative Note    Surgery Date: 7/12/2024     Surgeons and Role:     * Henrry Muñoz, DPM - Primary    Assisting Surgeon:    Adam Day DPM - 1st Assist   Naren Levin DPM - 2nd Assist    Pre-op Diagnosis:  Bunion, right [M21.611]  Foot pain, right [M79.671]  Hammertoe of right foot [M20.41]    Post-op Diagnosis:  Post-Op Diagnosis Codes:     * Bunion, right [M21.611]     * Foot pain, right [M79.671]     * Hammertoe of right foot [M20.41]    Procedure(s) (LRB):  BUNIONECTOMY, LAPIDUS (Right)  CORRECTION, HAMMER TOE (Right)    Anesthesia: Local MAC    Operative Findings: R foot Lapidus procedure with 2 theron, Akin procedure with 2 staples, EHL lengthening with graftjacket, 2nd and 3rd digit arthroplasty and flexor tenotomies.    Estimated Blood Loss: * No values recorded between 7/12/2024 12:40 PM and 7/12/2024  2:10 PM *         Specimens:   Specimen (24h ago, onward)      None          Discharge Note    OUTCOME: Patient tolerated treatment/procedure well without complication and is now ready for discharge.    DISPOSITION: Home or Self Care    FINAL DIAGNOSIS:  <principal problem not specified>    FOLLOWUP: In clinic    DISCHARGE INSTRUCTIONS:  No discharge procedures on file.

## 2024-07-12 NOTE — ANESTHESIA PREPROCEDURE EVALUATION
07/12/2024  Yayo Carver is a 58 y.o., female right foot bunion.    Hx of severe GERD on PPI  GERD symptoms today -- requesting H2 blockers  Discussed traditional Local /MAC with patient vs GETA due to severe GERD symptoms.   Will provide H2 blocker. Patient would like to proceed with Local/MAC at this time.  Risks and benefits discussed at length    Hx of anesthetics in past without complications  NPO>8 hours  No food or drug allergies  Amenable to proceed with scheduled procedure     Procedure: BUNIONECTOMY, LAPIDUS (Right)         Patient Active Problem List   Diagnosis    Morbid obesity with BMI of 50.0-59.9, adult    DOM (iron deficiency anemia)    GERD (gastroesophageal reflux disease)    Essential hypertension    Mixed hyperlipidemia    Vitamin D deficiency    Type 2 diabetes mellitus without complication, without long-term current use of insulin    Cyanocobalamin deficiency    Class 2 severe obesity with serious comorbidity and body mass index (BMI) of 38.0 to 38.9 in adult    Muscle cramps    Metabolic acidosis    Hyperglycemia    Impaired gait and mobility    Decreased strength of lower extremity    Primary osteoarthritis of left knee    Primary osteoarthritis of right knee       Past Medical History:   Diagnosis Date    Arthritis     Diabetes mellitus     Diabetes mellitus, type 2     GERD (gastroesophageal reflux disease)     HLD (hyperlipidemia)     Hypertension        ECHO: No results found for this or any previous visit.      Body mass index is 38.74 kg/m².    Tobacco Use: Low Risk  (7/12/2024)    Patient History     Smoking Tobacco Use: Never     Smokeless Tobacco Use: Never     Passive Exposure: Not on file       Social History     Substance and Sexual Activity   Drug Use No        Alcohol Use: Not At Risk (12/21/2023)    AUDIT-C     Frequency of Alcohol Consumption: Monthly or less      Average Number of Drinks: 1 or 2     Frequency of Binge Drinking: Never       Review of patient's allergies indicates:   Allergen Reactions    Ace inhibitors Edema and Swelling         Airway:  No value filed.         Pre-op Assessment    I have reviewed the Patient Summary Reports.     I have reviewed the Nursing Notes. I have reviewed the NPO Status.   I have reviewed the Medications.     Review of Systems  Anesthesia Hx:  No problems with previous Anesthesia   History of prior surgery of interest to airway management or planning:          Denies Family Hx of Anesthesia complications.    Denies Personal Hx of Anesthesia complications.                    Hematology/Oncology:       -- Anemia:                                  Cardiovascular:     Hypertension          PVD hyperlipidemia                             Pulmonary:        Sleep Apnea                Hepatic/GI:   PUD, Hiatal Hernia, GERD             Musculoskeletal:  Arthritis          Spine Disorders:             Neurological:        Chronic Pain Syndrome                         Endocrine:  Diabetes, well controlled, type 2, using insulin         Obesity / BMI > 30, Morbid Obesity / BMI > 40      Physical Exam  General: Well nourished and Cooperative    Airway:  Mallampati: II / I  Mouth Opening: Normal  TM Distance: Normal  Tongue: Normal  Neck ROM: Normal ROM    Dental:  Intact, Periodontal disease        Anesthesia Plan  Type of Anesthesia, risks & benefits discussed:    Anesthesia Type: MAC, Gen Natural Airway  Intra-op Monitoring Plan: Standard ASA Monitors  Post Op Pain Control Plan: multimodal analgesia and IV/PO Opioids PRN  Induction:  IV  Informed Consent: Informed consent signed with the Patient and all parties understand the risks and agree with anesthesia plan.  All questions answered. Patient consented to blood products? No  ASA Score: 3    Ready For Surgery From Anesthesia Perspective.     .

## 2024-07-12 NOTE — H&P
"The patient has been examined and the H&P has been reviewed.     I concur with the findings and no changes have occurred since H&P was written.     Anesthesia/Surgery risks, benefits and alternative options discussed and understood by patient/family.    Adam Day DPM   Podiatric Medicine & Surgery  [facility]  Secure Chat Preferred  Mobile: [phone]  Pager: [phone]     Subjective:     Patient ID: Yayo Carver is a 58 y.o. female.    Chief Complaint: No chief complaint on file.    Yayo is a 58 y.o. female who presents to the podiatry clinic  with complaint of  right foot pain. Onset of the symptoms was several years ago. Precipitating event: none known. Current symptoms include: ability to bear weight, but with some pain, stiffness, worsening symptoms after a period of activity, and painful bump on the big toe joint . Aggravating factors: standing, walking, and shoes . Symptoms have progressed to a point and plateaued. Patient has had no prior foot problems. Evaluation to date: none. Treatment to date: rest and shoe changes. Avoids tight shoes and only wears wide soft tennis shoe type shoes. Had Left knee replacemetn 4 months ago and states the R foot bunion pain has worsened since then. L foot has small bunion that does not bother her at all. Patients rates pain 2/10 on pain scale.  Patient discussed surgical intervention today.     Vitals:    07/12/24 0925 07/12/24 0952   BP: (!) 165/89    Pulse: 71    Resp: 17    Temp: 97.9 °F (36.6 °C)    TempSrc: Skin    SpO2: 99% 97%   Weight: 108.9 kg (240 lb)    Height: 5' 6" (1.676 m)          Review of Systems   Constitutional: Negative for chills and fever.   Cardiovascular:  Negative for chest pain, claudication and leg swelling.   Respiratory:  Negative for cough and shortness of breath.    Skin:  Negative for dry skin and nail changes.   Musculoskeletal:         R foot bunion, R foot pain   Gastrointestinal:  Negative for nausea and " vomiting.   Neurological:  Negative for numbness and paresthesias.   Psychiatric/Behavioral:  Negative for altered mental status.         Objective:     Physical Exam  Vitals reviewed.   Constitutional:       Appearance: She is well-developed.   HENT:      Head: Normocephalic.   Cardiovascular:      Pulses:           Dorsalis pedis pulses are 2+ on the right side and 2+ on the left side.        Posterior tibial pulses are 2+ on the right side and 2+ on the left side.      Comments: DP and PT pulses are 2/4 bilaterally.        Pulmonary:      Effort: No respiratory distress.   Musculoskeletal:      Right lower leg: No edema.      Left lower leg: No edema.      Comments: Semi-reducible hammertoe contractures noted to toes 2-4 b/l-asymptomatic. HAV, mild L, moderate R, non trackbound noted b/l with mild L and moderate R dorsal and dorsal medial bony prominence at 1st met head. L foot asymptomatic. R foot with pain on palpation of dorsal medial 1st met head and with ROM/reduction of bunion deformity.     Hypermobility noted to 1st ray b/l with near complete collapse of medial longitudinal arch b/l with loading.     Equinus contracture noted to b/l ankles with knee straight and slightly improved with knee bent.      Skin:     General: Skin is warm and dry.      Findings: No erythema.      Comments: No open lesions, lacerations or wounds noted. Nails are normal to R 1-5 and L 1-5. Interdigital spaces clean, dry and intact b/l. No erythema noted to b/l foot. Skin texture normal. Pedal hair normal     Neurological:      Mental Status: She is alert and oriented to person, place, and time.      Sensory: No sensory deficit.      Comments: Light touch, proprioception, and sharp/dull sensation are all intact bilaterally.    Psychiatric:         Behavior: Behavior normal.         Thought Content: Thought content normal.         Judgment: Judgment normal.         Assessment:      Encounter Diagnoses   Name Primary?    Doris,  "right Yes    Foot pain, right     Hammertoe of right foot            Plan:     Yayo Ramsay" was seen today for surgical consult and bunions.    Diagnoses and all orders for this visit:    Bunion, right    Foot pain, right    Hammertoe of right foot      The nature of the condition, options for management, as well as potential risks and complications were discussed in detail with patient. Patient was amenable to my recommendations and left my office fully informed and will follow up as instructed or sooner if necessary.      Conservative surgical options discussed in detail.    Independent visualization of imaging was performed.  Results were reviewed in detail with patient.    The patient has decided to forego any further conservative treatment and opted for surgical intervention.  Alternative treatments, and benefits of surgery were discussed with the patient.  Risks and complications, including but not limited to: pain, swelling, numbness, infection, failure to achieve the stated goals, recurrence of problem, nerve and blood vessel damage, scar tissue formation, further need of surgery, loss of limb or life, was discussed in detail with the patient.  All questions asked were answered, and written informed consent was sign and received. The patient will undergo Lapidus bunionectomy right foot with 2nd and 3rd hammertoe correction/arthroplasty with probable Michele osteotomy.      "

## 2024-07-13 NOTE — OP NOTE
[facility] ([facility])  Operative Note      Date of Procedure: 7/12/2024     Procedure: Procedure(s) (LRB):  BUNIONECTOMY, LAPIDUS (Right)  CORRECTION, HAMMER TOE (Right)     Surgeons and Role:     * Henrry Muñoz, DPM - Primary    Assisting Surgeon:    Adam Day DPM - !st Assist   Heraclio Levine DPM - 2nd Assist    Pre-Operative Diagnosis: Bunion, right [M21.611]  Foot pain, right [M79.671]  Hammertoe of right foot [M20.41]    Post-Operative Diagnosis: Post-Op Diagnosis Codes:     * Bunion, right [M21.611]     * Foot pain, right [M79.671]     * Hammertoe of right foot [M20.41]    Anesthesia: Local MAC    Operative Findings (including complications, if any):   R foot Lapidus procedure with 2 theron, Akin procedure with 2 staples, EHL lengthening with graftjacket, 2nd and 3rd digit arthroplasty and flexor tenotomies.     Description of Technical Procedures:   The patient was brought to the operating room on a stretcher and was transferred to the OR table in supine position. Anesthesia was induced.  A tourniquet was applied to the R calf. 30 mL of a 1:1 mixture of 0.25% bupivacaine plain and 1% lidocaine plain was locally infiltrated. The R lower limb was prepped and draped in a sterile manner. Tourniquet(s) inflated to 250 mmHg.      Attention was directed to the dorsal R first ray. A skin marker was used to plan a linear dorsal medial incision, beginning proximal to the 1st metatarsal base and extending distally to the 1st PIPJ along the medial aspect of the EHL tendon. A #15 blade was used to create a controlled depth incision followed by blunt and sharp dissection through skin down to subcutaneous tissues with care taken to retract and protect neurovascular structures. Superficial veins were ligated as necessary with Bovie electrocautery. The subcutaneous tissues were  from the deep fascia and a dorsal medial capsular incision was then completed followed by subperiosteal  dissection, exposing and mobilizing the 1st tarsalmetatarsal joint. A sagittal saw was used to resect the subchondral base of the 1st metatarsal and the distal subchondral surface of the medial cuneiform. The sagittal saw was also utilized to resect a portion of the lateral flare of the 1st metatarsal, and reciprocal plane the joint surfaces. The metatarsal base and head of the medial cuneiform were fenstrated using a drill bit. Following irrigation, temporary fixation was achieved via a crossing guide k-wires. Fluoroscopy confirmed proper alignment and 2 [employer] staples were inserted over the dorsal and medial aspect for the 1st metatarsal and medial cuneiform joint,  as permanent fixation. The deep fascia was then re-approximated.      Attention was then directed to the R 1st proximal phalanx. Using a 15 blade the periosteum overlying the 1st proximal phalanx was incised, periosteum was sharply elevated medially and laterally.  Using a sagittal saw a medial closing wedge was cut into the proximal phalanx. The osteotomy was noted to fully extend through the lateral wall of the proximal phalanx. The osteotomy site was reciprocally planed as the wedge was closed down. Osteotomy site was fixated with the 2 [employer] staples.    It was determined that EHL tendon lengthening would prevent reoccurrence. A Z tendon lengthening was preformed in a standard fashion with 15 blade and malleable retractor protecting underlying structures.  Tendon re-approximated with 3-0 vicryl. Graftjacket was placed over the EHL tendon and tagged down with 3-0 vycril. Layered closure with 3-0 monocril and 3-0 nylon over the incision on the R 1st ray.    Attention was then directed towards the R 2nd and 3rd digits where an elliptical incisions was made over PIPJ with a  #15 blade. The ellipse was carefully undermined with #15 blade and removed from the field. A transverse incision was made to the capsule of PIPJ which was released  using J maneuver. A sagittal saw was used to resect the head of the proximal phalanx. The foot was loaded, with improved alignment of the toe/toes were noted. The extensor tendons were reapproximated with 3-0 vicryl and closed superficially with 3-0 nylon. On the plantar aspect of R digits 2 and 3 a percutaneous flexor tenotomy was conducted and closed superficially with 3-0 nylon.      Excess skin was debulked, revised, and advanced in a closing wedge fashion to facilitate proper closure completing the flap advancement/skin rearrangement less than 10 sq cm Layered closure with 3-0 vicryl and 3-0 nylon.      The surgical site was irrigated with normal saline solution via bulb syringe.  The surgical site was dressed with xeroform, 4x4 gauze, cast padding, and ACE wrap. Tourniquet(s) deflated.      The patient was transferred to the recovery room with vital signs stable. Following a period of post op monitoring, the patient will be discharged home with the following postop instructions:   1. Keep dressing clean, dry, and intact until next seen by podiatry.   2. Weightbearing status: WBAT in post op shoe.   3. All necessary prescriptions were ordered and medical management will continue.   4. Contact podiatry with any postop questions or concerns.     Estimated Blood Loss (EBL): 5 mL           Implants:   Implant Name Type Inv. Item Serial No.  Lot No. LRB No. Used Action   easyfuse dynamic compression system    Rebls [id number] Right 1 Implanted   easyfuse dynamic compression system    Rebls [id number] Right 1 Implanted   [id number]   [id number] [email or web address]  Right 1 Implanted   fuseforce nitinol staple kit    Rebls [id number] Right 2 Implanted       Specimens:   Specimen (24h ago, onward)      None           Condition: Good    Disposition: PACU - hemodynamically stable.    Attestation: I was present and scrubbed for the entire procedure.    Discharge Note    OUTCOME:  Patient tolerated treatment/procedure well without complication and is now ready for discharge.    DISPOSITION: Home or Self Care    FINAL DIAGNOSIS:  <principal problem not specified>    FOLLOWUP: In clinic    DISCHARGE INSTRUCTIONS:  No discharge procedures on file.

## 2024-07-18 ENCOUNTER — OFFICE VISIT (OUTPATIENT)
Dept: PODIATRY | Facility: CLINIC | Age: 59
End: 2024-07-18
Payer: MEDICAID

## 2024-07-18 VITALS
SYSTOLIC BLOOD PRESSURE: 114 MMHG | HEIGHT: 66 IN | HEART RATE: 76 BPM | BODY MASS INDEX: 38.74 KG/M2 | DIASTOLIC BLOOD PRESSURE: 75 MMHG

## 2024-07-18 DIAGNOSIS — M21.611 BUNION, RIGHT: Primary | ICD-10-CM

## 2024-07-18 PROCEDURE — 99999 PR PBB SHADOW E&M-EST. PATIENT-LVL III: CPT | Mod: PBBFAC,,, | Performed by: PODIATRIST

## 2024-07-18 PROCEDURE — 1159F MED LIST DOCD IN RCRD: CPT | Mod: CPTII,,, | Performed by: PODIATRIST

## 2024-07-18 PROCEDURE — 99024 POSTOP FOLLOW-UP VISIT: CPT | Mod: ,,, | Performed by: PODIATRIST

## 2024-07-18 PROCEDURE — 3078F DIAST BP <80 MM HG: CPT | Mod: CPTII,,, | Performed by: PODIATRIST

## 2024-07-18 PROCEDURE — 3074F SYST BP LT 130 MM HG: CPT | Mod: CPTII,,, | Performed by: PODIATRIST

## 2024-07-18 PROCEDURE — 4010F ACE/ARB THERAPY RXD/TAKEN: CPT | Mod: CPTII,,, | Performed by: PODIATRIST

## 2024-07-18 PROCEDURE — 99213 OFFICE O/P EST LOW 20 MIN: CPT | Mod: PBBFAC | Performed by: PODIATRIST

## 2024-07-18 PROCEDURE — 3044F HG A1C LEVEL LT 7.0%: CPT | Mod: CPTII,,, | Performed by: PODIATRIST

## 2024-07-18 RX ORDER — OXYCODONE AND ACETAMINOPHEN 7.5; 325 MG/1; MG/1
1 TABLET ORAL EVERY 6 HOURS PRN
Qty: 30 TABLET | Refills: 0 | Status: SHIPPED | OUTPATIENT
Start: 2024-07-18 | End: 2024-07-25 | Stop reason: SDUPTHER

## 2024-07-18 RX ORDER — DICLOFENAC SODIUM 75 MG/1
75 TABLET, DELAYED RELEASE ORAL 2 TIMES DAILY
Qty: 30 TABLET | Refills: 2 | Status: SHIPPED | OUTPATIENT
Start: 2024-07-18

## 2024-07-22 ENCOUNTER — PATIENT MESSAGE (OUTPATIENT)
Dept: PODIATRY | Facility: CLINIC | Age: 59
End: 2024-07-22
Payer: MEDICAID

## 2024-07-25 ENCOUNTER — OFFICE VISIT (OUTPATIENT)
Dept: PODIATRY | Facility: CLINIC | Age: 59
End: 2024-07-25
Payer: MEDICAID

## 2024-07-25 VITALS
SYSTOLIC BLOOD PRESSURE: 121 MMHG | BODY MASS INDEX: 38.74 KG/M2 | HEIGHT: 66 IN | DIASTOLIC BLOOD PRESSURE: 91 MMHG | HEART RATE: 92 BPM

## 2024-07-25 DIAGNOSIS — M21.611 BUNION, RIGHT: Primary | ICD-10-CM

## 2024-07-25 PROCEDURE — 99024 POSTOP FOLLOW-UP VISIT: CPT | Mod: ,,, | Performed by: PODIATRIST

## 2024-07-25 PROCEDURE — 99213 OFFICE O/P EST LOW 20 MIN: CPT | Mod: PBBFAC | Performed by: PODIATRIST

## 2024-07-25 PROCEDURE — 4010F ACE/ARB THERAPY RXD/TAKEN: CPT | Mod: CPTII,,, | Performed by: PODIATRIST

## 2024-07-25 PROCEDURE — 3080F DIAST BP >= 90 MM HG: CPT | Mod: CPTII,,, | Performed by: PODIATRIST

## 2024-07-25 PROCEDURE — 99999 PR PBB SHADOW E&M-EST. PATIENT-LVL III: CPT | Mod: PBBFAC,,, | Performed by: PODIATRIST

## 2024-07-25 PROCEDURE — 1160F RVW MEDS BY RX/DR IN RCRD: CPT | Mod: CPTII,,, | Performed by: PODIATRIST

## 2024-07-25 PROCEDURE — 1159F MED LIST DOCD IN RCRD: CPT | Mod: CPTII,,, | Performed by: PODIATRIST

## 2024-07-25 PROCEDURE — 3074F SYST BP LT 130 MM HG: CPT | Mod: CPTII,,, | Performed by: PODIATRIST

## 2024-07-25 PROCEDURE — 3044F HG A1C LEVEL LT 7.0%: CPT | Mod: CPTII,,, | Performed by: PODIATRIST

## 2024-07-25 RX ORDER — OXYCODONE AND ACETAMINOPHEN 7.5; 325 MG/1; MG/1
1 TABLET ORAL EVERY 6 HOURS PRN
Qty: 30 TABLET | Refills: 0 | Status: SHIPPED | OUTPATIENT
Start: 2024-07-25

## 2024-07-25 NOTE — LETTER
[id number]  [address]  [facility] [address]  Phone: [phone] July 25, 2024     Patient: Yayo Carver   YOB: 1965   Date of Visit: 7/25/2024       To Whom It May Concern:    Yayo Carver will be under post operative care for the next 6-8 weeks following surgical procedure. Restriction with non-weightbearing activity, icing and elevating until further notice.     If you have any questions or concerns, please don't hesitate to contact my office.    Sincerely,        Henrry Muñoz DPM

## 2024-07-26 NOTE — PROGRESS NOTES
Patient presents 1 week status post Lapidus bunionectomy.  She is doing very well.  Mild discomfort controlled with pain medication.  Neurovascular status intact.  Incision healing well with no signs of infection or dehiscence.  Continue postoperative instructions and follow in 1 week.

## 2024-07-29 ENCOUNTER — PATIENT MESSAGE (OUTPATIENT)
Dept: PODIATRY | Facility: CLINIC | Age: 59
End: 2024-07-29
Payer: MEDICAID

## 2024-07-29 ENCOUNTER — TELEPHONE (OUTPATIENT)
Dept: PODIATRY | Facility: CLINIC | Age: 59
End: 2024-07-29
Payer: MEDICAID

## 2024-07-29 NOTE — TELEPHONE ENCOUNTER
----- Message from Vero Laboy sent at 7/29/2024 10:20 AM CDT -----  Contact: [phone]  [employer] from [employer] is calling regarding disability paperwork or call

## 2024-07-29 NOTE — TELEPHONE ENCOUNTER
Called patient insurance to try and see what they are requesting for the patient to move froward with her insurance. Was unable to speak with anyone to assist them any further,

## 2024-07-30 NOTE — PROGRESS NOTES
Patient presents 2 weeks status post Lapidus bunionectomy.  She is doing very well.  Mild discomfort controlled with pain medication.  Neurovascular status intact.  Incision healing well with no signs of infection or dehiscence.  Continue postoperative instructions and follow in 1 week.

## 2024-07-31 RX ORDER — OXYCODONE AND ACETAMINOPHEN 7.5; 325 MG/1; MG/1
1 TABLET ORAL EVERY 6 HOURS PRN
Qty: 30 TABLET | Refills: 0 | OUTPATIENT
Start: 2024-07-31

## 2024-07-31 RX ORDER — TRAMADOL HYDROCHLORIDE 50 MG/1
50-100 TABLET ORAL 3 TIMES DAILY PRN
Qty: 180 TABLET | Refills: 1 | Status: SHIPPED | OUTPATIENT
Start: 2024-07-31

## 2024-08-01 ENCOUNTER — OFFICE VISIT (OUTPATIENT)
Dept: PODIATRY | Facility: CLINIC | Age: 59
End: 2024-08-01
Payer: MEDICAID

## 2024-08-01 VITALS
SYSTOLIC BLOOD PRESSURE: 134 MMHG | DIASTOLIC BLOOD PRESSURE: 89 MMHG | BODY MASS INDEX: 38.74 KG/M2 | HEIGHT: 66 IN | HEART RATE: 89 BPM

## 2024-08-01 DIAGNOSIS — M21.611 BUNION, RIGHT: Primary | ICD-10-CM

## 2024-08-01 PROCEDURE — 99213 OFFICE O/P EST LOW 20 MIN: CPT | Mod: PBBFAC | Performed by: PODIATRIST

## 2024-08-01 PROCEDURE — 4010F ACE/ARB THERAPY RXD/TAKEN: CPT | Mod: CPTII,,, | Performed by: PODIATRIST

## 2024-08-01 PROCEDURE — 3044F HG A1C LEVEL LT 7.0%: CPT | Mod: CPTII,,, | Performed by: PODIATRIST

## 2024-08-01 PROCEDURE — 3079F DIAST BP 80-89 MM HG: CPT | Mod: CPTII,,, | Performed by: PODIATRIST

## 2024-08-01 PROCEDURE — 99999 PR PBB SHADOW E&M-EST. PATIENT-LVL III: CPT | Mod: PBBFAC,,, | Performed by: PODIATRIST

## 2024-08-01 PROCEDURE — 3075F SYST BP GE 130 - 139MM HG: CPT | Mod: CPTII,,, | Performed by: PODIATRIST

## 2024-08-01 PROCEDURE — 1159F MED LIST DOCD IN RCRD: CPT | Mod: CPTII,,, | Performed by: PODIATRIST

## 2024-08-01 PROCEDURE — 99024 POSTOP FOLLOW-UP VISIT: CPT | Mod: ,,, | Performed by: PODIATRIST

## 2024-08-04 RX ORDER — OXYCODONE AND ACETAMINOPHEN 7.5; 325 MG/1; MG/1
1 TABLET ORAL EVERY 6 HOURS PRN
Qty: 30 TABLET | Refills: 0 | Status: SHIPPED | OUTPATIENT
Start: 2024-08-04 | End: 2024-08-16 | Stop reason: SDUPTHER

## 2024-08-16 ENCOUNTER — TELEPHONE (OUTPATIENT)
Dept: PODIATRY | Facility: CLINIC | Age: 59
End: 2024-08-16
Payer: MEDICAID

## 2024-08-16 ENCOUNTER — PATIENT MESSAGE (OUTPATIENT)
Dept: PODIATRY | Facility: CLINIC | Age: 59
End: 2024-08-16
Payer: MEDICAID

## 2024-08-16 RX ORDER — OXYCODONE AND ACETAMINOPHEN 7.5; 325 MG/1; MG/1
1 TABLET ORAL EVERY 6 HOURS PRN
Qty: 30 TABLET | Refills: 0 | Status: SHIPPED | OUTPATIENT
Start: 2024-08-16

## 2024-08-16 NOTE — TELEPHONE ENCOUNTER
DEMARCO DELACRUZ calling regarding Patient Advice (message) for * pt is calling to speak with nurse regarding medication oxyCODONE-acetaminophen (PERCOCET) 7.5-325 mg per tablet she states she is in chronic pain and [facility] needs a PA for medication pt states she usually gets her medication sent to [facility] pharmacy pls advise and pt states she has been reaching out for 2 weeks now pls advise     Spoke with patient related to her message has been sent to provider please allow him some time to respond. Patient voice understanding to conversation.

## 2024-08-16 NOTE — TELEPHONE ENCOUNTER
Per Dr. Muñoz patient script has been sent to [facility] pharmacy. Patient voice understanding to conversation.

## 2024-08-16 NOTE — PROGRESS NOTES
Patient presents 3 weeks status post Lapidus bunionectomy.  She is doing very well.  Mild discomfort controlled with pain medication.  Neurovascular status intact.  Incision healing well with no signs of infection or dehiscence.  Sutures removed under asceptic conditions, dry sterile bandage applied. Patient is cleared to begin light bathing of operative site.  No oils, lotions, or ointments to incision for two weeks.

## 2024-08-19 ENCOUNTER — TELEPHONE (OUTPATIENT)
Dept: PODIATRY | Facility: CLINIC | Age: 59
End: 2024-08-19
Payer: MEDICAID

## 2024-08-19 NOTE — TELEPHONE ENCOUNTER
----- Message from Lesley Meier sent at 8/19/2024 12:32 PM CDT -----  Contact: [phone]  RESCHEDULE  Pt is calling to reschedule her appt for 08/21 states she wants to know if her has something available on tomorrow morning. No appt in  [facility] pls call pt

## 2024-08-19 NOTE — TELEPHONE ENCOUNTER
Patient gave verbal understanding of keeping her post op appointment as scheduled and if someone drops off the scheduled she should receive a text for sooner appointment per Onel

## 2024-08-21 ENCOUNTER — OFFICE VISIT (OUTPATIENT)
Dept: PODIATRY | Facility: CLINIC | Age: 59
End: 2024-08-21
Payer: MEDICAID

## 2024-08-21 ENCOUNTER — HOSPITAL ENCOUNTER (OUTPATIENT)
Dept: RADIOLOGY | Facility: HOSPITAL | Age: 59
Discharge: HOME | End: 2024-08-21
Attending: PODIATRIST
Payer: MEDICAID

## 2024-08-21 VITALS
DIASTOLIC BLOOD PRESSURE: 89 MMHG | BODY MASS INDEX: 38.74 KG/M2 | SYSTOLIC BLOOD PRESSURE: 134 MMHG | HEIGHT: 66 IN | HEART RATE: 89 BPM

## 2024-08-21 DIAGNOSIS — M21.611 BUNION, RIGHT: ICD-10-CM

## 2024-08-21 DIAGNOSIS — M21.611 BUNION, RIGHT: Primary | ICD-10-CM

## 2024-08-21 PROCEDURE — 3044F HG A1C LEVEL LT 7.0%: CPT | Mod: CPTII,,, | Performed by: PODIATRIST

## 2024-08-21 PROCEDURE — 1159F MED LIST DOCD IN RCRD: CPT | Mod: CPTII,,, | Performed by: PODIATRIST

## 2024-08-21 PROCEDURE — 99999 PR PBB SHADOW E&M-EST. PATIENT-LVL III: CPT | Mod: PBBFAC,,, | Performed by: PODIATRIST

## 2024-08-21 PROCEDURE — 3075F SYST BP GE 130 - 139MM HG: CPT | Mod: CPTII,,, | Performed by: PODIATRIST

## 2024-08-21 PROCEDURE — 3079F DIAST BP 80-89 MM HG: CPT | Mod: CPTII,,, | Performed by: PODIATRIST

## 2024-08-21 PROCEDURE — 99213 OFFICE O/P EST LOW 20 MIN: CPT | Mod: PBBFAC,25 | Performed by: PODIATRIST

## 2024-08-21 PROCEDURE — 73630 X-RAY EXAM OF FOOT: CPT | Mod: TC,RT

## 2024-08-21 PROCEDURE — 4010F ACE/ARB THERAPY RXD/TAKEN: CPT | Mod: CPTII,,, | Performed by: PODIATRIST

## 2024-08-21 PROCEDURE — 73630 X-RAY EXAM OF FOOT: CPT | Mod: 26,RT,, | Performed by: RADIOLOGY

## 2024-08-21 PROCEDURE — 99024 POSTOP FOLLOW-UP VISIT: CPT | Mod: ,,, | Performed by: PODIATRIST

## 2024-08-21 RX ORDER — AMMONIUM LACTATE 12 G/100G
1 CREAM TOPICAL 2 TIMES DAILY
Qty: 140 G | Refills: 11 | Status: SHIPPED | OUTPATIENT
Start: 2024-08-21

## 2024-08-21 RX ORDER — TRAMADOL HYDROCHLORIDE 50 MG/1
50-100 TABLET ORAL 3 TIMES DAILY PRN
Qty: 180 TABLET | Refills: 1 | Status: SHIPPED | OUTPATIENT
Start: 2024-08-30

## 2024-08-21 RX ORDER — OXYCODONE AND ACETAMINOPHEN 7.5; 325 MG/1; MG/1
1 TABLET ORAL EVERY 8 HOURS PRN
Qty: 30 TABLET | Refills: 0 | Status: SHIPPED | OUTPATIENT
Start: 2024-08-21

## 2024-08-27 ENCOUNTER — PATIENT OUTREACH (OUTPATIENT)
Dept: ADMINISTRATIVE | Facility: HOSPITAL | Age: 59
End: 2024-08-27
Payer: MEDICAID

## 2024-08-27 NOTE — PROGRESS NOTES
Health Maintenance Due   Topic Date Due    Sign Pain Contract  Never done    COVID-19 Vaccine (3 - 2023-24 season) 09/01/2023    Shingles Vaccine (2 of 2) 08/19/2024   Health maintenance reviewed, updated and links triggered. Eye appt 10/9/24 and cervical appt 9/3/24  ([facility]) 8/27/24

## 2024-08-30 NOTE — PROGRESS NOTES
Patient presents status post Lapidus bunionectomy.  She is doing very well.  Mild discomfort controlled with pain medication.  Neurovascular status intact.  Incision healing well with no signs of infection or dehiscence.  Continue boot, follow up in 2 weeks.

## 2024-09-04 ENCOUNTER — OFFICE VISIT (OUTPATIENT)
Dept: INTERNAL MEDICINE | Facility: CLINIC | Age: 59
End: 2024-09-04
Payer: MEDICAID

## 2024-09-04 ENCOUNTER — CLINICAL SUPPORT (OUTPATIENT)
Dept: INTERNAL MEDICINE | Facility: CLINIC | Age: 59
End: 2024-09-04
Attending: STUDENT IN AN ORGANIZED HEALTH CARE EDUCATION/TRAINING PROGRAM
Payer: MEDICAID

## 2024-09-04 VITALS
DIASTOLIC BLOOD PRESSURE: 78 MMHG | SYSTOLIC BLOOD PRESSURE: 114 MMHG | WEIGHT: 247.38 LBS | HEART RATE: 74 BPM | BODY MASS INDEX: 39.76 KG/M2 | OXYGEN SATURATION: 98 % | HEIGHT: 66 IN

## 2024-09-04 DIAGNOSIS — K21.00 GASTROESOPHAGEAL REFLUX DISEASE WITH ESOPHAGITIS: ICD-10-CM

## 2024-09-04 DIAGNOSIS — I10 ESSENTIAL HYPERTENSION: ICD-10-CM

## 2024-09-04 DIAGNOSIS — E11.9 TYPE 2 DIABETES MELLITUS WITHOUT COMPLICATION, UNSPECIFIED WHETHER LONG TERM INSULIN USE: ICD-10-CM

## 2024-09-04 DIAGNOSIS — E11.9 TYPE 2 DIABETES MELLITUS WITHOUT COMPLICATION, WITHOUT LONG-TERM CURRENT USE OF INSULIN: ICD-10-CM

## 2024-09-04 PROCEDURE — 99213 OFFICE O/P EST LOW 20 MIN: CPT | Mod: PBBFAC | Performed by: STUDENT IN AN ORGANIZED HEALTH CARE EDUCATION/TRAINING PROGRAM

## 2024-09-04 PROCEDURE — 3074F SYST BP LT 130 MM HG: CPT | Mod: CPTII,,, | Performed by: STUDENT IN AN ORGANIZED HEALTH CARE EDUCATION/TRAINING PROGRAM

## 2024-09-04 PROCEDURE — 99214 OFFICE O/P EST MOD 30 MIN: CPT | Mod: S$PBB,,, | Performed by: STUDENT IN AN ORGANIZED HEALTH CARE EDUCATION/TRAINING PROGRAM

## 2024-09-04 PROCEDURE — 99999 PR PBB SHADOW E&M-EST. PATIENT-LVL III: CPT | Mod: PBBFAC,,, | Performed by: STUDENT IN AN ORGANIZED HEALTH CARE EDUCATION/TRAINING PROGRAM

## 2024-09-04 PROCEDURE — 92228 IMG RTA DETC/MNTR DS PHY/QHP: CPT | Mod: PBBFAC

## 2024-09-04 PROCEDURE — 3078F DIAST BP <80 MM HG: CPT | Mod: CPTII,,, | Performed by: STUDENT IN AN ORGANIZED HEALTH CARE EDUCATION/TRAINING PROGRAM

## 2024-09-04 PROCEDURE — 4010F ACE/ARB THERAPY RXD/TAKEN: CPT | Mod: CPTII,,, | Performed by: STUDENT IN AN ORGANIZED HEALTH CARE EDUCATION/TRAINING PROGRAM

## 2024-09-04 PROCEDURE — 3008F BODY MASS INDEX DOCD: CPT | Mod: CPTII,,, | Performed by: STUDENT IN AN ORGANIZED HEALTH CARE EDUCATION/TRAINING PROGRAM

## 2024-09-04 PROCEDURE — 3044F HG A1C LEVEL LT 7.0%: CPT | Mod: CPTII,,, | Performed by: STUDENT IN AN ORGANIZED HEALTH CARE EDUCATION/TRAINING PROGRAM

## 2024-09-04 PROCEDURE — 1159F MED LIST DOCD IN RCRD: CPT | Mod: CPTII,,, | Performed by: STUDENT IN AN ORGANIZED HEALTH CARE EDUCATION/TRAINING PROGRAM

## 2024-09-04 RX ORDER — DICLOFENAC SODIUM 75 MG/1
75 TABLET, DELAYED RELEASE ORAL 2 TIMES DAILY PRN
Qty: 30 TABLET | Refills: 2 | Status: SHIPPED | OUTPATIENT
Start: 2024-09-04

## 2024-09-04 RX ORDER — PANTOPRAZOLE SODIUM 40 MG/1
40 TABLET, DELAYED RELEASE ORAL 2 TIMES DAILY
Qty: 180 TABLET | Refills: 3 | Status: SHIPPED | OUTPATIENT
Start: 2024-09-04 | End: 2025-08-30

## 2024-09-04 RX ORDER — SEMAGLUTIDE 1.34 MG/ML
1 INJECTION, SOLUTION SUBCUTANEOUS
Qty: 3 ML | Refills: 2 | Status: CANCELLED | OUTPATIENT
Start: 2024-09-04 | End: 2024-12-03

## 2024-09-04 RX ORDER — TRAMADOL HYDROCHLORIDE 50 MG/1
50-100 TABLET ORAL 3 TIMES DAILY PRN
Qty: 180 TABLET | Refills: 1 | Status: SHIPPED | OUTPATIENT
Start: 2024-09-04

## 2024-09-04 RX ORDER — SEMAGLUTIDE 2.68 MG/ML
2 INJECTION, SOLUTION SUBCUTANEOUS
Qty: 9 ML | Refills: 3 | Status: SHIPPED | OUTPATIENT
Start: 2024-09-04 | End: 2025-09-04

## 2024-09-04 RX ORDER — TRAMADOL HYDROCHLORIDE 50 MG/1
50-100 TABLET ORAL 3 TIMES DAILY PRN
Qty: 180 TABLET | Refills: 1 | Status: CANCELLED | OUTPATIENT
Start: 2024-09-04

## 2024-09-04 NOTE — PROGRESS NOTES
Yayo Carver is a 58 y.o. female here for a diabetic eye screening with non-dilated fundus photos per [facility].    Patient cooperative?: Yes  Small pupils?: Yes  Last eye exam: 1 year    For exam results, see Encounter Report.

## 2024-09-04 NOTE — PROGRESS NOTES
[facility]  Subjective:       Patient ID: Yayo Carver is a 58 y.o. female.  Chief Complaint: annual wellness, acid reflux    History was obtained from the patient and supplemented through chart review.    HPI:  Patient is a 58 y.o. female who presents for the above chief complaint.     Patient reports doing well; experiencing persistent acid reflux since gastric sleeve in May 2022. Reports symptom relief with Protonix, currently taking medication once daily as prescribed, but has found that it works better when she takes it twice a day.  She tolerates this well with no adverse side effects.  She was interested in following up with GI for this.  Does not currently have a GI provider though sees her bariatric surgeon in a [place] regularly.     She was adherent to Ozempic for diabetes.  Takes the 1 mg dose weekly.  Does not have any side effects from this medication.  Notes that it does decrease her appetite slightly.  Weight has been stable.  She is open to increasing to the 2 mg dose to see if she can get additional weight reduction.    Recently had bunionectomy.  Still has some pain from the surgery.  Uses Percocet p.r.n. pain though tries to limit use.  She is prescribed tramadol for chronic back pain but does not use it when she takes the Percocet.    Adherent to amlodipine and valsartan for hypertension.  Blood pressure is well-controlled today.    Patient reports due for routine PAP smear with GYN across town.     Medical History  Past Medical History:   Diagnosis Date    Arthritis     Diabetes mellitus     Diabetes mellitus, type 2     GERD (gastroesophageal reflux disease)     HLD (hyperlipidemia)     Hypertension          Surgical hx, family hx, social hx   Family History   Problem Relation Name Age of Onset    Cancer Mother Barb     Breast cancer Mother Barb 65    Diabetes Mother Barb     Colon polyps Mother Barb     Hypertension Mother Barb     Diabetes Father Fuentes  Theresa     Breast cancer Sister Salvatore 56    Cancer Sister Salvatore     No Known Problems Brother Fuentes     Arthritis Brother Armando     No Known Problems Daughter      Kidney disease Son      Diabetes Son      Breast cancer Maternal Aunt      Diabetes Paternal Uncle      Breast cancer Maternal Grandmother      Colon cancer Maternal Grandfather      Diabetes Paternal Grandmother Classic jacksom     Heart disease Paternal Grandmother Classic jacksom     No Known Problems Paternal Grandfather      Esophageal cancer Neg Hx      Irritable bowel syndrome Neg Hx      Rectal cancer Neg Hx      Stomach cancer Neg Hx      Ulcerative colitis Neg Hx      Celiac disease Neg Hx      Crohn's disease Neg Hx      Amblyopia Neg Hx      Blindness Neg Hx      Cataracts Neg Hx      Glaucoma Neg Hx      Macular degeneration Neg Hx      Retinal detachment Neg Hx      Strabismus Neg Hx      Stroke Neg Hx      Thyroid disease Neg Hx       Past Surgical History:   Procedure Laterality Date    ARTHROPLASTY OF TOE  2024    Procedure: ARTHROPLASTY, TOE;  Surgeon: Henrry Muñoz DPM;  Location: [facility];  Service: Podiatry;;    BARIATRIC SURGERY  2022    Gastric sleeve     SECTION  1989    COLONOSCOPY N/A 2017    Procedure: COLONOSCOPY;  Surgeon: Evelin Arceo MD;  Location: [facility] ENDO ([address] [place] [address]);  Service: Endoscopy;  Laterality: N/A;  2 nd floor d/t BMI > 50 kg/m3    CORRECTION OF HAMMER TOE Right 2024    Procedure: CORRECTION, HAMMER TOE;  Surgeon: Henrry Muñoz DPM;  Location: [facility] OR;  Service: Podiatry;  Laterality: Right;    KNEE ARTHROSCOPY Right     LAPIDUS BUNIONECTOMY Right 2024    Procedure: BUNIONECTOMY, LAPIDUS;  Surgeon: Henrry Muñoz DPM;  Location: [facility] OR;  Service: Podiatry;  Laterality: Right;    LENGTHENING OR SHORTENING, TENDON, LOWER EXTREMITY, 1 TENDON  2024    Procedure: LENGTHENING OR SHORTENING, TENDON, LOWER EXTREMITY, 1 TENDON;  Surgeon: Henrry Muñoz DPM;   Location: [facility] OR;  Service: Podiatry;;    TONSILLECTOMY      TOTAL KNEE ARTHROPLASTY Left 12/19/2022    Procedure: ARTHROPLASTY, KNEE, TOTAL: LEFT: DEPUY - ATTUNE;  Surgeon: Jc Padgett III, MD;  Location: [facility] OR;  Service: Orthopedics;  Laterality: Left;    TOTAL KNEE ARTHROPLASTY Right 6/26/2023    Procedure: ARTHROPLASTY, KNEE, TOTAL: RIGHT: DEPUY - ATTUNE;  Surgeon: Jc Padgett III, MD;  Location: [facility] OR;  Service: Orthopedics;  Laterality: Right;  90 minutes    TUBAL LIGATION       Social History     Socioeconomic History    Marital status:    Occupational History    Occupation: Supervisor for ushers at [employer]     Employer: [employer]   Tobacco Use    Smoking status: Never    Smokeless tobacco: Never   Substance and Sexual Activity    Alcohol use: Not Currently     Comment: occasional    Drug use: No    Sexual activity: Yes     Partners: Male     Birth control/protection: None     Comment: occasionally   Social History Narrative    ** Merged History Encounter **         Pt lives alone.  Has a college-age son who comes and goes.  Has 3 other adult children.       Social Determinants of Health     Financial Resource Strain: Low Risk  (12/21/2023)    Overall Financial Resource Strain (CARDIA)     Difficulty of Paying Living Expenses: Not very hard   Food Insecurity: No Food Insecurity (12/21/2023)    Hunger Vital Sign     Worried About Running Out of Food in the Last Year: Never true     Ran Out of Food in the Last Year: Never true   Transportation Needs: No Transportation Needs (12/21/2023)    PRAPARE - Transportation     Lack of Transportation (Medical): No     Lack of Transportation (Non-Medical): No   Physical Activity: Sufficiently Active (12/21/2023)    Exercise Vital Sign     Days of Exercise per Week: 7 days     Minutes of Exercise per Session: 40 min   Stress: Stress Concern Present (12/21/2023)    [employer] - Occupational Stress  Questionnaire     Feeling of Stress : To some extent   Housing Stability: Low Risk  (12/21/2023)    Housing Stability Vital Sign     Unable to Pay for Housing in the Last Year: No     Number of Places Lived in the Last Year: 1     Unstable Housing in the Last Year: No     Immunization History   Administered Date(s) Administered    COVID-19, MRNA, LN-S, PF (Pfizer) (Purple Cap) 03/06/2021, 04/11/2021    Influenza 10/25/2007, 10/16/2013    Influenza - Quadrivalent 10/16/2013    Influenza - Quadrivalent - PF *Preferred* (6 months and older) 10/16/2013, 01/18/2016, 12/05/2017, 11/03/2020    Influenza - Trivalent (ADULT) 10/25/2007    Influenza - Trivalent - PF (ADULT) 10/16/2013    Influenza Split 10/25/2007    Pneumococcal Conjugate - 13 Valent 02/03/2016    Pneumococcal Polysaccharide - 23 Valent 12/05/2017    Tdap 05/23/2017    Zoster Recombinant 06/24/2024     Current Outpatient Medications   Medication Instructions    acetaminophen (TYLENOL) 650 mg, Oral, Every 8 hours    amLODIPine (NORVASC) 10 mg, Oral, Daily    ammonium lactate 12 % Crea 1 application , Topical (Top), 2 times daily    atorvastatin (LIPITOR) 40 mg, Oral, Daily    b complex vitamins capsule 1 capsule, Oral, Daily    cholecalciferol (vitamin D3) (VITAMIN D3) 2,000 Units, Oral, Daily    flash glucose sensor (FREESTYLE HEENA 14 DAY SENSOR) Kit Apply to skin for 14 days.  Check blood glucose before meals and nightly.    fluticasone propionate (FLONASE) 50 mcg/actuation nasal spray Instill 2 sprays (100 mcg total) by Each Nostril route once daily.    LIDOcaine-prilocaine (EMLA) cream Topical (Top), Every 6-8 hours PRN, Apply to area of foot pain as needed every 6-8 hrs    multivitamin (THERAGRAN) per tablet 1 tablet, Oral, Daily    oxyCODONE-acetaminophen (PERCOCET) 7.5-325 mg per tablet 1 tablet, Oral, Every 8 hours PRN    OZEMPIC 2 mg, Subcutaneous, Every 7 days    pantoprazole (PROTONIX) 40 mg, Oral, 2 times daily    valsartan (DIOVAN) 80 mg, Oral,  "Daily     Objective:      Body mass index is 39.92 kg/m².  Vitals:    09/04/24 1511   BP: 114/78   Pulse: 74   SpO2: 98%   Weight: 112.2 kg (247 lb 5.7 oz)   Height: 5' 6" (1.676 m)   PainSc: 10-Worst pain ever   PainLoc: Foot     Physical Exam      Lab Results   Component Value Date    WBC 7.12 06/24/2024    HGB 11.1 (L) 06/24/2024    HCT 36.5 (L) 06/24/2024     06/24/2024    CHOL 172 03/27/2024    TRIG 84 03/27/2024    HDL 69 03/27/2024    ALT 29 06/24/2024    AST 19 06/24/2024     06/24/2024    K 4.5 06/24/2024     (H) 06/24/2024    CREATININE 1.1 06/24/2024    BUN 16 06/24/2024    CO2 24 06/24/2024    TSH 1.14 05/09/2024    INR 0.9 06/06/2023    HGBA1C 5.5 06/24/2024       The 10-year ASCVD risk score (Brittni LAND, et al., 2019) is: 7.2%    Values used to calculate the score:      Age: 58 years      Sex: Female      Is Non- : Yes      Diabetic: Yes      Tobacco smoker: No      Systolic Blood Pressure: 114 mmHg      Is BP treated: Yes      HDL Cholesterol: 69 mg/dL      Total Cholesterol: 172 mg/dL  Assessment:       1. Essential hypertension    2. Gastroesophageal reflux disease with esophagitis    3. Type 2 diabetes mellitus without complication, without long-term current use of insulin          Plan:     Refill protonix which she can take b.i.d..  She was discuss this further at her bariatric follow up visit.  Can place GI referral if symptoms worsen.     Increase Ozempic to 2 mg as above.     Otherwise doing well.  Blood pressure is controlled on current regimen.      Essential hypertension    Gastroesophageal reflux disease with esophagitis  -     pantoprazole (PROTONIX) 40 MG tablet; Take 1 tablet (40 mg total) by mouth 2 (two) times daily.  Dispense: 180 tablet; Refill: 3    Type 2 diabetes mellitus without complication, without long-term current use of insulin  -     semaglutide (OZEMPIC) 2 mg/dose (8 mg/3 mL) PnIj; Inject 2 mg into the skin every 7 days.  " Dispense: 9 mL; Refill: 3        Health Maintenance    Tests to Keep You Healthy    Mammogram: Met on 12/21/2023  Eye Exam: Met on 9/4/2024  Colon Cancer Screening: Met on 6/27/2017  Cervical Cancer Screening: ORDERED  Last Blood Pressure <= 139/89 (9/4/2024): Yes  Last HbA1c < 8 (06/24/2024): Yes    Follow up in about 6 months (around 3/4/2025). or sooner prn          Jonathon Chahal  [facility]  [facility] [address] [address]  Phone [phone]  Fax [phone]    This note is dictated using the M*Modal Fluency Direct word recognition program. It may contain word recognition mistakes or wrong word substitutions (commonly he/she and is/was substitutions) that were missed on review.

## 2024-09-13 ENCOUNTER — PATIENT MESSAGE (OUTPATIENT)
Dept: PODIATRY | Facility: CLINIC | Age: 59
End: 2024-09-13
Payer: MEDICAID

## 2024-09-15 NOTE — TELEPHONE ENCOUNTER
Care Due:                  Date            Visit Type   Department     Provider  --------------------------------------------------------------------------------                                MYCHART                              ANNUAL                              CHECKUP/PHY  [facility] INTERNAL  Last Visit: 09-      S            MEDICINE       Jonathon Chahal                              EP -                              PRIMARY      [facility] INTERNAL  Next Visit: 03-      CARE ([facility])   MEDICINE       Jonathon Chahal                                                            Last  Test          Frequency    Reason                     Performed    Due Date  --------------------------------------------------------------------------------    Vitamin D...  12 months..  cholecalciferol,.........  Not Found    Overdue    [employer] Embedded Care Due Messages. Reference number: [id number].   9/15/2024 3:19:18 PM CDT

## 2024-09-16 RX ORDER — FLUTICASONE PROPIONATE 50 MCG
2 SPRAY, SUSPENSION (ML) NASAL DAILY
Qty: 48 G | Refills: 3 | Status: SHIPPED | OUTPATIENT
Start: 2024-09-16

## 2024-09-16 NOTE — TELEPHONE ENCOUNTER
Provider Staff:  Action required for this patient    Requires labs      Please see care gap opportunities below in Care Due Message.    Thanks!  [facility]     Appointments      Date Provider   Last Visit   9/4/2024 Jonathon Chahal MD   Next Visit   3/5/2025 Jonathon Chahal MD     Refill Decision Note   Yayo Carver  is requesting a refill authorization.  Brief Assessment and Rationale for Refill:  Approve     Medication Therapy Plan:  FOVS      Comments:     Note composed:7:26 AM 09/16/2024

## 2024-09-17 ENCOUNTER — OFFICE VISIT (OUTPATIENT)
Dept: PODIATRY | Facility: CLINIC | Age: 59
End: 2024-09-17
Payer: MEDICAID

## 2024-09-17 VITALS
HEART RATE: 90 BPM | BODY MASS INDEX: 39.92 KG/M2 | SYSTOLIC BLOOD PRESSURE: 127 MMHG | DIASTOLIC BLOOD PRESSURE: 82 MMHG | HEIGHT: 66 IN

## 2024-09-17 DIAGNOSIS — M21.611 BUNION, RIGHT: Primary | ICD-10-CM

## 2024-09-17 PROCEDURE — 99214 OFFICE O/P EST MOD 30 MIN: CPT | Mod: PBBFAC | Performed by: PODIATRIST

## 2024-09-17 PROCEDURE — 4010F ACE/ARB THERAPY RXD/TAKEN: CPT | Mod: CPTII,,, | Performed by: PODIATRIST

## 2024-09-17 PROCEDURE — 3044F HG A1C LEVEL LT 7.0%: CPT | Mod: CPTII,,, | Performed by: PODIATRIST

## 2024-09-17 PROCEDURE — 99999 PR PBB SHADOW E&M-EST. PATIENT-LVL IV: CPT | Mod: PBBFAC,,, | Performed by: PODIATRIST

## 2024-09-17 PROCEDURE — 99024 POSTOP FOLLOW-UP VISIT: CPT | Mod: ,,, | Performed by: PODIATRIST

## 2024-09-17 PROCEDURE — 3079F DIAST BP 80-89 MM HG: CPT | Mod: CPTII,,, | Performed by: PODIATRIST

## 2024-09-17 PROCEDURE — 1159F MED LIST DOCD IN RCRD: CPT | Mod: CPTII,,, | Performed by: PODIATRIST

## 2024-09-17 PROCEDURE — 3074F SYST BP LT 130 MM HG: CPT | Mod: CPTII,,, | Performed by: PODIATRIST

## 2024-09-17 RX ORDER — OXYCODONE AND ACETAMINOPHEN 7.5; 325 MG/1; MG/1
1 TABLET ORAL EVERY 8 HOURS PRN
Qty: 30 TABLET | Refills: 0 | Status: SHIPPED | OUTPATIENT
Start: 2024-09-17

## 2024-09-20 ENCOUNTER — PATIENT MESSAGE (OUTPATIENT)
Dept: PODIATRY | Facility: CLINIC | Age: 59
End: 2024-09-20
Payer: MEDICAID

## 2024-09-22 NOTE — PROGRESS NOTES
Patient presents status post Lapidus bunionectomy.  She is doing very well.  Mild discomfort controlled with pain medication.  Neurovascular status intact.  Incision healing well with no signs of infection or dehiscence.  Begin slow transition out of walking boot to tolerance and into shoe gear.  Begin physical therapy...

## 2024-10-03 RX ORDER — OXYCODONE AND ACETAMINOPHEN 7.5; 325 MG/1; MG/1
1 TABLET ORAL EVERY 8 HOURS PRN
Qty: 30 TABLET | Refills: 0 | Status: SHIPPED | OUTPATIENT
Start: 2024-10-17

## 2024-10-03 NOTE — TELEPHONE ENCOUNTER
----- Message from Lacie sent at 10/3/2024  8:27 AM CDT -----  Regarding: rx refill; appt access  Rx Refill/Request Is this a Refill or New Rx: refill     Rx Name and Strength: traMADoL (ULTRAM) 50 mg tablet     Preferred Pharmacy with phone number:     [id number] - [address]  [address] [place] [address]  Phone: [phone] Fax: [phone]        Communication Preference: call back     Additional Information:  Pt is needing a refill and to be scheduled for an appt please call to advise further. Thank you for all you are doing.

## 2024-10-11 ENCOUNTER — PATIENT MESSAGE (OUTPATIENT)
Dept: PODIATRY | Facility: CLINIC | Age: 59
End: 2024-10-11

## 2024-10-15 ENCOUNTER — TELEPHONE (OUTPATIENT)
Dept: PODIATRY | Facility: CLINIC | Age: 59
End: 2024-10-15

## 2024-10-15 NOTE — TELEPHONE ENCOUNTER
Patient is having insurance problem so she cancelled her appointment for 10/15. Next appt scheduled for 11/5 her insurance will be ok than.
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
## 2024-10-25 ENCOUNTER — OFFICE VISIT (OUTPATIENT)
Dept: PHYSICAL MEDICINE AND REHAB | Facility: CLINIC | Age: 59
End: 2024-10-25
Payer: COMMERCIAL

## 2024-10-25 VITALS
HEIGHT: 66 IN | BODY MASS INDEX: 39.9 KG/M2 | SYSTOLIC BLOOD PRESSURE: 117 MMHG | DIASTOLIC BLOOD PRESSURE: 76 MMHG | WEIGHT: 248.25 LBS

## 2024-10-25 DIAGNOSIS — Z96.652 STATUS POST TOTAL LEFT KNEE REPLACEMENT: ICD-10-CM

## 2024-10-25 DIAGNOSIS — M47.816 LUMBAR FACET ARTHROPATHY: ICD-10-CM

## 2024-10-25 DIAGNOSIS — Z96.651 STATUS POST TOTAL RIGHT KNEE REPLACEMENT: ICD-10-CM

## 2024-10-25 DIAGNOSIS — M47.816 SPONDYLOSIS OF LUMBAR REGION WITHOUT MYELOPATHY OR RADICULOPATHY: ICD-10-CM

## 2024-10-25 DIAGNOSIS — E66.9 OBESITY (BMI 30-39.9): ICD-10-CM

## 2024-10-25 DIAGNOSIS — G89.29 CHRONIC BILATERAL LOW BACK PAIN WITHOUT SCIATICA: Primary | ICD-10-CM

## 2024-10-25 DIAGNOSIS — M54.50 CHRONIC BILATERAL LOW BACK PAIN WITHOUT SCIATICA: Primary | ICD-10-CM

## 2024-10-25 DIAGNOSIS — M43.16 SPONDYLOLISTHESIS OF LUMBAR REGION: ICD-10-CM

## 2024-10-25 PROCEDURE — 99999 PR PBB SHADOW E&M-EST. PATIENT-LVL III: CPT | Mod: PBBFAC,,, | Performed by: PHYSICAL MEDICINE & REHABILITATION

## 2024-10-25 RX ORDER — MELOXICAM 15 MG/1
15 TABLET ORAL DAILY
Qty: 30 TABLET | Refills: 3 | Status: SHIPPED | OUTPATIENT
Start: 2024-10-25 | End: 2025-02-22

## 2024-10-25 RX ORDER — TRAMADOL HYDROCHLORIDE 50 MG/1
50 TABLET ORAL 3 TIMES DAILY PRN
Qty: 90 TABLET | Refills: 3 | Status: SHIPPED | OUTPATIENT
Start: 2024-10-25

## 2024-11-05 ENCOUNTER — OFFICE VISIT (OUTPATIENT)
Dept: PODIATRY | Facility: CLINIC | Age: 59
End: 2024-11-05
Payer: COMMERCIAL

## 2024-11-05 VITALS
SYSTOLIC BLOOD PRESSURE: 117 MMHG | DIASTOLIC BLOOD PRESSURE: 76 MMHG | BODY MASS INDEX: 39.69 KG/M2 | HEART RATE: 90 BPM | HEIGHT: 66 IN | WEIGHT: 246.94 LBS

## 2024-11-05 DIAGNOSIS — E11.9 TYPE 2 DIABETES MELLITUS WITHOUT COMPLICATION, WITHOUT LONG-TERM CURRENT USE OF INSULIN: ICD-10-CM

## 2024-11-05 DIAGNOSIS — M21.611 BUNION, RIGHT: Primary | ICD-10-CM

## 2024-11-05 DIAGNOSIS — G89.18 POST-OP PAIN: ICD-10-CM

## 2024-11-05 PROCEDURE — 99024 POSTOP FOLLOW-UP VISIT: CPT | Mod: S$GLB,,, | Performed by: PODIATRIST

## 2024-11-05 PROCEDURE — 99999 PR PBB SHADOW E&M-EST. PATIENT-LVL IV: CPT | Mod: PBBFAC,,, | Performed by: PODIATRIST

## 2024-11-05 RX ORDER — SEMAGLUTIDE 2.68 MG/ML
2 INJECTION, SOLUTION SUBCUTANEOUS
Qty: 9 ML | Refills: 3 | Status: SHIPPED | OUTPATIENT
Start: 2024-11-05 | End: 2025-11-05

## 2024-11-05 RX ORDER — OXYCODONE AND ACETAMINOPHEN 7.5; 325 MG/1; MG/1
1 TABLET ORAL EVERY 4 HOURS PRN
Qty: 21 TABLET | Refills: 0 | Status: SHIPPED | OUTPATIENT
Start: 2024-11-05 | End: 2024-11-11 | Stop reason: SDUPTHER

## 2024-11-05 NOTE — LETTER
November 5, 2024      [facility]  [address]  [facility] [address]  Phone: [phone]       Patient: Yayo Carver   YOB: 1965  Date of Visit: 11/05/2024    To Whom It May Concern:    Yesenia Carver  was at [facility] on 11/05/2024. The patient may not return to work until January 2025 with restrictions of limited 300 steps with limited weight-bearing on right foot while under post operative care.     If you have any questions or concerns, or if I can be of further assistance, please do not hesitate to contact me.    Sincerely,    Henrry Muñoz DPM

## 2024-11-05 NOTE — TELEPHONE ENCOUNTER
"Spoke with pharmacy. They are currently working on the PA.     DÃ³ndet message sent.     "----- Message from Jonathon Chahal MD sent at 11/5/2024  1:01 PM CST -----  Regarding: RE: PA  If the RX is at [facility] they should do the PA. Should not switch right now  ----- Message -----  From: Zee Ovalles LPN  Sent: 11/5/2024  12:20 PM CST  To: Jonathon Chahal MD  Subject: FW: PA                                           Would switching to another injectable help the PA process? I can call pharmacy for you.  ----- Message -----  From: Leslie Martinez  Sent: 11/5/2024  11:49 AM CST  To: Fela Lobo Staff  Subject: PA                                               Type:  Patient Returning Call      Name of who is calling:pt        What is request in detail:pt is requesting a call back in regards to PA needed. Patient states that her new insurance provider needs PA in order for her to get her Ozempic. The RX is at the [facility].        Can clinic reply by [facility]:no        What number to call back if not in [facility]:[phone]  "  "

## 2024-11-05 NOTE — TELEPHONE ENCOUNTER
Care Due:                  Date            Visit Type   Department     Provider  --------------------------------------------------------------------------------                                MYCHART                              ANNUAL                              CHECKUP/PHY  [facility] INTERNAL  Last Visit: 09-      S            MEDICINE       Jonathon Chahal                              EP -                              PRIMARY      [facility] INTERNAL  Next Visit: 03-      CARE ([facility])   MEDICINE       Jonatohn Chahal                                                            Last  Test          Frequency    Reason                     Performed    Due Date  --------------------------------------------------------------------------------    HBA1C.......  6 months...  semaglutide..............  06- 12-    [facility] Embedded Care Due Messages. Reference number: [id number].   11/05/2024 4:36:44 PM CST

## 2024-11-11 DIAGNOSIS — M21.611 BUNION, RIGHT: ICD-10-CM

## 2024-11-11 RX ORDER — OXYCODONE AND ACETAMINOPHEN 7.5; 325 MG/1; MG/1
1 TABLET ORAL EVERY 4 HOURS PRN
Qty: 21 TABLET | Refills: 0 | Status: SHIPPED | OUTPATIENT
Start: 2024-11-17

## 2024-11-17 NOTE — PROGRESS NOTES
Patient presents status post Lapidus bunionectomy.  She is doing very well.  Mild discomfort controlled with pain medication.  Neurovascular status intact.  Incision healing well with no signs of infection or dehiscence.  Begin slow transition out of walking boot to tolerance and into shoe gear.

## 2024-11-26 ENCOUNTER — CLINICAL SUPPORT (OUTPATIENT)
Dept: REHABILITATION | Facility: OTHER | Age: 59
End: 2024-11-26
Payer: COMMERCIAL

## 2024-11-26 DIAGNOSIS — M21.611 BUNION, RIGHT: ICD-10-CM

## 2024-11-26 DIAGNOSIS — R29.898 ANKLE WEAKNESS: Primary | ICD-10-CM

## 2024-11-26 DIAGNOSIS — G89.18 POST-OP PAIN: ICD-10-CM

## 2024-11-26 DIAGNOSIS — M20.21: ICD-10-CM

## 2024-11-26 PROCEDURE — 97161 PT EVAL LOW COMPLEX 20 MIN: CPT

## 2024-11-26 PROCEDURE — 97140 MANUAL THERAPY 1/> REGIONS: CPT

## 2024-11-26 PROCEDURE — 97110 THERAPEUTIC EXERCISES: CPT

## 2024-11-26 RX ORDER — OXYCODONE AND ACETAMINOPHEN 7.5; 325 MG/1; MG/1
1 TABLET ORAL EVERY 4 HOURS PRN
Qty: 21 TABLET | Refills: 0 | Status: SHIPPED | OUTPATIENT
Start: 2024-11-26

## 2024-12-03 PROBLEM — R29.898 ANKLE WEAKNESS: Status: ACTIVE | Noted: 2024-12-03

## 2024-12-03 PROBLEM — M20.21: Status: ACTIVE | Noted: 2024-12-03

## 2024-12-04 NOTE — PLAN OF CARE
"  [facility]  Physical Therapy Initial Evaluation  Date: 11/26/2024   Name: Yayo Carver  Clinic Number: [id number]    Therapy Diagnosis:   Encounter Diagnoses   Name Primary?    Bunion, right     Post-op pain     Ankle weakness Yes    Rigidity of 1st MTP joint, right      Physician: Henrry Muñoz DPM    Physician Orders: PT Eval and Treat   Medical Diagnosis from Referral:   M21.611 (ICD-10-CM) - Bunion, right   G89.18 (ICD-10-CM) - Post-op pain     Evaluation Date: 11/26/2024  Authorization Period Expiration: 12/31/2024  Plan of Care Expiration: 2/26/2024  Visit # / Visits authorized: 1/ 1 (pending) Progress Note Due: 12/26/2024  FOTO: 1/ 1    Precautions: Standard    DOS: 7/12/24    Time In: 1:00 pm  Time Out: 2:00 pm  Total Appointment Time (timed & untimed codes): 60 minutes    Subjective   Date of onset: 7/12/2024  History of current condition - Elisa reports: she had surgery on her foot on 7/12/2024. They had to break part of the foot to create an arch. She still has some numbness on the outside and inside of the foot. The pain is in the area of the incision and on the front of the ankle. There is still swelling in the foot and it gets worse the more she stands. She works at [employer] so she is on her feet a good bit at work. She still finds herself limping for the pain.     Current 7/10, worst 9/10, best 2/10   Location: right foot  Description: Aching  Aggravating Factors: Standing, Touching, Walking, and Getting out of bed/chair  Easing Factors: pain medication, ice, and rest    Prior Therapy: No - only for knees after replacements  Social History: 2 story home lives alone  Occupation: Engineering and Events at [employer]  Prior Level of Function: independent  Current Level of Function: decreased tolerance to weight bearing and functional mobility activities    Pts goals: "To get the structure and motion back in my foot, and " "decrease the pain and swelling"      Imaging:  XR FOOT COMPLETE 3 VIEW RIGHT     CLINICAL HISTORY:  . Bunion of right foot     TECHNIQUE:  AP, lateral, and oblique views of the right foot were performed.     COMPARISON:  2024     FINDINGS:  Stable postsurgical changes and hardware related to great toe bunionectomy.  Stable postsurgical changes seen with respect to the distal proximal phalanges of the 2nd and 3rd toes.  Stable hardware fixation (2 staples) at 1st tarsal metatarsal articulation.  Some stable spurring about the 1st MTP articulation.  Elsewhere, preserved joint spaces.  No acute fractures.  Reconfirmed pes planus.  Reconfirmed and stable appearance of the navicular as seen on the lateral exam.  Calcaneal spurring at Achilles tendon and plantar aponeurosis attachments.  Soft tissue swelling dorsally at the level of the MTP articulations, metatarsals, and midfoot structures.     Impression:     As above    Medical History:   Past Medical History:   Diagnosis Date    Arthritis     Diabetes mellitus     Diabetes mellitus, type 2     GERD (gastroesophageal reflux disease)     HLD (hyperlipidemia)     Hypertension        Surgical History:   Yayo Carver  has a past surgical history that includes Tubal ligation; Tonsillectomy; Colonoscopy (N/A, 2017);  section (1989); Bariatric Surgery (2022); Knee arthroscopy (Right, ); Total knee arthroplasty (Left, 2022); Total knee arthroplasty (Right, 2023); Lapidus bunionectomy (Right, 2024); Correction of hammer toe (Right, 2024); Arthroplasty of toe (2024); and lengthening or shortening, tendon, lower extremity, 1 tendon (2024).    Medications:   Yayo has a current medication list which includes the following prescription(s): acetaminophen, amlodipine, ammonium lactate, atorvastatin, b complex vitamins, cholecalciferol (vitamin d3), freestyle zachery 14 day sensor, fluticasone propionate, " lidocaine-prilocaine, meloxicam, multivitamin, oxycodone-acetaminophen, pantoprazole, ozempic, tramadol, and valsartan.    Allergies:   Review of patient's allergies indicates:   Allergen Reactions    Ace inhibitors Edema and Swelling          Objective     Observation: pt presents without walking boot    Sensation: Light touch:impaired to light touch    GAIT DEVIATIONS: Yayo displays decreased step length;decreased weight shift;antalgic gait;    Ankle Range of Motion:   Ankle Right Left   Dorsiflexion 4 8   Plantarflexion 35 40   Inversion 33 35   Eversion 11 16      1st MTP Active Range of Motion:   Ankle Right Left   Dorsiflexion 15 65   Plantarflexion 15 20     1st MTP passive Range of Motion:   Ankle Right Left   Dorsiflexion 35 72   Plantarflexion 23 45       Strength:   Right Ankle   Left Ankle    Dorsiflexion: 4/5 Dorsiflexion: 5/5   Plantarflexion:  4-/5 Plantarflexion: 5/5   Inversion: 4-/5 Inversion: 4+/5   Eversion: 4-/5 Eversion: 4+/5     1st MTP Flexion 3+/5 1st MTP Flexion 5/5   1st MTP extension 3+/5 1st MTP Flexion 5/5       Right LE  Left LE    Hip flexion: 4/5 Hip flexion: 4/5   Hip extension:  4-/5 Hip extension: 4-/5   Hip abduction: 4-/5 Hip abduction: 4-/5   Hip adduction: 4-/5 Hip adduction 4-/5   Knee extension: 4/5 Knee extension: 4/5   Knee flexion: 4/5 Knee flexion: 4/5     Joint Mobility: hypomobility of R 1st MTP joint    Palpation: TTP of first ray, 1st MTP, and medial arch    Flexibility: decreased soft tissue flexibility and mobility of R foot and ankle      CMS Impairment/Limitation/Restriction for FOTO foot Survey    Therapist reviewed FOTO scores for Yayo Carver on 11/26/2024.   FOTO documents entered into [employer] - see Media section.    Limitation Score: 63%    Goal: 71%           TREATMENT   Treatment Time In: 1:30  Treatment Time Out: 2:00  Total Treatment time separate from Evaluation: 30 minutes    Elisa received therapeutic exercises to develop strength, endurance, ROM, and  "flexibility for 15 minutes includinst MTP self mob  Tandem balance 3x30"  Seated heel raises on towel 2x15  Towel 1st ray/calf stretch 3x20"  Towel scrunches 2x1'  [place] pickups x3'  1st MTP flexion YTB 2x10x3"  1st MTP extension YTB 2x10x3"    Elisa received the following manual therapy techniques: Joint mobilizations were applied to the: R foot for 10 minutes, includinst MTP distraction   1st MTP MWM into flexion/extension PROM    Elisa participated in dynamic functional therapeutic activities to improve functional performance for 0  minutes, including:      Elisa participated in gait training to improve functional mobility and safety for 0 minutes, including:      Elisa received  cold pack for 5 minutes to foot.    Home Exercises and Patient Education Provided    Education provided:   - HEP, POC    Written Home Exercises Provided: yes.  Exercises were reviewed and Elisa was able to demonstrate them prior to the end of the session.  Elisa demonstrated good  understanding of the education provided.     See EMR under Patient Instructions for exercises provided 2024.    Assessment   Yayo is a 58 y.o. female referred to outpatient Physical Therapy with a medical diagnosis of M21.611 (ICD-10-CM) - Bunion, right and G89.18 (ICD-10-CM) - Post-op pain. She is s/p lapidus bunionectomy on 24 with Dr. Muñoz. She presents without walking boot today. Pt presents with decreased range of motion of R 1st MTP joint, decreased ankle range of motion, weakness of R foot and ankle, balance deficits, gait deviations, decreased joint mobility, decreased soft tissue flexibility and mobility, and decreased tolerance to gait, functional mobility, and occupational activities. Pt will benefit from skilled outpatient Physical Therapy to address the deficits stated above and in the chart below, provide pt/family education, and to maximize pt's level of independence.     Pt prognosis is Good.     Plan of care discussed with " patient: Yes  Pt's spiritual, cultural and educational needs considered and patient is agreeable to the plan of care and goals as stated below:     Anticipated Barriers for therapy: hx of TKA    Medical Necessity is demonstrated by the following  History  Co-morbidities and personal factors that may impact the plan of care [] LOW: no personal factors / co-morbidities  [x] MODERATE: 1-2 personal factors / co-morbidities  [] HIGH: 3+ personal factors / co-morbidities    Moderate / High Support Documentation:   Co-morbidities affecting plan of care: BMI, hx of TKA    Personal Factors:   no deficits     Examination  Body Structures and Functions, activity limitations and participation restrictions that may impact the plan of care [x] LOW: addressing 1-2 elements  [] MODERATE: 3+ elements  [] HIGH: 4+ elements (please support below)    Moderate / High Support Documentation:      Clinical Presentation [x] LOW: stable  [] MODERATE: Evolving  [] HIGH: Unstable     Decision Making/ Complexity Score: low       Goals:  GOALS: Short Term Goals:  4 weeks  1.Report decreased  in  pain at worse less than  <   / =  5  /10  to increase tolerance for improved functional actvities. On going  2. Pt to improve range of motion by 25% to allow for improved functional mobility to allow for improvement in IADLs. On going  3. Increased R foot and ankle MMT 1/2 grade  to increase tolerance for ADL and work activities.On going  4. Pt to report ability to stand for 1 hour without increased pain  5. Pt to tolerate HEP to improve ROM and independence with ADL's. On going    Long Term Goals: 8 weeks  1.Report decreased  in  pain at worse  less than  <   / =  3  /10  to increase tolerance for improved functional actvities. On going  2.Pt to improve range of motion by 75% to allow for improved functional mobility to allow for improvement in IADLs. On going  3.Increased R foot and ankle MMT 1 grade  to increase tolerance for ADL and work  activities.On going  4. Pt will score at least 71% on  FOTO outcome assessment  to increase functional activities and mobility. On going  5. Pt to be Independent with HEP to improve ROM and independence with ADL's. On going      Plan   Plan of care Certification: 11/26/2024 to 2/26/2025.    Outpatient Physical Therapy 2 times weekly for 12 weeks to include the following interventions: Gait Training, Manual Therapy, Moist Heat/ Ice, Neuromuscular Re-ed, Orthotic Management and Training, Patient Education, Self Care, Therapeutic Activities, and Therapeutic Exercise. Dry needling.     Jairon Hitchcock, PT      I CERTIFY THE NEED FOR THESE SERVICES FURNISHED UNDER THIS PLAN OF TREATMENT AND WHILE UNDER MY CARE   Physician's comments:     Physician's Signature: ___________________________________________________

## 2024-12-10 DIAGNOSIS — M21.611 BUNION, RIGHT: ICD-10-CM

## 2024-12-10 RX ORDER — OXYCODONE AND ACETAMINOPHEN 7.5; 325 MG/1; MG/1
1 TABLET ORAL DAILY PRN
Qty: 30 TABLET | Refills: 0 | Status: SHIPPED | OUTPATIENT
Start: 2024-12-10 | End: 2025-01-12

## 2025-01-06 DIAGNOSIS — M21.611 BUNION, RIGHT: ICD-10-CM

## 2025-01-06 RX ORDER — OXYCODONE AND ACETAMINOPHEN 7.5; 325 MG/1; MG/1
1 TABLET ORAL DAILY PRN
Qty: 30 TABLET | Refills: 0 | Status: CANCELLED | OUTPATIENT
Start: 2025-01-06 | End: 2025-02-05

## 2025-01-07 DIAGNOSIS — M21.611 BUNION, RIGHT: ICD-10-CM

## 2025-01-07 DIAGNOSIS — E78.2 MIXED HYPERLIPIDEMIA: ICD-10-CM

## 2025-01-07 DIAGNOSIS — M79.641 RIGHT HAND PAIN: Primary | ICD-10-CM

## 2025-01-07 RX ORDER — ATORVASTATIN CALCIUM 40 MG/1
40 TABLET, FILM COATED ORAL DAILY
Qty: 90 TABLET | Refills: 0 | Status: SHIPPED | OUTPATIENT
Start: 2025-01-07

## 2025-01-07 NOTE — TELEPHONE ENCOUNTER
Last ordered:12/10/24    Last seen:10/25/24  Upcoming appt:none     Postoperative day 1 status post peritoneal dialysis catheter removal    S: No events overnight.  Denies any abdominal pain nausea vomiting.  Tolerated regular diet, was confused this morning, manage oriented now    O:   Vitals:    04/10/24 1038 04/10/24 1201 04/10/24 1300 04/10/24 1400   BP:  144/68 142/62 118/63   BP Location:  Right arm     Patient Position:  Lying     Pulse:  84 82 80   Resp: 18 18     Temp:  97.5 °F (36.4 °C)     TempSrc:  Oral     SpO2:  96% 93% 94%   Weight:       Height:          Alert, no acute distress  Breathing comfortable  Regular rate rhythm  Abdomen soft, nontender distended, incision clean dry intact with dressings    White blood cell count 6.4, hemoglobin 7.6  Body fluid culture with budding yeast    Assessment and plan    78-year-old male with peritonitis secondary to fungal infection of the peritoneal dialysis catheter status post removal.  He is doing okay.  Tolerating regular diet    -Continue regular diet  -Remove dressings in 3 days  -Can follow-up in my office in 2 months for discussion for peritoneal dialysis catheter placement if he wants to go back to PD    I will sign off, call with questions    Jose Berry MD, FACS  General, Minimally Invasive and Endoscopic Surgery  [facility]    [address], [address]  [place], [place], [address]  P: [phone]  F: [phone]

## 2025-01-07 NOTE — TELEPHONE ENCOUNTER
Care Due:                  Date            Visit Type   Department     Provider  --------------------------------------------------------------------------------                                MYCHART                              ANNUAL                              CHECKUP/PHY  [facility] INTERNAL  Last Visit: 09-      S            MEDICINE       Jonathon Chaahl                              EP -                              PRIMARY      [facility] INTERNAL  Next Visit: 03-      CARE ([facility])   MEDICINE       Jonathon Chahal                                                            Last  Test          Frequency    Reason                     Performed    Due Date  --------------------------------------------------------------------------------    HBA1C.......  6 months...  semaglutide..............  06- 12-    Lipid Panel.  12 months..  atorvastatin.............  03- 03-    Vitamin D...  12 months..  cholecalciferol,.........  Not Found    Overdue    [employer] Embedded Care Due Messages. Reference number: [id number].   1/07/2025 11:36:00 AM CST

## 2025-01-07 NOTE — TELEPHONE ENCOUNTER
Provider Staff:  Action required for this patient    Requires labs      Please see care gap opportunities below in Care Due Message.    Thanks!  [facility]     Appointments      Date Provider   Last Visit   9/4/2024 Jonathon Chahal MD   Next Visit   3/5/2025 Jonathon Chahal MD     Refill Decision Note   Yayo Carver  is requesting a refill authorization.    Brief Assessment and Rationale for Refill:  Approve       Medication Therapy Plan:         Comments:     Note composed:4:38 PM 01/07/2025

## 2025-01-08 ENCOUNTER — OFFICE VISIT (OUTPATIENT)
Dept: PODIATRY | Facility: CLINIC | Age: 60
End: 2025-01-08
Payer: COMMERCIAL

## 2025-01-08 VITALS
HEART RATE: 90 BPM | WEIGHT: 240.31 LBS | SYSTOLIC BLOOD PRESSURE: 117 MMHG | HEIGHT: 66 IN | BODY MASS INDEX: 38.62 KG/M2 | DIASTOLIC BLOOD PRESSURE: 76 MMHG

## 2025-01-08 DIAGNOSIS — E11.9 TYPE 2 DIABETES MELLITUS WITHOUT COMPLICATION: ICD-10-CM

## 2025-01-08 DIAGNOSIS — M21.42 PES PLANUS OF BOTH FEET: ICD-10-CM

## 2025-01-08 DIAGNOSIS — Z12.31 OTHER SCREENING MAMMOGRAM: ICD-10-CM

## 2025-01-08 DIAGNOSIS — M21.611 BUNION, RIGHT: Primary | ICD-10-CM

## 2025-01-08 DIAGNOSIS — M21.41 PES PLANUS OF BOTH FEET: ICD-10-CM

## 2025-01-08 DIAGNOSIS — G89.18 POST-OP PAIN: ICD-10-CM

## 2025-01-08 PROCEDURE — 3078F DIAST BP <80 MM HG: CPT | Mod: CPTII,S$GLB,, | Performed by: PODIATRIST

## 2025-01-08 PROCEDURE — 1160F RVW MEDS BY RX/DR IN RCRD: CPT | Mod: CPTII,S$GLB,, | Performed by: PODIATRIST

## 2025-01-08 PROCEDURE — 1159F MED LIST DOCD IN RCRD: CPT | Mod: CPTII,S$GLB,, | Performed by: PODIATRIST

## 2025-01-08 PROCEDURE — 99024 POSTOP FOLLOW-UP VISIT: CPT | Mod: S$GLB,,, | Performed by: PODIATRIST

## 2025-01-08 PROCEDURE — 3074F SYST BP LT 130 MM HG: CPT | Mod: CPTII,S$GLB,, | Performed by: PODIATRIST

## 2025-01-08 PROCEDURE — 99999 PR PBB SHADOW E&M-EST. PATIENT-LVL IV: CPT | Mod: PBBFAC,,, | Performed by: PODIATRIST

## 2025-01-08 RX ORDER — DICLOFENAC SODIUM 75 MG/1
75 TABLET, DELAYED RELEASE ORAL 2 TIMES DAILY
Qty: 30 TABLET | Refills: 2 | Status: SHIPPED | OUTPATIENT
Start: 2025-01-08

## 2025-01-08 RX ORDER — DICLOFENAC SODIUM 75 MG/1
75 TABLET, DELAYED RELEASE ORAL 2 TIMES DAILY
Qty: 30 TABLET | Refills: 2 | Status: SHIPPED | OUTPATIENT
Start: 2025-01-08 | End: 2025-01-08 | Stop reason: SDUPTHER

## 2025-01-08 RX ORDER — OXYCODONE AND ACETAMINOPHEN 5; 325 MG/1; MG/1
1 TABLET ORAL EVERY 12 HOURS PRN
Qty: 30 TABLET | Refills: 0 | Status: SHIPPED | OUTPATIENT
Start: 2025-01-08

## 2025-01-08 RX ORDER — PHENTERMINE HYDROCHLORIDE 37.5 MG/1
1 TABLET ORAL EVERY MORNING
COMMUNITY
Start: 2024-12-05 | End: 2025-04-04

## 2025-01-08 RX ORDER — OXYCODONE AND ACETAMINOPHEN 7.5; 325 MG/1; MG/1
1 TABLET ORAL DAILY PRN
Qty: 30 TABLET | Refills: 0 | Status: SHIPPED | OUTPATIENT
Start: 2025-01-12 | End: 2025-02-11

## 2025-01-08 RX ORDER — DICLOFENAC SODIUM 10 MG/G
2 GEL TOPICAL 4 TIMES DAILY
Qty: 100 G | Refills: 2 | Status: SHIPPED | OUTPATIENT
Start: 2025-01-08

## 2025-01-12 NOTE — PROGRESS NOTES
Patient presents status post Lapidus bunionectomy.  She is doing very well.  Mild discomfort controlled with pain medication.  Neurovascular status intact.  Incision healing well with no signs of infection or dehiscence.  Correction appears to be very good.  Patient is happy with result however still having some discomfort/stiffness.  Will continue home exercise program and slow transition into activities and shoe gear.  Follow-up in 2 months.    I recommended patient be fitted for orthoses.  I explained that orthoses may improve function of the foot, reduce pain, decrease pronation, increase efficiency of muscle function of the foot and ankle and prevent surgery.  Alternative forms of biomechanical control of the foot and ankle were discussed with the patient.

## 2025-01-14 ENCOUNTER — HOSPITAL ENCOUNTER (OUTPATIENT)
Dept: RADIOLOGY | Facility: OTHER | Age: 60
Discharge: HOME | End: 2025-01-14
Attending: PODIATRIST
Payer: COMMERCIAL

## 2025-01-14 ENCOUNTER — HOSPITAL ENCOUNTER (OUTPATIENT)
Dept: RADIOLOGY | Facility: OTHER | Age: 60
Discharge: HOME | End: 2025-01-14
Attending: ORTHOPAEDIC SURGERY
Payer: COMMERCIAL

## 2025-01-14 ENCOUNTER — OFFICE VISIT (OUTPATIENT)
Dept: INTERNAL MEDICINE | Facility: CLINIC | Age: 60
End: 2025-01-14
Payer: COMMERCIAL

## 2025-01-14 VITALS
SYSTOLIC BLOOD PRESSURE: 137 MMHG | BODY MASS INDEX: 39.01 KG/M2 | HEIGHT: 66 IN | DIASTOLIC BLOOD PRESSURE: 84 MMHG | WEIGHT: 242.75 LBS | OXYGEN SATURATION: 99 % | HEART RATE: 81 BPM

## 2025-01-14 DIAGNOSIS — E78.2 MIXED HYPERLIPIDEMIA: ICD-10-CM

## 2025-01-14 DIAGNOSIS — M79.641 RIGHT HAND PAIN: ICD-10-CM

## 2025-01-14 DIAGNOSIS — M21.611 BUNION, RIGHT: ICD-10-CM

## 2025-01-14 DIAGNOSIS — K21.00 GASTROESOPHAGEAL REFLUX DISEASE WITH ESOPHAGITIS: ICD-10-CM

## 2025-01-14 DIAGNOSIS — I10 ESSENTIAL HYPERTENSION: ICD-10-CM

## 2025-01-14 PROCEDURE — 99999 PR PBB SHADOW E&M-EST. PATIENT-LVL IV: CPT | Mod: PBBFAC,,, | Performed by: STUDENT IN AN ORGANIZED HEALTH CARE EDUCATION/TRAINING PROGRAM

## 2025-01-14 PROCEDURE — 73130 X-RAY EXAM OF HAND: CPT | Mod: TC,FY,RT

## 2025-01-14 PROCEDURE — 1159F MED LIST DOCD IN RCRD: CPT | Mod: CPTII,S$GLB,, | Performed by: STUDENT IN AN ORGANIZED HEALTH CARE EDUCATION/TRAINING PROGRAM

## 2025-01-14 PROCEDURE — 99396 PREV VISIT EST AGE 40-64: CPT | Mod: S$GLB,,, | Performed by: STUDENT IN AN ORGANIZED HEALTH CARE EDUCATION/TRAINING PROGRAM

## 2025-01-14 PROCEDURE — 73630 X-RAY EXAM OF FOOT: CPT | Mod: 26,RT,, | Performed by: RADIOLOGY

## 2025-01-14 PROCEDURE — 3079F DIAST BP 80-89 MM HG: CPT | Mod: CPTII,S$GLB,, | Performed by: STUDENT IN AN ORGANIZED HEALTH CARE EDUCATION/TRAINING PROGRAM

## 2025-01-14 PROCEDURE — 73130 X-RAY EXAM OF HAND: CPT | Mod: 26,RT,, | Performed by: RADIOLOGY

## 2025-01-14 PROCEDURE — 73630 X-RAY EXAM OF FOOT: CPT | Mod: TC,FY,RT

## 2025-01-14 PROCEDURE — 3008F BODY MASS INDEX DOCD: CPT | Mod: CPTII,S$GLB,, | Performed by: STUDENT IN AN ORGANIZED HEALTH CARE EDUCATION/TRAINING PROGRAM

## 2025-01-14 PROCEDURE — 3075F SYST BP GE 130 - 139MM HG: CPT | Mod: CPTII,S$GLB,, | Performed by: STUDENT IN AN ORGANIZED HEALTH CARE EDUCATION/TRAINING PROGRAM

## 2025-01-14 PROCEDURE — 4010F ACE/ARB THERAPY RXD/TAKEN: CPT | Mod: CPTII,S$GLB,, | Performed by: STUDENT IN AN ORGANIZED HEALTH CARE EDUCATION/TRAINING PROGRAM

## 2025-01-14 RX ORDER — AMLODIPINE BESYLATE 10 MG/1
10 TABLET ORAL DAILY
Qty: 90 TABLET | Refills: 3 | Status: SHIPPED | OUTPATIENT
Start: 2025-01-14

## 2025-01-14 RX ORDER — PANTOPRAZOLE SODIUM 40 MG/1
40 TABLET, DELAYED RELEASE ORAL 2 TIMES DAILY
Qty: 180 TABLET | Refills: 3 | Status: SHIPPED | OUTPATIENT
Start: 2025-01-14 | End: 2026-01-09

## 2025-01-14 RX ORDER — VALSARTAN 80 MG/1
80 TABLET ORAL DAILY
Qty: 90 TABLET | Refills: 3 | Status: SHIPPED | OUTPATIENT
Start: 2025-01-14 | End: 2026-01-14

## 2025-01-14 RX ORDER — ATORVASTATIN CALCIUM 40 MG/1
40 TABLET, FILM COATED ORAL DAILY
Qty: 90 TABLET | Refills: 3 | Status: SHIPPED | OUTPATIENT
Start: 2025-01-14 | End: 2026-01-14

## 2025-01-21 ENCOUNTER — TELEPHONE (OUTPATIENT)
Dept: ORTHOPEDICS | Facility: CLINIC | Age: 60
End: 2025-01-21
Payer: COMMERCIAL

## 2025-01-21 NOTE — PROGRESS NOTES
[facility]  Subjective:     Patient ID: Yayo Carver is a 59 y.o. female.  History of Present Illness    CHIEF COMPLAINT:  Patient presents today for medication refills and follow-up.    HYPERTENSION:  She reports adequate blood pressure control with home monitoring and requests valsartan refill.    NEUROPATHY:  She reports stinging and stabbing sensations in four fingers of her left hand  and decreased sensation. She uses hot water applications to help regain sensation.    DIABETES:  She continues Ozempic 2mg weekly for diabetes management. A1C was 5.5, indicating well-controlled diabetes.    PAIN MANAGEMENT:  She takes tramadol for back pain. She recently had foot surgery involving arch bone reconstruction and bunion removal.    GERD:  She reports recurrence of acid reflux which she attributes to changing from meloxicam to a different anti-inflammatory medication.    CURRENT MEDICATIONS AND SUPPLEMENTS:  Current medications include valsartan, Ozempic, tramadol, and vitamin D 2000mg daily. Vitamin D level was 42, within normal range.    IMMUNIZATIONS:  She is due for influenza vaccination, second dose of Shingrix, and COVID-19 vaccination.       Current Outpatient Medications   Medication Instructions    acetaminophen (TYLENOL) 650 mg, Oral, Every 8 hours    amLODIPine (NORVASC) 10 mg, Oral, Daily    ammonium lactate 12 % Crea 1 application , Topical (Top), 2 times daily    atorvastatin (LIPITOR) 40 mg, Oral, Daily    b complex vitamins capsule 1 capsule, Daily    cholecalciferol (vitamin D3) (VITAMIN D3) 2,000 Units, Oral, Daily    diclofenac (VOLTAREN) 75 mg, Oral, 2 times daily    diclofenac sodium (VOLTAREN) 2 g, Topical (Top), 4 times daily    flash glucose sensor (FREESTYLE HEENA 14 DAY SENSOR) Kit Apply to skin for 14 days.  Check blood glucose before meals and nightly.    fluticasone propionate (FLONASE) 50 mcg/actuation nasal spray Instill 2 sprays (100 mcg total) by Each Nostril  "route once daily.    LIDOcaine-prilocaine (EMLA) cream Topical (Top), Every 6-8 hours PRN, Apply to area of foot pain as needed every 6-8 hrs    multivitamin (THERAGRAN) per tablet 1 tablet, Daily    oxyCODONE-acetaminophen (PERCOCET) 5-325 mg per tablet 1 tablet, Oral, Every 12 hours PRN    oxyCODONE-acetaminophen (PERCOCET) 7.5-325 mg per tablet 1 tablet, Oral, Daily PRN    OZEMPIC 2 mg, Subcutaneous, Every 7 days    pantoprazole (PROTONIX) 40 mg, Oral, 2 times daily    phentermine (ADIPEX-P) 37.5 mg tablet 1 tablet, Every morning    traMADoL (ULTRAM) 50 mg, Oral, 3 times daily PRN    valsartan (DIOVAN) 80 mg, Oral, Daily     Objective:      Body mass index is 39.18 kg/m².  Vitals:    01/14/25 0815   BP: 137/84   Pulse: 81   SpO2: 99%   Weight: 110.1 kg (242 lb 11.6 oz)   Height: 5' 6" (1.676 m)   PainSc:   8   PainLoc: Hand     Physical Exam   Physical Exam    General: No acute distress. Normal appearance.  Head: Normocephalic.  Eyes: Extraocular movements intact.  Neck: No thyromegaly.  Cardiovascular: Normal rate and rhythm. No murmur heard. Strong pulse in wrist.  Lungs: Pulmonary effort is normal. Normal breath sounds. No wheezing. No rales.  Abdomen: Flat.  Musculoskeletal: No edema lower extremities. Swelling in right thumb.  Skin: Warm. Dry.  Neurological: Alert.  Psychiatric: Normal mood. Normal behavior.       Assessment:       1. Essential hypertension    2. Mixed hyperlipidemia    3. Gastroesophageal reflux disease with esophagitis        Plan:   Impression (dictated)   Plan (software generated and edited)   Assessment & Plan    IMPRESSION:  Seen today for a 6-month followup of hypertension and diabetes. Her blood pressure is well controlled on her current regimen of amlodipine and valsartan. Her diabetes is well controlled on high-dose Ozempic with her last A1c being less than 6 as checked by her bariatric surgeon. Reports some numbness and tingling in the first 3 fingers of her left hand today. She " has normal pulse in that area. Discussed this may be due to the cold weather and advised keeping hands warm. She has a visit scheduled with the hand surgeon next week which she will maintain and discuss further management with them at that time. Follow up in 6 months.    PLAN SUMMARY:  Continue valsartan and amlodipine for hypertension management  Continue Lipitor for cholesterol management  Continue Ozempic 2 mg weekly for diabetes management  Continue tramadol for back pain management  Refill Protonix for acid reflux  Continue vitamin D 2000 mg daily  Advised to switch back to meloxicam for pain management  Upcoming appointment with hand specialist on the 22nd for left hand evaluation  Follow up in 6 months      FOLLOW UP:  Follow up in 6 months.         Essential hypertension  -     valsartan (DIOVAN) 80 MG tablet; Take 1 tablet (80 mg total) by mouth once daily.  Dispense: 90 tablet; Refill: 3  -     amLODIPine (NORVASC) 10 MG tablet; Take 1 tablet (10 mg total) by mouth once daily.  Dispense: 90 tablet; Refill: 3    Mixed hyperlipidemia  -     atorvastatin (LIPITOR) 40 MG tablet; Take 1 tablet (40 mg total) by mouth once daily.  Dispense: 90 tablet; Refill: 3    Gastroesophageal reflux disease with esophagitis  -     pantoprazole (PROTONIX) 40 MG tablet; Take 1 tablet (40 mg total) by mouth 2 (two) times daily.  Dispense: 180 tablet; Refill: 3        Tests to Keep You Healthy    Mammogram: ORDERED BUT NOT SCHEDULED  Eye Exam: Met on 9/4/2024  Colon Cancer Screening: Met on 6/27/2017  Cervical Cancer Screening: ORDERED  Last Blood Pressure <= 139/89 (1/14/2025): Yes  Last HbA1c < 8 (12/05/2024): Yes    No follow-ups on file. or sooner prn (as needed)          Jonathon Chahal  [facility]  [address]  [address]  [place] [address]  Phone [phone]  Fax [phone]    Part of this note is dictated using the M*Modal Fluency Direct word recognition program. It may contain  word recognition mistakes or wrong word substitutions (commonly he/she and is/was substitutions) that were missed on review.    Part of this note was generated with the assistance of ambient listening technology. Verbal consent was obtained by the patient and accompanying visitor(s) for the recording of patient appointment to facilitate this note. I attest to having reviewed and edited the generated note for accuracy, though some syntax or spelling errors may persist. Please contact the author of this note for any clarification.

## 2025-01-21 NOTE — TELEPHONE ENCOUNTER
Patient is OON with [facility]. She is in  process of transitioning insurances. She will call us back once she gets new insurance.    Deion Workman MS, OTC   Sports Medicine Assistant   [facility]  (P) [phone]  (F) [phone]

## 2025-01-27 NOTE — PROGRESS NOTES
Dr. Ocampo,     Patient requesting referral to Dr. Sean Hubbard for acid reflux, unable to swallow.    Pended referral please review diagnosis and sign off if you agree.    Thank you.  Imelda Oliva  [facility]     Pt with known djd of bilateral knees. She comes in for aspiration today. She is scheduled for left knee replacement 12/19, so she can't have a cortisone injection in that knee today.        Knee Arthrocentesis with Injection Procedure Note    Pre-operative Diagnosis: Left knee degenerative arthritis    Post-operative Diagnosis: same    Indications: Left knee pain    Anesthesia: none    Procedure Details     Verbal consent was obtained for the procedure.An 18 gauge needle was inserted into the superior aspect of the joint from a lateral approach. 15 ml of clear yellow fluid was removed from the joint and discarded.  The needle was removed and the area cleansed and dressed.    Complications:  None; patient tolerated the procedure well.         Knee Arthrocentesis with Injection Procedure Note    Pre-operative Diagnosis: right knee degenerative arthritis    Post-operative Diagnosis: same    Indications: right knee pain    Anesthesia: none    Procedure Details     Verbal consent was obtained for the procedure.An 18 gauge needle was inserted into the superior aspect of the joint from a lateral approach. 15 ml of clear yellow fluid was removed from the joint and discarded. 25% marcaine 4 ml and 40mg of kenalog was then injected into the joint through the same needle. The needle was removed and the area cleansed and dressed.    Complications:  None; patient tolerated the procedure well.
